# Patient Record
Sex: FEMALE | Race: WHITE | NOT HISPANIC OR LATINO
[De-identification: names, ages, dates, MRNs, and addresses within clinical notes are randomized per-mention and may not be internally consistent; named-entity substitution may affect disease eponyms.]

---

## 2017-01-03 ENCOUNTER — RX RENEWAL (OUTPATIENT)
Age: 82
End: 2017-01-03

## 2017-01-04 ENCOUNTER — MEDICATION RENEWAL (OUTPATIENT)
Age: 82
End: 2017-01-04

## 2017-01-17 ENCOUNTER — APPOINTMENT (OUTPATIENT)
Dept: NEUROSURGERY | Facility: CLINIC | Age: 82
End: 2017-01-17

## 2017-01-17 VITALS
DIASTOLIC BLOOD PRESSURE: 71 MMHG | BODY MASS INDEX: 25.76 KG/M2 | SYSTOLIC BLOOD PRESSURE: 158 MMHG | HEART RATE: 80 BPM | WEIGHT: 140 LBS | HEIGHT: 62 IN

## 2017-01-17 DIAGNOSIS — M54.30 SCIATICA, UNSPECIFIED SIDE: ICD-10-CM

## 2017-01-17 DIAGNOSIS — M96.1 POSTLAMINECTOMY SYNDROME, NOT ELSEWHERE CLASSIFIED: ICD-10-CM

## 2017-02-08 ENCOUNTER — EMERGENCY (EMERGENCY)
Facility: HOSPITAL | Age: 82
LOS: 1 days | Discharge: HOME | End: 2017-02-08
Attending: EMERGENCY MEDICINE | Admitting: EMERGENCY MEDICINE
Payer: MEDICARE

## 2017-02-08 VITALS
HEART RATE: 64 BPM | SYSTOLIC BLOOD PRESSURE: 116 MMHG | WEIGHT: 160.06 LBS | DIASTOLIC BLOOD PRESSURE: 78 MMHG | OXYGEN SATURATION: 97 % | RESPIRATION RATE: 16 BRPM

## 2017-02-08 DIAGNOSIS — E78.5 HYPERLIPIDEMIA, UNSPECIFIED: ICD-10-CM

## 2017-02-08 DIAGNOSIS — R07.89 OTHER CHEST PAIN: ICD-10-CM

## 2017-02-08 DIAGNOSIS — I25.10 ATHEROSCLEROTIC HEART DISEASE OF NATIVE CORONARY ARTERY WITHOUT ANGINA PECTORIS: ICD-10-CM

## 2017-02-08 DIAGNOSIS — K21.9 GASTRO-ESOPHAGEAL REFLUX DISEASE WITHOUT ESOPHAGITIS: ICD-10-CM

## 2017-02-08 DIAGNOSIS — Z96.60 PRESENCE OF UNSPECIFIED ORTHOPEDIC JOINT IMPLANT: Chronic | ICD-10-CM

## 2017-02-08 DIAGNOSIS — Z98.89 OTHER SPECIFIED POSTPROCEDURAL STATES: Chronic | ICD-10-CM

## 2017-02-08 DIAGNOSIS — Z95.5 PRESENCE OF CORONARY ANGIOPLASTY IMPLANT AND GRAFT: ICD-10-CM

## 2017-02-08 DIAGNOSIS — Z79.84 LONG TERM (CURRENT) USE OF ORAL HYPOGLYCEMIC DRUGS: ICD-10-CM

## 2017-02-08 DIAGNOSIS — E11.9 TYPE 2 DIABETES MELLITUS WITHOUT COMPLICATIONS: ICD-10-CM

## 2017-02-08 DIAGNOSIS — Z79.82 LONG TERM (CURRENT) USE OF ASPIRIN: ICD-10-CM

## 2017-02-08 LAB
ALBUMIN SERPL ELPH-MCNC: 4.1 G/DL — SIGNIFICANT CHANGE UP (ref 3.3–5)
ALP SERPL-CCNC: 59 U/L — SIGNIFICANT CHANGE UP (ref 40–120)
ALT FLD-CCNC: 16 U/L — SIGNIFICANT CHANGE UP (ref 12–78)
ANION GAP SERPL CALC-SCNC: 11 MMOL/L — SIGNIFICANT CHANGE UP (ref 5–17)
APTT BLD: 28.5 SEC — SIGNIFICANT CHANGE UP (ref 27.5–37.4)
AST SERPL-CCNC: 16 U/L — SIGNIFICANT CHANGE UP (ref 15–37)
BASOPHILS # BLD AUTO: 0.1 K/UL — SIGNIFICANT CHANGE UP (ref 0–0.2)
BASOPHILS NFR BLD AUTO: 0.6 % — SIGNIFICANT CHANGE UP (ref 0–2)
BILIRUB SERPL-MCNC: 0.3 MG/DL — SIGNIFICANT CHANGE UP (ref 0.2–1.2)
BUN SERPL-MCNC: 19 MG/DL — SIGNIFICANT CHANGE UP (ref 7–23)
CALCIUM SERPL-MCNC: 8.9 MG/DL — SIGNIFICANT CHANGE UP (ref 8.5–10.1)
CHLORIDE SERPL-SCNC: 104 MMOL/L — SIGNIFICANT CHANGE UP (ref 96–108)
CK MB BLD-MCNC: 3 % — SIGNIFICANT CHANGE UP (ref 0–3.5)
CK MB CFR SERPL CALC: 2.6 NG/ML — SIGNIFICANT CHANGE UP (ref 0–3.6)
CK SERPL-CCNC: 88 U/L — SIGNIFICANT CHANGE UP (ref 26–192)
CO2 SERPL-SCNC: 24 MMOL/L — SIGNIFICANT CHANGE UP (ref 22–31)
CREAT SERPL-MCNC: 1.1 MG/DL — SIGNIFICANT CHANGE UP (ref 0.5–1.3)
EOSINOPHIL # BLD AUTO: 0.1 K/UL — SIGNIFICANT CHANGE UP (ref 0–0.5)
EOSINOPHIL NFR BLD AUTO: 0.6 % — SIGNIFICANT CHANGE UP (ref 0–6)
GLUCOSE SERPL-MCNC: 136 MG/DL — HIGH (ref 70–99)
HCT VFR BLD CALC: 36 % — SIGNIFICANT CHANGE UP (ref 34.5–45)
HGB BLD-MCNC: 11.2 G/DL — LOW (ref 11.5–15.5)
INR BLD: 1.03 RATIO — SIGNIFICANT CHANGE UP (ref 0.88–1.16)
LYMPHOCYTES # BLD AUTO: 1.9 K/UL — SIGNIFICANT CHANGE UP (ref 1–3.3)
LYMPHOCYTES # BLD AUTO: 14.5 % — SIGNIFICANT CHANGE UP (ref 13–44)
MCHC RBC-ENTMCNC: 27.3 PG — SIGNIFICANT CHANGE UP (ref 27–34)
MCHC RBC-ENTMCNC: 31 GM/DL — LOW (ref 32–36)
MCV RBC AUTO: 88 FL — SIGNIFICANT CHANGE UP (ref 80–100)
MONOCYTES # BLD AUTO: 0.7 K/UL — SIGNIFICANT CHANGE UP (ref 0–0.9)
MONOCYTES NFR BLD AUTO: 5.4 % — SIGNIFICANT CHANGE UP (ref 1–9)
NEUTROPHILS # BLD AUTO: 10.2 K/UL — HIGH (ref 1.8–7.4)
NEUTROPHILS NFR BLD AUTO: 78.9 % — HIGH (ref 43–77)
PLATELET # BLD AUTO: 238 K/UL — SIGNIFICANT CHANGE UP (ref 150–400)
POTASSIUM SERPL-MCNC: 4.3 MMOL/L — SIGNIFICANT CHANGE UP (ref 3.5–5.3)
POTASSIUM SERPL-SCNC: 4.3 MMOL/L — SIGNIFICANT CHANGE UP (ref 3.5–5.3)
PROT SERPL-MCNC: 7.8 G/DL — SIGNIFICANT CHANGE UP (ref 6–8.3)
PROTHROM AB SERPL-ACNC: 11.4 SEC — SIGNIFICANT CHANGE UP (ref 10–13.1)
RBC # BLD: 4.09 M/UL — SIGNIFICANT CHANGE UP (ref 3.8–5.2)
RBC # FLD: 13.3 % — SIGNIFICANT CHANGE UP (ref 10.3–14.5)
SODIUM SERPL-SCNC: 139 MMOL/L — SIGNIFICANT CHANGE UP (ref 135–145)
TROPONIN I SERPL-MCNC: <.015 NG/ML — SIGNIFICANT CHANGE UP (ref 0.01–0.04)
WBC # BLD: 12.9 K/UL — HIGH (ref 3.8–10.5)
WBC # FLD AUTO: 12.9 K/UL — HIGH (ref 3.8–10.5)

## 2017-02-08 PROCEDURE — 71010: CPT | Mod: 26

## 2017-02-08 PROCEDURE — 99285 EMERGENCY DEPT VISIT HI MDM: CPT

## 2017-02-08 RX ORDER — PANTOPRAZOLE SODIUM 20 MG/1
40 TABLET, DELAYED RELEASE ORAL ONCE
Qty: 0 | Refills: 0 | Status: COMPLETED | OUTPATIENT
Start: 2017-02-08 | End: 2017-02-08

## 2017-02-08 NOTE — ED ADULT NURSE NOTE - PMH
Coronary arteriosclerosis in native artery  s/p 2 stents. last placed 2 years ago  Costochondritis    DM type 2 with diabetic dyslipidemia    Dyslipidemia    Gastroparesis    H/O gastroesophageal reflux (GERD)    H/O vertebral fracture repair    History of vertebral fracture

## 2017-02-08 NOTE — ED ADULT NURSE NOTE - OBJECTIVE STATEMENT
Pt alert and oriented, came to ED with c/o chest pain earlier today, diarrhea earlier today, labs drawn and sent, advised on plan of care/hand hygiene, verbalized understanding, awaiting dispo

## 2017-02-09 VITALS
SYSTOLIC BLOOD PRESSURE: 142 MMHG | DIASTOLIC BLOOD PRESSURE: 68 MMHG | RESPIRATION RATE: 16 BRPM | OXYGEN SATURATION: 96 % | TEMPERATURE: 98 F | HEART RATE: 70 BPM

## 2017-02-09 LAB
CK MB CFR SERPL CALC: 2.1 NG/ML — SIGNIFICANT CHANGE UP (ref 0–3.6)
TROPONIN I SERPL-MCNC: <.015 NG/ML — SIGNIFICANT CHANGE UP (ref 0.01–0.04)

## 2017-02-09 PROCEDURE — 84484 ASSAY OF TROPONIN QUANT: CPT

## 2017-02-09 PROCEDURE — 85610 PROTHROMBIN TIME: CPT

## 2017-02-09 PROCEDURE — 96374 THER/PROPH/DIAG INJ IV PUSH: CPT

## 2017-02-09 PROCEDURE — 85730 THROMBOPLASTIN TIME PARTIAL: CPT

## 2017-02-09 PROCEDURE — 80053 COMPREHEN METABOLIC PANEL: CPT

## 2017-02-09 PROCEDURE — 71045 X-RAY EXAM CHEST 1 VIEW: CPT

## 2017-02-09 PROCEDURE — 93005 ELECTROCARDIOGRAM TRACING: CPT

## 2017-02-09 PROCEDURE — 85027 COMPLETE CBC AUTOMATED: CPT

## 2017-02-09 PROCEDURE — 99284 EMERGENCY DEPT VISIT MOD MDM: CPT | Mod: 25

## 2017-02-09 PROCEDURE — 82553 CREATINE MB FRACTION: CPT

## 2017-02-09 PROCEDURE — 82550 ASSAY OF CK (CPK): CPT

## 2017-02-09 RX ORDER — IBUPROFEN 200 MG
600 TABLET ORAL ONCE
Qty: 0 | Refills: 0 | Status: COMPLETED | OUTPATIENT
Start: 2017-02-09 | End: 2017-02-09

## 2017-02-09 RX ORDER — HYDROMORPHONE HYDROCHLORIDE 2 MG/ML
4 INJECTION INTRAMUSCULAR; INTRAVENOUS; SUBCUTANEOUS ONCE
Qty: 0 | Refills: 0 | Status: DISCONTINUED | OUTPATIENT
Start: 2017-02-09 | End: 2017-02-09

## 2017-02-09 RX ORDER — SUCRALFATE 1 G
1 TABLET ORAL ONCE
Qty: 0 | Refills: 0 | Status: COMPLETED | OUTPATIENT
Start: 2017-02-09 | End: 2017-02-09

## 2017-02-09 RX ADMIN — Medication 600 MILLIGRAM(S): at 05:05

## 2017-02-09 RX ADMIN — HYDROMORPHONE HYDROCHLORIDE 4 MILLIGRAM(S): 2 INJECTION INTRAMUSCULAR; INTRAVENOUS; SUBCUTANEOUS at 08:28

## 2017-02-09 RX ADMIN — Medication 30 MILLILITER(S): at 08:28

## 2017-02-09 RX ADMIN — HYDROMORPHONE HYDROCHLORIDE 4 MILLIGRAM(S): 2 INJECTION INTRAMUSCULAR; INTRAVENOUS; SUBCUTANEOUS at 07:59

## 2017-02-09 RX ADMIN — PANTOPRAZOLE SODIUM 40 MILLIGRAM(S): 20 TABLET, DELAYED RELEASE ORAL at 02:23

## 2017-02-09 RX ADMIN — Medication 600 MILLIGRAM(S): at 08:28

## 2017-02-09 RX ADMIN — Medication 1 GRAM(S): at 02:26

## 2017-02-09 NOTE — ED ADULT NURSE REASSESSMENT NOTE - NS ED NURSE REASSESS COMMENT FT1
await ambulance to transport pt back to her home. taxis and ambulettes are not in service during the snowstorm

## 2017-02-09 NOTE — ED ADULT NURSE REASSESSMENT NOTE - NS ED NURSE REASSESS COMMENT FT1
Pt alert and orientated no symptoms at this time. Pt continues to remove cardiac monitor and equipment. Motrin given for headache. Two sets of cardiac enzymes negative. Pt to see cardiology in the morning then discharged

## 2017-02-09 NOTE — ED PROVIDER NOTE - OBJECTIVE STATEMENT
Pt is a 84 yo female who presents to the ED with a cc of chest discomfort.  Pt reports that she is on chronic opioids for a pain disorder.  Due to this she has suffered from constipation.  She has tried various methods to relieve this without much success.  She recently was at her PMD and was prescribed Movantik for opoid induced constipation. Pt reports that she had taken it before but tonight after taking this medication she developed diarrhea and then severe epigastric discomfort.  About 1/2 hour later she developed chest pain.  Initially she thought that this was secondary to reflux so she tried Zantac, and then Dexilant without relief.  After attempting to rest with continued pain pt called EMS.  She reports that the chest pain concerned her because she does have a cardiac history with 2 previous stents.  Pt believes that the stents were placed in 2012.  Denies fever, chills, N/V, SOB.

## 2017-02-14 ENCOUNTER — APPOINTMENT (OUTPATIENT)
Dept: INTERNAL MEDICINE | Facility: CLINIC | Age: 82
End: 2017-02-14

## 2017-02-14 DIAGNOSIS — Z91.89 OTHER SPECIFIED PERSONAL RISK FACTORS, NOT ELSEWHERE CLASSIFIED: ICD-10-CM

## 2017-02-28 ENCOUNTER — OTHER (OUTPATIENT)
Age: 82
End: 2017-02-28

## 2017-03-01 ENCOUNTER — LABORATORY RESULT (OUTPATIENT)
Age: 82
End: 2017-03-01

## 2017-03-02 LAB
APPEARANCE: CLEAR
BILIRUBIN URINE: NEGATIVE
BLOOD URINE: NEGATIVE
COLOR: YELLOW
GLUCOSE QUALITATIVE U: NORMAL MG/DL
KETONES URINE: NEGATIVE
LEUKOCYTE ESTERASE URINE: ABNORMAL
NITRITE URINE: NEGATIVE
PH URINE: 7
PROTEIN URINE: NEGATIVE MG/DL
SPECIFIC GRAVITY URINE: 1.02
UROBILINOGEN URINE: NORMAL MG/DL

## 2017-05-30 ENCOUNTER — APPOINTMENT (OUTPATIENT)
Dept: INTERNAL MEDICINE | Facility: CLINIC | Age: 82
End: 2017-05-30

## 2017-05-30 VITALS — DIASTOLIC BLOOD PRESSURE: 71 MMHG | OXYGEN SATURATION: 96 % | HEART RATE: 77 BPM | SYSTOLIC BLOOD PRESSURE: 147 MMHG

## 2017-05-31 VITALS
RESPIRATION RATE: 16 BRPM | DIASTOLIC BLOOD PRESSURE: 73 MMHG | HEART RATE: 77 BPM | OXYGEN SATURATION: 97 % | SYSTOLIC BLOOD PRESSURE: 124 MMHG | TEMPERATURE: 97.9 F

## 2017-06-23 ENCOUNTER — APPOINTMENT (OUTPATIENT)
Dept: UROLOGY | Facility: CLINIC | Age: 82
End: 2017-06-23

## 2017-06-23 DIAGNOSIS — Z86.39 PERSONAL HISTORY OF OTHER ENDOCRINE, NUTRITIONAL AND METABOLIC DISEASE: ICD-10-CM

## 2017-06-23 DIAGNOSIS — G62.9 POLYNEUROPATHY, UNSPECIFIED: ICD-10-CM

## 2017-06-23 DIAGNOSIS — N30.00 ACUTE CYSTITIS W/OUT HEMATURIA: ICD-10-CM

## 2017-06-26 LAB
APPEARANCE: CLEAR
BACTERIA UR CULT: NORMAL
BACTERIA: NEGATIVE
BILIRUBIN URINE: NEGATIVE
BLOOD URINE: NEGATIVE
COLOR: YELLOW
GLUCOSE QUALITATIVE U: NORMAL MG/DL
HYALINE CASTS: 6 /LPF
KETONES URINE: NEGATIVE
LEUKOCYTE ESTERASE URINE: NEGATIVE
MICROSCOPIC-UA: NORMAL
NITRITE URINE: NEGATIVE
PH URINE: 6
PROTEIN URINE: 30 MG/DL
RED BLOOD CELLS URINE: 3 /HPF
SPECIFIC GRAVITY URINE: 1.02
SQUAMOUS EPITHELIAL CELLS: 1 /HPF
UROBILINOGEN URINE: NORMAL MG/DL
WHITE BLOOD CELLS URINE: 2 /HPF

## 2017-06-29 ENCOUNTER — APPOINTMENT (OUTPATIENT)
Dept: INTERNAL MEDICINE | Facility: CLINIC | Age: 82
End: 2017-06-29

## 2017-06-29 DIAGNOSIS — R68.83 CHILLS (WITHOUT FEVER): ICD-10-CM

## 2017-07-10 ENCOUNTER — RX RENEWAL (OUTPATIENT)
Age: 82
End: 2017-07-10

## 2017-07-10 DIAGNOSIS — E03.9 HYPOTHYROIDISM, UNSPECIFIED: ICD-10-CM

## 2017-07-10 LAB
CORTIS SERPL-MCNC: 20.9 UG/DL
TSH SERPL-ACNC: 4.5 UIU/ML
VIT B12 SERPL-MCNC: 361 PG/ML

## 2017-07-25 ENCOUNTER — MEDICATION RENEWAL (OUTPATIENT)
Age: 82
End: 2017-07-25

## 2017-08-04 ENCOUNTER — RX RENEWAL (OUTPATIENT)
Age: 82
End: 2017-08-04

## 2017-08-22 ENCOUNTER — APPOINTMENT (OUTPATIENT)
Dept: INTERNAL MEDICINE | Facility: CLINIC | Age: 82
End: 2017-08-22
Payer: MEDICARE

## 2017-08-22 VITALS
SYSTOLIC BLOOD PRESSURE: 113 MMHG | OXYGEN SATURATION: 97 % | HEART RATE: 87 BPM | DIASTOLIC BLOOD PRESSURE: 79 MMHG | RESPIRATION RATE: 14 BRPM

## 2017-08-22 DIAGNOSIS — L03.115 CELLULITIS OF RIGHT LOWER LIMB: ICD-10-CM

## 2017-08-22 PROCEDURE — 99213 OFFICE O/P EST LOW 20 MIN: CPT

## 2017-08-24 LAB
BASOPHILS # BLD AUTO: 0.02 K/UL
BASOPHILS NFR BLD AUTO: 0.2 %
EOSINOPHIL # BLD AUTO: 0.19 K/UL
EOSINOPHIL NFR BLD AUTO: 2.4 %
HCT VFR BLD CALC: 27.8 %
HGB BLD-MCNC: 8.5 G/DL
IMM GRANULOCYTES NFR BLD AUTO: 0.2 %
LYMPHOCYTES # BLD AUTO: 1.32 K/UL
LYMPHOCYTES NFR BLD AUTO: 16.4 %
MAN DIFF?: NORMAL
MCHC RBC-ENTMCNC: 25.2 PG
MCHC RBC-ENTMCNC: 30.6 GM/DL
MCV RBC AUTO: 82.5 FL
MONOCYTES # BLD AUTO: 0.56 K/UL
MONOCYTES NFR BLD AUTO: 7 %
NEUTROPHILS # BLD AUTO: 5.93 K/UL
NEUTROPHILS NFR BLD AUTO: 73.8 %
PLATELET # BLD AUTO: 277 K/UL
RBC # BLD: 3.37 M/UL
RBC # FLD: 15.9 %
WBC # FLD AUTO: 8.04 K/UL

## 2017-08-29 ENCOUNTER — APPOINTMENT (OUTPATIENT)
Dept: INTERNAL MEDICINE | Facility: CLINIC | Age: 82
End: 2017-08-29
Payer: MEDICARE

## 2017-08-29 VITALS
WEIGHT: 140 LBS | SYSTOLIC BLOOD PRESSURE: 124 MMHG | RESPIRATION RATE: 14 BRPM | DIASTOLIC BLOOD PRESSURE: 80 MMHG | BODY MASS INDEX: 25.61 KG/M2 | OXYGEN SATURATION: 98 % | HEART RATE: 84 BPM

## 2017-08-29 PROCEDURE — 99214 OFFICE O/P EST MOD 30 MIN: CPT

## 2017-08-30 LAB
FERRITIN SERPL-MCNC: 8 NG/ML
HBA1C MFR BLD HPLC: 7.1 %
IRON SERPL-MCNC: 22 UG/DL

## 2017-08-31 ENCOUNTER — RX RENEWAL (OUTPATIENT)
Age: 82
End: 2017-08-31

## 2017-09-19 ENCOUNTER — EMERGENCY (EMERGENCY)
Facility: HOSPITAL | Age: 82
LOS: 1 days | Discharge: HOME | End: 2017-09-19
Attending: EMERGENCY MEDICINE | Admitting: EMERGENCY MEDICINE
Payer: MEDICARE

## 2017-09-19 VITALS
RESPIRATION RATE: 14 BRPM | WEIGHT: 138.01 LBS | DIASTOLIC BLOOD PRESSURE: 62 MMHG | SYSTOLIC BLOOD PRESSURE: 130 MMHG | OXYGEN SATURATION: 97 % | HEART RATE: 72 BPM | TEMPERATURE: 98 F

## 2017-09-19 DIAGNOSIS — Z96.60 PRESENCE OF UNSPECIFIED ORTHOPEDIC JOINT IMPLANT: Chronic | ICD-10-CM

## 2017-09-19 DIAGNOSIS — Z98.89 OTHER SPECIFIED POSTPROCEDURAL STATES: Chronic | ICD-10-CM

## 2017-09-19 LAB
POTASSIUM SERPL-MCNC: 4.8 MMOL/L — SIGNIFICANT CHANGE UP (ref 3.5–5.3)
POTASSIUM SERPL-SCNC: 4.8 MMOL/L — SIGNIFICANT CHANGE UP (ref 3.5–5.3)

## 2017-09-19 PROCEDURE — 99283 EMERGENCY DEPT VISIT LOW MDM: CPT

## 2017-09-19 PROCEDURE — 84132 ASSAY OF SERUM POTASSIUM: CPT

## 2017-09-19 NOTE — ED ADULT NURSE NOTE - OBJECTIVE STATEMENT
Pt A&Ox4, ambulatory to ED states she had a lab draw that showed high potassium.  Pt states she went to urgent care this past Sunday for an evaluation of chills and general malaise.  Urgent care did blood analysis and called pt to report potassium of 5.7.  Pt states she needs blood redrawn and checked.  Urgent care faxed over copy of report of pt's bloodwork results.

## 2017-09-19 NOTE — ED PROVIDER NOTE - ATTENDING CONTRIBUTION TO CARE
Pt seen and examined h and p  daughter at bedside. she has hx of swollen legs and anemia, she has also been on abx for a wound on her r leg she sustained over a month ago-now getting better.  went to see urgent care for her leg at the time labs were drawn and she got a call today of the results.  the k was slightly elevated. pt has no other complaintss no sob no fever chills and is here for re evaluation of her serum potassium  eval of her legs demonstrates a healing cellulitis -pt counselled to keep legs up when possible despite her sciatica, she tolerated the lab draw and has no other general illness or complaint.

## 2017-09-19 NOTE — ED PROVIDER NOTE - OBJECTIVE STATEMENT
86 yo female sent to ed from urgent care, blood work was drawn on sunday, patient was called and informed potassium level is high 5.7 , to have level rechecked.  patient states she feels well. has hx of anemia, hgb has gone up since taking iron tablets.    PMD Dr Delacruz

## 2017-09-19 NOTE — ED PROVIDER NOTE - CHIEF COMPLAINT
The patient is a 85y Female complaining of see chief complaint quote. The patient is a 85y Female complaining of abnormal labs

## 2017-09-19 NOTE — ED ADULT NURSE NOTE - CHPI ED SYMPTOMS NEG
no back pain/no fever/no pain/no decreased eating/drinking/no vomiting/no nausea/no dizziness/no loss of consciousness/no headache

## 2017-09-23 DIAGNOSIS — R60.0 LOCALIZED EDEMA: ICD-10-CM

## 2017-09-23 DIAGNOSIS — Z79.84 LONG TERM (CURRENT) USE OF ORAL HYPOGLYCEMIC DRUGS: ICD-10-CM

## 2017-09-23 DIAGNOSIS — Z95.5 PRESENCE OF CORONARY ANGIOPLASTY IMPLANT AND GRAFT: ICD-10-CM

## 2017-09-23 DIAGNOSIS — Z79.2 LONG TERM (CURRENT) USE OF ANTIBIOTICS: ICD-10-CM

## 2017-09-23 DIAGNOSIS — Z79.82 LONG TERM (CURRENT) USE OF ASPIRIN: ICD-10-CM

## 2017-09-23 DIAGNOSIS — R79.89 OTHER SPECIFIED ABNORMAL FINDINGS OF BLOOD CHEMISTRY: ICD-10-CM

## 2017-09-23 DIAGNOSIS — D64.9 ANEMIA, UNSPECIFIED: ICD-10-CM

## 2017-09-23 DIAGNOSIS — E11.9 TYPE 2 DIABETES MELLITUS WITHOUT COMPLICATIONS: ICD-10-CM

## 2017-09-23 DIAGNOSIS — E78.5 HYPERLIPIDEMIA, UNSPECIFIED: ICD-10-CM

## 2017-09-23 DIAGNOSIS — E87.5 HYPERKALEMIA: ICD-10-CM

## 2017-09-23 DIAGNOSIS — K21.9 GASTRO-ESOPHAGEAL REFLUX DISEASE WITHOUT ESOPHAGITIS: ICD-10-CM

## 2017-09-23 DIAGNOSIS — I70.90 UNSPECIFIED ATHEROSCLEROSIS: ICD-10-CM

## 2017-09-25 ENCOUNTER — MEDICATION RENEWAL (OUTPATIENT)
Age: 82
End: 2017-09-25

## 2017-10-06 ENCOUNTER — APPOINTMENT (OUTPATIENT)
Dept: UROLOGY | Facility: CLINIC | Age: 82
End: 2017-10-06

## 2017-10-09 ENCOUNTER — RX RENEWAL (OUTPATIENT)
Age: 82
End: 2017-10-09

## 2017-10-10 ENCOUNTER — RX RENEWAL (OUTPATIENT)
Age: 82
End: 2017-10-10

## 2017-10-10 ENCOUNTER — MEDICATION RENEWAL (OUTPATIENT)
Age: 82
End: 2017-10-10

## 2017-10-13 ENCOUNTER — RX RENEWAL (OUTPATIENT)
Age: 82
End: 2017-10-13

## 2017-10-16 ENCOUNTER — OTHER (OUTPATIENT)
Age: 82
End: 2017-10-16

## 2017-10-16 ENCOUNTER — LABORATORY RESULT (OUTPATIENT)
Age: 82
End: 2017-10-16

## 2017-10-17 ENCOUNTER — MOBILE ON CALL (OUTPATIENT)
Age: 82
End: 2017-10-17

## 2017-10-17 LAB
APPEARANCE: CLEAR
BILIRUBIN URINE: NEGATIVE
BLOOD URINE: NEGATIVE
COLOR: YELLOW
GLUCOSE QUALITATIVE U: NEGATIVE MG/DL
KETONES URINE: ABNORMAL
LEUKOCYTE ESTERASE URINE: NEGATIVE
NITRITE URINE: NEGATIVE
PH URINE: 7
PROTEIN URINE: 30 MG/DL
SPECIFIC GRAVITY URINE: 1.02
UROBILINOGEN URINE: NEGATIVE MG/DL

## 2017-10-31 ENCOUNTER — MEDICATION RENEWAL (OUTPATIENT)
Age: 82
End: 2017-10-31

## 2017-11-01 ENCOUNTER — MEDICATION RENEWAL (OUTPATIENT)
Age: 82
End: 2017-11-01

## 2017-12-06 ENCOUNTER — MEDICATION RENEWAL (OUTPATIENT)
Age: 82
End: 2017-12-06

## 2017-12-18 ENCOUNTER — MEDICATION RENEWAL (OUTPATIENT)
Age: 82
End: 2017-12-18

## 2017-12-18 ENCOUNTER — EMERGENCY (EMERGENCY)
Facility: HOSPITAL | Age: 82
LOS: 1 days | Discharge: HOME | End: 2017-12-18
Attending: EMERGENCY MEDICINE | Admitting: EMERGENCY MEDICINE
Payer: MEDICARE

## 2017-12-18 VITALS
SYSTOLIC BLOOD PRESSURE: 130 MMHG | RESPIRATION RATE: 18 BRPM | OXYGEN SATURATION: 100 % | DIASTOLIC BLOOD PRESSURE: 76 MMHG | HEART RATE: 67 BPM

## 2017-12-18 VITALS
HEIGHT: 62 IN | OXYGEN SATURATION: 100 % | HEART RATE: 69 BPM | TEMPERATURE: 99 F | WEIGHT: 138.89 LBS | RESPIRATION RATE: 15 BRPM | SYSTOLIC BLOOD PRESSURE: 134 MMHG | DIASTOLIC BLOOD PRESSURE: 78 MMHG

## 2017-12-18 DIAGNOSIS — Z98.89 OTHER SPECIFIED POSTPROCEDURAL STATES: Chronic | ICD-10-CM

## 2017-12-18 DIAGNOSIS — Z96.60 PRESENCE OF UNSPECIFIED ORTHOPEDIC JOINT IMPLANT: Chronic | ICD-10-CM

## 2017-12-18 LAB
ALBUMIN SERPL ELPH-MCNC: 3.4 G/DL — SIGNIFICANT CHANGE UP (ref 3.3–5)
ALP SERPL-CCNC: 62 U/L — SIGNIFICANT CHANGE UP (ref 40–120)
ALT FLD-CCNC: 14 U/L — SIGNIFICANT CHANGE UP (ref 12–78)
ANION GAP SERPL CALC-SCNC: 6 MMOL/L — SIGNIFICANT CHANGE UP (ref 5–17)
APPEARANCE UR: ABNORMAL
AST SERPL-CCNC: 15 U/L — SIGNIFICANT CHANGE UP (ref 15–37)
BASOPHILS # BLD AUTO: 0.1 K/UL — SIGNIFICANT CHANGE UP (ref 0–0.2)
BASOPHILS NFR BLD AUTO: 0.6 % — SIGNIFICANT CHANGE UP (ref 0–2)
BILIRUB SERPL-MCNC: 0.3 MG/DL — SIGNIFICANT CHANGE UP (ref 0.2–1.2)
BILIRUB UR-MCNC: NEGATIVE — SIGNIFICANT CHANGE UP
BUN SERPL-MCNC: 19 MG/DL — SIGNIFICANT CHANGE UP (ref 7–23)
CALCIUM SERPL-MCNC: 8.6 MG/DL — SIGNIFICANT CHANGE UP (ref 8.5–10.1)
CHLORIDE SERPL-SCNC: 111 MMOL/L — HIGH (ref 96–108)
CO2 SERPL-SCNC: 24 MMOL/L — SIGNIFICANT CHANGE UP (ref 22–31)
COLOR SPEC: YELLOW — SIGNIFICANT CHANGE UP
CREAT SERPL-MCNC: 0.93 MG/DL — SIGNIFICANT CHANGE UP (ref 0.5–1.3)
DIFF PNL FLD: NEGATIVE — SIGNIFICANT CHANGE UP
EOSINOPHIL # BLD AUTO: 0.1 K/UL — SIGNIFICANT CHANGE UP (ref 0–0.5)
EOSINOPHIL NFR BLD AUTO: 1 % — SIGNIFICANT CHANGE UP (ref 0–6)
GLUCOSE SERPL-MCNC: 110 MG/DL — HIGH (ref 70–99)
GLUCOSE UR QL: NEGATIVE — SIGNIFICANT CHANGE UP
HCT VFR BLD CALC: 30.4 % — LOW (ref 34.5–45)
HGB BLD-MCNC: 9.2 G/DL — LOW (ref 11.5–15.5)
KETONES UR-MCNC: NEGATIVE — SIGNIFICANT CHANGE UP
LEUKOCYTE ESTERASE UR-ACNC: ABNORMAL
LYMPHOCYTES # BLD AUTO: 1.1 K/UL — SIGNIFICANT CHANGE UP (ref 1–3.3)
LYMPHOCYTES # BLD AUTO: 11.6 % — LOW (ref 13–44)
MCHC RBC-ENTMCNC: 26 PG — LOW (ref 27–34)
MCHC RBC-ENTMCNC: 30.2 GM/DL — LOW (ref 32–36)
MCV RBC AUTO: 86.1 FL — SIGNIFICANT CHANGE UP (ref 80–100)
MONOCYTES # BLD AUTO: 0.6 K/UL — SIGNIFICANT CHANGE UP (ref 0–0.9)
MONOCYTES NFR BLD AUTO: 6.1 % — SIGNIFICANT CHANGE UP (ref 1–9)
NEUTROPHILS # BLD AUTO: 7.8 K/UL — HIGH (ref 1.8–7.4)
NEUTROPHILS NFR BLD AUTO: 80.6 % — HIGH (ref 43–77)
NITRITE UR-MCNC: NEGATIVE — SIGNIFICANT CHANGE UP
PH UR: 8 — SIGNIFICANT CHANGE UP (ref 5–8)
PLATELET # BLD AUTO: 253 K/UL — SIGNIFICANT CHANGE UP (ref 150–400)
POTASSIUM SERPL-MCNC: 4.5 MMOL/L — SIGNIFICANT CHANGE UP (ref 3.5–5.3)
POTASSIUM SERPL-SCNC: 4.5 MMOL/L — SIGNIFICANT CHANGE UP (ref 3.5–5.3)
PROT SERPL-MCNC: 6.8 G/DL — SIGNIFICANT CHANGE UP (ref 6–8.3)
PROT UR-MCNC: 25 MG/DL
RBC # BLD: 3.53 M/UL — LOW (ref 3.8–5.2)
RBC # FLD: 15 % — HIGH (ref 10.3–14.5)
SODIUM SERPL-SCNC: 141 MMOL/L — SIGNIFICANT CHANGE UP (ref 135–145)
SP GR SPEC: 1.01 — SIGNIFICANT CHANGE UP (ref 1.01–1.02)
UROBILINOGEN FLD QL: NEGATIVE — SIGNIFICANT CHANGE UP
WBC # BLD: 9.7 K/UL — SIGNIFICANT CHANGE UP (ref 3.8–10.5)
WBC # FLD AUTO: 9.7 K/UL — SIGNIFICANT CHANGE UP (ref 3.8–10.5)

## 2017-12-18 PROCEDURE — 70450 CT HEAD/BRAIN W/O DYE: CPT | Mod: 26

## 2017-12-18 PROCEDURE — 99284 EMERGENCY DEPT VISIT MOD MDM: CPT | Mod: 25

## 2017-12-18 PROCEDURE — 87086 URINE CULTURE/COLONY COUNT: CPT

## 2017-12-18 PROCEDURE — 70450 CT HEAD/BRAIN W/O DYE: CPT

## 2017-12-18 PROCEDURE — 93010 ELECTROCARDIOGRAM REPORT: CPT

## 2017-12-18 PROCEDURE — 85027 COMPLETE CBC AUTOMATED: CPT

## 2017-12-18 PROCEDURE — 99285 EMERGENCY DEPT VISIT HI MDM: CPT

## 2017-12-18 PROCEDURE — 81001 URINALYSIS AUTO W/SCOPE: CPT

## 2017-12-18 PROCEDURE — 71045 X-RAY EXAM CHEST 1 VIEW: CPT

## 2017-12-18 PROCEDURE — 93005 ELECTROCARDIOGRAM TRACING: CPT

## 2017-12-18 PROCEDURE — 71010: CPT | Mod: 26

## 2017-12-18 PROCEDURE — 80053 COMPREHEN METABOLIC PANEL: CPT

## 2017-12-18 RX ORDER — SODIUM CHLORIDE 9 MG/ML
1000 INJECTION INTRAMUSCULAR; INTRAVENOUS; SUBCUTANEOUS ONCE
Qty: 0 | Refills: 0 | Status: COMPLETED | OUTPATIENT
Start: 2017-12-18 | End: 2017-12-18

## 2017-12-18 RX ADMIN — SODIUM CHLORIDE 1000 MILLILITER(S): 9 INJECTION INTRAMUSCULAR; INTRAVENOUS; SUBCUTANEOUS at 12:01

## 2017-12-18 NOTE — ED PROVIDER NOTE - OBJECTIVE STATEMENT
87 yo female with hx cad, dm, chronic back pain bibems due to confusion this am. Patient is aaox3, but doesn't know why she is here. History from patients daughter, who states patient called her twice this am, and was talking about people that werent there. Patient was hallucinating/seeing people. Patient was also prescribed ambien 10mg yesterday and took before bed. Patient denies any chest pain or shortness of breath. denies abdominal pain

## 2017-12-18 NOTE — ED PROVIDER NOTE - PROGRESS NOTE DETAILS
patient observed x 5 hours in ED: remains aaox3  symptoms this am possibly related to ambien  patient wants to go home  encouraged patient and mother to follow up with pmd had social work speak with patient and discuss home care options

## 2017-12-18 NOTE — ED ADULT TRIAGE NOTE - CHIEF COMPLAINT QUOTE
family states pt is confused, hallucinating ,possibly took extra medications, (pt on xanax and pain pills)

## 2017-12-19 LAB
CULTURE RESULTS: SIGNIFICANT CHANGE UP
SPECIMEN SOURCE: SIGNIFICANT CHANGE UP

## 2018-06-01 ENCOUNTER — EMERGENCY (EMERGENCY)
Facility: HOSPITAL | Age: 83
LOS: 1 days | Discharge: HOME | End: 2018-06-01
Attending: INTERNAL MEDICINE
Payer: MEDICARE

## 2018-06-01 VITALS
OXYGEN SATURATION: 97 % | SYSTOLIC BLOOD PRESSURE: 125 MMHG | TEMPERATURE: 98 F | DIASTOLIC BLOOD PRESSURE: 73 MMHG | HEART RATE: 78 BPM | RESPIRATION RATE: 18 BRPM

## 2018-06-01 DIAGNOSIS — Z98.89 OTHER SPECIFIED POSTPROCEDURAL STATES: Chronic | ICD-10-CM

## 2018-06-01 DIAGNOSIS — Z96.60 PRESENCE OF UNSPECIFIED ORTHOPEDIC JOINT IMPLANT: Chronic | ICD-10-CM

## 2018-06-01 PROCEDURE — 71045 X-RAY EXAM CHEST 1 VIEW: CPT | Mod: 26

## 2018-06-01 PROCEDURE — 74019 RADEX ABDOMEN 2 VIEWS: CPT

## 2018-06-01 PROCEDURE — 99284 EMERGENCY DEPT VISIT MOD MDM: CPT

## 2018-06-01 PROCEDURE — 71045 X-RAY EXAM CHEST 1 VIEW: CPT

## 2018-06-01 PROCEDURE — 99284 EMERGENCY DEPT VISIT MOD MDM: CPT | Mod: 25

## 2018-06-01 PROCEDURE — 74019 RADEX ABDOMEN 2 VIEWS: CPT | Mod: 26

## 2018-06-01 RX ORDER — ONDANSETRON 8 MG/1
4 TABLET, FILM COATED ORAL ONCE
Qty: 0 | Refills: 0 | Status: COMPLETED | OUTPATIENT
Start: 2018-06-01 | End: 2018-06-01

## 2018-06-01 RX ADMIN — ONDANSETRON 4 MILLIGRAM(S): 8 TABLET, FILM COATED ORAL at 06:49

## 2018-06-01 RX ADMIN — Medication 1 ENEMA: at 07:20

## 2018-06-01 NOTE — ED ADULT NURSE NOTE - OBJECTIVE STATEMENT
Pt. complaining of constipation x 1 day. Pt. stated she "ran out of milk of magnesia tablets and dulcolax" Pt. complaining of constipation x 1 day. Pt. stated she "ran out of milk of magnesia tablets and dulcolax" and attempted to disimpact her stool but was unsuccessful. Pt. stated she took 2 suppositories last night with oral milk of magnesia and a stool softener. Denied vomiting of fevers. Pt. complaining of constipation x 1 day. Pt. stated she "ran out of milk of magnesia tablets and dulcolax" and attempted to disimpact her stool but was unsuccessful. Pt. stated she took 2 suppositories last night with oral milk of magnesia and a stool softener. Denied vomiting of fevers. Blanchable redness noted to sacrum.

## 2018-06-01 NOTE — ED PROVIDER NOTE - OBJECTIVE STATEMENT
87 y/o wf h/o Coronary arteriosclerosis in native artery  s/p 2 stents  DM type 2 with diabetic dyslipidemia back pain, pain management on analgesia. C/O constipation, fecal impaction sensation. no abdominal pain, no nausea, no vomiting , no GIB. no fever, no chills.

## 2018-06-01 NOTE — ED ADULT TRIAGE NOTE - CHIEF COMPLAINT QUOTE
patient c/o constipation since yesterday took stool softener with no bowel movement. c/o abdominal pain

## 2018-09-05 ENCOUNTER — APPOINTMENT (OUTPATIENT)
Dept: CARDIOLOGY | Facility: CLINIC | Age: 83
End: 2018-09-05
Payer: MEDICARE

## 2018-09-05 ENCOUNTER — NON-APPOINTMENT (OUTPATIENT)
Age: 83
End: 2018-09-05

## 2018-09-05 VITALS
HEART RATE: 73 BPM | WEIGHT: 135 LBS | BODY MASS INDEX: 24.84 KG/M2 | OXYGEN SATURATION: 99 % | SYSTOLIC BLOOD PRESSURE: 149 MMHG | DIASTOLIC BLOOD PRESSURE: 67 MMHG | HEIGHT: 62 IN

## 2018-09-05 VITALS — SYSTOLIC BLOOD PRESSURE: 130 MMHG | DIASTOLIC BLOOD PRESSURE: 68 MMHG

## 2018-09-05 DIAGNOSIS — I65.29 OCCLUSION AND STENOSIS OF UNSPECIFIED CAROTID ARTERY: ICD-10-CM

## 2018-09-05 PROCEDURE — 93000 ELECTROCARDIOGRAM COMPLETE: CPT

## 2018-09-05 PROCEDURE — 99215 OFFICE O/P EST HI 40 MIN: CPT

## 2018-09-05 RX ORDER — LINAGLIPTIN 5 MG/1
5 TABLET, FILM COATED ORAL DAILY
Refills: 0 | Status: ACTIVE | COMMUNITY

## 2018-09-22 ENCOUNTER — APPOINTMENT (OUTPATIENT)
Dept: CARDIOLOGY | Facility: CLINIC | Age: 83
End: 2018-09-22
Payer: MEDICARE

## 2018-09-22 PROCEDURE — 93880 EXTRACRANIAL BILAT STUDY: CPT

## 2018-09-28 ENCOUNTER — APPOINTMENT (OUTPATIENT)
Dept: CARDIOLOGY | Facility: CLINIC | Age: 83
End: 2018-09-28
Payer: MEDICARE

## 2018-09-28 PROCEDURE — 93306 TTE W/DOPPLER COMPLETE: CPT

## 2018-11-02 ENCOUNTER — APPOINTMENT (OUTPATIENT)
Dept: CARDIOLOGY | Facility: CLINIC | Age: 83
End: 2018-11-02

## 2018-12-11 ENCOUNTER — EMERGENCY (EMERGENCY)
Facility: HOSPITAL | Age: 83
LOS: 1 days | Discharge: HOME | End: 2018-12-11
Attending: EMERGENCY MEDICINE | Admitting: EMERGENCY MEDICINE
Payer: MEDICARE

## 2018-12-11 VITALS
DIASTOLIC BLOOD PRESSURE: 69 MMHG | WEIGHT: 132.94 LBS | HEIGHT: 62 IN | HEART RATE: 72 BPM | SYSTOLIC BLOOD PRESSURE: 125 MMHG | RESPIRATION RATE: 18 BRPM | OXYGEN SATURATION: 100 % | TEMPERATURE: 98 F

## 2018-12-11 DIAGNOSIS — Z96.60 PRESENCE OF UNSPECIFIED ORTHOPEDIC JOINT IMPLANT: Chronic | ICD-10-CM

## 2018-12-11 DIAGNOSIS — Z98.89 OTHER SPECIFIED POSTPROCEDURAL STATES: Chronic | ICD-10-CM

## 2018-12-11 PROCEDURE — 74019 RADEX ABDOMEN 2 VIEWS: CPT

## 2018-12-11 PROCEDURE — 74019 RADEX ABDOMEN 2 VIEWS: CPT | Mod: 26

## 2018-12-11 PROCEDURE — 99283 EMERGENCY DEPT VISIT LOW MDM: CPT

## 2018-12-11 PROCEDURE — 99283 EMERGENCY DEPT VISIT LOW MDM: CPT | Mod: 25

## 2018-12-11 RX ORDER — MAGNESIUM HYDROXIDE 400 MG/1
30 TABLET, CHEWABLE ORAL ONCE
Qty: 0 | Refills: 0 | Status: COMPLETED | OUTPATIENT
Start: 2018-12-11 | End: 2018-12-11

## 2018-12-11 RX ADMIN — Medication 5 MILLIGRAM(S): at 15:20

## 2018-12-11 RX ADMIN — Medication 1 ENEMA: at 15:31

## 2018-12-11 RX ADMIN — MAGNESIUM HYDROXIDE 30 MILLILITER(S): 400 TABLET, CHEWABLE ORAL at 15:01

## 2018-12-11 NOTE — ED PROVIDER NOTE - OBJECTIVE STATEMENT
86 y female presents with constipation this morning, states she is on chronic pain medications, is on pain management and has hx of constipation, denies vomiting, states she was "pushing to have a bowel movement today" and now has rectal pain.  states she took dulcolax last night and miralax this am.  PMH :Coronary arteriosclerosis in native artery  s/p 2 stents. last placed 2 years ago, Costochondritis  ,DM type 2 with diabetic dyslipidemia  ,Dyslipidemia    Gastroparesis  ,H/O gastroesophageal reflux (GERD)  ,H/O vertebral fracture repair  ,History of vertebral fracture  PMD: Dr Delacruz

## 2018-12-11 NOTE — ED ADULT NURSE NOTE - NSIMPLEMENTINTERV_GEN_ALL_ED
Implemented All Fall Risk Interventions:  [place] to call system. Call bell, personal items and telephone within reach. Instruct patient to call for assistance. Room bathroom lighting operational. Non-slip footwear when patient is off stretcher. Physically safe environment: no spills, clutter or unnecessary equipment. Stretcher in lowest position, wheels locked, appropriate side rails in place. Provide visual cue, wrist band, yellow gown, etc. Monitor gait and stability. Monitor for mental status changes and reorient to person, place, and time. Review medications for side effects contributing to fall risk. Reinforce activity limits and safety measures with patient and family.

## 2018-12-11 NOTE — ED PROVIDER NOTE - PROGRESS NOTE DETAILS
patient had bowel movement in ED, requesting discharge, copy of xray result given,  advised increase fiber in diet, hydrate, follow up with her pmd and pain management doctor, if any concerns return to ED

## 2018-12-11 NOTE — ED PROVIDER NOTE - ATTENDING CONTRIBUTION TO CARE
Elderly white female with constipation here for evaluation. No vomiting and no abdominal pain. Exam revealed white female in NAD with soft and non tender abdomen. I agree with plan and management outlined by PA.

## 2019-01-31 ENCOUNTER — APPOINTMENT (OUTPATIENT)
Dept: UROLOGY | Facility: CLINIC | Age: 84
End: 2019-01-31
Payer: MEDICARE

## 2019-01-31 DIAGNOSIS — E11.9 TYPE 2 DIABETES MELLITUS W/OUT COMPLICATIONS: ICD-10-CM

## 2019-01-31 DIAGNOSIS — N39.0 URINARY TRACT INFECTION, SITE NOT SPECIFIED: ICD-10-CM

## 2019-01-31 PROCEDURE — 99214 OFFICE O/P EST MOD 30 MIN: CPT

## 2019-01-31 RX ORDER — LEVOTHYROXINE SODIUM 0.03 MG/1
25 TABLET ORAL
Qty: 90 | Refills: 0 | Status: DISCONTINUED | COMMUNITY
Start: 2017-07-10 | End: 2019-01-31

## 2019-01-31 RX ORDER — OXYBUTYNIN CHLORIDE 10 MG/1
10 TABLET, EXTENDED RELEASE ORAL
Qty: 30 | Refills: 5 | Status: DISCONTINUED | COMMUNITY
Start: 2017-12-06 | End: 2019-01-31

## 2019-02-01 LAB
APPEARANCE: CLEAR
BACTERIA: NEGATIVE
BILIRUBIN URINE: NEGATIVE
BLOOD URINE: NEGATIVE
COLOR: YELLOW
GLUCOSE QUALITATIVE U: NEGATIVE MG/DL
HYALINE CASTS: 2 /LPF
KETONES URINE: ABNORMAL
LEUKOCYTE ESTERASE URINE: NEGATIVE
MICROSCOPIC-UA: NORMAL
NITRITE URINE: NEGATIVE
PH URINE: 5.5
PROTEIN URINE: ABNORMAL MG/DL
RED BLOOD CELLS URINE: 2 /HPF
SPECIFIC GRAVITY URINE: 1.02
SQUAMOUS EPITHELIAL CELLS: 1 /HPF
UROBILINOGEN URINE: NEGATIVE MG/DL
WHITE BLOOD CELLS URINE: 3 /HPF

## 2019-04-04 ENCOUNTER — EMERGENCY (EMERGENCY)
Facility: HOSPITAL | Age: 84
LOS: 1 days | Discharge: HOME | End: 2019-04-04
Attending: EMERGENCY MEDICINE | Admitting: EMERGENCY MEDICINE
Payer: MEDICARE

## 2019-04-04 VITALS
WEIGHT: 134.04 LBS | RESPIRATION RATE: 17 BRPM | SYSTOLIC BLOOD PRESSURE: 158 MMHG | OXYGEN SATURATION: 99 % | HEART RATE: 70 BPM | DIASTOLIC BLOOD PRESSURE: 82 MMHG

## 2019-04-04 DIAGNOSIS — Z98.89 OTHER SPECIFIED POSTPROCEDURAL STATES: Chronic | ICD-10-CM

## 2019-04-04 DIAGNOSIS — Z96.60 PRESENCE OF UNSPECIFIED ORTHOPEDIC JOINT IMPLANT: Chronic | ICD-10-CM

## 2019-04-04 PROCEDURE — 74018 RADEX ABDOMEN 1 VIEW: CPT | Mod: 26

## 2019-04-04 PROCEDURE — 99283 EMERGENCY DEPT VISIT LOW MDM: CPT | Mod: 25

## 2019-04-04 PROCEDURE — 99283 EMERGENCY DEPT VISIT LOW MDM: CPT

## 2019-04-04 PROCEDURE — 74018 RADEX ABDOMEN 1 VIEW: CPT

## 2019-04-04 RX ORDER — POLYETHYLENE GLYCOL 3350 17 G/17G
17 POWDER, FOR SOLUTION ORAL
Qty: 1 | Refills: 0
Start: 2019-04-04 | End: 2019-04-10

## 2019-04-04 RX ORDER — SODIUM CHLORIDE 9 MG/ML
1000 INJECTION INTRAMUSCULAR; INTRAVENOUS; SUBCUTANEOUS ONCE
Qty: 0 | Refills: 0 | Status: COMPLETED | OUTPATIENT
Start: 2019-04-04 | End: 2019-04-04

## 2019-04-04 RX ORDER — DOCUSATE SODIUM 100 MG
1 CAPSULE ORAL
Qty: 10 | Refills: 0
Start: 2019-04-04 | End: 2019-04-08

## 2019-04-04 RX ORDER — POLYETHYLENE GLYCOL 3350 17 G/17G
17 POWDER, FOR SOLUTION ORAL ONCE
Qty: 0 | Refills: 0 | Status: COMPLETED | OUTPATIENT
Start: 2019-04-04 | End: 2019-04-04

## 2019-04-04 RX ADMIN — POLYETHYLENE GLYCOL 3350 17 GRAM(S): 17 POWDER, FOR SOLUTION ORAL at 12:08

## 2019-04-04 RX ADMIN — Medication 1 ENEMA: at 12:08

## 2019-04-04 RX ADMIN — Medication 5 MILLIGRAM(S): at 12:08

## 2019-04-04 NOTE — ED PROVIDER NOTE - OBJECTIVE STATEMENT
87 y female accompanied to ED by daughter, presents with constipation x 1 days, states she has hx of constipation,  denies vomiting, diarrhea, states her diet is poor, does not drink enough fluids 87 y female accompanied to ED by daughter, presents with constipation x 1 days, states she has hx of constipation, last episode a few months ago,   denies vomiting, diarrhea, states her diet is poor, does not drink enough fluids.  GI Dr Moe  PM:   Coronary arteriosclerosis in native artery  s/p 2 stents. last placed 2 years ago  Costochondritis    DM type 2 with diabetic dyslipidemia    Dyslipidemia    Gastroparesis    H/O gastroesophageal reflux (GERD)    H/O vertebral fracture repair    History of vertebral fracture

## 2019-04-04 NOTE — ED ADULT NURSE NOTE - NSIMPLEMENTINTERV_GEN_ALL_ED
Implemented All Fall with Harm Risk Interventions:  [place] to call system. Call bell, personal items and telephone within reach. Instruct patient to call for assistance. Room bathroom lighting operational. Non-slip footwear when patient is off stretcher. Physically safe environment: no spills, clutter or unnecessary equipment. Stretcher in lowest position, wheels locked, appropriate side rails in place. Provide visual cue, wrist band, yellow gown, etc. Monitor gait and stability. Monitor for mental status changes and reorient to person, place, and time. Review medications for side effects contributing to fall risk. Reinforce activity limits and safety measures with patient and family. Provide visual clues: red socks.

## 2019-04-04 NOTE — ED PROVIDER NOTE - PROGRESS NOTE DETAILS
patient manually disempacted, + return of stool.  no blood noted in stool, daughter at bedside patient had bowel movement in ED, feeling better, daughter at bedside.  advised follow up with GI Dr Moe, rx for dulcolax and miralax sent to pharmacy,  copy of xray given, no obstruction

## 2019-04-04 NOTE — ED ADULT NURSE NOTE - OBJECTIVE STATEMENT
86 y/o female with pmh of gastroperesis and chronic pain, on 12mg-16mg of oral dilaudid daily, c/o of abd discomfort and constipation for 2 days. states she has been takign laxatives and enemas with no improvement. denies fever chills chest pain sob headache

## 2019-07-15 ENCOUNTER — OTHER (OUTPATIENT)
Age: 84
End: 2019-07-15

## 2019-08-12 ENCOUNTER — APPOINTMENT (OUTPATIENT)
Dept: CARDIOLOGY | Facility: CLINIC | Age: 84
End: 2019-08-12

## 2019-08-20 ENCOUNTER — EMERGENCY (EMERGENCY)
Facility: HOSPITAL | Age: 84
LOS: 1 days | Discharge: HOME | End: 2019-08-20
Attending: EMERGENCY MEDICINE | Admitting: EMERGENCY MEDICINE
Payer: MEDICARE

## 2019-08-20 VITALS
RESPIRATION RATE: 15 BRPM | DIASTOLIC BLOOD PRESSURE: 73 MMHG | SYSTOLIC BLOOD PRESSURE: 143 MMHG | HEART RATE: 77 BPM | HEIGHT: 62 IN | OXYGEN SATURATION: 100 % | TEMPERATURE: 98 F | WEIGHT: 132.06 LBS

## 2019-08-20 DIAGNOSIS — Z96.60 PRESENCE OF UNSPECIFIED ORTHOPEDIC JOINT IMPLANT: Chronic | ICD-10-CM

## 2019-08-20 DIAGNOSIS — Z98.89 OTHER SPECIFIED POSTPROCEDURAL STATES: Chronic | ICD-10-CM

## 2019-08-20 LAB
ALBUMIN SERPL ELPH-MCNC: 3.7 G/DL — SIGNIFICANT CHANGE UP (ref 3.3–5)
ALP SERPL-CCNC: 55 U/L — SIGNIFICANT CHANGE UP (ref 40–120)
ALT FLD-CCNC: 14 U/L — SIGNIFICANT CHANGE UP (ref 12–78)
ANION GAP SERPL CALC-SCNC: 6 MMOL/L — SIGNIFICANT CHANGE UP (ref 5–17)
APTT BLD: 27.3 SEC — LOW (ref 28.5–37)
AST SERPL-CCNC: 12 U/L — LOW (ref 15–37)
BASOPHILS # BLD AUTO: 0.02 K/UL — SIGNIFICANT CHANGE UP (ref 0–0.2)
BASOPHILS NFR BLD AUTO: 0.2 % — SIGNIFICANT CHANGE UP (ref 0–2)
BILIRUB SERPL-MCNC: 0.3 MG/DL — SIGNIFICANT CHANGE UP (ref 0.2–1.2)
BUN SERPL-MCNC: 21 MG/DL — SIGNIFICANT CHANGE UP (ref 7–23)
CALCIUM SERPL-MCNC: 8.6 MG/DL — SIGNIFICANT CHANGE UP (ref 8.5–10.1)
CHLORIDE SERPL-SCNC: 109 MMOL/L — HIGH (ref 96–108)
CO2 SERPL-SCNC: 25 MMOL/L — SIGNIFICANT CHANGE UP (ref 22–31)
CREAT SERPL-MCNC: 1.1 MG/DL — SIGNIFICANT CHANGE UP (ref 0.5–1.3)
D DIMER BLD IA.RAPID-MCNC: <150 NG/ML DDU — SIGNIFICANT CHANGE UP
EOSINOPHIL # BLD AUTO: 0.1 K/UL — SIGNIFICANT CHANGE UP (ref 0–0.5)
EOSINOPHIL NFR BLD AUTO: 0.9 % — SIGNIFICANT CHANGE UP (ref 0–6)
GLUCOSE SERPL-MCNC: 129 MG/DL — HIGH (ref 70–99)
HCT VFR BLD CALC: 34.2 % — LOW (ref 34.5–45)
HGB BLD-MCNC: 10.6 G/DL — LOW (ref 11.5–15.5)
IMM GRANULOCYTES NFR BLD AUTO: 0.3 % — SIGNIFICANT CHANGE UP (ref 0–1.5)
INR BLD: 1 RATIO — SIGNIFICANT CHANGE UP (ref 0.88–1.16)
LYMPHOCYTES # BLD AUTO: 1.07 K/UL — SIGNIFICANT CHANGE UP (ref 1–3.3)
LYMPHOCYTES # BLD AUTO: 9.5 % — LOW (ref 13–44)
MCHC RBC-ENTMCNC: 27.7 PG — SIGNIFICANT CHANGE UP (ref 27–34)
MCHC RBC-ENTMCNC: 31 GM/DL — LOW (ref 32–36)
MCV RBC AUTO: 89.3 FL — SIGNIFICANT CHANGE UP (ref 80–100)
MONOCYTES # BLD AUTO: 0.78 K/UL — SIGNIFICANT CHANGE UP (ref 0–0.9)
MONOCYTES NFR BLD AUTO: 7 % — SIGNIFICANT CHANGE UP (ref 2–14)
NEUTROPHILS # BLD AUTO: 9.22 K/UL — HIGH (ref 1.8–7.4)
NEUTROPHILS NFR BLD AUTO: 82.1 % — HIGH (ref 43–77)
NRBC # BLD: 0 /100 WBCS — SIGNIFICANT CHANGE UP (ref 0–0)
PLATELET # BLD AUTO: 194 K/UL — SIGNIFICANT CHANGE UP (ref 150–400)
POTASSIUM SERPL-MCNC: 4.9 MMOL/L — SIGNIFICANT CHANGE UP (ref 3.5–5.3)
POTASSIUM SERPL-SCNC: 4.9 MMOL/L — SIGNIFICANT CHANGE UP (ref 3.5–5.3)
PROT SERPL-MCNC: 7.3 G/DL — SIGNIFICANT CHANGE UP (ref 6–8.3)
PROTHROM AB SERPL-ACNC: 11.3 SEC — SIGNIFICANT CHANGE UP (ref 10–12.9)
RBC # BLD: 3.83 M/UL — SIGNIFICANT CHANGE UP (ref 3.8–5.2)
RBC # FLD: 14.5 % — SIGNIFICANT CHANGE UP (ref 10.3–14.5)
SODIUM SERPL-SCNC: 140 MMOL/L — SIGNIFICANT CHANGE UP (ref 135–145)
TROPONIN I SERPL-MCNC: <.015 NG/ML — SIGNIFICANT CHANGE UP (ref 0.01–0.04)
WBC # BLD: 11.22 K/UL — HIGH (ref 3.8–10.5)
WBC # FLD AUTO: 11.22 K/UL — HIGH (ref 3.8–10.5)

## 2019-08-20 PROCEDURE — 71045 X-RAY EXAM CHEST 1 VIEW: CPT | Mod: 26

## 2019-08-20 PROCEDURE — 93010 ELECTROCARDIOGRAM REPORT: CPT

## 2019-08-20 PROCEDURE — 93970 EXTREMITY STUDY: CPT | Mod: 26

## 2019-08-20 PROCEDURE — 99284 EMERGENCY DEPT VISIT MOD MDM: CPT

## 2019-08-20 RX ORDER — POLYETHYLENE GLYCOL 3350 17 G/17G
17 POWDER, FOR SOLUTION ORAL ONCE
Refills: 0 | Status: COMPLETED | OUTPATIENT
Start: 2019-08-20 | End: 2019-08-20

## 2019-08-20 RX ORDER — FAMOTIDINE 10 MG/ML
20 INJECTION INTRAVENOUS ONCE
Refills: 0 | Status: COMPLETED | OUTPATIENT
Start: 2019-08-20 | End: 2019-08-20

## 2019-08-20 RX ADMIN — POLYETHYLENE GLYCOL 3350 17 GRAM(S): 17 POWDER, FOR SOLUTION ORAL at 23:16

## 2019-08-20 RX ADMIN — FAMOTIDINE 20 MILLIGRAM(S): 10 INJECTION INTRAVENOUS at 23:13

## 2019-08-20 NOTE — ED ADULT NURSE NOTE - NSIMPLEMENTINTERV_GEN_ALL_ED
Implemented All Fall with Harm Risk Interventions:  [place] to call system. Call bell, personal items and telephone within reach. Instruct patient to call for assistance. Room bathroom lighting operational. Non-slip footwear when patient is off stretcher. Physically safe environment: no spills, clutter or unnecessary equipment. Stretcher in lowest position, wheels locked, appropriate side rails in place. Provide visual cue, wrist band, yellow gown, etc. Monitor gait and stability. Monitor for mental status changes and reorient to person, place, and time. Review medications for side effects contributing to fall risk. Reinforce activity limits and safety measures with patient and family. Provide visual clues: red socks.

## 2019-08-20 NOTE — ED ADULT NURSE NOTE - OBJECTIVE STATEMENT
86 y/o female comes in with EMS A&Ox4 from home with complaints of chest pain x2 hours. Denies SOB, fever, nausea or vomiting. EKG obtained and cardiac monitor in place. Patient states the pain is midsternal and does not radiate. She was given nitroglycerin and asa in ambulance with no relief. PIV inplace. Plan of care discussed with patient. Comfort and safety maintained. Will continue to monitor.

## 2019-08-20 NOTE — ED ADULT NURSE NOTE - CHPI ED NUR SYMPTOMS NEG
no vomiting/no congestion/no diaphoresis/no syncope/no fever/no shortness of breath/no chills/no dizziness/no nausea

## 2019-08-20 NOTE — ED PROVIDER NOTE - OBJECTIVE STATEMENT
pt with hx cad, mi, stents c/o sharp substernal chest pain, worse with deep respirations x few hrs. no fevers, chills, cough, d/n/v, sob, abd pain, edema, calf pain, travel.   pmd - naila veloz pt with hx cad, mi, stents c/o sharp substernal chest pain, worse with deep respirations x few hrs. no fevers, chills, cough, d/n/v, sob, abd pain, edema, calf pain, travel. Pt given asa and ntg by ems without relief  pmd - naila veloz

## 2019-08-21 VITALS
DIASTOLIC BLOOD PRESSURE: 64 MMHG | TEMPERATURE: 99 F | RESPIRATION RATE: 16 BRPM | OXYGEN SATURATION: 98 % | HEART RATE: 76 BPM | SYSTOLIC BLOOD PRESSURE: 128 MMHG

## 2019-08-21 LAB — TROPONIN I SERPL-MCNC: <.015 NG/ML — SIGNIFICANT CHANGE UP (ref 0.01–0.04)

## 2019-08-21 PROCEDURE — 93970 EXTREMITY STUDY: CPT

## 2019-08-21 PROCEDURE — 99284 EMERGENCY DEPT VISIT MOD MDM: CPT | Mod: 25

## 2019-08-21 PROCEDURE — 93005 ELECTROCARDIOGRAM TRACING: CPT

## 2019-08-21 PROCEDURE — 85610 PROTHROMBIN TIME: CPT

## 2019-08-21 PROCEDURE — 84484 ASSAY OF TROPONIN QUANT: CPT

## 2019-08-21 PROCEDURE — 99285 EMERGENCY DEPT VISIT HI MDM: CPT

## 2019-08-21 PROCEDURE — 71045 X-RAY EXAM CHEST 1 VIEW: CPT

## 2019-08-21 PROCEDURE — 85379 FIBRIN DEGRADATION QUANT: CPT

## 2019-08-21 PROCEDURE — 96375 TX/PRO/DX INJ NEW DRUG ADDON: CPT

## 2019-08-21 PROCEDURE — 85730 THROMBOPLASTIN TIME PARTIAL: CPT

## 2019-08-21 PROCEDURE — 85027 COMPLETE CBC AUTOMATED: CPT

## 2019-08-21 PROCEDURE — 96374 THER/PROPH/DIAG INJ IV PUSH: CPT

## 2019-08-21 PROCEDURE — 80053 COMPREHEN METABOLIC PANEL: CPT

## 2019-08-21 PROCEDURE — 36415 COLL VENOUS BLD VENIPUNCTURE: CPT

## 2019-08-21 RX ORDER — KETOROLAC TROMETHAMINE 30 MG/ML
30 SYRINGE (ML) INJECTION ONCE
Refills: 0 | Status: DISCONTINUED | OUTPATIENT
Start: 2019-08-21 | End: 2019-08-21

## 2019-08-21 RX ADMIN — Medication 30 MILLIGRAM(S): at 08:58

## 2019-08-21 RX ADMIN — Medication 30 MILLIGRAM(S): at 03:37

## 2019-08-21 NOTE — CONSULT NOTE ADULT - SUBJECTIVE AND OBJECTIVE BOX
[facility]    Casper Mcgee, Hai, Briana, Connor, Eligio, Damon      [phone]    CHIEF COMPLAINT: Patient is a 88 y/o female who presents with a chief complaint of chest pain.     HPI: 88 y/o female with PMHx HTN, HLD, CAD s/p 2 stents (placed 2012), costochondritis, DMT2, gastroparesis, hypothyroidism, GERD presented to the ED c/o sharp, substernal chest pain that worsened with deep inspirations for approximately 7-8 hours. Pt states the pain did not radiate, was not relieved by nitroglycerin given by EMS. As per pt, her GERD symptoms are usually lower into the abdomen so she didn't think this was GI related at presentation. Pt denied any associated symptoms at the time including fevers, chills, SOB, BECK, cough, palpitations, N/V, abdominal pain, calf pain. Pt denies recent travel or sick contacts. Pt seen and examined at bedside. Pt states the chest pain has suddenly resolved, she denies any other symptoms currently including headache, blurry vision, SOB, BECK, palpitations, N/V, abdominal pain.     Pt last saw Dr. Shirley in 9/2018. EKG at that time showed nonspecific RSR' in V1 with poor R wave progression in V4-V6, which was unchanged from prior studies. Pt last echo 6/2015, EF 65%. Pt is scheduled for a nuclear stress test in 2 weeks.         EKG:     PAST MEDICAL & SURGICAL HISTORY:  Gastroparesis  Coronary arteriosclerosis in native artery: s/p 2 stents. last placed 2 years ago  Costochondritis  H/O gastroesophageal reflux (GERD)  DM type 2 with diabetic dyslipidemia  Dyslipidemia  History of vertebral fracture  H/O vertebral fracture repair  S/P appendectomy  S/P hip hemiarthroplasty  History of laminectomy      MEDICATIONS  (STANDING):    MEDICATIONS  (PRN):      FAMILY HISTORY:  Denies Family history of CAD, early MI. Denies any other significant family medical history.       Constitutional: Denies fever, chills  HEENT: Denies blurry vision  Respiratory: Denies SOB, BECK, cough  Cardiovascular: Denies CP, palpitations, orthopnea, worsening LE edema  Gastrointestinal: Denies nausea, vomiting, abdominal pain  Neurologic: Denies headache, weakness, dizziness    ROS negative except as noted above      SOCIAL HISTORY:  Pt never smoker. Social alcohol use.    Vital Signs Last 24 Hrs  T(C): 37 (21 Aug 2019 07:48), Max: 37.3 (21 Aug 2019 03:34)  T(F): 98.6 (21 Aug 2019 07:48), Max: 99.2 (21 Aug 2019 03:34)  HR: 78 (21 Aug 2019 07:48) (73 - 80)  BP: 130/62 (21 Aug 2019 07:48) (125/60 - 143/73)  BP(mean): --  RR: 16 (21 Aug 2019 07:48) (15 - 16)  SpO2: 97% (21 Aug 2019 07:48) (96% - 100%)    Physical Exam:  General: Well developed, well nourished, NAD  HEENT: NC/AT, EOMI b/l, moist mucous membranes   Neurology: AAOx3, no focal sensory deficits, motor strength grossly intact    Respiratory: Lungs CTA b/l, no wheezing, rhonchi, rales, nonlabored   CV: RRR, +S1/S2, no murmus, rubs or gallops. Trace nonpitting b/l LE edema   Abdominal: Soft, NTND, +BS x4 quadrants, no palpable masses  Extremities: 2+ peripheral pulses  Skin: No obvious ecchymosis or petechiae, warm, dry, normal color      LABS:                        10.6   11.22 )-----------( 194      ( 20 Aug 2019 21:43 )             34.2     08-20    140  |  109<H>  |  21  ----------------------------<  129<H>  4.9   |  25  |  1.10    Ca    8.6      20 Aug 2019 21:43    TPro  7.3  /  Alb  3.7  /  TBili  0.3  /  DBili  x   /  AST  12<L>  /  ALT  14  /  AlkPhos  55  08-20    CARDIAC MARKERS ( 21 Aug 2019 03:45 )  <.015 ng/mL / x     / x     / x     / x      CARDIAC MARKERS ( 20 Aug 2019 21:43 )  <.015 ng/mL / x     / x     / x     / x          PT/INR - ( 20 Aug 2019 21:43 )   PT: 11.3 sec;   INR: 1.00 ratio         PTT - ( 20 Aug 2019 21:43 )  PTT:27.3 sec      RADIOLOGY & ADDITIONAL STUDIES:  < from: Xray Chest 1 View AP/PA (08.20.19 @ 21:53) >  HISTORY: Chest pain    COMPARISON STUDY: 6/1/2018    Rotated expiratory view of the chest shows the heart to be similar in   size. The lungs are clear and there is no evidence of pneumothorax nor   pleural effusion.    IMPRESSION:  No active pulmonary disease.    Thank you for the courtesy of this referral.    < end of copied text >    < from: US Duplex Venous Lower Ext Complete, Bilateral (08.20.19 @ 22:16) >  FINDINGS:    There is normal compressibility of the bilateral common femoral, femoral   and popliteal veins.     Doppler examination shows normal spontaneous and phasic flow.    No calf vein thrombosis is detected.    IMPRESSION:     No evidence of deep venous thrombosis in either lower extremity.    < end of copied text >

## 2019-08-21 NOTE — CONSULT NOTE ADULT - ASSESSMENT
86 y/o female with PMHx HTN, HLD, CAD s/p 2 stents (placed 2012), costochondritis, DMT2, gastroparesis, hypothyroidism, GERD presented 5 pm yesterday evening c/o sharp, substernal chest pain that worsened with deep inspirations for approximately 7-8 hours. Pt currently asymptomatic.     Chest pain with pt history of CAD s/p spents   -Doubt ACS, pain likely reflux vs musculoskeletal  -No evidence of ischemia on EKG, troponins negative x2, pt asymptomatic. Would expect changes on EKG or Abbi at this time nearly 24 hrs since pain initially began   -Would expect cardiac pain to resolve with nitroglycerin, which it did not in this case   -No changes on EKG compared to previous  -BP stable, continue home metoprolol, enalapril   -Last echo 6/2015, EF 63%  -No evidence of volume overload on examination   -Pt will get nuclear stress test outpatient in office, has appointment scheduled for 2 weeks 88 y/o female with PMHx HTN, HLD, CAD s/p 2 stents (placed 2012), costochondritis, DMT2, gastroparesis, hypothyroidism, GERD presented 5 pm yesterday evening c/o sharp, substernal chest pain that worsened with deep inspirations for approximately 7-8 hours. Pt currently asymptomatic.     Chest pain with pt history of CAD s/p spents   -Doubt ACS, pain likely reflux vs musculoskeletal  -No evidence of ischemia on EKG, troponins negative x2, pt asymptomatic. Would expect changes on EKG or Abbi at this time nearly 24 hrs since pain initially began   -Would expect cardiac pain to resolve with nitroglycerin, which it did not in this case   -No changes on EKG compared to previous  -BP stable, continue home metoprolol, enalapril   -Last echo 6/2015, EF 63%  -No evidence of volume overload on examination   -Pt will get nuclear stress test outpatient in office, has appointment scheduled for 2 weeks with dr veloz

## 2019-08-21 NOTE — ED ADULT NURSE REASSESSMENT NOTE - NS ED NURSE REASSESS COMMENT FT1
Patient complaining of chest pain, requesting to take home medication of dilaudid. Patient made aware about not taking home medications while in ED. Medication sent and secured in pharmacy. Dr. Stanton made aware of chest pain. Patient to receive toradol. Ramirez GUTIÉRREZ

## 2019-08-26 ENCOUNTER — APPOINTMENT (OUTPATIENT)
Dept: CARDIOLOGY | Facility: CLINIC | Age: 84
End: 2019-08-26
Payer: MEDICARE

## 2019-08-26 ENCOUNTER — NON-APPOINTMENT (OUTPATIENT)
Age: 84
End: 2019-08-26

## 2019-08-26 VITALS
BODY MASS INDEX: 23.92 KG/M2 | DIASTOLIC BLOOD PRESSURE: 73 MMHG | HEIGHT: 62 IN | SYSTOLIC BLOOD PRESSURE: 136 MMHG | HEART RATE: 61 BPM | OXYGEN SATURATION: 99 % | WEIGHT: 130 LBS

## 2019-08-26 DIAGNOSIS — R07.89 OTHER CHEST PAIN: ICD-10-CM

## 2019-08-26 PROCEDURE — 93000 ELECTROCARDIOGRAM COMPLETE: CPT

## 2019-08-26 PROCEDURE — 99215 OFFICE O/P EST HI 40 MIN: CPT

## 2019-08-26 NOTE — DISCUSSION/SUMMARY
[FreeTextEntry1] : Mrs. Guerin was evaluated in the emergency room in [facility] on 8/20/19 for atypical chest discomfort which was exacerbated by deep inspiration, persisting for probably 7-8 hours, before resolving spontaneously. Her evaluation was negative for evidence of an acute coronary syndrome, and her chest discomfort symptom was attributed to either a musculoskeletal or gastrointestinal etiology. She has not experienced recurrence of this particular chest discomfort symptoms since being discharged from the hospital on 8/21/19. She has specifically not experienced any recurrence of chest discomfort which she would consider to be reminiscent of the symptom she experienced in association with her acute myocardial infarction on 2/11/12. She has not experienced any signs or symptoms to suggest the development of an anginal syndrome, congestive heart failure, or a hemodynamically-compromising arrhythmia. Her electrocardiogram today reveals sinus rhythm with one APC and non-specific diminished voltage with poor R wave progression in leads V4-V6, essentially unchanged from her previous office tracing. Her cardiac examination today is remarkable for a soft systolic ejection murmur, in all likelihood representing age-related valvular calcification, and unchanged from her previous visit with me. Her blood pressure reading was mildly elevated upon initial presentation to the office today, however, a follow-up management obtained after her examination revealed the reading to be normal.\par \par I have instructed the patient to continue her cardiac medications as presently prescribed for the time being.\par \par I am referring the patient for follow-up pharmacological nuclear stress testing at this point to more objectively assess the status of her ischemic heart disease. The patient is going to make arrangements to have this study performed through our office, and I will telephone her to discuss the findings, once the study has been completed.\par \par I have reviewed the findings of the echocardiogram of 9/28/18 and the carotid artery Doppler study of 9/22/18 in detail with the patient.\par \par The patient is going to be following up with her neurologist and pain specialist regarding her lumbar spinal issues.\par \par I have asked the patient had a copy of her next fasting lipid profile and chemistry screen results forwarded to my office for my review and records.\par \par The importance of proper dietary habits has again been discussed with the patient.  Her exercise activities have been precluded by her back issues, however, I have suggested to her that she try exercising on a recumbent bicycle and/or water aerobics.\par \par  I have asked the patient to call me if she should have any questions or problems pertaining to these matters, and especially if she should experience any concerning symptoms. I have otherwise asked her to return to the office for follow-up cardiac evaluation in 6 months, provided she remains clinically stable in the interim, and the findings on the upcoming pharmacological nuclear stress study do not warrant sooner evaluation and/or treatment.

## 2019-08-26 NOTE — PHYSICAL EXAM
[General Appearance - In No Acute Distress] : no acute distress [Normal Conjunctiva] : the conjunctiva exhibited no abnormalities [No Oral Pallor] : no oral pallor [Normal Jugular Venous A Waves Present] : normal jugular venous A waves present [Normal Jugular Venous V Waves Present] : normal jugular venous V waves present [No Jugular Venous Lombardo A Waves] : no jugular venous lombardo A waves [Auscultation Breath Sounds / Voice Sounds] : lungs were clear to auscultation bilaterally [Respiration, Rhythm And Depth] : normal respiratory rhythm and effort [Bowel Sounds] : normal bowel sounds [Abdomen Tenderness] : non-tender [] : no rash [Cyanosis, Localized] : no localized cyanosis [Oriented To Time, Place, And Person] : oriented to person, place, and time [Not Palpable] : not palpable [No Precordial Heave] : no precordial heave was noted [Rhythm Regular] : regular [Normal Rate] : normal [Normal S2] : normal S2 [Normal S1] : normal S1 [No Gallop] : no gallop heard [I] : a grade 1 [2+] : left 2+ [No Abnormalities] : the abdominal aorta was not enlarged and no bruit was heard [Nonpitting Edema] : nonpitting edema present [Rt] : varicose veins of the right leg noted [Lt] : varicose veins of the left leg noted [FreeTextEntry1] : ambulates with a walker secondary to lumbar spinal disease [Apical Thrill] : no thrill palpable at the apex [Click] : no click [Pericardial Rub] : no pericardial rub [Right Carotid Bruit] : no bruit heard over the right carotid [Left Carotid Bruit] : no bruit heard over the left carotid

## 2019-08-26 NOTE — HISTORY OF PRESENT ILLNESS
[FreeTextEntry1] : Mrs. Merissa Guerin presented to the office today for follow-up cardiac evaluation. I last evaluated the patient in the office on 9/5/18.\par \par The patient is an 87-year-old female with a history of coronary artery disease (status post primary LAD PCI 2/11/12), diabetes, hypertension, a dyslipidemia, moderate carotid atherosclerosis, mild aortic stenosis, mild mitral regurgitation, atypical chest discomfort, gastroparesis, hypothyroidism, diverticulosis, anemia, and lumbar degenerative disc disease (associated with spinal stenosis). \par \par The patient presented to the emergency room at [facility] on 8/20/19 with chest discomfort which was exasperated by deep inspiration. The discomfort persisted for a possibly 7-8 hours, without associated symptoms. Her evaluation in the emergency room was unrevealing for evidence of an acute coronary syndrome. Specifically, there were no acute electrocardiographic changes, and cardiac enzymes were negative. A chest x-ray was unrevealing. A lower extremity venous Doppler study was negative for evidence of deep venous thrombosis. The patient's chest discomfort symptom was attributed to either a musculoskeletal or gastrointestinal etiology. She was discharged home on 8/21/19, and she has not experienced recurrence of this particular chest discomfort symptom.\par \par The patient has otherwise been doing well from a cardiac symptomatic standpoint since her previous visit here on 9/5/18. Specifically, she does not report having experienced any symptoms of chest discomfort which have been precipitated or exacerbated by physical activity (she continues to occasionally experience epigastric discomfort in the evening, promptly relieved with antacid therapy). She has not noted dyspnea on exertion in association with her activities, noting that she ambulates with a walker secondary to lumbar spinal disease.  She has not noted orthopnea, paroxysmal nocturnal dyspnea, or lower extremity edema. She has not experienced any episodes of palpitations, presyncope or syncope.\par \par Review of systems is significant for the patient experiencing a persistent "tingling" sensation in the left posterior neck and left upper chest regions, suspected as being representative of post-herpetic neuralgia.\par \par Pharmacological nuclear stress testing most recently performed on 10/16/15 was negative for the inducement of cardiac symptoms, electrocardiographic evidence of myocardial ischemia, or significant arrhythmias. The cardiac imaging portion of the study revealed normal left ventricular myocardial perfusion and systolic function.\par \par Echocardiography most recently performed on 9/28/18 revealed mild bi-atrial enlargement. The ventricles were normal in dimension. Left ventricular wall thickness and wall motion were normal, with a calculated ejection fraction of 57%. Mild mitral regurgitation was demonstrated.\par \par Carotid artery Doppler testing most recently performed on 9/22/18 revealed mild plaque formation involving the bulbar wheezes bilaterally and the proximal portion of the left internal carotid arteries. No significant stenoses were demonstrated.\par \par Previous History:\par \par The patient telephoned me on 9/8/15, reporting having experienced chest discomfort (? right-sided), described as "a spasm", which had developed earlier that day while walking in a parking lot, without any associated symptoms.  At the time I spoke to her her on the telephone, she stated she was still experiencing the discomfort (although in retrospect, she thinks that it may have resolved after a few minutes, however, she cannot recall), aand I referred her to the emergency room at [facility] for further evaluation. She was evaluated in emergency room by my associate, Dr. Warren. No acute electrocardiographic changes were noted. Cardiac enzymes were negative.  A chest x-ray was unrevealing for any acute findings. Laboratory studies were remarkable for a mild hypochromic microcytic anemia. The patient was released from the emergency room that day, and advised to schedule a follow-up nuclear stress study in our office.The patient has not experienced any recurrence of chest discomfort since  the episode of 9/8/15.\par \par The patient was initially diagnosed as having coronary artery disease when she developed severe chest discomfort associated with diaphoresis and nausea on the evening of 2/11/12. She went to the emergency room at [facility], and was diagnosed as having an acute myocardial infarction. She was urgently transferred to [facility], where coronary angiography revealed a 90% stenosis involving the proximal portion of the left anterior descending artery, which was treated with implantation of 2 drug-eluting stents. A 30% stenosis was noted in the first obtuse marginal branch of the circumflex artery, a 40% stenosis was noted in the mid-portion of the right coronary artery, a 30% stenosis was noted in the distal portion of the right coronary artery, and a 70% stenosis was noted in the right posterior descending artery. The left main coronary artery was normal. Left ventriculography revealed normal global left ventricular systolic function, with an estimated ejection fraction of 65%.\par \par As far as risk factors for coronary artery disease are concerned, the patient has a history of diabetes, a dyslipidemia, and hypertension. She denies a history of cigarette smoking. She does not have a known immediate family history of premature coronary artery disease.\par \par Past medical history is otherwise significant for urinary incontinence, for which he follows with a urologist. In addition, the patient suffers intermittently from sciatica, noting that she has undergone multiple lumbar laminectomy procedures, X-stop of L2-L3 and L3-L4, as well as kyphoplasty. She ambulates with a cane and follows with a pain specialist and a neurologist.

## 2019-09-23 ENCOUNTER — APPOINTMENT (OUTPATIENT)
Dept: CARDIOLOGY | Facility: CLINIC | Age: 84
End: 2019-09-23
Payer: MEDICARE

## 2019-09-23 PROCEDURE — A9500: CPT

## 2019-09-23 PROCEDURE — 93015 CV STRESS TEST SUPVJ I&R: CPT

## 2019-09-23 PROCEDURE — 78452 HT MUSCLE IMAGE SPECT MULT: CPT

## 2019-10-01 ENCOUNTER — APPOINTMENT (OUTPATIENT)
Dept: UROLOGY | Facility: CLINIC | Age: 84
End: 2019-10-01
Payer: MEDICARE

## 2019-10-01 PROCEDURE — 51798 US URINE CAPACITY MEASURE: CPT

## 2019-10-01 PROCEDURE — 99214 OFFICE O/P EST MOD 30 MIN: CPT | Mod: 25

## 2019-10-02 LAB
APPEARANCE: CLEAR
BACTERIA: NEGATIVE
BILIRUBIN URINE: NEGATIVE
BLOOD URINE: NEGATIVE
COLOR: YELLOW
GLUCOSE QUALITATIVE U: ABNORMAL
HYALINE CASTS: 0 /LPF
KETONES URINE: NEGATIVE
LEUKOCYTE ESTERASE URINE: ABNORMAL
MICROSCOPIC-UA: NORMAL
NITRITE URINE: NEGATIVE
PH URINE: 6
PROTEIN URINE: NORMAL
RED BLOOD CELLS URINE: 1 /HPF
SPECIFIC GRAVITY URINE: 1.02
SQUAMOUS EPITHELIAL CELLS: 1 /HPF
UROBILINOGEN URINE: NORMAL
WHITE BLOOD CELLS URINE: 4 /HPF

## 2019-10-03 LAB — BACTERIA UR CULT: NORMAL

## 2019-10-22 ENCOUNTER — RX RENEWAL (OUTPATIENT)
Age: 84
End: 2019-10-22

## 2020-04-28 NOTE — ED ADULT NURSE NOTE - FALL HARM RISK
1.	KYLIE: Prerenal azotemia, On ACEI, ATN. Pt with h/o obstructive uropathy with ureteral stent  2.	COVID Pneumonia  3.	Hypertension  4.	h/o Dementia  5.	Anemia  6.	h/o Ovarian ca, s/p Surgery, on Chemo    Pt with worsening renal indices. Continue IV hydration. Change to half NS. Monitor h/h trend. PRBC trasfusion. Treatment for COVID pneumonia. COVID precautions. Supportive care.   On IV steroids. Hold ACEI. Monitor BP trend. Titrate BP meds as needed. Avoid nephrotoxic meds as possible. Avoid ACEI, ARB, NSAIDs and IV contrast.   Will follow electrolytes and renal function trend. D/w pts daughter over the phone. Check renal sonogram. Strict I & O. age(85 years old or older)

## 2020-09-21 ENCOUNTER — APPOINTMENT (OUTPATIENT)
Dept: UROLOGY | Facility: CLINIC | Age: 85
End: 2020-09-21
Payer: MEDICARE

## 2020-09-21 VITALS — TEMPERATURE: 97.1 F

## 2020-09-21 DIAGNOSIS — N95.2 POSTMENOPAUSAL ATROPHIC VAGINITIS: ICD-10-CM

## 2020-09-21 PROCEDURE — 51798 US URINE CAPACITY MEASURE: CPT

## 2020-11-03 ENCOUNTER — NON-APPOINTMENT (OUTPATIENT)
Age: 85
End: 2020-11-03

## 2020-11-03 ENCOUNTER — APPOINTMENT (OUTPATIENT)
Dept: CARDIOLOGY | Facility: CLINIC | Age: 85
End: 2020-11-03
Payer: MEDICARE

## 2020-11-03 VITALS
OXYGEN SATURATION: 98 % | HEIGHT: 62 IN | BODY MASS INDEX: 26.68 KG/M2 | SYSTOLIC BLOOD PRESSURE: 133 MMHG | WEIGHT: 145 LBS | DIASTOLIC BLOOD PRESSURE: 65 MMHG | HEART RATE: 76 BPM

## 2020-11-03 DIAGNOSIS — E55.9 VITAMIN D DEFICIENCY, UNSPECIFIED: ICD-10-CM

## 2020-11-03 PROCEDURE — 99215 OFFICE O/P EST HI 40 MIN: CPT

## 2020-11-03 PROCEDURE — 93000 ELECTROCARDIOGRAM COMPLETE: CPT

## 2020-11-03 RX ORDER — PHENAZOPYRIDINE HYDROCHLORIDE 200 MG/1
200 TABLET ORAL
Qty: 15 | Refills: 1 | Status: DISCONTINUED | COMMUNITY
Start: 2020-09-21 | End: 2020-11-03

## 2020-11-03 RX ORDER — PREDNISONE 5 MG/1
5 TABLET ORAL
Qty: 90 | Refills: 0 | Status: DISCONTINUED | COMMUNITY
Start: 2020-09-21 | End: 2020-11-03

## 2020-12-21 PROBLEM — N39.0 ACUTE URINARY TRACT INFECTION: Status: RESOLVED | Noted: 2017-10-16 | Resolved: 2020-12-21

## 2021-01-04 ENCOUNTER — RX RENEWAL (OUTPATIENT)
Age: 86
End: 2021-01-04

## 2021-05-20 ENCOUNTER — NON-APPOINTMENT (OUTPATIENT)
Age: 86
End: 2021-05-20

## 2021-05-20 ENCOUNTER — APPOINTMENT (OUTPATIENT)
Dept: CARDIOLOGY | Facility: CLINIC | Age: 86
End: 2021-05-20
Payer: MEDICARE

## 2021-05-20 VITALS
HEART RATE: 80 BPM | SYSTOLIC BLOOD PRESSURE: 121 MMHG | HEIGHT: 62 IN | WEIGHT: 125 LBS | BODY MASS INDEX: 23 KG/M2 | DIASTOLIC BLOOD PRESSURE: 67 MMHG | OXYGEN SATURATION: 97 %

## 2021-05-20 DIAGNOSIS — Z86.79 PERSONAL HISTORY OF OTHER DISEASES OF THE CIRCULATORY SYSTEM: ICD-10-CM

## 2021-05-20 PROCEDURE — 93000 ELECTROCARDIOGRAM COMPLETE: CPT

## 2021-05-20 PROCEDURE — 99215 OFFICE O/P EST HI 40 MIN: CPT

## 2021-05-20 RX ORDER — SERTRALINE HYDROCHLORIDE 25 MG/1
25 TABLET, FILM COATED ORAL TWICE DAILY
Refills: 0 | Status: ACTIVE | COMMUNITY
Start: 2017-05-30

## 2021-05-20 RX ORDER — METFORMIN ER 500 MG 500 MG/1
500 TABLET ORAL
Refills: 3 | Status: ACTIVE | COMMUNITY
Start: 2020-07-06

## 2021-05-20 RX ORDER — PREDNISONE 1 MG/1
1 TABLET ORAL
Qty: 90 | Refills: 0 | Status: DISCONTINUED | COMMUNITY
Start: 2020-08-26 | End: 2021-05-20

## 2021-05-20 RX ORDER — ESTRADIOL 0.1 MG/G
0.1 CREAM VAGINAL
Qty: 1 | Refills: 3 | Status: DISCONTINUED | COMMUNITY
Start: 2019-10-01 | End: 2021-05-20

## 2021-05-20 RX ORDER — TAPENTADOL HYDROCHLORIDE 50 MG/1
50 TABLET, FILM COATED, EXTENDED RELEASE ORAL
Qty: 60 | Refills: 0 | Status: DISCONTINUED | COMMUNITY
Start: 2020-10-20 | End: 2021-05-20

## 2021-05-20 RX ORDER — CLONIDINE HYDROCHLORIDE 0.2 MG/1
0.2 TABLET ORAL AT BEDTIME
Refills: 0 | Status: DISCONTINUED | COMMUNITY
Start: 2020-10-20 | End: 2021-05-20

## 2021-06-04 ENCOUNTER — APPOINTMENT (OUTPATIENT)
Dept: CARDIOLOGY | Facility: CLINIC | Age: 86
End: 2021-06-04
Payer: MEDICARE

## 2021-06-04 PROCEDURE — 93306 TTE W/DOPPLER COMPLETE: CPT

## 2021-07-20 ENCOUNTER — APPOINTMENT (OUTPATIENT)
Dept: UROLOGY | Facility: CLINIC | Age: 86
End: 2021-07-20
Payer: MEDICARE

## 2021-07-20 VITALS
TEMPERATURE: 98.3 F | WEIGHT: 125 LBS | BODY MASS INDEX: 23 KG/M2 | OXYGEN SATURATION: 98 % | DIASTOLIC BLOOD PRESSURE: 64 MMHG | SYSTOLIC BLOOD PRESSURE: 113 MMHG | HEIGHT: 62 IN | HEART RATE: 61 BPM | RESPIRATION RATE: 14 BRPM

## 2021-07-20 DIAGNOSIS — R39.15 URGENCY OF URINATION: ICD-10-CM

## 2021-07-20 DIAGNOSIS — R61 GENERALIZED HYPERHIDROSIS: ICD-10-CM

## 2021-07-20 PROCEDURE — 99213 OFFICE O/P EST LOW 20 MIN: CPT

## 2021-07-20 RX ORDER — AMITRIPTYLINE HYDROCHLORIDE 10 MG/1
10 TABLET, FILM COATED ORAL
Qty: 270 | Refills: 3 | Status: ACTIVE | COMMUNITY
Start: 2019-10-01 | End: 1900-01-01

## 2021-07-22 LAB
APPEARANCE: CLEAR
BACTERIA: NEGATIVE
BILIRUBIN URINE: NEGATIVE
BLOOD URINE: NEGATIVE
COLOR: YELLOW
GLUCOSE QUALITATIVE U: NEGATIVE
HYALINE CASTS: 2 /LPF
KETONES URINE: NEGATIVE
LEUKOCYTE ESTERASE URINE: ABNORMAL
MICROSCOPIC-UA: NORMAL
NITRITE URINE: NEGATIVE
PH URINE: 6
PROTEIN URINE: ABNORMAL
RED BLOOD CELLS URINE: 2 /HPF
SPECIFIC GRAVITY URINE: 1.02
SQUAMOUS EPITHELIAL CELLS: 3 /HPF
UROBILINOGEN URINE: ABNORMAL
WHITE BLOOD CELLS URINE: 10 /HPF

## 2021-07-23 LAB — BACTERIA UR CULT: NORMAL

## 2022-01-12 ENCOUNTER — APPOINTMENT (OUTPATIENT)
Dept: CARDIOLOGY | Facility: CLINIC | Age: 87
End: 2022-01-12
Payer: MEDICARE

## 2022-01-12 ENCOUNTER — NON-APPOINTMENT (OUTPATIENT)
Age: 87
End: 2022-01-12

## 2022-01-12 VITALS
SYSTOLIC BLOOD PRESSURE: 107 MMHG | HEIGHT: 62 IN | OXYGEN SATURATION: 100 % | HEART RATE: 64 BPM | WEIGHT: 113 LBS | DIASTOLIC BLOOD PRESSURE: 66 MMHG | BODY MASS INDEX: 20.8 KG/M2

## 2022-01-12 DIAGNOSIS — E78.5 HYPERLIPIDEMIA, UNSPECIFIED: ICD-10-CM

## 2022-01-12 DIAGNOSIS — R60.0 LOCALIZED EDEMA: ICD-10-CM

## 2022-01-12 DIAGNOSIS — I34.0 NONRHEUMATIC MITRAL (VALVE) INSUFFICIENCY: ICD-10-CM

## 2022-01-12 DIAGNOSIS — I25.10 ATHEROSCLEROTIC HEART DISEASE OF NATIVE CORONARY ARTERY W/OUT ANGINA PECTORIS: ICD-10-CM

## 2022-01-12 PROCEDURE — 99215 OFFICE O/P EST HI 40 MIN: CPT

## 2022-01-12 PROCEDURE — 93000 ELECTROCARDIOGRAM COMPLETE: CPT

## 2022-01-12 RX ORDER — PREDNISONE 5 MG/1
5 TABLET ORAL
Qty: 180 | Refills: 0 | Status: DISCONTINUED | COMMUNITY
Start: 2020-06-12 | End: 2022-01-12

## 2022-01-12 RX ORDER — ERGOCALCIFEROL 1.25 MG/1
1.25 MG CAPSULE, LIQUID FILLED ORAL
Qty: 6 | Refills: 0 | Status: DISCONTINUED | COMMUNITY
Start: 2020-05-07 | End: 2022-01-12

## 2022-01-25 ENCOUNTER — EMERGENCY (EMERGENCY)
Facility: HOSPITAL | Age: 87
LOS: 1 days | End: 2022-01-25
Attending: EMERGENCY MEDICINE
Payer: MEDICARE

## 2022-01-25 VITALS
HEART RATE: 74 BPM | SYSTOLIC BLOOD PRESSURE: 110 MMHG | TEMPERATURE: 97 F | DIASTOLIC BLOOD PRESSURE: 60 MMHG | HEIGHT: 62 IN | OXYGEN SATURATION: 98 % | RESPIRATION RATE: 16 BRPM

## 2022-01-25 DIAGNOSIS — Z98.89 OTHER SPECIFIED POSTPROCEDURAL STATES: Chronic | ICD-10-CM

## 2022-01-25 DIAGNOSIS — Z96.60 PRESENCE OF UNSPECIFIED ORTHOPEDIC JOINT IMPLANT: Chronic | ICD-10-CM

## 2022-01-25 LAB
ANION GAP SERPL CALC-SCNC: 6 MMOL/L — SIGNIFICANT CHANGE UP (ref 5–17)
BUN SERPL-MCNC: 23 MG/DL — SIGNIFICANT CHANGE UP (ref 7–23)
CALCIUM SERPL-MCNC: 8.2 MG/DL — LOW (ref 8.5–10.1)
CHLORIDE SERPL-SCNC: 110 MMOL/L — HIGH (ref 96–108)
CO2 SERPL-SCNC: 24 MMOL/L — SIGNIFICANT CHANGE UP (ref 22–31)
CREAT SERPL-MCNC: 1.4 MG/DL — HIGH (ref 0.5–1.3)
GLUCOSE SERPL-MCNC: 87 MG/DL — SIGNIFICANT CHANGE UP (ref 70–99)
HCT VFR BLD CALC: 32.8 % — LOW (ref 34.5–45)
HGB BLD-MCNC: 10.4 G/DL — LOW (ref 11.5–15.5)
MCHC RBC-ENTMCNC: 29.3 PG — SIGNIFICANT CHANGE UP (ref 27–34)
MCHC RBC-ENTMCNC: 31.7 GM/DL — LOW (ref 32–36)
MCV RBC AUTO: 92.4 FL — SIGNIFICANT CHANGE UP (ref 80–100)
NRBC # BLD: 0 /100 WBCS — SIGNIFICANT CHANGE UP (ref 0–0)
PLATELET # BLD AUTO: 180 K/UL — SIGNIFICANT CHANGE UP (ref 150–400)
POTASSIUM SERPL-MCNC: 5 MMOL/L — SIGNIFICANT CHANGE UP (ref 3.5–5.3)
POTASSIUM SERPL-SCNC: 5 MMOL/L — SIGNIFICANT CHANGE UP (ref 3.5–5.3)
RBC # BLD: 3.55 M/UL — LOW (ref 3.8–5.2)
RBC # FLD: 15.7 % — HIGH (ref 10.3–14.5)
SODIUM SERPL-SCNC: 140 MMOL/L — SIGNIFICANT CHANGE UP (ref 135–145)
TROPONIN I, HIGH SENSITIVITY RESULT: 19.1 NG/L — SIGNIFICANT CHANGE UP
WBC # BLD: 5 K/UL — SIGNIFICANT CHANGE UP (ref 3.8–10.5)
WBC # FLD AUTO: 5 K/UL — SIGNIFICANT CHANGE UP (ref 3.8–10.5)

## 2022-01-25 PROCEDURE — 80048 BASIC METABOLIC PNL TOTAL CA: CPT

## 2022-01-25 PROCEDURE — 36415 COLL VENOUS BLD VENIPUNCTURE: CPT

## 2022-01-25 PROCEDURE — 93010 ELECTROCARDIOGRAM REPORT: CPT

## 2022-01-25 PROCEDURE — 99284 EMERGENCY DEPT VISIT MOD MDM: CPT

## 2022-01-25 PROCEDURE — 85027 COMPLETE CBC AUTOMATED: CPT

## 2022-01-25 PROCEDURE — 93005 ELECTROCARDIOGRAM TRACING: CPT

## 2022-01-25 PROCEDURE — 99285 EMERGENCY DEPT VISIT HI MDM: CPT

## 2022-01-25 PROCEDURE — 84484 ASSAY OF TROPONIN QUANT: CPT

## 2022-01-25 NOTE — ED PROVIDER NOTE - NSFOLLOWUPINSTRUCTIONS_ED_ALL_ED_FT
Rest  Follow-up with Dr. Shirley this week  Return here if needed        Anywhere.FM [employer]® CareNotes®     :  [facility]  	                       CHEST PAIN - AfterCare(R) Instructions(ER/ED)           Chest Pain    WHAT YOU NEED TO KNOW:    Chest pain can be caused by a range of conditions, from not serious to life-threatening. Chest pain can be a symptom of a digestive problem, such as acid reflux or a stomach ulcer. An anxiety attack or a strong emotion, such as anger, can also cause chest pain. Infection, inflammation, or a fracture in the bones or cartilage in your chest can cause pain or discomfort. Sometimes chest pain or pressure is caused by poor blood flow to your heart (angina). Chest pain may also be caused by life-threatening conditions such as a heart attack or blood clot in your lungs.    DISCHARGE INSTRUCTIONS:    Call your local emergency number (911 in the ) or have someone call if:   •You have any of the following signs of a heart attack: ?Squeezing, pressure, or pain in your chest      ?You may also have any of the following: ?Discomfort or pain in your back, neck, jaw, stomach, or arm      ?Shortness of breath      ?Nausea or vomiting      ?Lightheadedness or a sudden cold sweat            Return to the emergency department if:   •You have chest discomfort that gets worse, even with medicine.      •You cough or vomit blood.      •Your bowel movements are black or bloody.      •You cannot stop vomiting, or it hurts to swallow.      Call your doctor if:   •You have questions or concerns about your condition or care.          Medicines:   •Medicines may be given to treat the cause of your chest pain. Examples include pain medicine, anxiety medicine, or medicines to increase blood flow to your heart.      •Do not take certain medicines without asking your healthcare provider first. These include NSAIDs, herbal or vitamin supplements, or hormones (estrogen or progestin).      •Take your medicine as directed. Contact your healthcare provider if you think your medicine is not helping or if you have side effects. Tell him or her if you are allergic to any medicine. Keep a list of the medicines, vitamins, and herbs you take. Include the amounts, and when and why you take them. Bring the list or the pill bottles to follow-up visits. Carry your medicine list with you in case of an emergency.      Healthy living tips: The following are general healthy guidelines. If the cause of your chest pain is known, your healthcare provider will give you specific guidelines to follow.  •Do not smoke. Nicotine and other chemicals in cigarettes and cigars can cause lung and heart damage. Ask your healthcare provider for information if you currently smoke and need help to quit. E-cigarettes or smokeless tobacco still contain nicotine. Talk to your healthcare provider before you use these products.      •Choose a variety of healthy foods as often as possible. Include fresh, frozen, or canned fruits and vegetables. Also include low-fat dairy products, fish, chicken (without skin), and lean meats. Your healthcare provider or a dietitian can help you create meal plans. You may need to avoid certain foods or drinks if your pain is caused by a digestion problem.  Healthy Foods           •Lower your sodium (salt) intake. Limit foods that are high in sodium, such as canned foods, salty snacks, and cold cuts. If you add salt when you cook food, do not add more at the table. Choose low-sodium canned foods as much as possible.             •Drink plenty of water every day. Water helps your body to control your temperature and blood pressure. Ask your healthcare provider how much water you should drink every day.      •Ask about activity. Your healthcare provider will tell you which activities to limit or avoid. Ask when you can drive, return to work, and have sex. Ask about the best exercise plan for you.      •Maintain a healthy weight. Ask your healthcare provider what a healthy weight is for you. Ask him or her to help you create a safe weight loss plan if you are overweight.      •Ask about vaccines you may need. Get the influenza (flu) vaccine every year as soon as recommended, usually in September or October. You may also need a pneumococcal vaccine to prevent pneumonia. The vaccine is usually given every 5 years, starting at age 65. Your healthcare provider can tell you if should get other vaccines, and when to get them.      Follow up with your healthcare provider within 72 hours, or as directed: You may need to return for more tests to find the cause of your chest pain. You may be referred to a specialist, such as a cardiologist or gastroenterologist. Write down your questions so you remember to ask them during your visits.

## 2022-01-25 NOTE — ED ADULT NURSE NOTE - NSICDXPASTMEDICALHX_GEN_ALL_CORE_FT
PAST MEDICAL HISTORY:  Coronary arteriosclerosis in native artery s/p 2 stents. last placed 2 years ago    Costochondritis     DM type 2 with diabetic dyslipidemia     Dyslipidemia     Gastroparesis     H/O gastroesophageal reflux (GERD)     H/O vertebral fracture repair     History of vertebral fracture

## 2022-01-25 NOTE — ED PROVIDER NOTE - PROGRESS NOTE DETAILS
Feels well at this time and wants to go home. Feels well at this time and wants to go home. Seen by Cardio Dr. Ryan and cleared to go home to be followed as outpatient.

## 2022-01-25 NOTE — ED ADULT NURSE NOTE - NSIMPLEMENTINTERV_GEN_ALL_ED
Implemented All Fall with Harm Risk Interventions:  [place] to call system. Call bell, personal items and telephone within reach. Instruct patient to call for assistance. Room bathroom lighting operational. Non-slip footwear when patient is off stretcher. Physically safe environment: no spills, clutter or unnecessary equipment. Stretcher in lowest position, wheels locked, appropriate side rails in place. Provide visual cue, wrist band, yellow gown, etc. Monitor gait and stability. Monitor for mental status changes and reorient to person, place, and time. Review medications for side effects contributing to fall risk. Reinforce activity limits and safety measures with patient and family. Provide visual clues: red socks.

## 2022-01-25 NOTE — ED PROVIDER NOTE - CONSTITUTIONAL, MLM
Weak and thin appearing elderly white female, awake, alert, oriented to person, place, time/situation and in no apparent distress. normal...

## 2022-01-25 NOTE — CONSULT NOTE ADULT - ATTENDING COMMENTS
ACS has been ruled out.  OK to follow with Dr. Shirley as outpatient for consideration of nuclear stress testing and echocardiography

## 2022-01-25 NOTE — CONSULT NOTE ADULT - SUBJECTIVE AND OBJECTIVE BOX
Patient is a 89y old  Female who presents with a chief complaint of chest pain    Cardio consulted for chest pain.    HPI: 89 yo F w/PMHx HTN, DM, CAD with 2 stents presents to ED with complaints of chest pressure, questionable near syncope. Patient was doing well at 9am, when she noticed a chest discomfort at the center of her chest. Reports it was pressure-like. It resolved until mid day, when she was eating lunch and chest pressure returned. Per her HHA, patient stood up, became diaphoretic and pale, and looked as if she were going to pass out. Reports chest pressure and other symptoms lasted for 4-5 minutes, and then resolved by the time the ambulance came. Denies any radiation of chest discomfort. Denies palpitations, shortness of breath, dizziness, headache. Reports bilateral lower extremity swelling that is at baseline (has chronic venous stasis).    In the ED:  Vital signs: Temp: 97.2, HR: 74, BP: 110/60, RR: 16, spo2: 98% RA  Labs significant for: H/H: 10.4/32.8, Cr. 1.4, Troponin negative  ECG: NSR    Patient sees cardiologist Dr. Shirley, most recently followed up in January 2022.  Pharmacological nuclear stress testing most recently performed on 9/23/19 was negative for the inducement of cardiac symptoms, electrocardiographic evidence of myocardial ischemia, or significant arrhythmias. The cardiac imaging portion of the study revealed normal left ventricular myocardial perfusion and systolic function.    Echocardiography most recently performed on 6/4/2021 revealed the left atrium to be markedly dilated. The remainder of the cardiac chambers were normal in dimension. Left ventricular wall thickness was toward the upper limit of normal with normal wall motion. Left ventricular systolic function was normal with an estimated ejection fraction of 65%. Moderate left ventricular diastolic dysfunction was demonstrated. Mild mitral regurgitation, mild pulmonic regurgitation, and mild to moderate tricuspid regurgitation were demonstrated, with an estimated right ventricular systolic pressure of 44 mmHg.    Carotid artery Doppler testing most recently performed on 9/22/18 revealed mild plaque formation involving the bulbar regions bilaterally and the proximal portion of the left internal carotid arteries. No significant stenoses were demonstrated.      PAST MEDICAL & SURGICAL HISTORY:  H/O vertebral fracture repair    History of vertebral fracture    Dyslipidemia    DM type 2 with diabetic dyslipidemia    H/O gastroesophageal reflux (GERD)    Costochondritis    Coronary arteriosclerosis in native artery  s/p 2 stents. last placed 2 years ago    Gastroparesis    History of laminectomy    S/P hip hemiarthroplasty    S/P appendectomy               MEDICATIONS  (STANDING):    MEDICATIONS  (PRN):      FAMILY HISTORY:  No pertinent family history in first degree relatives      Denies Family history of CAD or early MI      Constitutional: denies fever, chills  HEENT: denies blurry vision, difficulty hearing  Respiratory: denies SOB, BECK, cough  Cardiovascular: denies CP, palpitations, orthopnea, PND, ADMITS LE edema  Gastrointestinal: denies nausea, vomiting, abdominal pain  Genitourinary: denies urinary changes  Skin: Denies rashes, itching  Neurologic: denies headache, weakness, dizziness  Hematology/Oncology: denies bleeding, easy bruising  ROS negative except as noted above      SOCIAL HISTORY:    No tobacco, Alcohol or Drug use    Vital Signs Last 24 Hrs  T(C): 36.2 (25 Jan 2022 13:38), Max: 36.2 (25 Jan 2022 13:38)  T(F): 97.2 (25 Jan 2022 13:38), Max: 97.2 (25 Jan 2022 13:38)  HR: 74 (25 Jan 2022 13:38) (74 - 74)  BP: 110/60 (25 Jan 2022 13:38) (110/60 - 110/60)  BP(mean): --  RR: 16 (25 Jan 2022 13:38) (16 - 16)  SpO2: 98% (25 Jan 2022 13:38) (98% - 98%)    Physical Exam:  General: Well developed, thin, elderly, NAD  HEENT: NCAT, PERRLA, EOMI bl, moist mucous membranes   Neck: Supple, nontender, no mass  Neurology: A&Ox3, nonfocal, sensation intact   Respiratory: CTA B/L, No W/R/R  CV: RRR, +S1/S2, no murmurs, rubs or gallops  Abdominal: Soft, NT, ND +BSx4, no palpable masses  Extremities:  + peripheral pulses, chronic venous changes of bilateral lower extremities, dry, circular, green-black healing wound on R shin  MSK: Normal ROM, no joint erythema or warmth, no joint swelling  Skin: warm, dry, normal color      ECG: Normal sinus rhythm    I&O's Detail      LABS:                        10.4   5.00  )-----------( 180      ( 25 Jan 2022 14:23 )             32.8     01-25    140  |  110<H>  |  23  ----------------------------<  87  5.0   |  24  |  1.40<H>    Ca    8.2<L>      25 Jan 2022 14:23              I&O's Summary    BNP  RADIOLOGY & ADDITIONAL STUDIES:

## 2022-01-25 NOTE — ED ADULT NURSE NOTE - NSICDXPASTSURGICALHX_GEN_ALL_CORE_FT
PAST SURGICAL HISTORY:  History of laminectomy     S/P appendectomy     S/P hip hemiarthroplasty

## 2022-01-25 NOTE — ED PROVIDER NOTE - CARE PROVIDER_API CALL
Trip Shirley (MD)  Cardiovascular Disease; Internal Medicine  [address] [place] [address]  Phone: [phone]  Fax: [phone]  Follow Up Time:

## 2022-01-25 NOTE — ED ADULT NURSE NOTE - OBJECTIVE STATEMENT
Patient is a 40yo female that had a syncopal episode in front of her aide. Patient states she had some chest discomfort and stood up and felt faint. Patient denies loss of consciousness Patient aide states that she loss of consciousness and slumped back into chair. Patient denies chest pain now denies difficulty breathing Patient denies nausea vomiting diarrhea fever. Patient denies hitting head or neck tenderness

## 2022-01-25 NOTE — CONSULT NOTE ADULT - ASSESSMENT
incomplete; charting in progress    89 yo F w/PMHx HTN, DM, CAD with 2 stents presents to ED with complaints of chest pressure, near syncope. Cardio consulted for chest pain.    - ECG demonstrates normal sinus rhythm  - Troponin negative x1  - No evidence of acute ischemic changes  - Atypical chest pain; more likely to be vasovagal in nature than ischemic event  - Vitals stable, blood pressure well controlled  - H/o CAD with 2 stents placed in 2012  - Previous TTE in 6/2021 shows normal LV systolic function, ejection fraction of 65%. Moderate LV diastolic dysfunction. Mild MR, mild DE, mild to moderate TR with estimated RV systolic pressure of 44 mmHg. LA markedly dilated.  - Prior stress test in 9/2019 was negative for inducement of cardiac symptoms, ecg evidence of myocardial ischemia, or significant arrhythmias. Normal LV myocardial perfusion and systolic function.  - Pt has mild plaque formation involving the bulbar regions bilaterally and the proximal portion of the left internal carotid arteries with no significant stenosis, demonstrated by carotid artery doppler in 2018.  - No evidence of volume overload  - Stable for discharge from a cardiac standpoint; no cardiac contraindications to discharge. 89 yo F w/PMHx HTN, DM, CAD with 2 stents presents to ED with complaints of chest pressure, near syncope. Cardio consulted for chest pain.    - ECG demonstrates normal sinus rhythm  - Troponin negative x1  - No evidence of acute ischemic changes  - Atypical chest pain; more likely to be vasovagal in nature than ischemic event  - Vitals stable, blood pressure well controlled  - H/o CAD with 2 stents placed in 2012  - Previous TTE in 6/2021 shows normal LV systolic function, ejection fraction of 65%. Moderate LV diastolic dysfunction. Mild MR, mild ME, mild to moderate TR with estimated RV systolic pressure of 44 mmHg. LA markedly dilated.  - Prior stress test in 9/2019 was negative for inducement of cardiac symptoms, ecg evidence of myocardial ischemia, or significant arrhythmias. Normal LV myocardial perfusion and systolic function.  - Pt has mild plaque formation involving the bulbar regions bilaterally and the proximal portion of the left internal carotid arteries with no significant stenosis, demonstrated by carotid artery doppler in 2018.  - No evidence of volume overload  - Stable for discharge from a cardiac standpoint; no cardiac contraindications to discharge.

## 2022-01-25 NOTE — ED PROVIDER NOTE - OBJECTIVE STATEMENT
89 yo white female with HTN, DM, CAD with Stents who was well at her baseline condition until this morning when she awoke at 09oo with dull, non radiating chest pressure. Few hours later when eating lunch she stood up, noted chest pressure was worse and was witnessed by her aide to be diaphoretic and nauseated and near syncopal. EMS was called and transported patient to ED where on arrival patient was completely asymptomatic. 89 yo white female with HTN, DM, CAD with Stents who was well at her baseline condition until this morning when she awoke at 0900 with dull, non radiating chest pressure. Few hours later when eating lunch she stood up, noted chest pressure was worse and was witnessed by her aide to be diaphoretic and nauseated and near syncopal. EMS was called and transported patient to ED where on arrival patient was completely asymptomatic.

## 2022-01-25 NOTE — ED PROVIDER NOTE - PRO INTERPRETER NEED 2
[FreeTextEntry1] : #ADH\par We have discussed that ADH puts her at increased risk of developing breast cancer in the future more so than the general population.\par I would recommend chemoprevention to decrease her risk of developing breast cancer despite no difference in breast cancer- specific or all cause mortality.\par continue tamoxifen 10 mg every other day\par \par #anemia\par pending work up\par b12 wnl - previously was deficient\par \par #Vit D insufficiency - recommend 1000 IU vit D daily\par \par RTC in 2 months to see how she is tolerating tamoxifen. will check CBC with diff, cmp, vit D  English

## 2022-02-25 ENCOUNTER — INPATIENT (INPATIENT)
Facility: HOSPITAL | Age: 87
LOS: 4 days | Discharge: HOME | DRG: 439 | End: 2022-03-02
Attending: INTERNAL MEDICINE | Admitting: INTERNAL MEDICINE
Payer: MEDICARE

## 2022-02-25 VITALS
SYSTOLIC BLOOD PRESSURE: 107 MMHG | HEIGHT: 62 IN | DIASTOLIC BLOOD PRESSURE: 54 MMHG | RESPIRATION RATE: 14 BRPM | OXYGEN SATURATION: 97 % | HEART RATE: 76 BPM

## 2022-02-25 DIAGNOSIS — K85.10 BILIARY ACUTE PANCREATITIS WITHOUT NECROSIS OR INFECTION: ICD-10-CM

## 2022-02-25 DIAGNOSIS — N17.9 ACUTE KIDNEY FAILURE, UNSPECIFIED: ICD-10-CM

## 2022-02-25 DIAGNOSIS — E11.9 TYPE 2 DIABETES MELLITUS WITHOUT COMPLICATIONS: ICD-10-CM

## 2022-02-25 DIAGNOSIS — Z86.69 PERSONAL HISTORY OF OTHER DISEASES OF THE NERVOUS SYSTEM AND SENSE ORGANS: ICD-10-CM

## 2022-02-25 DIAGNOSIS — K85.90 ACUTE PANCREATITIS WITHOUT NECROSIS OR INFECTION, UNSPECIFIED: ICD-10-CM

## 2022-02-25 DIAGNOSIS — Z98.89 OTHER SPECIFIED POSTPROCEDURAL STATES: Chronic | ICD-10-CM

## 2022-02-25 DIAGNOSIS — R07.9 CHEST PAIN, UNSPECIFIED: ICD-10-CM

## 2022-02-25 DIAGNOSIS — I10 ESSENTIAL (PRIMARY) HYPERTENSION: ICD-10-CM

## 2022-02-25 DIAGNOSIS — Z29.9 ENCOUNTER FOR PROPHYLACTIC MEASURES, UNSPECIFIED: ICD-10-CM

## 2022-02-25 DIAGNOSIS — E78.5 HYPERLIPIDEMIA, UNSPECIFIED: ICD-10-CM

## 2022-02-25 DIAGNOSIS — I25.10 ATHEROSCLEROTIC HEART DISEASE OF NATIVE CORONARY ARTERY WITHOUT ANGINA PECTORIS: ICD-10-CM

## 2022-02-25 DIAGNOSIS — N39.0 URINARY TRACT INFECTION, SITE NOT SPECIFIED: ICD-10-CM

## 2022-02-25 DIAGNOSIS — F32.9 MAJOR DEPRESSIVE DISORDER, SINGLE EPISODE, UNSPECIFIED: ICD-10-CM

## 2022-02-25 DIAGNOSIS — R82.90 UNSPECIFIED ABNORMAL FINDINGS IN URINE: ICD-10-CM

## 2022-02-25 DIAGNOSIS — Z87.39 PERSONAL HISTORY OF OTHER DISEASES OF THE MUSCULOSKELETAL SYSTEM AND CONNECTIVE TISSUE: ICD-10-CM

## 2022-02-25 DIAGNOSIS — K21.9 GASTRO-ESOPHAGEAL REFLUX DISEASE WITHOUT ESOPHAGITIS: ICD-10-CM

## 2022-02-25 DIAGNOSIS — Z96.60 PRESENCE OF UNSPECIFIED ORTHOPEDIC JOINT IMPLANT: Chronic | ICD-10-CM

## 2022-02-25 LAB
ALBUMIN SERPL ELPH-MCNC: 2.8 G/DL — LOW (ref 3.3–5)
ALP SERPL-CCNC: 66 U/L — SIGNIFICANT CHANGE UP (ref 40–120)
ALT FLD-CCNC: 19 U/L — SIGNIFICANT CHANGE UP (ref 12–78)
ANION GAP SERPL CALC-SCNC: 11 MMOL/L — SIGNIFICANT CHANGE UP (ref 5–17)
APPEARANCE UR: ABNORMAL
AST SERPL-CCNC: 25 U/L — SIGNIFICANT CHANGE UP (ref 15–37)
BACTERIA # UR AUTO: ABNORMAL
BASOPHILS # BLD AUTO: 0.03 K/UL — SIGNIFICANT CHANGE UP (ref 0–0.2)
BASOPHILS NFR BLD AUTO: 0.3 % — SIGNIFICANT CHANGE UP (ref 0–2)
BILIRUB SERPL-MCNC: 0.3 MG/DL — SIGNIFICANT CHANGE UP (ref 0.2–1.2)
BILIRUB UR-MCNC: NEGATIVE — SIGNIFICANT CHANGE UP
BUN SERPL-MCNC: 34 MG/DL — HIGH (ref 7–23)
CALCIUM SERPL-MCNC: 8.9 MG/DL — SIGNIFICANT CHANGE UP (ref 8.5–10.1)
CHLORIDE SERPL-SCNC: 108 MMOL/L — SIGNIFICANT CHANGE UP (ref 96–108)
CO2 SERPL-SCNC: 16 MMOL/L — LOW (ref 22–31)
COLOR SPEC: YELLOW — SIGNIFICANT CHANGE UP
COMMENT - URINE: SIGNIFICANT CHANGE UP
CREAT SERPL-MCNC: 1.8 MG/DL — HIGH (ref 0.5–1.3)
DIFF PNL FLD: ABNORMAL
EOSINOPHIL # BLD AUTO: 0.12 K/UL — SIGNIFICANT CHANGE UP (ref 0–0.5)
EOSINOPHIL NFR BLD AUTO: 1.4 % — SIGNIFICANT CHANGE UP (ref 0–6)
EPI CELLS # UR: SIGNIFICANT CHANGE UP
GLUCOSE SERPL-MCNC: 83 MG/DL — SIGNIFICANT CHANGE UP (ref 70–99)
GLUCOSE UR QL: NEGATIVE — SIGNIFICANT CHANGE UP
HCT VFR BLD CALC: 35.3 % — SIGNIFICANT CHANGE UP (ref 34.5–45)
HGB BLD-MCNC: 11.6 G/DL — SIGNIFICANT CHANGE UP (ref 11.5–15.5)
IMM GRANULOCYTES NFR BLD AUTO: 0.5 % — SIGNIFICANT CHANGE UP (ref 0–1.5)
KETONES UR-MCNC: ABNORMAL
LEUKOCYTE ESTERASE UR-ACNC: ABNORMAL
LIDOCAIN IGE QN: 1537 U/L — HIGH (ref 73–393)
LYMPHOCYTES # BLD AUTO: 1.3 K/UL — SIGNIFICANT CHANGE UP (ref 1–3.3)
LYMPHOCYTES # BLD AUTO: 15 % — SIGNIFICANT CHANGE UP (ref 13–44)
MAGNESIUM SERPL-MCNC: 1.7 MG/DL — SIGNIFICANT CHANGE UP (ref 1.6–2.6)
MCHC RBC-ENTMCNC: 30.4 PG — SIGNIFICANT CHANGE UP (ref 27–34)
MCHC RBC-ENTMCNC: 32.9 GM/DL — SIGNIFICANT CHANGE UP (ref 32–36)
MCV RBC AUTO: 92.4 FL — SIGNIFICANT CHANGE UP (ref 80–100)
MONOCYTES # BLD AUTO: 0.59 K/UL — SIGNIFICANT CHANGE UP (ref 0–0.9)
MONOCYTES NFR BLD AUTO: 6.8 % — SIGNIFICANT CHANGE UP (ref 2–14)
NEUTROPHILS # BLD AUTO: 6.56 K/UL — SIGNIFICANT CHANGE UP (ref 1.8–7.4)
NEUTROPHILS NFR BLD AUTO: 76 % — SIGNIFICANT CHANGE UP (ref 43–77)
NITRITE UR-MCNC: NEGATIVE — SIGNIFICANT CHANGE UP
NRBC # BLD: 0 /100 WBCS — SIGNIFICANT CHANGE UP (ref 0–0)
NT-PROBNP SERPL-SCNC: 2914 PG/ML — HIGH (ref 0–450)
PH UR: 5 — SIGNIFICANT CHANGE UP (ref 5–8)
PLATELET # BLD AUTO: 229 K/UL — SIGNIFICANT CHANGE UP (ref 150–400)
POTASSIUM SERPL-MCNC: 5.3 MMOL/L — SIGNIFICANT CHANGE UP (ref 3.5–5.3)
POTASSIUM SERPL-SCNC: 5.3 MMOL/L — SIGNIFICANT CHANGE UP (ref 3.5–5.3)
PROT SERPL-MCNC: 5.6 G/DL — LOW (ref 6–8.3)
PROT UR-MCNC: 30 MG/DL
RBC # BLD: 3.82 M/UL — SIGNIFICANT CHANGE UP (ref 3.8–5.2)
RBC # FLD: 16.9 % — HIGH (ref 10.3–14.5)
RBC CASTS # UR COMP ASSIST: ABNORMAL /HPF (ref 0–4)
SARS-COV-2 RNA SPEC QL NAA+PROBE: SIGNIFICANT CHANGE UP
SODIUM SERPL-SCNC: 135 MMOL/L — SIGNIFICANT CHANGE UP (ref 135–145)
SP GR SPEC: 1.02 — SIGNIFICANT CHANGE UP (ref 1.01–1.02)
TROPONIN I, HIGH SENSITIVITY RESULT: 48.8 NG/L — SIGNIFICANT CHANGE UP
UROBILINOGEN FLD QL: NEGATIVE — SIGNIFICANT CHANGE UP
WBC # BLD: 8.64 K/UL — SIGNIFICANT CHANGE UP (ref 3.8–10.5)
WBC # FLD AUTO: 8.64 K/UL — SIGNIFICANT CHANGE UP (ref 3.8–10.5)
WBC UR QL: ABNORMAL

## 2022-02-25 PROCEDURE — 71045 X-RAY EXAM CHEST 1 VIEW: CPT | Mod: 26

## 2022-02-25 PROCEDURE — 99497 ADVNCD CARE PLAN 30 MIN: CPT

## 2022-02-25 PROCEDURE — 99285 EMERGENCY DEPT VISIT HI MDM: CPT

## 2022-02-25 PROCEDURE — 93010 ELECTROCARDIOGRAM REPORT: CPT

## 2022-02-25 PROCEDURE — 74176 CT ABD & PELVIS W/O CONTRAST: CPT | Mod: 26,MA

## 2022-02-25 PROCEDURE — 76705 ECHO EXAM OF ABDOMEN: CPT | Mod: 26

## 2022-02-25 PROCEDURE — 99223 1ST HOSP IP/OBS HIGH 75: CPT

## 2022-02-25 PROCEDURE — 78226 HEPATOBILIARY SYSTEM IMAGING: CPT | Mod: 26

## 2022-02-25 RX ORDER — OMEPRAZOLE 10 MG/1
1 CAPSULE, DELAYED RELEASE ORAL
Qty: 0 | Refills: 0 | DISCHARGE

## 2022-02-25 RX ORDER — SODIUM CHLORIDE 9 MG/ML
1000 INJECTION INTRAMUSCULAR; INTRAVENOUS; SUBCUTANEOUS ONCE
Refills: 0 | Status: COMPLETED | OUTPATIENT
Start: 2022-02-25 | End: 2022-02-25

## 2022-02-25 RX ORDER — HEPARIN SODIUM 5000 [USP'U]/ML
5000 INJECTION INTRAVENOUS; SUBCUTANEOUS EVERY 12 HOURS
Refills: 0 | Status: DISCONTINUED | OUTPATIENT
Start: 2022-02-25 | End: 2022-03-02

## 2022-02-25 RX ORDER — SODIUM CHLORIDE 9 MG/ML
1000 INJECTION, SOLUTION INTRAVENOUS
Refills: 0 | Status: DISCONTINUED | OUTPATIENT
Start: 2022-02-25 | End: 2022-02-26

## 2022-02-25 RX ORDER — DEXTROSE 50 % IN WATER 50 %
12.5 SYRINGE (ML) INTRAVENOUS ONCE
Refills: 0 | Status: DISCONTINUED | OUTPATIENT
Start: 2022-02-25 | End: 2022-03-02

## 2022-02-25 RX ORDER — LINAGLIPTIN 5 MG/1
0 TABLET, FILM COATED ORAL
Qty: 0 | Refills: 0 | DISCHARGE

## 2022-02-25 RX ORDER — LIDOCAINE 4 G/100G
10 CREAM TOPICAL ONCE
Refills: 0 | Status: COMPLETED | OUTPATIENT
Start: 2022-02-25 | End: 2022-02-25

## 2022-02-25 RX ORDER — SODIUM CHLORIDE 9 MG/ML
1000 INJECTION INTRAMUSCULAR; INTRAVENOUS; SUBCUTANEOUS
Refills: 0 | Status: DISCONTINUED | OUTPATIENT
Start: 2022-02-25 | End: 2022-02-25

## 2022-02-25 RX ORDER — DEXTROSE 50 % IN WATER 50 %
15 SYRINGE (ML) INTRAVENOUS ONCE
Refills: 0 | Status: DISCONTINUED | OUTPATIENT
Start: 2022-02-25 | End: 2022-03-02

## 2022-02-25 RX ORDER — SODIUM CHLORIDE 9 MG/ML
1000 INJECTION, SOLUTION INTRAVENOUS
Refills: 0 | Status: DISCONTINUED | OUTPATIENT
Start: 2022-02-25 | End: 2022-03-02

## 2022-02-25 RX ORDER — LANOLIN ALCOHOL/MO/W.PET/CERES
3 CREAM (GRAM) TOPICAL AT BEDTIME
Refills: 0 | Status: DISCONTINUED | OUTPATIENT
Start: 2022-02-25 | End: 2022-03-02

## 2022-02-25 RX ORDER — ATORVASTATIN CALCIUM 80 MG/1
40 TABLET, FILM COATED ORAL AT BEDTIME
Refills: 0 | Status: DISCONTINUED | OUTPATIENT
Start: 2022-02-25 | End: 2022-03-02

## 2022-02-25 RX ORDER — INSULIN LISPRO 100/ML
VIAL (ML) SUBCUTANEOUS
Refills: 0 | Status: DISCONTINUED | OUTPATIENT
Start: 2022-02-25 | End: 2022-03-02

## 2022-02-25 RX ORDER — AMITRIPTYLINE HCL 25 MG
30 TABLET ORAL AT BEDTIME
Refills: 0 | Status: DISCONTINUED | OUTPATIENT
Start: 2022-02-25 | End: 2022-03-02

## 2022-02-25 RX ORDER — PANTOPRAZOLE SODIUM 20 MG/1
40 TABLET, DELAYED RELEASE ORAL
Refills: 0 | Status: DISCONTINUED | OUTPATIENT
Start: 2022-02-25 | End: 2022-02-26

## 2022-02-25 RX ORDER — GLUCAGON INJECTION, SOLUTION 0.5 MG/.1ML
1 INJECTION, SOLUTION SUBCUTANEOUS ONCE
Refills: 0 | Status: DISCONTINUED | OUTPATIENT
Start: 2022-02-25 | End: 2022-03-02

## 2022-02-25 RX ORDER — ASPIRIN/CALCIUM CARB/MAGNESIUM 324 MG
81 TABLET ORAL DAILY
Refills: 0 | Status: DISCONTINUED | OUTPATIENT
Start: 2022-02-25 | End: 2022-03-02

## 2022-02-25 RX ORDER — METOPROLOL TARTRATE 50 MG
0 TABLET ORAL
Qty: 0 | Refills: 0 | DISCHARGE

## 2022-02-25 RX ORDER — SUCRALFATE 1 G
1 TABLET ORAL
Refills: 0 | Status: DISCONTINUED | OUTPATIENT
Start: 2022-02-25 | End: 2022-03-02

## 2022-02-25 RX ORDER — HYDROMORPHONE HYDROCHLORIDE 2 MG/ML
4 INJECTION INTRAMUSCULAR; INTRAVENOUS; SUBCUTANEOUS EVERY 8 HOURS
Refills: 0 | Status: DISCONTINUED | OUTPATIENT
Start: 2022-02-25 | End: 2022-03-02

## 2022-02-25 RX ORDER — ASPIRIN/CALCIUM CARB/MAGNESIUM 324 MG
1 TABLET ORAL
Qty: 0 | Refills: 0 | DISCHARGE

## 2022-02-25 RX ORDER — METOPROLOL TARTRATE 50 MG
25 TABLET ORAL DAILY
Refills: 0 | Status: DISCONTINUED | OUTPATIENT
Start: 2022-02-25 | End: 2022-03-02

## 2022-02-25 RX ORDER — SERTRALINE 25 MG/1
25 TABLET, FILM COATED ORAL EVERY 12 HOURS
Refills: 0 | Status: DISCONTINUED | OUTPATIENT
Start: 2022-02-25 | End: 2022-03-02

## 2022-02-25 RX ORDER — SERTRALINE 25 MG/1
0 TABLET, FILM COATED ORAL
Qty: 0 | Refills: 0 | DISCHARGE

## 2022-02-25 RX ORDER — DEXTROSE 50 % IN WATER 50 %
25 SYRINGE (ML) INTRAVENOUS ONCE
Refills: 0 | Status: DISCONTINUED | OUTPATIENT
Start: 2022-02-25 | End: 2022-03-02

## 2022-02-25 RX ORDER — ONDANSETRON 8 MG/1
4 TABLET, FILM COATED ORAL EVERY 8 HOURS
Refills: 0 | Status: DISCONTINUED | OUTPATIENT
Start: 2022-02-25 | End: 2022-03-02

## 2022-02-25 RX ORDER — FAMOTIDINE 10 MG/ML
20 INJECTION INTRAVENOUS ONCE
Refills: 0 | Status: COMPLETED | OUTPATIENT
Start: 2022-02-25 | End: 2022-02-25

## 2022-02-25 RX ORDER — ONDANSETRON 8 MG/1
4 TABLET, FILM COATED ORAL ONCE
Refills: 0 | Status: COMPLETED | OUTPATIENT
Start: 2022-02-25 | End: 2022-02-25

## 2022-02-25 RX ORDER — POLYETHYLENE GLYCOL 3350 17 G/17G
17 POWDER, FOR SOLUTION ORAL
Refills: 0 | Status: DISCONTINUED | OUTPATIENT
Start: 2022-02-25 | End: 2022-03-02

## 2022-02-25 RX ORDER — INSULIN LISPRO 100/ML
VIAL (ML) SUBCUTANEOUS AT BEDTIME
Refills: 0 | Status: DISCONTINUED | OUTPATIENT
Start: 2022-02-25 | End: 2022-03-02

## 2022-02-25 RX ORDER — HYDROMORPHONE HYDROCHLORIDE 2 MG/ML
1 INJECTION INTRAMUSCULAR; INTRAVENOUS; SUBCUTANEOUS
Qty: 0 | Refills: 0 | DISCHARGE

## 2022-02-25 RX ORDER — SENNA PLUS 8.6 MG/1
2 TABLET ORAL AT BEDTIME
Refills: 0 | Status: DISCONTINUED | OUTPATIENT
Start: 2022-02-25 | End: 2022-03-02

## 2022-02-25 RX ORDER — ACETAMINOPHEN 500 MG
650 TABLET ORAL EVERY 6 HOURS
Refills: 0 | Status: DISCONTINUED | OUTPATIENT
Start: 2022-02-25 | End: 2022-03-02

## 2022-02-25 RX ORDER — SERTRALINE 25 MG/1
1 TABLET, FILM COATED ORAL
Qty: 0 | Refills: 0 | DISCHARGE

## 2022-02-25 RX ADMIN — FAMOTIDINE 20 MILLIGRAM(S): 10 INJECTION INTRAVENOUS at 16:35

## 2022-02-25 RX ADMIN — SODIUM CHLORIDE 1000 MILLILITER(S): 9 INJECTION INTRAMUSCULAR; INTRAVENOUS; SUBCUTANEOUS at 01:11

## 2022-02-25 RX ADMIN — POLYETHYLENE GLYCOL 3350 17 GRAM(S): 17 POWDER, FOR SOLUTION ORAL at 17:41

## 2022-02-25 RX ADMIN — ATORVASTATIN CALCIUM 40 MILLIGRAM(S): 80 TABLET, FILM COATED ORAL at 20:06

## 2022-02-25 RX ADMIN — ONDANSETRON 4 MILLIGRAM(S): 8 TABLET, FILM COATED ORAL at 11:42

## 2022-02-25 RX ADMIN — HEPARIN SODIUM 5000 UNIT(S): 5000 INJECTION INTRAVENOUS; SUBCUTANEOUS at 17:41

## 2022-02-25 RX ADMIN — Medication 30 MILLIGRAM(S): at 20:13

## 2022-02-25 RX ADMIN — LIDOCAINE 10 MILLILITER(S): 4 CREAM TOPICAL at 01:11

## 2022-02-25 RX ADMIN — SODIUM CHLORIDE 1000 MILLILITER(S): 9 INJECTION INTRAMUSCULAR; INTRAVENOUS; SUBCUTANEOUS at 02:11

## 2022-02-25 RX ADMIN — SODIUM CHLORIDE 60 MILLILITER(S): 9 INJECTION INTRAMUSCULAR; INTRAVENOUS; SUBCUTANEOUS at 11:43

## 2022-02-25 RX ADMIN — Medication 650 MILLIGRAM(S): at 11:43

## 2022-02-25 RX ADMIN — Medication 3 MILLIGRAM(S): at 20:13

## 2022-02-25 RX ADMIN — PANTOPRAZOLE SODIUM 40 MILLIGRAM(S): 20 TABLET, DELAYED RELEASE ORAL at 11:43

## 2022-02-25 RX ADMIN — SODIUM CHLORIDE 60 MILLILITER(S): 9 INJECTION, SOLUTION INTRAVENOUS at 16:36

## 2022-02-25 RX ADMIN — ONDANSETRON 4 MILLIGRAM(S): 8 TABLET, FILM COATED ORAL at 01:11

## 2022-02-25 RX ADMIN — Medication 30 MILLILITER(S): at 01:11

## 2022-02-25 RX ADMIN — SERTRALINE 25 MILLIGRAM(S): 25 TABLET, FILM COATED ORAL at 17:41

## 2022-02-25 RX ADMIN — SENNA PLUS 2 TABLET(S): 8.6 TABLET ORAL at 20:06

## 2022-02-25 RX ADMIN — Medication 30 MILLILITER(S): at 11:43

## 2022-02-25 RX ADMIN — Medication 81 MILLIGRAM(S): at 11:43

## 2022-02-25 NOTE — H&P ADULT - PROBLEM SELECTOR PLAN 4
H/o CAD s/p stent placement in 2012  - Pt did present with burning chest pain to the ER, however pain may be more epigastric and related to pancreatitis   - EKG---   - Trops negative x 2  - Continue aspirin 81 mg daily   - Continue atorvastatin 40 mg daily  - Continue metoprolol succinate 25 mg daily with hold parameters   - Hold enalapril in setting of KERRY, resume when resolved   - Cardio (Dr Jose) consulted; f/u recs H/o CAD s/p stent placement in 2012  - Pt did present with burning chest pain to the ER, however pain may be more epigastric and related to pancreatitis   - EKG shows NSR, no ST or T wave abnormalities   - Trops negative x 2  - Continue aspirin 81 mg daily   - Continue atorvastatin 40 mg daily  - Continue metoprolol succinate 25 mg daily with hold parameters   - Hold enalapril in setting of KERRY, resume when resolved   - Cardio (Dr Jose) consulted; f/u recs UA significant for  large blood, moderate ketones, moderate leuk esterase, 26-50 WBCs, moderate bacteria, few yeast cells  - Patient is completely asymptomatic, no leukocytosis or fever   - Will monitor off abx for now   - F/u urine cx

## 2022-02-25 NOTE — H&P ADULT - PROBLEM SELECTOR PLAN 5
Chronic, stable   - BP controlled on admission   - Continue metoprolol succinate 25 mg daily with hold parameters   - Hold enalapril in setting of KERRY

## 2022-02-25 NOTE — H&P ADULT - PROBLEM SELECTOR PLAN 11
VTE ppx;   Heparin 5000 units q12h Chronic, stable  - Patient takes Dilaudid 4 mg PRN every 4-6 hr for pain   - Continue--- Chronic, stable  - Patient takes Dilaudid 4 mg PRN every 8hr PRN for pain, will continue

## 2022-02-25 NOTE — H&P ADULT - NEGATIVE GENERAL GENITOURINARY SYMPTOMS
no hematuria/no renal colic/no flank pain L/no flank pain R/no urine discoloration/no dysuria/no urinary hesitancy/normal urinary frequency

## 2022-02-25 NOTE — H&P ADULT - PROBLEM SELECTOR PLAN 9
Chronic, stable   - Continue pantoprazole 40 mg daily (therapeutic interchange for omeprazole 40 mg daily)

## 2022-02-25 NOTE — ED PROVIDER NOTE - NOTES
consult called to [facility] for am consulte called to [facility] service for am consult

## 2022-02-25 NOTE — CONSULT NOTE ADULT - ASSESSMENT
89 y/o F with PMHx CAD s/p stents (2012), DMII, HTN, HLD, GERD, mild AS, chronic venous insufficiency, neuropathy, macular degeneration, chronic constipation, depression, OA, lumbar degenerative disc disease, spinal stenosis who presents to ER with abdominal pain and nausea, CT shows cholelithiasis, U/S equivocal for acute marilyn, HIDA in progress - results pending, lipase 1500

## 2022-02-25 NOTE — H&P ADULT - PROBLEM SELECTOR PLAN 3
H/o CAD s/p stent placement in 2012  - Pt did present with burning chest pain to the ER, however pain may be more epigastric and related to pancreatitis   - EKG shows NSR, no ST or T wave abnormalities   - Trops negative x 2  - Continue aspirin 81 mg daily   - Continue atorvastatin 40 mg daily  - Continue metoprolol succinate 25 mg daily with hold parameters   - Hold enalapril in setting of KERRY, resume when resolved   - Cardio (Dr Jose) consulted; f/u recs

## 2022-02-25 NOTE — ED PROVIDER NOTE - PSYCHIATRIC, MLM
Alert and oriented to person, place, time/situation.flat affect, forgetful no apparent risk to self or others.

## 2022-02-25 NOTE — H&P ADULT - GASTROINTESTINAL DETAILS
normal/soft normal/soft/nontender/no distention/no masses palpable/bowel sounds normal/no bruit/no rebound tenderness/no guarding/no rigidity/no organomegaly

## 2022-02-25 NOTE — H&P ADULT - PROBLEM SELECTOR PLAN 1
Patient presenting with several days of nausea and one day of burning epigastric pain, admitted for likely gallstone pancreatitis vs medication induced (pt takes Tradjenta)  -Admit to [facility]   - Lipase elevated to 1537  - US abdomen: Sonographic findings equivocal for acute cholecystitis. If there is clinical suspicion for acute cholecystitis, a hepatobiliary scan may be obtained for further evaluation. Poorly visualized pancreas. Recommend correlation with serum lipase to assess acute pancreatitis.  -CT A/P shows Marked fatty replacement of the pancreas. Limited evaluation without intravenous contrast. Cholelithiasis without CT evidence for acute cholecystitis. Diverticulosis without diverticulitis.  - Will keep patient NPO and advance diet as tolerated, place on IVF NS @ 70 cc/hr (pt has h/o mild aortic stenosis, would caution with excessive IV fluids)   - No fever or leukocytosis noted, monitor fever and WBC curve  - F/u lipiud panel   - F/u HIDA scan   - Pain control with----   - Zofran PRN for nausea   - GI (Dr Montilla) consulted; f/u recs   - Surgery consulted; f/u recs Patient presenting with several days of nausea and one day of burning epigastric pain, admitted for possible gallstone pancreatitis vs medication induced (pt takes Tradjenta) vs cholecystitis?  -Admit to [facility]   - Lipase elevated to 1537  - US abdomen: Sonographic findings equivocal for acute cholecystitis. If there is clinical suspicion for acute cholecystitis, a hepatobiliary scan may be obtained for further evaluation. Poorly visualized pancreas. Recommend correlation with serum lipase to assess acute pancreatitis.  -CT A/P shows Marked fatty replacement of the pancreas. Limited evaluation without intravenous contrast. Cholelithiasis without CT evidence for acute cholecystitis. Diverticulosis without diverticulitis.  - No inflammation surrounding pancreas on CT; however will keep patient NPO and advance diet as tolerated, place on IVF NS @ 60 cc/hr (pt has h/o mild aortic stenosis, would caution with excessive IV fluids)   - No fever or leukocytosis noted, monitor fever and WBC curve  - F/u lipid panel   - F/u HIDA scan to r/o cholecystitis, will determine with GI whether patient needs MRCP  - Zofran PRN for nausea   - GI (Dr Montilla) consulted; f/u recs   - Surgery consulted; f/u recs

## 2022-02-25 NOTE — CONSULT NOTE ADULT - ATTENDING COMMENTS
Seen/examined. agree with above.  atypical chest pain, likely of GI origin  hepatobiliary imaging is in progress  troponin with mild increase, though no suggestion of acs  ekg unremarkable  will follow along with you

## 2022-02-25 NOTE — CHART NOTE - NSCHARTNOTEFT_GEN_A_CORE
The Drug Utilization Report below displays all of the controlled substance prescriptions, if any, that your patient has filled in the last twelve months. The information displayed on this report is compiled from pharmacy submissions to the Department, and accurately reflects the information as submitted by the pharmacies.    This report was requested by: Polina Bae | Reference #: [id number]    Others' Prescriptions  Patient Name: Merissa KasperkBirandrés Date: 12/15/1931  Address: [address] [place] [address] [address] [place], [place] [address]Swp: Female  Rx Written	Rx Dispensed	Drug	Quantity	Days Supply	Prescriber Name	Prescriber Sydnee #	Payment Method	Dispenser  02/09/2022	02/10/2022	hydromorphone 4 mg tablet	90	30	Leonel Kahn PA-C	[id number]	[facility]  01/11/2022	01/11/2022	hydromorphone 4 mg tablet	90	30	Leonel Kahn PA-C	[id number]	[facility]  11/09/2021	11/09/2021	hydromorphone 4 mg tablet	90	30	Leonel Kahn PA-C	[id number]	Medicare	[facility]  10/05/2021	10/12/2021	hydromorphone 4 mg tablet	90	30	Leonel Kahn PA-C	[id number]	Medicare	[facility]  08/26/2021	09/14/2021	hydromorphone 4 mg tablet	90	30	Satish Hanson	[id number]	Medicare	[facility]  08/11/2021	08/31/2021	hydromorphone 4 mg tablet	45	15	Alfredo Bahena MD	[id number]	Medicare	[facility]  07/26/2021	07/28/2021	hydromorphone 4 mg tablet	90	30	Leonel Kahn PA-C	[id number]	[facility]  06/28/2021	06/30/2021	hydromorphone 4 mg tablet	90	30	Leonel Kahn PA-C	[id number]	[facility]  05/27/2021	06/01/2021	hydromorphone 4 mg tablet	90	30	Emmanuel Sharma MD	[id number]	[facility]  04/29/2021	05/03/2021	hydromorphone 4 mg tablet	90	30	Leonel Kahn PA-C	[id number]	[facility]  04/06/2021	04/07/2021	nucynta er 50 mg tablet	60	30	Aden Radhasd	[id number]	[facility]  03/31/2021	04/05/2021	hydromorphone 4 mg tablet	90	30	Leonel Kahn PA-C	[id number]	[facility]  03/01/2021	03/01/2021	hydromorphone 4 mg tablet	66	22	Alfredo Bahena MD	[id number]	[facility]

## 2022-02-25 NOTE — H&P ADULT - SKIN
detailed exam venous stasis dermatitis bilateral lower extremities/warm and dry venous stasis dermatitis bilateral lower extremities/warm and dry/color normal

## 2022-02-25 NOTE — H&P ADULT - ASSESSMENT
The patient is a 89 year old female with PMH CAD s/p stents in 2012, Type 2 diabetes, HTN, HLD, GERD, mild AS, chronic venous insufficiency, depression, OA, lumbar degenerative disc disease, and spinal stenosis who presents to the ER complaining of nausea, abdominal pain, and burning chest pain, admitted for gallstone pancreatitis  The patient is a 89 year old female with PMH CAD s/p stents in 2012, Type 2 diabetes, HTN, HLD, GERD, mild AS, chronic venous insufficiency, depression, OA, lumbar degenerative disc disease, and spinal stenosis who presents to the ER complaining of nausea, abdominal pain, and burning chest pain, admitted for gallstone pancreatitis, KERRY.  The patient is a 89 year old female with PMH CAD s/p stents in 2012, Type 2 diabetes, HTN, HLD, GERD, mild AS, chronic venous insufficiency, depression, OA, lumbar degenerative disc disease, and spinal stenosis who presents to the ER complaining of nausea, abdominal pain, and burning chest pain, admitted for pancreatitis, KERRY.  The patient is a 89 year old female with PMH CAD s/p stents in 2012, Type 2 diabetes, HTN, HLD, GERD, mild AS, chronic venous insufficiency, neuropathy, macular degeneration, chronic constipation, depression, OA, lumbar degenerative disc disease, and spinal stenosis who presents to the ER complaining of nausea, abdominal pain, and burning chest pain, admitted for pancreatitis, KERRY.

## 2022-02-25 NOTE — ED ADULT NURSE REASSESSMENT NOTE - NS ED NURSE REASSESS COMMENT FT1
89 y/o female received from prior shift, AA&Ox4, breathing unlabored and regular, resting in the stretcher comfortably. Pt denies any abdominal pain at this time, reports mild nausea. Pending cardiology and GI consult. Will continue to monitor and maintain saftey. 89 y/o female received from prior shift, AA&Ox4, breathing unlabored and regular, resting in the stretcher comfortably. Pt denies any abdominal pain at this time, reports mild nausea. Pending cardiology and GI consult. Will continue to monitor and maintain safety.

## 2022-02-25 NOTE — H&P ADULT - NSHPOUTPATIENTPROVIDERS_GEN_ALL_CORE
PCP: Dr. Alvarado   Cardio: Dr Shirley PCP: Dr. Alvarado   Cardio: Dr Shirley  Endo: Dr Linda   Pain Management: Ingrid HENDERSON

## 2022-02-25 NOTE — H&P ADULT - HISTORY OF PRESENT ILLNESS
The patient is a 89 year old female with PMH CAD s/p stents in 2012, Type 2 diabetes, HTN, HLD, GERD, mild AS, chronic venous insufficiency, depression, OA, lumbar degenerative disc disease, and spinal stenosis who presents to the ER complaining of nausea, abdominal pain, and burning chest pain.          In the ED:   VS: T:97.6, HR:93, BP: 119/54, RR:16, SpO2: 98% on room air   Labs significant for BUN/Cr: 34/1.8, Trop negative x 2, lipase: 1537, proBNP: 2914  UA: slightly turbid, large blood, moderate ketones, moderate leuk esterase, 26-50 WBCs, moderate bacteria, few yeast cells   EKG:   US abdomen: Sonographic findings equivocal for acute cholecystitis. If there is clinical suspicion for acute cholecystitis, a hepatobiliary scan may be obtained for further evaluation.Poorly visualized pancreas. Recommend correlation with serum lipase to assess acute pancreatitis.  CT A/P: Marked fatty replacement of the pancreas. Limited evaluation without intravenous contrast. Cholelithiasis without CT evidence for acute cholecystitis.Diverticulosis without diverticulitis.  HIDA scan pending   Patient received 1 L NS bolus 4 mg Zofran The patient is a 89 year old female with PMH CAD s/p stents in 2012, Type 2 diabetes, HTN, HLD, GERD, mild AS, chronic venous insufficiency, neuropathy, macular degeneration, chronic constipation, depression, OA, lumbar degenerative disc disease, and spinal stenosis who presents to the ER complaining of nausea, abdominal pain, and burning chest pain. Patient is a poor historian, majority of the history obtained from daughter Mary Guerin at bedside. Per daughter, over the past week patient has been extremely nauseous and has been dry heaving. Patient never vomited. She had been eating less over the past few days; however, yesterday, patient was so nauseous that she was unable to eat or drink. She felt weak and fatigued. She also developed burning pain in the epigastric area that radiated to her chest. Her daughter became concerned and called EMS. Patient denies any other symptoms, including fevers, chills, SOB, diarrhea. Of note, per daughter, patient has lost a significant amount of weight (about 12 lbs) in the past few months as she has had a decreased appetite.    In the ED:   VS: T:97.6, HR:93, BP: 119/54, RR:16, SpO2: 98% on room air   Labs significant for BUN/Cr: 34/1.8, Trop negative x 2, lipase: 1537, proBNP: 2914  UA: slightly turbid, large blood, moderate ketones, moderate leuk esterase, 26-50 WBCs, moderate bacteria, few yeast cells   EKG: NSR, no ST or T wave abnormalities   US abdomen: Sonographic findings equivocal for acute cholecystitis. If there is clinical suspicion for acute cholecystitis, a hepatobiliary scan may be obtained for further evaluation.Poorly visualized pancreas. Recommend correlation with serum lipase to assess acute pancreatitis.  CT A/P: Marked fatty replacement of the pancreas. Limited evaluation without intravenous contrast. Cholelithiasis without CT evidence for acute cholecystitis.Diverticulosis without diverticulitis.  HIDA scan pending   Patient received 1 L NS bolus 4 mg Zofran The patient is a 89 year old female with PMH CAD s/p stents in 2012, Type 2 diabetes, HTN, HLD, GERD, mild AS, chronic venous insufficiency, neuropathy, macular degeneration, chronic constipation, depression, OA, lumbar degenerative disc disease, and spinal stenosis who presents to the ER complaining of nausea, abdominal pain, and burning chest pain. Patient is a poor historian, majority of the history obtained from daughter Mary Guerin at bedside. Per daughter, over the past week patient has been extremely nauseous and has been dry heaving. Patient never vomited. She had been eating less over the past few days; however, yesterday, patient was so nauseous that she was unable to eat or drink. She felt weak and fatigued. She also developed burning pain in the epigastric area that radiated to her chest. Her daughter became concerned and called EMS. Patient denies any other symptoms, including fevers, chills, SOB, diarrhea. Of note, per daughter, patient has lost a significant amount of weight (about 12 lbs) in the past few months as she has had a decreased appetite.    In the ED:   VS: T:97.6, HR:93, BP: 119/54, RR:16, SpO2: 98% on room air  Labs significant for BUN/Cr: 34/1.8, Trop negative x 2, lipase: 1537, proBNP: 2914  UA: slightly turbid, large blood, moderate ketones, moderate leuk esterase, 26-50 WBCs, moderate bacteria, few yeast cells   EKG: NSR, no ST or T wave abnormalities   US abdomen: Sonographic findings equivocal for acute cholecystitis. If there is clinical suspicion for acute cholecystitis, a hepatobiliary scan may be obtained for further evaluation.Poorly visualized pancreas. Recommend correlation with serum lipase to assess acute pancreatitis.  CT A/P: Marked fatty replacement of the pancreas. Limited evaluation without intravenous contrast. Cholelithiasis without CT evidence for acute cholecystitis.Diverticulosis without diverticulitis.  HIDA scan pending   Patient received 1 L NS bolus 4 mg Zofran

## 2022-02-25 NOTE — H&P ADULT - RS GEN PE MLT RESP DETAILS PC
normal/airway patent/breath sounds equal/good air movement/clear to auscultation bilaterally/no rales/no rhonchi/no wheezes normal/airway patent/breath sounds equal/good air movement/clear to auscultation bilaterally/no chest wall tenderness/no rales/no rhonchi/no wheezes

## 2022-02-25 NOTE — H&P ADULT - PROBLEM SELECTOR PLAN 2
BUN/Cr 34/1.8, baseline kidney function appears to be approximately 1   - Likely prerenal in setting of decreased PO intake as patient has been nauseous   - Continue IVF NS @ 70 cc/hr   - Hold enalapril in setting of KERRY   - Monitor BMP daily BUN/Cr 34/1.8, baseline kidney function appears to be approximately 1   - Likely prerenal in setting of decreased PO intake as patient has been nauseous   - Continue IVF NS @ 60 cc/hr   - Hold enalapril in setting of KERRY   - Monitor BMP daily

## 2022-02-25 NOTE — H&P ADULT - ATTENDING COMMENTS
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  GI-Dr Montilla  Cardiology-DR Jose  Sx -No surgical intervention.  AM labs   Diet---> NPO  Palliative care eval for GOC.  PT Eval.

## 2022-02-25 NOTE — PATIENT PROFILE ADULT - FALL HARM RISK - HARM RISK INTERVENTIONS

## 2022-02-25 NOTE — GOALS OF CARE CONVERSATION - ADVANCED CARE PLANNING - CONVERSATION DETAILS
Writer met with pt and dtr/HCP Mary at bedside. Reviewed patient's medical and social history as well as events leading to patient's hospitalization. Writer discussed patient's current diagnosis (chest pain,gastroporesis,CAD,costochondritis,GERD,DM,spinal stenosis),  medical condition and management,   prognosis, and life expectancy. Inquired about patient's wishes regarding extent of medical care to be provided including escalation of medical care into the ICU and use of vasopressor support. In addition, the writer inquired about thoughts regarding cardiopulmnary resuscitation, artificial nutrition and hydration including use of feeding tubes and IVF, antibiotics, and further investigative studies such as blood draws and radiology.Both  showed good insight into medical condition. Pt states she wants resuscitation, dtr knows when to stop.All questions answered.Psychosocial support provided.

## 2022-02-25 NOTE — ED PROVIDER NOTE - CARE PLAN
1 Principal Discharge DX:	Chest pain   Principal Discharge DX:	Chest pain  Secondary Diagnosis:	Pancreatitis

## 2022-02-25 NOTE — H&P ADULT - PROBLEM SELECTOR PLAN 6
- holding home medications: Tradjenta and metformin   - low dose insulin coverage scale w/hypoglycemia protocol in place   - Rocky AC&HS.  - Diet: NPO for now as patient is admitted with pancreatitis   - f/u AM HbA1c.

## 2022-02-25 NOTE — H&P ADULT - MUSCULOSKELETAL
no joint swelling/no joint erythema details… detailed exam normal/no joint swelling/no joint erythema/normal strength

## 2022-02-25 NOTE — H&P ADULT - CONVERSATION DETAILS
Discussed patient's current diagnosis of pancreatitis, as well as her comorbid conditions, including her history of CAD, advanced age, HTN, diabetes, mild AS, spinal stenosis. At this time, per Mary's discussion with her mother and the patient's wishes, she would like all resuscitative measures, including CPR and intubation. The patient remains FULL CODE at this time.

## 2022-02-25 NOTE — ED PROVIDER NOTE - OBJECTIVE STATEMENT
Pt is a 89 yo female extensive pmhx. pt recently wakes nauseated daily and today states all day with burning in chest. pt tonight told daughter and ems called, no vomiting, no f/c, pt denies radiation of pain, no a/a factors, no n/v.  no sob.  pt otherwise poor historian with  details.  pt states pain constant,     pmd: cele gallego  cards: roselia

## 2022-02-25 NOTE — ED PROVIDER NOTE - CONSTITUTIONAL, MLM
normal... elderly, frail, awake, alert, oriented to person, place, time/situation, forgetful,  and in no apparent distress.

## 2022-02-25 NOTE — CONSULT NOTE ADULT - ASSESSMENT
The patient is a 89 year old female with PMH CAD s/p stents in 2012, Type 2 diabetes, HTN, HLD, GERD, mild AS, chronic venous insufficiency, neuropathy, macular degeneration, chronic constipation, depression, OA, lumbar degenerative disc disease, and spinal stenosis who presents to the ER complaining of nausea, abdominal pain, and burning chest pain, admitted for pancreatitis, KERRY.     Doubt ACS  - pt hx of CAD with MI in 2012 and 2 stents; sees Dr. Shirley   - no evidence of ischemia; EKG NSR 82bpm   - Previous TTE in 6/2021 shows normal LV systolic function, ejection fraction of 65%. Moderate LV diastolic dysfunction. Mild MR, mild CO, mild to moderate TR with estimated RV systolic pressure of 44 mmHg. LA markedly dilated.  - Prior stress test in 9/2019 was negative for inducement of cardiac symptoms, ecg evidence of myocardial ischemia, or significant arrhythmias. Normal LV myocardial perfusion and systolic function.  - Monitor and replete lytes, keep K>4, Mg>2  - Pt has no evidence of ischemia, decompensated HF, unstable arrythmia, or severe stenotic valvular disease, and in the setting of possible low to intermediate risk  procedure, patient is optimized from cardiovascular standpoint to proceed with planned procedure with routine hemodynamic monitoring.   - All other workup per primary team           The patient is a 89 year old female with PMH CAD s/p stents in 2012, Type 2 diabetes, HTN, HLD, GERD, mild AS, chronic venous insufficiency, neuropathy, macular degeneration, chronic constipation, depression, OA, lumbar degenerative disc disease, and spinal stenosis who presents to the ER complaining of nausea, abdominal pain, and burning chest pain, admitted for pancreatitis, KERRY.     Doubt ACS  - symptoms most likely of pancreatic/gallbladder origin  - pt hx of CAD with MI in 2012 and 2 stents; sees Dr. Shirley   - no evidence of ischemia; EKG NSR 82bpm   - Previous TTE in 6/2021 shows normal LV systolic function, ejection fraction of 65%. Moderate LV diastolic dysfunction. Mild MR, mild RI, mild to moderate TR with estimated RV systolic pressure of 44 mmHg. LA markedly dilated.  - Prior stress test in 9/2019 was negative for inducement of cardiac symptoms, ecg evidence of myocardial ischemia, or significant arrhythmias. Normal LV myocardial perfusion and systolic function.  - Monitor and replete lytes, keep K>4, Mg>2  - Pt has no evidence of ischemia, decompensated HF, unstable arrythmia, or severe stenotic valvular disease. If a marilyn is deemed necessary, patient is optimized from cardiovascular standpoint to proceed with planned procedure with routine hemodynamic monitoring.   - All other workup per primary team

## 2022-02-25 NOTE — CONSULT NOTE ADULT - ASSESSMENT
abdominal pain  ? pancreatitis    advance to clears  proton pump inhibitor bid  carafate 1g four times a day  non contrast ct of the abdomen shows atrophic pancreas  no carmen-pancreatic inflammation  her GFR will not allow IV contrast CT  can check MRI with IV contrast (new generation gadolinum) for further evaluation, diagnosis of mass needs to be entertained given weight loss and chronicity of symptoms  d/w primary

## 2022-02-25 NOTE — H&P ADULT - PROBLEM SELECTOR PLAN 10
Chronic, stable  - Patient takes Dilaudid 4 mg PRN every 4-6 hr for pain   - Continue--- Patient started on amitriptyline for h/o neuropathy   - Continue amitriptyline 30 mg qhs

## 2022-02-25 NOTE — ED PROVIDER NOTE - CLINICAL SUMMARY MEDICAL DECISION MAKING FREE TEXT BOX
89 yo female extensive pmhx p/w nausea, recently constant and tonight chest burning pain,  ekg non spec, check trop, lipase, gi cocktail, zofran, cxr, trop x 2, cards in am 89 yo female extensive pmhx p/w nausea, recently constant and tonight chest burning pain,  ekg non spec, check trop, lipase, gi cocktail, zofran, cxr, trop x 2, cards in am---lipase elevated, check us and ct, gi in am

## 2022-02-25 NOTE — CONSULT NOTE ADULT - PROBLEM SELECTOR RECOMMENDATION 9
Continue care as per primary team  Pain control, supportive care  NPO, IV fluids  Possible cholecystectomy after acute pancreatitis resolves  Dr. Rodrigues to speak with patient's daughter  Trend lipase  Discussed with Dr. Rodrigues

## 2022-02-25 NOTE — H&P ADULT - PROBLEM SELECTOR PLAN 8
Chronic, stable   - Continue Zoloft 25 mg daily   - Continue Amitryptiline 30 mg qhs---- Chronic, stable   - Continue Zoloft 25 mg BID

## 2022-02-25 NOTE — H&P ADULT - NEUROLOGICAL DETAILS
responds to pain/responds to verbal commands alert and oriented x 3/responds to pain/responds to verbal commands/sensation intact/normal strength

## 2022-02-26 ENCOUNTER — TRANSCRIPTION ENCOUNTER (OUTPATIENT)
Age: 87
End: 2022-02-26

## 2022-02-26 LAB
ALBUMIN SERPL ELPH-MCNC: 1.6 G/DL — LOW (ref 3.3–5)
ALP SERPL-CCNC: 37 U/L — LOW (ref 40–120)
ALT FLD-CCNC: 10 U/L — LOW (ref 12–78)
ANION GAP SERPL CALC-SCNC: 5 MMOL/L — SIGNIFICANT CHANGE UP (ref 5–17)
ANION GAP SERPL CALC-SCNC: 8 MMOL/L — SIGNIFICANT CHANGE UP (ref 5–17)
AST SERPL-CCNC: 12 U/L — LOW (ref 15–37)
BASOPHILS # BLD AUTO: 0.02 K/UL — SIGNIFICANT CHANGE UP (ref 0–0.2)
BASOPHILS NFR BLD AUTO: 0.6 % — SIGNIFICANT CHANGE UP (ref 0–2)
BILIRUB SERPL-MCNC: 0.2 MG/DL — SIGNIFICANT CHANGE UP (ref 0.2–1.2)
BUN SERPL-MCNC: 13 MG/DL — SIGNIFICANT CHANGE UP (ref 7–23)
BUN SERPL-MCNC: 18 MG/DL — SIGNIFICANT CHANGE UP (ref 7–23)
CALCIUM SERPL-MCNC: 6.2 MG/DL — CRITICAL LOW (ref 8.5–10.1)
CALCIUM SERPL-MCNC: SIGNIFICANT CHANGE UP MG/DL (ref 8.5–10.1)
CHLORIDE SERPL-SCNC: 122 MMOL/L — HIGH (ref 96–108)
CHLORIDE SERPL-SCNC: 128 MMOL/L — HIGH (ref 96–108)
CHOLEST SERPL-MCNC: 54 MG/DL — SIGNIFICANT CHANGE UP
CO2 SERPL-SCNC: 13 MMOL/L — LOW (ref 22–31)
CO2 SERPL-SCNC: 18 MMOL/L — LOW (ref 22–31)
CREAT SERPL-MCNC: 1.2 MG/DL — SIGNIFICANT CHANGE UP (ref 0.5–1.3)
CREAT SERPL-MCNC: 1.2 MG/DL — SIGNIFICANT CHANGE UP (ref 0.5–1.3)
CULTURE RESULTS: SIGNIFICANT CHANGE UP
EOSINOPHIL # BLD AUTO: 0.06 K/UL — SIGNIFICANT CHANGE UP (ref 0–0.5)
EOSINOPHIL NFR BLD AUTO: 1.7 % — SIGNIFICANT CHANGE UP (ref 0–6)
GLUCOSE SERPL-MCNC: 1796 MG/DL — CRITICAL HIGH (ref 70–99)
GLUCOSE SERPL-MCNC: 962 MG/DL — CRITICAL HIGH (ref 70–99)
HCT VFR BLD CALC: 17.9 % — CRITICAL LOW (ref 34.5–45)
HCT VFR BLD CALC: 18.8 % — CRITICAL LOW (ref 34.5–45)
HDLC SERPL-MCNC: 34 MG/DL — LOW
HGB BLD-MCNC: 5.1 G/DL — CRITICAL LOW (ref 11.5–15.5)
HGB BLD-MCNC: 5.3 G/DL — CRITICAL LOW (ref 11.5–15.5)
IMM GRANULOCYTES NFR BLD AUTO: 1.7 % — HIGH (ref 0–1.5)
LIPID PNL WITH DIRECT LDL SERPL: 11 MG/DL — SIGNIFICANT CHANGE UP
LYMPHOCYTES # BLD AUTO: 0.32 K/UL — LOW (ref 1–3.3)
LYMPHOCYTES # BLD AUTO: 8.9 % — LOW (ref 13–44)
MCHC RBC-ENTMCNC: 27.1 GM/DL — LOW (ref 32–36)
MCHC RBC-ENTMCNC: 29.6 GM/DL — LOW (ref 32–36)
MCHC RBC-ENTMCNC: 30.4 PG — SIGNIFICANT CHANGE UP (ref 27–34)
MCHC RBC-ENTMCNC: 30.5 PG — SIGNIFICANT CHANGE UP (ref 27–34)
MCV RBC AUTO: 102.9 FL — HIGH (ref 80–100)
MCV RBC AUTO: 111.9 FL — HIGH (ref 80–100)
MONOCYTES # BLD AUTO: 0.31 K/UL — SIGNIFICANT CHANGE UP (ref 0–0.9)
MONOCYTES NFR BLD AUTO: 8.6 % — SIGNIFICANT CHANGE UP (ref 2–14)
NEUTROPHILS # BLD AUTO: 2.83 K/UL — SIGNIFICANT CHANGE UP (ref 1.8–7.4)
NEUTROPHILS NFR BLD AUTO: 78.5 % — HIGH (ref 43–77)
NON HDL CHOLESTEROL: 20 MG/DL — SIGNIFICANT CHANGE UP
NRBC # BLD: 0 /100 WBCS — SIGNIFICANT CHANGE UP (ref 0–0)
NRBC # BLD: 0 /100 WBCS — SIGNIFICANT CHANGE UP (ref 0–0)
PLATELET # BLD AUTO: 94 K/UL — LOW (ref 150–400)
PLATELET # BLD AUTO: 98 K/UL — LOW (ref 150–400)
POTASSIUM SERPL-MCNC: 2.4 MMOL/L — CRITICAL LOW (ref 3.5–5.3)
POTASSIUM SERPL-MCNC: 3.2 MMOL/L — LOW (ref 3.5–5.3)
POTASSIUM SERPL-SCNC: 2.4 MMOL/L — CRITICAL LOW (ref 3.5–5.3)
POTASSIUM SERPL-SCNC: 3.2 MMOL/L — LOW (ref 3.5–5.3)
PROT SERPL-MCNC: 3.6 G/DL — LOW (ref 6–8.3)
RBC # BLD: 1.68 M/UL — LOW (ref 3.8–5.2)
RBC # BLD: 1.74 M/UL — LOW (ref 3.8–5.2)
RBC # FLD: 17.7 % — HIGH (ref 10.3–14.5)
RBC # FLD: 18 % — HIGH (ref 10.3–14.5)
SODIUM SERPL-SCNC: 145 MMOL/L — SIGNIFICANT CHANGE UP (ref 135–145)
SODIUM SERPL-SCNC: 149 MMOL/L — HIGH (ref 135–145)
SPECIMEN SOURCE: SIGNIFICANT CHANGE UP
TRIGL SERPL-MCNC: 45 MG/DL — SIGNIFICANT CHANGE UP
WBC # BLD: 3.6 K/UL — LOW (ref 3.8–10.5)
WBC # BLD: 4.16 K/UL — SIGNIFICANT CHANGE UP (ref 3.8–10.5)
WBC # FLD AUTO: 3.6 K/UL — LOW (ref 3.8–10.5)
WBC # FLD AUTO: 4.16 K/UL — SIGNIFICANT CHANGE UP (ref 3.8–10.5)

## 2022-02-26 PROCEDURE — 99232 SBSQ HOSP IP/OBS MODERATE 35: CPT

## 2022-02-26 PROCEDURE — 99231 SBSQ HOSP IP/OBS SF/LOW 25: CPT

## 2022-02-26 RX ORDER — PANTOPRAZOLE SODIUM 20 MG/1
40 TABLET, DELAYED RELEASE ORAL
Refills: 0 | Status: DISCONTINUED | OUTPATIENT
Start: 2022-02-26 | End: 2022-03-02

## 2022-02-26 RX ORDER — SUCRALFATE 1 G
1 TABLET ORAL
Refills: 0 | Status: DISCONTINUED | OUTPATIENT
Start: 2022-02-26 | End: 2022-02-26

## 2022-02-26 RX ADMIN — SERTRALINE 25 MILLIGRAM(S): 25 TABLET, FILM COATED ORAL at 18:05

## 2022-02-26 RX ADMIN — Medication 1 GRAM(S): at 05:17

## 2022-02-26 RX ADMIN — HEPARIN SODIUM 5000 UNIT(S): 5000 INJECTION INTRAVENOUS; SUBCUTANEOUS at 05:17

## 2022-02-26 RX ADMIN — PANTOPRAZOLE SODIUM 40 MILLIGRAM(S): 20 TABLET, DELAYED RELEASE ORAL at 05:17

## 2022-02-26 RX ADMIN — Medication 1 GRAM(S): at 18:04

## 2022-02-26 RX ADMIN — SERTRALINE 25 MILLIGRAM(S): 25 TABLET, FILM COATED ORAL at 05:17

## 2022-02-26 RX ADMIN — HYDROMORPHONE HYDROCHLORIDE 4 MILLIGRAM(S): 2 INJECTION INTRAMUSCULAR; INTRAVENOUS; SUBCUTANEOUS at 16:00

## 2022-02-26 RX ADMIN — HYDROMORPHONE HYDROCHLORIDE 4 MILLIGRAM(S): 2 INJECTION INTRAMUSCULAR; INTRAVENOUS; SUBCUTANEOUS at 14:59

## 2022-02-26 RX ADMIN — Medication 1 DROP(S): at 18:04

## 2022-02-26 RX ADMIN — POLYETHYLENE GLYCOL 3350 17 GRAM(S): 17 POWDER, FOR SOLUTION ORAL at 18:04

## 2022-02-26 RX ADMIN — Medication 30 MILLIGRAM(S): at 21:33

## 2022-02-26 RX ADMIN — SENNA PLUS 2 TABLET(S): 8.6 TABLET ORAL at 21:33

## 2022-02-26 RX ADMIN — Medication 81 MILLIGRAM(S): at 12:59

## 2022-02-26 RX ADMIN — PANTOPRAZOLE SODIUM 40 MILLIGRAM(S): 20 TABLET, DELAYED RELEASE ORAL at 18:03

## 2022-02-26 RX ADMIN — Medication 1 GRAM(S): at 01:09

## 2022-02-26 RX ADMIN — POLYETHYLENE GLYCOL 3350 17 GRAM(S): 17 POWDER, FOR SOLUTION ORAL at 05:17

## 2022-02-26 RX ADMIN — Medication 1 GRAM(S): at 23:31

## 2022-02-26 RX ADMIN — HEPARIN SODIUM 5000 UNIT(S): 5000 INJECTION INTRAVENOUS; SUBCUTANEOUS at 18:04

## 2022-02-26 RX ADMIN — Medication 1 GRAM(S): at 12:59

## 2022-02-26 RX ADMIN — ATORVASTATIN CALCIUM 40 MILLIGRAM(S): 80 TABLET, FILM COATED ORAL at 21:33

## 2022-02-26 NOTE — DISCHARGE NOTE PROVIDER - PROVIDER TOKENS
PROVIDER:[TOKEN:[[id number]]] PROVIDER:[TOKEN:[[id number]]],PROVIDER:[TOKEN:[[id number]]]

## 2022-02-26 NOTE — DIETITIAN INITIAL EVALUATION ADULT. - SIGNS/SYMPTOMS
confusion ^ serum glucose, ( pancreatitis  DM, D5)  hypokalemia ( poor oral intake) hypoalbuminemia ( stress )

## 2022-02-26 NOTE — DIETITIAN INITIAL EVALUATION ADULT. - PROBLEM SELECTOR PLAN 1
Patient presenting with several days of nausea and one day of burning epigastric pain, admitted for possible gallstone pancreatitis vs medication induced (pt takes Tradjenta) vs cholecystitis?  -Admit to F   - Lipase elevated to 1537  - US abdomen: Sonographic findings equivocal for acute cholecystitis. If there is clinical suspicion for acute cholecystitis, a hepatobiliary scan may be obtained for further evaluation. Poorly visualized pancreas. Recommend correlation with serum lipase to assess acute pancreatitis.  -CT A/P shows Marked fatty replacement of the pancreas. Limited evaluation without intravenous contrast. Cholelithiasis without CT evidence for acute cholecystitis. Diverticulosis without diverticulitis.  - No inflammation surrounding pancreas on CT; however will keep patient NPO and advance diet as tolerated, place on IVF NS @ 60 cc/hr (pt has h/o mild aortic stenosis, would caution with excessive IV fluids)   - No fever or leukocytosis noted, monitor fever and WBC curve  - F/u lipid panel   - F/u HIDA scan to r/o cholecystitis, will determine with GI whether patient needs MRCP  - Zofran PRN for nausea   - GI (Dr Montilla) consulted; f/u recs   - Surgery consulted; f/u recs

## 2022-02-26 NOTE — PROGRESS NOTE ADULT - ASSESSMENT
89 year old female with PMH CAD s/p stents in 2012, Type 2 diabetes, HTN, HLD, GERD, mild AS, chronic venous insufficiency, neuropathy, macular degeneration, chronic constipation, depression, OA, lumbar degenerative disc disease, and spinal stenosis who presents to the ER complaining of nausea, abdominal pain, and burning chest pain, admitted for pancreatitis, KERRY.     Doubt ACS  - symptoms most likely of pancreatic/gallbladder origin  - pt hx of CAD with MI in 2012 and 2 stents; sees Dr. Shirley   - no evidence of ischemia; EKG NSR 82bpm   - Previous TTE in 6/2021 shows normal LV systolic function, ejection fraction of 65%. Moderate LV diastolic dysfunction. Mild MR, mild OK, mild to moderate TR with estimated RV systolic pressure of 44 mmHg. LA markedly dilated.  - Prior stress test in 9/2019 was negative for inducement of cardiac symptoms, ekg evidence of myocardial ischemia, or significant arrhythmias. Normal LV myocardial perfusion and systolic function.  - Continue asa, BB, statin  - Monitor and replete lytes, keep K>4, Mg>2  - Pt has no evidence of ischemia, decompensated HF, unstable arrythmia, or severe stenotic valvular disease. If a marilyn is deemed necessary, patient is optimized from cardiovascular standpoint to proceed with planned procedure with routine hemodynamic monitoring.   - Awaiting repeat labs this AM as sample was insufficient  - All other workup per primary team  Annemarie CEBALLOS [facility]  Spectra # [phone]       89 year old female with PMH CAD s/p stents in 2012, Type 2 diabetes, HTN, HLD, GERD, mild AS, chronic venous insufficiency, neuropathy, macular degeneration, chronic constipation, depression, OA, lumbar degenerative disc disease, and spinal stenosis who presents to the ER complaining of nausea, abdominal pain, and burning chest pain, admitted for pancreatitis, KERRY.     Doubt ACS  - symptoms most likely secondary to pancreatitis  - pt hx of CAD with MI in 2012 and 2 stents; sees Dr. Shirley   - no evidence of ischemia; EKG NSR 82bpm   - Previous TTE in 6/2021 shows normal LV systolic function, ejection fraction of 65%. Moderate LV diastolic dysfunction. Mild MR, mild IN, mild to moderate TR with estimated RV systolic pressure of 44 mmHg. LA markedly dilated.  - Prior stress test in 9/2019 was negative for inducement of cardiac symptoms, ekg evidence of myocardial ischemia, or significant arrhythmias. Normal LV myocardial perfusion and systolic function.  - Continue asa, BB, statin  - Monitor and replete lytes, keep K>4, Mg>2  - GI follow up  - Awaiting repeat labs this AM as sample was insufficient  - All other workup per primary team  Annemarie CEBALLOS [facility]  Spectra # [phone]

## 2022-02-26 NOTE — PROGRESS NOTE ADULT - PROBLEM SELECTOR PLAN 1
Patient presenting with several days of nausea and one day of burning epigastric pain, admitted for possible gallstone pancreatitis vs medication induced (pt takes Tradjenta) vs cholecystitis vs pancreatic malignancy  - Lipase 1537 on presentation   - US abdomen: Sonographic findings equivocal for acute cholecystitis. Poorly visualized pancreas. Recommend correlation with serum lipase to assess acute pancreatitis.  - HIDA: IMPRESSION: Normal hepatobiliary scan. No scan evidence of acute cholecystitis.  -CT A/P shows Marked fatty replacement of the pancreas. Limited evaluation without intravenous contrast. Cholelithiasis without CT evidence for acute cholecystitis. Diverticulosis without diverticulitis.  - No inflammation surrounding pancreas on CT; however will keep patient NPO and advance diet as tolerated, place on IVF NS @ 60 cc/hr (pt has h/o mild aortic stenosis, would caution with excessive IV fluids)   - f/u MR Abdomen   - No fever or leukocytosis noted, f/u fever and WBC curve  - F/u lipid panel   - Zofran PRN for nausea   - GI (Dr Montilla) consulted; f/u recs   - Surgery consulted; f/u recs Patient presenting with several days of nausea and one day of burning epigastric pain, admitted for possible gallstone pancreatitis vs medication induced (pt takes Tradjenta) vs cholecystitis vs pancreatic malignancy  - Lipase 1537 on presentation   - US abdomen: Sonographic findings equivocal for acute cholecystitis. Poorly visualized pancreas. Recommend correlation with serum lipase to assess acute pancreatitis.  - HIDA: IMPRESSION: Normal hepatobiliary scan. No scan evidence of acute cholecystitis.  -CT A/P shows Marked fatty replacement of the pancreas. Limited evaluation without intravenous contrast. Cholelithiasis without CT evidence for acute cholecystitis. Diverticulosis without diverticulitis.  - No inflammation surrounding pancreas on CT; however will keep patient NPO and advance diet as tolerated, place on IVF NS @ 60 cc/hr (pt has h/o mild aortic stenosis, would caution with excessive IV fluids)   - f/u MR Abdomen with IV Cont  - No fever or leukocytosis noted, f/u fever and WBC curve  - F/u lipid panel   - Zofran PRN for nausea   - GI (Dr Montlila) consulted; f/u recs -MRI A/P with IV Cont   - Surgery consulted; -NO Intervention

## 2022-02-26 NOTE — CHART NOTE - NSCHARTNOTEFT_GEN_A_CORE
Critical lab values Hb 5.4; BG >500 received. However blood draw sample is thin and critical lab values are likely erroneous. Will repeat BMP and CBC STAT.     Magnus Mathur   pgy-1 Critical lab values Hgb: 5.1 Ca: 6.2; Glucose: >500 this am. However blood draw sample is thin and critical lab values are likely erroneous. Will repeat BMP and CBC STAT.     Magnus Mathur   pgy-1

## 2022-02-26 NOTE — PROGRESS NOTE ADULT - SUBJECTIVE AND OBJECTIVE BOX
Patient is a 89y old  Female who presents with a chief complaint of pancreatitis (2022 17:42)      HPI:   The patient is a 89 year old female with PMH CAD s/p stents in , Type 2 diabetes, HTN, HLD, GERD, mild AS, chronic venous insufficiency, neuropathy, macular degeneration, chronic constipation, depression, OA, lumbar degenerative disc disease, and spinal stenosis who presents to the ER complaining of nausea, abdominal pain, and burning chest pain. Patient is a poor historian, majority of the history obtained from daughter Mary Guerin at bedside. Per daughter, over the past week patient has been extremely nauseous and has been dry heaving. Patient never vomited. She had been eating less over the past few days; however, yesterday, patient was so nauseous that she was unable to eat or drink. She felt weak and fatigued. She also developed burning pain in the epigastric area that radiated to her chest. Her daughter became concerned and called EMS. Patient denies any other symptoms, including fevers, chills, SOB, diarrhea. Of note, per daughter, patient has lost a significant amount of weight (about 12 lbs) in the past few months as she has had a decreased appetite.    In the ED:   VS: T:97.6, HR:93, BP: 119/54, RR:16, SpO2: 98% on room air  Labs significant for BUN/Cr: 34/1.8, Trop negative x 2, lipase: 1537, proBNP: 2914  UA: slightly turbid, large blood, moderate ketones, moderate leuk esterase, 26-50 WBCs, moderate bacteria, few yeast cells   EKG: NSR, no ST or T wave abnormalities   US abdomen: Sonographic findings equivocal for acute cholecystitis. If there is clinical suspicion for acute cholecystitis, a hepatobiliary scan may be obtained for further evaluation.Poorly visualized pancreas. Recommend correlation with serum lipase to assess acute pancreatitis.  CT A/P: Marked fatty replacement of the pancreas. Limited evaluation without intravenous contrast. Cholelithiasis without CT evidence for acute cholecystitis.Diverticulosis without diverticulitis.  HIDA scan pending   Patient received 1 L NS bolus 4 mg Zofran    INTERVAL HPI: 22: Pt seen and examined at bedside. Pt is poor historian' aox1 to person only; has no complaints this morning. On 60cc/hr DS+NS for possible acute pancreatitis. Awaiting MRI abd to rule out malignancy.        OVERNIGHT EVENTS: None       Home Medications:  amitriptyline 10 mg oral tablet: 3 tab(s) orally once a day (at bedtime) (2022 16:50)  aspirin 81 mg oral tablet: 1 tab(s) orally once a day (2022 16:50)  atorvastatin 40 mg oral tablet: 1 tab(s) orally once a day (2022 16:50)  Dilaudid 4 mg oral tablet: 1 tab(s) orally 3 times a day, As Needed (2022 16:50)  enalapril 2.5 mg oral tablet: 1 tab(s) orally once a day (2022 16:50)  ferrous sulfate 325 mg (65 mg elemental iron) oral tablet: 1 tablet oral daily (2022 16:50)  metFORMIN 500 mg oral tablet: 1 tab(s) orally 2 times a day (2022 16:50)  Metoprolol Succinate ER 25 mg oral tablet, extended release: 1 tab(s) orally once a day (2022 16:50)  omeprazole 20 mg oral delayed release capsule: 1 cap(s) orally once in the morning and 1 in the afternoon (2022 16:50)  PreserVision AREDS oral capsule: 1 tablet oral daily (2022 16:50)  Tradjenta 5 mg oral tablet: 1 tab(s) orally once a day  -per pt daughter, not sure if taking currently (2022 16:50)  Tylenol 500 mg oral tablet: 2 tablets oral twice a day (2022 16:50)  Vitamin D2 50,000 intl units (1.25 mg) oral capsule (obsolete): 1 cap(s) orally once a week (2022 16:50)  Zoloft 25 mg oral tablet: 1 tab(s) orally 2 times a day (2022 16:50)      MEDICATIONS  (STANDING):  amitriptyline 30 milliGRAM(s) Oral at bedtime  aspirin enteric coated 81 milliGRAM(s) Oral daily  atorvastatin 40 milliGRAM(s) Oral at bedtime  dextrose 40% Gel 15 Gram(s) Oral once  dextrose 5% + sodium chloride 0.9%. 1000 milliLiter(s) (60 mL/Hr) IV Continuous <Continuous>  dextrose 5%. 1000 milliLiter(s) (50 mL/Hr) IV Continuous <Continuous>  dextrose 5%. 1000 milliLiter(s) (100 mL/Hr) IV Continuous <Continuous>  dextrose 50% Injectable 25 Gram(s) IV Push once  dextrose 50% Injectable 12.5 Gram(s) IV Push once  dextrose 50% Injectable 25 Gram(s) IV Push once  glucagon  Injectable 1 milliGRAM(s) IntraMuscular once  heparin   Injectable 5000 Unit(s) SubCutaneous every 12 hours  insulin lispro (ADMELOG) corrective regimen sliding scale   SubCutaneous three times a day before meals  insulin lispro (ADMELOG) corrective regimen sliding scale   SubCutaneous at bedtime  metoprolol succinate ER 25 milliGRAM(s) Oral daily  pantoprazole  Injectable 40 milliGRAM(s) IV Push two times a day  polyethylene glycol 3350 17 Gram(s) Oral two times a day  senna 2 Tablet(s) Oral at bedtime  sertraline 25 milliGRAM(s) Oral every 12 hours  sucralfate suspension 1 Gram(s) Oral four times a day    MEDICATIONS  (PRN):  acetaminophen     Tablet .. 650 milliGRAM(s) Oral every 6 hours PRN Temp greater or equal to 38C (100.4F), Mild Pain (1 - 3)  aluminum hydroxide/magnesium hydroxide/simethicone Suspension 30 milliLiter(s) Oral every 4 hours PRN Dyspepsia  HYDROmorphone   Tablet 4 milliGRAM(s) Oral every 8 hours PRN Severe Pain (7 - 10)  melatonin 3 milliGRAM(s) Oral at bedtime PRN Insomnia  ondansetron Injectable 4 milliGRAM(s) IV Push every 8 hours PRN Nausea and/or Vomiting      morphine (Other)      Social History:  Former cigarette smoker, smoked <1/2 ppd for less than 15 years, quit more than 50 years ago   Denies ETOH use   Denies drug use     Ambulates with a walker. Lives at home with aides who come to help her with ADLs (not 24 hour aides) (2022 07:48)      REVIEW OF SYSTEMS:   CONSTITUTIONAL: No fever, No chills, No fatigue, No myalgia, No Body ache, No Weakness  EYES: No eye pain,  No visual disturbances, No discharge, No Redness  ENMT: No ear pain, No nose bleed, No vertigo; No sinus pain, No throat pain, No Congestion  NECK: No pain, No stiffness  RESPIRATORY: No cough, No wheezing, No hemoptysis, No shortness of breath  CARDIOVASCULAR: No chest pain, No palpitations  GASTROINTESTINAL: No abdominal pain, No epigastric pain. No nausea, No vomiting, No diarrhea, No constipation; [  ] BM  GENITOURINARY: No dysuria, No frequency, No urgency, No hematuria, No incontinence  NEUROLOGICAL: No headaches, No dizziness, No numbness, No tingling, No tremors, No weakness  EXTREMITIES: No Swelling, No Pain, No Edema  SKIN: [  ] No itching, burning, rashes, or lesions   MUSCULOSKELETAL: No joint pain, No joint swelling; No muscle pain, No back pain, No extremity pain  PSYCHIATRIC: No depression, No anxiety, No mood swings, No difficulty sleeping at night  PAIN SCALE: [  ] None  [  ] Other-  ROS Unable to obtain due to: [  ] Dementia  [  ] Lethargy  [  ] Sedated  [  ] Non verbal  REST OF REVIEW OF SYSTEMS: [  ] Normal     Vital Signs Last 24 Hrs  T(C): 36.9 (2022 04:22), Max: 36.9 (2022 19:13)  T(F): 98.4 (2022 04:22), Max: 98.4 (2022 19:13)  HR: 88 (2022 04:22) (88 - 105)  BP: 94/58 (2022 04:22) (94/58 - 117/51)  BP(mean): --  RR: 16 (2022 04:22) (16 - 18)  SpO2: 98% (2022 04:22) (98% - 98%)    CAPILLARY BLOOD GLUCOSE      POCT Blood Glucose.: 113 mg/dL (2022 21:36)  POCT Blood Glucose.: 69 mg/dL (2022 18:10)  POCT Blood Glucose.: 70 mg/dL (2022 11:34)      I&O's Summary    PHYSICAL EXAM:  GENERAL:  [ x ] NAD, [ x ] Well appearing, [  ] Agitated, [  ] Drowsy, [  ] Lethargy, [  ] Confused   HEAD:  [ x ] Normal, [  ] Other  EYES:  [ x ] EOMI, [ x ] PERRLA, [ x ] Conjunctiva and sclera clear normal, [  ] Other, [  ] Pallor, [  ] Discharge  ENMT:  [ x ] Normal, [ x ] Moist mucous membranes, [  ] Good dentition, [  ] No thrush  NECK:  [ x ] Supple, [ x ] No JVD, [  ] Normal thyroid, [  ] Lymphadenopathy, [  ] Other  CHEST/LUNG:  [ x ] Clear to auscultation bilaterally, [ x ] Breath Sounds equal B/L / decreased, [  ] Poor effort, [  ] No rales, [  ] No rhonchi, [  ] No wheezing  HEART:  [ x ] Regular rate and rhythm, [  ] Tachycardia, [  ] Bradycardia, [  ] Irregular, [ x ] No murmurs, No rubs, No gallops, [  ] PPM in place (Mfr:  )  ABDOMEN:  [ x ] Soft, [ x ] Nontender, [ x ] Nondistended, [  ] No mass, [  ] Bowel sounds present, [  ] Obese  NERVOUS SYSTEM:  [ x ] Alert & Oriented x3, [  ] Nonfocal, [  ] Confusion, [  ] Encephalopathic, [  ] Sedated, [  ] Unable to assess, [  ] Dementia, [  ] Other-  EXTREMITIES:  [ x ] 2+ Peripheral Pulses, No clubbing, No cyanosis,  [ x ] Edema B/L lower EXT, [  ] PVD stasis skin changes B/L lower EXT, [  ] Wound  LYMPH:  No lymphadenopathy noted  SKIN:  [ x ] No rashes or lesions, [  ] Pressure ulcers, [  ] Ecchymosis, [  ] Skin tears, [  ] Other    DIET: Diet, Clear Liquid (22 @ 17:32)      LABS:      Ca    8.9        2022 00:33        Urinalysis Basic - ( 2022 01:50 )    Color: Yellow / Appearance: Slightly Turbid / S.020 / pH: x  Gluc: x / Ketone: Moderate  / Bili: Negative / Urobili: Negative   Blood: x / Protein: 30 mg/dL / Nitrite: Negative   Leuk Esterase: Moderate / RBC: 11-25 /HPF / WBC 26-50   Sq Epi: x / Non Sq Epi: Few / Bacteria: Moderate                 Anemia Panel:      Thyroid Panel:        Lipase, Serum: 1537 U/L (22 @ 00:33)      Serum Pro-Brain Natriuretic Peptide: 2914 pg/mL (22 @ 00:33)      RADIOLOGY & ADDITIONAL TESTS:      HEALTH ISSUES - PROBLEM Dx:  CAD (coronary artery disease)    Benign essential HTN    Type 2 diabetes mellitus    Hyperlipidemia    Major depression    GERD (gastroesophageal reflux disease)    History of spinal stenosis    Need for prophylactic measure    Pancreatitis    KERRY (acute kidney injury)    H/O peripheral neuropathy    Abnormal urinalysis    Gallstone pancreatitis          Consultant(s) Notes Reviewed:  [ x ] YES     Care Discussed with [ x ] Consultants, [ x ] Patient, [ x ] Family, [  ] HCP, [  ] , [  ] Social Service, [  ] RN, [  ] Physical Therapy, [  ] Palliative Care Team  DVT PPX: [  ] Lovenox, [ x ] SC Heparin, [  ] Coumadin, [  ] Xarelto, [  ] Eliquis, [  ] Pradaxa, [  ] IV Heparin drip, [  ] SCD, [  ] Ambulation, [  ] Contraindicated 2/2 GI Bleed, [  ] Contraindicated 2/2  Bleed, [  ] Contraindicated 2/2 Brain Bleed  Advanced Directive: [ x ] None, [  ] DNR/DNI Patient is a 89y old  Female who presents with a chief complaint of pancreatitis (2022 17:42)      HPI:   The patient is a 89 year old female with PMH CAD s/p stents in , Type 2 diabetes, HTN, HLD, GERD, mild AS, chronic venous insufficiency, neuropathy, macular degeneration, chronic constipation, depression, OA, lumbar degenerative disc disease, and spinal stenosis who presents to the ER complaining of nausea, abdominal pain, and burning chest pain. Patient is a poor historian, majority of the history obtained from daughter Mary Guerin at bedside. Per daughter, over the past week patient has been extremely nauseous and has been dry heaving. Patient never vomited. She had been eating less over the past few days; however, yesterday, patient was so nauseous that she was unable to eat or drink. She felt weak and fatigued. She also developed burning pain in the epigastric area that radiated to her chest. Her daughter became concerned and called EMS. Patient denies any other symptoms, including fevers, chills, SOB, diarrhea. Of note, per daughter, patient has lost a significant amount of weight (about 12 lbs) in the past few months as she has had a decreased appetite.    In the ED:   VS: T:97.6, HR:93, BP: 119/54, RR:16, SpO2: 98% on room air  Labs significant for BUN/Cr: 34/1.8, Trop negative x 2, lipase: 1537, proBNP: 2914  UA: slightly turbid, large blood, moderate ketones, moderate leuk esterase, 26-50 WBCs, moderate bacteria, few yeast cells   EKG: NSR, no ST or T wave abnormalities   US abdomen: Sonographic findings equivocal for acute cholecystitis. If there is clinical suspicion for acute cholecystitis, a hepatobiliary scan may be obtained for further evaluation.Poorly visualized pancreas. Recommend correlation with serum lipase to assess acute pancreatitis.  CT A/P: Marked fatty replacement of the pancreas. Limited evaluation without intravenous contrast. Cholelithiasis without CT evidence for acute cholecystitis.Diverticulosis without diverticulitis.  HIDA scan pending   Patient received 1 L NS bolus 4 mg Zofran    INTERVAL HPI: 22: Pt seen and examined at bedside. Pt is poor historian' aox1 to person only; has no complaints this morning. On 60cc/hr DS+NS for possible acute pancreatitis. Awaiting MRI abd to rule out malignancy. Erroneous lab result this morning 2/2 to blood draw from IV site; repeat lab ordered.       OVERNIGHT EVENTS: None       Home Medications:  amitriptyline 10 mg oral tablet: 3 tab(s) orally once a day (at bedtime) (2022 16:50)  aspirin 81 mg oral tablet: 1 tab(s) orally once a day (2022 16:50)  atorvastatin 40 mg oral tablet: 1 tab(s) orally once a day (2022 16:50)  Dilaudid 4 mg oral tablet: 1 tab(s) orally 3 times a day, As Needed (2022 16:50)  enalapril 2.5 mg oral tablet: 1 tab(s) orally once a day (2022 16:50)  ferrous sulfate 325 mg (65 mg elemental iron) oral tablet: 1 tablet oral daily (2022 16:50)  metFORMIN 500 mg oral tablet: 1 tab(s) orally 2 times a day (2022 16:50)  Metoprolol Succinate ER 25 mg oral tablet, extended release: 1 tab(s) orally once a day (2022 16:50)  omeprazole 20 mg oral delayed release capsule: 1 cap(s) orally once in the morning and 1 in the afternoon (2022 16:50)  PreserVision AREDS oral capsule: 1 tablet oral daily (2022 16:50)  Tradjenta 5 mg oral tablet: 1 tab(s) orally once a day  -per pt daughter, not sure if taking currently (2022 16:50)  Tylenol 500 mg oral tablet: 2 tablets oral twice a day (2022 16:50)  Vitamin D2 50,000 intl units (1.25 mg) oral capsule (obsolete): 1 cap(s) orally once a week (2022 16:50)  Zoloft 25 mg oral tablet: 1 tab(s) orally 2 times a day (2022 16:50)      MEDICATIONS  (STANDING):  amitriptyline 30 milliGRAM(s) Oral at bedtime  aspirin enteric coated 81 milliGRAM(s) Oral daily  atorvastatin 40 milliGRAM(s) Oral at bedtime  dextrose 40% Gel 15 Gram(s) Oral once  dextrose 5% + sodium chloride 0.9%. 1000 milliLiter(s) (60 mL/Hr) IV Continuous <Continuous>  dextrose 5%. 1000 milliLiter(s) (50 mL/Hr) IV Continuous <Continuous>  dextrose 5%. 1000 milliLiter(s) (100 mL/Hr) IV Continuous <Continuous>  dextrose 50% Injectable 25 Gram(s) IV Push once  dextrose 50% Injectable 12.5 Gram(s) IV Push once  dextrose 50% Injectable 25 Gram(s) IV Push once  glucagon  Injectable 1 milliGRAM(s) IntraMuscular once  heparin   Injectable 5000 Unit(s) SubCutaneous every 12 hours  insulin lispro (ADMELOG) corrective regimen sliding scale   SubCutaneous three times a day before meals  insulin lispro (ADMELOG) corrective regimen sliding scale   SubCutaneous at bedtime  metoprolol succinate ER 25 milliGRAM(s) Oral daily  pantoprazole  Injectable 40 milliGRAM(s) IV Push two times a day  polyethylene glycol 3350 17 Gram(s) Oral two times a day  senna 2 Tablet(s) Oral at bedtime  sertraline 25 milliGRAM(s) Oral every 12 hours  sucralfate suspension 1 Gram(s) Oral four times a day    MEDICATIONS  (PRN):  acetaminophen     Tablet .. 650 milliGRAM(s) Oral every 6 hours PRN Temp greater or equal to 38C (100.4F), Mild Pain (1 - 3)  aluminum hydroxide/magnesium hydroxide/simethicone Suspension 30 milliLiter(s) Oral every 4 hours PRN Dyspepsia  HYDROmorphone   Tablet 4 milliGRAM(s) Oral every 8 hours PRN Severe Pain (7 - 10)  melatonin 3 milliGRAM(s) Oral at bedtime PRN Insomnia  ondansetron Injectable 4 milliGRAM(s) IV Push every 8 hours PRN Nausea and/or Vomiting      morphine (Other)      Social History:  Former cigarette smoker, smoked <1/2 ppd for less than 15 years, quit more than 50 years ago   Denies ETOH use   Denies drug use     Ambulates with a walker. Lives at home with aides who come to help her with ADLs (not 24 hour aides) (2022 07:48)      REVIEW OF SYSTEMS:   CONSTITUTIONAL: No fever, No chills, No fatigue, No myalgia, No Body ache, No Weakness  EYES: No eye pain,  No visual disturbances, No discharge, No Redness  ENMT: No ear pain, No nose bleed, No vertigo; No sinus pain, No throat pain, No Congestion  NECK: No pain, No stiffness  RESPIRATORY: No cough, No wheezing, No hemoptysis, No shortness of breath  CARDIOVASCULAR: No chest pain, No palpitations  GASTROINTESTINAL: No abdominal pain, No epigastric pain. No nausea, No vomiting, No diarrhea, No constipation; [  ] BM  GENITOURINARY: No dysuria, No frequency, No urgency, No hematuria, No incontinence  NEUROLOGICAL: No headaches, No dizziness, No numbness, No tingling, No tremors, No weakness  EXTREMITIES: No Swelling, No Pain, No Edema  SKIN: [  ] No itching, burning, rashes, or lesions   MUSCULOSKELETAL: No joint pain, No joint swelling; No muscle pain, No back pain, No extremity pain  PSYCHIATRIC: No depression, No anxiety, No mood swings, No difficulty sleeping at night  PAIN SCALE: [  ] None  [  ] Other-  ROS Unable to obtain due to: [  ] Dementia  [  ] Lethargy  [  ] Sedated  [  ] Non verbal  REST OF REVIEW OF SYSTEMS: [  ] Normal     Vital Signs Last 24 Hrs  T(C): 36.9 (2022 04:22), Max: 36.9 (2022 19:13)  T(F): 98.4 (2022 04:22), Max: 98.4 (2022 19:13)  HR: 88 (2022 04:22) (88 - 105)  BP: 94/58 (2022 04:22) (94/58 - 117/51)  BP(mean): --  RR: 16 (2022 04:22) (16 - 18)  SpO2: 98% (2022 04:22) (98% - 98%)    CAPILLARY BLOOD GLUCOSE      POCT Blood Glucose.: 113 mg/dL (2022 21:36)  POCT Blood Glucose.: 69 mg/dL (2022 18:10)  POCT Blood Glucose.: 70 mg/dL (2022 11:34)      I&O's Summary    PHYSICAL EXAM:  GENERAL:  [ x ] NAD, [ x ] Well appearing, [  ] Agitated, [  ] Drowsy, [  ] Lethargy, [  ] Confused   HEAD:  [ x ] Normal, [  ] Other  EYES:  [ x ] EOMI, [ x ] PERRLA, [ x ] Conjunctiva and sclera clear normal, [  ] Other, [  ] Pallor, [  ] Discharge  ENMT:  [ x ] Normal, [ x ] Moist mucous membranes, [  ] Good dentition, [  ] No thrush  NECK:  [ x ] Supple, [ x ] No JVD, [  ] Normal thyroid, [  ] Lymphadenopathy, [  ] Other  CHEST/LUNG:  [ x ] Clear to auscultation bilaterally, [ x ] Breath Sounds equal B/L / decreased, [  ] Poor effort, [  ] No rales, [  ] No rhonchi, [  ] No wheezing  HEART:  [ x ] Regular rate and rhythm, [  ] Tachycardia, [  ] Bradycardia, [  ] Irregular, [ x ] No murmurs, No rubs, No gallops, [  ] PPM in place (Mfr:  )  ABDOMEN:  [ x ] Soft, [ x ] Nontender, [ x ] Nondistended, [  ] No mass, [  ] Bowel sounds present, [  ] Obese  NERVOUS SYSTEM:  [ x ] Alert & Oriented x3, [  ] Nonfocal, [  ] Confusion, [  ] Encephalopathic, [  ] Sedated, [  ] Unable to assess, [  ] Dementia, [  ] Other-  EXTREMITIES:  [ x ] 2+ Peripheral Pulses, No clubbing, No cyanosis,  [ x ] Edema B/L lower EXT, [  ] PVD stasis skin changes B/L lower EXT, [  ] Wound  LYMPH:  No lymphadenopathy noted  SKIN:  [ x ] No rashes or lesions, [  ] Pressure ulcers, [  ] Ecchymosis, [  ] Skin tears, [  ] Other    DIET: Diet, Clear Liquid (22 @ 17:32)        LABS:                        9.7    6.51  )-----------( 178      ( 2022 10:03 )             28.8         140  |  113<H>  |  21  ----------------------------<  132<H>  3.9   |  23  |  1.20    Ca    8.4<L>      2022 10:03  Mg     1.7         TPro  4.9<L>  /  Alb  2.3<L>  /  TBili  0.3  /  DBili  x   /  AST  18  /  ALT  17  /  AlkPhos  57            Urinalysis Basic - ( 2022 01:50 )    Color: Yellow / Appearance: Slightly Turbid / S.020 / pH: x  Gluc: x / Ketone: Moderate  / Bili: Negative / Urobili: Negative   Blood: x / Protein: 30 mg/dL / Nitrite: Negative   Leuk Esterase: Moderate / RBC: 11-25 /HPF / WBC 26-50   Sq Epi: x / Non Sq Epi: Few / Bacteria: Moderate                 Anemia Panel:      Thyroid Panel:        Lipase, Serum: 1537 U/L (22 @ 00:33)      Serum Pro-Brain Natriuretic Peptide: 2914 pg/mL (22 @ 00:33)      RADIOLOGY & ADDITIONAL TESTS:      HEALTH ISSUES - PROBLEM Dx:  CAD (coronary artery disease)    Benign essential HTN    Type 2 diabetes mellitus    Hyperlipidemia    Major depression    GERD (gastroesophageal reflux disease)    History of spinal stenosis    Need for prophylactic measure    Pancreatitis    KERRY (acute kidney injury)    H/O peripheral neuropathy    Abnormal urinalysis    Gallstone pancreatitis          Consultant(s) Notes Reviewed:  [ x ] YES     Care Discussed with [ x ] Consultants, [ x ] Patient, [ x ] Family, [  ] HCP, [  ] , [  ] Social Service, [  ] RN, [  ] Physical Therapy, [  ] Palliative Care Team  DVT PPX: [  ] Lovenox, [ x ] SC Heparin, [  ] Coumadin, [  ] Xarelto, [  ] Eliquis, [  ] Pradaxa, [  ] IV Heparin drip, [  ] SCD, [  ] Ambulation, [  ] Contraindicated 2/2 GI Bleed, [  ] Contraindicated 2/2  Bleed, [  ] Contraindicated 2/2 Brain Bleed  Advanced Directive: [ x ] None, [  ] DNR/DNI Patient is a 89y old  Female who presents with a chief complaint of pancreatitis (2022 17:42)      HPI:   The patient is a 89 year old female with PMH CAD s/p stents in , Type 2 diabetes, HTN, HLD, GERD, mild AS, chronic venous insufficiency, neuropathy, macular degeneration, chronic constipation, depression, OA, lumbar degenerative disc disease, and spinal stenosis who presents to the ER complaining of nausea, abdominal pain, and burning chest pain. Patient is a poor historian, majority of the history obtained from daughter Mary Guerin at bedside. Per daughter, over the past week patient has been extremely nauseous and has been dry heaving. Patient never vomited. She had been eating less over the past few days; however, yesterday, patient was so nauseous that she was unable to eat or drink. She felt weak and fatigued. She also developed burning pain in the epigastric area that radiated to her chest. Her daughter became concerned and called EMS. Patient denies any other symptoms, including fevers, chills, SOB, diarrhea. Of note, per daughter, patient has lost a significant amount of weight (about 12 lbs) in the past few months as she has had a decreased appetite.    In the ED:   VS: T:97.6, HR:93, BP: 119/54, RR:16, SpO2: 98% on room air  Labs significant for BUN/Cr: 34/1.8, Trop negative x 2, lipase: 1537, proBNP: 2914  UA: slightly turbid, large blood, moderate ketones, moderate leuk esterase, 26-50 WBCs, moderate bacteria, few yeast cells   EKG: NSR, no ST or T wave abnormalities   US abdomen: Sonographic findings equivocal for acute cholecystitis. If there is clinical suspicion for acute cholecystitis, a hepatobiliary scan may be obtained for further evaluation.Poorly visualized pancreas. Recommend correlation with serum lipase to assess acute pancreatitis.  CT A/P: Marked fatty replacement of the pancreas. Limited evaluation without intravenous contrast. Cholelithiasis without CT evidence for acute cholecystitis.Diverticulosis without diverticulitis.  HIDA scan pending   Patient received 1 L NS bolus 4 mg Zofran    INTERVAL HPI: 22: Pt seen and examined at bedside. Pt is poor historian' aox1 to person only; has no complaints this morning. On 60cc/hr DS+NS for possible acute pancreatitis. Awaiting MRI abd to rule out malignancy. Erroneous lab result this morning 2/2 to blood draw from IV site; repeat lab ordered. -Stable labs. Low stable H/H      OVERNIGHT EVENTS: None       Home Medications:  amitriptyline 10 mg oral tablet: 3 tab(s) orally once a day (at bedtime) (2022 16:50)  aspirin 81 mg oral tablet: 1 tab(s) orally once a day (2022 16:50)  atorvastatin 40 mg oral tablet: 1 tab(s) orally once a day (2022 16:50)  Dilaudid 4 mg oral tablet: 1 tab(s) orally 3 times a day, As Needed (2022 16:50)  enalapril 2.5 mg oral tablet: 1 tab(s) orally once a day (2022 16:50)  ferrous sulfate 325 mg (65 mg elemental iron) oral tablet: 1 tablet oral daily (2022 16:50)  metFORMIN 500 mg oral tablet: 1 tab(s) orally 2 times a day (2022 16:50)  Metoprolol Succinate ER 25 mg oral tablet, extended release: 1 tab(s) orally once a day (2022 16:50)  omeprazole 20 mg oral delayed release capsule: 1 cap(s) orally once in the morning and 1 in the afternoon (2022 16:50)  PreserVision AREDS oral capsule: 1 tablet oral daily (2022 16:50)  Tradjenta 5 mg oral tablet: 1 tab(s) orally once a day  -per pt daughter, not sure if taking currently (2022 16:50)  Tylenol 500 mg oral tablet: 2 tablets oral twice a day (2022 16:50)  Vitamin D2 50,000 intl units (1.25 mg) oral capsule (obsolete): 1 cap(s) orally once a week (2022 16:50)  Zoloft 25 mg oral tablet: 1 tab(s) orally 2 times a day (2022 16:50)      MEDICATIONS  (STANDING):  amitriptyline 30 milliGRAM(s) Oral at bedtime  aspirin enteric coated 81 milliGRAM(s) Oral daily  atorvastatin 40 milliGRAM(s) Oral at bedtime  dextrose 40% Gel 15 Gram(s) Oral once  dextrose 5% + sodium chloride 0.9%. 1000 milliLiter(s) (60 mL/Hr) IV Continuous <Continuous>  dextrose 5%. 1000 milliLiter(s) (50 mL/Hr) IV Continuous <Continuous>  dextrose 5%. 1000 milliLiter(s) (100 mL/Hr) IV Continuous <Continuous>  dextrose 50% Injectable 25 Gram(s) IV Push once  dextrose 50% Injectable 12.5 Gram(s) IV Push once  dextrose 50% Injectable 25 Gram(s) IV Push once  glucagon  Injectable 1 milliGRAM(s) IntraMuscular once  heparin   Injectable 5000 Unit(s) SubCutaneous every 12 hours  insulin lispro (ADMELOG) corrective regimen sliding scale   SubCutaneous three times a day before meals  insulin lispro (ADMELOG) corrective regimen sliding scale   SubCutaneous at bedtime  metoprolol succinate ER 25 milliGRAM(s) Oral daily  pantoprazole  Injectable 40 milliGRAM(s) IV Push two times a day  polyethylene glycol 3350 17 Gram(s) Oral two times a day  senna 2 Tablet(s) Oral at bedtime  sertraline 25 milliGRAM(s) Oral every 12 hours  sucralfate suspension 1 Gram(s) Oral four times a day    MEDICATIONS  (PRN):  acetaminophen     Tablet .. 650 milliGRAM(s) Oral every 6 hours PRN Temp greater or equal to 38C (100.4F), Mild Pain (1 - 3)  aluminum hydroxide/magnesium hydroxide/simethicone Suspension 30 milliLiter(s) Oral every 4 hours PRN Dyspepsia  HYDROmorphone   Tablet 4 milliGRAM(s) Oral every 8 hours PRN Severe Pain (7 - 10)  melatonin 3 milliGRAM(s) Oral at bedtime PRN Insomnia  ondansetron Injectable 4 milliGRAM(s) IV Push every 8 hours PRN Nausea and/or Vomiting      morphine (Other)      Social History:  Former cigarette smoker, smoked <1/2 ppd for less than 15 years, quit more than 50 years ago   Denies ETOH use   Denies drug use     Ambulates with a walker. Lives at home with aides who come to help her with ADLs (not 24 hour aides) (2022 07:48)      REVIEW OF SYSTEMS: i am OK  CONSTITUTIONAL: No fever, No chills, No fatigue, No myalgia, No Body ache, No Weakness  EYES: No eye pain,  No visual disturbances, No discharge, No Redness  ENMT: No ear pain, No nose bleed, No vertigo; No sinus pain, No throat pain, No Congestion  NECK: No pain, No stiffness  RESPIRATORY: No cough, No wheezing, No hemoptysis, No shortness of breath  CARDIOVASCULAR: No chest pain, No palpitations  GASTROINTESTINAL: No abdominal pain, No epigastric pain. No nausea, No vomiting, No diarrhea, No constipation; [  ] BM  GENITOURINARY: No dysuria, No frequency, No urgency, No hematuria, No incontinence  NEUROLOGICAL: No headaches, No dizziness, No numbness, No tingling, No tremors, No weakness  EXTREMITIES: No Swelling, No Pain, No Edema  SKIN: [ x ] No itching, burning, rashes, or lesions   MUSCULOSKELETAL: No joint pain, No joint swelling; No muscle pain, No back pain, No extremity pain  PSYCHIATRIC: No depression, No anxiety, No mood swings, No difficulty sleeping at night  PAIN SCALE: [x  ] None  [  ] Other-  ROS Unable to obtain due to: [  ] Dementia  [  ] Lethargy  [  ] Sedated  [  ] Non verbal  REST OF REVIEW OF SYSTEMS: [ x ] Normal     Vital Signs Last 24 Hrs  T(C): 36.9 (2022 04:22), Max: 36.9 (2022 19:13)  T(F): 98.4 (2022 04:22), Max: 98.4 (2022 19:13)  HR: 88 (2022 04:22) (88 - 105)  BP: 94/58 (2022 04:22) (94/58 - 117/51)  BP(mean): --  RR: 16 (2022 04:22) (16 - 18)  SpO2: 98% (2022 04:22) (98% - 98%)    CAPILLARY BLOOD GLUCOSE      POCT Blood Glucose.: 113 mg/dL (2022 21:36)  POCT Blood Glucose.: 69 mg/dL (2022 18:10)  POCT Blood Glucose.: 70 mg/dL (2022 11:34)      I&O's Summary    PHYSICAL EXAM:  GENERAL:  [ x ] NAD, [ x ] Well appearing, [  ] Agitated, [  ] Drowsy, [  ] Lethargy, [  ] Confused   HEAD:  [ x ] Normal, [  ] Other  EYES:  [ x ] EOMI, [ x ] PERRLA, [ x ] Conjunctiva and sclera clear normal, [  ] Other, [  ] Pallor, [  ] Discharge  ENMT:  [ x ] Normal, [ x ] Moist mucous membranes, [  ] Good dentition, [ x ] No thrush  NECK:  [ x ] Supple, [ x ] No JVD, [x  ] Normal thyroid, [  ] Lymphadenopathy, [  ] Other  CHEST/LUNG:  [ x ] Clear to auscultation bilaterally, [ x ] Breath Sounds equal B/L / decreased, [  ] Poor effort, [ x ] No rales, [ x ] No rhonchi, [x  ] No wheezing  HEART:  [ x ] Regular rate and rhythm, [  ] Tachycardia, [  ] Bradycardia, [  ] Irregular, [ x ] No murmurs, No rubs, No gallops, [  ] PPM in place (Mfr:  )  ABDOMEN:  [ x ] Soft, [ x ] Nontender, [ x ] Nondistended, [ x ] No mass, [ x ] Bowel sounds present, [  ] Obese  NERVOUS SYSTEM:  [ x ] Alert & Oriented x3, [ x ] Nonfocal, [  ] Confusion, [  ] Encephalopathic, [  ] Sedated, [  ] Unable to assess, [  ] Dementia, [  ] Other-  EXTREMITIES:  [ x ] 2+ Peripheral Pulses, No clubbing, No cyanosis,  [ x ] Edema B/L lower EXT, [  ] PVD stasis skin changes B/L lower EXT, [  ] Wound  LYMPH:  No lymphadenopathy noted  SKIN:  [ x ] No rashes or lesions, [  ] Pressure ulcers, [  ] Ecchymosis, [  ] Skin tears, [  ] Other    DIET: Diet, Clear Liquid (22 @ 17:32)        LABS:                        9.7    6.51  )-----------( 178      ( 2022 10:03 )             28.8         140  |  113<H>  |  21  ----------------------------<  132<H>  3.9   |  23  |  1.20    Ca    8.4<L>      2022 10:03  Mg     1.7         TPro  4.9<L>  /  Alb  2.3<L>  /  TBili  0.3  /  DBili  x   /  AST  18  /  ALT  17  /  AlkPhos  57        Urinalysis Basic - ( 2022 01:50 )    Color: Yellow / Appearance: Slightly Turbid / S.020 / pH: x  Gluc: x / Ketone: Moderate  / Bili: Negative / Urobili: Negative   Blood: x / Protein: 30 mg/dL / Nitrite: Negative   Leuk Esterase: Moderate / RBC: 11-25 /HPF / WBC 26-50   Sq Epi: x / Non Sq Epi: Few / Bacteria: Moderate          Lipase, Serum: 1537 U/L (22 @ 00:33)      Serum Pro-Brain Natriuretic Peptide: 2914 pg/mL (22 @ 00:33)      RADIOLOGY & ADDITIONAL TESTS:      HEALTH ISSUES - PROBLEM Dx:  CAD (coronary artery disease)    Benign essential HTN    Type 2 diabetes mellitus    Hyperlipidemia    Major depression    GERD (gastroesophageal reflux disease)    History of spinal stenosis    Need for prophylactic measure    Pancreatitis    KERRY (acute kidney injury)    H/O peripheral neuropathy    Abnormal urinalysis    Gallstone pancreatitis    Consultant(s) Notes Reviewed:  [ x ] YES     Care Discussed with [ x ] Consultants, [ x ] Patient, [ x ] Family, [  ] HCP, [  ] , [  ] Social Service, [x  ] RN, [  ] Physical Therapy, [  ] Palliative Care Team  DVT PPX: [  ] Lovenox, [ x ] SC Heparin, [  ] Coumadin, [  ] Xarelto, [  ] Eliquis, [  ] Pradaxa, [  ] IV Heparin drip, [  ] SCD, [  ] Ambulation, [  ] Contraindicated 2/2 GI Bleed, [  ] Contraindicated 2/2  Bleed, [  ] Contraindicated 2/2 Brain Bleed  Advanced Directive: [ x ] None, [  ] DNR/DNI

## 2022-02-26 NOTE — PROGRESS NOTE ADULT - PROBLEM SELECTOR PLAN 6
- holding home medications: Tradjenta and metformin   - low dose insulin coverage scale w/hypoglycemia protocol in place   - Rocky AC&HS.  - Diet: NPO for now as patient is admitted with pancreatitis   - A1c-

## 2022-02-26 NOTE — PROGRESS NOTE ADULT - SUBJECTIVE AND OBJECTIVE BOX
pt seen  no complaints  Vital Signs Last 24 Hrs  T(C): 36.9 (02-26-22 @ 04:22), Max: 36.9 (02-25-22 @ 19:13)  T(F): 98.4 (02-26-22 @ 04:22), Max: 98.4 (02-25-22 @ 19:13)  HR: 88 (02-26-22 @ 04:22) (88 - 105)  BP: 94/58 (02-26-22 @ 04:22) (94/58 - 117/51)  BP(mean): --  RR: 16 (02-26-22 @ 04:22) (16 - 18)  SpO2: 98% (02-26-22 @ 04:22) (98% - 98%)    gen-NAD  resp-clear  abd-soft TN/ND                          9.7    6.51  )-----------( 178      ( 26 Feb 2022 10:03 )             28.8   02-26    140  |  113<H>  |  21  ----------------------------<  132<H>  3.9   |  23  |  1.20    Ca    8.4<L>      26 Feb 2022 10:03  Mg     1.7     02-25    TPro  4.9<L>  /  Alb  2.3<L>  /  TBili  0.3  /  DBili  x   /  AST  18  /  ALT  17  /  AlkPhos  57  02-26

## 2022-02-26 NOTE — PROGRESS NOTE ADULT - PROBLEM SELECTOR PLAN 2
BUN/Cr 34/1.8, baseline kidney function appears to be approximately 1   - Likely prerenal in setting of decreased PO intake as patient has been nauseous   - Continue IVF NS @ 60 cc/hr   - Hold enalapril in setting of KERRY   - Monitor BMP daily BUN/Cr 34/1.8, baseline kidney function appears to be approximately 1- Improving   - Likely prerenal in setting of decreased PO intake as patient has been nauseous   - Continue IVF NS @ 60 cc/hr   - Hold enalapril in setting of KERRY   - Monitor BMP daily BUN/Cr 34/1.8, baseline kidney function appears to be approximately 1- Improving   - Likely prerenal in setting of decreased PO intake as patient has been nauseous   - S/P IVF NS @ 60 cc/hr -STOP  - Hold enalapril in setting of KERRY   - Monitor BMP daily

## 2022-02-26 NOTE — PROGRESS NOTE ADULT - ASSESSMENT
89 yo with pancreatitis. PT has gallstones.  CT showing pancreatic atrophy  cont IVF/NPO  will d/w GI plan, MRCP?

## 2022-02-26 NOTE — PROGRESS NOTE ADULT - PROBLEM SELECTOR PLAN 4
UA significant for  large blood, moderate ketones, moderate leuk esterase, 26-50 WBCs, moderate bacteria, few yeast cells  - Patient is completely asymptomatic, no leukocytosis or fever   - Will monitor off abx for now   - F/u urine cx

## 2022-02-26 NOTE — DIETITIAN INITIAL EVALUATION ADULT. - PROBLEM SELECTOR PLAN 2
BUN/Cr 34/1.8, baseline kidney function appears to be approximately 1   - Likely prerenal in setting of decreased PO intake as patient has been nauseous   - Continue IVF NS @ 60 cc/hr   - Hold enalapril in setting of KERRY   - Monitor BMP daily

## 2022-02-26 NOTE — DISCHARGE NOTE PROVIDER - NSDCACTIVITY_GEN_ALL_CORE
No restrictions No restrictions/Bathing allowed/Do not drive or operate machinery/Do not make important decisions/Walking - Indoors allowed/No heavy lifting/straining

## 2022-02-26 NOTE — DIETITIAN INITIAL EVALUATION ADULT. - PERTINENT LABORATORY DATA
( 2/26/22)   145  122( H)   962( H)                   3.2(L)   18( L)     18/1.2 alb 1.6 ( L) ca ( 6.2) corrected ca 8.12   ( 2/25/22) H/H: 11.6/35.3 MCV 92.4

## 2022-02-26 NOTE — DISCHARGE NOTE PROVIDER - NSDCCPCAREPLAN_GEN_ALL_CORE_FT
PRINCIPAL DISCHARGE DIAGNOSIS  Diagnosis: Pancreatitis  Assessment and Plan of Treatment: Your nausea, abdominal pain, and chest pain were likely caused by inflammation of your pancreas. We limited your diet and continued your intravenous fluids to help resolve the pancreatitis (or inflammation of the pancreas). We also did an MRI of your abdomen, which showed_____. The surgery team saw you and recommended conservative management, as opposed to surgical intervention.   Given your recent weight loss in addition to these symptoms, you should follow up with your primary care doctor, Dr Alvarado, within 1 week of discharge from the hospital to see if further workup is warranted.      SECONDARY DISCHARGE DIAGNOSES  Diagnosis: KERRY (acute kidney injury)  Assessment and Plan of Treatment: Your kidney marker (the creatinine level) was elevated to 1.8 when you came to the hospital. We gave you intravenous fluids which helped improve your creatinine level down to 1.1. Your Enalapril (blood pressure medication) was also held during your hospitalization due to this elevated number. You may RESUME ENALAPRIL when you leave the hospital as you regularly would take it at home.    Diagnosis: Type 2 diabetes mellitus  Assessment and Plan of Treatment: Your hemoglobin A1c, which is a measure of your three month blood glucose level, was 6.0 in the hospital. You should continue your home diabetic medications, including metformin and trajenta. You should follow up with your primary care doctor, Dr Alvarado within 1 week of discharge.    Diagnosis: CAD (coronary artery disease)  Assessment and Plan of Treatment: Your nausea, abdominal pain and chest pain were more likely to be due to pancreatitis than your history of coronary artery disease. Your EKG did not show any signs of acute heart attack. Your heart enzymes, known as the troponins, were negative when checked twice. Please continue your home aspirin 81mg once a day, atorvastatin 40mg once a day, and metoprolol 25mg once a day. Please follow up with your primary care doctor, Dr Alvarado, and your cardiologist within 1 week of discharge.    Diagnosis: History of spinal stenosis  Assessment and Plan of Treatment: You should continue your pain medication, Dilaudid 4mg as needed every 8hours to help with pain.    Diagnosis: GERD (gastroesophageal reflux disease)  Assessment and Plan of Treatment: You should continue your home medication pantoprazole 40mg every day.    Diagnosis: H/O peripheral neuropathy  Assessment and Plan of Treatment: You should continue your home amitriptyline 30mg daily.    Diagnosis: Hyperlipidemia  Assessment and Plan of Treatment: You should continue your home atorvastatin 40mg daily. We checked your cholesterol levels while you were in the hospital and your levels were all normal, with the exception of a low HDL or "good cholesterol" level. You should follow up with your primary care doctor, Dr Alvarado, within 1 week of discharge.    Diagnosis: Major depression  Assessment and Plan of Treatment: You should continue your home Zoloft 25mg two times a day.     PRINCIPAL DISCHARGE DIAGNOSIS  Diagnosis: Pancreatitis  Assessment and Plan of Treatment: Your nausea, abdominal pain, and chest pain were likely caused by inflammation of your pancreas.   We limited your diet and continued your intravenous fluids to help resolve the pancreatitis (or inflammation of the pancreas). We also did an MRI of your abdomen, which showed_____. The surgery team saw you and recommended conservative management, as opposed to surgical intervention.   Given your recent weight loss in addition to these symptoms, you should follow up with your primary care doctor, Dr Alvarado, within 1 week of discharge from the hospital to see if further workup is warranted.      SECONDARY DISCHARGE DIAGNOSES  Diagnosis: CAD (coronary artery disease)  Assessment and Plan of Treatment: Your nausea, abdominal pain and chest pain were more likely to be due to pancreatitis than your history of coronary artery disease. Your EKG did not show any signs of acute heart attack. Your heart enzymes, known as the troponins, were negative when checked twice. Please continue your home aspirin 81mg once a day, atorvastatin 40mg once a day, and metoprolol 25mg once a day. Please follow up with your primary care doctor, Dr Alvarado, and your cardiologist within 1 week of discharge.    Diagnosis: Type 2 diabetes mellitus  Assessment and Plan of Treatment: Your hemoglobin A1c, which is a measure of your three month blood glucose level, was 6.0 in the hospital.  STOP trajenta due to this medication can cause pancreatitis and continue metformine   You should follow up with your primary care doctor, Dr Alvarado within 1 week of discharge.    Diagnosis: Hyperlipidemia  Assessment and Plan of Treatment: You should continue your home atorvastatin 40mg daily. We checked your cholesterol levels while you were in the hospital and your levels were all normal, with the exception of a low HDL or "good cholesterol" level. You should follow up with your primary care doctor, Dr Alvarado, within 1 week of discharge.    Diagnosis: Major depression  Assessment and Plan of Treatment: You should continue your home Zoloft 25mg two times a day.    Diagnosis: GERD (gastroesophageal reflux disease)  Assessment and Plan of Treatment: You should continue your home medication pantoprazole 40mg every day.    Diagnosis: History of spinal stenosis  Assessment and Plan of Treatment: You should continue your pain medication, Dilaudid 4mg as needed every 8hours to help with pain.    Diagnosis: KERRY (acute kidney injury)  Assessment and Plan of Treatment: Your kidney marker (the creatinine level) was elevated to 1.8 when you came to the hospital. We gave you intravenous fluids which helped improve your creatinine level down to 1.1. Your Enalapril (blood pressure medication) was also held during your hospitalization due to this elevated number. You may RESUME ENALAPRIL when you leave the hospital as you regularly would take it at home.    Diagnosis: H/O peripheral neuropathy  Assessment and Plan of Treatment: You should continue your home amitriptyline 30mg daily.     PRINCIPAL DISCHARGE DIAGNOSIS  Diagnosis: Pancreatitis  Assessment and Plan of Treatment: Your nausea, abdominal pain, and chest pain were likely caused by inflammation of your pancreas.   We limited your diet and continued your intravenous fluids to help resolve the pancreatitis (or inflammation of the pancreas). We also did an MRI of your abdomen, which showed_____. The surgery team saw you and recommended conservative management, as opposed to surgical intervention.   Given your recent weight loss in addition to these symptoms, you should follow up with your primary care doctor, Dr Alvarado, within 1 week of discharge from the hospital to see if further workup is warranted.      SECONDARY DISCHARGE DIAGNOSES  Diagnosis: CAD (coronary artery disease)  Assessment and Plan of Treatment: Your nausea, abdominal pain and chest pain were more likely to be due to pancreatitis than your history of coronary artery disease. Your EKG did not show any signs of acute heart attack. Your heart enzymes, known as the troponins, were negative when checked twice. Please continue your home aspirin 81mg once a day, atorvastatin 40mg once a day, and metoprolol 25mg once a day. Please follow up with your primary care doctor, Dr Alvarado, and your cardiologist within 1 week of discharge.    Diagnosis: Type 2 diabetes mellitus  Assessment and Plan of Treatment: Your hemoglobin, which is a measure of your three month blood glucose level, was 6.0 in the hospital.  STOP trajenta due to this medication can cause pancreatitis and metformine   You should follow up with your primary care doctor, Dr Alvarado within 1 week of discharge.    Diagnosis: Hyperlipidemia  Assessment and Plan of Treatment: You should continue your home atorvastatin 40mg daily. We checked your cholesterol levels while you were in the hospital and your levels were all normal, with the exception of a low HDL or "good cholesterol" level. You should follow up with your primary care doctor, Dr Alvarado, within 1 week of discharge.    Diagnosis: Major depression  Assessment and Plan of Treatment: You should continue your home Zoloft 25mg two times a day.    Diagnosis: GERD (gastroesophageal reflux disease)  Assessment and Plan of Treatment: You should continue your home medication pantoprazole 40mg every day.    Diagnosis: History of spinal stenosis  Assessment and Plan of Treatment: You should continue your pain medication, Dilaudid 4mg as needed every 8hours to help with pain.    Diagnosis: KERRY (acute kidney injury)  Assessment and Plan of Treatment: Your kidney marker (the creatinine level) was elevated to 1.8 when you came to the hospital. We gave you intravenous fluids which helped improve your creatinine level down to 1.1. Your Enalapril (blood pressure medication) was also held during your hospitalization due to this elevated number. You may RESUME ENALAPRIL when you leave the hospital as you regularly would take it at home.    Diagnosis: H/O peripheral neuropathy  Assessment and Plan of Treatment: You should continue your home amitriptyline 30mg daily.     PRINCIPAL DISCHARGE DIAGNOSIS  Diagnosis: Pancreatitis  Assessment and Plan of Treatment: Your nausea, abdominal pain, and chest pain were likely caused by inflammation of your pancreas.   We limited your diet and continued your intravenous fluids to help resolve the pancreatitis (or inflammation of the pancreas). We also did an MRI of your abdomen, which showed a cytic lesion in the pancreas.____   Given your recent weight loss in addition to these symptoms, you should follow up with your primary care doctor, Dr Alvarado, within 1 week of discharge from the hospital to see if further workup is warranted.      SECONDARY DISCHARGE DIAGNOSES  Diagnosis: KERRY (acute kidney injury)  Assessment and Plan of Treatment: Your kidney marker (the creatinine level) was elevated to 1.8 when you came to the hospital. We gave you intravenous fluids which helped improve your creatinine level down to 1.1. Your Enalapril (blood pressure medication) was also held during your hospitalization due to this elevated number. You may RESUME ENALAPRIL when you leave the hospital as you regularly would take it at home.    Diagnosis: Type 2 diabetes mellitus  Assessment and Plan of Treatment: Your hemoglobin A1c, which is a measure of your three month blood glucose level, was 6.0 in the hospital.  STOP trajenta and STOP metformin as your hemoglobin A1c is not in the diabetic range.   You should follow up with your primary care doctor, Dr Alvarado within 1 week of discharge.    Diagnosis: CAD (coronary artery disease)  Assessment and Plan of Treatment: Your nausea, abdominal pain and chest pain were more likely to be due to pancreatitis than your history of coronary artery disease. Your EKG did not show any signs of acute heart attack. Your heart enzymes, known as the troponins, were negative when checked twice. Please continue your home aspirin 81mg once a day, atorvastatin 40mg once a day, and metoprolol 25mg once a day. Please follow up with your primary care doctor, Dr Alvarado, and your cardiologist within 1 week of discharge.    Diagnosis: History of spinal stenosis  Assessment and Plan of Treatment: You should continue your pain medication, Dilaudid 4mg as needed every 8hours to help with pain.    Diagnosis: GERD (gastroesophageal reflux disease)  Assessment and Plan of Treatment: You should continue your home medication pantoprazole 40mg every day.    Diagnosis: H/O peripheral neuropathy  Assessment and Plan of Treatment: You should continue your home amitriptyline 30mg daily.    Diagnosis: Hyperlipidemia  Assessment and Plan of Treatment: You should continue your home atorvastatin 40mg daily. We checked your cholesterol levels while you were in the hospital and your levels were all normal, with the exception of a low HDL or "good cholesterol" level. You should follow up with your primary care doctor, Dr Alvarado, within 1 week of discharge.    Diagnosis: Major depression  Assessment and Plan of Treatment: You should continue your home Zoloft 25mg two times a day.     PRINCIPAL DISCHARGE DIAGNOSIS  Diagnosis: Pancreatitis  Assessment and Plan of Treatment: Your nausea, abdominal pain, and chest pain were likely caused by inflammation of your pancreas.   We limited your diet and continued your intravenous fluids to help resolve the pancreatitis (or inflammation of the pancreas). We also did an MRI of your abdomen, which showed a cytic lesion in the pancreas. A follow up bone scan showed degenerative changes.  You should follow up with your primary care doctor, Dr Alvarado, within 1 week of discharge from the hospital.      SECONDARY DISCHARGE DIAGNOSES  Diagnosis: KERRY (acute kidney injury)  Assessment and Plan of Treatment: Your kidney marker (the creatinine level) was elevated to 1.8 when you came to the hospital. We gave you intravenous fluids which helped improve your creatinine level down to 1.1. Your Enalapril (blood pressure medication) was also held during your hospitalization due to this elevated number. You may RESUME ENALAPRIL when you leave the hospital as you regularly would take it at home.    Diagnosis: Type 2 diabetes mellitus  Assessment and Plan of Treatment: Your hemoglobin A1c, which is a measure of your three month blood glucose level, was 6.0 in the hospital.  STOP trajenta and STOP metformin as your hemoglobin A1c is not in the diabetic range.   You should follow up with your primary care doctor, Dr Alvarado within 1 week of discharge.    Diagnosis: CAD (coronary artery disease)  Assessment and Plan of Treatment: Your nausea, abdominal pain and chest pain were more likely to be due to pancreatitis than your history of coronary artery disease. Your EKG did not show any signs of acute heart attack. Your heart enzymes, known as the troponins, were negative when checked twice. Please continue your home aspirin 81mg once a day, atorvastatin 40mg once a day, and metoprolol 25mg once a day. Please follow up with your primary care doctor, Dr Alvarado, and your cardiologist within 1 week of discharge.    Diagnosis: History of spinal stenosis  Assessment and Plan of Treatment: You should continue your pain medication, Dilaudid 4mg as needed every 8hours to help with pain.    Diagnosis: GERD (gastroesophageal reflux disease)  Assessment and Plan of Treatment: You should continue your home medication pantoprazole 40mg every day.    Diagnosis: H/O peripheral neuropathy  Assessment and Plan of Treatment: You should continue your home amitriptyline 30mg daily.    Diagnosis: Hyperlipidemia  Assessment and Plan of Treatment: You should continue your home atorvastatin 40mg daily. We checked your cholesterol levels while you were in the hospital and your levels were all normal, with the exception of a low HDL or "good cholesterol" level. You should follow up with your primary care doctor, Dr Alvarado, within 1 week of discharge.    Diagnosis: Major depression  Assessment and Plan of Treatment: You should continue your home Zoloft 25mg two times a day.     PRINCIPAL DISCHARGE DIAGNOSIS  Diagnosis: Pancreatitis  Assessment and Plan of Treatment: Your nausea, abdominal pain, and chest pain were likely caused by inflammation of your pancreas.   We limited your diet and continued your intravenous fluids to help resolve the pancreatitis (or inflammation of the pancreas). We also did an MRI of your abdomen, which showed a cytic lesion in the pancreas. A follow up bone scan showed degenerative changes.  You should follow up with your primary care doctor, Dr Alvarado, within 1 week of discharge from the hospital.      SECONDARY DISCHARGE DIAGNOSES  Diagnosis: CAD (coronary artery disease)  Assessment and Plan of Treatment: Your nausea, abdominal pain and chest pain were more likely to be due to pancreatitis than your history of coronary artery disease. Your EKG did not show any signs of acute heart attack. Your heart enzymes, known as the troponins, were negative when checked twice.  -Please continue your home:   *aspirin 81mg once a day, *atorvastatin 40mg once a day, and *metoprolol 25mg once a day.   Please follow up with your primary care doctor, Dr Alvarado, and your cardiologist within 1 week of discharge.    Diagnosis: Type 2 diabetes mellitus  Assessment and Plan of Treatment: Your hemoglobin A1c, which is a measure of your three month blood glucose level, was 6.0 in the hospital.  STOP trajenta and STOP metformin as your hemoglobin A1c is not in the diabetic range.   You should follow up with your primary care doctor, Dr Alvarado within 1 week of discharge.    Diagnosis: Hyperlipidemia  Assessment and Plan of Treatment: You should continue your home atorvastatin 40mg daily.   We checked your cholesterol levels while you were in the hospital and your levels were all normal, with the exception of a low HDL or "good cholesterol" level. You should follow up with your primary care doctor, Dr Alvarado, within 1 week of discharge.    Diagnosis: Major depression  Assessment and Plan of Treatment: You should continue your home Zoloft 25mg two times a day.    Diagnosis: GERD (gastroesophageal reflux disease)  Assessment and Plan of Treatment: You should continue your home medication pantoprazole 40mg every day.    Diagnosis: History of spinal stenosis  Assessment and Plan of Treatment: You should continue your pain medication, Dilaudid 4mg as needed every 8hours to help with pain.    Diagnosis: KERRY (acute kidney injury)  Assessment and Plan of Treatment: Your kidney marker (the creatinine level) was elevated to 1.8 when you came to the hospital. We gave you intravenous fluids which helped improve your creatinine level down to 1.1. Your Enalapril (blood pressure medication) was also held during your hospitalization due to this elevated number.   You may RESUME ENALAPRIL when you leave the hospital as you regularly would take it at home.    Diagnosis: H/O peripheral neuropathy  Assessment and Plan of Treatment: You should continue your home amitriptyline 30mg daily.     PRINCIPAL DISCHARGE DIAGNOSIS  Diagnosis: Pancreatitis  Assessment and Plan of Treatment: Your nausea, abdominal pain, and chest pain were likely caused by inflammation of your pancreas- Likely 2/2 Diabetic Medication.  -We limited your diet and continued your intravenous fluids to help resolve the pancreatitis (or inflammation of the pancreas).  - We also did an MRI of your abdomen, which showed a cytic lesion in the pancreas.  -Cystic lesion in the pancreatic head with a few thin internal septations   measuring 1.6 x 1.0 cm; follow-up is recommended.  -Small amount of peripancreatic fluid consistent with pancreatitis.  -Cholelithiasis.  -STOP Diabetic mredication -Tredjenta.  - A follow up bone scan showed degenerative changes.  You should follow up with your primary care doctor, Dr Alvarado, within 1 week of discharge from the hospital.      SECONDARY DISCHARGE DIAGNOSES  Diagnosis: CAD (coronary artery disease)  Assessment and Plan of Treatment: - Your heart enzymes, known as the troponins, were negative when checked twice.  -aspirin 81mg once a day, *atorvastatin 40mg once a day, and *Metoprolol  XL 25mg once a day.   Please follow up with your primary care doctor, Dr Alvarado, and your cardiologist within 1 week of discharge.    Diagnosis: Type 2 diabetes mellitus  Assessment and Plan of Treatment: Your hemoglobin A1c, which is a measure of your three month blood glucose level, was 6.0 in the hospital.  STOP trajenta and STOP metformin as your hemoglobin A1c is 6.2   You should follow up with Endocrinologist  doctor,to discuss about your Diabetic Medications . follow up with  Dr Alvarado within 1 week of discharge.    Diagnosis: Hyperlipidemia  Assessment and Plan of Treatment: You should continue your home atorvastatin 40mg daily.       Diagnosis: Major depression  Assessment and Plan of Treatment: You should continue your home Zoloft 25mg two times a day.    Diagnosis: GERD (gastroesophageal reflux disease)  Assessment and Plan of Treatment: You should continue your home medication Omeprazole  40mg every day.    Diagnosis: History of spinal stenosis  Assessment and Plan of Treatment: You should continue your pain medication, Dilaudid 4mg 3 x day  as needed.  Bone Scan--  Heterogeneous uptake throughout the spine, possibly secondary to   degenerative changes and/or compression deformities.  -Degenerative changes in the major joints.      Diagnosis: KERRY (acute kidney injury)  Assessment and Plan of Treatment: Your kidney marker (the creatinine level) was elevated to 1.8 when you came to the hospital. We gave you intravenous fluids which helped improve your creatinine level down to 1.1.   -STOP your  ENALAPRIL when you leave the hospital as you regularly would take it at home.    Diagnosis: Benign essential HTN  Assessment and Plan of Treatment: On Metoprol XL 25 mg daily  STOP Enalapril    Diagnosis: H/O peripheral neuropathy  Assessment and Plan of Treatment: You should continue your home amitriptyline 30mg at bedtime..

## 2022-02-26 NOTE — DIETITIAN INITIAL EVALUATION ADULT. - OTHER INFO
Pt confused answers some questions appropriately and others not. she reports she is 5'4" ( per documentation 5'2" and will use this ht for assessment purposes)   and thinks she weighs ~ 125# but used to weigh much more. She has many missing teeth.  normally would recommend transition to low fat cho consistent diet but in view of advanced age, dementia, poor po intake, RD recommending no therapeutic restrictions at this time to help maximize po intake  but will recommend a lower CHO nutrient dense supplement ( Glucerna )  Pt would likley benefit from a modified texture diet r/t confusion and multiple missing teeth .

## 2022-02-26 NOTE — PROGRESS NOTE ADULT - ASSESSMENT
abdominal pain  ? pancreatitis    LFTS are trending down   advance to clears  proton pump inhibitor bid  carafate 1g four times a day  non contrast ct of the abdomen shows atrophic pancreas  no carmen-pancreatic inflammation  her GFR will not allow IV contrast CT  MRI

## 2022-02-26 NOTE — PROGRESS NOTE ADULT - SUBJECTIVE AND OBJECTIVE BOX
subjective   events noted       PHYSICAL EXAM:   Vital Signs:  Vital Signs Last 24 Hrs  T(C): 36.6 (2022 11:57), Max: 36.6 (2022 11:57)  T(F): 97.9 (2022 11:57), Max: 97.9 (2022 11:57)  HR: 101 (2022 11:57) (76 - 101)  BP: 117/51 (2022 11:57) (107/54 - 119/54)  BP(mean): --  RR: 17 (2022 11:57) (14 - 17)  SpO2: 98% (2022 11:57) (97% - 98%)  Daily Height in cm: 157.48 (2022 00:07)    Daily     GENERAL:  Appears stated age,   HEENT:  NC/AT,    CHEST:  Full & symmetric excursion,   HEART:  Regular rhythm  ABDOMEN:  Soft, non-tender, non-distended,   EXTEREMITIES:  no cyanosis,clubbing or edema  SKIN:  No rash  NEURO:  Alert,    LABS:                        11.6   8.64  )-----------( 229      ( 2022 00:33 )             35.3     02-25    135  |  108  |  34<H>  ----------------------------<  83  5.3   |  16<L>  |  1.80<H>    Ca    8.9      2022 00:33  Mg     1.7     -25    TPro  5.6<L>  /  Alb  2.8<L>  /  TBili  0.3  /  DBili  x   /  AST  25  /  ALT  19  /  AlkPhos  66  02-25    LIVER FUNCTIONS - ( 2022 00:33 )  Alb: 2.8 g/dL / Pro: 5.6 g/dL / ALK PHOS: 66 U/L / ALT: 19 U/L / AST: 25 U/L / GGT: x             Urinalysis Basic - ( 2022 01:50 )    Color: Yellow / Appearance: Slightly Turbid / S.020 / pH: x  Gluc: x / Ketone: Moderate  / Bili: Negative / Urobili: Negative   Blood: x / Protein: 30 mg/dL / Nitrite: Negative   Leuk Esterase: Moderate / RBC: 11-25 /HPF / WBC 26-50   Sq Epi: x / Non Sq Epi: Few / Bacteria: Moderate      Amylase Serum--      Lipase [id number]       Ammonia--      Imaging:

## 2022-02-26 NOTE — DISCHARGE NOTE PROVIDER - NSDCFUADDINST_GEN_ALL_CORE_FT
Follow up with PMD DR KADI Alvarado in 1 week- Repeat CBC, BMP in 1 week.  Follow up with Endocrinologist in 1 week to discuss about your Diabetic Medications.

## 2022-02-26 NOTE — DISCHARGE NOTE PROVIDER - NSDCMRMEDTOKEN_GEN_ALL_CORE_FT
amitriptyline 10 mg oral tablet: 3 tab(s) orally once a day (at bedtime)  aspirin 81 mg oral tablet: 1 tab(s) orally once a day  atorvastatin 40 mg oral tablet: 1 tab(s) orally once a day  Dilaudid 4 mg oral tablet: 1 tab(s) orally 3 times a day, As Needed  enalapril 2.5 mg oral tablet: 1 tab(s) orally once a day  ferrous sulfate 325 mg (65 mg elemental iron) oral tablet: 1 tablet oral daily  metFORMIN 500 mg oral tablet: 1 tab(s) orally 2 times a day  Metoprolol Succinate ER 25 mg oral tablet, extended release: 1 tab(s) orally once a day  omeprazole 20 mg oral delayed release capsule: 1 cap(s) orally once in the morning and 1 in the afternoon  PreserVision AREDS oral capsule: 1 tablet oral daily  Tradjenta 5 mg oral tablet: 1 tab(s) orally once a day  -per pt daughter, not sure if taking currently  Tylenol 500 mg oral tablet: 2 tablets oral twice a day  Vitamin D2 50,000 intl units (1.25 mg) oral capsule (obsolete): 1 cap(s) orally once a week  Zoloft 25 mg oral tablet: 1 tab(s) orally 2 times a day   amitriptyline 10 mg oral tablet: 3 tab(s) orally once a day (at bedtime)  aspirin 81 mg oral tablet: 1 tab(s) orally once a day  atorvastatin 40 mg oral tablet: 1 tab(s) orally once a day  Dilaudid 4 mg oral tablet: 1 tab(s) orally 3 times a day, As Needed  enalapril 2.5 mg oral tablet: 1 tab(s) orally once a day  ferrous sulfate 325 mg (65 mg elemental iron) oral tablet: 1 tablet oral daily  metFORMIN 500 mg oral tablet: 1 tab(s) orally 2 times a day  Metoprolol Succinate ER 25 mg oral tablet, extended release: 1 tab(s) orally once a day  omeprazole 20 mg oral delayed release capsule: 1 cap(s) orally once in the morning and 1 in the afternoon  PreserVision AREDS oral capsule: 1 tablet oral daily  Tradjenta 5 mg oral tablet: 1 tab(s) orally once a day  -per pt daughter, not sure if taking currently  Tylenol 325 mg oral tablet: 2 tab(s) orally every 6 hours, As needed, Temp greater or equal to 38C (100.4F), Mild Pain (1 - 3)  Vitamin D2 50,000 intl units (1.25 mg) oral capsule (obsolete): 1 cap(s) orally once a week  Zoloft 25 mg oral tablet: 1 tab(s) orally 2 times a day   amitriptyline 10 mg oral tablet: 3 tab(s) orally once a day (at bedtime)  aspirin 81 mg oral tablet: 1 tab(s) orally once a day  atorvastatin 40 mg oral tablet: 1 tab(s) orally once a day  Dilaudid 4 mg oral tablet: 1 tab(s) orally 3 times a day, As Needed  enalapril 2.5 mg oral tablet: 1 tab(s) orally once a day  ferrous sulfate 325 mg (65 mg elemental iron) oral tablet: 1 tablet oral daily  Metoprolol Succinate ER 25 mg oral tablet, extended release: 1 tab(s) orally once a day  omeprazole 20 mg oral delayed release capsule: 1 cap(s) orally once in the morning and 1 in the afternoon  PreserVision AREDS oral capsule: 1 tablet oral daily  Tylenol 325 mg oral tablet: 2 tab(s) orally every 6 hours, As needed, Temp greater or equal to 38C (100.4F), Mild Pain (1 - 3)  Vitamin D2 50,000 intl units (1.25 mg) oral capsule (obsolete): 1 cap(s) orally once a week  Zoloft 25 mg oral tablet: 1 tab(s) orally 2 times a day   amitriptyline 10 mg oral tablet: 3 tab(s) orally once a day (at bedtime)  aspirin 81 mg oral tablet: 1 tab(s) orally once a day  atorvastatin 40 mg oral tablet: 1 tab(s) orally once a day  Dilaudid 4 mg oral tablet: 1 tab(s) orally 3 times a day, As Needed  ferrous sulfate 325 mg (65 mg elemental iron) oral tablet: 1 tablet oral daily  Metoprolol Succinate ER 25 mg oral tablet, extended release: 1 tab(s) orally once a day  omeprazole 20 mg oral delayed release capsule: 1 cap(s) orally once in the morning and 1 in the afternoon  PreserVision AREDS oral capsule: 1 tablet oral daily  senna oral tablet: 2 tab(s) orally once a day (at bedtime)  Tylenol 325 mg oral tablet: 2 tab(s) orally every 6 hours, As needed, ), Mild Pain (1 - 3)  Vitamin D2 50,000 intl units (1.25 mg) oral capsule (obsolete): 1 cap(s) orally once a week  Zoloft 25 mg oral tablet: 1 tab(s) orally 2 times a day

## 2022-02-26 NOTE — DISCHARGE NOTE PROVIDER - HOSPITAL COURSE
FROM ADMISSION H+P:   HPI:  The patient is a 89 year old female with PMH CAD s/p stents in 2012, Type 2 diabetes, HTN, HLD, GERD, mild AS, chronic venous insufficiency, neuropathy, macular degeneration, chronic constipation, depression, OA, lumbar degenerative disc disease, and spinal stenosis who presents to the ER complaining of nausea, abdominal pain, and burning chest pain. Patient is a poor historian, majority of the history obtained from daughter Mary Guerin at bedside. Per daughter, over the past week patient has been extremely nauseous and has been dry heaving. Patient never vomited. She had been eating less over the past few days; however, yesterday, patient was so nauseous that she was unable to eat or drink. She felt weak and fatigued. She also developed burning pain in the epigastric area that radiated to her chest. Her daughter became concerned and called EMS. Patient denies any other symptoms, including fevers, chills, SOB, diarrhea. Of note, per daughter, patient has lost a significant amount of weight (about 12 lbs) in the past few months as she has had a decreased appetite.    In the ED:   VS: T:97.6, HR:93, BP: 119/54, RR:16, SpO2: 98% on room air  Labs significant for BUN/Cr: 34/1.8, Trop negative x 2, lipase: 1537, proBNP: 2914  UA: slightly turbid, large blood, moderate ketones, moderate leuk esterase, 26-50 WBCs, moderate bacteria, few yeast cells   EKG: NSR, no ST or T wave abnormalities   US abdomen: Sonographic findings equivocal for acute cholecystitis. If there is clinical suspicion for acute cholecystitis, a hepatobiliary scan may be obtained for further evaluation.Poorly visualized pancreas. Recommend correlation with serum lipase to assess acute pancreatitis.  CT A/P: Marked fatty replacement of the pancreas. Limited evaluation without intravenous contrast. Cholelithiasis without CT evidence for acute cholecystitis.Diverticulosis without diverticulitis.  HIDA scan pending   Patient received 1 L NS bolus 4 mg Zofran (25 Feb 2022 07:48)      ---  HOSPITAL COURSE: Patient admitted for Acute pancreatitis. GI consulted- Dr Montilla. Pt treated with gentle fluid hydration given patients hx of aortic stenosis. CT Abd showed marked fatty replacement of the pancreas. Limited evaluation without intravenous contrast. Cholelithiasis without CT evidence for acute cholecystitis. Diverticulosis without diverticulitis.   Surgery - Dr Rodrigues consulted for ?acute cholecystitis.   Cardiology Dr Jose consulted   MRI abd showed ?        Patient was medically optimized and improved clinically throughout hospital course. Patient seen and examined on day of discharge. Patient is medically stable for discharge to ______ with outpatient follow up.       updated   02/26  ---  CONSULTANTS:   GI- Dr Montilla  Cardio- Dr Jose   Surgery- Dr Rodrigues     ---  TIME SPENT:  I, the attending physician, was physically present for the key portions of the evaluation and management (E/M) service provided. The total amount of time spent reviewing the hospital notes, laboratory values, imaging findings, assessing/counseling the patient, discussing with consultant physicians, social work, nursing staff was -- minutes    ---  Primary care provider was made aware of plan for discharge:      [  ] NO     [ x ] YES     FROM ADMISSION H+P:   HPI:  The patient is a 89 year old female with PMH CAD s/p stents in 2012, Type 2 diabetes, HTN, HLD, GERD, mild AS, chronic venous insufficiency, neuropathy, macular degeneration, chronic constipation, depression, OA, lumbar degenerative disc disease, and spinal stenosis who presents to the ER complaining of nausea, abdominal pain, and burning chest pain. Patient is a poor historian, majority of the history obtained from daughter Mary Guerin at bedside. Per daughter, over the past week patient has been extremely nauseous and has been dry heaving. Patient never vomited. She had been eating less over the past few days; however, yesterday, patient was so nauseous that she was unable to eat or drink. She felt weak and fatigued. She also developed burning pain in the epigastric area that radiated to her chest. Her daughter became concerned and called EMS. Patient denies any other symptoms, including fevers, chills, SOB, diarrhea. Of note, per daughter, patient has lost a significant amount of weight (about 12 lbs) in the past few months as she has had a decreased appetite.    In the ED:   VS: T:97.6, HR:93, BP: 119/54, RR:16, SpO2: 98% on room air  Labs significant for BUN/Cr: 34/1.8, Trop negative x 2, lipase: 1537, proBNP: 2914  UA: slightly turbid, large blood, moderate ketones, moderate leuk esterase, 26-50 WBCs, moderate bacteria, few yeast cells   EKG: NSR, no ST or T wave abnormalities   US abdomen: Sonographic findings equivocal for acute cholecystitis. If there is clinical suspicion for acute cholecystitis, a hepatobiliary scan may be obtained for further evaluation.Poorly visualized pancreas. Recommend correlation with serum lipase to assess acute pancreatitis.  CT A/P: Marked fatty replacement of the pancreas. Limited evaluation without intravenous contrast. Cholelithiasis without CT evidence for acute cholecystitis.Diverticulosis without diverticulitis.  HIDA scan pending   Patient received 1 L NS bolus 4 mg Zofran (25 Feb 2022 07:48)      ---  HOSPITAL COURSE:   Patient admitted for acute pancreatitis. GI was consulted, Dr Montilla. Pt was treated with gentle fluid hydration given patients hx of aortic stenosis. CT Abd showed marked fatty replacement of the pancreas. Limited evaluation without intravenous contrast. Cholelithiasis without CT evidence for acute cholecystitis. Diverticulosis without diverticulitis.   The patient had an KERRY upon admission with Cr 1.8, unknown baseline. Patient was continued on D5+NS. Enalapril was held until resolution of the KERRY.   The patient was also noted to be anemic upon admission. The patient was without acute signs or symptoms of bleeding. Hg was monitoring. Heme onc followed patient while inpatient (Dr Nicholas).   The patient had an abnormal UA as noted above. However, pt was asymptomatic and was monitored off antibiotics. A urine culture showed 3 organisms with likely contamination.   PT was consulted.    MRI showed _______.    Patient was medically optimized and improved clinically throughout hospital course. Patient seen and examined on day of discharge. Patient is medically stable for discharge to ______ with outpatient follow up.       CONSULTANTS:   GI- Dr Montilla  Cardio- Dr Jose   Surgery- Dr Rodrigues     ---  TIME SPENT:  I, the attending physician, was physically present for the key portions of the evaluation and management (E/M) service provided. The total amount of time spent reviewing the hospital notes, laboratory values, imaging findings, assessing/counseling the patient, discussing with consultant physicians, social work, nursing staff was -- minutes    ---  Primary care provider was made aware of plan for discharge:      [  ] NO     [ x ] YES      PATIENT CODE STATUS: [ X ] FULL CODE    FROM ADMISSION H+P:   HPI:  The patient is a 89 year old female with PMH CAD s/p stents in 2012, Type 2 diabetes, HTN, HLD, GERD, mild AS, chronic venous insufficiency, neuropathy, macular degeneration, chronic constipation, depression, OA, lumbar degenerative disc disease, and spinal stenosis who presents to the ER complaining of nausea, abdominal pain, and burning chest pain. Patient is a poor historian, majority of the history obtained from daughter Mary Guerin at bedside. Per daughter, over the past week patient has been extremely nauseous and has been dry heaving. Patient never vomited. She had been eating less over the past few days; however, yesterday, patient was so nauseous that she was unable to eat or drink. She felt weak and fatigued. She also developed burning pain in the epigastric area that radiated to her chest. Her daughter became concerned and called EMS. Patient denies any other symptoms, including fevers, chills, SOB, diarrhea. Of note, per daughter, patient has lost a significant amount of weight (about 12 lbs) in the past few months as she has had a decreased appetite.    In the ED:   VS: T:97.6, HR:93, BP: 119/54, RR:16, SpO2: 98% on room air  Labs significant for BUN/Cr: 34/1.8, Trop negative x 2, lipase: 1537, proBNP: 2914  UA: slightly turbid, large blood, moderate ketones, moderate leuk esterase, 26-50 WBCs, moderate bacteria, few yeast cells   EKG: NSR, no ST or T wave abnormalities   US abdomen: Sonographic findings equivocal for acute cholecystitis. If there is clinical suspicion for acute cholecystitis, a hepatobiliary scan may be obtained for further evaluation.Poorly visualized pancreas. Recommend correlation with serum lipase to assess acute pancreatitis.  CT A/P: Marked fatty replacement of the pancreas. Limited evaluation without intravenous contrast. Cholelithiasis without CT evidence for acute cholecystitis.Diverticulosis without diverticulitis.  HIDA scan pending   Patient received 1 L NS bolus 4 mg Zofran (25 Feb 2022 07:48)      ---  HOSPITAL COURSE:   Patient admitted for acute pancreatitis. GI was consulted, Dr Montilla. Pt was treated with gentle fluid hydration given patients hx of aortic stenosis. CT Abd showed marked fatty replacement of the pancreas. Limited evaluation without intravenous contrast. Cholelithiasis without CT evidence for acute cholecystitis. Diverticulosis without diverticulitis.   The patient had an KERRY upon admission with Cr 1.8, unknown baseline. Patient was continued on D5+NS. Enalapril was held until resolution of the KERRY.   The patient was also noted to be anemic upon admission. The patient was without acute signs or symptoms of bleeding. Hg was monitored. Heme onc followed patient while inpatient (Dr Nicholas).   The patient had an abnormal UA as noted above. However, pt was asymptomatic and was monitored off antibiotics. A urine culture showed 3 organisms with likely contamination.   PT was consulted.    MRI showed _______.    Patient was medically optimized and improved clinically throughout hospital course. Patient seen and examined on day of discharge. Patient is medically stable for discharge to ______ with outpatient follow up.       CONSULTANTS:   GI- Dr Montilla  Cardio- Dr Jose   Surgery- Dr Rodrigues     ---  TIME SPENT:  I, the attending physician, was physically present for the key portions of the evaluation and management (E/M) service provided. The total amount of time spent reviewing the hospital notes, laboratory values, imaging findings, assessing/counseling the patient, discussing with consultant physicians, social work, nursing staff was -- minutes    ---  Primary care provider was made aware of plan for discharge:      [  ] NO     [ x ] YES      PATIENT CODE STATUS: [ X ] FULL CODE    FROM ADMISSION H+P:   HPI:  The patient is a 89 year old female with PMH CAD s/p stents in 2012, Type 2 diabetes, HTN, HLD, GERD, mild AS, chronic venous insufficiency, neuropathy, macular degeneration, chronic constipation, depression, OA, lumbar degenerative disc disease, and spinal stenosis who presents to the ER complaining of nausea, abdominal pain, and burning chest pain. Patient is a poor historian, majority of the history obtained from daughter Mary Guerin at bedside. Per daughter, over the past week patient has been extremely nauseous and has been dry heaving. Patient never vomited. She had been eating less over the past few days; however, yesterday, patient was so nauseous that she was unable to eat or drink. She felt weak and fatigued. She also developed burning pain in the epigastric area that radiated to her chest. Her daughter became concerned and called EMS. Patient denies any other symptoms, including fevers, chills, SOB, diarrhea. Of note, per daughter, patient has lost a significant amount of weight (about 12 lbs) in the past few months as she has had a decreased appetite.    In the ED:   VS: T:97.6, HR:93, BP: 119/54, RR:16, SpO2: 98% on room air  Labs significant for BUN/Cr: 34/1.8, Trop negative x 2, lipase: 1537, proBNP: 2914  UA: slightly turbid, large blood, moderate ketones, moderate leuk esterase, 26-50 WBCs, moderate bacteria, few yeast cells   EKG: NSR, no ST or T wave abnormalities   US abdomen: Sonographic findings equivocal for acute cholecystitis. If there is clinical suspicion for acute cholecystitis, a hepatobiliary scan may be obtained for further evaluation.Poorly visualized pancreas. Recommend correlation with serum lipase to assess acute pancreatitis.  CT A/P: Marked fatty replacement of the pancreas. Limited evaluation without intravenous contrast. Cholelithiasis without CT evidence for acute cholecystitis.Diverticulosis without diverticulitis.  HIDA scan pending   Patient received 1 L NS bolus 4 mg Zofran (25 Feb 2022 07:48)      ---  HOSPITAL COURSE:   Patient admitted for acute pancreatitis. CT Abd on admission showed marked fatty replacement of the pancreas. Cholelithiasis without CT evidence for acute cholecystitis. Diverticulosis without diverticulitis. GI was consulted, Dr Montilla. Pt was treated with gentle fluid hydration given patients hx of aortic stenosis. MRI abdomen was done on 02/28/22.   Patient tolerated well regular diet.    On admission found to have KERRY, was given IV fluids and held home enalapril. Renal function improved during hospital stay.   The patient was also noted to be anemic upon admission. The patient was without acute signs or symptoms of bleeding. Hg was monitored. Heme onc followed patient while inpatient (Dr Nicholas).   The patient had an abnormal UA as noted above. However, pt was asymptomatic and was monitored off antibiotics. A urine culture showed 3 organisms with likely contamination.   PT was consulted who recommenced home with home PT     Patient was medically optimized and improved clinically throughout hospital course. Patient seen and examined on day of discharge. Patient is medically stable for discharge to ______ with outpatient follow up.       CONSULTANTS:   GI- Dr Montilla  Cardio- Dr Jose   Surgery- Dr Rodrigues   PT   ---  TIME SPENT:  I, the attending physician, was physically present for the key portions of the evaluation and management (E/M) service provided. The total amount of time spent reviewing the hospital notes, laboratory values, imaging findings, assessing/counseling the patient, discussing with consultant physicians, social work, nursing staff was -- minutes    ---  Primary care provider was made aware of plan for discharge:      [  ] NO     [ x ] YES      PATIENT CODE STATUS: [ X ] FULL CODE    FROM ADMISSION H+P:   HPI:  The patient is a 89 year old female with PMH CAD s/p stents in 2012, Type 2 diabetes, HTN, HLD, GERD, mild AS, chronic venous insufficiency, neuropathy, macular degeneration, chronic constipation, depression, OA, lumbar degenerative disc disease, and spinal stenosis who presents to the ER complaining of nausea, abdominal pain, and burning chest pain. Patient is a poor historian, majority of the history obtained from daughter Mary Guerin at bedside. Per daughter, over the past week patient has been extremely nauseous and has been dry heaving. Patient never vomited. She had been eating less over the past few days; however, yesterday, patient was so nauseous that she was unable to eat or drink. She felt weak and fatigued. She also developed burning pain in the epigastric area that radiated to her chest. Her daughter became concerned and called EMS. Patient denies any other symptoms, including fevers, chills, SOB, diarrhea. Of note, per daughter, patient has lost a significant amount of weight (about 12 lbs) in the past few months as she has had a decreased appetite.    In the ED:   VS: T:97.6, HR:93, BP: 119/54, RR:16, SpO2: 98% on room air  Labs significant for BUN/Cr: 34/1.8, Trop negative x 2, lipase: 1537, proBNP: 2914  UA: slightly turbid, large blood, moderate ketones, moderate leuk esterase, 26-50 WBCs, moderate bacteria, few yeast cells   EKG: NSR, no ST or T wave abnormalities   US abdomen: Sonographic findings equivocal for acute cholecystitis. If there is clinical suspicion for acute cholecystitis, a hepatobiliary scan may be obtained for further evaluation.Poorly visualized pancreas. Recommend correlation with serum lipase to assess acute pancreatitis.  CT A/P: Marked fatty replacement of the pancreas. Limited evaluation without intravenous contrast. Cholelithiasis without CT evidence for acute cholecystitis.Diverticulosis without diverticulitis.  HIDA scan pending   Patient received 1 L NS bolus 4 mg Zofran (25 Feb 2022 07:48)      ---  HOSPITAL COURSE:   Patient admitted for acute pancreatitis. CT Abd on admission showed marked fatty replacement of the pancreas. Cholelithiasis without CT evidence for acute cholecystitis. Diverticulosis without diverticulitis. GI was consulted, Dr Montilla. Pt was treated with gentle fluid hydration given patients hx of aortic stenosis. MRI abdomen was done on 02/28/22.   Patient tolerated well regular diet.  MRI abdomen demonstrated cyst in the pancreatic head, cholelithiasis, and lesions in the right iliac bone  On admission found to have KERRY, was given IV fluids and held home enalapril. Renal function improved during hospital stay.   The patient was also noted to be anemic upon admission. The patient was without acute signs or symptoms of bleeding. Hg was monitored. Heme onc followed patient while inpatient (Dr Nicholas).   The patient had an abnormal UA as noted above. However, pt was asymptomatic and was monitored off antibiotics. A urine culture showed 3 organisms with likely contamination.   PT was consulted who recommenced home with home PT     Patient was medically optimized and improved clinically throughout hospital course. Patient seen and examined on day of discharge. Patient is medically stable for discharge to ______ with outpatient follow up.       CONSULTANTS:   GI- Dr Montilla  Cardio- Dr Jose   Surgery- Dr Rodrigues   PT   ---  TIME SPENT:  I, the attending physician, was physically present for the key portions of the evaluation and management (E/M) service provided. The total amount of time spent reviewing the hospital notes, laboratory values, imaging findings, assessing/counseling the patient, discussing with consultant physicians, social work, nursing staff was -- minutes    ---  Primary care provider was made aware of plan for discharge:      [  ] NO     [ x ] YES      PATIENT CODE STATUS: [ X ] FULL CODE    FROM ADMISSION H+P:   HPI:  The patient is a 89 year old female with PMH CAD s/p stents in 2012, Type 2 diabetes, HTN, HLD, GERD, mild AS, chronic venous insufficiency, neuropathy, macular degeneration, chronic constipation, depression, OA, lumbar degenerative disc disease, and spinal stenosis who presents to the ER complaining of nausea, abdominal pain, and burning chest pain. Patient is a poor historian, majority of the history obtained from daughter Mary Guerin at bedside. Per daughter, over the past week patient has been extremely nauseous and has been dry heaving. Patient never vomited. She had been eating less over the past few days; however, yesterday, patient was so nauseous that she was unable to eat or drink. She felt weak and fatigued. She also developed burning pain in the epigastric area that radiated to her chest. Her daughter became concerned and called EMS. Patient denies any other symptoms, including fevers, chills, SOB, diarrhea. Of note, per daughter, patient has lost a significant amount of weight (about 12 lbs) in the past few months as she has had a decreased appetite.    In the ED:   VS: T:97.6, HR:93, BP: 119/54, RR:16, SpO2: 98% on room air  Labs significant for BUN/Cr: 34/1.8, Trop negative x 2, lipase: 1537, proBNP: 2914  UA: slightly turbid, large blood, moderate ketones, moderate leuk esterase, 26-50 WBCs, moderate bacteria, few yeast cells   EKG: NSR, no ST or T wave abnormalities   US abdomen: Sonographic findings equivocal for acute cholecystitis. If there is clinical suspicion for acute cholecystitis, a hepatobiliary scan may be obtained for further evaluation.Poorly visualized pancreas. Recommend correlation with serum lipase to assess acute pancreatitis.  CT A/P: Marked fatty replacement of the pancreas. Limited evaluation without intravenous contrast. Cholelithiasis without CT evidence for acute cholecystitis.Diverticulosis without diverticulitis.  HIDA scan pending   Patient received 1 L NS bolus 4 mg Zofran (25 Feb 2022 07:48)      ---  HOSPITAL COURSE:   Patient admitted for acute pancreatitis. CT Abd on admission showed marked fatty replacement of the pancreas. Cholelithiasis without CT evidence for acute cholecystitis. Diverticulosis without diverticulitis. GI was consulted, Dr Montilla. Pt was treated with gentle fluid hydration given patients hx of aortic stenosis. MRI abdomen was done on 02/28/22.   Patient tolerated well regular diet.  MRI abdomen demonstrated cyst in the pancreatic head, cholelithiasis, and lesions in the right iliac bone. A NM Bone scan was performed and showed Heterogeneous uptake throughout the spine, possibly secondary to degenerative changes and/or compression deformities. Degenerative changes in the major joints. Right iliac bone lesions noted on MRI, not visualized.  On admission found to have KERRY, was given IV fluids and held home enalapril. Renal function improved during hospital stay.   The patient was also noted to be anemic upon admission. The patient was without acute signs or symptoms of bleeding. Hg was monitored. Heme onc followed patient while inpatient (Dr Nicholas).   The patient had an abnormal UA as noted above. However, pt was asymptomatic and was monitored off antibiotics. A urine culture showed 3 organisms with likely contamination.   PT was consulted who recommenced home with home PT     Patient was medically optimized and improved clinically throughout hospital course. Patient seen and examined on day of discharge. Patient is medically stable for discharge to ______ with outpatient follow up.       CONSULTANTS:   GI- Dr Montilla  Cardio- Dr Jose   Surgery- Dr Rodrigues   PT   ---  TIME SPENT:  I, the attending physician, was physically present for the key portions of the evaluation and management (E/M) service provided. The total amount of time spent reviewing the hospital notes, laboratory values, imaging findings, assessing/counseling the patient, discussing with consultant physicians, social work, nursing staff was -- minutes    ---  Primary care provider was made aware of plan for discharge:      [  ] NO     [ x ] YES      PATIENT CODE STATUS: [ X ] FULL CODE    FROM ADMISSION H+P:   HPI:  The patient is a 89 year old female with PMH CAD s/p stents in 2012, Type 2 diabetes, HTN, HLD, GERD, mild AS, chronic venous insufficiency, neuropathy, macular degeneration, chronic constipation, depression, OA, lumbar degenerative disc disease, and spinal stenosis who presents to the ER complaining of nausea, abdominal pain, and burning chest pain. Patient is a poor historian, majority of the history obtained from daughter Mary Guerin at bedside. Per daughter, over the past week patient has been extremely nauseous and has been dry heaving. Patient never vomited. She had been eating less over the past few days; however, yesterday, patient was so nauseous that she was unable to eat or drink. She felt weak and fatigued. She also developed burning pain in the epigastric area that radiated to her chest. Her daughter became concerned and called EMS. Patient denies any other symptoms, including fevers, chills, SOB, diarrhea. Of note, per daughter, patient has lost a significant amount of weight (about 12 lbs) in the past few months as she has had a decreased appetite.    In the ED:   VS: T:97.6, HR:93, BP: 119/54, RR:16, SpO2: 98% on room air  Labs significant for BUN/Cr: 34/1.8, Trop negative x 2, lipase: 1537, proBNP: 2914  UA: slightly turbid, large blood, moderate ketones, moderate leuk esterase, 26-50 WBCs, moderate bacteria, few yeast cells   EKG: NSR, no ST or T wave abnormalities   US abdomen: Sonographic findings equivocal for acute cholecystitis. If there is clinical suspicion for acute cholecystitis, a hepatobiliary scan may be obtained for further evaluation.Poorly visualized pancreas. Recommend correlation with serum lipase to assess acute pancreatitis.  CT A/P: Marked fatty replacement of the pancreas. Limited evaluation without intravenous contrast. Cholelithiasis without CT evidence for acute cholecystitis.Diverticulosis without diverticulitis.  HIDA scan pending   Patient received 1 L NS bolus 4 mg Zofran (25 Feb 2022 07:48)      ---  HOSPITAL COURSE:   Patient admitted for acute pancreatitis. CT Abd on admission showed marked fatty replacement of the pancreas. Cholelithiasis without CT evidence for acute cholecystitis. Diverticulosis without diverticulitis. GI was consulted, Dr Montilla. Pt was treated with gentle fluid hydration given patients hx of aortic stenosis. MRI abdomen was done on 02/28/22.   Patient tolerated well regular diet.  MRI abdomen demonstrated cyst in the pancreatic head, cholelithiasis, and lesions in the right iliac bone. A NM Bone scan was performed and showed Heterogeneous uptake throughout the spine, possibly secondary to degenerative changes and/or compression deformities. Degenerative changes in the major joints. Right iliac bone lesions noted on MRI, not visualized.  On admission found to have KERRY, was given IV fluids and held home enalapril. Renal function improved during hospital stay.   The patient was also noted to be anemic upon admission. The patient was without acute signs or symptoms of bleeding. Hg was monitored. Heme onc followed patient while inpatient (Dr Nicholas).   The patient had an abnormal UA as noted above. However, pt was asymptomatic and was monitored off antibiotics. A urine culture showed 3 organisms with likely contamination.   PT was consulted who recommenced home with assistance.     Patient was medically optimized and improved clinically throughout hospital course. Patient seen and examined on day of discharge. Patient is medically stable for discharge to home, as patient has private nurse aid. Pt to follow up closely with PCP upon discharge.      CONSULTANTS:   GI- Dr Montilla  Cardio- Dr Jose   Surgery- Dr Rodrigues   PT   ---    GENERAL:  [ x ] NAD , [ x ] well appearing, [  ] Agitated, [  ] Drowsy,  [  ] Lethargy, [  ] confused   HEAD:  [ x ] Normal, [  ] Other  EYES:  [ x ] EOMI, [ x ] PERRLA, [ x ] conjunctiva and sclera clear normal, [  ] Other,  [  ] Pallor,[  ] Discharge  ENMT:  [ x ] Normal, [ x ] Moist mucous membranes, [  ] Good dentition, [ x ] No Thrush  NECK:  [ x ] Supple, [ x ] No JVD, [ x ] Normal thyroid, [  ] Lymphadenopathy [  ] Other  CHEST/LUNG:  [ x ] Clear to auscultation bilaterally, [ x ] Breath Sounds equal B/L / Decrease, [x  ] poor effort  [ x ] No rales, [ x ] No rhonchi  [ x ]  No wheezing,   HEART:  [ x ] Regular rate and rhythm, [  ] tachycardia, [  ] Bradycardia,  [  ] irregular  [ x ] No murmurs, No rubs, No gallops, [  ] PPM in place (Mfr:  )  ABDOMEN:  [ x ] Soft, [ x ] Nontender, [ x ] Nondistended, [x  ] No mass, [  ] Bowel sounds present, [  ] obese  NERVOUS SYSTEM:  [ x ] Alert & Oriented X2 to person, place, [ x ] Nonfocal  [  ] Confusion  [  ] Encephalopathic [  ] Sedated [  ] Unable to assess, [  ] Dementia [ x ] Other- forgetful  EXTREMITIES: [ x ] 2+ Peripheral Pulses, No clubbing, No cyanosis,  [ x ] 2 + edema B/L lower EXT. [  ] PVD stasis skin changes B/L Lower EXT, [  ] wound  LYMPH: No lymphadenopathy noted  SKIN:  [  ] No rashes or lesions, [  ] Pressure Ulcers, [  ] ecchymosis, [  ] Skin Tears, [ x ] Other- scattered age related ecchymosis throughout b/l UE and LE  TIME SPENT:  I, the attending physician, was physically present for the key portions of the evaluation and management (E/M) service provided. The total amount of time spent reviewing the hospital notes, laboratory values, imaging findings, assessing/counseling the patient, discussing with consultant physicians, social work, nursing staff was -- minutes    ---  Primary care provider was made aware of plan for discharge:      [  ] NO     [ x ] YES      PATIENT CODE STATUS: [ X ] FULL CODE    FROM ADMISSION H+P:   HPI:  The patient is a 89 year old female with PMH CAD s/p stents in 2012, Type 2 diabetes, HTN, HLD, GERD, mild AS, chronic venous insufficiency, neuropathy, macular degeneration, chronic constipation, depression, OA, lumbar degenerative disc disease, and spinal stenosis who presents to the ER complaining of nausea, abdominal pain, and burning chest pain. Patient is a poor historian, majority of the history obtained from daughter Mary Guerin at bedside. Per daughter, over the past week patient has been extremely nauseous and has been dry heaving. Patient never vomited. She had been eating less over the past few days; however, yesterday, patient was so nauseous that she was unable to eat or drink. She felt weak and fatigued. She also developed burning pain in the epigastric area that radiated to her chest. Her daughter became concerned and called EMS. Patient denies any other symptoms, including fevers, chills, SOB, diarrhea. Of note, per daughter, patient has lost a significant amount of weight (about 12 lbs) in the past few months as she has had a decreased appetite.    In the ED:   VS: T:97.6, HR:93, BP: 119/54, RR:16, SpO2: 98% on room air  Labs significant for BUN/Cr: 34/1.8, Trop negative x 2, lipase: 1537, proBNP: 2914  UA: slightly turbid, large blood, moderate ketones, moderate leuk esterase, 26-50 WBCs, moderate bacteria, few yeast cells   EKG: NSR, no ST or T wave abnormalities   US abdomen: Sonographic findings equivocal for acute cholecystitis. If there is clinical suspicion for acute cholecystitis, a hepatobiliary scan may be obtained for further evaluation.Poorly visualized pancreas. Recommend correlation with serum lipase to assess acute pancreatitis.  CT A/P: Marked fatty replacement of the pancreas. Limited evaluation without intravenous contrast. Cholelithiasis without CT evidence for acute cholecystitis.Diverticulosis without diverticulitis.  HIDA scan pending   Patient received 1 L NS bolus 4 mg Zofran (25 Feb 2022 07:48)      ---  HOSPITAL COURSE:   Patient admitted for acute pancreatitis. CT Abd on admission showed marked fatty replacement of the pancreas. Cholelithiasis without CT evidence for acute cholecystitis. Diverticulosis without diverticulitis. GI was consulted, Dr Montilla. Pt was treated with gentle fluid hydration given patients hx of aortic stenosis. MRI abdomen was done on 02/28/22.   Patient tolerated well regular diet.  MRI abdomen demonstrated cyst in the pancreatic head, cholelithiasis, and lesions in the right iliac bone. A NM Bone scan was performed and showed Heterogeneous uptake throughout the spine, possibly secondary to degenerative changes and/or compression deformities. Degenerative changes in the major joints. Right iliac bone lesions noted on MRI, not visualized.  On admission found to have KERRY, was given IV fluids and held home enalapril. Renal function improved during hospital stay.   The patient was also noted to be anemic upon admission. The patient was without acute signs or symptoms of bleeding. Hg was monitored. Heme onc followed patient while inpatient (Dr Nicholas).   The patient had an abnormal UA as noted above. However, pt was asymptomatic and was monitored off antibiotics. A urine culture showed 3 organisms with likely contamination.   PT was consulted who recommenced home with assistance.     Patient was medically optimized and improved clinically throughout hospital course. Patient seen and examined on day of discharge. Patient is medically stable for discharge to home, as patient has private nurse aid. Pt to follow up closely with PCP upon discharge.      CONSULTANTS:   GI- Dr Montilla  Cardio- Dr Jose   Surgery- Dr Rodrigues   PT -No Need  -Hematology-DR Nicholas   -  TIME SPENT:  I, the attending physician, was physically present for the key portions of the evaluation and management (E/M) service provided. The total amount of time spent reviewing the hospital notes, laboratory values, imaging findings, assessing/counseling the patient, discussing with consultant physicians, social work, nursing staff was 60 minutes    ---  Primary care provider was made aware of plan for discharge:      [  ] NO     [ x ] YES      PATIENT CODE STATUS: [ X ] FULL CODE

## 2022-02-26 NOTE — DIETITIAN INITIAL EVALUATION ADULT. - PERTINENT MEDS FT
IVF's: D5 with 0.9% nacl at 60 ml/hr  humalog , protonix, eleavil, lipitor, metoprolol, miralax, senna carafate

## 2022-02-26 NOTE — PROGRESS NOTE ADULT - SUBJECTIVE AND OBJECTIVE BOX
[facility] -- Caspre Mcgee Grossman, [place], Eligio Ryan Savella, Goodger: Office # [phone]    Follow Up:  CAD Abdominal pain/pancreatitis    Subjective/Observations:     REVIEW OF SYSTEMS: All review of systems is negative for eye, ENT, GI, , allergic, dermatologic, musculoskeletal and neurologic except as described above    PAST MEDICAL & SURGICAL HISTORY:  H/O vertebral fracture repair    History of vertebral fracture    Dyslipidemia    DM type 2 with diabetic dyslipidemia    H/O gastroesophageal reflux (GERD)    Costochondritis    Coronary arteriosclerosis in native artery  s/p 2 stents. last placed 2 years ago    Gastroparesis    History of laminectomy    S/P hip hemiarthroplasty    S/P appendectomy        MEDICATIONS  (STANDING):  amitriptyline 30 milliGRAM(s) Oral at bedtime  aspirin enteric coated 81 milliGRAM(s) Oral daily  atorvastatin 40 milliGRAM(s) Oral at bedtime  dextrose 40% Gel 15 Gram(s) Oral once  dextrose 5% + sodium chloride 0.9%. 1000 milliLiter(s) (60 mL/Hr) IV Continuous <Continuous>  dextrose 5%. 1000 milliLiter(s) (50 mL/Hr) IV Continuous <Continuous>  dextrose 5%. 1000 milliLiter(s) (100 mL/Hr) IV Continuous <Continuous>  dextrose 50% Injectable 25 Gram(s) IV Push once  dextrose 50% Injectable 12.5 Gram(s) IV Push once  dextrose 50% Injectable 25 Gram(s) IV Push once  glucagon  Injectable 1 milliGRAM(s) IntraMuscular once  heparin   Injectable 5000 Unit(s) SubCutaneous every 12 hours  insulin lispro (ADMELOG) corrective regimen sliding scale   SubCutaneous three times a day before meals  insulin lispro (ADMELOG) corrective regimen sliding scale   SubCutaneous at bedtime  metoprolol succinate ER 25 milliGRAM(s) Oral daily  pantoprazole  Injectable 40 milliGRAM(s) IV Push two times a day  polyethylene glycol 3350 17 Gram(s) Oral two times a day  senna 2 Tablet(s) Oral at bedtime  sertraline 25 milliGRAM(s) Oral every 12 hours  sucralfate suspension 1 Gram(s) Oral four times a day    MEDICATIONS  (PRN):  acetaminophen     Tablet .. 650 milliGRAM(s) Oral every 6 hours PRN Temp greater or equal to 38C (100.4F), Mild Pain (1 - 3)  aluminum hydroxide/magnesium hydroxide/simethicone Suspension 30 milliLiter(s) Oral every 4 hours PRN Dyspepsia  HYDROmorphone   Tablet 4 milliGRAM(s) Oral every 8 hours PRN Severe Pain (7 - 10)  melatonin 3 milliGRAM(s) Oral at bedtime PRN Insomnia  ondansetron Injectable 4 milliGRAM(s) IV Push every 8 hours PRN Nausea and/or Vomiting    Allergies    morphine (Other)    Intolerances      Vital Signs Last 24 Hrs  T(C): 36.9 (26 Feb 2022 04:22), Max: 36.9 (25 Feb 2022 19:13)  T(F): 98.4 (26 Feb 2022 04:22), Max: 98.4 (25 Feb 2022 19:13)  HR: 88 (26 Feb 2022 04:22) (88 - 105)  BP: 94/58 (26 Feb 2022 04:22) (94/58 - 117/51)  BP(mean): --  RR: 16 (26 Feb 2022 04:22) (16 - 18)  SpO2: 98% (26 Feb 2022 04:22) (98% - 98%)  I&O's Summary        TELE: Not on telemetry   PHYSICAL EXAM:  Appearance: NAD, no distress, alert, thin eldrly  HEENT: Moist Mucous Membranes, Anicteric  Cardiovascular: Regular rate and rhythm, Normal S1 S2, No JVD, No murmurs, No rubs, gallops or clicks  Respiratory: Non-labored, Clear to auscultation, No rales, No rhonchi, No wheezing.   Gastrointestinal:  Soft, Non-tender, + BS  Neurologic: Non-focal  Skin: Warm and dry, No visible rashes or ulcers, No ecchymosis, No cyanosis  Musculoskeletal: No clubbing, No cyanosis, No joint swelling/tenderness  Psychiatry: Mood & affect appropriate  Lymph: No peripheral edema.     LABS: All Labs Reviewed:                        5.1    3.60  )-----------( 94       ( 26 Feb 2022 08:03 )             18.8                         11.6   8.64  )-----------( 229      ( 25 Feb 2022 00:33 )             35.3     26 Feb 2022 08:03    145    |  122    |  18     ----------------------------<  962    3.2     |  18     |  1.20   25 Feb 2022 00:33    135    |  108    |  34     ----------------------------<  83     5.3     |  16     |  1.80     Ca    6.2        26 Feb 2022 08:03  Ca    8.9        25 Feb 2022 00:33  Mg     1.7       25 Feb 2022 00:33    TPro  3.6    /  Alb  1.6    /  TBili  0.2    /  DBili  x      /  AST  12     /  ALT  10     /  AlkPhos  37     26 Feb 2022 08:03  TPro  5.6    /  Alb  2.8    /  TBili  0.3    /  DBili  x      /  AST  25     /  ALT  19     /  AlkPhos  66     25 Feb 2022 00:33  Troponin I, High Sensitivity Result: 48.8 ng/L (02-25-22 @ 00:33)      12 Lead ECG:   Ventricular Rate 82 BPM  Atrial Rate 82 BPM  P-R Interval 138 ms  QRS Duration 80 ms  Q-T Interval 370 ms  QTC Calculation(Bazett) 432 m  P Axis 79 degrees  R Axis 34 degrees  T Axis 34 degrees  Diagnosis Line Normal sinus rhythm  Nonspecific ST abnormality  Abnormal ECG  Confirmed by jarrett Jose ([id number]) on 2/25/2022 1:30:25 PM (02-25-22 @ 00:18)    < from: NM Hepatobiliary Imaging (02.25.22 @ 11:28) >  IMPRESSION: Normal hepatobiliary scan.  No scan evidence of acute cholecystitis.  --- End of Report ---  < end of copied text >   [facility] -- Casper Mcgee Grossman, Wachsman, Eligio Ryan Savella, Goodger: Office # [phone]    Follow Up:  CAD Abdominal pain/pancreatitis    Subjective/Observations:  Patient seen and examined. Patient awake, alert, resting comfortably in bed. No complaints of chest pain, dyspnea, palpitations or dizziness. Tolerating room air. Denie abdominal pain or nausea. IVF @ 75 cc/hr    REVIEW OF SYSTEMS: All review of systems is negative for eye, ENT, GI, , allergic, dermatologic, musculoskeletal and neurologic except as described above    PAST MEDICAL & SURGICAL HISTORY:  H/O vertebral fracture repair    History of vertebral fracture    Dyslipidemia    DM type 2 with diabetic dyslipidemia    H/O gastroesophageal reflux (GERD)    Costochondritis    Coronary arteriosclerosis in native artery  s/p 2 stents. last placed 2 years ago    Gastroparesis    History of laminectomy    S/P hip hemiarthroplasty    S/P appendectomy        MEDICATIONS  (STANDING):  amitriptyline 30 milliGRAM(s) Oral at bedtime  aspirin enteric coated 81 milliGRAM(s) Oral daily  atorvastatin 40 milliGRAM(s) Oral at bedtime  dextrose 40% Gel 15 Gram(s) Oral once  dextrose 5% + sodium chloride 0.9%. 1000 milliLiter(s) (60 mL/Hr) IV Continuous <Continuous>  dextrose 5%. 1000 milliLiter(s) (50 mL/Hr) IV Continuous <Continuous>  dextrose 5%. 1000 milliLiter(s) (100 mL/Hr) IV Continuous <Continuous>  dextrose 50% Injectable 25 Gram(s) IV Push once  dextrose 50% Injectable 12.5 Gram(s) IV Push once  dextrose 50% Injectable 25 Gram(s) IV Push once  glucagon  Injectable 1 milliGRAM(s) IntraMuscular once  heparin   Injectable 5000 Unit(s) SubCutaneous every 12 hours  insulin lispro (ADMELOG) corrective regimen sliding scale   SubCutaneous three times a day before meals  insulin lispro (ADMELOG) corrective regimen sliding scale   SubCutaneous at bedtime  metoprolol succinate ER 25 milliGRAM(s) Oral daily  pantoprazole  Injectable 40 milliGRAM(s) IV Push two times a day  polyethylene glycol 3350 17 Gram(s) Oral two times a day  senna 2 Tablet(s) Oral at bedtime  sertraline 25 milliGRAM(s) Oral every 12 hours  sucralfate suspension 1 Gram(s) Oral four times a day    MEDICATIONS  (PRN):  acetaminophen     Tablet .. 650 milliGRAM(s) Oral every 6 hours PRN Temp greater or equal to 38C (100.4F), Mild Pain (1 - 3)  aluminum hydroxide/magnesium hydroxide/simethicone Suspension 30 milliLiter(s) Oral every 4 hours PRN Dyspepsia  HYDROmorphone   Tablet 4 milliGRAM(s) Oral every 8 hours PRN Severe Pain (7 - 10)  melatonin 3 milliGRAM(s) Oral at bedtime PRN Insomnia  ondansetron Injectable 4 milliGRAM(s) IV Push every 8 hours PRN Nausea and/or Vomiting    Allergies    morphine (Other)    Intolerances      Vital Signs Last 24 Hrs  T(C): 36.9 (26 Feb 2022 04:22), Max: 36.9 (25 Feb 2022 19:13)  T(F): 98.4 (26 Feb 2022 04:22), Max: 98.4 (25 Feb 2022 19:13)  HR: 88 (26 Feb 2022 04:22) (88 - 105)  BP: 94/58 (26 Feb 2022 04:22) (94/58 - 117/51)  BP(mean): --  RR: 16 (26 Feb 2022 04:22) (16 - 18)  SpO2: 98% (26 Feb 2022 04:22) (98% - 98%)  I&O's Summary        TELE: Not on telemetry   PHYSICAL EXAM:  Appearance: NAD, no distress, alert, thin eldrly  HEENT: Moist Mucous Membranes, Anicteric  Cardiovascular: Regular rate and rhythm, Normal S1 S2, No JVD, No murmurs, No rubs, gallops or clicks  Respiratory: Non-labored, Clear to auscultation, No rales, No rhonchi, No wheezing.   Gastrointestinal:  Soft, Non-tender, + BS  Neurologic: Non-focal  Skin: Warm and dry, No visible rashes or ulcers, No ecchymosis, No cyanosis  Musculoskeletal: No clubbing, No cyanosis, No joint swelling/tenderness  Psychiatry: Mood & affect appropriate  Lymph: No peripheral edema.     LABS: All Labs Reviewed:                        5.1    3.60  )-----------( 94       ( 26 Feb 2022 08:03 )             18.8                         11.6   8.64  )-----------( 229      ( 25 Feb 2022 00:33 )             35.3     26 Feb 2022 08:03    145    |  122    |  18     ----------------------------<  962    3.2     |  18     |  1.20   25 Feb 2022 00:33    135    |  108    |  34     ----------------------------<  83     5.3     |  16     |  1.80     Ca    6.2        26 Feb 2022 08:03  Ca    8.9        25 Feb 2022 00:33  Mg     1.7       25 Feb 2022 00:33    TPro  3.6    /  Alb  1.6    /  TBili  0.2    /  DBili  x      /  AST  12     /  ALT  10     /  AlkPhos  37     26 Feb 2022 08:03  TPro  5.6    /  Alb  2.8    /  TBili  0.3    /  DBili  x      /  AST  25     /  ALT  19     /  AlkPhos  66     25 Feb 2022 00:33  Troponin I, High Sensitivity Result: 48.8 ng/L (02-25-22 @ 00:33)      12 Lead ECG:   Ventricular Rate 82 BPM  Atrial Rate 82 BPM  P-R Interval 138 ms  QRS Duration 80 ms  Q-T Interval 370 ms  QTC Calculation(Bazett) 432 m  P Axis 79 degrees  R Axis 34 degrees  T Axis 34 degrees  Diagnosis Line Normal sinus rhythm  Nonspecific ST abnormality  Abnormal ECG  Confirmed by jarrett Jose ([id number]) on 2/25/2022 1:30:25 PM (02-25-22 @ 00:18)    < from: NM Hepatobiliary Imaging (02.25.22 @ 11:28) >  IMPRESSION: Normal hepatobiliary scan.  No scan evidence of acute cholecystitis.  --- End of Report ---  < end of copied text >

## 2022-02-26 NOTE — DISCHARGE NOTE PROVIDER - CARE PROVIDER_API CALL
Gary Alvarado  INTERNAL MEDICINE  [address] [place] [address]  Phone: [phone]  Fax: [phone]  Follow Up Time:    Gary Alvarado  INTERNAL MEDICINE  [address] [place] [address]  Phone: [phone]  Fax: [phone]  Follow Up Time:     Tucker Nicholas)  Hematology; Internal Medicine; Medical Oncology  [address] [facility], [place] [address]  Phone: [phone]  Fax: [phone]  Follow Up Time:

## 2022-02-26 NOTE — PROGRESS NOTE ADULT - PROBLEM SELECTOR PLAN 3
H/o CAD s/p stent placement in 2012  - Pt did present with burning chest pain to the ER, however pain may be more epigastric and related to pancreatitis   - EKG shows NSR, no ST or T wave abnormalities   - Trops negative x 2  - Continue aspirin 81 mg daily   - Continue atorvastatin 40 mg daily  - Continue metoprolol succinate 25 mg daily with hold parameters   - Hold enalapril in setting of KERRY, resume when resolved   - Cardio (Dr Jose) consulted; f/u recs H/o CAD s/p stent placement in 2012  - Pt did present with burning chest pain to the ER, however pain may be more epigastric and related to pancreatitis   - EKG shows NSR, no ST or T wave abnormalities   - Trops negative x 2  - Continue aspirin 81 mg daily   - Continue atorvastatin 40 mg daily  - Continue metoprolol succinate 25 mg daily with hold parameters   - Hold enalapril in setting of KERRY, resume when resolved   - Cardio (Dr Jose),

## 2022-02-26 NOTE — PROGRESS NOTE ADULT - ASSESSMENT
The patient is a 89 year old female with PMH CAD s/p stents in 2012, Type 2 diabetes, HTN, HLD, GERD, mild AS, chronic venous insufficiency, neuropathy, macular degeneration, chronic constipation, depression, OA, lumbar degenerative disc disease, and spinal stenosis who presents to the ER complaining of nausea, abdominal pain, and burning chest pain, admitted for pancreatitis, KERRY.

## 2022-02-27 DIAGNOSIS — D64.9 ANEMIA, UNSPECIFIED: ICD-10-CM

## 2022-02-27 LAB
A1C WITH ESTIMATED AVERAGE GLUCOSE RESULT: 6 % — HIGH (ref 4–5.6)
ALBUMIN SERPL ELPH-MCNC: 2.3 G/DL — LOW (ref 3.3–5)
ALP SERPL-CCNC: 59 U/L — SIGNIFICANT CHANGE UP (ref 40–120)
ALT FLD-CCNC: 18 U/L — SIGNIFICANT CHANGE UP (ref 12–78)
ANION GAP SERPL CALC-SCNC: 7 MMOL/L — SIGNIFICANT CHANGE UP (ref 5–17)
AST SERPL-CCNC: 22 U/L — SIGNIFICANT CHANGE UP (ref 15–37)
BASOPHILS # BLD AUTO: 0.03 K/UL — SIGNIFICANT CHANGE UP (ref 0–0.2)
BASOPHILS NFR BLD AUTO: 0.5 % — SIGNIFICANT CHANGE UP (ref 0–2)
BILIRUB SERPL-MCNC: 0.4 MG/DL — SIGNIFICANT CHANGE UP (ref 0.2–1.2)
BUN SERPL-MCNC: 18 MG/DL — SIGNIFICANT CHANGE UP (ref 7–23)
CALCIUM SERPL-MCNC: 8.7 MG/DL — SIGNIFICANT CHANGE UP (ref 8.5–10.1)
CHLORIDE SERPL-SCNC: 113 MMOL/L — HIGH (ref 96–108)
CO2 SERPL-SCNC: 20 MMOL/L — LOW (ref 22–31)
CREAT SERPL-MCNC: 0.97 MG/DL — SIGNIFICANT CHANGE UP (ref 0.5–1.3)
EOSINOPHIL # BLD AUTO: 0.15 K/UL — SIGNIFICANT CHANGE UP (ref 0–0.5)
EOSINOPHIL NFR BLD AUTO: 2.4 % — SIGNIFICANT CHANGE UP (ref 0–6)
ESTIMATED AVERAGE GLUCOSE: 126 MG/DL — HIGH (ref 68–114)
GLUCOSE SERPL-MCNC: 70 MG/DL — SIGNIFICANT CHANGE UP (ref 70–99)
HCT VFR BLD CALC: 30.3 % — LOW (ref 34.5–45)
HGB BLD-MCNC: 10.3 G/DL — LOW (ref 11.5–15.5)
IMM GRANULOCYTES NFR BLD AUTO: 0.6 % — SIGNIFICANT CHANGE UP (ref 0–1.5)
LIDOCAIN IGE QN: 996 U/L — HIGH (ref 73–393)
LYMPHOCYTES # BLD AUTO: 0.72 K/UL — LOW (ref 1–3.3)
LYMPHOCYTES # BLD AUTO: 11.4 % — LOW (ref 13–44)
MCHC RBC-ENTMCNC: 30.7 PG — SIGNIFICANT CHANGE UP (ref 27–34)
MCHC RBC-ENTMCNC: 34 GM/DL — SIGNIFICANT CHANGE UP (ref 32–36)
MCV RBC AUTO: 90.2 FL — SIGNIFICANT CHANGE UP (ref 80–100)
MONOCYTES # BLD AUTO: 0.62 K/UL — SIGNIFICANT CHANGE UP (ref 0–0.9)
MONOCYTES NFR BLD AUTO: 9.9 % — SIGNIFICANT CHANGE UP (ref 2–14)
NEUTROPHILS # BLD AUTO: 4.73 K/UL — SIGNIFICANT CHANGE UP (ref 1.8–7.4)
NEUTROPHILS NFR BLD AUTO: 75.2 % — SIGNIFICANT CHANGE UP (ref 43–77)
NRBC # BLD: 0 /100 WBCS — SIGNIFICANT CHANGE UP (ref 0–0)
PLATELET # BLD AUTO: 169 K/UL — SIGNIFICANT CHANGE UP (ref 150–400)
POTASSIUM SERPL-MCNC: 4.2 MMOL/L — SIGNIFICANT CHANGE UP (ref 3.5–5.3)
POTASSIUM SERPL-SCNC: 4.2 MMOL/L — SIGNIFICANT CHANGE UP (ref 3.5–5.3)
PROT SERPL-MCNC: 5.1 G/DL — LOW (ref 6–8.3)
RBC # BLD: 3.36 M/UL — LOW (ref 3.8–5.2)
RBC # FLD: 16.7 % — HIGH (ref 10.3–14.5)
SODIUM SERPL-SCNC: 140 MMOL/L — SIGNIFICANT CHANGE UP (ref 135–145)
WBC # BLD: 6.29 K/UL — SIGNIFICANT CHANGE UP (ref 3.8–10.5)
WBC # FLD AUTO: 6.29 K/UL — SIGNIFICANT CHANGE UP (ref 3.8–10.5)

## 2022-02-27 PROCEDURE — 99232 SBSQ HOSP IP/OBS MODERATE 35: CPT

## 2022-02-27 PROCEDURE — 99231 SBSQ HOSP IP/OBS SF/LOW 25: CPT

## 2022-02-27 RX ORDER — SODIUM CHLORIDE 9 MG/ML
1000 INJECTION, SOLUTION INTRAVENOUS
Refills: 0 | Status: DISCONTINUED | OUTPATIENT
Start: 2022-02-27 | End: 2022-02-28

## 2022-02-27 RX ORDER — ALPRAZOLAM 0.25 MG
0.25 TABLET ORAL ONCE
Refills: 0 | Status: DISCONTINUED | OUTPATIENT
Start: 2022-02-27 | End: 2022-02-28

## 2022-02-27 RX ADMIN — HEPARIN SODIUM 5000 UNIT(S): 5000 INJECTION INTRAVENOUS; SUBCUTANEOUS at 17:12

## 2022-02-27 RX ADMIN — Medication 1 GRAM(S): at 17:12

## 2022-02-27 RX ADMIN — SERTRALINE 25 MILLIGRAM(S): 25 TABLET, FILM COATED ORAL at 05:13

## 2022-02-27 RX ADMIN — Medication 1 GRAM(S): at 05:14

## 2022-02-27 RX ADMIN — SODIUM CHLORIDE 30 MILLILITER(S): 9 INJECTION, SOLUTION INTRAVENOUS at 17:09

## 2022-02-27 RX ADMIN — SENNA PLUS 2 TABLET(S): 8.6 TABLET ORAL at 21:34

## 2022-02-27 RX ADMIN — Medication 30 MILLIGRAM(S): at 21:33

## 2022-02-27 RX ADMIN — POLYETHYLENE GLYCOL 3350 17 GRAM(S): 17 POWDER, FOR SOLUTION ORAL at 05:14

## 2022-02-27 RX ADMIN — PANTOPRAZOLE SODIUM 40 MILLIGRAM(S): 20 TABLET, DELAYED RELEASE ORAL at 05:14

## 2022-02-27 RX ADMIN — HEPARIN SODIUM 5000 UNIT(S): 5000 INJECTION INTRAVENOUS; SUBCUTANEOUS at 05:14

## 2022-02-27 RX ADMIN — ATORVASTATIN CALCIUM 40 MILLIGRAM(S): 80 TABLET, FILM COATED ORAL at 21:34

## 2022-02-27 RX ADMIN — Medication 1 DROP(S): at 17:17

## 2022-02-27 RX ADMIN — PANTOPRAZOLE SODIUM 40 MILLIGRAM(S): 20 TABLET, DELAYED RELEASE ORAL at 17:12

## 2022-02-27 RX ADMIN — HYDROMORPHONE HYDROCHLORIDE 4 MILLIGRAM(S): 2 INJECTION INTRAMUSCULAR; INTRAVENOUS; SUBCUTANEOUS at 14:31

## 2022-02-27 RX ADMIN — Medication 81 MILLIGRAM(S): at 11:37

## 2022-02-27 RX ADMIN — Medication 1 DROP(S): at 05:14

## 2022-02-27 RX ADMIN — HYDROMORPHONE HYDROCHLORIDE 4 MILLIGRAM(S): 2 INJECTION INTRAMUSCULAR; INTRAVENOUS; SUBCUTANEOUS at 15:31

## 2022-02-27 RX ADMIN — Medication 1 GRAM(S): at 11:36

## 2022-02-27 RX ADMIN — SERTRALINE 25 MILLIGRAM(S): 25 TABLET, FILM COATED ORAL at 17:20

## 2022-02-27 RX ADMIN — Medication 1 GRAM(S): at 21:33

## 2022-02-27 NOTE — PROGRESS NOTE ADULT - PROBLEM SELECTOR PLAN 10
Patient started on amitriptyline for h/o neuropathy   - Continue amitriptyline 30 mg qhs Chronic, stable   - Continue pantoprazole 40 mg daily (therapeutic interchange for omeprazole 40 mg daily)

## 2022-02-27 NOTE — CONSULT NOTE ADULT - SUBJECTIVE AND OBJECTIVE BOX
[facility]  Shar Hopkins PA-C  [facility] [place] [address]  [phone]      Chief Complaint:  Patient is a 89y old  Female who presents with a chief complaint of gallstone pancreatitis (2022 10:48)      HPI:The patient is a 89 year old female with PMH CAD s/p stents in , Type 2 diabetes, HTN, HLD, GERD, mild AS, chronic venous insufficiency, neuropathy, macular degeneration, chronic constipation, depression, OA, lumbar degenerative disc disease, and spinal stenosis who presents to the ER complaining of nausea, abdominal pain, and burning chest pain. Patient is a poor historian, majority of the history obtained from daughter Mary Guerin at bedside. Per daughter, over the past week patient has been extremely nauseous and has been dry heaving. Patient never vomited. She had been eating less over the past few days; however, yesterday, patient was so nauseous that she was unable to eat or drink. She felt weak and fatigued. She also developed burning pain in the epigastric area that radiated to her chest. Her daughter became concerned and called EMS. Patient denies any other symptoms, including fevers, chills, SOB, diarrhea. Of note, per daughter, patient has lost a significant amount of weight (about 12 lbs) in the past few months as she has had a decreased appetite.    Allergies:  morphine (Other)      Medications:  acetaminophen     Tablet .. 650 milliGRAM(s) Oral every 6 hours PRN  aluminum hydroxide/magnesium hydroxide/simethicone Suspension 30 milliLiter(s) Oral every 4 hours PRN  amitriptyline 30 milliGRAM(s) Oral at bedtime  aspirin enteric coated 81 milliGRAM(s) Oral daily  atorvastatin 40 milliGRAM(s) Oral at bedtime  dextrose 40% Gel 15 Gram(s) Oral once  dextrose 5% + sodium chloride 0.9%. 1000 milliLiter(s) IV Continuous <Continuous>  dextrose 5%. 1000 milliLiter(s) IV Continuous <Continuous>  dextrose 5%. 1000 milliLiter(s) IV Continuous <Continuous>  dextrose 50% Injectable 25 Gram(s) IV Push once  dextrose 50% Injectable 12.5 Gram(s) IV Push once  dextrose 50% Injectable 25 Gram(s) IV Push once  glucagon  Injectable 1 milliGRAM(s) IntraMuscular once  heparin   Injectable 5000 Unit(s) SubCutaneous every 12 hours  HYDROmorphone   Tablet 4 milliGRAM(s) Oral every 8 hours PRN  insulin lispro (ADMELOG) corrective regimen sliding scale   SubCutaneous three times a day before meals  insulin lispro (ADMELOG) corrective regimen sliding scale   SubCutaneous at bedtime  melatonin 3 milliGRAM(s) Oral at bedtime PRN  metoprolol succinate ER 25 milliGRAM(s) Oral daily  ondansetron Injectable 4 milliGRAM(s) IV Push every 8 hours PRN  pantoprazole    Tablet 40 milliGRAM(s) Oral before breakfast  polyethylene glycol 3350 17 Gram(s) Oral two times a day  senna 2 Tablet(s) Oral at bedtime  sertraline 25 milliGRAM(s) Oral every 12 hours      PMHX/PSHX:  H/O vertebral fracture repair    History of vertebral fracture    Dyslipidemia    DM type 2 with diabetic dyslipidemia    H/O gastroesophageal reflux (GERD)    Costochondritis    Coronary arteriosclerosis in native artery    Gastroparesis    History of laminectomy    S/P hip hemiarthroplasty    S/P appendectomy        Family history:  No pertinent family history in first degree relatives    No pertinent family history in first degree relatives    FHx: stroke (Mother)        Social History:     ROS:     General:  No wt loss, fevers, chills, night sweats, fatigue,   Eyes:  Good vision, no reported pain  ENT:  No sore throat, pain, runny nose, dysphagia  CV:  No pain, palpitations, hypo/hypertension  Resp:  No dyspnea, cough, tachypnea, wheezing  GI:  No pain, No nausea, No vomiting, No diarrhea, No constipation, No weight loss, No fever, No pruritis, No rectal bleeding, No tarry stools, No dysphagia,  :  No pain, bleeding, incontinence, nocturia  Muscle:  No pain, weakness  Neuro:  No weakness, tingling, memory problems  Psych:  No fatigue, insomnia, mood problems, depression  Endocrine:  No polyuria, polydipsia, cold/heat intolerance  Heme:  No petechiae, ecchymosis, easy bruisability  Skin:  No rash, tattoos, scars, edema      PHYSICAL EXAM:   Vital Signs:  Vital Signs Last 24 Hrs  T(C): 36.6 (2022 11:57), Max: 36.6 (2022 11:57)  T(F): 97.9 (2022 11:57), Max: 97.9 (2022 11:57)  HR: 101 (2022 11:57) (76 - 101)  BP: 117/51 (2022 11:57) (107/54 - 119/54)  BP(mean): --  RR: 17 (2022 11:57) (14 - 17)  SpO2: 98% (2022 11:57) (97% - 98%)  Daily Height in cm: 157.48 (2022 00:07)    Daily     GENERAL:  Appears stated age,   HEENT:  NC/AT,    CHEST:  Full & symmetric excursion,   HEART:  Regular rhythm  ABDOMEN:  Soft, non-tender, non-distended,   EXTEREMITIES:  no cyanosis,clubbing or edema  SKIN:  No rash  NEURO:  Alert,    LABS:                        11.6   8.64  )-----------( 229      ( 2022 00:33 )             35.3     02-25    135  |  108  |  34<H>  ----------------------------<  83  5.3   |  16<L>  |  1.80<H>    Ca    8.9      2022 00:33  Mg     1.7     02-25    TPro  5.6<L>  /  Alb  2.8<L>  /  TBili  0.3  /  DBili  x   /  AST  25  /  ALT  19  /  AlkPhos  66  02-25    LIVER FUNCTIONS - ( 2022 00:33 )  Alb: 2.8 g/dL / Pro: 5.6 g/dL / ALK PHOS: 66 U/L / ALT: 19 U/L / AST: 25 U/L / GGT: x             Urinalysis Basic - ( 2022 01:50 )    Color: Yellow / Appearance: Slightly Turbid / S.020 / pH: x  Gluc: x / Ketone: Moderate  / Bili: Negative / Urobili: Negative   Blood: x / Protein: 30 mg/dL / Nitrite: Negative   Leuk Esterase: Moderate / RBC: 11-25 /HPF / WBC 26-50   Sq Epi: x / Non Sq Epi: Few / Bacteria: Moderate      Amylase Serum--      Lipase [id number]       Ammonia--      Imaging:          
Patient is a 89y old  Female who presents with a chief complaint of pancreatitis (27 Feb 2022 13:59)      HPI:  The patient is a 89 year old female with PMH CAD s/p stents in 2012, Type 2 diabetes, HTN, HLD, GERD, mild AS, chronic venous insufficiency, neuropathy, macular degeneration, chronic constipation, depression, OA, lumbar degenerative disc disease, and spinal stenosis who presents to the ER complaining of nausea, abdominal pain, and burning chest pain. Patient is a poor historian, majority of the history obtained from daughter Mary Guerin at bedside. Per daughter, over the past week patient has been extremely nauseous and has been dry heaving. Patient never vomited. She had been eating less over the past few days; however, yesterday, patient was so nauseous that she was unable to eat or drink. She felt weak and fatigued. She also developed burning pain in the epigastric area that radiated to her chest. Her daughter became concerned and called EMS. Patient denies any other symptoms, including fevers, chills, SOB, diarrhea. Of note, per daughter, patient has lost a significant amount of weight (about 12 lbs) in the past few months as she has had a decreased appetite.    In the ED:   VS: T:97.6, HR:93, BP: 119/54, RR:16, SpO2: 98% on room air  Labs significant for BUN/Cr: 34/1.8, Trop negative x 2, lipase: 1537, proBNP: 2914  UA: slightly turbid, large blood, moderate ketones, moderate leuk esterase, 26-50 WBCs, moderate bacteria, few yeast cells   EKG: NSR, no ST or T wave abnormalities   US abdomen: Sonographic findings equivocal for acute cholecystitis. If there is clinical suspicion for acute cholecystitis, a hepatobiliary scan may be obtained for further evaluation.Poorly visualized pancreas. Recommend correlation with serum lipase to assess acute pancreatitis.  CT A/P: Marked fatty replacement of the pancreas. Limited evaluation without intravenous contrast. Cholelithiasis without CT evidence for acute cholecystitis.Diverticulosis without diverticulitis.  HIDA scan pending   Patient received 1 L NS bolus 4 mg Zofran (25 Feb 2022 07:48)       ROS:  Negative except for:    PAST MEDICAL & SURGICAL HISTORY:  H/O vertebral fracture repair    History of vertebral fracture    Dyslipidemia    DM type 2 with diabetic dyslipidemia    H/O gastroesophageal reflux (GERD)    Costochondritis    Coronary arteriosclerosis in native artery  s/p 2 stents. last placed 2 years ago    Gastroparesis    History of laminectomy    S/P hip hemiarthroplasty    S/P appendectomy        SOCIAL HISTORY:    FAMILY HISTORY:  FHx: stroke (Mother)        MEDICATIONS  (STANDING):  ALPRAZolam 0.25 milliGRAM(s) Oral once  amitriptyline 30 milliGRAM(s) Oral at bedtime  artificial tears (preservative free) Ophthalmic Solution 1 Drop(s) Both EYES two times a day  aspirin enteric coated 81 milliGRAM(s) Oral daily  atorvastatin 40 milliGRAM(s) Oral at bedtime  dextrose 40% Gel 15 Gram(s) Oral once  dextrose 5% + sodium chloride 0.9%. 1000 milliLiter(s) (30 mL/Hr) IV Continuous <Continuous>  dextrose 5%. 1000 milliLiter(s) (50 mL/Hr) IV Continuous <Continuous>  dextrose 5%. 1000 milliLiter(s) (100 mL/Hr) IV Continuous <Continuous>  dextrose 50% Injectable 25 Gram(s) IV Push once  dextrose 50% Injectable 12.5 Gram(s) IV Push once  dextrose 50% Injectable 25 Gram(s) IV Push once  glucagon  Injectable 1 milliGRAM(s) IntraMuscular once  heparin   Injectable 5000 Unit(s) SubCutaneous every 12 hours  insulin lispro (ADMELOG) corrective regimen sliding scale   SubCutaneous three times a day before meals  insulin lispro (ADMELOG) corrective regimen sliding scale   SubCutaneous at bedtime  metoprolol succinate ER 25 milliGRAM(s) Oral daily  pantoprazole  Injectable 40 milliGRAM(s) IV Push two times a day  polyethylene glycol 3350 17 Gram(s) Oral two times a day  senna 2 Tablet(s) Oral at bedtime  sertraline 25 milliGRAM(s) Oral every 12 hours  sucralfate suspension 1 Gram(s) Oral four times a day    MEDICATIONS  (PRN):  acetaminophen     Tablet .. 650 milliGRAM(s) Oral every 6 hours PRN Temp greater or equal to 38C (100.4F), Mild Pain (1 - 3)  aluminum hydroxide/magnesium hydroxide/simethicone Suspension 30 milliLiter(s) Oral every 4 hours PRN Dyspepsia  HYDROmorphone   Tablet 4 milliGRAM(s) Oral every 8 hours PRN Severe Pain (7 - 10)  melatonin 3 milliGRAM(s) Oral at bedtime PRN Insomnia  ondansetron Injectable 4 milliGRAM(s) IV Push every 8 hours PRN Nausea and/or Vomiting      Allergies    morphine (Other)    Intolerances        Vital Signs Last 24 Hrs  T(C): 36.4 (27 Feb 2022 12:34), Max: 36.8 (27 Feb 2022 05:06)  T(F): 97.6 (27 Feb 2022 12:34), Max: 98.2 (27 Feb 2022 05:06)  HR: 80 (27 Feb 2022 12:34) (80 - 91)  BP: 124/73 (27 Feb 2022 12:34) (103/61 - 124/73)  BP(mean): --  RR: 18 (27 Feb 2022 12:34) (18 - 18)  SpO2: 92% (27 Feb 2022 12:34) (92% - 98%)    PHYSICAL EXAM  General: adult in NAD  HEENT: clear oropharynx, anicteric sclera, pink conjunctivae  Neck: supple  CV: normal S1S2 with no murmur rubs or gallops  Lungs: clear to auscultation, no wheezes, no rhales  Abdomen: soft non-tender non-distended, no hepato/splenomegaly  Ext: no clubbing cyanosis or edema  Skin: no rashes and no petichiae  Neuro: alert and oriented X3 no focal deficits      LABS:    CBC Full  -  ( 27 Feb 2022 07:39 )  WBC Count : 6.29 K/uL  RBC Count : 3.36 M/uL  Hemoglobin : 10.3 g/dL  Hematocrit : 30.3 %  Platelet Count - Automated : 169 K/uL  Mean Cell Volume : 90.2 fl  Mean Cell Hemoglobin : 30.7 pg  Mean Cell Hemoglobin Concentration : 34.0 gm/dL  Auto Neutrophil # : 4.73 K/uL  Auto Lymphocyte # : 0.72 K/uL  Auto Monocyte # : 0.62 K/uL  Auto Eosinophil # : 0.15 K/uL  Auto Basophil # : 0.03 K/uL  Auto Neutrophil % : 75.2 %  Auto Lymphocyte % : 11.4 %  Auto Monocyte % : 9.9 %  Auto Eosinophil % : 2.4 %  Auto Basophil % : 0.5 %    02-27    140  |  113<H>  |  18  ----------------------------<  70  4.2   |  20<L>  |  0.97    Ca    8.7      27 Feb 2022 07:39    TPro  5.1<L>  /  Alb  2.3<L>  /  TBili  0.4  /  DBili  x   /  AST  22  /  ALT  18  /  AlkPhos  59  02-27              BLOOD SMEAR INTERPRETATION:    RADIOLOGY & ADDITIONAL STUDIES:  < from: CT Abdomen and Pelvis No Cont (02.25.22 @ 02:52) >  ACC: [id number] EXAM:  CT ABDOMEN AND PELVIS                          PROCEDURE DATE:  02/25/2022          INTERPRETATION:  CLINICAL INFORMATION: Nausea, chest/epigastric pain,   elevated lipase, question pancreatitis    COMPARISON: None.    CONTRAST/COMPLICATIONS:  IV Contrast: NONE  0 cc administered   0 cc discarded  Oral Contrast: NONE  Complications: None reported at time of study completion    PROCEDURE:  CT of the Abdomen and Pelvis was performed.  Sagittal and coronal reformats were performed.    FINDINGS:  LOWER CHEST: Mitral valve annulus calcifications. Coronary artery and   valvular calcifications.    LIVER: Within normal limits.  BILE DUCTS: Normal caliber.  GALLBLADDER: Cholelithiasis without CT evidence for acute cholecystitis.  SPLEEN: Within normal limits.  PANCREAS: Marked fatty replacement of the pancreas  ADRENALS: Within normal limits.  KIDNEYS/URETERS: Within normal limits.    BLADDER: Within normal limits.  REPRODUCTIVE ORGANS: Atrophic uterus.    BOWEL: No bowel obstruction. Appendix is normal. Diverticulosis without   diverticulitis.  PERITONEUM: No ascites.  VESSELS: Atherosclerotic changes.  RETROPERITONEUM/LYMPH NODES: No lymphadenopathy.  ABDOMINAL WALL: Within normal limits.  BONES: Degenerative changes. Diffuse osteopenia. Vertebral body height   loss and kyphoplasty cement at T11 and T12. Surgical disc spaces at   L2-L4. Disc height loss at L5.    IMPRESSION:    Marked fatty replacement of the pancreas. Limited evaluation without   intravenous contrast.    Cholelithiasis without CT evidence for acute cholecystitis.    Diverticulosis without diverticulitis.      --- End of Report ---            ASTER WARD MD; Attending Radiologist  This document has been electronically signed. Feb 25 2022  3:03AM    < end of copied text >      
[facility]         Casper Mcgee, Hai, Briana, Connor, Eligio, Damon        [phone] (office)    Reason for Consult: pancreatitis, cad hx     Interval HPI: Patient seen and examined at bedside. No acute events overnight. Denies chest pain, palpitations, sob, or any other cardiac symptoms.     HPI:  The patient is a 89 year old female with PMH CAD s/p stents in 2012, Type 2 diabetes, HTN, HLD, GERD, mild AS, chronic venous insufficiency, neuropathy, macular degeneration, chronic constipation, depression, OA, lumbar degenerative disc disease, and spinal stenosis who presents to the ER complaining of nausea, abdominal pain, and burning chest pain. Patient is a poor historian, majority of the history obtained from daughter Mary Guerin at bedside. Per daughter, over the past week patient has been extremely nauseous and has been dry heaving. Patient never vomited. She had been eating less over the past few days; however, yesterday, patient was so nauseous that she was unable to eat or drink. She felt weak and fatigued. She also developed burning pain in the epigastric area that radiated to her chest. Her daughter became concerned and called EMS. Patient denies any other symptoms, including fevers, chills, SOB, diarrhea. Of note, per daughter, patient has lost a significant amount of weight (about 12 lbs) in the past few months as she has had a decreased appetite.    In the ED:   VS: T:97.6, HR:93, BP: 119/54, RR:16, SpO2: 98% on room air   Labs significant for BUN/Cr: 34/1.8, Trop negative x 2, lipase: 1537, proBNP: 2914  UA: slightly turbid, large blood, moderate ketones, moderate leuk esterase, 26-50 WBCs, moderate bacteria, few yeast cells   EKG: NSR, no ST or T wave abnormalities   US abdomen: Sonographic findings equivocal for acute cholecystitis. If there is clinical suspicion for acute cholecystitis, a hepatobiliary scan may be obtained for further evaluation.Poorly visualized pancreas. Recommend correlation with serum lipase to assess acute pancreatitis.  CT A/P: Marked fatty replacement of the pancreas. Limited evaluation without intravenous contrast. Cholelithiasis without CT evidence for acute cholecystitis.Diverticulosis without diverticulitis.  HIDA scan pending   Patient received 1 L NS bolus 4 mg Zofran (25 Feb 2022 07:48)      PAST MEDICAL & SURGICAL HISTORY:  H/O vertebral fracture repair    History of vertebral fracture    Dyslipidemia    DM type 2 with diabetic dyslipidemia    H/O gastroesophageal reflux (GERD)    Costochondritis    Coronary arteriosclerosis in native artery  s/p 2 stents. last placed 2 years ago    Gastroparesis    History of laminectomy    S/P hip hemiarthroplasty    S/P appendectomy        SOCIAL HISTORY: No active tobacco, alcohol or illicit drug use    FAMILY HISTORY:  FHx: stroke (Mother)        Home Medications:  amitriptyline 10 mg oral tablet: 3 tab(s) orally once a day (at bedtime) (25 Feb 2022 09:48)  aspirin 81 mg oral tablet: 1 tab(s) orally once a day (25 Feb 2022 09:48)  atorvastatin 40 mg oral tablet: 1 tab(s) orally once a day (25 Feb 2022 09:48)  Dilaudid 4 mg oral tablet: 1 tab(s) orally 3 times a day, As Needed (25 Feb 2022 09:48)  enalapril 2.5 mg oral tablet: 1 tab(s) orally once a day (25 Feb 2022 09:48)  metFORMIN 500 mg oral tablet: 1 tab(s) orally 2 times a day (25 Feb 2022 09:48)  Metoprolol Succinate ER 25 mg oral tablet, extended release: 1 tab(s) orally once a day (25 Feb 2022 09:48)  omeprazole 40 mg oral delayed release capsule: 1 cap(s) orally once a day (25 Feb 2022 09:48)  Tradjenta 5 mg oral tablet: 1 tab(s) orally once a day (25 Feb 2022 09:48)  Vitamin D2 50,000 intl units (1.25 mg) oral capsule (obsolete): 1 cap(s) orally once a week (25 Feb 2022 09:48)  Zoloft 25 mg oral tablet: 1 tab(s) orally 2 times a day (25 Feb 2022 09:48)      MEDICATIONS  (STANDING):  amitriptyline 30 milliGRAM(s) Oral at bedtime  aspirin enteric coated 81 milliGRAM(s) Oral daily  atorvastatin 40 milliGRAM(s) Oral at bedtime  dextrose 40% Gel 15 Gram(s) Oral once  dextrose 5%. 1000 milliLiter(s) (50 mL/Hr) IV Continuous <Continuous>  dextrose 5%. 1000 milliLiter(s) (100 mL/Hr) IV Continuous <Continuous>  dextrose 50% Injectable 25 Gram(s) IV Push once  dextrose 50% Injectable 12.5 Gram(s) IV Push once  dextrose 50% Injectable 25 Gram(s) IV Push once  glucagon  Injectable 1 milliGRAM(s) IntraMuscular once  heparin   Injectable 5000 Unit(s) SubCutaneous every 12 hours  insulin lispro (ADMELOG) corrective regimen sliding scale   SubCutaneous three times a day before meals  insulin lispro (ADMELOG) corrective regimen sliding scale   SubCutaneous at bedtime  metoprolol succinate ER 25 milliGRAM(s) Oral daily  pantoprazole    Tablet 40 milliGRAM(s) Oral before breakfast  polyethylene glycol 3350 17 Gram(s) Oral two times a day  senna 2 Tablet(s) Oral at bedtime  sertraline 25 milliGRAM(s) Oral every 12 hours    MEDICATIONS  (PRN):  acetaminophen     Tablet .. 650 milliGRAM(s) Oral every 6 hours PRN Temp greater or equal to 38C (100.4F), Mild Pain (1 - 3)  aluminum hydroxide/magnesium hydroxide/simethicone Suspension 30 milliLiter(s) Oral every 4 hours PRN Dyspepsia  melatonin 3 milliGRAM(s) Oral at bedtime PRN Insomnia  ondansetron Injectable 4 milliGRAM(s) IV Push every 8 hours PRN Nausea and/or Vomiting      Allergies    morphine (Other)    Intolerances        REVIEW OF SYSTEMS: Negative except as per HPI.    VITAL SIGNS:   Vital Signs Last 24 Hrs  T(C): 36.4 (25 Feb 2022 07:12), Max: 36.4 (25 Feb 2022 07:12)  T(F): 97.6 (25 Feb 2022 07:12), Max: 97.6 (25 Feb 2022 07:12)  HR: 93 (25 Feb 2022 07:12) (76 - 93)  BP: 119/54 (25 Feb 2022 07:12) (107/54 - 119/54)  BP(mean): --  RR: 16 (25 Feb 2022 07:12) (14 - 16)  SpO2: 98% (25 Feb 2022 07:12) (97% - 98%)    I&O's Summary      PHYSICAL EXAM:  Constitutional: anxious elderly female   HEENT NC/AT, moist mucous membranes  Pulmonary: Non-labored, breath sounds are clear bilaterally, no wheezing, rales or rhonchi  Cardiovascular: +S1, S2, RRR, no murmur  Gastrointestinal: Soft, nontender, nondistended, normoactive bowel sounds  Extremities: b/l LE edema   Neurological: Alert, strength and sensitivity are grossly intact  Skin: No obvious lesions/rashes  Psych: Mood & affect appropriate    LABS: All Labs Reviewed:                        11.6   8.64  )-----------( 229      ( 25 Feb 2022 00:33 )             35.3     25 Feb 2022 00:33    135    |  108    |  34     ----------------------------<  83     5.3     |  16     |  1.80     Ca    8.9        25 Feb 2022 00:33  Mg     1.7       25 Feb 2022 00:33    TPro  5.6    /  Alb  2.8    /  TBili  0.3    /  DBili  x      /  AST  25     /  ALT  19     /  AlkPhos  66     25 Feb 2022 00:33          Blood Culture:   02-25 @ 00:33  Pro Bnp 2914        EKG:    RADIOLOGY:    CXR:  
HPI  89 y/o F with PMHx CAD s/p stents (), DMII, HTN, HLD, GERD, mild AS, chronic venous insufficiency, neuropathy, macular degeneration, chronic constipation, depression, OA, lumbar degenerative disc disease, spinal stenosis who presents to ER with abdominal pain and nausea. Patient poor historian, most history obtained from chart and H&P. As per daughter, patient has decreased PO intake due to nausea and increasing weakness. EMS was called after concern over burning abdominal pain radiating to the chest. Of note, patient has lost approx 12lbs over the past few months and has experienced decreased appetite. Patient seen with Dr. Rodrigues while in HIDA scan. Denies fever, chills, shortness of breath, vomiting, diarrhea.       PAST MEDICAL & SURGICAL HISTORY:  H/O vertebral fracture repair  History of vertebral fracture  Dyslipidemia  DM type 2 with diabetic dyslipidemia  H/O gastroesophageal reflux (GERD)  Costochondritis  Coronary arteriosclerosis in native artery  s/p 2 stents. last placed 2 years ago  Gastroparesis  History of laminectomy  S/P hip hemiarthroplasty  S/P appendectomy    FAMILY HISTORY:  FHx: stroke (Mother)    SOCIAL:  Former cigarette smoker, smoked 1/2 ppd for less than 15 years, quit more than 50 years ago. Denies ETOH use. Denies drug use Ambulates with a walker.    ALLERGIES:  morphine (Other)    Home Medications:  amitriptyline 10 mg oral tablet: 3 tab(s) orally once a day (at bedtime) (:48)  aspirin 81 mg oral tablet: 1 tab(s) orally once a day (:48)  atorvastatin 40 mg oral tablet: 1 tab(s) orally once a day (:48)  Dilaudid 4 mg oral tablet: 1 tab(s) orally 3 times a day, As Needed (:48)  enalapril 2.5 mg oral tablet: 1 tab(s) orally once a day (:48)  metFORMIN 500 mg oral tablet: 1 tab(s) orally 2 times a day (:48)  Metoprolol Succinate ER 25 mg oral tablet, extended release: 1 tab(s) orally once a day (:48)  omeprazole 40 mg oral delayed release capsule: 1 cap(s) orally once a day (2022 09:48)  Tradjenta 5 mg oral tablet: 1 tab(s) orally once a day (2022 09:48)  Vitamin D2 50,000 intl units (1.25 mg) oral capsule (obsolete): 1 cap(s) orally once a week (2022 09:48)  Zoloft 25 mg oral tablet: 1 tab(s) orally 2 times a day (2022 09:48)    MEDICATIONS  (STANDING):  amitriptyline 30 milliGRAM(s) Oral at bedtime  aspirin enteric coated 81 milliGRAM(s) Oral daily  atorvastatin 40 milliGRAM(s) Oral at bedtime  dextrose 40% Gel 15 Gram(s) Oral once  dextrose 5%. 1000 milliLiter(s) (50 mL/Hr) IV Continuous <Continuous>  dextrose 5%. 1000 milliLiter(s) (100 mL/Hr) IV Continuous <Continuous>  dextrose 50% Injectable 25 Gram(s) IV Push once  dextrose 50% Injectable 12.5 Gram(s) IV Push once  dextrose 50% Injectable 25 Gram(s) IV Push once  glucagon  Injectable 1 milliGRAM(s) IntraMuscular once  heparin   Injectable 5000 Unit(s) SubCutaneous every 12 hours  insulin lispro (ADMELOG) corrective regimen sliding scale   SubCutaneous three times a day before meals  insulin lispro (ADMELOG) corrective regimen sliding scale   SubCutaneous at bedtime  metoprolol succinate ER 25 milliGRAM(s) Oral daily  pantoprazole    Tablet 40 milliGRAM(s) Oral before breakfast  polyethylene glycol 3350 17 Gram(s) Oral two times a day  senna 2 Tablet(s) Oral at bedtime  sertraline 25 milliGRAM(s) Oral every 12 hours    MEDICATIONS  (PRN):  acetaminophen     Tablet .. 650 milliGRAM(s) Oral every 6 hours PRN Temp greater or equal to 38C (100.4F), Mild Pain (1 - 3)  aluminum hydroxide/magnesium hydroxide/simethicone Suspension 30 milliLiter(s) Oral every 4 hours PRN Dyspepsia  melatonin 3 milliGRAM(s) Oral at bedtime PRN Insomnia  ondansetron Injectable 4 milliGRAM(s) IV Push every 8 hours PRN Nausea and/or Vomiting    REVIEW OF SYSTEMS:  CONSTITUTIONAL: No weakness, fevers or chills  EYES/ENT: No visual changes;  No vertigo or throat pain   NECK: No pain or stiffness  RESPIRATORY: No cough, wheezing, hemoptysis; No shortness of breath  CARDIOVASCULAR: No chest pain or palpitations  GASTROINTESTINAL: SEE HPI.  GENITOURINARY: No dysuria, frequency or hematuria  NEUROLOGICAL: No numbness or weakness  SKIN: No itching, burning, rashes, or lesions   All other review of systems is negative unless indicated above.    Vital Signs Last 24 Hrs  T(C): 36.4 (2022 07:12), Max: 36.4 (2022 07:12)  T(F): 97.6 (2022 07:12), Max: 97.6 (2022 07:12)  HR: 93 (2022 07:12) (76 - 93)  BP: 119/54 (2022 07:12) (107/54 - 119/54)  BP(mean): --  RR: 16 (2022 07:12) (14 - 16)  SpO2: 98% (2022 07:12) (97% - 98%)      PHYSICAL EXAM:  GENERAL: NAD, appears comfortable  HEAD:  Atraumatic, Normocephalic  NECK: Supple  HEART: RRR. +S1/S2.   RESPIRATORY: CTA B/L, Good inspiratory effort.   ABDOMEN: Soft, Nontender, Nondistended; Bowel sounds present  NEUROLOGY: A&Ox3  EXTREMITIES: No calf tenderness b/l.     LABS:                        11.6   8.64  )-----------( 229      ( 2022 00:33 )             35.3     LIVER FUNCTIONS - ( 2022 00:33 )  Alb: 2.8 g/dL / Pro: 5.6 g/dL / ALK PHOS: 66 U/L / ALT: 19 U/L / AST: 25 U/L / GGT: x           Urinalysis Basic - ( 2022 01:50 )  Color: Yellow / Appearance: Slightly Turbid / S.020 / pH: x  Gluc: x / Ketone: Moderate  / Bili: Negative / Urobili: Negative   Blood: x / Protein: 30 mg/dL / Nitrite: Negative   Leuk Esterase: Moderate / RBC: 11-25 /HPF / WBC 26-50   Sq Epi: x / Non Sq Epi: Few / Bacteria: Moderate      RADIOLOGY/IMAGING:    < from: CT Abdomen and Pelvis No Cont (22 @ 02:52) >  ACC: [id number] EXAM:  CT ABDOMEN AND PELVIS                        PROCEDURE DATE:  2022    INTERPRETATION:  CLINICAL INFORMATION: Nausea, chest/epigastric pain,   elevated lipase, question pancreatitis  COMPARISON: None.  CONTRAST/COMPLICATIONS:  IV Contrast: NONE  0 cc administered   0 cc discarded  Oral Contrast: NONE  Complications: None reported at time of study completion  PROCEDURE:  CT of the Abdomen and Pelvis was performed.  Sagittal and coronal reformats were performed.    FINDINGS:  LOWER CHEST: Mitral valve annulus calcifications. Coronary artery and   valvular calcifications.  LIVER: Within normal limits.  BILE DUCTS: Normal caliber.  GALLBLADDER: Cholelithiasis without CT evidence for acute cholecystitis.  SPLEEN: Within normal limits.  PANCREAS: Marked fatty replacement of the pancreas  ADRENALS: Within normal limits.  KIDNEYS/URETERS: Within normal limits.  BLADDER: Within normal limits.  REPRODUCTIVE ORGANS: Atrophic uterus.  BOWEL: No bowel obstruction. Appendix is normal. Diverticulosis without   diverticulitis.  PERITONEUM: No ascites.  VESSELS: Atherosclerotic changes.  RETROPERITONEUM/LYMPH NODES: No lymphadenopathy.  ABDOMINAL WALL: Within normal limits.  BONES: Degenerative changes. Diffuse osteopenia. Vertebral body height   loss and kyphoplasty cement at T11 and T12. Surgical disc spaces at   L2-L4. Disc height loss at L5.    IMPRESSION:  Marked fatty replacement of the pancreas. Limited evaluation without   intravenous contrast.  Cholelithiasis without CT evidence for acute cholecystitis.  Diverticulosis without diverticulitis.  --- End of Report ---    ASTER WARD MD; Attending Radiologist  This document has been electronically signed. 2022  3:03AM  < end of copied text >    < from: US Abdomen Limited (22 @ 02:22) >  ACC: [id number] EXAM:  US ABDOMEN LIMITED                        PROCEDURE DATE:  2022    INTERPRETATION:  CLINICAL INDICATION: epigastric pain, nausea. ro   gallstone paincreatitis/marilyn  TECHNIQUE: Targeted right upper quadrant ultrasound of the abdomen was   performed.  COMPARISON: Same day CT.    FINDINGS: This study was technically difficult due to bowel gas.  LIVER: 12.3 cm in length. Unremarkable.  BILIARY: No intrahepatic or extrahepatic biliary dilatation. Visualized   common bile duct measuring up to 0.3 cm.  GALLBLADDER: Multiple stones. Wall thickening (0.5 cm). No obvious   pericholecystic fluid. Negative sonographic Scott sign. It is unknown   unclear whether the patient was premedicated.  PANCREAS: Poorly visualized due to bowel gas.  RIGHT KIDNEY: 8.2 cm in length. No hydronephrosis. Echogenic, reflecting   parenchymal disease.  ADDITIONAL: No ascites.    IMPRESSION:  Sonographic findings equivocal for acute cholecystitis. If there is   clinical suspicion for acute cholecystitis, a hepatobiliary scan may be   obtained for further evaluation.  Poorly visualized pancreas. Recommend correlation with serum lipase to   assess acute pancreatitis.  --- End of Report ---    OPAL MEAD MD; Attending Radiologist  This document has been electronically signed. 2022  3:15AM  < end of copied text >    HIDA in progress. Results pending.

## 2022-02-27 NOTE — PROGRESS NOTE ADULT - PROBLEM SELECTOR PLAN 1
Patient presenting with several days of nausea and one day of burning epigastric pain, admitted for possible gallstone pancreatitis vs medication induced (pt takes Tradjenta) vs cholecystitis vs pancreatic malignancy  - Lipase 1537 on presentation   - US abdomen: Sonographic findings equivocal for acute cholecystitis. Poorly visualized pancreas. Recommend correlation with serum lipase to assess acute pancreatitis.  - HIDA: Normal hepatobiliary scan. No scan evidence of acute cholecystitis.  -CT A/P shows Marked fatty replacement of the pancreas. Limited evaluation without intravenous contrast. Cholelithiasis without CT evidence for acute cholecystitis. Diverticulosis without diverticulitis.  - No inflammation surrounding pancreas on CT; however will keep patient NPO and advance diet as tolerated, place on IVF NS @ 60 cc/hr (pt has h/o mild aortic stenosis, would caution with excessive IV fluids)   - f/u MR Abdomen with IV Cont  - No fever or leukocytosis noted, f/u fever and WBC curve  - Lipid panel WNL    - Zofran PRN for nausea   - GI (Dr Montilla) consulted; f/u recs -MRI A/P with IV Cont   - Surgery consulted; -NO Intervention Patient presenting with several days of nausea and one day of burning epigastric pain, admitted for possible gallstone pancreatitis vs medication induced (pt takes Tradjenta) vs cholecystitis vs pancreatic malignancy  - Lipase 1537 on presentation- Downtrending   - US abdomen: Sonographic findings equivocal for acute cholecystitis. Poorly visualized pancreas. Recommend correlation with serum lipase to assess acute pancreatitis.  - HIDA: Normal hepatobiliary scan. No scan evidence of acute cholecystitis.  -CT A/P shows Marked fatty replacement of the pancreas. Limited evaluation without intravenous contrast. Cholelithiasis without CT evidence for acute cholecystitis. Diverticulosis without diverticulitis.  - No inflammation surrounding pancreas on CT; however will keep patient NPO and advance diet as tolerated, place on IVF NS @ 60 cc/hr (pt has h/o mild aortic stenosis, would caution with excessive IV fluids)   - f/u MR Abdomen with IV Cont  - No fever or leukocytosis noted, f/u fever and WBC curve  - Lipid panel WNL    - Zofran PRN for nausea   - GI (Dr Montilla) consulted; f/u recs -MRI A/P with IV Cont   - Surgery consulted; -NO Intervention

## 2022-02-27 NOTE — PROGRESS NOTE ADULT - SUBJECTIVE AND OBJECTIVE BOX
[facility] - Casper Mcgee, Hai, Briana, Connor, Damon Del Valle  Office Number:  [phone]    Patient resting comfortably in bed in NAD.  Laying flat with no respiratory distress.  No complaints of chest pain, dyspnea, palpitations, PND, or orthopnea.  confused, sitting in chair, though says she is ok    ROS: negative unless otherwise mentioned.    Telemetry:  off    MEDICATIONS  (STANDING):  amitriptyline 30 milliGRAM(s) Oral at bedtime  artificial tears (preservative free) Ophthalmic Solution 1 Drop(s) Both EYES two times a day  aspirin enteric coated 81 milliGRAM(s) Oral daily  atorvastatin 40 milliGRAM(s) Oral at bedtime  dextrose 40% Gel 15 Gram(s) Oral once  dextrose 5%. 1000 milliLiter(s) (50 mL/Hr) IV Continuous <Continuous>  dextrose 5%. 1000 milliLiter(s) (100 mL/Hr) IV Continuous <Continuous>  dextrose 50% Injectable 25 Gram(s) IV Push once  dextrose 50% Injectable 12.5 Gram(s) IV Push once  dextrose 50% Injectable 25 Gram(s) IV Push once  glucagon  Injectable 1 milliGRAM(s) IntraMuscular once  heparin   Injectable 5000 Unit(s) SubCutaneous every 12 hours  insulin lispro (ADMELOG) corrective regimen sliding scale   SubCutaneous three times a day before meals  insulin lispro (ADMELOG) corrective regimen sliding scale   SubCutaneous at bedtime  metoprolol succinate ER 25 milliGRAM(s) Oral daily  pantoprazole  Injectable 40 milliGRAM(s) IV Push two times a day  polyethylene glycol 3350 17 Gram(s) Oral two times a day  senna 2 Tablet(s) Oral at bedtime  sertraline 25 milliGRAM(s) Oral every 12 hours  sucralfate suspension 1 Gram(s) Oral four times a day    MEDICATIONS  (PRN):  acetaminophen     Tablet .. 650 milliGRAM(s) Oral every 6 hours PRN Temp greater or equal to 38C (100.4F), Mild Pain (1 - 3)  aluminum hydroxide/magnesium hydroxide/simethicone Suspension 30 milliLiter(s) Oral every 4 hours PRN Dyspepsia  HYDROmorphone   Tablet 4 milliGRAM(s) Oral every 8 hours PRN Severe Pain (7 - 10)  melatonin 3 milliGRAM(s) Oral at bedtime PRN Insomnia  ondansetron Injectable 4 milliGRAM(s) IV Push every 8 hours PRN Nausea and/or Vomiting      Allergies    morphine (Other)    Intolerances        Vital Signs Last 24 Hrs  T(C): 36.4 (27 Feb 2022 12:34), Max: 36.8 (27 Feb 2022 05:06)  T(F): 97.6 (27 Feb 2022 12:34), Max: 98.2 (27 Feb 2022 05:06)  HR: 80 (27 Feb 2022 12:34) (80 - 91)  BP: 124/73 (27 Feb 2022 12:34) (103/61 - 124/73)  BP(mean): --  RR: 18 (27 Feb 2022 12:34) (18 - 18)  SpO2: 92% (27 Feb 2022 12:34) (92% - 98%)    I&O's Summary      ON EXAM:    Appearance: NAD, no distress, alert, thin eldrly  HEENT: Moist Mucous Membranes, Anicteric  Cardiovascular: Regular rate and rhythm, Normal S1 S2, No JVD, No murmurs, No rubs, gallops or clicks  Respiratory: Non-labored, Clear to auscultation, No rales, No rhonchi, No wheezing.   Gastrointestinal:  Soft, Non-tender, + BS  Neurologic: Non-focal  Skin: Warm and dry, No visible rashes or ulcers, No ecchymosis, No cyanosis  Musculoskeletal: No clubbing, No cyanosis, No joint swelling/tenderness  Psychiatry: Mood & affect appropriate  Lymph: No peripheral edema.     LABS: All Labs Reviewed:                        10.3   6.29  )-----------( 169      ( 27 Feb 2022 07:39 )             30.3                         9.7    6.51  )-----------( 178      ( 26 Feb 2022 10:03 )             28.8                         5.3    4.16  )-----------( 98       ( 26 Feb 2022 09:03 )             17.9     27 Feb 2022 07:39    140    |  113    |  18     ----------------------------<  70     4.2     |  20     |  0.97   26 Feb 2022 10:03    140    |  113    |  21     ----------------------------<  132    3.9     |  23     |  1.20   26 Feb 2022 09:03    149    |  128    |  13     ----------------------------<  1796   2.4     |  13     |  1.20     Ca    8.7        27 Feb 2022 07:39  Ca    8.4        26 Feb 2022 10:03  Ca    SEE NOTE      26 Feb 2022 09:03  Mg     1.7       25 Feb 2022 00:33    TPro  5.1    /  Alb  2.3    /  TBili  0.4    /  DBili  x      /  AST  22     /  ALT  18     /  AlkPhos  59     27 Feb 2022 07:39  TPro  4.9    /  Alb  2.3    /  TBili  0.3    /  DBili  x      /  AST  18     /  ALT  17     /  AlkPhos  57     26 Feb 2022 10:03  TPro  3.6    /  Alb  1.6    /  TBili  0.2    /  DBili  x      /  AST  12     /  ALT  10     /  AlkPhos  37     26 Feb 2022 08:03          Blood Culture: Organism --  Gram Stain Blood -- Gram Stain --  Specimen Source Clean Catch Clean Catch (Midstream)  Culture-Blood --      02-25 @ 00:33  Pro Bnp 2003

## 2022-02-27 NOTE — PROGRESS NOTE ADULT - PROBLEM SELECTOR PLAN 4
UA significant for  large blood, moderate ketones, moderate leuk esterase, 26-50 WBCs, moderate bacteria, few yeast cells  - Patient is completely asymptomatic, no leukocytosis or fever   - Will monitor off abx for now   - F/u urine cx H/o CAD s/p stent placement in 2012  - Pt did present with burning chest pain to the ER, however pain may be more epigastric and related to pancreatitis   - EKG shows NSR, no ST or T wave abnormalities   - Trops negative x 2  - Continue aspirin 81 mg daily   - Continue atorvastatin 40 mg daily  - Continue metoprolol succinate 25 mg daily with hold parameters   - Hold enalapril in setting of KERRY, resume when resolved   - Cardio (Dr Jose),

## 2022-02-27 NOTE — PROGRESS NOTE ADULT - PROBLEM SELECTOR PLAN 12
VTE ppx;   Heparin 5000 units q12h Chronic, stable  - Patient takes Dilaudid 4 mg PRN every 8hr PRN for pain, will continue

## 2022-02-27 NOTE — PROGRESS NOTE ADULT - PROBLEM SELECTOR PLAN 6
- holding home medications: Tradjenta and metformin   - low dose insulin coverage scale w/hypoglycemia protocol in place   - Rocky AC&HS.  - Diet: NPO for now as patient is admitted with pancreatitis   - A1c- Chronic, stable   - BP controlled on admission   - Continue metoprolol succinate 25 mg daily with hold parameters   - Hold enalapril in setting of KERRY

## 2022-02-27 NOTE — PROGRESS NOTE ADULT - PROBLEM SELECTOR PLAN 3
H/o CAD s/p stent placement in 2012  - Pt did present with burning chest pain to the ER, however pain may be more epigastric and related to pancreatitis   - EKG shows NSR, no ST or T wave abnormalities   - Trops negative x 2  - Continue aspirin 81 mg daily   - Continue atorvastatin 40 mg daily  - Continue metoprolol succinate 25 mg daily with hold parameters   - Hold enalapril in setting of KERRY, resume when resolved   - Cardio (Dr Jose), Hb 10.4 on presentation- STABLE   - Monitor for signs and symptoms of bleeding  - Transfuse prn (Hgb>8)   - F/u AM CBC; serum iron, tibc, ferritin   - Heme consulted- Dr Nicholas

## 2022-02-27 NOTE — PROGRESS NOTE ADULT - SUBJECTIVE AND OBJECTIVE BOX
Patient is a 89y old  Female who presents with a chief complaint of pancreatitis (26 Feb 2022 22:05)      HPI:   The patient is a 89 year old female with PMH CAD s/p stents in 2012, Type 2 diabetes, HTN, HLD, GERD, mild AS, chronic venous insufficiency, neuropathy, macular degeneration, chronic constipation, depression, OA, lumbar degenerative disc disease, and spinal stenosis who presents to the ER complaining of nausea, abdominal pain, and burning chest pain. Patient is a poor historian, majority of the history obtained from daughter Mary Guerin at bedside. Per daughter, over the past week patient has been extremely nauseous and has been dry heaving. Patient never vomited. She had been eating less over the past few days; however, yesterday, patient was so nauseous that she was unable to eat or drink. She felt weak and fatigued. She also developed burning pain in the epigastric area that radiated to her chest. Her daughter became concerned and called EMS. Patient denies any other symptoms, including fevers, chills, SOB, diarrhea. Of note, per daughter, patient has lost a significant amount of weight (about 12 lbs) in the past few months as she has had a decreased appetite.      In the ED:   VS: T:97.6, HR:93, BP: 119/54, RR:16, SpO2: 98% on room air  Labs significant for BUN/Cr: 34/1.8, Trop negative x 2, lipase: 1537, proBNP: 2914  UA: slightly turbid, large blood, moderate ketones, moderate leuk esterase, 26-50 WBCs, moderate bacteria, few yeast cells   EKG: NSR, no ST or T wave abnormalities   US abdomen: Sonographic findings equivocal for acute cholecystitis. If there is clinical suspicion for acute cholecystitis, a hepatobiliary scan may be obtained for further evaluation.Poorly visualized pancreas. Recommend correlation with serum lipase to assess acute pancreatitis.  CT A/P: Marked fatty replacement of the pancreas. Limited evaluation without intravenous contrast. Cholelithiasis without CT evidence for acute cholecystitis. Diverticulosis without diverticulitis.  HIDA scan pending   Patient received 1 L NS bolus 4 mg Zofran    INTERVAL HPI:   02/26/22: Pt seen and examined at bedside. Pt is poor historian' aox1 to person only; has no complaints this morning. On 60cc/hr DS+NS for possible acute pancreatitis. Awaiting MRI abd to rule out malignancy. Erroneous lab result this morning 2/2 to blood draw from IV site; repeat lab ordered. -Stable labs. Low stable H/H  02/27/22: Pt seen and examined at bedside. Pt AOx1 with mild confusion. No complaint this am.  Awaiting MRI abd to rule out malignancy. H/H       OVERNIGHT EVENTS: None     Home Medications:  amitriptyline 10 mg oral tablet: 3 tab(s) orally once a day (at bedtime) (25 Feb 2022 16:50)  aspirin 81 mg oral tablet: 1 tab(s) orally once a day (25 Feb 2022 16:50)  atorvastatin 40 mg oral tablet: 1 tab(s) orally once a day (25 Feb 2022 16:50)  Dilaudid 4 mg oral tablet: 1 tab(s) orally 3 times a day, As Needed (25 Feb 2022 16:50)  enalapril 2.5 mg oral tablet: 1 tab(s) orally once a day (25 Feb 2022 16:50)  ferrous sulfate 325 mg (65 mg elemental iron) oral tablet: 1 tablet oral daily (25 Feb 2022 16:50)  metFORMIN 500 mg oral tablet: 1 tab(s) orally 2 times a day (25 Feb 2022 16:50)  Metoprolol Succinate ER 25 mg oral tablet, extended release: 1 tab(s) orally once a day (25 Feb 2022 16:50)  omeprazole 20 mg oral delayed release capsule: 1 cap(s) orally once in the morning and 1 in the afternoon (25 Feb 2022 16:50)  PreserVision AREDS oral capsule: 1 tablet oral daily (25 Feb 2022 16:50)  Tradjenta 5 mg oral tablet: 1 tab(s) orally once a day  -per pt daughter, not sure if taking currently (25 Feb 2022 16:50)  Tylenol 500 mg oral tablet: 2 tablets oral twice a day (25 Feb 2022 16:50)  Vitamin D2 50,000 intl units (1.25 mg) oral capsule (obsolete): 1 cap(s) orally once a week (25 Feb 2022 16:50)  Zoloft 25 mg oral tablet: 1 tab(s) orally 2 times a day (25 Feb 2022 16:50)      MEDICATIONS  (STANDING):  amitriptyline 30 milliGRAM(s) Oral at bedtime  artificial tears (preservative free) Ophthalmic Solution 1 Drop(s) Both EYES two times a day  aspirin enteric coated 81 milliGRAM(s) Oral daily  atorvastatin 40 milliGRAM(s) Oral at bedtime  dextrose 40% Gel 15 Gram(s) Oral once  dextrose 5%. 1000 milliLiter(s) (50 mL/Hr) IV Continuous <Continuous>  dextrose 5%. 1000 milliLiter(s) (100 mL/Hr) IV Continuous <Continuous>  dextrose 50% Injectable 25 Gram(s) IV Push once  dextrose 50% Injectable 12.5 Gram(s) IV Push once  dextrose 50% Injectable 25 Gram(s) IV Push once  glucagon  Injectable 1 milliGRAM(s) IntraMuscular once  heparin   Injectable 5000 Unit(s) SubCutaneous every 12 hours  insulin lispro (ADMELOG) corrective regimen sliding scale   SubCutaneous three times a day before meals  insulin lispro (ADMELOG) corrective regimen sliding scale   SubCutaneous at bedtime  metoprolol succinate ER 25 milliGRAM(s) Oral daily  pantoprazole  Injectable 40 milliGRAM(s) IV Push two times a day  polyethylene glycol 3350 17 Gram(s) Oral two times a day  senna 2 Tablet(s) Oral at bedtime  sertraline 25 milliGRAM(s) Oral every 12 hours  sucralfate suspension 1 Gram(s) Oral four times a day    MEDICATIONS  (PRN):  acetaminophen     Tablet .. 650 milliGRAM(s) Oral every 6 hours PRN Temp greater or equal to 38C (100.4F), Mild Pain (1 - 3)  aluminum hydroxide/magnesium hydroxide/simethicone Suspension 30 milliLiter(s) Oral every 4 hours PRN Dyspepsia  HYDROmorphone   Tablet 4 milliGRAM(s) Oral every 8 hours PRN Severe Pain (7 - 10)  melatonin 3 milliGRAM(s) Oral at bedtime PRN Insomnia  ondansetron Injectable 4 milliGRAM(s) IV Push every 8 hours PRN Nausea and/or Vomiting      morphine (Other)      Social History:  Former cigarette smoker, smoked <1/2 ppd for less than 15 years, quit more than 50 years ago   Denies ETOH use   Denies drug use     Ambulates with a walker. Lives at home with aides who come to help her with ADLs (not 24 hour aides) (25 Feb 2022 07:48)      REVIEW OF SYSTEMS:  CONSTITUTIONAL: No fever, No chills, No fatigue, No myalgia, No Body ache, No Weakness  EYES: No eye pain,  No visual disturbances, No discharge, No Redness  ENMT: No ear pain, No nose bleed, No vertigo; No sinus pain, No throat pain, No Congestion  NECK: No pain, No stiffness  RESPIRATORY: No cough, No wheezing, No hemoptysis, No shortness of breath  CARDIOVASCULAR: No chest pain, No palpitations  GASTROINTESTINAL: No abdominal pain, No epigastric pain. No nausea, No vomiting, No diarrhea, No constipation; [  ] BM  GENITOURINARY: No dysuria, No frequency, No urgency, No hematuria, No incontinence  NEUROLOGICAL: No headaches, No dizziness, No numbness, No tingling, No tremors, No weakness  EXTREMITIES: No Swelling, No Pain, No Edema  SKIN: [ x ] No itching, burning, rashes, or lesions   MUSCULOSKELETAL: No joint pain, No joint swelling; No muscle pain, No back pain, No extremity pain  PSYCHIATRIC: No depression, No anxiety, No mood swings, No difficulty sleeping at night  PAIN SCALE: [x  ] None  [  ] Other-  ROS Unable to obtain due to: [  ] Dementia  [  ] Lethargy  [  ] Sedated  [  ] Non verbal  REST OF REVIEW OF SYSTEMS: [ x ] Normal     Vital Signs Last 24 Hrs  T(C): 36.8 (27 Feb 2022 05:06), Max: 36.8 (27 Feb 2022 05:06)  T(F): 98.2 (27 Feb 2022 05:06), Max: 98.2 (27 Feb 2022 05:06)  HR: 83 (27 Feb 2022 05:06) (83 - 102)  BP: 103/61 (27 Feb 2022 05:06) (103/61 - 126/82)  BP(mean): --  RR: 18 (27 Feb 2022 05:06) (18 - 18)  SpO2: 98% (27 Feb 2022 05:06) (94% - 98%)    CAPILLARY BLOOD GLUCOSE      POCT Blood Glucose.: 87 mg/dL (26 Feb 2022 21:31)  POCT Blood Glucose.: 97 mg/dL (26 Feb 2022 16:45)  POCT Blood Glucose.: 94 mg/dL (26 Feb 2022 11:58)  POCT Blood Glucose.: 119 mg/dL (26 Feb 2022 07:57)      I&O's Summary      PHYSICAL EXAM:  GENERAL:  [ x ] NAD, [ x ] Well appearing, [  ] Agitated, [  ] Drowsy, [  ] Lethargy, [  ] Confused   HEAD:  [ x ] Normal, [  ] Other  EYES:  [ x ] EOMI, [ x ] PERRLA, [ x ] Conjunctiva and sclera clear normal, [  ] Other, [  ] Pallor, [  ] Discharge  ENMT:  [ x ] Normal, [ x ] Moist mucous membranes, [  ] Good dentition, [ x ] No thrush  NECK:  [ x ] Supple, [ x ] No JVD, [x  ] Normal thyroid, [  ] Lymphadenopathy, [  ] Other  CHEST/LUNG:  [ x ] Clear to auscultation bilaterally, [ x ] Breath Sounds equal B/L / decreased, [  ] Poor effort, [ x ] No rales, [ x ] No rhonchi, [x  ] No wheezing  HEART:  [ x ] Regular rate and rhythm, [  ] Tachycardia, [  ] Bradycardia, [  ] Irregular, [ x ] No murmurs, No rubs, No gallops, [  ] PPM in place (Mfr:  )  ABDOMEN:  [ x ] Soft, [ x ] Nontender, [ x ] Nondistended, [ x ] No mass, [ x ] Bowel sounds present, [  ] Obese  NERVOUS SYSTEM:  [ x ] Alert & Oriented x3, [ x ] Nonfocal, [  ] Confusion, [  ] Encephalopathic, [  ] Sedated, [  ] Unable to assess, [  ] Dementia, [  ] Other-  EXTREMITIES:  [ x ] 2+ Peripheral Pulses, No clubbing, No cyanosis,  [ x ] Edema B/L lower EXT, [  ] PVD stasis skin changes B/L lower EXT, [  ] Wound  LYMPH:  No lymphadenopathy noted  SKIN:  [ x ] No rashes or lesions, [  ] Pressure ulcers, [  ] Ecchymosis, [  ] Skin tears, [  ] Other      DIET: Diet, Clear Liquid (02-25-22 @ 17:32)      LABS:                        9.7    6.51  )-----------( 178      ( 26 Feb 2022 10:03 )             28.8     26 Feb 2022 10:03    140    |  113    |  21     ----------------------------<  132    3.9     |  23     |  1.20     Ca    8.4        26 Feb 2022 10:03    TPro  4.9    /  Alb  2.3    /  TBili  0.3    /  DBili  x      /  AST  18     /  ALT  17     /  AlkPhos  57     26 Feb 2022 10:03        Culture Results:   >=3 organisms. Probable collection contamination. (02-25 @ 09:18)    culture blood  -- Clean Catch Clean Catch (Midstream) 02-25 @ 09:18    culture urine  --  02-25 @ 09:18          Culture - Urine (collected 25 Feb 2022 09:18)  Source: Clean Catch Clean Catch (Midstream)  Final Report (26 Feb 2022 08:21):    >=3 organisms. Probable collection contamination.       Anemia Panel:      Thyroid Panel:        Lipase, Serum: 1668 U/L (02-26-22 @ 10:03)  Lipase, Serum: 1251 U/L (02-26-22 @ 08:03)  Lipase, Serum: 1537 U/L (02-25-22 @ 00:33)      Serum Pro-Brain Natriuretic Peptide: 2914 pg/mL (02-25-22 @ 00:33)      RADIOLOGY & ADDITIONAL TESTS:      HEALTH ISSUES - PROBLEM Dx:  CAD (coronary artery disease)    Benign essential HTN    Type 2 diabetes mellitus    Hyperlipidemia    Major depression    GERD (gastroesophageal reflux disease)    History of spinal stenosis    Need for prophylactic measure    Pancreatitis    KERRY (acute kidney injury)    H/O peripheral neuropathy    Abnormal urinalysis    Gallstone pancreatitis          Consultant(s) Notes Reviewed:  [ x ] YES     Care Discussed with [ x ] Consultants, [ x ] Patient, [ x ] Family, [  ] HCP, [  ] , [  ] Social Service, [  ] RN, [  ] Physical Therapy, [  ] Palliative Care Team  DVT PPX: [  ] Lovenox, [ x ] SC Heparin, [  ] Coumadin, [  ] Xarelto, [  ] Eliquis, [  ] Pradaxa, [  ] IV Heparin drip, [  ] SCD, [  ] Ambulation, [  ] Contraindicated 2/2 GI Bleed, [  ] Contraindicated 2/2  Bleed, [  ] Contraindicated 2/2 Brain Bleed  Advanced Directive: [ x ] None, [  ] DNR/DNI Patient is a 89y old  Female who presents with a chief complaint of pancreatitis (26 Feb 2022 22:05)      HPI:   The patient is a 89 year old female with PMH CAD s/p stents in 2012, Type 2 diabetes, HTN, HLD, GERD, mild AS, chronic venous insufficiency, neuropathy, macular degeneration, chronic constipation, depression, OA, lumbar degenerative disc disease, and spinal stenosis who presents to the ER complaining of nausea, abdominal pain, and burning chest pain. Patient is a poor historian, majority of the history obtained from daughter Mary Guerin at bedside. Per daughter, over the past week patient has been extremely nauseous and has been dry heaving. Patient never vomited. She had been eating less over the past few days; however, yesterday, patient was so nauseous that she was unable to eat or drink. She felt weak and fatigued. She also developed burning pain in the epigastric area that radiated to her chest. Her daughter became concerned and called EMS. Patient denies any other symptoms, including fevers, chills, SOB, diarrhea. Of note, per daughter, patient has lost a significant amount of weight (about 12 lbs) in the past few months as she has had a decreased appetite.      In the ED:   VS: T:97.6, HR:93, BP: 119/54, RR:16, SpO2: 98% on room air  Labs significant for BUN/Cr: 34/1.8, Trop negative x 2, lipase: 1537, proBNP: 2914  UA: slightly turbid, large blood, moderate ketones, moderate leuk esterase, 26-50 WBCs, moderate bacteria, few yeast cells   EKG: NSR, no ST or T wave abnormalities   US abdomen: Sonographic findings equivocal for acute cholecystitis. If there is clinical suspicion for acute cholecystitis, a hepatobiliary scan may be obtained for further evaluation.Poorly visualized pancreas. Recommend correlation with serum lipase to assess acute pancreatitis.  CT A/P: Marked fatty replacement of the pancreas. Limited evaluation without intravenous contrast. Cholelithiasis without CT evidence for acute cholecystitis. Diverticulosis without diverticulitis.  HIDA scan pending   Patient received 1 L NS bolus 4 mg Zofran    INTERVAL HPI:   02/26/22: Pt seen and examined at bedside. Pt is poor historian' aox1 to person only; has no complaints this morning. On 60cc/hr DS+NS for possible acute pancreatitis. Awaiting MRI abd to rule out malignancy. Erroneous lab result this morning 2/2 to blood draw from IV site; repeat lab ordered. -Stable labs. Low stable H/H  02/27/22: Pt seen and examined at bedside. Pt AOx1 with mild confusion. No complaint this am.  Awaiting MRI abd to rule out malignancy. Low stable H/H; lipase downtrending.      OVERNIGHT EVENTS: None     Home Medications:  amitriptyline 10 mg oral tablet: 3 tab(s) orally once a day (at bedtime) (25 Feb 2022 16:50)  aspirin 81 mg oral tablet: 1 tab(s) orally once a day (25 Feb 2022 16:50)  atorvastatin 40 mg oral tablet: 1 tab(s) orally once a day (25 Feb 2022 16:50)  Dilaudid 4 mg oral tablet: 1 tab(s) orally 3 times a day, As Needed (25 Feb 2022 16:50)  enalapril 2.5 mg oral tablet: 1 tab(s) orally once a day (25 Feb 2022 16:50)  ferrous sulfate 325 mg (65 mg elemental iron) oral tablet: 1 tablet oral daily (25 Feb 2022 16:50)  metFORMIN 500 mg oral tablet: 1 tab(s) orally 2 times a day (25 Feb 2022 16:50)  Metoprolol Succinate ER 25 mg oral tablet, extended release: 1 tab(s) orally once a day (25 Feb 2022 16:50)  omeprazole 20 mg oral delayed release capsule: 1 cap(s) orally once in the morning and 1 in the afternoon (25 Feb 2022 16:50)  PreserVision AREDS oral capsule: 1 tablet oral daily (25 Feb 2022 16:50)  Tradjenta 5 mg oral tablet: 1 tab(s) orally once a day  -per pt daughter, not sure if taking currently (25 Feb 2022 16:50)  Tylenol 500 mg oral tablet: 2 tablets oral twice a day (25 Feb 2022 16:50)  Vitamin D2 50,000 intl units (1.25 mg) oral capsule (obsolete): 1 cap(s) orally once a week (25 Feb 2022 16:50)  Zoloft 25 mg oral tablet: 1 tab(s) orally 2 times a day (25 Feb 2022 16:50)      MEDICATIONS  (STANDING):  amitriptyline 30 milliGRAM(s) Oral at bedtime  artificial tears (preservative free) Ophthalmic Solution 1 Drop(s) Both EYES two times a day  aspirin enteric coated 81 milliGRAM(s) Oral daily  atorvastatin 40 milliGRAM(s) Oral at bedtime  dextrose 40% Gel 15 Gram(s) Oral once  dextrose 5%. 1000 milliLiter(s) (50 mL/Hr) IV Continuous <Continuous>  dextrose 5%. 1000 milliLiter(s) (100 mL/Hr) IV Continuous <Continuous>  dextrose 50% Injectable 25 Gram(s) IV Push once  dextrose 50% Injectable 12.5 Gram(s) IV Push once  dextrose 50% Injectable 25 Gram(s) IV Push once  glucagon  Injectable 1 milliGRAM(s) IntraMuscular once  heparin   Injectable 5000 Unit(s) SubCutaneous every 12 hours  insulin lispro (ADMELOG) corrective regimen sliding scale   SubCutaneous three times a day before meals  insulin lispro (ADMELOG) corrective regimen sliding scale   SubCutaneous at bedtime  metoprolol succinate ER 25 milliGRAM(s) Oral daily  pantoprazole  Injectable 40 milliGRAM(s) IV Push two times a day  polyethylene glycol 3350 17 Gram(s) Oral two times a day  senna 2 Tablet(s) Oral at bedtime  sertraline 25 milliGRAM(s) Oral every 12 hours  sucralfate suspension 1 Gram(s) Oral four times a day    MEDICATIONS  (PRN):  acetaminophen     Tablet .. 650 milliGRAM(s) Oral every 6 hours PRN Temp greater or equal to 38C (100.4F), Mild Pain (1 - 3)  aluminum hydroxide/magnesium hydroxide/simethicone Suspension 30 milliLiter(s) Oral every 4 hours PRN Dyspepsia  HYDROmorphone   Tablet 4 milliGRAM(s) Oral every 8 hours PRN Severe Pain (7 - 10)  melatonin 3 milliGRAM(s) Oral at bedtime PRN Insomnia  ondansetron Injectable 4 milliGRAM(s) IV Push every 8 hours PRN Nausea and/or Vomiting      morphine (Other)      Social History:  Former cigarette smoker, smoked <1/2 ppd for less than 15 years, quit more than 50 years ago   Denies ETOH use   Denies drug use     Ambulates with a walker. Lives at home with aides who come to help her with ADLs (not 24 hour aides) (25 Feb 2022 07:48)      REVIEW OF SYSTEMS:  CONSTITUTIONAL: No fever, No chills, No fatigue, No myalgia, No Body ache, No Weakness  EYES: No eye pain,  No visual disturbances, No discharge, No Redness  ENMT: No ear pain, No nose bleed, No vertigo; No sinus pain, No throat pain, No Congestion  NECK: No pain, No stiffness  RESPIRATORY: No cough, No wheezing, No hemoptysis, No shortness of breath  CARDIOVASCULAR: No chest pain, No palpitations  GASTROINTESTINAL: No abdominal pain, No epigastric pain. No nausea, No vomiting, No diarrhea, No constipation; [  ] BM  GENITOURINARY: No dysuria, No frequency, No urgency, No hematuria, No incontinence  NEUROLOGICAL: No headaches, No dizziness, No numbness, No tingling, No tremors, No weakness  EXTREMITIES: No Swelling, No Pain, No Edema  SKIN: [ x ] No itching, burning, rashes, or lesions   MUSCULOSKELETAL: No joint pain, No joint swelling; No muscle pain, No back pain, No extremity pain  PSYCHIATRIC: No depression, No anxiety, No mood swings, No difficulty sleeping at night  PAIN SCALE: [x  ] None  [  ] Other-  ROS Unable to obtain due to: [  ] Dementia  [  ] Lethargy  [  ] Sedated  [  ] Non verbal  REST OF REVIEW OF SYSTEMS: [ x ] Normal     Vital Signs Last 24 Hrs  T(C): 36.8 (27 Feb 2022 05:06), Max: 36.8 (27 Feb 2022 05:06)  T(F): 98.2 (27 Feb 2022 05:06), Max: 98.2 (27 Feb 2022 05:06)  HR: 83 (27 Feb 2022 05:06) (83 - 102)  BP: 103/61 (27 Feb 2022 05:06) (103/61 - 126/82)  BP(mean): --  RR: 18 (27 Feb 2022 05:06) (18 - 18)  SpO2: 98% (27 Feb 2022 05:06) (94% - 98%)    CAPILLARY BLOOD GLUCOSE      POCT Blood Glucose.: 87 mg/dL (26 Feb 2022 21:31)  POCT Blood Glucose.: 97 mg/dL (26 Feb 2022 16:45)  POCT Blood Glucose.: 94 mg/dL (26 Feb 2022 11:58)  POCT Blood Glucose.: 119 mg/dL (26 Feb 2022 07:57)      I&O's Summary      PHYSICAL EXAM:  GENERAL:  [ x ] NAD, [ x ] Well appearing, [  ] Agitated, [  ] Drowsy, [  ] Lethargy, [  ] Confused   HEAD:  [ x ] Normal, [  ] Other  EYES:  [ x ] EOMI, [ x ] PERRLA, [ x ] Conjunctiva and sclera clear normal, [  ] Other, [  ] Pallor, [  ] Discharge  ENMT:  [ x ] Normal, [ x ] Moist mucous membranes, [  ] Good dentition, [ x ] No thrush  NECK:  [ x ] Supple, [ x ] No JVD, [x  ] Normal thyroid, [  ] Lymphadenopathy, [  ] Other  CHEST/LUNG:  [ x ] Clear to auscultation bilaterally, [ x ] Breath Sounds equal B/L / decreased, [  ] Poor effort, [ x ] No rales, [ x ] No rhonchi, [x  ] No wheezing  HEART:  [ x ] Regular rate and rhythm, [  ] Tachycardia, [  ] Bradycardia, [  ] Irregular, [ x ] No murmurs, No rubs, No gallops, [  ] PPM in place (Mfr:  )  ABDOMEN:  [ x ] Soft, [ x ] Nontender, [ x ] Nondistended, [ x ] No mass, [ x ] Bowel sounds present, [  ] Obese  NERVOUS SYSTEM:  [ x ] Alert & Oriented x3, [ x ] Nonfocal, [  ] Confusion, [  ] Encephalopathic, [  ] Sedated, [  ] Unable to assess, [  ] Dementia, [  ] Other-  EXTREMITIES:  [ x ] 2+ Peripheral Pulses, No clubbing, No cyanosis,  [ x ] Edema B/L lower EXT, [  ] PVD stasis skin changes B/L lower EXT, [  ] Wound  LYMPH:  No lymphadenopathy noted  SKIN:  [ x ] No rashes or lesions, [  ] Pressure ulcers, [  ] Ecchymosis, [  ] Skin tears, [  ] Other      DIET: Diet, Clear Liquid (02-25-22 @ 17:32)      LABS:                        9.7    6.51  )-----------( 178      ( 26 Feb 2022 10:03 )             28.8     26 Feb 2022 10:03    140    |  113    |  21     ----------------------------<  132    3.9     |  23     |  1.20     Ca    8.4        26 Feb 2022 10:03    TPro  4.9    /  Alb  2.3    /  TBili  0.3    /  DBili  x      /  AST  18     /  ALT  17     /  AlkPhos  57     26 Feb 2022 10:03        Culture Results:   >=3 organisms. Probable collection contamination. (02-25 @ 09:18)    culture blood  -- Clean Catch Clean Catch (Midstream) 02-25 @ 09:18    culture urine  --  02-25 @ 09:18          Culture - Urine (collected 25 Feb 2022 09:18)  Source: Clean Catch Clean Catch (Midstream)  Final Report (26 Feb 2022 08:21):    >=3 organisms. Probable collection contamination.       Anemia Panel:      Thyroid Panel:        Lipase, Serum: 1668 U/L (02-26-22 @ 10:03)  Lipase, Serum: 1251 U/L (02-26-22 @ 08:03)  Lipase, Serum: 1537 U/L (02-25-22 @ 00:33)      Serum Pro-Brain Natriuretic Peptide: 2914 pg/mL (02-25-22 @ 00:33)      RADIOLOGY & ADDITIONAL TESTS:      HEALTH ISSUES - PROBLEM Dx:  CAD (coronary artery disease)    Benign essential HTN    Type 2 diabetes mellitus    Hyperlipidemia    Major depression    GERD (gastroesophageal reflux disease)    History of spinal stenosis    Need for prophylactic measure    Pancreatitis    KERRY (acute kidney injury)    H/O peripheral neuropathy    Abnormal urinalysis    Gallstone pancreatitis          Consultant(s) Notes Reviewed:  [ x ] YES     Care Discussed with [ x ] Consultants, [ x ] Patient, [ x ] Family, [  ] HCP, [  ] , [  ] Social Service, [  ] RN, [  ] Physical Therapy, [  ] Palliative Care Team  DVT PPX: [  ] Lovenox, [ x ] SC Heparin, [  ] Coumadin, [  ] Xarelto, [  ] Eliquis, [  ] Pradaxa, [  ] IV Heparin drip, [  ] SCD, [  ] Ambulation, [  ] Contraindicated 2/2 GI Bleed, [  ] Contraindicated 2/2  Bleed, [  ] Contraindicated 2/2 Brain Bleed  Advanced Directive: [ x ] None, [  ] DNR/DNI Patient is a 89y old  Female who presents with a chief complaint of pancreatitis (26 Feb 2022 22:05)      HPI:   The patient is a 89 year old female with PMH CAD s/p stents in 2012, Type 2 diabetes, HTN, HLD, GERD, mild AS, chronic venous insufficiency, neuropathy, macular degeneration, chronic constipation, depression, OA, lumbar degenerative disc disease, and spinal stenosis who presents to the ER complaining of nausea, abdominal pain, and burning chest pain. Patient is a poor historian, majority of the history obtained from daughter Mary Guerin at bedside. Per daughter, over the past week patient has been extremely nauseous and has been dry heaving. Patient never vomited. She had been eating less over the past few days; however, yesterday, patient was so nauseous that she was unable to eat or drink. She felt weak and fatigued. She also developed burning pain in the epigastric area that radiated to her chest. Her daughter became concerned and called EMS. Patient denies any other symptoms, including fevers, chills, SOB, diarrhea. Of note, per daughter, patient has lost a significant amount of weight (about 12 lbs) in the past few months as she has had a decreased appetite.      In the ED:   VS: T:97.6, HR:93, BP: 119/54, RR:16, SpO2: 98% on room air  Labs significant for BUN/Cr: 34/1.8, Trop negative x 2, lipase: 1537, proBNP: 2914  UA: slightly turbid, large blood, moderate ketones, moderate leuk esterase, 26-50 WBCs, moderate bacteria, few yeast cells   EKG: NSR, no ST or T wave abnormalities   US abdomen: Sonographic findings equivocal for acute cholecystitis. If there is clinical suspicion for acute cholecystitis, a hepatobiliary scan may be obtained for further evaluation.Poorly visualized pancreas. Recommend correlation with serum lipase to assess acute pancreatitis.  CT A/P: Marked fatty replacement of the pancreas. Limited evaluation without intravenous contrast. Cholelithiasis without CT evidence for acute cholecystitis. Diverticulosis without diverticulitis.  HIDA scan pending   Patient received 1 L NS bolus 4 mg Zofran    INTERVAL HPI:   02/26/22: Pt seen and examined at bedside. Pt is poor historian' aox1 to person only; has no complaints this morning. On 60cc/hr DS+NS for possible acute pancreatitis. Awaiting MRI abd to rule out malignancy. Erroneous lab result this morning 2/2 to blood draw from IV site; repeat lab ordered. -Stable labs. Low stable H/H  02/27/22: Pt seen and examined at bedside. Pt AOx1 with mild confusion. No complaint this am.  Awaiting MRI A/P  abd to rule out malignancy. Low stable H/H; lipase downtrending.No Complaints      OVERNIGHT EVENTS: None     Home Medications:  amitriptyline 10 mg oral tablet: 3 tab(s) orally once a day (at bedtime) (25 Feb 2022 16:50)  aspirin 81 mg oral tablet: 1 tab(s) orally once a day (25 Feb 2022 16:50)  atorvastatin 40 mg oral tablet: 1 tab(s) orally once a day (25 Feb 2022 16:50)  Dilaudid 4 mg oral tablet: 1 tab(s) orally 3 times a day, As Needed (25 Feb 2022 16:50)  enalapril 2.5 mg oral tablet: 1 tab(s) orally once a day (25 Feb 2022 16:50)  ferrous sulfate 325 mg (65 mg elemental iron) oral tablet: 1 tablet oral daily (25 Feb 2022 16:50)  metFORMIN 500 mg oral tablet: 1 tab(s) orally 2 times a day (25 Feb 2022 16:50)  Metoprolol Succinate ER 25 mg oral tablet, extended release: 1 tab(s) orally once a day (25 Feb 2022 16:50)  omeprazole 20 mg oral delayed release capsule: 1 cap(s) orally once in the morning and 1 in the afternoon (25 Feb 2022 16:50)  PreserVision AREDS oral capsule: 1 tablet oral daily (25 Feb 2022 16:50)  Tradjenta 5 mg oral tablet: 1 tab(s) orally once a day  -per pt daughter, not sure if taking currently (25 Feb 2022 16:50)  Tylenol 500 mg oral tablet: 2 tablets oral twice a day (25 Feb 2022 16:50)  Vitamin D2 50,000 intl units (1.25 mg) oral capsule (obsolete): 1 cap(s) orally once a week (25 Feb 2022 16:50)  Zoloft 25 mg oral tablet: 1 tab(s) orally 2 times a day (25 Feb 2022 16:50)      MEDICATIONS  (STANDING):  amitriptyline 30 milliGRAM(s) Oral at bedtime  artificial tears (preservative free) Ophthalmic Solution 1 Drop(s) Both EYES two times a day  aspirin enteric coated 81 milliGRAM(s) Oral daily  atorvastatin 40 milliGRAM(s) Oral at bedtime  dextrose 40% Gel 15 Gram(s) Oral once  dextrose 5%. 1000 milliLiter(s) (50 mL/Hr) IV Continuous <Continuous>  dextrose 5%. 1000 milliLiter(s) (100 mL/Hr) IV Continuous <Continuous>  dextrose 50% Injectable 25 Gram(s) IV Push once  dextrose 50% Injectable 12.5 Gram(s) IV Push once  dextrose 50% Injectable 25 Gram(s) IV Push once  glucagon  Injectable 1 milliGRAM(s) IntraMuscular once  heparin   Injectable 5000 Unit(s) SubCutaneous every 12 hours  insulin lispro (ADMELOG) corrective regimen sliding scale   SubCutaneous three times a day before meals  insulin lispro (ADMELOG) corrective regimen sliding scale   SubCutaneous at bedtime  metoprolol succinate ER 25 milliGRAM(s) Oral daily  pantoprazole  Injectable 40 milliGRAM(s) IV Push two times a day  polyethylene glycol 3350 17 Gram(s) Oral two times a day  senna 2 Tablet(s) Oral at bedtime  sertraline 25 milliGRAM(s) Oral every 12 hours  sucralfate suspension 1 Gram(s) Oral four times a day    MEDICATIONS  (PRN):  acetaminophen     Tablet .. 650 milliGRAM(s) Oral every 6 hours PRN Temp greater or equal to 38C (100.4F), Mild Pain (1 - 3)  aluminum hydroxide/magnesium hydroxide/simethicone Suspension 30 milliLiter(s) Oral every 4 hours PRN Dyspepsia  HYDROmorphone   Tablet 4 milliGRAM(s) Oral every 8 hours PRN Severe Pain (7 - 10)  melatonin 3 milliGRAM(s) Oral at bedtime PRN Insomnia  ondansetron Injectable 4 milliGRAM(s) IV Push every 8 hours PRN Nausea and/or Vomiting      morphine (Other)      Social History:  Former cigarette smoker, smoked <1/2 ppd for less than 15 years, quit more than 50 years ago   Denies ETOH use   Denies drug use     Ambulates with a walker. Lives at home with aides who come to help her with ADLs (not 24 hour aides) (25 Feb 2022 07:48)      REVIEW OF SYSTEMS:i am OK, Not much appetite  CONSTITUTIONAL: No fever, No chills, No fatigue, No myalgia, No Body ache, No Weakness  EYES: No eye pain,  No visual disturbances, No discharge, No Redness  ENMT: No ear pain, No nose bleed, No vertigo; No sinus pain, No throat pain, No Congestion  NECK: No pain, No stiffness  RESPIRATORY: No cough, No wheezing, No hemoptysis, No shortness of breath  CARDIOVASCULAR: No chest pain, No palpitations  GASTROINTESTINAL: No abdominal pain, No epigastric pain. No nausea, No vomiting, No diarrhea, No constipation; [  ] BM  GENITOURINARY: No dysuria, No frequency, No urgency, No hematuria, No incontinence  NEUROLOGICAL: No headaches, No dizziness, No numbness, No tingling, No tremors, No weakness  EXTREMITIES: No Swelling, No Pain, No Edema  SKIN: [ x ] No itching, burning, rashes, or lesions   MUSCULOSKELETAL: No joint pain, No joint swelling; No muscle pain, No back pain, No extremity pain  PSYCHIATRIC: No depression, No anxiety, No mood swings, No difficulty sleeping at night  PAIN SCALE: [x  ] None  [  ] Other-  ROS Unable to obtain due to: [  ] Dementia  [  ] Lethargy  [  ] Sedated  [  ] Non verbal  REST OF REVIEW OF SYSTEMS: [ x ] Normal     Vital Signs Last 24 Hrs  T(C): 36.8 (27 Feb 2022 05:06), Max: 36.8 (27 Feb 2022 05:06)  T(F): 98.2 (27 Feb 2022 05:06), Max: 98.2 (27 Feb 2022 05:06)  HR: 83 (27 Feb 2022 05:06) (83 - 102)  BP: 103/61 (27 Feb 2022 05:06) (103/61 - 126/82)  BP(mean): --  RR: 18 (27 Feb 2022 05:06) (18 - 18)  SpO2: 98% (27 Feb 2022 05:06) (94% - 98%)    CAPILLARY BLOOD GLUCOSE      POCT Blood Glucose.: 87 mg/dL (26 Feb 2022 21:31)  POCT Blood Glucose.: 97 mg/dL (26 Feb 2022 16:45)  POCT Blood Glucose.: 94 mg/dL (26 Feb 2022 11:58)  POCT Blood Glucose.: 119 mg/dL (26 Feb 2022 07:57)      I&O's Summary      PHYSICAL EXAM:  GENERAL:  [ x ] NAD, [ x ] Well appearing, [  ] Agitated, [  ] Drowsy, [  ] Lethargy, [  ] Confused   HEAD:  [ x ] Normal, [  ] Other  EYES:  [ x ] EOMI, [ x ] PERRLA, [ x ] Conjunctiva and sclera clear normal, [  ] Other, [  ] Pallor, [  ] Discharge  ENMT:  [ x ] Normal, [ x ] Moist mucous membranes, [  ] Good dentition, [ x ] No thrush  NECK:  [ x ] Supple, [ x ] No JVD, [x  ] Normal thyroid, [  ] Lymphadenopathy, [  ] Other  CHEST/LUNG:  [ x ] Clear to auscultation bilaterally, [ x ] Breath Sounds equal B/L / decreased, [  ] Poor effort, [ x ] No rales, [ x ] No rhonchi, [x  ] No wheezing  HEART:  [ x ] Regular rate and rhythm, [  ] Tachycardia, [  ] Bradycardia, [  ] Irregular, [ x ] No murmurs, No rubs, No gallops, [  ] PPM in place (Mfr:  )  ABDOMEN:  [ x ] Soft, [ x ] Nontender, [ x ] Nondistended, [ x ] No mass, [ x ] Bowel sounds present, [  ] Obese  NERVOUS SYSTEM:  [ x ] Alert & Oriented x3, [ x ] Nonfocal, [  ] Confusion, [  ] Encephalopathic, [  ] Sedated, [  ] Unable to assess, [  ] Dementia, [  ] Other-  EXTREMITIES:  [ x ] 2+ Peripheral Pulses, No clubbing, No cyanosis,  [ x ] Edema B/L lower EXT, [  ] PVD stasis skin changes B/L lower EXT, [  ] Wound  LYMPH:  No lymphadenopathy noted  SKIN:  [ x ] No rashes or lesions, [  ] Pressure ulcers, [  ] Ecchymosis, [  ] Skin tears, [  ] Other      DIET: Diet, Clear Liquid (02-25-22 @ 17:32)      LABS:                        9.7    6.51  )-----------( 178      ( 26 Feb 2022 10:03 )             28.8     26 Feb 2022 10:03    140    |  113    |  21     ----------------------------<  132    3.9     |  23     |  1.20     Ca    8.4        26 Feb 2022 10:03    TPro  4.9    /  Alb  2.3    /  TBili  0.3    /  DBili  x      /  AST  18     /  ALT  17     /  AlkPhos  57     26 Feb 2022 10:03        Culture Results:   >=3 organisms. Probable collection contamination. (02-25 @ 09:18)    culture blood  -- Clean Catch Clean Catch (Midstream) 02-25 @ 09:18    culture urine  --  02-25 @ 09:18          Culture - Urine (collected 25 Feb 2022 09:18)  Source: Clean Catch Clean Catch (Midstream)  Final Report (26 Feb 2022 08:21):    >=3 organisms. Probable collection contamination.  Lipase, Serum: 1668 U/L (02-26-22 @ 10:03)  Lipase, Serum: 1251 U/L (02-26-22 @ 08:03)  Lipase, Serum: 1537 U/L (02-25-22 @ 00:33)      Serum Pro-Brain Natriuretic Peptide: 2914 pg/mL (02-25-22 @ 00:33)      RADIOLOGY & ADDITIONAL TESTS: NONE      HEALTH ISSUES - PROBLEM Dx:  CAD (coronary artery disease)    Benign essential HTN    Type 2 diabetes mellitus    Hyperlipidemia    Major depression    GERD (gastroesophageal reflux disease)    History of spinal stenosis    Need for prophylactic measure    Pancreatitis    KERRY (acute kidney injury)    H/O peripheral neuropathy    Abnormal urinalysis    Gallstone pancreatitis          Consultant(s) Notes Reviewed:  [ x ] YES     Care Discussed with [ x ] Consultants, [ x ] Patient, [ x ] Family,- Dtr  [  ] HCP, [  ] , [  ] Social Service, [ x ] RN, [  ] Physical Therapy, [  ] Palliative Care Team  DVT PPX: [  ] Lovenox, [ x ] SC Heparin, [  ] Coumadin, [  ] Xarelto, [  ] Eliquis, [  ] Pradaxa, [  ] IV Heparin drip, [  ] SCD, [  ] Ambulation, [  ] Contraindicated 2/2 GI Bleed, [  ] Contraindicated 2/2  Bleed, [  ] Contraindicated 2/2 Brain Bleed  Advanced Directive: [ x ] None, [  ] DNR/DNI

## 2022-02-27 NOTE — PROGRESS NOTE ADULT - SUBJECTIVE AND OBJECTIVE BOX
pt seen  sleeping comfortably  no complaints  ICU Vital Signs Last 24 Hrs  T(C): 36.8 (27 Feb 2022 05:06), Max: 36.8 (27 Feb 2022 05:06)  T(F): 98.2 (27 Feb 2022 05:06), Max: 98.2 (27 Feb 2022 05:06)  HR: 83 (27 Feb 2022 05:06) (83 - 102)  BP: 103/61 (27 Feb 2022 05:06) (103/61 - 126/82)  BP(mean): --  ABP: --  ABP(mean): --  RR: 18 (27 Feb 2022 05:06) (18 - 18)  SpO2: 98% (27 Feb 2022 05:06) (94% - 98%)  gen-NAD  resp-clear  abd-soft NT/ND      labs pending

## 2022-02-27 NOTE — PROGRESS NOTE ADULT - PROBLEM SELECTOR PLAN 9
Chronic, stable   - Continue pantoprazole 40 mg daily (therapeutic interchange for omeprazole 40 mg daily) Chronic, stable   - Continue Zoloft 25 mg BID

## 2022-02-27 NOTE — PROGRESS NOTE ADULT - PROBLEM SELECTOR PLAN 2
BUN/Cr 34/1.8, baseline kidney function appears to be approximately 1- Improving   - Likely prerenal in setting of decreased PO intake as patient has been nauseous   - S/P IVF NS @ 60 cc/hr -STOP  - Hold enalapril in setting of KERRY   - Monitor BMP daily BUN/Cr 34/1.8, baseline kidney function appears to be approximately 1- Improving , RESOLVED  - Likely prerenal in setting of decreased PO intake as patient has been nauseous   - S/P IVF NS @ 60 cc/hr -STOP  - Hold enalapril in setting of KERRY   - Monitor BMP daily

## 2022-02-27 NOTE — CONSULT NOTE ADULT - ASSESSMENT
Anemia seondary most likely secondary to acute disease with pancreatitis  lab error on first blood draw in ER above IV line  Etiology of pancreatitis ?possible from medication    Recommendations  1.  follow CBC  2.  MRI pancreas to rule underlying mass  3.  check fe studies   4.  further heme onc recommendations pending above

## 2022-02-27 NOTE — PROGRESS NOTE ADULT - PROBLEM SELECTOR PLAN 8
Chronic, stable   - Continue Zoloft 25 mg BID Chronic, stable   - Continue atorvastatin 40 mg daily   - F/u lipid panel

## 2022-02-27 NOTE — PROGRESS NOTE ADULT - SUBJECTIVE AND OBJECTIVE BOX
[facility]  Shar Hopkins PA-C  [facility] [place] [address]  [phone]      Chief Complaint:  Patient is a 89y old  Female who presents with a chief complaint of gallstone pancreatitis (2022 10:48)      HPI:The patient is a 89 year old female with PMH CAD s/p stents in , Type 2 diabetes, HTN, HLD, GERD, mild AS, chronic venous insufficiency, neuropathy, macular degeneration, chronic constipation, depression, OA, lumbar degenerative disc disease, and spinal stenosis who presents to the ER complaining of nausea, abdominal pain, and burning chest pain. Patient is a poor historian, majority of the history obtained from daughter Mary Guerin at bedside. Per daughter, over the past week patient has been extremely nauseous and has been dry heaving. Patient never vomited. She had been eating less over the past few days; however, yesterday, patient was so nauseous that she was unable to eat or drink. She felt weak and fatigued. She also developed burning pain in the epigastric area that radiated to her chest. Her daughter became concerned and called EMS. Patient denies any other symptoms, including fevers, chills, SOB, diarrhea. Of note, per daughter, patient has lost a significant amount of weight (about 12 lbs) in the past few months as she has had a decreased appetite.    Allergies:  morphine (Other)      Medications:  acetaminophen     Tablet .. 650 milliGRAM(s) Oral every 6 hours PRN  aluminum hydroxide/magnesium hydroxide/simethicone Suspension 30 milliLiter(s) Oral every 4 hours PRN  amitriptyline 30 milliGRAM(s) Oral at bedtime  aspirin enteric coated 81 milliGRAM(s) Oral daily  atorvastatin 40 milliGRAM(s) Oral at bedtime  dextrose 40% Gel 15 Gram(s) Oral once  dextrose 5% + sodium chloride 0.9%. 1000 milliLiter(s) IV Continuous <Continuous>  dextrose 5%. 1000 milliLiter(s) IV Continuous <Continuous>  dextrose 5%. 1000 milliLiter(s) IV Continuous <Continuous>  dextrose 50% Injectable 25 Gram(s) IV Push once  dextrose 50% Injectable 12.5 Gram(s) IV Push once  dextrose 50% Injectable 25 Gram(s) IV Push once  glucagon  Injectable 1 milliGRAM(s) IntraMuscular once  heparin   Injectable 5000 Unit(s) SubCutaneous every 12 hours  HYDROmorphone   Tablet 4 milliGRAM(s) Oral every 8 hours PRN  insulin lispro (ADMELOG) corrective regimen sliding scale   SubCutaneous three times a day before meals  insulin lispro (ADMELOG) corrective regimen sliding scale   SubCutaneous at bedtime  melatonin 3 milliGRAM(s) Oral at bedtime PRN  metoprolol succinate ER 25 milliGRAM(s) Oral daily  ondansetron Injectable 4 milliGRAM(s) IV Push every 8 hours PRN  pantoprazole    Tablet 40 milliGRAM(s) Oral before breakfast  polyethylene glycol 3350 17 Gram(s) Oral two times a day  senna 2 Tablet(s) Oral at bedtime  sertraline 25 milliGRAM(s) Oral every 12 hours      PMHX/PSHX:  H/O vertebral fracture repair    History of vertebral fracture    Dyslipidemia    DM type 2 with diabetic dyslipidemia    H/O gastroesophageal reflux (GERD)    Costochondritis    Coronary arteriosclerosis in native artery    Gastroparesis    History of laminectomy    S/P hip hemiarthroplasty    S/P appendectomy        Family history:  No pertinent family history in first degree relatives    No pertinent family history in first degree relatives    FHx: stroke (Mother)        Social History:     ROS:     General:  No wt loss, fevers, chills, night sweats, fatigue,   Eyes:  Good vision, no reported pain  ENT:  No sore throat, pain, runny nose, dysphagia  CV:  No pain, palpitations, hypo/hypertension  Resp:  No dyspnea, cough, tachypnea, wheezing  GI:  No pain, No nausea, No vomiting, No diarrhea, No constipation, No weight loss, No fever, No pruritis, No rectal bleeding, No tarry stools, No dysphagia,  :  No pain, bleeding, incontinence, nocturia  Muscle:  No pain, weakness  Neuro:  No weakness, tingling, memory problems  Psych:  No fatigue, insomnia, mood problems, depression  Endocrine:  No polyuria, polydipsia, cold/heat intolerance  Heme:  No petechiae, ecchymosis, easy bruisability  Skin:  No rash, tattoos, scars, edema      PHYSICAL EXAM:   Vital Signs:  Vital Signs Last 24 Hrs  T(C): 36.6 (2022 11:57), Max: 36.6 (2022 11:57)  T(F): 97.9 (2022 11:57), Max: 97.9 (2022 11:57)  HR: 101 (2022 11:57) (76 - 101)  BP: 117/51 (2022 11:57) (107/54 - 119/54)  BP(mean): --  RR: 17 (2022 11:57) (14 - 17)  SpO2: 98% (2022 11:57) (97% - 98%)  Daily Height in cm: 157.48 (2022 00:07)    Daily     GENERAL:  Appears stated age,   HEENT:  NC/AT,    CHEST:  Full & symmetric excursion,   HEART:  Regular rhythm  ABDOMEN:  Soft, non-tender, non-distended,   EXTEREMITIES:  no cyanosis,clubbing or edema  SKIN:  No rash  NEURO:  Alert,    LABS:                        11.6   8.64  )-----------( 229      ( 2022 00:33 )             35.3     02-25    135  |  108  |  34<H>  ----------------------------<  83  5.3   |  16<L>  |  1.80<H>    Ca    8.9      2022 00:33  Mg     1.7     02-25    TPro  5.6<L>  /  Alb  2.8<L>  /  TBili  0.3  /  DBili  x   /  AST  25  /  ALT  19  /  AlkPhos  66  02-25    LIVER FUNCTIONS - ( 2022 00:33 )  Alb: 2.8 g/dL / Pro: 5.6 g/dL / ALK PHOS: 66 U/L / ALT: 19 U/L / AST: 25 U/L / GGT: x             Urinalysis Basic - ( 2022 01:50 )    Color: Yellow / Appearance: Slightly Turbid / S.020 / pH: x  Gluc: x / Ketone: Moderate  / Bili: Negative / Urobili: Negative   Blood: x / Protein: 30 mg/dL / Nitrite: Negative   Leuk Esterase: Moderate / RBC: 11-25 /HPF / WBC 26-50   Sq Epi: x / Non Sq Epi: Few / Bacteria: Moderate      Amylase Serum--      Lipase [id number]       Ammonia--      Imaging:

## 2022-02-27 NOTE — PROGRESS NOTE ADULT - PROBLEM SELECTOR PLAN 11
Chronic, stable  - Patient takes Dilaudid 4 mg PRN every 8hr PRN for pain, will continue Patient started on amitriptyline for h/o neuropathy   - Continue amitriptyline 30 mg qhs

## 2022-02-27 NOTE — PROGRESS NOTE ADULT - PROBLEM SELECTOR PLAN 7
Chronic, stable   - Continue atorvastatin 40 mg daily   - F/u lipid panel - holding home medications: Tradjenta and metformin   - low dose insulin coverage scale w/hypoglycemia protocol in place   - Rocky AC&HS.  - Diet: NPO for now as patient is admitted with pancreatitis   - A1c-

## 2022-02-27 NOTE — PROGRESS NOTE ADULT - PROBLEM SELECTOR PLAN 5
Chronic, stable   - BP controlled on admission   - Continue metoprolol succinate 25 mg daily with hold parameters   - Hold enalapril in setting of KERRY UA significant for  large blood, moderate ketones, moderate leuk esterase, 26-50 WBCs, moderate bacteria, few yeast cells  - Patient is completely asymptomatic, no leukocytosis or fever   - Will monitor off abx for now   - F/u urine cx

## 2022-02-27 NOTE — PROGRESS NOTE ADULT - ASSESSMENT
89 year old female with PMH CAD s/p stents in 2012, Type 2 diabetes, HTN, HLD, GERD, mild AS, chronic venous insufficiency, neuropathy, macular degeneration, chronic constipation, depression, OA, lumbar degenerative disc disease, and spinal stenosis who presents to the ER complaining of nausea, abdominal pain, and burning chest pain, admitted for pancreatitis, KERRY.     Doubt ACS  - symptoms most likely secondary to pancreatitis  - pt hx of CAD with MI in 2012 and 2 stents; sees Dr. Shirley   - no evidence of ischemia; EKG NSR 82bpm   - Previous TTE in 6/2021 shows normal LV systolic function, ejection fraction of 65%. Moderate LV diastolic dysfunction. Mild MR, mild IL, mild to moderate TR with estimated RV systolic pressure of 44 mmHg. LA markedly dilated.  - Prior stress test in 9/2019 was negative for inducement of cardiac symptoms, ekg evidence of myocardial ischemia, or significant arrhythmias. Normal LV myocardial perfusion and systolic function.  - Continue asa, BB, statin  - Monitor and replete lytes, keep K>4, Mg>2  - GI follow up  - All other workup per primary team

## 2022-02-28 LAB
ALBUMIN SERPL ELPH-MCNC: 2.2 G/DL — LOW (ref 3.3–5)
ALP SERPL-CCNC: 54 U/L — SIGNIFICANT CHANGE UP (ref 40–120)
ALT FLD-CCNC: 17 U/L — SIGNIFICANT CHANGE UP (ref 12–78)
ANION GAP SERPL CALC-SCNC: 7 MMOL/L — SIGNIFICANT CHANGE UP (ref 5–17)
AST SERPL-CCNC: 22 U/L — SIGNIFICANT CHANGE UP (ref 15–37)
BASOPHILS # BLD AUTO: 0.03 K/UL — SIGNIFICANT CHANGE UP (ref 0–0.2)
BASOPHILS NFR BLD AUTO: 0.5 % — SIGNIFICANT CHANGE UP (ref 0–2)
BILIRUB SERPL-MCNC: 0.3 MG/DL — SIGNIFICANT CHANGE UP (ref 0.2–1.2)
BUN SERPL-MCNC: 14 MG/DL — SIGNIFICANT CHANGE UP (ref 7–23)
CALCIUM SERPL-MCNC: 8.6 MG/DL — SIGNIFICANT CHANGE UP (ref 8.5–10.1)
CHLORIDE SERPL-SCNC: 112 MMOL/L — HIGH (ref 96–108)
CO2 SERPL-SCNC: 22 MMOL/L — SIGNIFICANT CHANGE UP (ref 22–31)
CREAT SERPL-MCNC: 1.1 MG/DL — SIGNIFICANT CHANGE UP (ref 0.5–1.3)
EOSINOPHIL # BLD AUTO: 0.1 K/UL — SIGNIFICANT CHANGE UP (ref 0–0.5)
EOSINOPHIL NFR BLD AUTO: 1.8 % — SIGNIFICANT CHANGE UP (ref 0–6)
FERRITIN SERPL-MCNC: 124 NG/ML — SIGNIFICANT CHANGE UP (ref 15–150)
GLUCOSE SERPL-MCNC: 83 MG/DL — SIGNIFICANT CHANGE UP (ref 70–99)
HCT VFR BLD CALC: 25.3 % — LOW (ref 34.5–45)
HGB BLD-MCNC: 8.6 G/DL — LOW (ref 11.5–15.5)
IMM GRANULOCYTES NFR BLD AUTO: 0.4 % — SIGNIFICANT CHANGE UP (ref 0–1.5)
IRON SATN MFR SERPL: 27 % — SIGNIFICANT CHANGE UP (ref 14–50)
IRON SATN MFR SERPL: 33 UG/DL — SIGNIFICANT CHANGE UP (ref 30–160)
LIDOCAIN IGE QN: 752 U/L — HIGH (ref 73–393)
LYMPHOCYTES # BLD AUTO: 0.56 K/UL — LOW (ref 1–3.3)
LYMPHOCYTES # BLD AUTO: 10.2 % — LOW (ref 13–44)
MCHC RBC-ENTMCNC: 30.2 PG — SIGNIFICANT CHANGE UP (ref 27–34)
MCHC RBC-ENTMCNC: 34 GM/DL — SIGNIFICANT CHANGE UP (ref 32–36)
MCV RBC AUTO: 88.8 FL — SIGNIFICANT CHANGE UP (ref 80–100)
MONOCYTES # BLD AUTO: 0.51 K/UL — SIGNIFICANT CHANGE UP (ref 0–0.9)
MONOCYTES NFR BLD AUTO: 9.3 % — SIGNIFICANT CHANGE UP (ref 2–14)
NEUTROPHILS # BLD AUTO: 4.27 K/UL — SIGNIFICANT CHANGE UP (ref 1.8–7.4)
NEUTROPHILS NFR BLD AUTO: 77.8 % — HIGH (ref 43–77)
NRBC # BLD: 0 /100 WBCS — SIGNIFICANT CHANGE UP (ref 0–0)
PLATELET # BLD AUTO: 177 K/UL — SIGNIFICANT CHANGE UP (ref 150–400)
POTASSIUM SERPL-MCNC: 3.7 MMOL/L — SIGNIFICANT CHANGE UP (ref 3.5–5.3)
POTASSIUM SERPL-SCNC: 3.7 MMOL/L — SIGNIFICANT CHANGE UP (ref 3.5–5.3)
PROT SERPL-MCNC: 4.6 G/DL — LOW (ref 6–8.3)
RBC # BLD: 2.85 M/UL — LOW (ref 3.8–5.2)
RBC # FLD: 16.6 % — HIGH (ref 10.3–14.5)
SODIUM SERPL-SCNC: 141 MMOL/L — SIGNIFICANT CHANGE UP (ref 135–145)
TIBC SERPL-MCNC: 120 UG/DL — LOW (ref 220–430)
UIBC SERPL-MCNC: 87 UG/DL — LOW (ref 110–370)
WBC # BLD: 5.49 K/UL — SIGNIFICANT CHANGE UP (ref 3.8–10.5)
WBC # FLD AUTO: 5.49 K/UL — SIGNIFICANT CHANGE UP (ref 3.8–10.5)

## 2022-02-28 PROCEDURE — 74183 MRI ABD W/O CNTR FLWD CNTR: CPT | Mod: 26

## 2022-02-28 PROCEDURE — 99231 SBSQ HOSP IP/OBS SF/LOW 25: CPT

## 2022-02-28 PROCEDURE — 99232 SBSQ HOSP IP/OBS MODERATE 35: CPT

## 2022-02-28 RX ORDER — ACETAMINOPHEN 500 MG
2 TABLET ORAL
Qty: 0 | Refills: 0 | DISCHARGE
Start: 2022-02-28

## 2022-02-28 RX ORDER — ACETAMINOPHEN 500 MG
0 TABLET ORAL
Qty: 0 | Refills: 0 | DISCHARGE

## 2022-02-28 RX ADMIN — SERTRALINE 25 MILLIGRAM(S): 25 TABLET, FILM COATED ORAL at 18:10

## 2022-02-28 RX ADMIN — ATORVASTATIN CALCIUM 40 MILLIGRAM(S): 80 TABLET, FILM COATED ORAL at 20:47

## 2022-02-28 RX ADMIN — Medication 3 MILLIGRAM(S): at 20:47

## 2022-02-28 RX ADMIN — PANTOPRAZOLE SODIUM 40 MILLIGRAM(S): 20 TABLET, DELAYED RELEASE ORAL at 05:31

## 2022-02-28 RX ADMIN — Medication 0.25 MILLIGRAM(S): at 13:01

## 2022-02-28 RX ADMIN — Medication 1 GRAM(S): at 05:31

## 2022-02-28 RX ADMIN — SERTRALINE 25 MILLIGRAM(S): 25 TABLET, FILM COATED ORAL at 05:31

## 2022-02-28 RX ADMIN — Medication 1 GRAM(S): at 23:01

## 2022-02-28 RX ADMIN — POLYETHYLENE GLYCOL 3350 17 GRAM(S): 17 POWDER, FOR SOLUTION ORAL at 05:31

## 2022-02-28 RX ADMIN — Medication 1 GRAM(S): at 18:10

## 2022-02-28 RX ADMIN — HEPARIN SODIUM 5000 UNIT(S): 5000 INJECTION INTRAVENOUS; SUBCUTANEOUS at 18:09

## 2022-02-28 RX ADMIN — HYDROMORPHONE HYDROCHLORIDE 4 MILLIGRAM(S): 2 INJECTION INTRAMUSCULAR; INTRAVENOUS; SUBCUTANEOUS at 19:44

## 2022-02-28 RX ADMIN — SENNA PLUS 2 TABLET(S): 8.6 TABLET ORAL at 20:46

## 2022-02-28 RX ADMIN — Medication 1 DROP(S): at 18:11

## 2022-02-28 RX ADMIN — Medication 81 MILLIGRAM(S): at 13:01

## 2022-02-28 RX ADMIN — HEPARIN SODIUM 5000 UNIT(S): 5000 INJECTION INTRAVENOUS; SUBCUTANEOUS at 05:31

## 2022-02-28 RX ADMIN — Medication 30 MILLIGRAM(S): at 20:47

## 2022-02-28 RX ADMIN — Medication 1 DROP(S): at 05:32

## 2022-02-28 RX ADMIN — PANTOPRAZOLE SODIUM 40 MILLIGRAM(S): 20 TABLET, DELAYED RELEASE ORAL at 18:10

## 2022-02-28 RX ADMIN — POLYETHYLENE GLYCOL 3350 17 GRAM(S): 17 POWDER, FOR SOLUTION ORAL at 18:10

## 2022-02-28 RX ADMIN — Medication 25 MILLIGRAM(S): at 05:31

## 2022-02-28 NOTE — PROGRESS NOTE ADULT - ASSESSMENT
89 year old female with PMH CAD s/p stents in 2012, Type 2 diabetes, HTN, HLD, GERD, mild AS, chronic venous insufficiency, neuropathy, macular degeneration, chronic constipation, depression, OA, lumbar degenerative disc disease, and spinal stenosis who presents to the ER complaining of nausea, abdominal pain, and burning chest pain, admitted for pancreatitis, KERRY.     Chest Pain  - Symptoms most likely secondary to pancreatitis.  Now pain-free  - Hx of CAD with MI in 2012 and 2 stents; sees Dr. Shirley   - No evidence of ischemia; EKG NSR.  Troponins x 2 negative  - Previous TTE in 6/2021 shows normal LV systolic function, ejection fraction of 65%. Moderate LV diastolic dysfunction. Mild MR, mild CA, mild to moderate TR with estimated RV systolic pressure of 44 mmHg. LA markedly dilated.  - Prior stress test in 9/2019 was negative for inducement of cardiac symptoms, ekg evidence of myocardial ischemia, or significant arrhythmias. Normal LV myocardial perfusion and systolic function.  - Continue ASA, BB, and statin  - Monitor and replete lytes, keep K>4, Mg>2  - GI follow up.  Planned for MRI    - All other workup per primary team    Birdie Miranda DNP, NP-C  Cardiology   Spectra #[phone]      89 year old female with PMH CAD s/p stents in 2012, Type 2 diabetes, HTN, HLD, GERD, mild AS, chronic venous insufficiency, neuropathy, macular degeneration, chronic constipation, depression, OA, lumbar degenerative disc disease, and spinal stenosis who presents to the ER complaining of nausea, abdominal pain, and burning chest pain, admitted for pancreatitis, KERRY.     Atypical Chest Pain  - Symptoms most likely secondary to pancreatitis.  Now pain-free  - Hx of CAD with MI in 2012 and 2 stents; sees Dr. Shirley   - No evidence of ischemia; EKG NSR.  Troponins x 2 negative.  No evidence of volume overload  - Previous TTE in 6/2021 shows normal LV systolic function, ejection fraction of 65%. Moderate LV diastolic dysfunction. Mild MR, mild MO, mild to moderate TR with estimated RV systolic pressure of 44 mmHg. LA markedly dilated.  - Prior stress test in 9/2019 was negative for inducement of cardiac symptoms, ekg evidence of myocardial ischemia, or significant arrhythmias. Normal LV myocardial perfusion and systolic function.  - Continue ASA, BB, and statin  - Monitor and replete lytes, keep K>4, Mg>2  - GI follow up.  Planned for MRI    - All other workup per primary team    Birdie Miranda DNP, NP-C  Cardiology   Spectra #[phone]

## 2022-02-28 NOTE — PROGRESS NOTE ADULT - PROBLEM SELECTOR PLAN 7
- holding home medications, tradjenta and metformin   - low dose insulin coverage scale w/hypoglycemia protocol in place   - accuchecks AC&HS  - diet full liquid, advance as tolerated  - A1c 6.0

## 2022-02-28 NOTE — PROGRESS NOTE ADULT - SUBJECTIVE AND OBJECTIVE BOX
Patient is a 89y old  Female who presents with a chief complaint of pancreatitis (27 Feb 2022 15:37)    HPI:  The patient is a 89 year old female with PMH CAD s/p stents in 2012, Type 2 diabetes, HTN, HLD, GERD, mild AS, chronic venous insufficiency, neuropathy, macular degeneration, chronic constipation, depression, OA, lumbar degenerative disc disease, and spinal stenosis who presents to the ER complaining of nausea, abdominal pain, and burning chest pain. Patient is a poor historian, majority of the history obtained from daughter Mary Guerin at bedside. Per daughter, over the past week patient has been extremely nauseous and has been dry heaving. Patient never vomited. She had been eating less over the past few days; however, yesterday, patient was so nauseous that she was unable to eat or drink. She felt weak and fatigued. She also developed burning pain in the epigastric area that radiated to her chest. Her daughter became concerned and called EMS. Patient denies any other symptoms, including fevers, chills, SOB, diarrhea. Of note, per daughter, patient has lost a significant amount of weight (about 12 lbs) in the past few months as she has had a decreased appetite.    In the ED:   VS: T:97.6, HR:93, BP: 119/54, RR:16, SpO2: 98% on room air  Labs significant for BUN/Cr: 34/1.8, Trop negative x 2, lipase: 1537, proBNP: 2914  UA: slightly turbid, large blood, moderate ketones, moderate leuk esterase, 26-50 WBCs, moderate bacteria, few yeast cells   EKG: NSR, no ST or T wave abnormalities   US abdomen: Sonographic findings equivocal for acute cholecystitis. If there is clinical suspicion for acute cholecystitis, a hepatobiliary scan may be obtained for further evaluation.Poorly visualized pancreas. Recommend correlation with serum lipase to assess acute pancreatitis.  CT A/P: Marked fatty replacement of the pancreas. Limited evaluation without intravenous contrast. Cholelithiasis without CT evidence for acute cholecystitis.Diverticulosis without diverticulitis.  HIDA scan pending   Patient received 1 L NS bolus 4 mg Zofran (25 Feb 2022 07:48)    INTERVAL HPI:  02/26/22: Pt seen and examined at bedside. Pt is poor historian' aox1 to person only; has no complaints this morning. On 60cc/hr DS+NS for possible acute pancreatitis. Awaiting MRI abd to rule out malignancy. Erroneous lab result this morning 2/2 to blood draw from IV site; repeat lab ordered. -Stable labs. Low stable H/H  02/27/22: Pt seen and examined at bedside. Pt AOx1 with mild confusion. No complaint this am.  Awaiting MRI A/P  abd to rule out malignancy. Low stable H/H; lipase downtrending.No Complaints  02/28/22: Pt seen and examined at bedside. Pt AOx2 to person, place. Pt appears confused about whereabouts of her . Has no acute concerns this morning. Awaiting MRI abdomen w/ w/o C to r/o malignancy. H/H remains low this AM, though no acute signs bleeding at this time. Pt otherwise stable. Lipase downtrended. No complaints.    OVERNIGHT EVENTS:    Home Medications:  amitriptyline 10 mg oral tablet: 3 tab(s) orally once a day (at bedtime) (25 Feb 2022 16:50)  aspirin 81 mg oral tablet: 1 tab(s) orally once a day (25 Feb 2022 16:50)  atorvastatin 40 mg oral tablet: 1 tab(s) orally once a day (25 Feb 2022 16:50)  Dilaudid 4 mg oral tablet: 1 tab(s) orally 3 times a day, As Needed (25 Feb 2022 16:50)  enalapril 2.5 mg oral tablet: 1 tab(s) orally once a day (25 Feb 2022 16:50)  ferrous sulfate 325 mg (65 mg elemental iron) oral tablet: 1 tablet oral daily (25 Feb 2022 16:50)  metFORMIN 500 mg oral tablet: 1 tab(s) orally 2 times a day (25 Feb 2022 16:50)  Metoprolol Succinate ER 25 mg oral tablet, extended release: 1 tab(s) orally once a day (25 Feb 2022 16:50)  omeprazole 20 mg oral delayed release capsule: 1 cap(s) orally once in the morning and 1 in the afternoon (25 Feb 2022 16:50)  PreserVision AREDS oral capsule: 1 tablet oral daily (25 Feb 2022 16:50)  Tradjenta 5 mg oral tablet: 1 tab(s) orally once a day  -per pt daughter, not sure if taking currently (25 Feb 2022 16:50)  Tylenol 500 mg oral tablet: 2 tablets oral twice a day (25 Feb 2022 16:50)  Vitamin D2 50,000 intl units (1.25 mg) oral capsule (obsolete): 1 cap(s) orally once a week (25 Feb 2022 16:50)  Zoloft 25 mg oral tablet: 1 tab(s) orally 2 times a day (25 Feb 2022 16:50)      MEDICATIONS  (STANDING):  ALPRAZolam 0.25 milliGRAM(s) Oral once  amitriptyline 30 milliGRAM(s) Oral at bedtime  artificial tears (preservative free) Ophthalmic Solution 1 Drop(s) Both EYES two times a day  aspirin enteric coated 81 milliGRAM(s) Oral daily  atorvastatin 40 milliGRAM(s) Oral at bedtime  dextrose 40% Gel 15 Gram(s) Oral once  dextrose 5% + sodium chloride 0.9%. 1000 milliLiter(s) (30 mL/Hr) IV Continuous <Continuous>  dextrose 5%. 1000 milliLiter(s) (50 mL/Hr) IV Continuous <Continuous>  dextrose 5%. 1000 milliLiter(s) (100 mL/Hr) IV Continuous <Continuous>  dextrose 50% Injectable 25 Gram(s) IV Push once  dextrose 50% Injectable 12.5 Gram(s) IV Push once  dextrose 50% Injectable 25 Gram(s) IV Push once  glucagon  Injectable 1 milliGRAM(s) IntraMuscular once  heparin   Injectable 5000 Unit(s) SubCutaneous every 12 hours  insulin lispro (ADMELOG) corrective regimen sliding scale   SubCutaneous three times a day before meals  insulin lispro (ADMELOG) corrective regimen sliding scale   SubCutaneous at bedtime  metoprolol succinate ER 25 milliGRAM(s) Oral daily  pantoprazole  Injectable 40 milliGRAM(s) IV Push two times a day  polyethylene glycol 3350 17 Gram(s) Oral two times a day  senna 2 Tablet(s) Oral at bedtime  sertraline 25 milliGRAM(s) Oral every 12 hours  sucralfate suspension 1 Gram(s) Oral four times a day    MEDICATIONS  (PRN):  acetaminophen     Tablet .. 650 milliGRAM(s) Oral every 6 hours PRN Temp greater or equal to 38C (100.4F), Mild Pain (1 - 3)  aluminum hydroxide/magnesium hydroxide/simethicone Suspension 30 milliLiter(s) Oral every 4 hours PRN Dyspepsia  HYDROmorphone   Tablet 4 milliGRAM(s) Oral every 8 hours PRN Severe Pain (7 - 10)  melatonin 3 milliGRAM(s) Oral at bedtime PRN Insomnia  ondansetron Injectable 4 milliGRAM(s) IV Push every 8 hours PRN Nausea and/or Vomiting      Allergies    morphine (Other)    Intolerances        Social History:  Former cigarette smoker, smoked <1/2 ppd for less than 15 years, quit more than 50 years ago   Denies ETOH use   Denies drug use     Ambulates with a walker. Lives at home with aides who come to help her with ADLs (not 24 hour aides) (25 Feb 2022 07:48)      REVIEW OF SYSTEMS:  CONSTITUTIONAL: No fever, No chills, No fatigue, No myalgia, No Body ache, No Weakness  EYES: No eye pain,  No visual disturbances, No discharge, NO Redness  ENMT:  No ear pain, No nose bleed, No vertigo; No sinus pain, NO throat pain, No Congestion  NECK: No pain, No stiffness  RESPIRATORY: No cough, NO wheezing, No  hemoptysis, NO  shortness of breath  CARDIOVASCULAR: No chest pain, palpitations  GASTROINTESTINAL: No abdominal pain, NO epigastric pain. No nausea, No vomiting; No diarrhea, No constipation. [  ] BM  GENITOURINARY: No dysuria, No frequency, No urgency, No hematuria, NO incontinence  NEUROLOGICAL: No headaches, No dizziness, No numbness, No tingling, No tremors, No weakness  EXT: No Swelling, No Pain, No Edema  SKIN:  [ X ] No itching, burning, rashes, or lesions   MUSCULOSKELETAL: No joint pain ,No Jt swelling; No muscle pain, No back pain, No extremity pain  PSYCHIATRIC: No depression,  No anxiety,  No mood swings ,No difficulty sleeping at night  PAIN SCALE: [ X ] None  [  ] Other-  ROS Unable to obtain due to - [  ] Dementia  [  ] Lethargy [  ] Drowsy [  ] Sedated [  ] non verbal  REST OF REVIEW Of SYSTEM - [ X ] Normal     Vital Signs Last 24 Hrs  T(C): 36.9 (28 Feb 2022 04:41), Max: 36.9 (28 Feb 2022 04:41)  T(F): 98.4 (28 Feb 2022 04:41), Max: 98.4 (28 Feb 2022 04:41)  HR: 92 (28 Feb 2022 04:41) (80 - 92)  BP: 116/68 (28 Feb 2022 04:41) (116/68 - 132/71)  BP(mean): --  RR: 18 (28 Feb 2022 04:41) (18 - 18)  SpO2: 92% (28 Feb 2022 04:41) (92% - 92%)  Finger Stick        02-27 @ 07:01  -  02-28 @ 07:00  --------------------------------------------------------  IN: 480 mL / OUT: 0 mL / NET: 480 mL        PHYSICAL EXAM:  GENERAL:  [ x ] NAD , [ x ] well appearing, [  ] Agitated, [  ] Drowsy,  [  ] Lethargy, [  ] confused   HEAD:  [ x ] Normal, [  ] Other  EYES:  [ x ] EOMI, [ x ] PERRLA, [ x ] conjunctiva and sclera clear normal, [  ] Other,  [  ] Pallor,[  ] Discharge  ENMT:  [ x ] Normal, [ x ] Moist mucous membranes, [  ] Good dentition, [ x ] No Thrush  NECK:  [ x ] Supple, [ x ] No JVD, [ x ] Normal thyroid, [  ] Lymphadenopathy [  ] Other  CHEST/LUNG:  [ x ] Clear to auscultation bilaterally, [ x ] Breath Sounds equal B/L / Decrease, [  ] poor effort  [ x ] No rales, [ x ] No rhonchi  [ x ]  No wheezing,   HEART:  [ x ] Regular rate and rhythm, [  ] tachycardia, [  ] Bradycardia,  [  ] irregular  [ x ] No murmurs, No rubs, No gallops, [  ] PPM in place (Mfr:  )  ABDOMEN:  [ x ] Soft, [ x ] Nontender, [ x ] Nondistended, [x  ] No mass, [  ] Bowel sounds present, [  ] obese  NERVOUS SYSTEM:  [ x ] Alert & Oriented X2 to person, place, [  ] Nonfocal  [  ] Confusion  [  ] Encephalopathic [  ] Sedated [  ] Unable to assess, [  ] Dementia [  ] Other-  EXTREMITIES: [ x ] 2+ Peripheral Pulses, No clubbing, No cyanosis,  [ x ] edema B/L lower EXT. [  ] PVD stasis skin changes B/L Lower EXT, [  ] wound  LYMPH: No lymphadenopathy noted  SKIN:  [  ] No rashes or lesions, [  ] Pressure Ulcers, [  ] ecchymosis, [  ] Skin Tears, [ x ] Other- scattered age related ecchymosis throughout b/l UE and LE    DIET: Diet, Full Liquid:   Consistent Carbohydrate Evening Snack  DASH/TLC Sodium & Cholesterol Restricted (02-27-22 @ 12:07)  Diet, NPO after Midnight:      NPO Start Date: 27-Feb-2022,   NPO Start Time: 23:59 (02-27-22 @ 12:07)      LABS:                        8.6    5.49  )-----------( 177      ( 28 Feb 2022 08:09 )             25.3       Ca    8.7        27 Feb 2022 07:39            Culture Results:   >=3 organisms. Probable collection contamination. (02-25 @ 09:18)                  Culture - Urine (collected 25 Feb 2022 09:18)  Source: Clean Catch Clean Catch (Midstream)  Final Report (26 Feb 2022 08:21):    >=3 organisms. Probable collection contamination.         Anemia Panel:      Thyroid Panel:        Lipase, Serum: 996 U/L (02-27-22 @ 07:39)  Lipase, Serum: 1668 U/L (02-26-22 @ 10:03)  Lipase, Serum: 1251 U/L (02-26-22 @ 08:03)  Lipase, Serum: 1537 U/L (02-25-22 @ 00:33)      Serum Pro-Brain Natriuretic Peptide: 2914 pg/mL (02-25-22 @ 00:33)      RADIOLOGY & ADDITIONAL TESTS:      HEALTH ISSUES - PROBLEM Dx:  CAD (coronary artery disease)    Benign essential HTN    Type 2 diabetes mellitus    Hyperlipidemia    Major depression    GERD (gastroesophageal reflux disease)    History of spinal stenosis    Need for prophylactic measure    Pancreatitis    KERRY (acute kidney injury)    H/O peripheral neuropathy    Abnormal urinalysis    Gallstone pancreatitis    Anemia            Consultant(s) Notes Reviewed:  [  ] YES     Care Discussed with [X] Consultants  [  ] Patient  [  ] Family [  ] HCP [  ]   [  ] Social Service  [  ] RN, [  ] Physical Therapy,[  ] Palliative care team  DVT PPX: [  ] Lovenox, [  ] S C Heparin, [  ] Coumadin, [  ] Xarelto, [  ] Eliquis, [  ] Pradaxa, [  ] IV Heparin drip, [  ] SCD [  ] Contraindication 2 to GI Bleed,[  ] Ambulation [  ] Contraindicated 2 to  bleed [  ] Contraindicated 2 to Brain Bleed  Advanced directive: [  ] None, [  ] DNR/DNI Patient is a 89y old  Female who presents with a chief complaint of pancreatitis (27 Feb 2022 15:37)    HPI:  The patient is a 89 year old female with PMH CAD s/p stents in 2012, Type 2 diabetes, HTN, HLD, GERD, mild AS, chronic venous insufficiency, neuropathy, macular degeneration, chronic constipation, depression, OA, lumbar degenerative disc disease, and spinal stenosis who presents to the ER complaining of nausea, abdominal pain, and burning chest pain. Patient is a poor historian, majority of the history obtained from daughter Mary Guerin at bedside. Per daughter, over the past week patient has been extremely nauseous and has been dry heaving. Patient never vomited. She had been eating less over the past few days; however, yesterday, patient was so nauseous that she was unable to eat or drink. She felt weak and fatigued. She also developed burning pain in the epigastric area that radiated to her chest. Her daughter became concerned and called EMS. Patient denies any other symptoms, including fevers, chills, SOB, diarrhea. Of note, per daughter, patient has lost a significant amount of weight (about 12 lbs) in the past few months as she has had a decreased appetite.    In the ED:   VS: T:97.6, HR:93, BP: 119/54, RR:16, SpO2: 98% on room air  Labs significant for BUN/Cr: 34/1.8, Trop negative x 2, lipase: 1537, proBNP: 2914  UA: slightly turbid, large blood, moderate ketones, moderate leuk esterase, 26-50 WBCs, moderate bacteria, few yeast cells   EKG: NSR, no ST or T wave abnormalities   US abdomen: Sonographic findings equivocal for acute cholecystitis. If there is clinical suspicion for acute cholecystitis, a hepatobiliary scan may be obtained for further evaluation.Poorly visualized pancreas. Recommend correlation with serum lipase to assess acute pancreatitis.  CT A/P: Marked fatty replacement of the pancreas. Limited evaluation without intravenous contrast. Cholelithiasis without CT evidence for acute cholecystitis.Diverticulosis without diverticulitis.  HIDA scan pending   Patient received 1 L NS bolus 4 mg Zofran (25 Feb 2022 07:48)    INTERVAL HPI:  02/26/22: Pt seen and examined at bedside. Pt is poor historian' aox1 to person only; has no complaints this morning. On 60cc/hr DS+NS for possible acute pancreatitis. Awaiting MRI abd to rule out malignancy. Erroneous lab result this morning 2/2 to blood draw from IV site; repeat lab ordered. -Stable labs. Low stable H/H  02/27/22: Pt seen and examined at bedside. Pt AOx1 with mild confusion. No complaint this am.  Awaiting MRI A/P  abd to rule out malignancy. Low stable H/H; lipase downtrending.No Complaints  02/28/22: Pt seen and examined at bedside. Pt AOx2 to person, place. Pt appears confused about whereabouts of her . Has no acute concerns this morning. Awaiting MRI abdomen w/ w/o C to r/o malignancy. H/H remains low this AM, though no acute signs bleeding at this time. Pt otherwise stable. Lipase downtrended. No complaints.    OVERNIGHT EVENTS:    Home Medications:  amitriptyline 10 mg oral tablet: 3 tab(s) orally once a day (at bedtime) (25 Feb 2022 16:50)  aspirin 81 mg oral tablet: 1 tab(s) orally once a day (25 Feb 2022 16:50)  atorvastatin 40 mg oral tablet: 1 tab(s) orally once a day (25 Feb 2022 16:50)  Dilaudid 4 mg oral tablet: 1 tab(s) orally 3 times a day, As Needed (25 Feb 2022 16:50)  enalapril 2.5 mg oral tablet: 1 tab(s) orally once a day (25 Feb 2022 16:50)  ferrous sulfate 325 mg (65 mg elemental iron) oral tablet: 1 tablet oral daily (25 Feb 2022 16:50)  metFORMIN 500 mg oral tablet: 1 tab(s) orally 2 times a day (25 Feb 2022 16:50)  Metoprolol Succinate ER 25 mg oral tablet, extended release: 1 tab(s) orally once a day (25 Feb 2022 16:50)  omeprazole 20 mg oral delayed release capsule: 1 cap(s) orally once in the morning and 1 in the afternoon (25 Feb 2022 16:50)  PreserVision AREDS oral capsule: 1 tablet oral daily (25 Feb 2022 16:50)  Tradjenta 5 mg oral tablet: 1 tab(s) orally once a day  -per pt daughter, not sure if taking currently (25 Feb 2022 16:50)  Tylenol 500 mg oral tablet: 2 tablets oral twice a day (25 Feb 2022 16:50)  Vitamin D2 50,000 intl units (1.25 mg) oral capsule (obsolete): 1 cap(s) orally once a week (25 Feb 2022 16:50)  Zoloft 25 mg oral tablet: 1 tab(s) orally 2 times a day (25 Feb 2022 16:50)      MEDICATIONS  (STANDING):  ALPRAZolam 0.25 milliGRAM(s) Oral once  amitriptyline 30 milliGRAM(s) Oral at bedtime  artificial tears (preservative free) Ophthalmic Solution 1 Drop(s) Both EYES two times a day  aspirin enteric coated 81 milliGRAM(s) Oral daily  atorvastatin 40 milliGRAM(s) Oral at bedtime  dextrose 40% Gel 15 Gram(s) Oral once  dextrose 5% + sodium chloride 0.9%. 1000 milliLiter(s) (30 mL/Hr) IV Continuous <Continuous>  dextrose 5%. 1000 milliLiter(s) (50 mL/Hr) IV Continuous <Continuous>  dextrose 5%. 1000 milliLiter(s) (100 mL/Hr) IV Continuous <Continuous>  dextrose 50% Injectable 25 Gram(s) IV Push once  dextrose 50% Injectable 12.5 Gram(s) IV Push once  dextrose 50% Injectable 25 Gram(s) IV Push once  glucagon  Injectable 1 milliGRAM(s) IntraMuscular once  heparin   Injectable 5000 Unit(s) SubCutaneous every 12 hours  insulin lispro (ADMELOG) corrective regimen sliding scale   SubCutaneous three times a day before meals  insulin lispro (ADMELOG) corrective regimen sliding scale   SubCutaneous at bedtime  metoprolol succinate ER 25 milliGRAM(s) Oral daily  pantoprazole  Injectable 40 milliGRAM(s) IV Push two times a day  polyethylene glycol 3350 17 Gram(s) Oral two times a day  senna 2 Tablet(s) Oral at bedtime  sertraline 25 milliGRAM(s) Oral every 12 hours  sucralfate suspension 1 Gram(s) Oral four times a day    MEDICATIONS  (PRN):  acetaminophen     Tablet .. 650 milliGRAM(s) Oral every 6 hours PRN Temp greater or equal to 38C (100.4F), Mild Pain (1 - 3)  aluminum hydroxide/magnesium hydroxide/simethicone Suspension 30 milliLiter(s) Oral every 4 hours PRN Dyspepsia  HYDROmorphone   Tablet 4 milliGRAM(s) Oral every 8 hours PRN Severe Pain (7 - 10)  melatonin 3 milliGRAM(s) Oral at bedtime PRN Insomnia  ondansetron Injectable 4 milliGRAM(s) IV Push every 8 hours PRN Nausea and/or Vomiting      Allergies    morphine (Other)    Intolerances        Social History:  Former cigarette smoker, smoked <1/2 ppd for less than 15 years, quit more than 50 years ago   Denies ETOH use   Denies drug use     Ambulates with a walker. Lives at home with aides who come to help her with ADLs (not 24 hour aides) (25 Feb 2022 07:48)      REVIEW OF SYSTEMS:  CONSTITUTIONAL: No fever, No chills, No fatigue, No myalgia, No Body ache, No Weakness  EYES: No eye pain,  No visual disturbances, No discharge, NO Redness  ENMT:  No ear pain, No nose bleed, No vertigo; No sinus pain, NO throat pain, No Congestion  NECK: No pain, No stiffness  RESPIRATORY: No cough, NO wheezing, No  hemoptysis, NO  shortness of breath  CARDIOVASCULAR: No chest pain, palpitations  GASTROINTESTINAL: No abdominal pain, NO epigastric pain. No nausea, No vomiting; No diarrhea, No constipation. [  ] BM  GENITOURINARY: No dysuria, No frequency, No urgency, No hematuria, NO incontinence  NEUROLOGICAL: No headaches, No dizziness, No numbness, No tingling, No tremors, No weakness  EXT: No Swelling, No Pain, No Edema  SKIN:  [ X ] No itching, burning, rashes, or lesions   MUSCULOSKELETAL: No joint pain ,No Jt swelling; No muscle pain, No back pain, No extremity pain  PSYCHIATRIC: No depression,  No anxiety,  No mood swings ,No difficulty sleeping at night  PAIN SCALE: [ X ] None  [  ] Other-  ROS Unable to obtain due to - [  ] Dementia  [  ] Lethargy [  ] Drowsy [  ] Sedated [  ] non verbal  REST OF REVIEW Of SYSTEM - [ X ] Normal     Vital Signs Last 24 Hrs  T(C): 36.9 (28 Feb 2022 04:41), Max: 36.9 (28 Feb 2022 04:41)  T(F): 98.4 (28 Feb 2022 04:41), Max: 98.4 (28 Feb 2022 04:41)  HR: 92 (28 Feb 2022 04:41) (80 - 92)  BP: 116/68 (28 Feb 2022 04:41) (116/68 - 132/71)  BP(mean): --  RR: 18 (28 Feb 2022 04:41) (18 - 18)  SpO2: 92% (28 Feb 2022 04:41) (92% - 92%)  Finger Stick        02-27 @ 07:01  -  02-28 @ 07:00  --------------------------------------------------------  IN: 480 mL / OUT: 0 mL / NET: 480 mL        PHYSICAL EXAM:  GENERAL:  [ x ] NAD , [ x ] well appearing, [  ] Agitated, [  ] Drowsy,  [  ] Lethargy, [  ] confused   HEAD:  [ x ] Normal, [  ] Other  EYES:  [ x ] EOMI, [ x ] PERRLA, [ x ] conjunctiva and sclera clear normal, [  ] Other,  [  ] Pallor,[  ] Discharge  ENMT:  [ x ] Normal, [ x ] Moist mucous membranes, [  ] Good dentition, [ x ] No Thrush  NECK:  [ x ] Supple, [ x ] No JVD, [ x ] Normal thyroid, [  ] Lymphadenopathy [  ] Other  CHEST/LUNG:  [ x ] Clear to auscultation bilaterally, [ x ] Breath Sounds equal B/L / Decrease, [  ] poor effort  [ x ] No rales, [ x ] No rhonchi  [ x ]  No wheezing,   HEART:  [ x ] Regular rate and rhythm, [  ] tachycardia, [  ] Bradycardia,  [  ] irregular  [ x ] No murmurs, No rubs, No gallops, [  ] PPM in place (Mfr:  )  ABDOMEN:  [ x ] Soft, [ x ] Nontender, [ x ] Nondistended, [x  ] No mass, [  ] Bowel sounds present, [  ] obese  NERVOUS SYSTEM:  [ x ] Alert & Oriented X2 to person, place, [  ] Nonfocal  [  ] Confusion  [  ] Encephalopathic [  ] Sedated [  ] Unable to assess, [  ] Dementia [  ] Other-  EXTREMITIES: [ x ] 2+ Peripheral Pulses, No clubbing, No cyanosis,  [ x ] edema B/L lower EXT. [  ] PVD stasis skin changes B/L Lower EXT, [  ] wound  LYMPH: No lymphadenopathy noted  SKIN:  [  ] No rashes or lesions, [  ] Pressure Ulcers, [  ] ecchymosis, [  ] Skin Tears, [ x ] Other- scattered age related ecchymosis throughout b/l UE and LE    DIET: Diet, Full Liquid:   Consistent Carbohydrate Evening Snack  DASH/TLC Sodium & Cholesterol Restricted (02-27-22 @ 12:07)  Diet, NPO after Midnight:      NPO Start Date: 27-Feb-2022,   NPO Start Time: 23:59 (02-27-22 @ 12:07)      LABS:                        8.6    5.49  )-----------( 177      ( 28 Feb 2022 08:09 )             25.3       Ca    8.7        27 Feb 2022 07:39            Culture Results:   >=3 organisms. Probable collection contamination. (02-25 @ 09:18)                  Culture - Urine (collected 25 Feb 2022 09:18)  Source: Clean Catch Clean Catch (Midstream)  Final Report (26 Feb 2022 08:21):    >=3 organisms. Probable collection contamination.         Anemia Panel:      Thyroid Panel:        Lipase, Serum: 996 U/L (02-27-22 @ 07:39)  Lipase, Serum: 1668 U/L (02-26-22 @ 10:03)  Lipase, Serum: 1251 U/L (02-26-22 @ 08:03)  Lipase, Serum: 1537 U/L (02-25-22 @ 00:33)      Serum Pro-Brain Natriuretic Peptide: 2914 pg/mL (02-25-22 @ 00:33)      RADIOLOGY & ADDITIONAL TESTS:      HEALTH ISSUES - PROBLEM Dx:  CAD (coronary artery disease)    Benign essential HTN    Type 2 diabetes mellitus    Hyperlipidemia    Major depression    GERD (gastroesophageal reflux disease)    History of spinal stenosis    Need for prophylactic measure    Pancreatitis    KERRY (acute kidney injury)    H/O peripheral neuropathy    Abnormal urinalysis    Gallstone pancreatitis    Anemia            Consultant(s) Notes Reviewed:  [  ] YES     Care Discussed with [X] Consultants  [ x ] Patient  [ x ] Family [  ] HCP [  ]   [  ] Social Service  [ x ] RN, [  ] Physical Therapy,[  ] Palliative care team  DVT PPX: [  ] Lovenox, [ x ] S C Heparin, [  ] Coumadin, [  ] Xarelto, [  ] Eliquis, [  ] Pradaxa, [  ] IV Heparin drip, [  ] SCD [  ] Contraindication 2 to GI Bleed,[  ] Ambulation [  ] Contraindicated 2 to  bleed [  ] Contraindicated 2 to Brain Bleed  Advanced directive: [ x ] None, [  ] DNR/DNI Patient is a 89y old  Female who presents with a chief complaint of pancreatitis (27 Feb 2022 15:37)    HPI:  The patient is a 89 year old female with PMH CAD s/p stents in 2012, Type 2 diabetes, HTN, HLD, GERD, mild AS, chronic venous insufficiency, neuropathy, macular degeneration, chronic constipation, depression, OA, lumbar degenerative disc disease, and spinal stenosis who presents to the ER complaining of nausea, abdominal pain, and burning chest pain. Patient is a poor historian, majority of the history obtained from daughter Mary Guerin at bedside. Per daughter, over the past week patient has been extremely nauseous and has been dry heaving. Patient never vomited. She had been eating less over the past few days; however, yesterday, patient was so nauseous that she was unable to eat or drink. She felt weak and fatigued. She also developed burning pain in the epigastric area that radiated to her chest. Her daughter became concerned and called EMS. Patient denies any other symptoms, including fevers, chills, SOB, diarrhea. Of note, per daughter, patient has lost a significant amount of weight (about 12 lbs) in the past few months as she has had a decreased appetite.    In the ED:   VS: T:97.6, HR:93, BP: 119/54, RR:16, SpO2: 98% on room air  Labs significant for BUN/Cr: 34/1.8, Trop negative x 2, lipase: 1537, proBNP: 2914  UA: slightly turbid, large blood, moderate ketones, moderate leuk esterase, 26-50 WBCs, moderate bacteria, few yeast cells   EKG: NSR, no ST or T wave abnormalities   US abdomen: Sonographic findings equivocal for acute cholecystitis. If there is clinical suspicion for acute cholecystitis, a hepatobiliary scan may be obtained for further evaluation.Poorly visualized pancreas. Recommend correlation with serum lipase to assess acute pancreatitis.  CT A/P: Marked fatty replacement of the pancreas. Limited evaluation without intravenous contrast. Cholelithiasis without CT evidence for acute cholecystitis.Diverticulosis without diverticulitis.  HIDA scan pending   Patient received 1 L NS bolus 4 mg Zofran (25 Feb 2022 07:48)    INTERVAL HPI:  02/26/22: Pt seen and examined at bedside. Pt is poor historian' aox1 to person only; has no complaints this morning. On 60cc/hr DS+NS for possible acute pancreatitis. Awaiting MRI abd to rule out malignancy. Erroneous lab result this morning 2/2 to blood draw from IV site; repeat lab ordered. -Stable labs. Low stable H/H  02/27/22: Pt seen and examined at bedside. Pt AOx1 with mild confusion. No complaint this am.  Awaiting MRI A/P  abd to rule out malignancy. Low stable H/H; lipase downtrending.No Complaints  02/28/22: Pt seen and examined at bedside. Pt AOx2 to person, place. Pt appears confused about whereabouts of her . Has no acute concerns this morning. Awaiting MRI abdomen w/ w/o C to r/o malignancy. H/H remains low this AM, though no acute signs bleeding at this time. Pt otherwise stable. Lipase downtrended. No complaints.    OVERNIGHT EVENTS: None    Home Medications:  amitriptyline 10 mg oral tablet: 3 tab(s) orally once a day (at bedtime) (25 Feb 2022 16:50)  aspirin 81 mg oral tablet: 1 tab(s) orally once a day (25 Feb 2022 16:50)  atorvastatin 40 mg oral tablet: 1 tab(s) orally once a day (25 Feb 2022 16:50)  Dilaudid 4 mg oral tablet: 1 tab(s) orally 3 times a day, As Needed (25 Feb 2022 16:50)  enalapril 2.5 mg oral tablet: 1 tab(s) orally once a day (25 Feb 2022 16:50)  ferrous sulfate 325 mg (65 mg elemental iron) oral tablet: 1 tablet oral daily (25 Feb 2022 16:50)  metFORMIN 500 mg oral tablet: 1 tab(s) orally 2 times a day (25 Feb 2022 16:50)  Metoprolol Succinate ER 25 mg oral tablet, extended release: 1 tab(s) orally once a day (25 Feb 2022 16:50)  omeprazole 20 mg oral delayed release capsule: 1 cap(s) orally once in the morning and 1 in the afternoon (25 Feb 2022 16:50)  PreserVision AREDS oral capsule: 1 tablet oral daily (25 Feb 2022 16:50)  Tradjenta 5 mg oral tablet: 1 tab(s) orally once a day  -per pt daughter, not sure if taking currently (25 Feb 2022 16:50)  Tylenol 500 mg oral tablet: 2 tablets oral twice a day (25 Feb 2022 16:50)  Vitamin D2 50,000 intl units (1.25 mg) oral capsule (obsolete): 1 cap(s) orally once a week (25 Feb 2022 16:50)  Zoloft 25 mg oral tablet: 1 tab(s) orally 2 times a day (25 Feb 2022 16:50)      MEDICATIONS  (STANDING):  ALPRAZolam 0.25 milliGRAM(s) Oral once  amitriptyline 30 milliGRAM(s) Oral at bedtime  artificial tears (preservative free) Ophthalmic Solution 1 Drop(s) Both EYES two times a day  aspirin enteric coated 81 milliGRAM(s) Oral daily  atorvastatin 40 milliGRAM(s) Oral at bedtime  dextrose 40% Gel 15 Gram(s) Oral once  dextrose 5% + sodium chloride 0.9%. 1000 milliLiter(s) (30 mL/Hr) IV Continuous <Continuous>  dextrose 5%. 1000 milliLiter(s) (50 mL/Hr) IV Continuous <Continuous>  dextrose 5%. 1000 milliLiter(s) (100 mL/Hr) IV Continuous <Continuous>  dextrose 50% Injectable 25 Gram(s) IV Push once  dextrose 50% Injectable 12.5 Gram(s) IV Push once  dextrose 50% Injectable 25 Gram(s) IV Push once  glucagon  Injectable 1 milliGRAM(s) IntraMuscular once  heparin   Injectable 5000 Unit(s) SubCutaneous every 12 hours  insulin lispro (ADMELOG) corrective regimen sliding scale   SubCutaneous three times a day before meals  insulin lispro (ADMELOG) corrective regimen sliding scale   SubCutaneous at bedtime  metoprolol succinate ER 25 milliGRAM(s) Oral daily  pantoprazole  Injectable 40 milliGRAM(s) IV Push two times a day  polyethylene glycol 3350 17 Gram(s) Oral two times a day  senna 2 Tablet(s) Oral at bedtime  sertraline 25 milliGRAM(s) Oral every 12 hours  sucralfate suspension 1 Gram(s) Oral four times a day    MEDICATIONS  (PRN):  acetaminophen     Tablet .. 650 milliGRAM(s) Oral every 6 hours PRN Temp greater or equal to 38C (100.4F), Mild Pain (1 - 3)  aluminum hydroxide/magnesium hydroxide/simethicone Suspension 30 milliLiter(s) Oral every 4 hours PRN Dyspepsia  HYDROmorphone   Tablet 4 milliGRAM(s) Oral every 8 hours PRN Severe Pain (7 - 10)  melatonin 3 milliGRAM(s) Oral at bedtime PRN Insomnia  ondansetron Injectable 4 milliGRAM(s) IV Push every 8 hours PRN Nausea and/or Vomiting      Allergies    morphine (Other)    Intolerances        Social History:  Former cigarette smoker, smoked <1/2 ppd for less than 15 years, quit more than 50 years ago   Denies ETOH use   Denies drug use     Ambulates with a walker. Lives at home with aides who come to help her with ADLs (not 24 hour aides) (25 Feb 2022 07:48)      REVIEW OF SYSTEMS:  CONSTITUTIONAL: No fever, No chills, No fatigue, No myalgia, No Body ache, No Weakness  EYES: No eye pain,  No visual disturbances, No discharge, NO Redness  ENMT:  No ear pain, No nose bleed, No vertigo; No sinus pain, NO throat pain, No Congestion  NECK: No pain, No stiffness  RESPIRATORY: No cough, NO wheezing, No  hemoptysis, NO  shortness of breath  CARDIOVASCULAR: No chest pain, palpitations  GASTROINTESTINAL: No abdominal pain, NO epigastric pain. No nausea, No vomiting; No diarrhea, No constipation. [  ] BM  GENITOURINARY: No dysuria, No frequency, No urgency, No hematuria, NO incontinence  NEUROLOGICAL: No headaches, No dizziness, No numbness, No tingling, No tremors, No weakness  EXT: No Swelling, No Pain, No Edema  SKIN:  [ X ] No itching, burning, rashes, or lesions   MUSCULOSKELETAL: No joint pain ,No Jt swelling; No muscle pain, No back pain, No extremity pain  PSYCHIATRIC: No depression,  No anxiety,  No mood swings ,No difficulty sleeping at night  PAIN SCALE: [ X ] None  [  ] Other-  ROS Unable to obtain due to - [  ] Dementia  [  ] Lethargy [  ] Drowsy [  ] Sedated [  ] non verbal  REST OF REVIEW Of SYSTEM - [ X ] Normal     Vital Signs Last 24 Hrs  T(C): 36.9 (28 Feb 2022 04:41), Max: 36.9 (28 Feb 2022 04:41)  T(F): 98.4 (28 Feb 2022 04:41), Max: 98.4 (28 Feb 2022 04:41)  HR: 92 (28 Feb 2022 04:41) (80 - 92)  BP: 116/68 (28 Feb 2022 04:41) (116/68 - 132/71)  BP(mean): --  RR: 18 (28 Feb 2022 04:41) (18 - 18)  SpO2: 92% (28 Feb 2022 04:41) (92% - 92%)  Finger Stick        02-27 @ 07:01  -  02-28 @ 07:00  --------------------------------------------------------  IN: 480 mL / OUT: 0 mL / NET: 480 mL        PHYSICAL EXAM:  GENERAL:  [ x ] NAD , [ x ] well appearing, [  ] Agitated, [  ] Drowsy,  [  ] Lethargy, [  ] confused   HEAD:  [ x ] Normal, [  ] Other  EYES:  [ x ] EOMI, [ x ] PERRLA, [ x ] conjunctiva and sclera clear normal, [  ] Other,  [  ] Pallor,[  ] Discharge  ENMT:  [ x ] Normal, [ x ] Moist mucous membranes, [  ] Good dentition, [ x ] No Thrush  NECK:  [ x ] Supple, [ x ] No JVD, [ x ] Normal thyroid, [  ] Lymphadenopathy [  ] Other  CHEST/LUNG:  [ x ] Clear to auscultation bilaterally, [ x ] Breath Sounds equal B/L / Decrease, [  ] poor effort  [ x ] No rales, [ x ] No rhonchi  [ x ]  No wheezing,   HEART:  [ x ] Regular rate and rhythm, [  ] tachycardia, [  ] Bradycardia,  [  ] irregular  [ x ] No murmurs, No rubs, No gallops, [  ] PPM in place (Mfr:  )  ABDOMEN:  [ x ] Soft, [ x ] Nontender, [ x ] Nondistended, [x  ] No mass, [  ] Bowel sounds present, [  ] obese  NERVOUS SYSTEM:  [ x ] Alert & Oriented X2 to person, place, [  ] Nonfocal  [  ] Confusion  [  ] Encephalopathic [  ] Sedated [  ] Unable to assess, [  ] Dementia [  ] Other-  EXTREMITIES: [ x ] 2+ Peripheral Pulses, No clubbing, No cyanosis,  [ x ] edema B/L lower EXT. [  ] PVD stasis skin changes B/L Lower EXT, [  ] wound  LYMPH: No lymphadenopathy noted  SKIN:  [  ] No rashes or lesions, [  ] Pressure Ulcers, [  ] ecchymosis, [  ] Skin Tears, [ x ] Other- scattered age related ecchymosis throughout b/l UE and LE    DIET: Diet, Full Liquid:   Consistent Carbohydrate Evening Snack  DASH/TLC Sodium & Cholesterol Restricted (02-27-22 @ 12:07)  Diet, NPO after Midnight:      NPO Start Date: 27-Feb-2022,   NPO Start Time: 23:59 (02-27-22 @ 12:07)      LABS:                        8.6    5.49  )-----------( 177      ( 28 Feb 2022 08:09 )             25.3       Ca    8.7        27 Feb 2022 07:39            Culture Results:   >=3 organisms. Probable collection contamination. (02-25 @ 09:18)                  Culture - Urine (collected 25 Feb 2022 09:18)  Source: Clean Catch Clean Catch (Midstream)  Final Report (26 Feb 2022 08:21):    >=3 organisms. Probable collection contamination.         Anemia Panel:      Thyroid Panel:        Lipase, Serum: 996 U/L (02-27-22 @ 07:39)  Lipase, Serum: 1668 U/L (02-26-22 @ 10:03)  Lipase, Serum: 1251 U/L (02-26-22 @ 08:03)  Lipase, Serum: 1537 U/L (02-25-22 @ 00:33)      Serum Pro-Brain Natriuretic Peptide: 2914 pg/mL (02-25-22 @ 00:33)      RADIOLOGY & ADDITIONAL TESTS:      HEALTH ISSUES - PROBLEM Dx:  CAD (coronary artery disease)    Benign essential HTN    Type 2 diabetes mellitus    Hyperlipidemia    Major depression    GERD (gastroesophageal reflux disease)    History of spinal stenosis    Need for prophylactic measure    Pancreatitis    KERRY (acute kidney injury)    H/O peripheral neuropathy    Abnormal urinalysis    Gallstone pancreatitis    Anemia            Consultant(s) Notes Reviewed:  [  ] YES     Care Discussed with [X] Consultants  [ x ] Patient  [ x ] Family [  ] HCP [  ]   [  ] Social Service  [ x ] RN, [  ] Physical Therapy,[  ] Palliative care team  DVT PPX: [  ] Lovenox, [ x ] S C Heparin, [  ] Coumadin, [  ] Xarelto, [  ] Eliquis, [  ] Pradaxa, [  ] IV Heparin drip, [  ] SCD [  ] Contraindication 2 to GI Bleed,[  ] Ambulation [  ] Contraindicated 2 to  bleed [  ] Contraindicated 2 to Brain Bleed  Advanced directive: [ x ] None, [  ] DNR/DNI Patient is a 89y old  Female who presents with a chief complaint of pancreatitis (27 Feb 2022 15:37)    HPI:  The patient is a 89 year old female with PMH CAD s/p stents in 2012, Type 2 diabetes, HTN, HLD, GERD, mild AS, chronic venous insufficiency, neuropathy, macular degeneration, chronic constipation, depression, OA, lumbar degenerative disc disease, and spinal stenosis who presents to the ER complaining of nausea, abdominal pain, and burning chest pain. Patient is a poor historian, majority of the history obtained from daughter Mary Guerin at bedside. Per daughter, over the past week patient has been extremely nauseous and has been dry heaving. Patient never vomited. She had been eating less over the past few days; however, yesterday, patient was so nauseous that she was unable to eat or drink. She felt weak and fatigued. She also developed burning pain in the epigastric area that radiated to her chest. Her daughter became concerned and called EMS. Patient denies any other symptoms, including fevers, chills, SOB, diarrhea. Of note, per daughter, patient has lost a significant amount of weight (about 12 lbs) in the past few months as she has had a decreased appetite.    In the ED:   VS: T:97.6, HR:93, BP: 119/54, RR:16, SpO2: 98% on room air  Labs significant for BUN/Cr: 34/1.8, Trop negative x 2, lipase: 1537, proBNP: 2914  UA: slightly turbid, large blood, moderate ketones, moderate leuk esterase, 26-50 WBCs, moderate bacteria, few yeast cells   EKG: NSR, no ST or T wave abnormalities   US abdomen: Sonographic findings equivocal for acute cholecystitis. If there is clinical suspicion for acute cholecystitis, a hepatobiliary scan may be obtained for further evaluation.Poorly visualized pancreas. Recommend correlation with serum lipase to assess acute pancreatitis.  CT A/P: Marked fatty replacement of the pancreas. Limited evaluation without intravenous contrast. Cholelithiasis without CT evidence for acute cholecystitis.Diverticulosis without diverticulitis.  HIDA scan pending   Patient received 1 L NS bolus 4 mg Zofran (25 Feb 2022 07:48)    INTERVAL HPI:  02/26/22: Pt seen and examined at bedside. Pt is poor historian' aox1 to person only; has no complaints this morning. On 60cc/hr DS+NS for possible acute pancreatitis. Awaiting MRI abd to rule out malignancy. Erroneous lab result this morning 2/2 to blood draw from IV site; repeat lab ordered. -Stable labs. Low stable H/H  02/27/22: Pt seen and examined at bedside. Pt AOx1 with mild confusion. No complaint this am.  Awaiting MRI A/P  abd to rule out malignancy. Low stable H/H; lipase downtrending.No Complaints  02/28/22: Pt seen and examined at bedside. Pt AOx2 to person, place. Pt appears confused about whereabouts of her . Has no acute concerns this morning. Awaiting MRI abdomen w/ w/o C to r/o malignancy. H/H remains low this AM, though no acute signs bleeding at this time. Pt otherwise stable. Lipase downtrended. No complaints. advance Diet.    OVERNIGHT EVENTS: None    Home Medications:  amitriptyline 10 mg oral tablet: 3 tab(s) orally once a day (at bedtime) (25 Feb 2022 16:50)  aspirin 81 mg oral tablet: 1 tab(s) orally once a day (25 Feb 2022 16:50)  atorvastatin 40 mg oral tablet: 1 tab(s) orally once a day (25 Feb 2022 16:50)  Dilaudid 4 mg oral tablet: 1 tab(s) orally 3 times a day, As Needed (25 Feb 2022 16:50)  enalapril 2.5 mg oral tablet: 1 tab(s) orally once a day (25 Feb 2022 16:50)  ferrous sulfate 325 mg (65 mg elemental iron) oral tablet: 1 tablet oral daily (25 Feb 2022 16:50)  metFORMIN 500 mg oral tablet: 1 tab(s) orally 2 times a day (25 Feb 2022 16:50)  Metoprolol Succinate ER 25 mg oral tablet, extended release: 1 tab(s) orally once a day (25 Feb 2022 16:50)  omeprazole 20 mg oral delayed release capsule: 1 cap(s) orally once in the morning and 1 in the afternoon (25 Feb 2022 16:50)  PreserVision AREDS oral capsule: 1 tablet oral daily (25 Feb 2022 16:50)  Tradjenta 5 mg oral tablet: 1 tab(s) orally once a day  -per pt daughter, not sure if taking currently (25 Feb 2022 16:50)  Tylenol 500 mg oral tablet: 2 tablets oral twice a day (25 Feb 2022 16:50)  Vitamin D2 50,000 intl units (1.25 mg) oral capsule (obsolete): 1 cap(s) orally once a week (25 Feb 2022 16:50)  Zoloft 25 mg oral tablet: 1 tab(s) orally 2 times a day (25 Feb 2022 16:50)      MEDICATIONS  (STANDING):  ALPRAZolam 0.25 milliGRAM(s) Oral once  amitriptyline 30 milliGRAM(s) Oral at bedtime  artificial tears (preservative free) Ophthalmic Solution 1 Drop(s) Both EYES two times a day  aspirin enteric coated 81 milliGRAM(s) Oral daily  atorvastatin 40 milliGRAM(s) Oral at bedtime  dextrose 40% Gel 15 Gram(s) Oral once  dextrose 5% + sodium chloride 0.9%. 1000 milliLiter(s) (30 mL/Hr) IV Continuous <Continuous>  dextrose 5%. 1000 milliLiter(s) (50 mL/Hr) IV Continuous <Continuous>  dextrose 5%. 1000 milliLiter(s) (100 mL/Hr) IV Continuous <Continuous>  dextrose 50% Injectable 25 Gram(s) IV Push once  dextrose 50% Injectable 12.5 Gram(s) IV Push once  dextrose 50% Injectable 25 Gram(s) IV Push once  glucagon  Injectable 1 milliGRAM(s) IntraMuscular once  heparin   Injectable 5000 Unit(s) SubCutaneous every 12 hours  insulin lispro (ADMELOG) corrective regimen sliding scale   SubCutaneous three times a day before meals  insulin lispro (ADMELOG) corrective regimen sliding scale   SubCutaneous at bedtime  metoprolol succinate ER 25 milliGRAM(s) Oral daily  pantoprazole  Injectable 40 milliGRAM(s) IV Push two times a day  polyethylene glycol 3350 17 Gram(s) Oral two times a day  senna 2 Tablet(s) Oral at bedtime  sertraline 25 milliGRAM(s) Oral every 12 hours  sucralfate suspension 1 Gram(s) Oral four times a day    MEDICATIONS  (PRN):  acetaminophen     Tablet .. 650 milliGRAM(s) Oral every 6 hours PRN Temp greater or equal to 38C (100.4F), Mild Pain (1 - 3)  aluminum hydroxide/magnesium hydroxide/simethicone Suspension 30 milliLiter(s) Oral every 4 hours PRN Dyspepsia  HYDROmorphone   Tablet 4 milliGRAM(s) Oral every 8 hours PRN Severe Pain (7 - 10)  melatonin 3 milliGRAM(s) Oral at bedtime PRN Insomnia  ondansetron Injectable 4 milliGRAM(s) IV Push every 8 hours PRN Nausea and/or Vomiting      Allergies    morphine (Other)    Intolerances        Social History:  Former cigarette smoker, smoked <1/2 ppd for less than 15 years, quit more than 50 years ago   Denies ETOH use   Denies drug use     Ambulates with a walker. Lives at home with aides who come to help her with ADLs (not 24 hour aides) (25 Feb 2022 07:48)      REVIEW OF SYSTEMS:  CONSTITUTIONAL: No fever, No chills, No fatigue, No myalgia, No Body ache, No Weakness  EYES: No eye pain,  No visual disturbances, No discharge, NO Redness  ENMT:  No ear pain, No nose bleed, No vertigo; No sinus pain, NO throat pain, No Congestion  NECK: No pain, No stiffness  RESPIRATORY: No cough, NO wheezing, No  hemoptysis, NO  shortness of breath  CARDIOVASCULAR: No chest pain, palpitations  GASTROINTESTINAL: No abdominal pain, NO epigastric pain. No nausea, No vomiting; No diarrhea, No constipation. [  ] BM  GENITOURINARY: No dysuria, No frequency, No urgency, No hematuria, NO incontinence  NEUROLOGICAL: No headaches, No dizziness, No numbness, No tingling, No tremors, No weakness  EXT: No Swelling, No Pain, No Edema  SKIN:  [ X ] No itching, burning, rashes, or lesions   MUSCULOSKELETAL: No joint pain ,No Jt swelling; No muscle pain, No back pain, No extremity pain  PSYCHIATRIC: No depression,  No anxiety,  No mood swings ,No difficulty sleeping at night  PAIN SCALE: [ X ] None  [  ] Other-  ROS Unable to obtain due to - [  ] Dementia  [  ] Lethargy [  ] Drowsy [  ] Sedated [  ] non verbal  REST OF REVIEW Of SYSTEM - [ X ] Normal     Vital Signs Last 24 Hrs  T(C): 36.9 (28 Feb 2022 04:41), Max: 36.9 (28 Feb 2022 04:41)  T(F): 98.4 (28 Feb 2022 04:41), Max: 98.4 (28 Feb 2022 04:41)  HR: 92 (28 Feb 2022 04:41) (80 - 92)  BP: 116/68 (28 Feb 2022 04:41) (116/68 - 132/71)  BP(mean): --  RR: 18 (28 Feb 2022 04:41) (18 - 18)  SpO2: 92% (28 Feb 2022 04:41) (92% - 92%)  Finger Stick        02-27 @ 07:01  -  02-28 @ 07:00  --------------------------------------------------------  IN: 480 mL / OUT: 0 mL / NET: 480 mL        PHYSICAL EXAM:  GENERAL:  [ x ] NAD , [ x ] well appearing, [  ] Agitated, [  ] Drowsy,  [  ] Lethargy, [  ] confused   HEAD:  [ x ] Normal, [  ] Other  EYES:  [ x ] EOMI, [ x ] PERRLA, [ x ] conjunctiva and sclera clear normal, [  ] Other,  [  ] Pallor,[  ] Discharge  ENMT:  [ x ] Normal, [ x ] Moist mucous membranes, [  ] Good dentition, [ x ] No Thrush  NECK:  [ x ] Supple, [ x ] No JVD, [ x ] Normal thyroid, [  ] Lymphadenopathy [  ] Other  CHEST/LUNG:  [ x ] Clear to auscultation bilaterally, [ x ] Breath Sounds equal B/L / Decrease, [x  ] poor effort  [ x ] No rales, [ x ] No rhonchi  [ x ]  No wheezing,   HEART:  [ x ] Regular rate and rhythm, [  ] tachycardia, [  ] Bradycardia,  [  ] irregular  [ x ] No murmurs, No rubs, No gallops, [  ] PPM in place (Mfr:  )  ABDOMEN:  [ x ] Soft, [ x ] Nontender, [ x ] Nondistended, [x  ] No mass, [  ] Bowel sounds present, [  ] obese  NERVOUS SYSTEM:  [ x ] Alert & Oriented X2 to person, place, [  ] Nonfocal  [  ] Confusion  [  ] Encephalopathic [  ] Sedated [  ] Unable to assess, [  ] Dementia [  ] Other-  EXTREMITIES: [ x ] 2+ Peripheral Pulses, No clubbing, No cyanosis,  [ x ] 2 + edema B/L lower EXT. [  ] PVD stasis skin changes B/L Lower EXT, [  ] wound  LYMPH: No lymphadenopathy noted  SKIN:  [  ] No rashes or lesions, [  ] Pressure Ulcers, [  ] ecchymosis, [  ] Skin Tears, [ x ] Other- scattered age related ecchymosis throughout b/l UE and LE    DIET: Diet, Full Liquid:   Consistent Carbohydrate Evening Snack  DASH/TLC Sodium & Cholesterol Restricted (02-27-22 @ 12:07)  Diet, NPO after Midnight:      NPO Start Date: 27-Feb-2022,   NPO Start Time: 23:59 (02-27-22 @ 12:07)      LABS:                        8.6    5.49  )-----------( 177      ( 28 Feb 2022 08:09 )             25.3       Ca    8.7        27 Feb 2022 07:39      28 Feb 2022 08:09    141    |  112    |  14     ----------------------------<  83     3.7     |  22     |  1.10     Ca    8.6        28 Feb 2022 08:09    TPro  4.6    /  Alb  2.2    /  TBili  0.3    /  DBili  x      /  AST  22     /  ALT  17     /  AlkPhos  54     28 Feb 2022 08:09        Culture Results:   >=3 organisms. Probable collection contamination. (02-25 @ 09:18)      Culture - Urine (collected 25 Feb 2022 09:18)  Source: Clean Catch Clean Catch (Midstream)  Final Report (26 Feb 2022 08:21):    >=3 organisms. Probable collection contamination.         Lipase, Serum: 996 U/L (02-27-22 @ 07:39)  Lipase, Serum: 1668 U/L (02-26-22 @ 10:03)  Lipase, Serum: 1251 U/L (02-26-22 @ 08:03)  Lipase, Serum: 1537 U/L (02-25-22 @ 00:33)      Serum Pro-Brain Natriuretic Peptide: 2914 pg/mL (02-25-22 @ 00:33)      RADIOLOGY & ADDITIONAL TESTS: none      HEALTH ISSUES - PROBLEM Dx:  CAD (coronary artery disease)    Benign essential HTN    Type 2 diabetes mellitus    Hyperlipidemia    Major depression    GERD (gastroesophageal reflux disease)    History of spinal stenosis    Need for prophylactic measure    Pancreatitis    KERRY (acute kidney injury)    H/O peripheral neuropathy    Abnormal urinalysis    Gallstone pancreatitis    Anemia      Consultant(s) Notes Reviewed:  [ x ] YES     Care Discussed with [X] Consultants  [ x ] Patient  [ x ] Family- dtr  [  ] HCP [  ]   [  ] Social Service  [ x ] RN, [  ] Physical Therapy,[  ] Palliative care team  DVT PPX: [  ] Lovenox, [ x ] S C Heparin, [  ] Coumadin, [  ] Xarelto, [  ] Eliquis, [  ] Pradaxa, [  ] IV Heparin drip, [  ] SCD [  ] Contraindication 2 to GI Bleed,[  ] Ambulation [  ] Contraindicated 2 to  bleed [  ] Contraindicated 2 to Brain Bleed  Advanced directive: [ x ] None, [  ] DNR/DNI

## 2022-02-28 NOTE — PROGRESS NOTE ADULT - SUBJECTIVE AND OBJECTIVE BOX
pt seen  doing well  no complaints  ICU Vital Signs Last 24 Hrs  T(C): 36.9 (28 Feb 2022 04:41), Max: 36.9 (28 Feb 2022 04:41)  T(F): 98.4 (28 Feb 2022 04:41), Max: 98.4 (28 Feb 2022 04:41)  HR: 92 (28 Feb 2022 04:41) (80 - 92)  BP: 116/68 (28 Feb 2022 04:41) (116/68 - 132/71)  BP(mean): --  ABP: --  ABP(mean): --  RR: 18 (28 Feb 2022 04:41) (18 - 18)  SpO2: 92% (28 Feb 2022 04:41) (92% - 92%)  gen-NAD  resp-clear  abd-soft NT/ND                          8.6    5.49  )-----------( 177      ( 28 Feb 2022 08:09 )             25.3   02-28    141  |  112<H>  |  14  ----------------------------<  83  3.7   |  22  |  1.10    Ca    8.6      28 Feb 2022 08:09    TPro  4.6<L>  /  Alb  2.2<L>  /  TBili  0.3  /  DBili  x   /  AST  22  /  ALT  17  /  AlkPhos  54  02-28

## 2022-02-28 NOTE — PROGRESS NOTE ADULT - PROBLEM SELECTOR PLAN 6
Face to Face Note  -  Durable Medical Equipment    Brian Barker M.D. - NPI: [id number]  I certify that this patient is under my care and that they had a durable medical equipment(DME)face to face encounter by myself that meets the physician DME face-to-face encounter requirements with this patient on:    Date of encounter:   Patient:                    MRN:                       YOB: 2020  Chase Harris  [id number]  1949     The encounter with the patient was in whole, or in part, for the following medical condition, which is the primary reason for durable medical equipment:  Other - PT recommneded DC Equipment Recommendations: Front-Wheel Walker    I certify that, based on my findings, the following durable medical equipment is medically necessary:  Walkers.    HOME O2 Saturation Measurements:(Values must be present for Home Oxygen orders)         ,     ,         My Clinical findings support the need for the above equipment due to:  Other - Orthostatic Hypotension and Interstitial Lung Disease    Supporting Symptoms: Shortness of breath with ambulation and light-headedness when standing (orthostatic)     Chronic, stable   - BP controlled on admission   - Continue metoprolol succinate 25 mg daily with hold parameters   - Hold enalapril in setting of KERRY

## 2022-02-28 NOTE — PROGRESS NOTE ADULT - PROBLEM SELECTOR PLAN 3
Hb 10.4 on presentation- Hg 8.6 this AM, low but stable  - Monitor for signs and symptoms of bleeding  - Transfuse prn (Hgb>8)   - F/u AM CBC; serum iron, tibc, ferritin   - Heme consulted- Dr Nicholas- MRI to r/o mass, F studies Hb 10.4 on presentation- Hg 8.6 this AM, low but stable  - Monitor for signs and symptoms of bleeding  - Transfuse prn (Hgb>8)   - F/u AM CBC; serum iron, tibc, ferritin   - Heme consulted- Dr Nicholas- MRI to r/o mass, Fe studies

## 2022-02-28 NOTE — PROGRESS NOTE ADULT - SUBJECTIVE AND OBJECTIVE BOX
Patient seen and examined;  Chart reviewed and events noted;   Upset that she has not eaten; daughter at bedside    MEDICATIONS  (STANDING):  ALPRAZolam 0.25 milliGRAM(s) Oral once  amitriptyline 30 milliGRAM(s) Oral at bedtime  artificial tears (preservative free) Ophthalmic Solution 1 Drop(s) Both EYES two times a day  aspirin enteric coated 81 milliGRAM(s) Oral daily  atorvastatin 40 milliGRAM(s) Oral at bedtime  dextrose 40% Gel 15 Gram(s) Oral once  dextrose 5% + sodium chloride 0.9%. 1000 milliLiter(s) (30 mL/Hr) IV Continuous <Continuous>  glucagon  Injectable 1 milliGRAM(s) IntraMuscular once  heparin   Injectable 5000 Unit(s) SubCutaneous every 12 hours  insulin lispro (ADMELOG) corrective regimen sliding scale   SubCutaneous three times a day before meals  insulin lispro (ADMELOG) corrective regimen sliding scale   SubCutaneous at bedtime  metoprolol succinate ER 25 milliGRAM(s) Oral daily  pantoprazole  Injectable 40 milliGRAM(s) IV Push two times a day  polyethylene glycol 3350 17 Gram(s) Oral two times a day  senna 2 Tablet(s) Oral at bedtime  sertraline 25 milliGRAM(s) Oral every 12 hours  sucralfate suspension 1 Gram(s) Oral four times a day    MEDICATIONS  (PRN):  acetaminophen     Tablet .. 650 milliGRAM(s) Oral every 6 hours PRN Temp greater or equal to 38C (100.4F), Mild Pain (1 - 3)  aluminum hydroxide/magnesium hydroxide/simethicone Suspension 30 milliLiter(s) Oral every 4 hours PRN Dyspepsia  HYDROmorphone   Tablet 4 milliGRAM(s) Oral every 8 hours PRN Severe Pain (7 - 10)  melatonin 3 milliGRAM(s) Oral at bedtime PRN Insomnia  ondansetron Injectable 4 milliGRAM(s) IV Push every 8 hours PRN Nausea and/or Vomiting      Vital Signs Last 24 Hrs  T(C): 36.4 (28 Feb 2022 12:05), Max: 36.9 (28 Feb 2022 04:41)  T(F): 97.6 (28 Feb 2022 12:05), Max: 98.4 (28 Feb 2022 04:41)  HR: 84 (28 Feb 2022 12:05) (80 - 92)  BP: 121/68 (28 Feb 2022 12:05) (116/68 - 132/71)  BP(mean): --  RR: 18 (28 Feb 2022 12:05) (18 - 18)  SpO2: 92% (28 Feb 2022 12:05) (92% - 92%)    PHYSICAL EXAM  General: adult thin elderly woman in NAD  HEENT: clear oropharynx, anicteric sclera, pink conjunctivae  Neck: supple  CV: normal S1S2 with no murmur rubs or gallops  Lungs: clear to auscultation, no wheezes, no rhales  Abdomen: soft non-tender non-distended, no hepato/splenomegaly  Ext: left arm with IV (minimal infiltration)  Skin: no rashes and no petichiae  Neuro: alert and cooperative but "sundowning per family"    LABS:                        8.6    5.49  )-----------( 177      ( 28 Feb 2022 08:09 )             25.3     Hemoglobin: 8.6 g/dL (02-28 @ 08:09)  Hemoglobin: 10.3 g/dL (02-27 @ 07:39)  Hemoglobin: 9.7 g/dL (02-26 @ 10:03)  Hemoglobin: 5.3 g/dL (02-26 @ 09:03)  Hemoglobin: 5.1 g/dL (02-26 @ 08:03)      02-28    141  |  112<H>  |  14  ----------------------------<  83  3.7   |  22  |  1.10    Ca    8.6      28 Feb 2022 08:09    TPro  4.6<L>  /  Alb  2.2<L>  /  TBili  0.3  /  DBili  x   /  AST  22  /  ALT  17  /  AlkPhos  54  02-28

## 2022-02-28 NOTE — PHYSICAL THERAPY INITIAL EVALUATION ADULT - PERTINENT HX OF CURRENT PROBLEM, REHAB EVAL
PMH CAD s/p stents in 2012, Type 2 diabetes, HTN, HLD, GERD, mild AS, chronic venous insufficiency, neuropathy, macular degeneration, chronic constipation, depression, OA, lumbar degenerative disc disease, and spinal stenosis who presents to the ER with nausea, abdominal pain, and burning chest pain, admitted for pancreatitis, KERRY.

## 2022-02-28 NOTE — PROGRESS NOTE ADULT - PROBLEM SELECTOR PLAN 1
- gallstone pancreatitis vs medication induced (pt takes Tradjenta) vs cholecystitis vs pancreatic malignancy  - Lipase 1537 on presentation- downtrending 996   - US abdomen: findings equivocal for acute cholecystitis. Poorly visualized pancreas  - HIDA: Normal hepatobiliary scan. No scan evidence of acute cholecystitis.  - CT A/P shows Marked fatty replacement of the pancreas. Limited evaluation without intravenous contrast. Cholelithiasis without CT evidence for acute cholecystitis. Diverticulosis without diverticulitis. No inflammation surrounding pancreas on CT  - diet full liquid, advance as tolerated  - continue D5+NS at 30cc/hr (h/o mild aortic stenosis, would caution with excessive IV fluids)   - f/u MR Abdomen w/ and w/o IV Cont  - Zofran PRN for nausea   - GI (Dr Montilla) consulted; f/u recs -MRI A/P with IV Cont to r/o mass   - Surgery consulted; -NO Intervention - gallstone pancreatitis vs medication induced (pt takes Tradjenta) vs cholecystitis vs pancreatic malignancy  - Lipase 1537 on presentation- downtrending 996   - US abdomen: findings equivocal for acute cholecystitis. Poorly visualized pancreas  - HIDA: Normal hepatobiliary scan. No scan evidence of acute cholecystitis.  - CT A/P shows Marked fatty replacement of the pancreas. Limited evaluation without intravenous contrast. Cholelithiasis without CT evidence for acute cholecystitis. Diverticulosis without diverticulitis. No inflammation surrounding pancreas on CT  - diet full liquid, advance as tolerated  - continue D5+NS at 30cc/hr (h/o mild aortic stenosis, would caution with excessive IV fluids)   - f/u MR Abdomen w/ and w/o IV Cont today  - Zofran PRN for nausea   - GI (Dr Montilla) consulted; f/u recs -MRI A/P with IV Cont to r/o mass   - Surgery consulted; -NO Intervention - gallstone pancreatitis vs medication induced (pt takes Tradjenta) vs cholecystitis vs pancreatic malignancy  - Lipase 1537 on presentation- downtrending 996   - US abdomen: findings equivocal for acute cholecystitis. Poorly visualized pancreas  - HIDA: Normal hepatobiliary scan. No scan evidence of acute cholecystitis.  - CT A/P shows Marked fatty replacement of the pancreas. Limited evaluation without intravenous contrast. Cholelithiasis without CT evidence for acute cholecystitis. Diverticulosis without diverticulitis. No inflammation surrounding pancreas on CT  - will trial regular diet, DASH   - STOP D5+NS at 30cc/hr (h/o mild aortic stenosis, would caution with excessive IV fluids)   - f/u MR Abdomen w/ and w/o IV Cont today-Cystic lesion in the pancreatic head with a few thin internal septations measuring 1.6 x 1.0 cm. Small amount of peripancreatic fluid consistent with pancreatitis. Cholelithiasis. Partially imaged enhancing lesions in the right iliac bone measuring 1.5 cm and 0.7 cm, incompletely characterized on this study; a bone scan or MRI of the pelvis could be obtained for further evaluation.  - Zofran PRN for nausea   - GI (Dr Montilla) consulted; f/u recs -MRI A/P with IV Cont to r/o mass   - Surgery consulted; -NO Intervention

## 2022-02-28 NOTE — PROGRESS NOTE ADULT - SUBJECTIVE AND OBJECTIVE BOX
[facility] -- Casper Mcgee, Briana Valles, Eligio Ryan Savella, Goodger  Office # [phone]    Follow Up:  CAD s/p stents, Cardiac Optimization    Subjective/Observations: Denies N/V/abdominal pain.  Denies any respiratory or cardiac discomfort.  Comfortable on RA    REVIEW OF SYSTEMS: All other review of systems is negative unless indicated above  PAST MEDICAL & SURGICAL HISTORY:  H/O vertebral fracture repair    History of vertebral fracture    Dyslipidemia    DM type 2 with diabetic dyslipidemia    H/O gastroesophageal reflux (GERD)    Costochondritis    Coronary arteriosclerosis in native artery  s/p 2 stents. last placed 2 years ago    Gastroparesis    History of laminectomy    S/P hip hemiarthroplasty    S/P appendectomy    MEDICATIONS  (STANDING):  ALPRAZolam 0.25 milliGRAM(s) Oral once  amitriptyline 30 milliGRAM(s) Oral at bedtime  artificial tears (preservative free) Ophthalmic Solution 1 Drop(s) Both EYES two times a day  aspirin enteric coated 81 milliGRAM(s) Oral daily  atorvastatin 40 milliGRAM(s) Oral at bedtime  dextrose 40% Gel 15 Gram(s) Oral once  dextrose 5% + sodium chloride 0.9%. 1000 milliLiter(s) (30 mL/Hr) IV Continuous <Continuous>  dextrose 5%. 1000 milliLiter(s) (50 mL/Hr) IV Continuous <Continuous>  dextrose 5%. 1000 milliLiter(s) (100 mL/Hr) IV Continuous <Continuous>  dextrose 50% Injectable 25 Gram(s) IV Push once  dextrose 50% Injectable 12.5 Gram(s) IV Push once  dextrose 50% Injectable 25 Gram(s) IV Push once  glucagon  Injectable 1 milliGRAM(s) IntraMuscular once  heparin   Injectable 5000 Unit(s) SubCutaneous every 12 hours  insulin lispro (ADMELOG) corrective regimen sliding scale   SubCutaneous three times a day before meals  insulin lispro (ADMELOG) corrective regimen sliding scale   SubCutaneous at bedtime  metoprolol succinate ER 25 milliGRAM(s) Oral daily  pantoprazole  Injectable 40 milliGRAM(s) IV Push two times a day  polyethylene glycol 3350 17 Gram(s) Oral two times a day  senna 2 Tablet(s) Oral at bedtime  sertraline 25 milliGRAM(s) Oral every 12 hours  sucralfate suspension 1 Gram(s) Oral four times a day    MEDICATIONS  (PRN):  acetaminophen     Tablet .. 650 milliGRAM(s) Oral every 6 hours PRN Temp greater or equal to 38C (100.4F), Mild Pain (1 - 3)  aluminum hydroxide/magnesium hydroxide/simethicone Suspension 30 milliLiter(s) Oral every 4 hours PRN Dyspepsia  HYDROmorphone   Tablet 4 milliGRAM(s) Oral every 8 hours PRN Severe Pain (7 - 10)  melatonin 3 milliGRAM(s) Oral at bedtime PRN Insomnia  ondansetron Injectable 4 milliGRAM(s) IV Push every 8 hours PRN Nausea and/or Vomiting    Allergies    morphine (Other)    Intolerances    Vital Signs Last 24 Hrs  T(C): 36.9 (28 Feb 2022 04:41), Max: 36.9 (28 Feb 2022 04:41)  T(F): 98.4 (28 Feb 2022 04:41), Max: 98.4 (28 Feb 2022 04:41)  HR: 92 (28 Feb 2022 04:41) (80 - 92)  BP: 116/68 (28 Feb 2022 04:41) (116/68 - 132/71)  BP(mean): --  RR: 18 (28 Feb 2022 04:41) (18 - 18)  SpO2: 92% (28 Feb 2022 04:41) (92% - 92%)  I&O's Summary    27 Feb 2022 07:01  -  28 Feb 2022 07:00  --------------------------------------------------------  IN: 480 mL / OUT: 0 mL / NET: 480 mL     PHYSICAL EXAM:  TELE: Not on tele  Constitutional: NAD, awake and alert, well-developed  HEENT: Moist Mucous Membranes, Anicteric  Pulmonary: Non-labored, breath sounds are clear bilaterally, No wheezing, rales or rhonchi  Cardiovascular: Regular, S1 and S2, No murmurs, rubs, gallops or clicks  Gastrointestinal: Bowel Sounds present, soft, nontender.   Lymph: No peripheral edema. No lymphadenopathy.  Skin: No visible rashes or ulcers.  Psych:  Mood & affect appropriate  LABS: All Labs Reviewed:                        8.6    5.49  )-----------( 177      ( 28 Feb 2022 08:09 )             25.3                         10.3   6.29  )-----------( 169      ( 27 Feb 2022 07:39 )             30.3                         9.7    6.51  )-----------( 178      ( 26 Feb 2022 10:03 )             28.8     28 Feb 2022 08:09    141    |  112    |  14     ----------------------------<  83     3.7     |  22     |  1.10   27 Feb 2022 07:39    140    |  113    |  18     ----------------------------<  70     4.2     |  20     |  0.97   26 Feb 2022 10:03    140    |  113    |  21     ----------------------------<  132    3.9     |  23     |  1.20     Ca    8.6        28 Feb 2022 08:09  Ca    8.7        27 Feb 2022 07:39  Ca    8.4        26 Feb 2022 10:03    TPro  4.6    /  Alb  2.2    /  TBili  0.3    /  DBili  x      /  AST  22     /  ALT  17     /  AlkPhos  54     28 Feb 2022 08:09  TPro  5.1    /  Alb  2.3    /  TBili  0.4    /  DBili  x      /  AST  22     /  ALT  18     /  AlkPhos  59     27 Feb 2022 07:39  TPro  4.9    /  Alb  2.3    /  TBili  0.3    /  DBili  x      /  AST  18     /  ALT  17     /  AlkPhos  57     26 Feb 2022 10:03      ACC: [id number] EXAM:  XR CHEST PORTABLE URGENT 1V                          PROCEDURE DATE:  02/25/2022          INTERPRETATION:  AP chest on February 25, 2022 at 1:26 AM. Patient has   chest pain.    Elevated diaphragms again noted.    . I see densities in the lower thoracic region again noted.    Heart magnified by technique.    Patient's chin obscures the apices.    No visible lung abnormality.    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    Chest is similar to August 20, 2019.    IMPRESSION: No acute finding or change.    --- End of Report ---    JARRETT SHARIF MD; Attending Radiologist  This document has been electronically signed. Feb 25 2022  1:36PM    Ventricular Rate 82 BPM    Atrial Rate 82 BPM    P-R Interval 138 ms    QRS Duration 80 ms    Q-T Interval 370 ms    QTC Calculation(Bazett) 432 ms    P Axis 79 degrees    R Axis 34 degrees    T Axis 34 degrees    Diagnosis Line Normal sinus rhythm  Nonspecific ST abnormality  Abnormal ECG    Confirmed by jarrett Jose ([id number]) on 2/25/2022 1:30:25 PM            [facility] -- Casper Mcgee, Briana Valles, Eligio Ryan Savella, Goodger  Office # [phone]    Follow Up:  CAD s/p stents, Cardiac Optimization    Subjective/Observations: Denies N/V/abdominal pain.  Denies any respiratory or cardiac discomfort.  Comfortable on RA    REVIEW OF SYSTEMS: All other review of systems is negative unless indicated above  PAST MEDICAL & SURGICAL HISTORY:  H/O vertebral fracture repair    History of vertebral fracture    Dyslipidemia    DM type 2 with diabetic dyslipidemia    H/O gastroesophageal reflux (GERD)    Costochondritis    Coronary arteriosclerosis in native artery  s/p 2 stents. last placed 2 years ago    Gastroparesis    History of laminectomy    S/P hip hemiarthroplasty    S/P appendectomy    MEDICATIONS  (STANDING):  ALPRAZolam 0.25 milliGRAM(s) Oral once  amitriptyline 30 milliGRAM(s) Oral at bedtime  artificial tears (preservative free) Ophthalmic Solution 1 Drop(s) Both EYES two times a day  aspirin enteric coated 81 milliGRAM(s) Oral daily  atorvastatin 40 milliGRAM(s) Oral at bedtime  dextrose 40% Gel 15 Gram(s) Oral once  dextrose 5% + sodium chloride 0.9%. 1000 milliLiter(s) (30 mL/Hr) IV Continuous <Continuous>  dextrose 5%. 1000 milliLiter(s) (50 mL/Hr) IV Continuous <Continuous>  dextrose 5%. 1000 milliLiter(s) (100 mL/Hr) IV Continuous <Continuous>  dextrose 50% Injectable 25 Gram(s) IV Push once  dextrose 50% Injectable 12.5 Gram(s) IV Push once  dextrose 50% Injectable 25 Gram(s) IV Push once  glucagon  Injectable 1 milliGRAM(s) IntraMuscular once  heparin   Injectable 5000 Unit(s) SubCutaneous every 12 hours  insulin lispro (ADMELOG) corrective regimen sliding scale   SubCutaneous three times a day before meals  insulin lispro (ADMELOG) corrective regimen sliding scale   SubCutaneous at bedtime  metoprolol succinate ER 25 milliGRAM(s) Oral daily  pantoprazole  Injectable 40 milliGRAM(s) IV Push two times a day  polyethylene glycol 3350 17 Gram(s) Oral two times a day  senna 2 Tablet(s) Oral at bedtime  sertraline 25 milliGRAM(s) Oral every 12 hours  sucralfate suspension 1 Gram(s) Oral four times a day    MEDICATIONS  (PRN):  acetaminophen     Tablet .. 650 milliGRAM(s) Oral every 6 hours PRN Temp greater or equal to 38C (100.4F), Mild Pain (1 - 3)  aluminum hydroxide/magnesium hydroxide/simethicone Suspension 30 milliLiter(s) Oral every 4 hours PRN Dyspepsia  HYDROmorphone   Tablet 4 milliGRAM(s) Oral every 8 hours PRN Severe Pain (7 - 10)  melatonin 3 milliGRAM(s) Oral at bedtime PRN Insomnia  ondansetron Injectable 4 milliGRAM(s) IV Push every 8 hours PRN Nausea and/or Vomiting    Allergies    morphine (Other)    Intolerances    Vital Signs Last 24 Hrs  T(C): 36.9 (28 Feb 2022 04:41), Max: 36.9 (28 Feb 2022 04:41)  T(F): 98.4 (28 Feb 2022 04:41), Max: 98.4 (28 Feb 2022 04:41)  HR: 92 (28 Feb 2022 04:41) (80 - 92)  BP: 116/68 (28 Feb 2022 04:41) (116/68 - 132/71)  BP(mean): --  RR: 18 (28 Feb 2022 04:41) (18 - 18)  SpO2: 92% (28 Feb 2022 04:41) (92% - 92%)  I&O's Summary    27 Feb 2022 07:01  -  28 Feb 2022 07:00  --------------------------------------------------------  IN: 480 mL / OUT: 0 mL / NET: 480 mL     PHYSICAL EXAM:  TELE: Not on tele  Constitutional: NAD, awake and alert, well-developed  HEENT: Moist Mucous Membranes, Anicteric  Pulmonary: Non-labored, breath sounds are clear bilaterally, No wheezing, rales or rhonchi  Cardiovascular: Regular, S1 and S2, + murmurs, no rubs, gallops or clicks  Gastrointestinal: Bowel Sounds present, soft, nontender.   Lymph: No peripheral edema. No lymphadenopathy.  Skin: No visible rashes or ulcers.  Psych:  Mood & affect appropriate  LABS: All Labs Reviewed:                        8.6    5.49  )-----------( 177      ( 28 Feb 2022 08:09 )             25.3                         10.3   6.29  )-----------( 169      ( 27 Feb 2022 07:39 )             30.3                         9.7    6.51  )-----------( 178      ( 26 Feb 2022 10:03 )             28.8     28 Feb 2022 08:09    141    |  112    |  14     ----------------------------<  83     3.7     |  22     |  1.10   27 Feb 2022 07:39    140    |  113    |  18     ----------------------------<  70     4.2     |  20     |  0.97   26 Feb 2022 10:03    140    |  113    |  21     ----------------------------<  132    3.9     |  23     |  1.20     Ca    8.6        28 Feb 2022 08:09  Ca    8.7        27 Feb 2022 07:39  Ca    8.4        26 Feb 2022 10:03    TPro  4.6    /  Alb  2.2    /  TBili  0.3    /  DBili  x      /  AST  22     /  ALT  17     /  AlkPhos  54     28 Feb 2022 08:09  TPro  5.1    /  Alb  2.3    /  TBili  0.4    /  DBili  x      /  AST  22     /  ALT  18     /  AlkPhos  59     27 Feb 2022 07:39  TPro  4.9    /  Alb  2.3    /  TBili  0.3    /  DBili  x      /  AST  18     /  ALT  17     /  AlkPhos  57     26 Feb 2022 10:03      ACC: [id number] EXAM:  XR CHEST PORTABLE URGENT 1V                          PROCEDURE DATE:  02/25/2022          INTERPRETATION:  AP chest on February 25, 2022 at 1:26 AM. Patient has   chest pain.    Elevated diaphragms again noted.    . I see densities in the lower thoracic region again noted.    Heart magnified by technique.    Patient's chin obscures the apices.    No visible lung abnormality.    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    Chest is similar to August 20, 2019.    IMPRESSION: No acute finding or change.    --- End of Report ---    JARRETT SHARIF MD; Attending Radiologist  This document has been electronically signed. Feb 25 2022  1:36PM    Ventricular Rate 82 BPM    Atrial Rate 82 BPM    P-R Interval 138 ms    QRS Duration 80 ms    Q-T Interval 370 ms    QTC Calculation(Bazett) 432 ms    P Axis 79 degrees    R Axis 34 degrees    T Axis 34 degrees    Diagnosis Line Normal sinus rhythm  Nonspecific ST abnormality  Abnormal ECG    Confirmed by jarrett Jose ([id number]) on 2/25/2022 1:30:25 PM

## 2022-02-28 NOTE — PROGRESS NOTE ADULT - ASSESSMENT
89 yo woman admitted with acute pancreatitis; hematology asked to evaluate for acute anemia with Hg drop to ~5 but suspected to be due to lab error as blood drawn above IV access line  - iron studies noted and equivocal with low TIBC 120 and Ferritin 124  - suspect anemia multifactorial with aggressive hydration and acute illness  - supportive transfusion to maintain Hg >7 g/dL  - awaiting MRI abdomen to evaluate for pancreatic lesion  - continue medical management for pancreatitis  - will follow with you

## 2022-02-28 NOTE — PROGRESS NOTE ADULT - SUBJECTIVE AND OBJECTIVE BOX
[facility]  Shar Hopkins PA-C  Formerly [facility], [facility] [place] [address]  [phone]      INTERVAL HPI/OVERNIGHT EVENTS:  Pt s/e  Pt reports she feels tired but otherwise denies GI complaints    MEDICATIONS  (STANDING):  ALPRAZolam 0.25 milliGRAM(s) Oral once  amitriptyline 30 milliGRAM(s) Oral at bedtime  artificial tears (preservative free) Ophthalmic Solution 1 Drop(s) Both EYES two times a day  aspirin enteric coated 81 milliGRAM(s) Oral daily  atorvastatin 40 milliGRAM(s) Oral at bedtime  dextrose 40% Gel 15 Gram(s) Oral once  dextrose 5% + sodium chloride 0.9%. 1000 milliLiter(s) (30 mL/Hr) IV Continuous <Continuous>  dextrose 5%. 1000 milliLiter(s) (50 mL/Hr) IV Continuous <Continuous>  dextrose 5%. 1000 milliLiter(s) (100 mL/Hr) IV Continuous <Continuous>  dextrose 50% Injectable 25 Gram(s) IV Push once  dextrose 50% Injectable 12.5 Gram(s) IV Push once  dextrose 50% Injectable 25 Gram(s) IV Push once  glucagon  Injectable 1 milliGRAM(s) IntraMuscular once  heparin   Injectable 5000 Unit(s) SubCutaneous every 12 hours  insulin lispro (ADMELOG) corrective regimen sliding scale   SubCutaneous three times a day before meals  insulin lispro (ADMELOG) corrective regimen sliding scale   SubCutaneous at bedtime  metoprolol succinate ER 25 milliGRAM(s) Oral daily  pantoprazole  Injectable 40 milliGRAM(s) IV Push two times a day  polyethylene glycol 3350 17 Gram(s) Oral two times a day  senna 2 Tablet(s) Oral at bedtime  sertraline 25 milliGRAM(s) Oral every 12 hours  sucralfate suspension 1 Gram(s) Oral four times a day    MEDICATIONS  (PRN):  acetaminophen     Tablet .. 650 milliGRAM(s) Oral every 6 hours PRN Temp greater or equal to 38C (100.4F), Mild Pain (1 - 3)  aluminum hydroxide/magnesium hydroxide/simethicone Suspension 30 milliLiter(s) Oral every 4 hours PRN Dyspepsia  HYDROmorphone   Tablet 4 milliGRAM(s) Oral every 8 hours PRN Severe Pain (7 - 10)  melatonin 3 milliGRAM(s) Oral at bedtime PRN Insomnia  ondansetron Injectable 4 milliGRAM(s) IV Push every 8 hours PRN Nausea and/or Vomiting      Allergies    morphine (Other)    PHYSICAL EXAM:   Vital Signs:  Vital Signs Last 24 Hrs  T(C): 36.9 (2022 04:41), Max: 36.9 (2022 04:41)  T(F): 98.4 (2022 04:41), Max: 98.4 (2022 04:41)  HR: 92 (2022 04:41) (80 - 92)  BP: 116/68 (2022 04:41) (116/68 - 132/71)  BP(mean): --  RR: 18 (2022 04:41) (18 - 18)  SpO2: 92% (2022 04:41) (92% - 92%)  Daily     Daily Weight in k.5 (2022 04:41)    GENERAL:  Appears stated age  ABDOMEN:  Soft, non-tender, non-distended  NEURO:  Alert      LABS:                        8.6    5.49  )-----------( 177      ( 2022 08:09 )             25.3         141  |  112<H>  |  14  ----------------------------<  83  3.7   |  22  |  1.10    Ca    8.6      2022 08:09    TPro  4.6<L>  /  Alb  2.2<L>  /  TBili  0.3  /  DBili  x   /  AST  22  /  ALT  17  /  AlkPhos  54

## 2022-02-28 NOTE — PROGRESS NOTE ADULT - ASSESSMENT
The patient is a 89 year old female with PMH CAD s/p stents in 2012, Type 2 diabetes, HTN, HLD, GERD, mild AS, chronic venous insufficiency, neuropathy, macular degeneration, chronic constipation, depression, OA, lumbar degenerative disc disease, and spinal stenosis who presented to the ER complaining of nausea, abdominal pain, and burning chest pain. Admitted for pancreatitis, KERRY.  intact/manual removal/to pathology

## 2022-02-28 NOTE — PROGRESS NOTE ADULT - ASSESSMENT
abdominal pain  ? pancreatitis    diet as tolerated  proton pump inhibitor bid  carafate 1g four times a day  non contrast ct of the abdomen shows atrophic pancreas  no carmen-pancreatic inflammation  her GFR will not allow IV contrast CT  can check MRI with IV contrast (new generation gadolinum) for further evaluation, diagnosis of mass needs to be entertained given weight loss and chronicity of symptoms  d/w primary    I reviewed the overnight course of events on the unit, re-confirming the patient history. I discussed the care with the patient and their family  Differential diagnosis and plan of care discussed with patient after the evaluation  35 minutes spent on total encounter of which more than fifty percent of the encounter was spent counseling and/or coordinating care by the attending physician.  Advanced care planning was discussed with patient and family.  Advanced care planning forms were reviewed and discussed.  Risks, benefits and alternatives of gastroenterologic procedures were discussed in detail and all questions were answered.

## 2022-02-28 NOTE — PHYSICAL THERAPY INITIAL EVALUATION ADULT - ADDITIONAL COMMENTS
Pt has no steps to enter. Pt has HHA's at all times except for a few hours in the middle of the night according to her daughter. Pt owns several rolling walkers

## 2022-02-28 NOTE — PROGRESS NOTE ADULT - PROBLEM SELECTOR PLAN 2
BUN/Cr 34/1.8, baseline kidney function appears to be approximately 1- RESOLVED  - Likely prerenal in setting of decreased PO intake   - will continue D5+NS 30cc/hr  - Hold enalapril in setting of KERRY   - Monitor BMP daily BUN/Cr 34/1.8, baseline kidney function appears to be approximately 1- RESOLVED  - Likely prerenal in setting of decreased PO intake   - STOP D5+NS 30cc/hr  - Hold enalapril in setting of KERRY   - Monitor BMP daily

## 2022-02-28 NOTE — PROGRESS NOTE ADULT - PROBLEM SELECTOR PLAN 12
Chronic, stable  - Patient takes Dilaudid 4 mg PRN every 8hr PRN for pain, will continue    #DVT  - heparin 5000u SQ q12hrs Chronic, stable  - Patient takes Dilaudid 4 mg PRN every 8hr PRN for pain, will continue    #DVT  - heparin 5000u SQ q12hrs      - PT consult, f/u recs  - palliative consult, f/u recs

## 2022-02-28 NOTE — PROGRESS NOTE ADULT - PROBLEM SELECTOR PLAN 4
H/o CAD s/p stent placement in 2012  - epigastric pain on admission more likely 2/2 pancreatitis than cardiac etiology at this time  - EKG shows NSR, no ST or T wave abnormalities   - Trops negative x 2  - Continue aspirin 81 mg daily   - Continue atorvastatin 40 mg daily  - Continue metoprolol succinate 25 mg daily with hold parameters   - Hold enalapril in setting of KERRY, resume when resolved   - Cardio (Dr Jose)

## 2022-02-28 NOTE — PROGRESS NOTE ADULT - PROBLEM SELECTOR PLAN 5
UA significant for large blood, moderate ketones, moderate leuk esterase, 26-50 WBCs, moderate bacteria, few yeast cells  - Patient is completely asymptomatic, no leukocytosis or fever   - Will monitor off abx for now   - F/u urine cx- >3organisms, likely contaminant UA significant for large blood, moderate ketones, moderate leuk esterase, 26-50 WBCs, moderate bacteria, few yeast cells  - Patient is completely asymptomatic, no leukocytosis or fever   - Will monitor off abx for now   - F/u urine cx -> 3 organisms, likely contaminant

## 2022-03-01 LAB
ALBUMIN SERPL ELPH-MCNC: 2 G/DL — LOW (ref 3.3–5)
ALP SERPL-CCNC: 52 U/L — SIGNIFICANT CHANGE UP (ref 40–120)
ALT FLD-CCNC: 18 U/L — SIGNIFICANT CHANGE UP (ref 12–78)
ANION GAP SERPL CALC-SCNC: 6 MMOL/L — SIGNIFICANT CHANGE UP (ref 5–17)
AST SERPL-CCNC: 20 U/L — SIGNIFICANT CHANGE UP (ref 15–37)
BASOPHILS # BLD AUTO: 0.02 K/UL — SIGNIFICANT CHANGE UP (ref 0–0.2)
BASOPHILS NFR BLD AUTO: 0.4 % — SIGNIFICANT CHANGE UP (ref 0–2)
BILIRUB SERPL-MCNC: 0.3 MG/DL — SIGNIFICANT CHANGE UP (ref 0.2–1.2)
BUN SERPL-MCNC: 12 MG/DL — SIGNIFICANT CHANGE UP (ref 7–23)
CALCIUM SERPL-MCNC: 8.5 MG/DL — SIGNIFICANT CHANGE UP (ref 8.5–10.1)
CHLORIDE SERPL-SCNC: 112 MMOL/L — HIGH (ref 96–108)
CO2 SERPL-SCNC: 23 MMOL/L — SIGNIFICANT CHANGE UP (ref 22–31)
CREAT SERPL-MCNC: 0.89 MG/DL — SIGNIFICANT CHANGE UP (ref 0.5–1.3)
EGFR: 62 ML/MIN/1.73M2 — SIGNIFICANT CHANGE UP
EOSINOPHIL # BLD AUTO: 0.23 K/UL — SIGNIFICANT CHANGE UP (ref 0–0.5)
EOSINOPHIL NFR BLD AUTO: 4.5 % — SIGNIFICANT CHANGE UP (ref 0–6)
GLUCOSE SERPL-MCNC: 90 MG/DL — SIGNIFICANT CHANGE UP (ref 70–99)
HCT VFR BLD CALC: 23.2 % — LOW (ref 34.5–45)
HGB BLD-MCNC: 8.2 G/DL — LOW (ref 11.5–15.5)
IMM GRANULOCYTES NFR BLD AUTO: 0.4 % — SIGNIFICANT CHANGE UP (ref 0–1.5)
LIDOCAIN IGE QN: 489 U/L — HIGH (ref 73–393)
LYMPHOCYTES # BLD AUTO: 0.56 K/UL — LOW (ref 1–3.3)
LYMPHOCYTES # BLD AUTO: 11.1 % — LOW (ref 13–44)
MCHC RBC-ENTMCNC: 31.2 PG — SIGNIFICANT CHANGE UP (ref 27–34)
MCHC RBC-ENTMCNC: 35.3 GM/DL — SIGNIFICANT CHANGE UP (ref 32–36)
MCV RBC AUTO: 88.2 FL — SIGNIFICANT CHANGE UP (ref 80–100)
MONOCYTES # BLD AUTO: 0.52 K/UL — SIGNIFICANT CHANGE UP (ref 0–0.9)
MONOCYTES NFR BLD AUTO: 10.3 % — SIGNIFICANT CHANGE UP (ref 2–14)
NEUTROPHILS # BLD AUTO: 3.71 K/UL — SIGNIFICANT CHANGE UP (ref 1.8–7.4)
NEUTROPHILS NFR BLD AUTO: 73.3 % — SIGNIFICANT CHANGE UP (ref 43–77)
NRBC # BLD: 0 /100 WBCS — SIGNIFICANT CHANGE UP (ref 0–0)
PLATELET # BLD AUTO: 177 K/UL — SIGNIFICANT CHANGE UP (ref 150–400)
POTASSIUM SERPL-MCNC: 3.7 MMOL/L — SIGNIFICANT CHANGE UP (ref 3.5–5.3)
POTASSIUM SERPL-SCNC: 3.7 MMOL/L — SIGNIFICANT CHANGE UP (ref 3.5–5.3)
PROT SERPL-MCNC: 4.3 G/DL — LOW (ref 6–8.3)
RBC # BLD: 2.63 M/UL — LOW (ref 3.8–5.2)
RBC # FLD: 16.5 % — HIGH (ref 10.3–14.5)
SODIUM SERPL-SCNC: 141 MMOL/L — SIGNIFICANT CHANGE UP (ref 135–145)
WBC # BLD: 5.06 K/UL — SIGNIFICANT CHANGE UP (ref 3.8–10.5)
WBC # FLD AUTO: 5.06 K/UL — SIGNIFICANT CHANGE UP (ref 3.8–10.5)

## 2022-03-01 PROCEDURE — 99232 SBSQ HOSP IP/OBS MODERATE 35: CPT

## 2022-03-01 PROCEDURE — 99231 SBSQ HOSP IP/OBS SF/LOW 25: CPT

## 2022-03-01 PROCEDURE — 78306 BONE IMAGING WHOLE BODY: CPT | Mod: 26

## 2022-03-01 RX ORDER — LINAGLIPTIN 5 MG/1
1 TABLET, FILM COATED ORAL
Qty: 0 | Refills: 0 | DISCHARGE

## 2022-03-01 RX ORDER — FERROUS SULFATE 325(65) MG
325 TABLET ORAL DAILY
Refills: 0 | Status: DISCONTINUED | OUTPATIENT
Start: 2022-03-01 | End: 2022-03-02

## 2022-03-01 RX ORDER — ALPRAZOLAM 0.25 MG
0.25 TABLET ORAL ONCE
Refills: 0 | Status: DISCONTINUED | OUTPATIENT
Start: 2022-03-01 | End: 2022-03-01

## 2022-03-01 RX ORDER — METFORMIN HYDROCHLORIDE 850 MG/1
1 TABLET ORAL
Qty: 0 | Refills: 0 | DISCHARGE

## 2022-03-01 RX ADMIN — Medication 0.25 MILLIGRAM(S): at 13:06

## 2022-03-01 RX ADMIN — SENNA PLUS 2 TABLET(S): 8.6 TABLET ORAL at 21:12

## 2022-03-01 RX ADMIN — SERTRALINE 25 MILLIGRAM(S): 25 TABLET, FILM COATED ORAL at 18:10

## 2022-03-01 RX ADMIN — Medication 25 MILLIGRAM(S): at 05:26

## 2022-03-01 RX ADMIN — PANTOPRAZOLE SODIUM 40 MILLIGRAM(S): 20 TABLET, DELAYED RELEASE ORAL at 18:10

## 2022-03-01 RX ADMIN — Medication 30 MILLIGRAM(S): at 21:12

## 2022-03-01 RX ADMIN — Medication 81 MILLIGRAM(S): at 12:23

## 2022-03-01 RX ADMIN — Medication 1 GRAM(S): at 18:10

## 2022-03-01 RX ADMIN — Medication 0.25 MILLIGRAM(S): at 22:04

## 2022-03-01 RX ADMIN — HEPARIN SODIUM 5000 UNIT(S): 5000 INJECTION INTRAVENOUS; SUBCUTANEOUS at 18:10

## 2022-03-01 RX ADMIN — HYDROMORPHONE HYDROCHLORIDE 4 MILLIGRAM(S): 2 INJECTION INTRAMUSCULAR; INTRAVENOUS; SUBCUTANEOUS at 20:30

## 2022-03-01 RX ADMIN — Medication 1 GRAM(S): at 12:22

## 2022-03-01 RX ADMIN — HYDROMORPHONE HYDROCHLORIDE 4 MILLIGRAM(S): 2 INJECTION INTRAMUSCULAR; INTRAVENOUS; SUBCUTANEOUS at 12:23

## 2022-03-01 RX ADMIN — HYDROMORPHONE HYDROCHLORIDE 4 MILLIGRAM(S): 2 INJECTION INTRAMUSCULAR; INTRAVENOUS; SUBCUTANEOUS at 21:00

## 2022-03-01 RX ADMIN — Medication 1 DROP(S): at 18:10

## 2022-03-01 RX ADMIN — Medication 1: at 12:23

## 2022-03-01 RX ADMIN — Medication 3 MILLIGRAM(S): at 21:12

## 2022-03-01 RX ADMIN — ATORVASTATIN CALCIUM 40 MILLIGRAM(S): 80 TABLET, FILM COATED ORAL at 21:12

## 2022-03-01 RX ADMIN — SERTRALINE 25 MILLIGRAM(S): 25 TABLET, FILM COATED ORAL at 05:26

## 2022-03-01 RX ADMIN — HEPARIN SODIUM 5000 UNIT(S): 5000 INJECTION INTRAVENOUS; SUBCUTANEOUS at 05:26

## 2022-03-01 RX ADMIN — POLYETHYLENE GLYCOL 3350 17 GRAM(S): 17 POWDER, FOR SOLUTION ORAL at 21:12

## 2022-03-01 RX ADMIN — Medication 1 GRAM(S): at 05:26

## 2022-03-01 RX ADMIN — POLYETHYLENE GLYCOL 3350 17 GRAM(S): 17 POWDER, FOR SOLUTION ORAL at 05:28

## 2022-03-01 RX ADMIN — PANTOPRAZOLE SODIUM 40 MILLIGRAM(S): 20 TABLET, DELAYED RELEASE ORAL at 05:26

## 2022-03-01 NOTE — PROGRESS NOTE ADULT - ASSESSMENT
89 yo woman admitted with acute pancreatitis; hematology asked to evaluate for acute anemia with Hg drop to ~5 but suspected to be due to lab error as blood drawn above IV access line  - iron studies not c/w iron def w TIBC only in 100s  - suspect anemia multifactorial with aggressive hydration and acute illness, poor response during stress. Recommend symptomatic management  - MRI show head of pancreas cystic lesion w internal septation 1.6x1cm, but also right iliac bone lesions x21.5cm and 0.7cm but incompletely imaged, and recom bone scan or MRI pelvis. Will order Bone Scan(CT pelvis did not mention bone lesion but diffuse osteopenia. Pt does c/o right leg/thigh pain(chronic?)

## 2022-03-01 NOTE — PROGRESS NOTE ADULT - SUBJECTIVE AND OBJECTIVE BOX
[facility]  Shar Hopkins PA-C  [facility] [place] [address]  [phone]      INTERVAL HPI/OVERNIGHT EVENTS:  Pt s/e  Pt states she feels well, denies N/V or abdominal pain or further GI complaints    MEDICATIONS  (STANDING):  amitriptyline 30 milliGRAM(s) Oral at bedtime  artificial tears (preservative free) Ophthalmic Solution 1 Drop(s) Both EYES two times a day  aspirin enteric coated 81 milliGRAM(s) Oral daily  atorvastatin 40 milliGRAM(s) Oral at bedtime  dextrose 40% Gel 15 Gram(s) Oral once  dextrose 5%. 1000 milliLiter(s) (50 mL/Hr) IV Continuous <Continuous>  dextrose 5%. 1000 milliLiter(s) (100 mL/Hr) IV Continuous <Continuous>  dextrose 50% Injectable 25 Gram(s) IV Push once  dextrose 50% Injectable 12.5 Gram(s) IV Push once  dextrose 50% Injectable 25 Gram(s) IV Push once  glucagon  Injectable 1 milliGRAM(s) IntraMuscular once  heparin   Injectable 5000 Unit(s) SubCutaneous every 12 hours  insulin lispro (ADMELOG) corrective regimen sliding scale   SubCutaneous three times a day before meals  insulin lispro (ADMELOG) corrective regimen sliding scale   SubCutaneous at bedtime  metoprolol succinate ER 25 milliGRAM(s) Oral daily  pantoprazole  Injectable 40 milliGRAM(s) IV Push two times a day  polyethylene glycol 3350 17 Gram(s) Oral two times a day  senna 2 Tablet(s) Oral at bedtime  sertraline 25 milliGRAM(s) Oral every 12 hours  sucralfate suspension 1 Gram(s) Oral four times a day    MEDICATIONS  (PRN):  acetaminophen     Tablet .. 650 milliGRAM(s) Oral every 6 hours PRN Temp greater or equal to 38C (100.4F), Mild Pain (1 - 3)  aluminum hydroxide/magnesium hydroxide/simethicone Suspension 30 milliLiter(s) Oral every 4 hours PRN Dyspepsia  HYDROmorphone   Tablet 4 milliGRAM(s) Oral every 8 hours PRN Severe Pain (7 - 10)  melatonin 3 milliGRAM(s) Oral at bedtime PRN Insomnia  ondansetron Injectable 4 milliGRAM(s) IV Push every 8 hours PRN Nausea and/or Vomiting      Allergies    morphine (Other)    PHYSICAL EXAM:   Vital Signs:  Vital Signs Last 24 Hrs  T(C): 36.5 (01 Mar 2022 04:45), Max: 36.5 (01 Mar 2022 04:45)  T(F): 97.7 (01 Mar 2022 04:45), Max: 97.7 (01 Mar 2022 04:45)  HR: 61 (01 Mar 2022 04:45) (61 - 94)  BP: 126/70 (01 Mar 2022 04:45) (118/71 - 126/70)  BP(mean): --  RR: 18 (01 Mar 2022 04:45) (18 - 18)  SpO2: 92% (01 Mar 2022 04:45) (92% - 92%)  Daily     Daily Weight in k.5 (01 Mar 2022 04:45)    GENERAL:  Appears stated age  ABDOMEN:  Soft, non-tender, non-distended  NEURO:  Alert      LABS:                        8.2    5.06  )-----------( 177      ( 01 Mar 2022 07:55 )             23.2     03-01    141  |  112<H>  |  12  ----------------------------<  90  3.7   |  23  |  0.89    Ca    8.5      01 Mar 2022 07:55    TPro  4.3<L>  /  Alb  2.0<L>  /  TBili  0.3  /  DBili  x   /  AST  20  /  ALT  18  /  AlkPhos  52  03-    RADIOLOGY:  < from: MR Abdomen w/wo IV Cont (22 @ 14:31) >    ACC: [id number] EXAM:  MR ABDOMEN WAW IC                          PROCEDURE DATE:  2022          INTERPRETATION:  CLINICAL INFORMATION: History of p.o. intake and weight   loss. Evaluate for pancreatic cancer. Pancreatitis.    COMPARISON: CT abdomen/pelvis, right upper quadrant ultrasound, and   nuclear medicine hepatobiliary study 2022.    CONTRAST/COMPLICATIONS:  IV Contrast: Gadavist  6 cc administered   1.5 cc discarded  Oral Contrast: NONE  Complications: None reported at time of study completion    PROCEDURE:  MRI of the abdomen was performed.  MRCP was performed.  The examination is limited secondary to motion artifact.    FINDINGS:  LOWER CHEST: Bilateral pleural effusions, larger on the right.    LIVER: No focal hepatic lesion.  BILE DUCTS: Normal caliber.  GALLBLADDER: Cholelithiasis.  SPLEEN: Within normal limits.  PANCREAS: Cystic lesion in the pancreatic head with a few thin internal   septations measuring 1.6 x 1.0 cm (series 2 image 17). Evaluation for   enhancement is limited secondary to motion artifact. No pancreatic ductal   dilatation is seen. Small amount of peripancreatic fluid. Fatty   infiltration of the pancreas.  ADRENALS: Within normal limits.  KIDNEYS/URETERS: No hydronephrosis. Right renal cysts.    VISUALIZED PORTIONS:  BOWEL: Normal caliber.  PERITONEUM: Small amount of ascites.  VESSELS: Abdominal aorta normal in caliber. Portal vein is patent.  RETROPERITONEUM/LYMPH NODES: No lymphadenopathy.  ABDOMINAL WALL: Fat-containing hernia withinthe posterior abdominal   wall, posterior to the left kidney. Tiny fat-containing umbilical hernia.  BONES: Compression deformities again seen in the spine. Partially imaged   enhancing lesions in the right iliac bone measuring 1.5 cm and 0.7 cm.    IMPRESSION:    Examination limited secondary to motion artifact.    Cystic lesion in the pancreatic head with a few thin internal septations   measuring 1.6 x 1.0 cm; follow-up is recommended.    Small amount of peripancreatic fluid consistent with pancreatitis.    Cholelithiasis.    Partially imaged enhancing lesions in the right iliac bone measuring 1.5   cm and 0.7 cm, incompletely characterized on this study; a bone scan or   MRI of the pelvis could be obtained for further evaluation.    --- End of Report ---            BRENDON HURD MD; Attending Radiologist  This document has been electronically signed. 2022  5:16PM    < end of copied text >

## 2022-03-01 NOTE — PROGRESS NOTE ADULT - SUBJECTIVE AND OBJECTIVE BOX
All interim records and events noted.    up in chair, c/o same right leg/thigh pain      MEDICATIONS  (STANDING):  amitriptyline 30 milliGRAM(s) Oral at bedtime  artificial tears (preservative free) Ophthalmic Solution 1 Drop(s) Both EYES two times a day  aspirin enteric coated 81 milliGRAM(s) Oral daily  atorvastatin 40 milliGRAM(s) Oral at bedtime  dextrose 40% Gel 15 Gram(s) Oral once  dextrose 5%. 1000 milliLiter(s) (50 mL/Hr) IV Continuous <Continuous>  dextrose 5%. 1000 milliLiter(s) (100 mL/Hr) IV Continuous <Continuous>  dextrose 50% Injectable 25 Gram(s) IV Push once  dextrose 50% Injectable 12.5 Gram(s) IV Push once  dextrose 50% Injectable 25 Gram(s) IV Push once  glucagon  Injectable 1 milliGRAM(s) IntraMuscular once  heparin   Injectable 5000 Unit(s) SubCutaneous every 12 hours  insulin lispro (ADMELOG) corrective regimen sliding scale   SubCutaneous three times a day before meals  insulin lispro (ADMELOG) corrective regimen sliding scale   SubCutaneous at bedtime  metoprolol succinate ER 25 milliGRAM(s) Oral daily  pantoprazole  Injectable 40 milliGRAM(s) IV Push two times a day  polyethylene glycol 3350 17 Gram(s) Oral two times a day  senna 2 Tablet(s) Oral at bedtime  sertraline 25 milliGRAM(s) Oral every 12 hours  sucralfate suspension 1 Gram(s) Oral four times a day    MEDICATIONS  (PRN):  acetaminophen     Tablet .. 650 milliGRAM(s) Oral every 6 hours PRN Temp greater or equal to 38C (100.4F), Mild Pain (1 - 3)  aluminum hydroxide/magnesium hydroxide/simethicone Suspension 30 milliLiter(s) Oral every 4 hours PRN Dyspepsia  HYDROmorphone   Tablet 4 milliGRAM(s) Oral every 8 hours PRN Severe Pain (7 - 10)  melatonin 3 milliGRAM(s) Oral at bedtime PRN Insomnia  ondansetron Injectable 4 milliGRAM(s) IV Push every 8 hours PRN Nausea and/or Vomiting      Vital Signs Last 24 Hrs  T(C): 36.5 (01 Mar 2022 04:45), Max: 36.5 (01 Mar 2022 04:45)  T(F): 97.7 (01 Mar 2022 04:45), Max: 97.7 (01 Mar 2022 04:45)  HR: 61 (01 Mar 2022 04:45) (61 - 94)  BP: 126/70 (01 Mar 2022 04:45) (118/71 - 126/70)  BP(mean): --  RR: 18 (01 Mar 2022 04:45) (18 - 18)  SpO2: 92% (01 Mar 2022 04:45) (92% - 92%)    PHYSICAL EXAM  General: frail thin elderly woman in chair, in no acute distress  Head: atraumatic, normocephalic  ENT: sclera anicteric  Neck: supple, trachea midline  CV: S1 S2, regular rate and rhythm  Lungs: clear to auscultation, no wheezes/rhonchi  Abdomen: soft, nontender, bowel sounds present, no palpable masses  Extrem: no clubbing/cyanosis/edema  Skin: no significant increased ecchymosis/petechiae  Neuro: alert and responsive,  no focal deficits      LABS:             8.2    5.06  )-----------( 177      ( 03-01 @ 07:55 )             23.2                8.6    5.49  )-----------( 177      ( 02-28 @ 08:09 )             25.3                10.3   6.29  )-----------( 169      ( 02-27 @ 07:39 )             30.3       03-01    141  |  112<H>  |  12  ----------------------------<  90  3.7   |  23  |  0.89    Ca    8.5      01 Mar 2022 07:55    TPro  4.3<L>  /  Alb  2.0<L>  /  TBili  0.3  /  DBili  x   /  AST  20  /  ALT  18  /  AlkPhos  52  03-01        RADIOLOGY & ADDITIONAL STUDIES:  < from: MR Abdomen w/wo IV Cont (02.28.22 @ 14:31) >    ACC: [id number] EXAM:  MR ABDOMEN WAW IC                          PROCEDURE DATE:  02/28/2022          INTERPRETATION:  CLINICAL INFORMATION: History of p.o. intake and weight   loss. Evaluate for pancreatic cancer. Pancreatitis.    COMPARISON: CT abdomen/pelvis, right upper quadrant ultrasound, and   nuclear medicine hepatobiliary study 2/25/2022.    CONTRAST/COMPLICATIONS:  IV Contrast: Gadavist  6 cc administered   1.5 cc discarded  Oral Contrast: NONE  Complications: None reported at time of study completion    PROCEDURE:  MRI of the abdomen was performed.  MRCP was performed.  The examination is limited secondary to motion artifact.    FINDINGS:  LOWER CHEST: Bilateral pleural effusions, larger on the right.    LIVER: No focal hepatic lesion.  BILE DUCTS: Normal caliber.  GALLBLADDER: Cholelithiasis.  SPLEEN: Within normal limits.  PANCREAS: Cystic lesion in the pancreatic head with a few thin internal   septations measuring 1.6 x 1.0 cm (series 2 image 17). Evaluation for   enhancement is limited secondary to motion artifact. No pancreatic ductal   dilatation is seen. Small amount of peripancreatic fluid. Fatty   infiltration of the pancreas.  ADRENALS: Within normal limits.  KIDNEYS/URETERS: No hydronephrosis. Right renal cysts.    VISUALIZED PORTIONS:  BOWEL: Normal caliber.  PERITONEUM: Small amount of ascites.  VESSELS: Abdominal aorta normal in caliber. Portal vein is patent.  RETROPERITONEUM/LYMPH NODES: No lymphadenopathy.  ABDOMINAL WALL: Fat-containing hernia withinthe posterior abdominal   wall, posterior to the left kidney. Tiny fat-containing umbilical hernia.  BONES: Compression deformities again seen in the spine. Partially imaged   enhancing lesions in the right iliac bone measuring 1.5 cm and 0.7 cm.    IMPRESSION:    Examination limited secondary to motion artifact.    Cystic lesion in the pancreatic head with a few thin internal septations   measuring 1.6 x 1.0 cm; follow-up is recommended.    Small amount of peripancreatic fluid consistent with pancreatitis.    Cholelithiasis.    Partially imaged enhancing lesions in the right iliac bone measuring 1.5   cm and 0.7 cm, incompletely characterized on this study; a bone scan or   MRI of the pelvis could be obtained for further evaluation.    --- End of Report ---    < end of copied text >    < from: CT Abdomen and Pelvis No Cont (02.25.22 @ 02:52) >    ACC: [id number] EXAM:  CT ABDOMEN AND PELVIS                          PROCEDURE DATE:  02/25/2022          INTERPRETATION:  CLINICAL INFORMATION: Nausea, chest/epigastric pain,   elevated lipase, question pancreatitis    COMPARISON: None.    CONTRAST/COMPLICATIONS:  IV Contrast: NONE  0 cc administered   0 cc discarded  Oral Contrast: NONE  Complications: None reported at time of study completion    PROCEDURE:  CT of the Abdomen and Pelvis was performed.  Sagittal and coronal reformats were performed.    FINDINGS:  LOWER CHEST: Mitral valve annulus calcifications. Coronary artery and   valvular calcifications.    LIVER: Within normal limits.  BILE DUCTS: Normal caliber.  GALLBLADDER: Cholelithiasis without CT evidence for acute cholecystitis.  SPLEEN: Within normal limits.  PANCREAS: Marked fatty replacement of the pancreas  ADRENALS: Within normal limits.  KIDNEYS/URETERS: Within normal limits.    BLADDER: Within normal limits.  REPRODUCTIVE ORGANS: Atrophic uterus.    BOWEL: No bowel obstruction. Appendix is normal. Diverticulosis without   diverticulitis.  PERITONEUM: No ascites.  VESSELS: Atherosclerotic changes.  RETROPERITONEUM/LYMPH NODES: No lymphadenopathy.  ABDOMINAL WALL: Within normal limits.  BONES: Degenerative changes. Diffuse osteopenia. Vertebral body height   loss and kyphoplasty cement at T11 and T12. Surgical disc spaces at   L2-L4. Disc height loss at L5.    IMPRESSION:    Marked fatty replacement of the pancreas. Limited evaluation without   intravenous contrast.    Cholelithiasis without CT evidence for acute cholecystitis.    Diverticulosis without diverticulitis.      < end of copied text >      IMPRESSION/RECOMMENDATIONS:

## 2022-03-01 NOTE — PROGRESS NOTE ADULT - SUBJECTIVE AND OBJECTIVE BOX
[facility] -- Casper Mcgee Grossman, [place], Eligio Ryan, Thalia Jose: Office # [phone]    Follow Up:  CAD, Cardiac Optimization    Subjective/Observations: Patient seen and examined, awake, alert, resting comfortably in bed, denies chest pain, dyspnea, palpitations or dizziness, Tolerating room air. states that her cp had resolved     REVIEW OF SYSTEMS: All review of systems is negative for eye, ENT, GI, , allergic, dermatologic, musculoskeletal and neurologic except as described above    PAST MEDICAL & SURGICAL HISTORY:  H/O vertebral fracture repair    History of vertebral fracture    Dyslipidemia    DM type 2 with diabetic dyslipidemia    H/O gastroesophageal reflux (GERD)    Costochondritis    Coronary arteriosclerosis in native artery  s/p 2 stents. last placed 2 years ago    Gastroparesis    History of laminectomy    S/P hip hemiarthroplasty    S/P appendectomy        MEDICATIONS  (STANDING):  amitriptyline 30 milliGRAM(s) Oral at bedtime  artificial tears (preservative free) Ophthalmic Solution 1 Drop(s) Both EYES two times a day  aspirin enteric coated 81 milliGRAM(s) Oral daily  atorvastatin 40 milliGRAM(s) Oral at bedtime  dextrose 40% Gel 15 Gram(s) Oral once  dextrose 5%. 1000 milliLiter(s) (50 mL/Hr) IV Continuous <Continuous>  dextrose 5%. 1000 milliLiter(s) (100 mL/Hr) IV Continuous <Continuous>  dextrose 50% Injectable 25 Gram(s) IV Push once  dextrose 50% Injectable 12.5 Gram(s) IV Push once  dextrose 50% Injectable 25 Gram(s) IV Push once  glucagon  Injectable 1 milliGRAM(s) IntraMuscular once  heparin   Injectable 5000 Unit(s) SubCutaneous every 12 hours  insulin lispro (ADMELOG) corrective regimen sliding scale   SubCutaneous three times a day before meals  insulin lispro (ADMELOG) corrective regimen sliding scale   SubCutaneous at bedtime  metoprolol succinate ER 25 milliGRAM(s) Oral daily  pantoprazole  Injectable 40 milliGRAM(s) IV Push two times a day  polyethylene glycol 3350 17 Gram(s) Oral two times a day  senna 2 Tablet(s) Oral at bedtime  sertraline 25 milliGRAM(s) Oral every 12 hours  sucralfate suspension 1 Gram(s) Oral four times a day    MEDICATIONS  (PRN):  acetaminophen     Tablet .. 650 milliGRAM(s) Oral every 6 hours PRN Temp greater or equal to 38C (100.4F), Mild Pain (1 - 3)  aluminum hydroxide/magnesium hydroxide/simethicone Suspension 30 milliLiter(s) Oral every 4 hours PRN Dyspepsia  HYDROmorphone   Tablet 4 milliGRAM(s) Oral every 8 hours PRN Severe Pain (7 - 10)  melatonin 3 milliGRAM(s) Oral at bedtime PRN Insomnia  ondansetron Injectable 4 milliGRAM(s) IV Push every 8 hours PRN Nausea and/or Vomiting    Allergies    morphine (Other)    Intolerances      Vital Signs Last 24 Hrs  T(C): 36.5 (01 Mar 2022 04:45), Max: 36.5 (01 Mar 2022 04:45)  T(F): 97.7 (01 Mar 2022 04:45), Max: 97.7 (01 Mar 2022 04:45)  HR: 61 (01 Mar 2022 04:45) (61 - 94)  BP: 126/70 (01 Mar 2022 04:45) (118/71 - 126/70)  BP(mean): --  RR: 18 (01 Mar 2022 04:45) (18 - 18)  SpO2: 92% (01 Mar 2022 04:45) (92% - 92%)  I&O's Summary        TELE: Not on telemetry   PHYSICAL EXAM:  Appearance: NAD, no distress, alert, well developed   HEENT: Moist Mucous Membranes, Anicteric  Cardiovascular: Regular rate and rhythm, Normal S1 S2, No JVD, + murmurs, No rubs, gallops or clicks  Respiratory: Non-labored, Clear to auscultation, No rales, No rhonchi, No wheezing.   Gastrointestinal:  Soft, Non-tender, + BS  Neurologic: Non-focal  Skin: Warm and dry, No visible rashes or ulcers, No ecchymosis, No cyanosis  Musculoskeletal: No clubbing, No cyanosis, No joint swelling/tenderness  Psychiatry: Mood & affect appropriate  Lymph: No peripheral edema.     LABS: All Labs Reviewed:                        8.2    5.06  )-----------( 177      ( 01 Mar 2022 07:55 )             23.2                         8.6    5.49  )-----------( 177      ( 28 Feb 2022 08:09 )             25.3                         10.3   6.29  )-----------( 169      ( 27 Feb 2022 07:39 )             30.3     01 Mar 2022 07:55    141    |  112    |  12     ----------------------------<  90     3.7     |  23     |  0.89   28 Feb 2022 08:09    141    |  112    |  14     ----------------------------<  83     3.7     |  22     |  1.10   27 Feb 2022 07:39    140    |  113    |  18     ----------------------------<  70     4.2     |  20     |  0.97     Ca    8.5        01 Mar 2022 07:55  Ca    8.6        28 Feb 2022 08:09  Ca    8.7        27 Feb 2022 07:39    TPro  4.3    /  Alb  2.0    /  TBili  0.3    /  DBili  x      /  AST  20     /  ALT  18     /  AlkPhos  52     01 Mar 2022 07:55  TPro  4.6    /  Alb  2.2    /  TBili  0.3    /  DBili  x      /  AST  22     /  ALT  17     /  AlkPhos  54     28 Feb 2022 08:09  TPro  5.1    /  Alb  2.3    /  TBili  0.4    /  DBili  x      /  AST  22     /  ALT  18     /  AlkPhos  59     27 Feb 2022 07:39      Troponin I, High Sensitivity Result: 48.8 ng/L (02-25-22 @ 00:33)            Cholesterol, Serum: 54 mg/dL (02-26-22 @ 11:18)  HDL Cholesterol, Serum: 34 mg/dL (02-26-22 @ 11:18)  Triglycerides, Serum: 45 mg/dL (02-26-22 @ 11:18)      12 Lead ECG:   Ventricular Rate 82 BPM    Atrial Rate 82 BPM    P-R Interval 138 ms    QRS Duration 80 ms    Q-T Interval 370 ms    QTC Calculation(Bazett) 432 ms    P Axis 79 degrees    R Axis 34 degrees    T Axis 34 degrees    Diagnosis Line Normal sinus rhythm  Nonspecific ST abnormality  Abnormal ECG    Confirmed by jarrett Jose ([id number]) on 2/25/2022 1:30:25 PM (02-25-22 @ 00:18)    < from: Xray Chest 1 View- PORTABLE-Urgent (02.25.22 @ 01:36) >    ACC: [id number] EXAM:  XR CHEST PORTABLE URGENT 1V                          PROCEDURE DATE:  02/25/2022          INTERPRETATION:  AP chest on February 25, 2022 at 1:26 AM. Patient has   chest pain.    Elevated diaphragms again noted.    . I see densities in the lower thoracic region again noted.    Heart magnified by technique.    Patient's chin obscures the apices.    No visible lung abnormality.    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    Chest is similar to August 20, 2019.    IMPRESSION: No acute finding or change.    --- End of Report ---            JARRETT SHARIF MD; Attending Radiologist  This document has been electronically signed. Feb 25 2022  1:36PM    < end of copied text >  < from: Transthoracic Echocardiogram w/Doppler (02.13.12 @ 10:41) >    Patient name: DEVON NOLAND  YOB: 1931   Age: 80 (F)   MR#: [id number]  Study Date: 2/13/2012  Location: [place] [id number]: Brooke Matias RDCS  Study quality: Technically fair  Referring Physician: MARY FINNEY MD  Blood Pressure: 139/60 mmHg  Height: 5ft 3in  Weight: 153 lb  BSA: 1.7 m2  ------------------------------------------------------------------------  PROCEDURE: Transthoracic echocardiogram with 2-D, M-Mode  and complete spectral and color flow Doppler.  INDICATION: Chest Pain (786.50)  ------------------------------------------------------------------------  Dimensions:    Normal Values:  LA:     4.1    2.0 - 4.0 cm  Ao:     3.3    2.0 - 3.8 cm  SEPTUM: 1.3    0.6 - 1.2 cm  PWT:    1.1    0.6 - 1.1 cm  LVIDd:4.0    3.0 - 5.6 cm  LVIDs:  2.6    1.8 - 4.0 cm  Derived variables:  LVMI: 96 g/m2  RWT: 0.55  Fractional short: 35 %  Ejection Fraction: 65 %  ------------------------------------------------------------------------  Observations:  Mitral Valve: Normal mitral valve. Mild-moderate mitral  regurgitation.  Aortic Valve/Aorta: Aortic valve not well visualized;  appears calcified.  Normal aortic root.  Left Atrium: Mildly dilated left atrium.  LA volume index =  29 cc/m2.  Left Ventricle: Normal left ventricular systolic function.  No segmental wall motion abnormalities. Concentric left  ventricular hypertrophy. Mild diastolic dysfunction (Stage  I).  Right Heart: Normal right atrium. Normal right ventricular  size and function. Normal tricuspidvalve. Mild tricuspid  regurgitation. Normal pulmonic valve.  Pericardium/Pleura: Normal pericardium with no pericardial  effusion.  Hemodynamic: Estimated right atrial pressure is 10 mm Hg.  Estimated right ventricular systolic pressure equals 43 mm  Hg, assuming right atrial pressure equals 10 mm Hg,  consistent with mild pulmonary hypertension.  ------------------------------------------------------------------------  Conclusions:  1. Mild-moderate mitral regurgitation.  2. Normal left ventricular systolic function. No segmental  wall motion abnormalities.  3. Mild diastolic dysfunction (Stage I).  4. Normal right ventricular size and function.  5. Estimated right ventricular systolic pressure equals 43  mm Hg, assuming right atrial pressure equals 10 mm Hg,  consistent with mild pulmonary hypertension.  6. Normal tricuspid valve. Mild tricuspid regurgitation.  ------------------------------------------------------------------------  Confirmed on  2/13/2012 - 18:34:21 by Paolo Nascimento  ------------------------------------------------------------------------    < end of copied text >

## 2022-03-01 NOTE — CHART NOTE - NSCHARTNOTEFT_GEN_A_CORE
Assessment: patient seen for re-referral and follow up 89y old  Female who presents with a chief complaint of pancreatitis   patient seen sitting in chair just finished with PT now going to eat breakfast. per RN patient with good PO yesterday. confused now.  previously noted with poor dentition. appears to be tolerating diet as ordered   2/28 BM       Factors impacting intake: [ ] none [ ] nausea  [ ] vomiting [ ] diarrhea [ ] constipation  [x ]chewing problems [ ] swallowing issues  [ ] other: poor dentition previously noted    Diet Prescription: Diet, Regular:   DASH/TLC {Sodium & Cholesterol Restricted}  Low Sodium (02-28-22 @ 14:48)    Intake: good PO per RN    Current Weight: 3/1 106.9# 2/26 111.5#      Pertinent Medications: MEDICATIONS  (STANDING):  amitriptyline 30 milliGRAM(s) Oral at bedtime  artificial tears (preservative free) Ophthalmic Solution 1 Drop(s) Both EYES two times a day  aspirin enteric coated 81 milliGRAM(s) Oral daily  atorvastatin 40 milliGRAM(s) Oral at bedtime  dextrose 40% Gel 15 Gram(s) Oral once  dextrose 5%. 1000 milliLiter(s) (50 mL/Hr) IV Continuous <Continuous>  dextrose 5%. 1000 milliLiter(s) (100 mL/Hr) IV Continuous <Continuous>  dextrose 50% Injectable 25 Gram(s) IV Push once  dextrose 50% Injectable 12.5 Gram(s) IV Push once  dextrose 50% Injectable 25 Gram(s) IV Push once  glucagon  Injectable 1 milliGRAM(s) IntraMuscular once  heparin   Injectable 5000 Unit(s) SubCutaneous every 12 hours  insulin lispro (ADMELOG) corrective regimen sliding scale   SubCutaneous three times a day before meals  insulin lispro (ADMELOG) corrective regimen sliding scale   SubCutaneous at bedtime  metoprolol succinate ER 25 milliGRAM(s) Oral daily  pantoprazole  Injectable 40 milliGRAM(s) IV Push two times a day  polyethylene glycol 3350 17 Gram(s) Oral two times a day  senna 2 Tablet(s) Oral at bedtime  sertraline 25 milliGRAM(s) Oral every 12 hours  sucralfate suspension 1 Gram(s) Oral four times a day    MEDICATIONS  (PRN):  acetaminophen     Tablet .. 650 milliGRAM(s) Oral every 6 hours PRN Temp greater or equal to 38C (100.4F), Mild Pain (1 - 3)  aluminum hydroxide/magnesium hydroxide/simethicone Suspension 30 milliLiter(s) Oral every 4 hours PRN Dyspepsia  HYDROmorphone   Tablet 4 milliGRAM(s) Oral every 8 hours PRN Severe Pain (7 - 10)  melatonin 3 milliGRAM(s) Oral at bedtime PRN Insomnia  ondansetron Injectable 4 milliGRAM(s) IV Push every 8 hours PRN Nausea and/or Vomiting    Pertinent Labs: A1c 6.0% lipase 752 with pancreatitis dx  Skin: no pressure injury     Estimated Needs:   [x ] no change since previous assessment  [ ] recalculated:     Previous Nutrition Diagnosis:   [ ] Inadequate Energy Intake [ x]Inadequate Oral Intake [ ] Excessive Energy Intake   [ ] Underweight [ ] Increased Nutrient Needs [ ] Overweight/Obesity   [ ] Altered GI Function [ ] Unintended Weight Loss [ ] Food & Nutrition Related Knowledge Deficit [ ] Malnutrition   (X) altered nutrition related labs  Nutrition Diagnosis is [x ] ongoing improving PO and Lipase noted  [ ] resolved [ ] not applicable     New Nutrition Diagnosis: [x ] not applicable       Interventions:   Recommend  [ ] Change Diet To:  [ ] Nutrition Supplement  [ ] Nutrition Support  [x ] Other:  recommend cc low Na low fat  diet glucerna BID , trend labs    Monitoring and Evaluation:   [x ] PO intake [ x ] Tolerance to diet prescription [ x ] weights [ x ] labs[ x ] follow up per protocol  [ ] other: Assessment: patient seen for re-referral and follow up 89y old  Female who presents with a chief complaint of pancreatitis   patient seen sitting in chair just finished with PT now going to eat breakfast. per RN patient with good PO yesterday. confused now.  previously noted with poor dentition. appears to be tolerating diet as ordered   2/28 BM       Factors impacting intake: [ ] none [ ] nausea  [ ] vomiting [ ] diarrhea [ ] constipation  [x ]chewing problems [ ] swallowing issues  [ ] other: poor dentition previously noted    Diet Prescription: Diet, Regular:   DASH/TLC {Sodium & Cholesterol Restricted}  Low Sodium (02-28-22 @ 14:48)    Intake: good PO per RN    Current Weight: 3/1 106.9# 2/26 111.5#      Pertinent Medications: MEDICATIONS  (STANDING):  amitriptyline 30 milliGRAM(s) Oral at bedtime  artificial tears (preservative free) Ophthalmic Solution 1 Drop(s) Both EYES two times a day  aspirin enteric coated 81 milliGRAM(s) Oral daily  atorvastatin 40 milliGRAM(s) Oral at bedtime  dextrose 40% Gel 15 Gram(s) Oral once  dextrose 5%. 1000 milliLiter(s) (50 mL/Hr) IV Continuous <Continuous>  dextrose 5%. 1000 milliLiter(s) (100 mL/Hr) IV Continuous <Continuous>  dextrose 50% Injectable 25 Gram(s) IV Push once  dextrose 50% Injectable 12.5 Gram(s) IV Push once  dextrose 50% Injectable 25 Gram(s) IV Push once  glucagon  Injectable 1 milliGRAM(s) IntraMuscular once  heparin   Injectable 5000 Unit(s) SubCutaneous every 12 hours  insulin lispro (ADMELOG) corrective regimen sliding scale   SubCutaneous three times a day before meals  insulin lispro (ADMELOG) corrective regimen sliding scale   SubCutaneous at bedtime  metoprolol succinate ER 25 milliGRAM(s) Oral daily  pantoprazole  Injectable 40 milliGRAM(s) IV Push two times a day  polyethylene glycol 3350 17 Gram(s) Oral two times a day  senna 2 Tablet(s) Oral at bedtime  sertraline 25 milliGRAM(s) Oral every 12 hours  sucralfate suspension 1 Gram(s) Oral four times a day    MEDICATIONS  (PRN):  acetaminophen     Tablet .. 650 milliGRAM(s) Oral every 6 hours PRN Temp greater or equal to 38C (100.4F), Mild Pain (1 - 3)  aluminum hydroxide/magnesium hydroxide/simethicone Suspension 30 milliLiter(s) Oral every 4 hours PRN Dyspepsia  HYDROmorphone   Tablet 4 milliGRAM(s) Oral every 8 hours PRN Severe Pain (7 - 10)  melatonin 3 milliGRAM(s) Oral at bedtime PRN Insomnia  ondansetron Injectable 4 milliGRAM(s) IV Push every 8 hours PRN Nausea and/or Vomiting    Pertinent Labs: A1c 6.0% lipase 752 with pancreatitis dx  Skin: no pressure injury     Estimated Needs:   [x ] no change since previous assessment  [ ] recalculated:     Previous Nutrition Diagnosis:   [ ] Inadequate Energy Intake [ x]Inadequate Oral Intake [ ] Excessive Energy Intake   [ ] Underweight [ ] Increased Nutrient Needs [ ] Overweight/Obesity   [ ] Altered GI Function [ ] Unintended Weight Loss [ ] Food & Nutrition Related Knowledge Deficit [ ] Malnutrition   (X) altered nutrition related labs  Nutrition Diagnosis is [x ] ongoing improving PO and Lipase noted  [ ] resolved [ ] not applicable     New Nutrition Diagnosis: [x ] not applicable       Interventions:   Recommend  [ ] Change Diet To:  [ ] Nutrition Supplement  [ ] Nutrition Support  [x ] Other:  recommend low Na low fat  diet glucerna BID , trend labs follow blood sugars for need for cc diet    Monitoring and Evaluation:   [x ] PO intake [ x ] Tolerance to diet prescription [ x ] weights [ x ] labs[ x ] follow up per protocol  [ ] other:

## 2022-03-01 NOTE — PROGRESS NOTE ADULT - PROBLEM SELECTOR PLAN 4
- s/p stent placement in 2012  - epigastric pain on admission more likely 2/2 pancreatitis than cardiac etiology at this time  - EKG shows NSR, no ST or T wave abnormalities   - Trops negative x 2  - Continue aspirin 81 mg daily   - Continue atorvastatin 40 mg daily  - Continue metoprolol succinate 25 mg daily with hold parameters   - Continue to hold enalapril for now  - Cardio (Dr Jose) following, f/u recs - s/p stent placement in 2012  - epigastric pain on admission more likely 2/2 pancreatitis than cardiac etiology at this time  - EKG shows NSR, no ST or T wave abnormalities   - Trops negative x 2  - Continue aspirin 81 mg daily   - Continue atorvastatin 40 mg daily  - Continue metoprolol succinate 25 mg daily with hold parameters   - Continue to hold enalapril for now  - Cardio (Dr Jose) following, f/u recs-- continue asa, beta blocker, statin

## 2022-03-01 NOTE — PROGRESS NOTE ADULT - PROBLEM SELECTOR PLAN 5
UA significant for large blood, moderate ketones, moderate leuk esterase, 26-50 WBCs, moderate bacteria, few yeast cells  - Patient is completely asymptomatic, no leukocytosis or fever   - Will continue to monitor off abx  - Urine cx -> 3 organisms, likely contaminant

## 2022-03-01 NOTE — PROGRESS NOTE ADULT - PROBLEM SELECTOR PLAN 3
Hb 10.4 on presentation- Hg ___ this AM   - Monitor for signs and symptoms of bleeding  - Transfuse prn (Hgb>8)   - Serum iron 33, tibc 120, ferritin 124  - Heme consulted, Dr Nicholas, f/u recs-- Hb 10.4 on presentation- Hg 8.2 this AM, low but stable  - Monitor for signs and symptoms of bleeding  - Transfuse prn (Hgb>8)   - Serum iron 33, tibc 120, ferritin 124  - Heme consulted, Dr Nicholas, f/u recs--  - Continue to monitor daily CBCs Hb 10.4 on presentation- Hg 8.2 this AM, low but stable  - Monitor for signs and symptoms of bleeding  - Transfuse prn (Hgb>8)   - Serum iron 33, tibc 120, ferritin 124  - Heme consulted, Dr Nicholas, rec symptomatic management  - Continue to monitor daily CBCs

## 2022-03-01 NOTE — PROGRESS NOTE ADULT - ASSESSMENT
89 year old female with PMH CAD s/p stents in 2012, Type 2 diabetes, HTN, HLD, GERD, mild AS, chronic venous insufficiency, neuropathy, macular degeneration, chronic constipation, depression, OA, lumbar degenerative disc disease, and spinal stenosis who presents to the ER complaining of nausea, abdominal pain, and burning chest pain, admitted for pancreatitis, KERRY.     Atypical Chest Pain  - Symptoms most likely secondary to pancreatitis. Now pain-free  - Hx of CAD with MI in 2012 and 2 stents; sees Dr. Shirley   - No evidence of ischemia; EKG NSR.  Troponins x 2 negative.  No evidence of volume overload  - TTE (6/2021)normal LV systolic function, ejection fraction of 65%. Moderate LV diastolic dysfunction. Mild MR, mild DC, mild to moderate TR with estimated RV systolic pressure of 44 mmHg. LA markedly dilated.  - Stress test (9/2019) negative for inducement of cardiac symptoms, EKG evidence of myocardial ischemia, or significant arrhythmias. Normal LV myocardial perfusion and systolic function.  - Continue ASA, BB, and statin  - Monitor and replete lytes, keep K>4, Mg>2  - Heme/onc and GI following, awaiting for MRI    - All other medical needs as per primary team.  - Other cardiovascular workup will depend on clinical course.  - Will continue to follow.    Nikia Costa [facility]  Nurse Practitioner - Cardiology   Spectra #[id number]/ [phone]

## 2022-03-01 NOTE — PROGRESS NOTE ADULT - PROBLEM SELECTOR PLAN 2
BUN/Cr 34/1.8, baseline kidney function appears to be approximately 1- RESOLVED  - Likely prerenal in setting of decreased PO intake   - will continue to hold enalapril for now  - Monitor BMP daily

## 2022-03-01 NOTE — PROGRESS NOTE ADULT - SUBJECTIVE AND OBJECTIVE BOX
pt seen  doing well  no complaints  ICU Vital Signs Last 24 Hrs  T(C): 36.5 (01 Mar 2022 04:45), Max: 36.5 (01 Mar 2022 04:45)  T(F): 97.7 (01 Mar 2022 04:45), Max: 97.7 (01 Mar 2022 04:45)  HR: 61 (01 Mar 2022 04:45) (61 - 94)  BP: 126/70 (01 Mar 2022 04:45) (118/71 - 126/70)  BP(mean): --  ABP: --  ABP(mean): --  RR: 18 (01 Mar 2022 04:45) (18 - 18)  SpO2: 92% (01 Mar 2022 04:45) (92% - 92%)  gen-NAD  resp-clear  abd-soft NT/ND                        8.2    5.06  )-----------( 177      ( 01 Mar 2022 07:55 )             23.2   03-01    141  |  112<H>  |  12  ----------------------------<  90  3.7   |  23  |  0.89    Ca    8.5      01 Mar 2022 07:55    TPro  4.3<L>  /  Alb  2.0<L>  /  TBili  0.3  /  DBili  x   /  AST  20  /  ALT  18  /  AlkPhos  52  03-01    pancreatic cyst

## 2022-03-01 NOTE — PROGRESS NOTE ADULT - PROBLEM SELECTOR PLAN 6
Chronic, stable   - BP controlled on admission   - Continue metoprolol succinate 25 mg daily with hold parameters   - Hold enalapril in setting of KERRY Chronic, stable   - BP controlled on admission   - Continue metoprolol succinate 25 mg daily with hold parameters   - Will continue to hold enalapril in setting of recent KERRY and soft BPs

## 2022-03-01 NOTE — PROGRESS NOTE ADULT - ASSESSMENT
The patient is a 89 year old female with PMH CAD s/p stents in 2012, Type 2 diabetes, HTN, HLD, GERD, mild AS, chronic venous insufficiency, neuropathy, macular degeneration, chronic constipation, depression, OA, lumbar degenerative disc disease, and spinal stenosis who presented to the ER complaining of nausea, abdominal pain, and burning chest pain. Admitted for pancreatitis, KERRY.

## 2022-03-01 NOTE — PROGRESS NOTE ADULT - SUBJECTIVE AND OBJECTIVE BOX
INCOMPLETE NOTE IN PROGRESS  Patient is a 89y old  Female who presents with a chief complaint of pancreatitis (28 Feb 2022 12:32)    HPI:  The patient is a 89 year old female with PMH CAD s/p stents in 2012, Type 2 diabetes, HTN, HLD, GERD, mild AS, chronic venous insufficiency, neuropathy, macular degeneration, chronic constipation, depression, OA, lumbar degenerative disc disease, and spinal stenosis who presents to the ER complaining of nausea, abdominal pain, and burning chest pain. Patient is a poor historian, majority of the history obtained from daughter Mary Guerin at bedside. Per daughter, over the past week patient has been extremely nauseous and has been dry heaving. Patient never vomited. She had been eating less over the past few days; however, yesterday, patient was so nauseous that she was unable to eat or drink. She felt weak and fatigued. She also developed burning pain in the epigastric area that radiated to her chest. Her daughter became concerned and called EMS. Patient denies any other symptoms, including fevers, chills, SOB, diarrhea. Of note, per daughter, patient has lost a significant amount of weight (about 12 lbs) in the past few months as she has had a decreased appetite.    In the ED:   VS: T:97.6, HR:93, BP: 119/54, RR:16, SpO2: 98% on room air  Labs significant for BUN/Cr: 34/1.8, Trop negative x 2, lipase: 1537, proBNP: 2914  UA: slightly turbid, large blood, moderate ketones, moderate leuk esterase, 26-50 WBCs, moderate bacteria, few yeast cells   EKG: NSR, no ST or T wave abnormalities   US abdomen: Sonographic findings equivocal for acute cholecystitis. If there is clinical suspicion for acute cholecystitis, a hepatobiliary scan may be obtained for further evaluation.Poorly visualized pancreas. Recommend correlation with serum lipase to assess acute pancreatitis.  CT A/P: Marked fatty replacement of the pancreas. Limited evaluation without intravenous contrast. Cholelithiasis without CT evidence for acute cholecystitis.Diverticulosis without diverticulitis.  HIDA scan pending   Patient received 1 L NS bolus 4 mg Zofran (25 Feb 2022 07:48)    INTERVAL HPI:  02/26/22: Pt seen and examined at bedside. Pt is poor historian' aox1 to person only; has no complaints this morning. On 60cc/hr DS+NS for possible acute pancreatitis. Awaiting MRI abd to rule out malignancy. Erroneous lab result this morning 2/2 to blood draw from IV site; repeat lab ordered. -Stable labs. Low stable H/H  02/27/22: Pt seen and examined at bedside. Pt AOx1 with mild confusion. No complaint this am.  Awaiting MRI A/P  abd to rule out malignancy. Low stable H/H; lipase downtrending.No Complaints  02/28/22: Pt seen and examined at bedside. Pt AOx2 to person, place. Pt appears confused about whereabouts of her . Has no acute concerns this morning. Awaiting MRI abdomen w/ w/o C to r/o malignancy. H/H remains low this AM, though no acute signs bleeding at this time. Pt otherwise stable. Lipase downtrended. No complaints. advance Diet.  02/29/22: Pt seen and examined at bedside. ___    OVERNIGHT EVENTS: None    Home Medications:  amitriptyline 10 mg oral tablet: 3 tab(s) orally once a day (at bedtime) (25 Feb 2022 16:50)  aspirin 81 mg oral tablet: 1 tab(s) orally once a day (25 Feb 2022 16:50)  atorvastatin 40 mg oral tablet: 1 tab(s) orally once a day (25 Feb 2022 16:50)  Dilaudid 4 mg oral tablet: 1 tab(s) orally 3 times a day, As Needed (25 Feb 2022 16:50)  enalapril 2.5 mg oral tablet: 1 tab(s) orally once a day (25 Feb 2022 16:50)  ferrous sulfate 325 mg (65 mg elemental iron) oral tablet: 1 tablet oral daily (25 Feb 2022 16:50)  metFORMIN 500 mg oral tablet: 1 tab(s) orally 2 times a day (25 Feb 2022 16:50)  Metoprolol Succinate ER 25 mg oral tablet, extended release: 1 tab(s) orally once a day (25 Feb 2022 16:50)  omeprazole 20 mg oral delayed release capsule: 1 cap(s) orally once in the morning and 1 in the afternoon (25 Feb 2022 16:50)  PreserVision AREDS oral capsule: 1 tablet oral daily (25 Feb 2022 16:50)  Tradjenta 5 mg oral tablet: 1 tab(s) orally once a day  -per pt daughter, not sure if taking currently (25 Feb 2022 16:50)  Tylenol 325 mg oral tablet: 2 tab(s) orally every 6 hours, As needed, Temp greater or equal to 38C (100.4F), Mild Pain (1 - 3) (28 Feb 2022 13:22)  Vitamin D2 50,000 intl units (1.25 mg) oral capsule (obsolete): 1 cap(s) orally once a week (25 Feb 2022 16:50)  Zoloft 25 mg oral tablet: 1 tab(s) orally 2 times a day (25 Feb 2022 16:50)      MEDICATIONS  (STANDING):  amitriptyline 30 milliGRAM(s) Oral at bedtime  artificial tears (preservative free) Ophthalmic Solution 1 Drop(s) Both EYES two times a day  aspirin enteric coated 81 milliGRAM(s) Oral daily  atorvastatin 40 milliGRAM(s) Oral at bedtime  dextrose 40% Gel 15 Gram(s) Oral once  dextrose 5%. 1000 milliLiter(s) (50 mL/Hr) IV Continuous <Continuous>  dextrose 5%. 1000 milliLiter(s) (100 mL/Hr) IV Continuous <Continuous>  dextrose 50% Injectable 25 Gram(s) IV Push once  dextrose 50% Injectable 12.5 Gram(s) IV Push once  dextrose 50% Injectable 25 Gram(s) IV Push once  glucagon  Injectable 1 milliGRAM(s) IntraMuscular once  heparin   Injectable 5000 Unit(s) SubCutaneous every 12 hours  insulin lispro (ADMELOG) corrective regimen sliding scale   SubCutaneous three times a day before meals  insulin lispro (ADMELOG) corrective regimen sliding scale   SubCutaneous at bedtime  metoprolol succinate ER 25 milliGRAM(s) Oral daily  pantoprazole  Injectable 40 milliGRAM(s) IV Push two times a day  polyethylene glycol 3350 17 Gram(s) Oral two times a day  senna 2 Tablet(s) Oral at bedtime  sertraline 25 milliGRAM(s) Oral every 12 hours  sucralfate suspension 1 Gram(s) Oral four times a day    MEDICATIONS  (PRN):  acetaminophen     Tablet .. 650 milliGRAM(s) Oral every 6 hours PRN Temp greater or equal to 38C (100.4F), Mild Pain (1 - 3)  aluminum hydroxide/magnesium hydroxide/simethicone Suspension 30 milliLiter(s) Oral every 4 hours PRN Dyspepsia  HYDROmorphone   Tablet 4 milliGRAM(s) Oral every 8 hours PRN Severe Pain (7 - 10)  melatonin 3 milliGRAM(s) Oral at bedtime PRN Insomnia  ondansetron Injectable 4 milliGRAM(s) IV Push every 8 hours PRN Nausea and/or Vomiting      Allergies    morphine (Other)    Intolerances        Social History:  Former cigarette smoker, smoked <1/2 ppd for less than 15 years, quit more than 50 years ago   Denies ETOH use   Denies drug use     Ambulates with a walker. Lives at home with aides who come to help her with ADLs (not 24 hour aides) (25 Feb 2022 07:48)      REVIEW OF SYSTEMS:  CONSTITUTIONAL: No fever, No chills, No fatigue, No myalgia, No Body ache, No Weakness  EYES: No eye pain,  No visual disturbances, No discharge, NO Redness  ENMT:  No ear pain, No nose bleed, No vertigo; No sinus pain, NO throat pain, No Congestion  NECK: No pain, No stiffness  RESPIRATORY: No cough, NO wheezing, No  hemoptysis, NO  shortness of breath  CARDIOVASCULAR: No chest pain, palpitations  GASTROINTESTINAL: No abdominal pain, NO epigastric pain. No nausea, No vomiting; No diarrhea, No constipation. [  ] BM  GENITOURINARY: No dysuria, No frequency, No urgency, No hematuria, NO incontinence  NEUROLOGICAL: No headaches, No dizziness, No numbness, No tingling, No tremors, No weakness  EXT: No Swelling, No Pain, No Edema  SKIN:  [ X ] No itching, burning, rashes, or lesions   MUSCULOSKELETAL: No joint pain ,No Jt swelling; No muscle pain, No back pain, No extremity pain  PSYCHIATRIC: No depression,  No anxiety,  No mood swings ,No difficulty sleeping at night  PAIN SCALE: [ X ] None  [  ] Other-  ROS Unable to obtain due to - [  ] Dementia  [  ] Lethargy [  ] Drowsy [  ] Sedated [  ] non verbal  REST OF REVIEW Of SYSTEM - [ X ] Normal     Vital Signs Last 24 Hrs  T(C): 36.5 (01 Mar 2022 04:45), Max: 36.5 (01 Mar 2022 04:45)  T(F): 97.7 (01 Mar 2022 04:45), Max: 97.7 (01 Mar 2022 04:45)  HR: 61 (01 Mar 2022 04:45) (61 - 94)  BP: 126/70 (01 Mar 2022 04:45) (118/71 - 126/70)  BP(mean): --  RR: 18 (01 Mar 2022 04:45) (18 - 18)  SpO2: 92% (01 Mar 2022 04:45) (92% - 92%)  Finger Stick      PHYSICAL EXAM:  GENERAL:  [ x ] NAD , [ x ] well appearing, [  ] Agitated, [  ] Drowsy,  [  ] Lethargy, [  ] confused   HEAD:  [ x ] Normal, [  ] Other  EYES:  [ x ] EOMI, [ x ] PERRLA, [ x ] conjunctiva and sclera clear normal, [  ] Other,  [  ] Pallor,[  ] Discharge  ENMT:  [ x ] Normal, [ x ] Moist mucous membranes, [  ] Good dentition, [ x ] No Thrush  NECK:  [ x ] Supple, [ x ] No JVD, [ x ] Normal thyroid, [  ] Lymphadenopathy [  ] Other  CHEST/LUNG:  [ x ] Clear to auscultation bilaterally, [ x ] Breath Sounds equal B/L / Decrease, [x  ] poor effort  [ x ] No rales, [ x ] No rhonchi  [ x ]  No wheezing,   HEART:  [ x ] Regular rate and rhythm, [  ] tachycardia, [  ] Bradycardia,  [  ] irregular  [ x ] No murmurs, No rubs, No gallops, [  ] PPM in place (Mfr:  )  ABDOMEN:  [ x ] Soft, [ x ] Nontender, [ x ] Nondistended, [x  ] No mass, [  ] Bowel sounds present, [  ] obese  NERVOUS SYSTEM:  [ x ] Alert & Oriented X2 to person, place, [  ] Nonfocal  [  ] Confusion  [  ] Encephalopathic [  ] Sedated [  ] Unable to assess, [  ] Dementia [  ] Other-  EXTREMITIES: [ x ] 2+ Peripheral Pulses, No clubbing, No cyanosis,  [ x ] 2 + edema B/L lower EXT. [  ] PVD stasis skin changes B/L Lower EXT, [  ] wound  LYMPH: No lymphadenopathy noted  SKIN:  [  ] No rashes or lesions, [  ] Pressure Ulcers, [  ] ecchymosis, [  ] Skin Tears, [ x ] Other- scattered age related ecchymosis throughout b/l UE and LE    DIET: Diet, Regular:   DASH/TLC Sodium & Cholesterol Restricted  Low Sodium (02-28-22 @ 14:48)      LABS:                        8.6    5.49  )-----------( 177      ( 28 Feb 2022 08:09 )             25.3     28 Feb 2022 08:09    141    |  112    |  14     ----------------------------<  83     3.7     |  22     |  1.10     Ca    8.6        28 Feb 2022 08:09    TPro  4.6    /  Alb  2.2    /  TBili  0.3    /  DBili  x      /  AST  22     /  ALT  17     /  AlkPhos  54     28 Feb 2022 08:09          Culture Results:   >=3 organisms. Probable collection contamination. (02-25 @ 09:18)                  Culture - Urine (collected 25 Feb 2022 09:18)  Source: Clean Catch Clean Catch (Midstream)  Final Report (26 Feb 2022 08:21):    >=3 organisms. Probable collection contamination.         Anemia Panel:  Ferritin, Serum: 124 ng/mL (02-28-22 @ 11:05)  Iron - Total Binding Capacity.: 120 ug/dL (02-28-22 @ 11:03)  Iron Total, Serum: 33 ug/dL (02-28-22 @ 11:03)      Thyroid Panel:        Lipase, Serum: 752 U/L (02-28-22 @ 08:09)  Lipase, Serum: 996 U/L (02-27-22 @ 07:39)  Lipase, Serum: 1668 U/L (02-26-22 @ 10:03)  Lipase, Serum: 1251 U/L (02-26-22 @ 08:03)  Lipase, Serum: 1537 U/L (02-25-22 @ 00:33)      Serum Pro-Brain Natriuretic Peptide: 2914 pg/mL (02-25-22 @ 00:33)      RADIOLOGY & ADDITIONAL TESTS:      HEALTH ISSUES - PROBLEM Dx:  Abnormal urinalysis    Benign essential HTN    Need for prophylactic measure    Pancreatitis    Gallstone pancreatitis    Anemia    CAD (coronary artery disease)    Type 2 diabetes mellitus    Hyperlipidemia    Major depression    GERD (gastroesophageal reflux disease)    History of spinal stenosis    KERRY (acute kidney injury)    H/O peripheral neuropathy      Consultant(s) Notes Reviewed:  [ x ] YES     Care Discussed with [X] Consultants  [ x ] Patient  [ x ] Family- daughter  [  ] HCP [  ]   [  ] Social Service  [ x ] RN, [  ] Physical Therapy,[  ] Palliative care team  DVT PPX: [  ] Lovenox, [ x ] S C Heparin, [  ] Coumadin, [  ] Xarelto, [  ] Eliquis, [  ] Pradaxa, [  ] IV Heparin drip, [  ] SCD [  ] Contraindication 2 to GI Bleed,[  ] Ambulation [  ] Contraindicated 2 to  bleed [  ] Contraindicated 2 to Brain Bleed    Advanced directive: [ x ] None, [  ] DNR/DNI     INCOMPLETE NOTE IN PROGRESS  Patient is a 89y old  Female who presents with a chief complaint of pancreatitis (28 Feb 2022 12:32)    HPI:  The patient is a 89 year old female with PMH CAD s/p stents in 2012, Type 2 diabetes, HTN, HLD, GERD, mild AS, chronic venous insufficiency, neuropathy, macular degeneration, chronic constipation, depression, OA, lumbar degenerative disc disease, and spinal stenosis who presents to the ER complaining of nausea, abdominal pain, and burning chest pain. Patient is a poor historian, majority of the history obtained from daughter Mary Guerin at bedside. Per daughter, over the past week patient has been extremely nauseous and has been dry heaving. Patient never vomited. She had been eating less over the past few days; however, yesterday, patient was so nauseous that she was unable to eat or drink. She felt weak and fatigued. She also developed burning pain in the epigastric area that radiated to her chest. Her daughter became concerned and called EMS. Patient denies any other symptoms, including fevers, chills, SOB, diarrhea. Of note, per daughter, patient has lost a significant amount of weight (about 12 lbs) in the past few months as she has had a decreased appetite.    In the ED:   VS: T:97.6, HR:93, BP: 119/54, RR:16, SpO2: 98% on room air  Labs significant for BUN/Cr: 34/1.8, Trop negative x 2, lipase: 1537, proBNP: 2914  UA: slightly turbid, large blood, moderate ketones, moderate leuk esterase, 26-50 WBCs, moderate bacteria, few yeast cells   EKG: NSR, no ST or T wave abnormalities   US abdomen: Sonographic findings equivocal for acute cholecystitis. If there is clinical suspicion for acute cholecystitis, a hepatobiliary scan may be obtained for further evaluation.Poorly visualized pancreas. Recommend correlation with serum lipase to assess acute pancreatitis.  CT A/P: Marked fatty replacement of the pancreas. Limited evaluation without intravenous contrast. Cholelithiasis without CT evidence for acute cholecystitis.Diverticulosis without diverticulitis.  HIDA scan pending   Patient received 1 L NS bolus 4 mg Zofran (25 Feb 2022 07:48)    INTERVAL HPI:  02/26/22: Pt seen and examined at bedside. Pt is poor historian' aox1 to person only; has no complaints this morning. On 60cc/hr DS+NS for possible acute pancreatitis. Awaiting MRI abd to rule out malignancy. Erroneous lab result this morning 2/2 to blood draw from IV site; repeat lab ordered. -Stable labs. Low stable H/H  02/27/22: Pt seen and examined at bedside. Pt AOx1 with mild confusion. No complaint this am.  Awaiting MRI A/P  abd to rule out malignancy. Low stable H/H; lipase downtrending.No Complaints  02/28/22: Pt seen and examined at bedside. Pt AOx2 to person, place. Pt appears confused about whereabouts of her . Has no acute concerns this morning. Awaiting MRI abdomen w/ w/o C to r/o malignancy. H/H remains low this AM, though no acute signs bleeding at this time. Pt otherwise stable. Lipase downtrended. No complaints. advance Diet.  03/01/22: Pt seen and examined at bedside. The patient is sleepy on exam, does not appear to have any concerns. Again appears confused about the time. AOX2 person, place. No complaints this AM. H/H low this AM but largely stable. Lipase downtrending. Tolerating diet.     OVERNIGHT EVENTS: None    Home Medications:  amitriptyline 10 mg oral tablet: 3 tab(s) orally once a day (at bedtime) (25 Feb 2022 16:50)  aspirin 81 mg oral tablet: 1 tab(s) orally once a day (25 Feb 2022 16:50)  atorvastatin 40 mg oral tablet: 1 tab(s) orally once a day (25 Feb 2022 16:50)  Dilaudid 4 mg oral tablet: 1 tab(s) orally 3 times a day, As Needed (25 Feb 2022 16:50)  enalapril 2.5 mg oral tablet: 1 tab(s) orally once a day (25 Feb 2022 16:50)  ferrous sulfate 325 mg (65 mg elemental iron) oral tablet: 1 tablet oral daily (25 Feb 2022 16:50)  metFORMIN 500 mg oral tablet: 1 tab(s) orally 2 times a day (25 Feb 2022 16:50)  Metoprolol Succinate ER 25 mg oral tablet, extended release: 1 tab(s) orally once a day (25 Feb 2022 16:50)  omeprazole 20 mg oral delayed release capsule: 1 cap(s) orally once in the morning and 1 in the afternoon (25 Feb 2022 16:50)  PreserVision AREDS oral capsule: 1 tablet oral daily (25 Feb 2022 16:50)  Tradjenta 5 mg oral tablet: 1 tab(s) orally once a day  -per pt daughter, not sure if taking currently (25 Feb 2022 16:50)  Tylenol 325 mg oral tablet: 2 tab(s) orally every 6 hours, As needed, Temp greater or equal to 38C (100.4F), Mild Pain (1 - 3) (28 Feb 2022 13:22)  Vitamin D2 50,000 intl units (1.25 mg) oral capsule (obsolete): 1 cap(s) orally once a week (25 Feb 2022 16:50)  Zoloft 25 mg oral tablet: 1 tab(s) orally 2 times a day (25 Feb 2022 16:50)      MEDICATIONS  (STANDING):  amitriptyline 30 milliGRAM(s) Oral at bedtime  artificial tears (preservative free) Ophthalmic Solution 1 Drop(s) Both EYES two times a day  aspirin enteric coated 81 milliGRAM(s) Oral daily  atorvastatin 40 milliGRAM(s) Oral at bedtime  dextrose 40% Gel 15 Gram(s) Oral once  dextrose 5%. 1000 milliLiter(s) (50 mL/Hr) IV Continuous <Continuous>  dextrose 5%. 1000 milliLiter(s) (100 mL/Hr) IV Continuous <Continuous>  dextrose 50% Injectable 25 Gram(s) IV Push once  dextrose 50% Injectable 12.5 Gram(s) IV Push once  dextrose 50% Injectable 25 Gram(s) IV Push once  glucagon  Injectable 1 milliGRAM(s) IntraMuscular once  heparin   Injectable 5000 Unit(s) SubCutaneous every 12 hours  insulin lispro (ADMELOG) corrective regimen sliding scale   SubCutaneous three times a day before meals  insulin lispro (ADMELOG) corrective regimen sliding scale   SubCutaneous at bedtime  metoprolol succinate ER 25 milliGRAM(s) Oral daily  pantoprazole  Injectable 40 milliGRAM(s) IV Push two times a day  polyethylene glycol 3350 17 Gram(s) Oral two times a day  senna 2 Tablet(s) Oral at bedtime  sertraline 25 milliGRAM(s) Oral every 12 hours  sucralfate suspension 1 Gram(s) Oral four times a day    MEDICATIONS  (PRN):  acetaminophen     Tablet .. 650 milliGRAM(s) Oral every 6 hours PRN Temp greater or equal to 38C (100.4F), Mild Pain (1 - 3)  aluminum hydroxide/magnesium hydroxide/simethicone Suspension 30 milliLiter(s) Oral every 4 hours PRN Dyspepsia  HYDROmorphone   Tablet 4 milliGRAM(s) Oral every 8 hours PRN Severe Pain (7 - 10)  melatonin 3 milliGRAM(s) Oral at bedtime PRN Insomnia  ondansetron Injectable 4 milliGRAM(s) IV Push every 8 hours PRN Nausea and/or Vomiting      Allergies    morphine (Other)    Intolerances        Social History:  Former cigarette smoker, smoked <1/2 ppd for less than 15 years, quit more than 50 years ago   Denies ETOH use   Denies drug use     Ambulates with a walker. Lives at home with aides who come to help her with ADLs (not 24 hour aides) (25 Feb 2022 07:48)      REVIEW OF SYSTEMS:  CONSTITUTIONAL: No fever, No chills, No fatigue, No myalgia, No Body ache, No Weakness  EYES: No eye pain,  No visual disturbances, No discharge, NO Redness  ENMT:  No ear pain, No nose bleed, No vertigo; No sinus pain, NO throat pain, No Congestion  NECK: No pain, No stiffness  RESPIRATORY: No cough, NO wheezing, No  hemoptysis, NO  shortness of breath  CARDIOVASCULAR: No chest pain, palpitations  GASTROINTESTINAL: No abdominal pain, NO epigastric pain. No nausea, No vomiting; No diarrhea, No constipation. [  ] BM  GENITOURINARY: No dysuria, No frequency, No urgency, No hematuria, NO incontinence  NEUROLOGICAL: No headaches, No dizziness, No numbness, No tingling, No tremors, No weakness  EXT: No Swelling, No Pain, No Edema  SKIN:  [ X ] No itching, burning, rashes, or lesions   MUSCULOSKELETAL: No joint pain ,No Jt swelling; No muscle pain, No back pain, No extremity pain  PSYCHIATRIC: No depression,  No anxiety,  No mood swings ,No difficulty sleeping at night  PAIN SCALE: [ X ] None  [  ] Other-  ROS Unable to obtain due to - [  ] Dementia  [  ] Lethargy [  ] Drowsy [  ] Sedated [  ] non verbal  REST OF REVIEW Of SYSTEM - [ X ] Normal     Vital Signs Last 24 Hrs  T(C): 36.5 (01 Mar 2022 04:45), Max: 36.5 (01 Mar 2022 04:45)  T(F): 97.7 (01 Mar 2022 04:45), Max: 97.7 (01 Mar 2022 04:45)  HR: 61 (01 Mar 2022 04:45) (61 - 94)  BP: 126/70 (01 Mar 2022 04:45) (118/71 - 126/70)  BP(mean): --  RR: 18 (01 Mar 2022 04:45) (18 - 18)  SpO2: 92% (01 Mar 2022 04:45) (92% - 92%)  Finger Stick      PHYSICAL EXAM:  GENERAL:  [ x ] NAD , [ x ] well appearing, [  ] Agitated, [  ] Drowsy,  [  ] Lethargy, [  ] confused   HEAD:  [ x ] Normal, [  ] Other  EYES:  [ x ] EOMI, [ x ] PERRLA, [ x ] conjunctiva and sclera clear normal, [  ] Other,  [  ] Pallor,[  ] Discharge  ENMT:  [ x ] Normal, [ x ] Moist mucous membranes, [  ] Good dentition, [ x ] No Thrush  NECK:  [ x ] Supple, [ x ] No JVD, [ x ] Normal thyroid, [  ] Lymphadenopathy [  ] Other  CHEST/LUNG:  [ x ] Clear to auscultation bilaterally, [ x ] Breath Sounds equal B/L / Decrease, [x  ] poor effort  [ x ] No rales, [ x ] No rhonchi  [ x ]  No wheezing,   HEART:  [ x ] Regular rate and rhythm, [  ] tachycardia, [  ] Bradycardia,  [  ] irregular  [ x ] No murmurs, No rubs, No gallops, [  ] PPM in place (Mfr:  )  ABDOMEN:  [ x ] Soft, [ x ] Nontender, [ x ] Nondistended, [x  ] No mass, [  ] Bowel sounds present, [  ] obese  NERVOUS SYSTEM:  [ x ] Alert & Oriented X2 to person, place, [  ] Nonfocal  [  ] Confusion  [  ] Encephalopathic [  ] Sedated [  ] Unable to assess, [  ] Dementia [  ] Other-  EXTREMITIES: [ x ] 2+ Peripheral Pulses, No clubbing, No cyanosis,  [ x ] 2 + edema B/L lower EXT. [  ] PVD stasis skin changes B/L Lower EXT, [  ] wound  LYMPH: No lymphadenopathy noted  SKIN:  [  ] No rashes or lesions, [  ] Pressure Ulcers, [  ] ecchymosis, [  ] Skin Tears, [ x ] Other- scattered age related ecchymosis throughout b/l UE and LE    DIET: Diet, Regular:   DASH/TLC Sodium & Cholesterol Restricted  Low Sodium (02-28-22 @ 14:48)      LABS:                        8.6    5.49  )-----------( 177      ( 28 Feb 2022 08:09 )             25.3     28 Feb 2022 08:09    141    |  112    |  14     ----------------------------<  83     3.7     |  22     |  1.10     Ca    8.6        28 Feb 2022 08:09    TPro  4.6    /  Alb  2.2    /  TBili  0.3    /  DBili  x      /  AST  22     /  ALT  17     /  AlkPhos  54     28 Feb 2022 08:09          Culture Results:   >=3 organisms. Probable collection contamination. (02-25 @ 09:18)                  Culture - Urine (collected 25 Feb 2022 09:18)  Source: Clean Catch Clean Catch (Midstream)  Final Report (26 Feb 2022 08:21):    >=3 organisms. Probable collection contamination.         Anemia Panel:  Ferritin, Serum: 124 ng/mL (02-28-22 @ 11:05)  Iron - Total Binding Capacity.: 120 ug/dL (02-28-22 @ 11:03)  Iron Total, Serum: 33 ug/dL (02-28-22 @ 11:03)      Thyroid Panel:        Lipase, Serum: 752 U/L (02-28-22 @ 08:09)  Lipase, Serum: 996 U/L (02-27-22 @ 07:39)  Lipase, Serum: 1668 U/L (02-26-22 @ 10:03)  Lipase, Serum: 1251 U/L (02-26-22 @ 08:03)  Lipase, Serum: 1537 U/L (02-25-22 @ 00:33)      Serum Pro-Brain Natriuretic Peptide: 2914 pg/mL (02-25-22 @ 00:33)      RADIOLOGY & ADDITIONAL TESTS:      HEALTH ISSUES - PROBLEM Dx:  Abnormal urinalysis    Benign essential HTN    Need for prophylactic measure    Pancreatitis    Gallstone pancreatitis    Anemia    CAD (coronary artery disease)    Type 2 diabetes mellitus    Hyperlipidemia    Major depression    GERD (gastroesophageal reflux disease)    History of spinal stenosis    KERRY (acute kidney injury)    H/O peripheral neuropathy      Consultant(s) Notes Reviewed:  [ x ] YES     Care Discussed with [X] Consultants  [ x ] Patient  [ x ] Family- daughter  [  ] HCP [  ]   [  ] Social Service  [ x ] RN, [  ] Physical Therapy,[  ] Palliative care team  DVT PPX: [  ] Lovenox, [ x ] S C Heparin, [  ] Coumadin, [  ] Xarelto, [  ] Eliquis, [  ] Pradaxa, [  ] IV Heparin drip, [  ] SCD [  ] Contraindication 2 to GI Bleed,[  ] Ambulation [  ] Contraindicated 2 to  bleed [  ] Contraindicated 2 to Brain Bleed    Advanced directive: [ x ] None, [  ] DNR/DNI     INCOMPLETE NOTE IN PROGRESS  Patient is a 89y old  Female who presents with a chief complaint of pancreatitis (28 Feb 2022 12:32)    HPI:  The patient is a 89 year old female with PMH CAD s/p stents in 2012, Type 2 diabetes, HTN, HLD, GERD, mild AS, chronic venous insufficiency, neuropathy, macular degeneration, chronic constipation, depression, OA, lumbar degenerative disc disease, and spinal stenosis who presents to the ER complaining of nausea, abdominal pain, and burning chest pain. Patient is a poor historian, majority of the history obtained from daughter Mary Guerin at bedside. Per daughter, over the past week patient has been extremely nauseous and has been dry heaving. Patient never vomited. She had been eating less over the past few days; however, yesterday, patient was so nauseous that she was unable to eat or drink. She felt weak and fatigued. She also developed burning pain in the epigastric area that radiated to her chest. Her daughter became concerned and called EMS. Patient denies any other symptoms, including fevers, chills, SOB, diarrhea. Of note, per daughter, patient has lost a significant amount of weight (about 12 lbs) in the past few months as she has had a decreased appetite.    In the ED:   VS: T:97.6, HR:93, BP: 119/54, RR:16, SpO2: 98% on room air  Labs significant for BUN/Cr: 34/1.8, Trop negative x 2, lipase: 1537, proBNP: 2914  UA: slightly turbid, large blood, moderate ketones, moderate leuk esterase, 26-50 WBCs, moderate bacteria, few yeast cells   EKG: NSR, no ST or T wave abnormalities   US abdomen: Sonographic findings equivocal for acute cholecystitis. If there is clinical suspicion for acute cholecystitis, a hepatobiliary scan may be obtained for further evaluation.Poorly visualized pancreas. Recommend correlation with serum lipase to assess acute pancreatitis.  CT A/P: Marked fatty replacement of the pancreas. Limited evaluation without intravenous contrast. Cholelithiasis without CT evidence for acute cholecystitis.Diverticulosis without diverticulitis.  HIDA scan pending   Patient received 1 L NS bolus 4 mg Zofran (25 Feb 2022 07:48)    INTERVAL HPI:  02/26/22: Pt seen and examined at bedside. Pt is poor historian' aox1 to person only; has no complaints this morning. On 60cc/hr DS+NS for possible acute pancreatitis. Awaiting MRI abd to rule out malignancy. Erroneous lab result this morning 2/2 to blood draw from IV site; repeat lab ordered. -Stable labs. Low stable H/H  02/27/22: Pt seen and examined at bedside. Pt AOx1 with mild confusion. No complaint this am.  Awaiting MRI A/P  abd to rule out malignancy. Low stable H/H; lipase downtrending.No Complaints  02/28/22: Pt seen and examined at bedside. Pt AOx2 to person, place. Pt appears confused about whereabouts of her . Has no acute concerns this morning. Awaiting MRI abdomen w/ w/o C to r/o malignancy. H/H remains low this AM, though no acute signs bleeding at this time. Pt otherwise stable. Lipase downtrended. No complaints. advance Diet.  03/01/22: Pt seen and examined at bedside. The patient is sleepy on exam, does not appear to have any concerns. Again appears confused about the time. AOX2 person, place. No complaints this AM. H/H low this AM but largely stable. Lipase downtrending. Tolerating diet.     OVERNIGHT EVENTS: None    Home Medications:  amitriptyline 10 mg oral tablet: 3 tab(s) orally once a day (at bedtime) (25 Feb 2022 16:50)  aspirin 81 mg oral tablet: 1 tab(s) orally once a day (25 Feb 2022 16:50)  atorvastatin 40 mg oral tablet: 1 tab(s) orally once a day (25 Feb 2022 16:50)  Dilaudid 4 mg oral tablet: 1 tab(s) orally 3 times a day, As Needed (25 Feb 2022 16:50)  enalapril 2.5 mg oral tablet: 1 tab(s) orally once a day (25 Feb 2022 16:50)  ferrous sulfate 325 mg (65 mg elemental iron) oral tablet: 1 tablet oral daily (25 Feb 2022 16:50)  metFORMIN 500 mg oral tablet: 1 tab(s) orally 2 times a day (25 Feb 2022 16:50)  Metoprolol Succinate ER 25 mg oral tablet, extended release: 1 tab(s) orally once a day (25 Feb 2022 16:50)  omeprazole 20 mg oral delayed release capsule: 1 cap(s) orally once in the morning and 1 in the afternoon (25 Feb 2022 16:50)  PreserVision AREDS oral capsule: 1 tablet oral daily (25 Feb 2022 16:50)  Tradjenta 5 mg oral tablet: 1 tab(s) orally once a day  -per pt daughter, not sure if taking currently (25 Feb 2022 16:50)  Tylenol 325 mg oral tablet: 2 tab(s) orally every 6 hours, As needed, Temp greater or equal to 38C (100.4F), Mild Pain (1 - 3) (28 Feb 2022 13:22)  Vitamin D2 50,000 intl units (1.25 mg) oral capsule (obsolete): 1 cap(s) orally once a week (25 Feb 2022 16:50)  Zoloft 25 mg oral tablet: 1 tab(s) orally 2 times a day (25 Feb 2022 16:50)      MEDICATIONS  (STANDING):  amitriptyline 30 milliGRAM(s) Oral at bedtime  artificial tears (preservative free) Ophthalmic Solution 1 Drop(s) Both EYES two times a day  aspirin enteric coated 81 milliGRAM(s) Oral daily  atorvastatin 40 milliGRAM(s) Oral at bedtime  dextrose 40% Gel 15 Gram(s) Oral once  dextrose 5%. 1000 milliLiter(s) (50 mL/Hr) IV Continuous <Continuous>  dextrose 5%. 1000 milliLiter(s) (100 mL/Hr) IV Continuous <Continuous>  dextrose 50% Injectable 25 Gram(s) IV Push once  dextrose 50% Injectable 12.5 Gram(s) IV Push once  dextrose 50% Injectable 25 Gram(s) IV Push once  glucagon  Injectable 1 milliGRAM(s) IntraMuscular once  heparin   Injectable 5000 Unit(s) SubCutaneous every 12 hours  insulin lispro (ADMELOG) corrective regimen sliding scale   SubCutaneous three times a day before meals  insulin lispro (ADMELOG) corrective regimen sliding scale   SubCutaneous at bedtime  metoprolol succinate ER 25 milliGRAM(s) Oral daily  pantoprazole  Injectable 40 milliGRAM(s) IV Push two times a day  polyethylene glycol 3350 17 Gram(s) Oral two times a day  senna 2 Tablet(s) Oral at bedtime  sertraline 25 milliGRAM(s) Oral every 12 hours  sucralfate suspension 1 Gram(s) Oral four times a day    MEDICATIONS  (PRN):  acetaminophen     Tablet .. 650 milliGRAM(s) Oral every 6 hours PRN Temp greater or equal to 38C (100.4F), Mild Pain (1 - 3)  aluminum hydroxide/magnesium hydroxide/simethicone Suspension 30 milliLiter(s) Oral every 4 hours PRN Dyspepsia  HYDROmorphone   Tablet 4 milliGRAM(s) Oral every 8 hours PRN Severe Pain (7 - 10)  melatonin 3 milliGRAM(s) Oral at bedtime PRN Insomnia  ondansetron Injectable 4 milliGRAM(s) IV Push every 8 hours PRN Nausea and/or Vomiting      Allergies    morphine (Other)    Intolerances        Social History:  Former cigarette smoker, smoked <1/2 ppd for less than 15 years, quit more than 50 years ago   Denies ETOH use   Denies drug use     Ambulates with a walker. Lives at home with aides who come to help her with ADLs (not 24 hour aides) (25 Feb 2022 07:48)      REVIEW OF SYSTEMS: i am better , i ate well  CONSTITUTIONAL: No fever, No chills, No fatigue, No myalgia, No Body ache, No Weakness  EYES: No eye pain,  No visual disturbances, No discharge, NO Redness  ENMT:  No ear pain, No nose bleed, No vertigo; No sinus pain, NO throat pain, No Congestion  NECK: No pain, No stiffness  RESPIRATORY: No cough, NO wheezing, No  hemoptysis, NO  shortness of breath  CARDIOVASCULAR: No chest pain, palpitations  GASTROINTESTINAL: No abdominal pain, NO epigastric pain. No nausea, No vomiting; No diarrhea, No constipation. [  ] BM  GENITOURINARY: No dysuria, No frequency, No urgency, No hematuria, NO incontinence  NEUROLOGICAL: No headaches, No dizziness, No numbness, No tingling, No tremors, No weakness  EXT: No Swelling, No Pain, No Edema  SKIN:  [ X ] No itching, burning, rashes, or lesions   MUSCULOSKELETAL: No joint pain ,No Jt swelling; No muscle pain, No back pain, No extremity pain  PSYCHIATRIC: No depression,  No anxiety,  No mood swings ,No difficulty sleeping at night  PAIN SCALE: [ X ] None  [  ] Other-  ROS Unable to obtain due to - [  ] Dementia  [  ] Lethargy [  ] Drowsy [  ] Sedated [  ] non verbal  REST OF REVIEW Of SYSTEM - [ X ] Normal     Vital Signs Last 24 Hrs  T(C): 36.5 (01 Mar 2022 04:45), Max: 36.5 (01 Mar 2022 04:45)  T(F): 97.7 (01 Mar 2022 04:45), Max: 97.7 (01 Mar 2022 04:45)  HR: 61 (01 Mar 2022 04:45) (61 - 94)  BP: 126/70 (01 Mar 2022 04:45) (118/71 - 126/70)  BP(mean): --  RR: 18 (01 Mar 2022 04:45) (18 - 18)  SpO2: 92% (01 Mar 2022 04:45) (92% - 92%)  Finger Stick      PHYSICAL EXAM:  GENERAL:  [ x ] NAD , [ x ] well appearing, [  ] Agitated, [  ] Drowsy,  [  ] Lethargy, [  ] confused   HEAD:  [ x ] Normal, [  ] Other  EYES:  [ x ] EOMI, [ x ] PERRLA, [ x ] conjunctiva and sclera clear normal, [  ] Other,  [  ] Pallor,[  ] Discharge  ENMT:  [ x ] Normal, [ x ] Moist mucous membranes, [  ] Good dentition, [ x ] No Thrush  NECK:  [ x ] Supple, [ x ] No JVD, [ x ] Normal thyroid, [  ] Lymphadenopathy [  ] Other  CHEST/LUNG:  [ x ] Clear to auscultation bilaterally, [ x ] Breath Sounds equal B/L / Decrease, [x  ] poor effort  [ x ] No rales, [ x ] No rhonchi  [ x ]  No wheezing,   HEART:  [ x ] Regular rate and rhythm, [  ] tachycardia, [  ] Bradycardia,  [  ] irregular  [ x ] No murmurs, No rubs, No gallops, [  ] PPM in place (Mfr:  )  ABDOMEN:  [ x ] Soft, [ x ] Nontender, [ x ] Nondistended, [x  ] No mass, [  ] Bowel sounds present, [  ] obese  NERVOUS SYSTEM:  [ x ] Alert & Oriented X2 to person, place, [ x ] Nonfocal  [  ] Confusion  [  ] Encephalopathic [  ] Sedated [  ] Unable to assess, [  ] Dementia [ x ] Other- forgetful  EXTREMITIES: [ x ] 2+ Peripheral Pulses, No clubbing, No cyanosis,  [ x ] 2 + edema B/L lower EXT. [  ] PVD stasis skin changes B/L Lower EXT, [  ] wound  LYMPH: No lymphadenopathy noted  SKIN:  [  ] No rashes or lesions, [  ] Pressure Ulcers, [  ] ecchymosis, [  ] Skin Tears, [ x ] Other- scattered age related ecchymosis throughout b/l UE and LE    DIET: Diet, Regular:   DASH/TLC Sodium & Cholesterol Restricted  Low Sodium (02-28-22 @ 14:48)      LABS:                          8.2    5.06  )-----------( 177      ( 01 Mar 2022 07:55 )             23.2     01 Mar 2022 07:55    141    |  112    |  12     ----------------------------<  90     3.7     |  23     |  0.89     Ca    8.5        01 Mar 2022 07:55    TPro  4.3    /  Alb  2.0    /  TBili  0.3    /  DBili  x      /  AST  20     /  ALT  18     /  AlkPhos  52     01 Mar 2022 07:55                          8.6    5.49  )-----------( 177      ( 28 Feb 2022 08:09 )             25.3     28 Feb 2022 08:09    141    |  112    |  14     ----------------------------<  83     3.7     |  22     |  1.10     Ca    8.6        28 Feb 2022 08:09    TPro  4.6    /  Alb  2.2    /  TBili  0.3    /  DBili  x      /  AST  22     /  ALT  17     /  AlkPhos  54     28 Feb 2022 08:09          Culture Results:   >=3 organisms. Probable collection contamination. (02-25 @ 09:18)    Culture - Urine (collected 25 Feb 2022 09:18)  Source: Clean Catch Clean Catch (Midstream)  Final Report (26 Feb 2022 08:21):    >=3 organisms. Probable collection contamination.         Anemia Panel:  Ferritin, Serum: 124 ng/mL (02-28-22 @ 11:05)  Iron - Total Binding Capacity.: 120 ug/dL (02-28-22 @ 11:03)  Iron Total, Serum: 33 ug/dL (02-28-22 @ 11:03)      Thyroid Panel:        Lipase, Serum: 752 U/L (02-28-22 @ 08:09)  Lipase, Serum: 996 U/L (02-27-22 @ 07:39)  Lipase, Serum: 1668 U/L (02-26-22 @ 10:03)  Lipase, Serum: 1251 U/L (02-26-22 @ 08:03)  Lipase, Serum: 1537 U/L (02-25-22 @ 00:33)      Serum Pro-Brain Natriuretic Peptide: 2914 pg/mL (02-25-22 @ 00:33)      RADIOLOGY & ADDITIONAL TESTS:  NONE    HEALTH ISSUES - PROBLEM Dx:  Abnormal urinalysis    Benign essential HTN    Need for prophylactic measure    Pancreatitis    Gallstone pancreatitis    Anemia    CAD (coronary artery disease)    Type 2 diabetes mellitus    Hyperlipidemia    Major depression    GERD (gastroesophageal reflux disease)    History of spinal stenosis    KERRY (acute kidney injury)    H/O peripheral neuropathy      Consultant(s) Notes Reviewed:  [ x ] YES     Care Discussed with [X] Consultants  [ x ] Patient  [ x ] Family- daughter  [  ] HCP [  ]   [  ] Social Service  [ x ] RN, [  ] Physical Therapy,[  ] Palliative care team  DVT PPX: [  ] Lovenox, [ x ] S C Heparin, [  ] Coumadin, [  ] Xarelto, [  ] Eliquis, [  ] Pradaxa, [  ] IV Heparin drip, [  ] SCD [  ] Contraindication 2 to GI Bleed,[  ] Ambulation [  ] Contraindicated 2 to  bleed [  ] Contraindicated 2 to Brain Bleed    Advanced directive: [ x ] None, [  ] DNR/DNI     Patient is a 89y old  Female who presents with a chief complaint of pancreatitis (28 Feb 2022 12:32)    HPI:  The patient is a 89 year old female with PMH CAD s/p stents in 2012, Type 2 diabetes, HTN, HLD, GERD, mild AS, chronic venous insufficiency, neuropathy, macular degeneration, chronic constipation, depression, OA, lumbar degenerative disc disease, and spinal stenosis who presents to the ER complaining of nausea, abdominal pain, and burning chest pain. Patient is a poor historian, majority of the history obtained from daughter Mary Guerin at bedside. Per daughter, over the past week patient has been extremely nauseous and has been dry heaving. Patient never vomited. She had been eating less over the past few days; however, yesterday, patient was so nauseous that she was unable to eat or drink. She felt weak and fatigued. She also developed burning pain in the epigastric area that radiated to her chest. Her daughter became concerned and called EMS. Patient denies any other symptoms, including fevers, chills, SOB, diarrhea. Of note, per daughter, patient has lost a significant amount of weight (about 12 lbs) in the past few months as she has had a decreased appetite.    In the ED:   VS: T:97.6, HR:93, BP: 119/54, RR:16, SpO2: 98% on room air  Labs significant for BUN/Cr: 34/1.8, Trop negative x 2, lipase: 1537, proBNP: 2914  UA: slightly turbid, large blood, moderate ketones, moderate leuk esterase, 26-50 WBCs, moderate bacteria, few yeast cells   EKG: NSR, no ST or T wave abnormalities   US abdomen: Sonographic findings equivocal for acute cholecystitis. If there is clinical suspicion for acute cholecystitis, a hepatobiliary scan may be obtained for further evaluation.Poorly visualized pancreas. Recommend correlation with serum lipase to assess acute pancreatitis.  CT A/P: Marked fatty replacement of the pancreas. Limited evaluation without intravenous contrast. Cholelithiasis without CT evidence for acute cholecystitis.Diverticulosis without diverticulitis.  HIDA scan pending   Patient received 1 L NS bolus 4 mg Zofran (25 Feb 2022 07:48)    INTERVAL HPI:  02/26/22: Pt seen and examined at bedside. Pt is poor historian' aox1 to person only; has no complaints this morning. On 60cc/hr DS+NS for possible acute pancreatitis. Awaiting MRI abd to rule out malignancy. Erroneous lab result this morning 2/2 to blood draw from IV site; repeat lab ordered. -Stable labs. Low stable H/H  02/27/22: Pt seen and examined at bedside. Pt AOx1 with mild confusion. No complaint this am.  Awaiting MRI A/P  abd to rule out malignancy. Low stable H/H; lipase downtrending.No Complaints  02/28/22: Pt seen and examined at bedside. Pt AOx2 to person, place. Pt appears confused about whereabouts of her . Has no acute concerns this morning. Awaiting MRI abdomen w/ w/o C to r/o malignancy. H/H remains low this AM, though no acute signs bleeding at this time. Pt otherwise stable. Lipase downtrended. No complaints. advance Diet.  03/01/22: Pt seen and examined at bedside. The patient is sleepy on exam, does not appear to have any concerns. Again appears confused about the time. AOX2 person, place. No complaints this AM. H/H low this AM but largely stable. Lipase downtrending. Tolerating diet.     OVERNIGHT EVENTS: None    Home Medications:  amitriptyline 10 mg oral tablet: 3 tab(s) orally once a day (at bedtime) (25 Feb 2022 16:50)  aspirin 81 mg oral tablet: 1 tab(s) orally once a day (25 Feb 2022 16:50)  atorvastatin 40 mg oral tablet: 1 tab(s) orally once a day (25 Feb 2022 16:50)  Dilaudid 4 mg oral tablet: 1 tab(s) orally 3 times a day, As Needed (25 Feb 2022 16:50)  enalapril 2.5 mg oral tablet: 1 tab(s) orally once a day (25 Feb 2022 16:50)  ferrous sulfate 325 mg (65 mg elemental iron) oral tablet: 1 tablet oral daily (25 Feb 2022 16:50)  metFORMIN 500 mg oral tablet: 1 tab(s) orally 2 times a day (25 Feb 2022 16:50)  Metoprolol Succinate ER 25 mg oral tablet, extended release: 1 tab(s) orally once a day (25 Feb 2022 16:50)  omeprazole 20 mg oral delayed release capsule: 1 cap(s) orally once in the morning and 1 in the afternoon (25 Feb 2022 16:50)  PreserVision AREDS oral capsule: 1 tablet oral daily (25 Feb 2022 16:50)  Tradjenta 5 mg oral tablet: 1 tab(s) orally once a day  -per pt daughter, not sure if taking currently (25 Feb 2022 16:50)  Tylenol 325 mg oral tablet: 2 tab(s) orally every 6 hours, As needed, Temp greater or equal to 38C (100.4F), Mild Pain (1 - 3) (28 Feb 2022 13:22)  Vitamin D2 50,000 intl units (1.25 mg) oral capsule (obsolete): 1 cap(s) orally once a week (25 Feb 2022 16:50)  Zoloft 25 mg oral tablet: 1 tab(s) orally 2 times a day (25 Feb 2022 16:50)      MEDICATIONS  (STANDING):  amitriptyline 30 milliGRAM(s) Oral at bedtime  artificial tears (preservative free) Ophthalmic Solution 1 Drop(s) Both EYES two times a day  aspirin enteric coated 81 milliGRAM(s) Oral daily  atorvastatin 40 milliGRAM(s) Oral at bedtime  dextrose 40% Gel 15 Gram(s) Oral once  dextrose 5%. 1000 milliLiter(s) (50 mL/Hr) IV Continuous <Continuous>  dextrose 5%. 1000 milliLiter(s) (100 mL/Hr) IV Continuous <Continuous>  dextrose 50% Injectable 25 Gram(s) IV Push once  dextrose 50% Injectable 12.5 Gram(s) IV Push once  dextrose 50% Injectable 25 Gram(s) IV Push once  glucagon  Injectable 1 milliGRAM(s) IntraMuscular once  heparin   Injectable 5000 Unit(s) SubCutaneous every 12 hours  insulin lispro (ADMELOG) corrective regimen sliding scale   SubCutaneous three times a day before meals  insulin lispro (ADMELOG) corrective regimen sliding scale   SubCutaneous at bedtime  metoprolol succinate ER 25 milliGRAM(s) Oral daily  pantoprazole  Injectable 40 milliGRAM(s) IV Push two times a day  polyethylene glycol 3350 17 Gram(s) Oral two times a day  senna 2 Tablet(s) Oral at bedtime  sertraline 25 milliGRAM(s) Oral every 12 hours  sucralfate suspension 1 Gram(s) Oral four times a day    MEDICATIONS  (PRN):  acetaminophen     Tablet .. 650 milliGRAM(s) Oral every 6 hours PRN Temp greater or equal to 38C (100.4F), Mild Pain (1 - 3)  aluminum hydroxide/magnesium hydroxide/simethicone Suspension 30 milliLiter(s) Oral every 4 hours PRN Dyspepsia  HYDROmorphone   Tablet 4 milliGRAM(s) Oral every 8 hours PRN Severe Pain (7 - 10)  melatonin 3 milliGRAM(s) Oral at bedtime PRN Insomnia  ondansetron Injectable 4 milliGRAM(s) IV Push every 8 hours PRN Nausea and/or Vomiting      Allergies    morphine (Other)    Intolerances        Social History:  Former cigarette smoker, smoked <1/2 ppd for less than 15 years, quit more than 50 years ago   Denies ETOH use   Denies drug use     Ambulates with a walker. Lives at home with aides who come to help her with ADLs (not 24 hour aides) (25 Feb 2022 07:48)      REVIEW OF SYSTEMS: i am better , i ate well  CONSTITUTIONAL: No fever, No chills, No fatigue, No myalgia, No Body ache, No Weakness  EYES: No eye pain,  No visual disturbances, No discharge, NO Redness  ENMT:  No ear pain, No nose bleed, No vertigo; No sinus pain, NO throat pain, No Congestion  NECK: No pain, No stiffness  RESPIRATORY: No cough, NO wheezing, No  hemoptysis, NO  shortness of breath  CARDIOVASCULAR: No chest pain, palpitations  GASTROINTESTINAL: No abdominal pain, NO epigastric pain. No nausea, No vomiting; No diarrhea, No constipation. [  ] BM  GENITOURINARY: No dysuria, No frequency, No urgency, No hematuria, NO incontinence  NEUROLOGICAL: No headaches, No dizziness, No numbness, No tingling, No tremors, No weakness  EXT: No Swelling, No Pain, No Edema  SKIN:  [ X ] No itching, burning, rashes, or lesions   MUSCULOSKELETAL: No joint pain ,No Jt swelling; No muscle pain, No back pain, No extremity pain  PSYCHIATRIC: No depression,  No anxiety,  No mood swings ,No difficulty sleeping at night  PAIN SCALE: [ X ] None  [  ] Other-  ROS Unable to obtain due to - [  ] Dementia  [  ] Lethargy [  ] Drowsy [  ] Sedated [  ] non verbal  REST OF REVIEW Of SYSTEM - [ X ] Normal     Vital Signs Last 24 Hrs  T(C): 36.5 (01 Mar 2022 04:45), Max: 36.5 (01 Mar 2022 04:45)  T(F): 97.7 (01 Mar 2022 04:45), Max: 97.7 (01 Mar 2022 04:45)  HR: 61 (01 Mar 2022 04:45) (61 - 94)  BP: 126/70 (01 Mar 2022 04:45) (118/71 - 126/70)  BP(mean): --  RR: 18 (01 Mar 2022 04:45) (18 - 18)  SpO2: 92% (01 Mar 2022 04:45) (92% - 92%)  Finger Stick      PHYSICAL EXAM:  GENERAL:  [ x ] NAD , [ x ] well appearing, [  ] Agitated, [  ] Drowsy,  [  ] Lethargy, [  ] confused   HEAD:  [ x ] Normal, [  ] Other  EYES:  [ x ] EOMI, [ x ] PERRLA, [ x ] conjunctiva and sclera clear normal, [  ] Other,  [  ] Pallor,[  ] Discharge  ENMT:  [ x ] Normal, [ x ] Moist mucous membranes, [  ] Good dentition, [ x ] No Thrush  NECK:  [ x ] Supple, [ x ] No JVD, [ x ] Normal thyroid, [  ] Lymphadenopathy [  ] Other  CHEST/LUNG:  [ x ] Clear to auscultation bilaterally, [ x ] Breath Sounds equal B/L / Decrease, [x  ] poor effort  [ x ] No rales, [ x ] No rhonchi  [ x ]  No wheezing,   HEART:  [ x ] Regular rate and rhythm, [  ] tachycardia, [  ] Bradycardia,  [  ] irregular  [ x ] No murmurs, No rubs, No gallops, [  ] PPM in place (Mfr:  )  ABDOMEN:  [ x ] Soft, [ x ] Nontender, [ x ] Nondistended, [x  ] No mass, [  ] Bowel sounds present, [  ] obese  NERVOUS SYSTEM:  [ x ] Alert & Oriented X2 to person, place, [ x ] Nonfocal  [  ] Confusion  [  ] Encephalopathic [  ] Sedated [  ] Unable to assess, [  ] Dementia [ x ] Other- forgetful  EXTREMITIES: [ x ] 2+ Peripheral Pulses, No clubbing, No cyanosis,  [ x ] 2 + edema B/L lower EXT. [  ] PVD stasis skin changes B/L Lower EXT, [  ] wound  LYMPH: No lymphadenopathy noted  SKIN:  [  ] No rashes or lesions, [  ] Pressure Ulcers, [  ] ecchymosis, [  ] Skin Tears, [ x ] Other- scattered age related ecchymosis throughout b/l UE and LE    DIET: Diet, Regular:   DASH/TLC Sodium & Cholesterol Restricted  Low Sodium (02-28-22 @ 14:48)      LABS:                          8.2    5.06  )-----------( 177      ( 01 Mar 2022 07:55 )             23.2     01 Mar 2022 07:55    141    |  112    |  12     ----------------------------<  90     3.7     |  23     |  0.89     Ca    8.5        01 Mar 2022 07:55    TPro  4.3    /  Alb  2.0    /  TBili  0.3    /  DBili  x      /  AST  20     /  ALT  18     /  AlkPhos  52     01 Mar 2022 07:55                          8.6    5.49  )-----------( 177      ( 28 Feb 2022 08:09 )             25.3     28 Feb 2022 08:09    141    |  112    |  14     ----------------------------<  83     3.7     |  22     |  1.10     Ca    8.6        28 Feb 2022 08:09    TPro  4.6    /  Alb  2.2    /  TBili  0.3    /  DBili  x      /  AST  22     /  ALT  17     /  AlkPhos  54     28 Feb 2022 08:09          Culture Results:   >=3 organisms. Probable collection contamination. (02-25 @ 09:18)    Culture - Urine (collected 25 Feb 2022 09:18)  Source: Clean Catch Clean Catch (Midstream)  Final Report (26 Feb 2022 08:21):    >=3 organisms. Probable collection contamination.         Anemia Panel:  Ferritin, Serum: 124 ng/mL (02-28-22 @ 11:05)  Iron - Total Binding Capacity.: 120 ug/dL (02-28-22 @ 11:03)  Iron Total, Serum: 33 ug/dL (02-28-22 @ 11:03)      Thyroid Panel:        Lipase, Serum: 752 U/L (02-28-22 @ 08:09)  Lipase, Serum: 996 U/L (02-27-22 @ 07:39)  Lipase, Serum: 1668 U/L (02-26-22 @ 10:03)  Lipase, Serum: 1251 U/L (02-26-22 @ 08:03)  Lipase, Serum: 1537 U/L (02-25-22 @ 00:33)      Serum Pro-Brain Natriuretic Peptide: 2914 pg/mL (02-25-22 @ 00:33)      RADIOLOGY & ADDITIONAL TESTS:  NONE    HEALTH ISSUES - PROBLEM Dx:  Abnormal urinalysis    Benign essential HTN    Need for prophylactic measure    Pancreatitis    Gallstone pancreatitis    Anemia    CAD (coronary artery disease)    Type 2 diabetes mellitus    Hyperlipidemia    Major depression    GERD (gastroesophageal reflux disease)    History of spinal stenosis    KERRY (acute kidney injury)    H/O peripheral neuropathy      Consultant(s) Notes Reviewed:  [ x ] YES     Care Discussed with [X] Consultants  [ x ] Patient  [ x ] Family- daughter  [  ] HCP [  ]   [  ] Social Service  [ x ] RN, [  ] Physical Therapy,[  ] Palliative care team  DVT PPX: [  ] Lovenox, [ x ] S C Heparin, [  ] Coumadin, [  ] Xarelto, [  ] Eliquis, [  ] Pradaxa, [  ] IV Heparin drip, [  ] SCD [  ] Contraindication 2 to GI Bleed,[  ] Ambulation [  ] Contraindicated 2 to  bleed [  ] Contraindicated 2 to Brain Bleed    Advanced directive: [ x ] None, [  ] DNR/DNI

## 2022-03-01 NOTE — PROGRESS NOTE ADULT - ASSESSMENT
abdominal pain  ? pancreatitis    diet as tolerated  proton pump inhibitor bid  carafate 1g four times a day  non contrast ct of the abdomen shows atrophic pancreas; no carmen-pancreatic inflammation  MRI noted, results d/w patient  hem/onc following, recs appreciated, ordered NM bone scan  will d/w family and surgery team    I reviewed the overnight course of events on the unit, re-confirming the patient history. I discussed the care with the patient and their family  Differential diagnosis and plan of care discussed with patient after the evaluation  35 minutes spent on total encounter of which more than fifty percent of the encounter was spent counseling and/or coordinating care by the attending physician.  Advanced care planning was discussed with patient and family.  Advanced care planning forms were reviewed and discussed.  Risks, benefits and alternatives of gastroenterologic procedures were discussed in detail and all questions were answered.

## 2022-03-01 NOTE — PROGRESS NOTE ADULT - PROBLEM SELECTOR PLAN 7
- holding home medications, tradjenta and metformin, hba1c 6.0%  - low dose insulin coverage scale w/hypoglycemia protocol in place   - joe AC&HS  - would STOP tradjenta on d/c as pancreatitis risk may be inc by this medication - STOPPING  home medications, Tradjenta and metformin, hba1c 6.0%  - low dose insulin coverage scale w/hypoglycemia protocol in place   - Accu-Devante AC&HS, D/W Dtr   - would STOP Tradjenta on d/c as pancreatitis risk may be inc by this medication - STOPPING home medications, Tradjenta and metformin, hba1c 6.0%  - low dose insulin coverage scale w/hypoglycemia protocol in place   - Accu-Devante AC&HS, D/W Dtr   - would STOP Tradjenta on d/c as pancreatitis risk may be inc by this medication

## 2022-03-01 NOTE — PROGRESS NOTE ADULT - PROBLEM SELECTOR PLAN 12
Chronic, stable  - Patient takes Dilaudid 4 mg PRN every 8hr PRN for pain, will continue    #DVT  - heparin 5000u SQ q12hrs    #Dispo planning  - PT consult, recs home with assist  - palliative consult, f/u recs-- Chronic, stable  - Patient takes Dilaudid 4 mg PRN every 8hr PRN for pain, will continue    #DVT  - heparin 5000u SQ q12hrs    #Dispo planning  - PT consult, recs home with assist  - palliative consult, f/u recs

## 2022-03-01 NOTE — PROGRESS NOTE ADULT - PROBLEM SELECTOR PLAN 1
- gallstone pancreatitis vs medication induced (pt takes Tradjenta) vs cholecystitis vs pancreatic malignancy  - Lipase 1537 -> 1251 -> 1668 -> 996 -> 752, downtrending. F/u AM Lipase  - US abdomen: findings equivocal for acute cholecystitis. Poorly visualized pancreas  - HIDA: Normal hepatobiliary scan. No scan evidence of acute cholecystitis.  - CT A/P shows Marked fatty replacement of the pancreas. Limited evaluation without intravenous contrast. Cholelithiasis without CT evidence for acute cholecystitis. Diverticulosis without diverticulitis. No inflammation surrounding pancreas on CT  - continue DASH diet as tolerated  - h/o mild aortic stenosis, would caution with excessive IV fluids  - MR abdomen w/ and w/o IV Cont 2/28/22: cystic lesion in the pancreatic head with a few thin internal septations measuring 1.6 x 1.0 cm. Small amount of peripancreatic fluid consistent with pancreatitis. Cholelithiasis. Partially imaged enhancing lesions in the right iliac bone measuring 1.5 cm and 0.7 cm, incompletely characterized on this study; a bone scan or MRI of the pelvis could be obtained for further evaluation.  - Zofran PRN for nausea   - GI (Dr Montilla) consulted, f/u recs--  - Surgery consulted- NO Intervention - medication induced (pt takes Tradjenta) pancreatitis vs pancreatic malignancy  - Lipase 1537 -> 1251 -> 1668 -> 996 -> 752 -> 489, downtrending. F/u AM Lipase  - US abdomen: findings equivocal for acute cholecystitis. Poorly visualized pancreas  - HIDA: Normal hepatobiliary scan. No scan evidence of acute cholecystitis.  - CT A/P shows Marked fatty replacement of the pancreas. Limited evaluation without intravenous contrast. Cholelithiasis without CT evidence for acute cholecystitis. Diverticulosis without diverticulitis. No inflammation surrounding pancreas on CT  - continue DASH diet as tolerated  - h/o mild aortic stenosis, would caution with excessive IV fluids  - MR abdomen w/ and w/o IV Cont 2/28/22: cystic lesion in the pancreatic head with a few thin internal septations measuring 1.6 x 1.0 cm. Small amount of peripancreatic fluid consistent with pancreatitis. Cholelithiasis. Partially imaged enhancing lesions in the right iliac bone measuring 1.5 cm and 0.7 cm, incompletely characterized on this study; a bone scan or MRI of the pelvis could be obtained for further evaluation.  - Zofran PRN for nausea   - GI (Dr Montilla) consulted, f/u recs--  - Surgery consulted- NO Intervention - medication induced (pt takes Tradjenta) pancreatitis vs pancreatic malignancy  - Lipase 1537 -> 1251 -> 1668 -> 996 -> 752 -> 489, downtrending. F/u AM Lipase  - US abdomen: findings equivocal for acute cholecystitis. Poorly visualized pancreas  - HIDA: Normal hepatobiliary scan. No scan evidence of acute cholecystitis.  - CT A/P shows Marked fatty replacement of the pancreas. Limited evaluation without intravenous contrast. Cholelithiasis without CT evidence for acute cholecystitis. Diverticulosis without diverticulitis. No inflammation surrounding pancreas on CT  - continue DASH diet as tolerated  - h/o mild aortic stenosis, would caution with excessive IV fluids  - MR abdomen w/ and w/o IV Cont 2/28/22: cystic lesion in the pancreatic head with a few thin internal septations measuring 1.6 x 1.0 cm. Small amount of peripancreatic fluid consistent with pancreatitis. Cholelithiasis. Partially imaged enhancing lesions in the right iliac bone measuring 1.5 cm and 0.7 cm, incompletely characterized on this study; a bone scan or MRI of the pelvis could be obtained for further evaluation.  - NM Bone scan: Heterogeneous uptake throughout the spine, possibly secondary to degenerative changes and/or compression deformities. Degenerative changes in the major joints. Right iliac bone lesions noted on MRI, not visualized.  - Zofran PRN for nausea   - GI (Dr Montilla) consulted, recs appreciated  - Surgery consulted- NO Intervention

## 2022-03-02 ENCOUNTER — TRANSCRIPTION ENCOUNTER (OUTPATIENT)
Age: 87
End: 2022-03-02

## 2022-03-02 VITALS
OXYGEN SATURATION: 97 % | SYSTOLIC BLOOD PRESSURE: 121 MMHG | RESPIRATION RATE: 16 BRPM | DIASTOLIC BLOOD PRESSURE: 76 MMHG | TEMPERATURE: 98 F | HEART RATE: 99 BPM

## 2022-03-02 LAB
ALBUMIN SERPL ELPH-MCNC: 2 G/DL — LOW (ref 3.3–5)
ALP SERPL-CCNC: 53 U/L — SIGNIFICANT CHANGE UP (ref 40–120)
ALT FLD-CCNC: 17 U/L — SIGNIFICANT CHANGE UP (ref 12–78)
ANION GAP SERPL CALC-SCNC: 5 MMOL/L — SIGNIFICANT CHANGE UP (ref 5–17)
AST SERPL-CCNC: 16 U/L — SIGNIFICANT CHANGE UP (ref 15–37)
BASOPHILS # BLD AUTO: 0.02 K/UL — SIGNIFICANT CHANGE UP (ref 0–0.2)
BASOPHILS NFR BLD AUTO: 0.4 % — SIGNIFICANT CHANGE UP (ref 0–2)
BILIRUB SERPL-MCNC: 0.3 MG/DL — SIGNIFICANT CHANGE UP (ref 0.2–1.2)
BUN SERPL-MCNC: 12 MG/DL — SIGNIFICANT CHANGE UP (ref 7–23)
CALCIUM SERPL-MCNC: 8.3 MG/DL — LOW (ref 8.5–10.1)
CHLORIDE SERPL-SCNC: 111 MMOL/L — HIGH (ref 96–108)
CO2 SERPL-SCNC: 25 MMOL/L — SIGNIFICANT CHANGE UP (ref 22–31)
CREAT SERPL-MCNC: 0.87 MG/DL — SIGNIFICANT CHANGE UP (ref 0.5–1.3)
EGFR: 63 ML/MIN/1.73M2 — SIGNIFICANT CHANGE UP
EOSINOPHIL # BLD AUTO: 0.36 K/UL — SIGNIFICANT CHANGE UP (ref 0–0.5)
EOSINOPHIL NFR BLD AUTO: 7.5 % — HIGH (ref 0–6)
GLUCOSE SERPL-MCNC: 84 MG/DL — SIGNIFICANT CHANGE UP (ref 70–99)
HCT VFR BLD CALC: 24.8 % — LOW (ref 34.5–45)
HGB BLD-MCNC: 8.3 G/DL — LOW (ref 11.5–15.5)
IMM GRANULOCYTES NFR BLD AUTO: 0.2 % — SIGNIFICANT CHANGE UP (ref 0–1.5)
LIDOCAIN IGE QN: 346 U/L — SIGNIFICANT CHANGE UP (ref 73–393)
LYMPHOCYTES # BLD AUTO: 0.82 K/UL — LOW (ref 1–3.3)
LYMPHOCYTES # BLD AUTO: 17.2 % — SIGNIFICANT CHANGE UP (ref 13–44)
MCHC RBC-ENTMCNC: 30.3 PG — SIGNIFICANT CHANGE UP (ref 27–34)
MCHC RBC-ENTMCNC: 33.5 GM/DL — SIGNIFICANT CHANGE UP (ref 32–36)
MCV RBC AUTO: 90.5 FL — SIGNIFICANT CHANGE UP (ref 80–100)
MONOCYTES # BLD AUTO: 0.61 K/UL — SIGNIFICANT CHANGE UP (ref 0–0.9)
MONOCYTES NFR BLD AUTO: 12.8 % — SIGNIFICANT CHANGE UP (ref 2–14)
NEUTROPHILS # BLD AUTO: 2.95 K/UL — SIGNIFICANT CHANGE UP (ref 1.8–7.4)
NEUTROPHILS NFR BLD AUTO: 61.9 % — SIGNIFICANT CHANGE UP (ref 43–77)
NRBC # BLD: 0 /100 WBCS — SIGNIFICANT CHANGE UP (ref 0–0)
PLATELET # BLD AUTO: 178 K/UL — SIGNIFICANT CHANGE UP (ref 150–400)
POTASSIUM SERPL-MCNC: 3.8 MMOL/L — SIGNIFICANT CHANGE UP (ref 3.5–5.3)
POTASSIUM SERPL-SCNC: 3.8 MMOL/L — SIGNIFICANT CHANGE UP (ref 3.5–5.3)
PROT SERPL-MCNC: 4.6 G/DL — LOW (ref 6–8.3)
RBC # BLD: 2.74 M/UL — LOW (ref 3.8–5.2)
RBC # FLD: 16.8 % — HIGH (ref 10.3–14.5)
SODIUM SERPL-SCNC: 141 MMOL/L — SIGNIFICANT CHANGE UP (ref 135–145)
WBC # BLD: 4.77 K/UL — SIGNIFICANT CHANGE UP (ref 3.8–10.5)
WBC # FLD AUTO: 4.77 K/UL — SIGNIFICANT CHANGE UP (ref 3.8–10.5)

## 2022-03-02 PROCEDURE — 83550 IRON BINDING TEST: CPT

## 2022-03-02 PROCEDURE — 86900 BLOOD TYPING SEROLOGIC ABO: CPT

## 2022-03-02 PROCEDURE — 84484 ASSAY OF TROPONIN QUANT: CPT

## 2022-03-02 PROCEDURE — 93005 ELECTROCARDIOGRAM TRACING: CPT

## 2022-03-02 PROCEDURE — 82962 GLUCOSE BLOOD TEST: CPT

## 2022-03-02 PROCEDURE — 78226 HEPATOBILIARY SYSTEM IMAGING: CPT | Mod: MA

## 2022-03-02 PROCEDURE — 96360 HYDRATION IV INFUSION INIT: CPT

## 2022-03-02 PROCEDURE — A9579: CPT

## 2022-03-02 PROCEDURE — 82728 ASSAY OF FERRITIN: CPT

## 2022-03-02 PROCEDURE — 71045 X-RAY EXAM CHEST 1 VIEW: CPT

## 2022-03-02 PROCEDURE — 97162 PT EVAL MOD COMPLEX 30 MIN: CPT

## 2022-03-02 PROCEDURE — 83690 ASSAY OF LIPASE: CPT

## 2022-03-02 PROCEDURE — 99285 EMERGENCY DEPT VISIT HI MDM: CPT

## 2022-03-02 PROCEDURE — 86901 BLOOD TYPING SEROLOGIC RH(D): CPT

## 2022-03-02 PROCEDURE — 80053 COMPREHEN METABOLIC PANEL: CPT

## 2022-03-02 PROCEDURE — 83540 ASSAY OF IRON: CPT

## 2022-03-02 PROCEDURE — 78306 BONE IMAGING WHOLE BODY: CPT

## 2022-03-02 PROCEDURE — 76705 ECHO EXAM OF ABDOMEN: CPT

## 2022-03-02 PROCEDURE — 87086 URINE CULTURE/COLONY COUNT: CPT

## 2022-03-02 PROCEDURE — 80048 BASIC METABOLIC PNL TOTAL CA: CPT

## 2022-03-02 PROCEDURE — 96374 THER/PROPH/DIAG INJ IV PUSH: CPT

## 2022-03-02 PROCEDURE — 83735 ASSAY OF MAGNESIUM: CPT

## 2022-03-02 PROCEDURE — 99231 SBSQ HOSP IP/OBS SF/LOW 25: CPT

## 2022-03-02 PROCEDURE — 74176 CT ABD & PELVIS W/O CONTRAST: CPT | Mod: MA

## 2022-03-02 PROCEDURE — 83880 ASSAY OF NATRIURETIC PEPTIDE: CPT

## 2022-03-02 PROCEDURE — 86850 RBC ANTIBODY SCREEN: CPT

## 2022-03-02 PROCEDURE — 81001 URINALYSIS AUTO W/SCOPE: CPT

## 2022-03-02 PROCEDURE — 96361 HYDRATE IV INFUSION ADD-ON: CPT

## 2022-03-02 PROCEDURE — A9561: CPT

## 2022-03-02 PROCEDURE — 74183 MRI ABD W/O CNTR FLWD CNTR: CPT

## 2022-03-02 PROCEDURE — A9537: CPT

## 2022-03-02 PROCEDURE — 80061 LIPID PANEL: CPT

## 2022-03-02 PROCEDURE — 99232 SBSQ HOSP IP/OBS MODERATE 35: CPT

## 2022-03-02 PROCEDURE — 87635 SARS-COV-2 COVID-19 AMP PRB: CPT

## 2022-03-02 PROCEDURE — 85027 COMPLETE CBC AUTOMATED: CPT

## 2022-03-02 PROCEDURE — 83036 HEMOGLOBIN GLYCOSYLATED A1C: CPT

## 2022-03-02 PROCEDURE — 85025 COMPLETE CBC W/AUTO DIFF WBC: CPT

## 2022-03-02 PROCEDURE — 36415 COLL VENOUS BLD VENIPUNCTURE: CPT

## 2022-03-02 RX ORDER — SENNA PLUS 8.6 MG/1
2 TABLET ORAL
Qty: 0 | Refills: 0 | DISCHARGE
Start: 2022-03-02

## 2022-03-02 RX ADMIN — HYDROMORPHONE HYDROCHLORIDE 4 MILLIGRAM(S): 2 INJECTION INTRAMUSCULAR; INTRAVENOUS; SUBCUTANEOUS at 11:19

## 2022-03-02 RX ADMIN — Medication 1 DROP(S): at 06:11

## 2022-03-02 RX ADMIN — Medication 325 MILLIGRAM(S): at 11:19

## 2022-03-02 RX ADMIN — Medication 1 GRAM(S): at 06:12

## 2022-03-02 RX ADMIN — Medication 81 MILLIGRAM(S): at 11:19

## 2022-03-02 RX ADMIN — SERTRALINE 25 MILLIGRAM(S): 25 TABLET, FILM COATED ORAL at 06:11

## 2022-03-02 RX ADMIN — PANTOPRAZOLE SODIUM 40 MILLIGRAM(S): 20 TABLET, DELAYED RELEASE ORAL at 06:11

## 2022-03-02 RX ADMIN — HEPARIN SODIUM 5000 UNIT(S): 5000 INJECTION INTRAVENOUS; SUBCUTANEOUS at 06:11

## 2022-03-02 RX ADMIN — Medication 1 GRAM(S): at 11:19

## 2022-03-02 RX ADMIN — POLYETHYLENE GLYCOL 3350 17 GRAM(S): 17 POWDER, FOR SOLUTION ORAL at 06:12

## 2022-03-02 NOTE — PROGRESS NOTE ADULT - SUBJECTIVE AND OBJECTIVE BOX
INCOMPLETE - NOTE IN PROGRESS  Patient is a 89y old  Female who presents with a chief complaint of pancreatitis (01 Mar 2022 12:35)    HPI:  The patient is a 89 year old female with PMH CAD s/p stents in 2012, Type 2 diabetes, HTN, HLD, GERD, mild AS, chronic venous insufficiency, neuropathy, macular degeneration, chronic constipation, depression, OA, lumbar degenerative disc disease, and spinal stenosis who presents to the ER complaining of nausea, abdominal pain, and burning chest pain. Patient is a poor historian, majority of the history obtained from daughter Mary Guerin at bedside. Per daughter, over the past week patient has been extremely nauseous and has been dry heaving. Patient never vomited. She had been eating less over the past few days; however, yesterday, patient was so nauseous that she was unable to eat or drink. She felt weak and fatigued. She also developed burning pain in the epigastric area that radiated to her chest. Her daughter became concerned and called EMS. Patient denies any other symptoms, including fevers, chills, SOB, diarrhea. Of note, per daughter, patient has lost a significant amount of weight (about 12 lbs) in the past few months as she has had a decreased appetite.    In the ED:   VS: T:97.6, HR:93, BP: 119/54, RR:16, SpO2: 98% on room air  Labs significant for BUN/Cr: 34/1.8, Trop negative x 2, lipase: 1537, proBNP: 2914  UA: slightly turbid, large blood, moderate ketones, moderate leuk esterase, 26-50 WBCs, moderate bacteria, few yeast cells   EKG: NSR, no ST or T wave abnormalities   US abdomen: Sonographic findings equivocal for acute cholecystitis. If there is clinical suspicion for acute cholecystitis, a hepatobiliary scan may be obtained for further evaluation.Poorly visualized pancreas. Recommend correlation with serum lipase to assess acute pancreatitis.  CT A/P: Marked fatty replacement of the pancreas. Limited evaluation without intravenous contrast. Cholelithiasis without CT evidence for acute cholecystitis.Diverticulosis without diverticulitis.  HIDA scan pending   Patient received 1 L NS bolus 4 mg Zofran (25 Feb 2022 07:48)    INTERVAL HPI:  02/26/22: Pt seen and examined at bedside. Pt is poor historian' aox1 to person only; has no complaints this morning. On 60cc/hr DS+NS for possible acute pancreatitis. Awaiting MRI abd to rule out malignancy. Erroneous lab result this morning 2/2 to blood draw from IV site; repeat lab ordered. -Stable labs. Low stable H/H  02/27/22: Pt seen and examined at bedside. Pt AOx1 with mild confusion. No complaint this am.  Awaiting MRI A/P  abd to rule out malignancy. Low stable H/H; lipase downtrending.No Complaints  02/28/22: Pt seen and examined at bedside. Pt AOx2 to person, place. Pt appears confused about whereabouts of her . Has no acute concerns this morning. Awaiting MRI abdomen w/ w/o C to r/o malignancy. H/H remains low this AM, though no acute signs bleeding at this time. Pt otherwise stable. Lipase downtrended. No complaints. advance Diet.  03/01/22: Pt seen and examined at bedside. The patient is sleepy on exam, does not appear to have any concerns. Again appears confused about the time. AOX2 person, place. No complaints this AM. H/H low this AM but largely stable. Lipase downtrending. Tolerating diet.   03/02/22: Pt seem and examined at bedside...    OVERNIGHT EVENTS: None    Home Medications:  amitriptyline 10 mg oral tablet: 3 tab(s) orally once a day (at bedtime) (25 Feb 2022 16:50)  aspirin 81 mg oral tablet: 1 tab(s) orally once a day (25 Feb 2022 16:50)  atorvastatin 40 mg oral tablet: 1 tab(s) orally once a day (25 Feb 2022 16:50)  Dilaudid 4 mg oral tablet: 1 tab(s) orally 3 times a day, As Needed (25 Feb 2022 16:50)  enalapril 2.5 mg oral tablet: 1 tab(s) orally once a day (25 Feb 2022 16:50)  ferrous sulfate 325 mg (65 mg elemental iron) oral tablet: 1 tablet oral daily (25 Feb 2022 16:50)  Metoprolol Succinate ER 25 mg oral tablet, extended release: 1 tab(s) orally once a day (25 Feb 2022 16:50)  omeprazole 20 mg oral delayed release capsule: 1 cap(s) orally once in the morning and 1 in the afternoon (25 Feb 2022 16:50)  PreserVision AREDS oral capsule: 1 tablet oral daily (25 Feb 2022 16:50)  Tylenol 325 mg oral tablet: 2 tab(s) orally every 6 hours, As needed, Temp greater or equal to 38C (100.4F), Mild Pain (1 - 3) (28 Feb 2022 13:22)  Vitamin D2 50,000 intl units (1.25 mg) oral capsule (obsolete): 1 cap(s) orally once a week (25 Feb 2022 16:50)  Zoloft 25 mg oral tablet: 1 tab(s) orally 2 times a day (25 Feb 2022 16:50)      MEDICATIONS  (STANDING):  amitriptyline 30 milliGRAM(s) Oral at bedtime  artificial tears (preservative free) Ophthalmic Solution 1 Drop(s) Both EYES two times a day  aspirin enteric coated 81 milliGRAM(s) Oral daily  atorvastatin 40 milliGRAM(s) Oral at bedtime  dextrose 40% Gel 15 Gram(s) Oral once  dextrose 5%. 1000 milliLiter(s) (50 mL/Hr) IV Continuous <Continuous>  dextrose 5%. 1000 milliLiter(s) (100 mL/Hr) IV Continuous <Continuous>  dextrose 50% Injectable 25 Gram(s) IV Push once  dextrose 50% Injectable 12.5 Gram(s) IV Push once  dextrose 50% Injectable 25 Gram(s) IV Push once  ferrous    sulfate 325 milliGRAM(s) Oral daily  glucagon  Injectable 1 milliGRAM(s) IntraMuscular once  heparin   Injectable 5000 Unit(s) SubCutaneous every 12 hours  insulin lispro (ADMELOG) corrective regimen sliding scale   SubCutaneous three times a day before meals  insulin lispro (ADMELOG) corrective regimen sliding scale   SubCutaneous at bedtime  metoprolol succinate ER 25 milliGRAM(s) Oral daily  pantoprazole  Injectable 40 milliGRAM(s) IV Push two times a day  polyethylene glycol 3350 17 Gram(s) Oral two times a day  senna 2 Tablet(s) Oral at bedtime  sertraline 25 milliGRAM(s) Oral every 12 hours  sucralfate suspension 1 Gram(s) Oral four times a day    MEDICATIONS  (PRN):  acetaminophen     Tablet .. 650 milliGRAM(s) Oral every 6 hours PRN Temp greater or equal to 38C (100.4F), Mild Pain (1 - 3)  aluminum hydroxide/magnesium hydroxide/simethicone Suspension 30 milliLiter(s) Oral every 4 hours PRN Dyspepsia  HYDROmorphone   Tablet 4 milliGRAM(s) Oral every 8 hours PRN Severe Pain (7 - 10)  melatonin 3 milliGRAM(s) Oral at bedtime PRN Insomnia  ondansetron Injectable 4 milliGRAM(s) IV Push every 8 hours PRN Nausea and/or Vomiting      Allergies    morphine (Other)    Intolerances        Social History:  Former cigarette smoker, smoked <1/2 ppd for less than 15 years, quit more than 50 years ago   Denies ETOH use   Denies drug use     Ambulates with a walker. Lives at home with aides who come to help her with ADLs (not 24 hour aides) (25 Feb 2022 07:48)      REVIEW OF SYSTEMS: i am better , i ate well  CONSTITUTIONAL: No fever, No chills, No fatigue, No myalgia, No Body ache, No Weakness  EYES: No eye pain,  No visual disturbances, No discharge, NO Redness  ENMT:  No ear pain, No nose bleed, No vertigo; No sinus pain, NO throat pain, No Congestion  NECK: No pain, No stiffness  RESPIRATORY: No cough, NO wheezing, No  hemoptysis, NO  shortness of breath  CARDIOVASCULAR: No chest pain, palpitations  GASTROINTESTINAL: No abdominal pain, NO epigastric pain. No nausea, No vomiting; No diarrhea, No constipation. [  ] BM  GENITOURINARY: No dysuria, No frequency, No urgency, No hematuria, NO incontinence  NEUROLOGICAL: No headaches, No dizziness, No numbness, No tingling, No tremors, No weakness  EXT: No Swelling, No Pain, No Edema  SKIN:  [ X ] No itching, burning, rashes, or lesions   MUSCULOSKELETAL: No joint pain ,No Jt swelling; No muscle pain, No back pain, No extremity pain  PSYCHIATRIC: No depression,  No anxiety,  No mood swings ,No difficulty sleeping at night  PAIN SCALE: [ X ] None  [  ] Other-  ROS Unable to obtain due to - [  ] Dementia  [  ] Lethargy [  ] Drowsy [  ] Sedated [  ] non verbal  REST OF REVIEW Of SYSTEM - [ X ] Normal     Vital Signs Last 24 Hrs  T(C): 36.4 (02 Mar 2022 05:35), Max: 36.6 (01 Mar 2022 13:28)  T(F): 97.5 (02 Mar 2022 05:35), Max: 97.9 (01 Mar 2022 13:28)  HR: 70 (02 Mar 2022 05:35) (70 - 97)  BP: 104/64 (02 Mar 2022 05:35) (104/64 - 117/76)  BP(mean): --  RR: 17 (02 Mar 2022 05:35) (17 - 18)  SpO2: 95% (02 Mar 2022 05:35) (95% - 98%)  Finger Stick    PHYSICAL EXAM:  GENERAL:  [ x ] NAD , [ x ] well appearing, [  ] Agitated, [  ] Drowsy,  [  ] Lethargy, [  ] confused   HEAD:  [ x ] Normal, [  ] Other  EYES:  [ x ] EOMI, [ x ] PERRLA, [ x ] conjunctiva and sclera clear normal, [  ] Other,  [  ] Pallor,[  ] Discharge  ENMT:  [ x ] Normal, [ x ] Moist mucous membranes, [  ] Good dentition, [ x ] No Thrush  NECK:  [ x ] Supple, [ x ] No JVD, [ x ] Normal thyroid, [  ] Lymphadenopathy [  ] Other  CHEST/LUNG:  [ x ] Clear to auscultation bilaterally, [ x ] Breath Sounds equal B/L / Decrease, [x  ] poor effort  [ x ] No rales, [ x ] No rhonchi  [ x ]  No wheezing,   HEART:  [ x ] Regular rate and rhythm, [  ] tachycardia, [  ] Bradycardia,  [  ] irregular  [ x ] No murmurs, No rubs, No gallops, [  ] PPM in place (Mfr:  )  ABDOMEN:  [ x ] Soft, [ x ] Nontender, [ x ] Nondistended, [x  ] No mass, [  ] Bowel sounds present, [  ] obese  NERVOUS SYSTEM:  [ x ] Alert & Oriented X2 to person, place, [ x ] Nonfocal  [  ] Confusion  [  ] Encephalopathic [  ] Sedated [  ] Unable to assess, [  ] Dementia [ x ] Other- forgetful  EXTREMITIES: [ x ] 2+ Peripheral Pulses, No clubbing, No cyanosis,  [ x ] 2 + edema B/L lower EXT. [  ] PVD stasis skin changes B/L Lower EXT, [  ] wound  LYMPH: No lymphadenopathy noted  SKIN:  [  ] No rashes or lesions, [  ] Pressure Ulcers, [  ] ecchymosis, [  ] Skin Tears, [ x ] Other- scattered age related ecchymosis throughout b/l UE and LE    DIET: Diet, Regular:   DASH/TLC Sodium & Cholesterol Restricted  Low Sodium (02-28-22 @ 14:48)      LABS:      Ca    8.5        01 Mar 2022 07:55        Culture Results:   >=3 organisms. Probable collection contamination. (02-25 @ 09:18)      Culture - Urine (collected 25 Feb 2022 09:18)  Source: Clean Catch Clean Catch (Midstream)  Final Report (26 Feb 2022 08:21):    >=3 organisms. Probable collection contamination.         Anemia Panel:  Ferritin, Serum: 124 ng/mL (02-28-22 @ 11:05)  Iron - Total Binding Capacity.: 120 ug/dL (02-28-22 @ 11:03)  Iron Total, Serum: 33 ug/dL (02-28-22 @ 11:03)      Thyroid Panel:        Lipase, Serum: 489 U/L (03-01-22 @ 07:55)  Lipase, Serum: 752 U/L (02-28-22 @ 08:09)  Lipase, Serum: 996 U/L (02-27-22 @ 07:39)  Lipase, Serum: 1668 U/L (02-26-22 @ 10:03)  Lipase, Serum: 1251 U/L (02-26-22 @ 08:03)  Lipase, Serum: 1537 U/L (02-25-22 @ 00:33)      Serum Pro-Brain Natriuretic Peptide: 2914 pg/mL (02-25-22 @ 00:33)      RADIOLOGY & ADDITIONAL TESTS:      HEALTH ISSUES - PROBLEM Dx:  Abnormal urinalysis    Benign essential HTN    Need for prophylactic measure    Pancreatitis    Gallstone pancreatitis    Anemia    CAD (coronary artery disease)    Type 2 diabetes mellitus    Hyperlipidemia    Major depression    GERD (gastroesophageal reflux disease)    History of spinal stenosis    KERRY (acute kidney injury)    H/O peripheral neuropathy        Consultant(s) Notes Reviewed:  [ x ] YES     Care Discussed with [X] Consultants  [ x ] Patient  [ x ] Family- daughter  [  ] HCP [  ]   [  ] Social Service  [ x ] RN, [  ] Physical Therapy,[  ] Palliative care team  DVT PPX: [  ] Lovenox, [ x ] S C Heparin, [  ] Coumadin, [  ] Xarelto, [  ] Eliquis, [  ] Pradaxa, [  ] IV Heparin drip, [  ] SCD [  ] Contraindication 2 to GI Bleed,[  ] Ambulation [  ] Contraindicated 2 to  bleed [  ] Contraindicated 2 to Brain Bleed    Advanced directive: [ x ] None, [  ] DNR/DNI Patient is a 89y old  Female who presents with a chief complaint of pancreatitis (01 Mar 2022 12:35)    HPI:  The patient is a 89 year old female with PMH CAD s/p stents in 2012, Type 2 diabetes, HTN, HLD, GERD, mild AS, chronic venous insufficiency, neuropathy, macular degeneration, chronic constipation, depression, OA, lumbar degenerative disc disease, and spinal stenosis who presents to the ER complaining of nausea, abdominal pain, and burning chest pain. Patient is a poor historian, majority of the history obtained from daughter Mary Guerin at bedside. Per daughter, over the past week patient has been extremely nauseous and has been dry heaving. Patient never vomited. She had been eating less over the past few days; however, yesterday, patient was so nauseous that she was unable to eat or drink. She felt weak and fatigued. She also developed burning pain in the epigastric area that radiated to her chest. Her daughter became concerned and called EMS. Patient denies any other symptoms, including fevers, chills, SOB, diarrhea. Of note, per daughter, patient has lost a significant amount of weight (about 12 lbs) in the past few months as she has had a decreased appetite.    In the ED:   VS: T:97.6, HR:93, BP: 119/54, RR:16, SpO2: 98% on room air  Labs significant for BUN/Cr: 34/1.8, Trop negative x 2, lipase: 1537, proBNP: 2914  UA: slightly turbid, large blood, moderate ketones, moderate leuk esterase, 26-50 WBCs, moderate bacteria, few yeast cells   EKG: NSR, no ST or T wave abnormalities   US abdomen: Sonographic findings equivocal for acute cholecystitis. If there is clinical suspicion for acute cholecystitis, a hepatobiliary scan may be obtained for further evaluation.Poorly visualized pancreas. Recommend correlation with serum lipase to assess acute pancreatitis.  CT A/P: Marked fatty replacement of the pancreas. Limited evaluation without intravenous contrast. Cholelithiasis without CT evidence for acute cholecystitis.Diverticulosis without diverticulitis.  HIDA scan pending   Patient received 1 L NS bolus 4 mg Zofran (25 Feb 2022 07:48)    INTERVAL HPI:  02/26/22: Pt seen and examined at bedside. Pt is poor historian' aox1 to person only; has no complaints this morning. On 60cc/hr DS+NS for possible acute pancreatitis. Awaiting MRI abd to rule out malignancy. Erroneous lab result this morning 2/2 to blood draw from IV site; repeat lab ordered. -Stable labs. Low stable H/H  02/27/22: Pt seen and examined at bedside. Pt AOx1 with mild confusion. No complaint this am.  Awaiting MRI A/P  abd to rule out malignancy. Low stable H/H; lipase downtrending.No Complaints  02/28/22: Pt seen and examined at bedside. Pt AOx2 to person, place. Pt appears confused about whereabouts of her . Has no acute concerns this morning. Awaiting MRI abdomen w/ w/o C to r/o malignancy. H/H remains low this AM, though no acute signs bleeding at this time. Pt otherwise stable. Lipase downtrended. No complaints. advance Diet.  03/01/22: Pt seen and examined at bedside. The patient is sleepy on exam, does not appear to have any concerns. Again appears confused about the time. AOX2 person, place. No complaints this AM. H/H low this AM but largely stable. Lipase downtrending. Tolerating diet.   03/02/22: Pt seem and examined at bedside. Pt offers no new complaints at this time. Still confused but comfortable. A&Ox2 person, place. H/H low but remains stable. Lipase downtrending. Tolerating breakfast this AM.    OVERNIGHT EVENTS: None    Home Medications:  amitriptyline 10 mg oral tablet: 3 tab(s) orally once a day (at bedtime) (25 Feb 2022 16:50)  aspirin 81 mg oral tablet: 1 tab(s) orally once a day (25 Feb 2022 16:50)  atorvastatin 40 mg oral tablet: 1 tab(s) orally once a day (25 Feb 2022 16:50)  Dilaudid 4 mg oral tablet: 1 tab(s) orally 3 times a day, As Needed (25 Feb 2022 16:50)  enalapril 2.5 mg oral tablet: 1 tab(s) orally once a day (25 Feb 2022 16:50)  ferrous sulfate 325 mg (65 mg elemental iron) oral tablet: 1 tablet oral daily (25 Feb 2022 16:50)  Metoprolol Succinate ER 25 mg oral tablet, extended release: 1 tab(s) orally once a day (25 Feb 2022 16:50)  omeprazole 20 mg oral delayed release capsule: 1 cap(s) orally once in the morning and 1 in the afternoon (25 Feb 2022 16:50)  PreserVision AREDS oral capsule: 1 tablet oral daily (25 Feb 2022 16:50)  Tylenol 325 mg oral tablet: 2 tab(s) orally every 6 hours, As needed, Temp greater or equal to 38C (100.4F), Mild Pain (1 - 3) (28 Feb 2022 13:22)  Vitamin D2 50,000 intl units (1.25 mg) oral capsule (obsolete): 1 cap(s) orally once a week (25 Feb 2022 16:50)  Zoloft 25 mg oral tablet: 1 tab(s) orally 2 times a day (25 Feb 2022 16:50)      MEDICATIONS  (STANDING):  amitriptyline 30 milliGRAM(s) Oral at bedtime  artificial tears (preservative free) Ophthalmic Solution 1 Drop(s) Both EYES two times a day  aspirin enteric coated 81 milliGRAM(s) Oral daily  atorvastatin 40 milliGRAM(s) Oral at bedtime  dextrose 40% Gel 15 Gram(s) Oral once  dextrose 5%. 1000 milliLiter(s) (50 mL/Hr) IV Continuous <Continuous>  dextrose 5%. 1000 milliLiter(s) (100 mL/Hr) IV Continuous <Continuous>  dextrose 50% Injectable 25 Gram(s) IV Push once  dextrose 50% Injectable 12.5 Gram(s) IV Push once  dextrose 50% Injectable 25 Gram(s) IV Push once  ferrous    sulfate 325 milliGRAM(s) Oral daily  glucagon  Injectable 1 milliGRAM(s) IntraMuscular once  heparin   Injectable 5000 Unit(s) SubCutaneous every 12 hours  insulin lispro (ADMELOG) corrective regimen sliding scale   SubCutaneous three times a day before meals  insulin lispro (ADMELOG) corrective regimen sliding scale   SubCutaneous at bedtime  metoprolol succinate ER 25 milliGRAM(s) Oral daily  pantoprazole  Injectable 40 milliGRAM(s) IV Push two times a day  polyethylene glycol 3350 17 Gram(s) Oral two times a day  senna 2 Tablet(s) Oral at bedtime  sertraline 25 milliGRAM(s) Oral every 12 hours  sucralfate suspension 1 Gram(s) Oral four times a day    MEDICATIONS  (PRN):  acetaminophen     Tablet .. 650 milliGRAM(s) Oral every 6 hours PRN Temp greater or equal to 38C (100.4F), Mild Pain (1 - 3)  aluminum hydroxide/magnesium hydroxide/simethicone Suspension 30 milliLiter(s) Oral every 4 hours PRN Dyspepsia  HYDROmorphone   Tablet 4 milliGRAM(s) Oral every 8 hours PRN Severe Pain (7 - 10)  melatonin 3 milliGRAM(s) Oral at bedtime PRN Insomnia  ondansetron Injectable 4 milliGRAM(s) IV Push every 8 hours PRN Nausea and/or Vomiting      Allergies    morphine (Other)    Intolerances        Social History:  Former cigarette smoker, smoked <1/2 ppd for less than 15 years, quit more than 50 years ago   Denies ETOH use   Denies drug use     Ambulates with a walker. Lives at home with aides who come to help her with ADLs (not 24 hour aides) (25 Feb 2022 07:48)      REVIEW OF SYSTEMS: i feel fine   CONSTITUTIONAL: No fever, No chills, No fatigue, No myalgia, No Body ache, No Weakness  EYES: No eye pain,  No visual disturbances, No discharge, NO Redness  ENMT:  No ear pain, No nose bleed, No vertigo; No sinus pain, NO throat pain, No Congestion  NECK: No pain, No stiffness  RESPIRATORY: No cough, NO wheezing, No  hemoptysis, NO  shortness of breath  CARDIOVASCULAR: No chest pain, palpitations  GASTROINTESTINAL: No abdominal pain, NO epigastric pain. No nausea, No vomiting; No diarrhea, No constipation. [  ] BM  GENITOURINARY: No dysuria, No frequency, No urgency, No hematuria, NO incontinence  NEUROLOGICAL: No headaches, No dizziness, No numbness, No tingling, No tremors, No weakness  EXT: No Swelling, No Pain, No Edema  SKIN:  [ X ] No itching, burning, rashes, or lesions   MUSCULOSKELETAL: No joint pain ,No Jt swelling; No muscle pain, No back pain, No extremity pain  PSYCHIATRIC: No depression,  No anxiety,  No mood swings ,No difficulty sleeping at night  PAIN SCALE: [ X ] None  [  ] Other-  ROS Unable to obtain due to - [  ] Dementia  [  ] Lethargy [  ] Drowsy [  ] Sedated [  ] non verbal  REST OF REVIEW Of SYSTEM - [ X ] Normal     Vital Signs Last 24 Hrs  T(C): 36.4 (02 Mar 2022 05:35), Max: 36.6 (01 Mar 2022 13:28)  T(F): 97.5 (02 Mar 2022 05:35), Max: 97.9 (01 Mar 2022 13:28)  HR: 70 (02 Mar 2022 05:35) (70 - 97)  BP: 104/64 (02 Mar 2022 05:35) (104/64 - 117/76)  BP(mean): --  RR: 17 (02 Mar 2022 05:35) (17 - 18)  SpO2: 95% (02 Mar 2022 05:35) (95% - 98%)  Finger Stick    PHYSICAL EXAM:  GENERAL:  [ x ] NAD , [ x ] well appearing, [  ] Agitated, [  ] Drowsy,  [  ] Lethargy, [  ] confused   HEAD:  [ x ] Normal, [  ] Other  EYES:  [ x ] EOMI, [ x ] PERRLA, [ x ] conjunctiva and sclera clear normal, [  ] Other,  [  ] Pallor,[  ] Discharge  ENMT:  [ x ] Normal, [ x ] Moist mucous membranes, [  ] Good dentition, [ x ] No Thrush  NECK:  [ x ] Supple, [ x ] No JVD, [ x ] Normal thyroid, [  ] Lymphadenopathy [  ] Other  CHEST/LUNG:  [ x ] Clear to auscultation bilaterally, [ x ] Breath Sounds equal B/L / Decrease, [x  ] poor effort  [ x ] No rales, [ x ] No rhonchi  [ x ]  No wheezing,   HEART:  [ x ] Regular rate and rhythm, [  ] tachycardia, [  ] Bradycardia,  [  ] irregular  [ x ] No murmurs, No rubs, No gallops, [  ] PPM in place (Mfr:  )  ABDOMEN:  [ x ] Soft, [ x ] Nontender, [ x ] Nondistended, [x  ] No mass, [  ] Bowel sounds present, [  ] obese  NERVOUS SYSTEM:  [ x ] Alert & Oriented X2 to person, place, [ x ] Nonfocal  [  ] Confusion  [  ] Encephalopathic [  ] Sedated [  ] Unable to assess, [  ] Dementia [ x ] Other- forgetful  EXTREMITIES: [ x ] 2+ Peripheral Pulses, No clubbing, No cyanosis,  [ x ] 2 + edema B/L lower EXT. [  ] PVD stasis skin changes B/L Lower EXT, [  ] wound  LYMPH: No lymphadenopathy noted  SKIN:  [  ] No rashes or lesions, [  ] Pressure Ulcers, [  ] ecchymosis, [  ] Skin Tears, [ x ] Other- scattered age related ecchymosis throughout b/l UE and LE    DIET: Diet, Regular:   DASH/TLC Sodium & Cholesterol Restricted  Low Sodium (02-28-22 @ 14:48)      LABS:      Ca    8.5        01 Mar 2022 07:55        Culture Results:   >=3 organisms. Probable collection contamination. (02-25 @ 09:18)      Culture - Urine (collected 25 Feb 2022 09:18)  Source: Clean Catch Clean Catch (Midstream)  Final Report (26 Feb 2022 08:21):    >=3 organisms. Probable collection contamination.         Anemia Panel:  Ferritin, Serum: 124 ng/mL (02-28-22 @ 11:05)  Iron - Total Binding Capacity.: 120 ug/dL (02-28-22 @ 11:03)  Iron Total, Serum: 33 ug/dL (02-28-22 @ 11:03)      Thyroid Panel:        Lipase, Serum: 489 U/L (03-01-22 @ 07:55)  Lipase, Serum: 752 U/L (02-28-22 @ 08:09)  Lipase, Serum: 996 U/L (02-27-22 @ 07:39)  Lipase, Serum: 1668 U/L (02-26-22 @ 10:03)  Lipase, Serum: 1251 U/L (02-26-22 @ 08:03)  Lipase, Serum: 1537 U/L (02-25-22 @ 00:33)      Serum Pro-Brain Natriuretic Peptide: 2914 pg/mL (02-25-22 @ 00:33)      RADIOLOGY & ADDITIONAL TESTS:      HEALTH ISSUES - PROBLEM Dx:  Abnormal urinalysis    Benign essential HTN    Need for prophylactic measure    Pancreatitis    Gallstone pancreatitis    Anemia    CAD (coronary artery disease)    Type 2 diabetes mellitus    Hyperlipidemia    Major depression    GERD (gastroesophageal reflux disease)    History of spinal stenosis    KERRY (acute kidney injury)    H/O peripheral neuropathy        Consultant(s) Notes Reviewed:  [ x ] YES     Care Discussed with [X] Consultants  [ x ] Patient  [ x ] Family- daughter  [  ] HCP [  ]   [  ] Social Service  [ x ] RN, [  ] Physical Therapy,[  ] Palliative care team  DVT PPX: [  ] Lovenox, [ x ] S C Heparin, [  ] Coumadin, [  ] Xarelto, [  ] Eliquis, [  ] Pradaxa, [  ] IV Heparin drip, [  ] SCD [  ] Contraindication 2 to GI Bleed,[  ] Ambulation [  ] Contraindicated 2 to  bleed [  ] Contraindicated 2 to Brain Bleed    Advanced directive: [ x ] None, [  ] DNR/DNI Patient is a 89y old  Female who presents with a chief complaint of pancreatitis (01 Mar 2022 12:35)    HPI:  The patient is a 89 year old female with PMH CAD s/p stents in 2012, Type 2 diabetes, HTN, HLD, GERD, mild AS, chronic venous insufficiency, neuropathy, macular degeneration, chronic constipation, depression, OA, lumbar degenerative disc disease, and spinal stenosis who presents to the ER complaining of nausea, abdominal pain, and burning chest pain. Patient is a poor historian, majority of the history obtained from daughter Mary Guerin at bedside. Per daughter, over the past week patient has been extremely nauseous and has been dry heaving. Patient never vomited. She had been eating less over the past few days; however, yesterday, patient was so nauseous that she was unable to eat or drink. She felt weak and fatigued. She also developed burning pain in the epigastric area that radiated to her chest. Her daughter became concerned and called EMS. Patient denies any other symptoms, including fevers, chills, SOB, diarrhea. Of note, per daughter, patient has lost a significant amount of weight (about 12 lbs) in the past few months as she has had a decreased appetite.    In the ED:   VS: T:97.6, HR:93, BP: 119/54, RR:16, SpO2: 98% on room air  Labs significant for BUN/Cr: 34/1.8, Trop negative x 2, lipase: 1537, proBNP: 2914  UA: slightly turbid, large blood, moderate ketones, moderate leuk esterase, 26-50 WBCs, moderate bacteria, few yeast cells   EKG: NSR, no ST or T wave abnormalities   US abdomen: Sonographic findings equivocal for acute cholecystitis. If there is clinical suspicion for acute cholecystitis, a hepatobiliary scan may be obtained for further evaluation.Poorly visualized pancreas. Recommend correlation with serum lipase to assess acute pancreatitis.  CT A/P: Marked fatty replacement of the pancreas. Limited evaluation without intravenous contrast. Cholelithiasis without CT evidence for acute cholecystitis.Diverticulosis without diverticulitis.  HIDA scan pending   Patient received 1 L NS bolus 4 mg Zofran (25 Feb 2022 07:48)    INTERVAL HPI:  02/26/22: Pt seen and examined at bedside. Pt is poor historian' aox1 to person only; has no complaints this morning. On 60cc/hr DS+NS for possible acute pancreatitis. Awaiting MRI abd to rule out malignancy. Erroneous lab result this morning 2/2 to blood draw from IV site; repeat lab ordered. -Stable labs. Low stable H/H  02/27/22: Pt seen and examined at bedside. Pt AOx1 with mild confusion. No complaint this am.  Awaiting MRI A/P  abd to rule out malignancy. Low stable H/H; lipase downtrending.No Complaints  02/28/22: Pt seen and examined at bedside. Pt AOx2 to person, place. Pt appears confused about whereabouts of her . Has no acute concerns this morning. Awaiting MRI abdomen w/ w/o C to r/o malignancy. H/H remains low this AM, though no acute signs bleeding at this time. Pt otherwise stable. Lipase downtrended. No complaints. advance Diet.  03/01/22: Pt seen and examined at bedside. The patient is sleepy on exam, does not appear to have any concerns. Again appears confused about the time. AOX2 person, place. No complaints this AM. H/H low this AM but largely stable. Lipase downtrending. Tolerating diet.   03/02/22: Pt seem and examined at bedside. Pt offers no new complaints at this time. Still confused but comfortable. A&Ox 3 person, place. H/H low but remains stable. Lipase downtrending. Tolerating breakfast this AM. D/C Plan     OVERNIGHT EVENTS: None    Home Medications:  amitriptyline 10 mg oral tablet: 3 tab(s) orally once a day (at bedtime) (25 Feb 2022 16:50)  aspirin 81 mg oral tablet: 1 tab(s) orally once a day (25 Feb 2022 16:50)  atorvastatin 40 mg oral tablet: 1 tab(s) orally once a day (25 Feb 2022 16:50)  Dilaudid 4 mg oral tablet: 1 tab(s) orally 3 times a day, As Needed (25 Feb 2022 16:50)  enalapril 2.5 mg oral tablet: 1 tab(s) orally once a day (25 Feb 2022 16:50)  ferrous sulfate 325 mg (65 mg elemental iron) oral tablet: 1 tablet oral daily (25 Feb 2022 16:50)  Metoprolol Succinate ER 25 mg oral tablet, extended release: 1 tab(s) orally once a day (25 Feb 2022 16:50)  omeprazole 20 mg oral delayed release capsule: 1 cap(s) orally once in the morning and 1 in the afternoon (25 Feb 2022 16:50)  PreserVision AREDS oral capsule: 1 tablet oral daily (25 Feb 2022 16:50)  Tylenol 325 mg oral tablet: 2 tab(s) orally every 6 hours, As needed, Temp greater or equal to 38C (100.4F), Mild Pain (1 - 3) (28 Feb 2022 13:22)  Vitamin D2 50,000 intl units (1.25 mg) oral capsule (obsolete): 1 cap(s) orally once a week (25 Feb 2022 16:50)  Zoloft 25 mg oral tablet: 1 tab(s) orally 2 times a day (25 Feb 2022 16:50)      MEDICATIONS  (STANDING):  amitriptyline 30 milliGRAM(s) Oral at bedtime  artificial tears (preservative free) Ophthalmic Solution 1 Drop(s) Both EYES two times a day  aspirin enteric coated 81 milliGRAM(s) Oral daily  atorvastatin 40 milliGRAM(s) Oral at bedtime  dextrose 40% Gel 15 Gram(s) Oral once  dextrose 5%. 1000 milliLiter(s) (50 mL/Hr) IV Continuous <Continuous>  dextrose 5%. 1000 milliLiter(s) (100 mL/Hr) IV Continuous <Continuous>  dextrose 50% Injectable 25 Gram(s) IV Push once  dextrose 50% Injectable 12.5 Gram(s) IV Push once  dextrose 50% Injectable 25 Gram(s) IV Push once  ferrous    sulfate 325 milliGRAM(s) Oral daily  glucagon  Injectable 1 milliGRAM(s) IntraMuscular once  heparin   Injectable 5000 Unit(s) SubCutaneous every 12 hours  insulin lispro (ADMELOG) corrective regimen sliding scale   SubCutaneous three times a day before meals  insulin lispro (ADMELOG) corrective regimen sliding scale   SubCutaneous at bedtime  metoprolol succinate ER 25 milliGRAM(s) Oral daily  pantoprazole  Injectable 40 milliGRAM(s) IV Push two times a day  polyethylene glycol 3350 17 Gram(s) Oral two times a day  senna 2 Tablet(s) Oral at bedtime  sertraline 25 milliGRAM(s) Oral every 12 hours  sucralfate suspension 1 Gram(s) Oral four times a day    MEDICATIONS  (PRN):  acetaminophen     Tablet .. 650 milliGRAM(s) Oral every 6 hours PRN Temp greater or equal to 38C (100.4F), Mild Pain (1 - 3)  aluminum hydroxide/magnesium hydroxide/simethicone Suspension 30 milliLiter(s) Oral every 4 hours PRN Dyspepsia  HYDROmorphone   Tablet 4 milliGRAM(s) Oral every 8 hours PRN Severe Pain (7 - 10)  melatonin 3 milliGRAM(s) Oral at bedtime PRN Insomnia  ondansetron Injectable 4 milliGRAM(s) IV Push every 8 hours PRN Nausea and/or Vomiting      Allergies    morphine (Other)    Intolerances        Social History:  Former cigarette smoker, smoked <1/2 ppd for less than 15 years, quit more than 50 years ago   Denies ETOH use   Denies drug use     Ambulates with a walker. Lives at home with aides who come to help her with ADLs (not 24 hour aides) (25 Feb 2022 07:48)      REVIEW OF SYSTEMS: i feel fine   CONSTITUTIONAL: No fever, No chills, No fatigue, No myalgia, No Body ache, No Weakness  EYES: No eye pain,  No visual disturbances, No discharge, NO Redness  ENMT:  No ear pain, No nose bleed, No vertigo; No sinus pain, NO throat pain, No Congestion  NECK: No pain, No stiffness  RESPIRATORY: No cough, NO wheezing, No  hemoptysis, NO  shortness of breath  CARDIOVASCULAR: No chest pain, palpitations  GASTROINTESTINAL: No abdominal pain, NO epigastric pain. No nausea, No vomiting; No diarrhea, No constipation. [  ] BM  GENITOURINARY: No dysuria, No frequency, No urgency, No hematuria, NO incontinence  NEUROLOGICAL: No headaches, No dizziness, No numbness, No tingling, No tremors, No weakness  EXT: No Swelling, No Pain, No Edema  SKIN:  [ X ] No itching, burning, rashes, or lesions   MUSCULOSKELETAL: No joint pain ,No Jt swelling; No muscle pain, No back pain, No extremity pain  PSYCHIATRIC: No depression,  No anxiety,  No mood swings ,No difficulty sleeping at night  PAIN SCALE: [ X ] None  [  ] Other-  ROS Unable to obtain due to - [  ] Dementia  [  ] Lethargy [  ] Drowsy [  ] Sedated [  ] non verbal  REST OF REVIEW Of SYSTEM - [ X ] Normal     Vital Signs Last 24 Hrs  T(C): 36.4 (02 Mar 2022 05:35), Max: 36.6 (01 Mar 2022 13:28)  T(F): 97.5 (02 Mar 2022 05:35), Max: 97.9 (01 Mar 2022 13:28)  HR: 70 (02 Mar 2022 05:35) (70 - 97)  BP: 104/64 (02 Mar 2022 05:35) (104/64 - 117/76)  BP(mean): --  RR: 17 (02 Mar 2022 05:35) (17 - 18)  SpO2: 95% (02 Mar 2022 05:35) (95% - 98%)  Finger Stick    PHYSICAL EXAM:  GENERAL:  [ x ] NAD , [ x ] well appearing, [  ] Agitated, [  ] Drowsy,  [  ] Lethargy, [  ] confused   HEAD:  [ x ] Normal, [  ] Other  EYES:  [ x ] EOMI, [ x ] PERRLA, [ x ] conjunctiva and sclera clear normal, [  ] Other,  [  ] Pallor,[  ] Discharge  ENMT:  [ x ] Normal, [ x ] Moist mucous membranes, [  ] Good dentition, [ x ] No Thrush  NECK:  [ x ] Supple, [ x ] No JVD, [ x ] Normal thyroid, [  ] Lymphadenopathy [  ] Other  CHEST/LUNG:  [ x ] Clear to auscultation bilaterally, [ x ] Breath Sounds equal B/L / Decrease, [x  ] poor effort  [ x ] No rales, [ x ] No rhonchi  [ x ]  No wheezing,   HEART:  [ x ] Regular rate and rhythm, [  ] tachycardia, [  ] Bradycardia,  [  ] irregular  [ x ] No murmurs, No rubs, No gallops, [  ] PPM in place (Mfr:  )  ABDOMEN:  [ x ] Soft, [ x ] Nontender, [ x ] Nondistended, [x  ] No mass, [  ] Bowel sounds present, [  ] obese  NERVOUS SYSTEM:  [ x ] Alert & Oriented X2 to person, place, [ x ] Nonfocal  [  ] Confusion  [  ] Encephalopathic [  ] Sedated [  ] Unable to assess, [  ] Dementia [ x ] Other- forgetful  EXTREMITIES: [ x ] 2+ Peripheral Pulses, No clubbing, No cyanosis,  [ x ] 2 + edema B/L lower EXT. [  ] PVD stasis skin changes B/L Lower EXT, [  ] wound  LYMPH: No lymphadenopathy noted  SKIN:  [  ] No rashes or lesions, [  ] Pressure Ulcers, [  ] ecchymosis, [  ] Skin Tears, [ x ] Other- scattered age related ecchymosis throughout b/l UE and LE    DIET: Diet, Regular:   DASH/TLC Sodium & Cholesterol Restricted  Low Sodium (02-28-22 @ 14:48)      LABS:      Ca    8.5        01 Mar 2022 07:55        Culture Results:   >=3 organisms. Probable collection contamination. (02-25 @ 09:18)      Culture - Urine (collected 25 Feb 2022 09:18)  Source: Clean Catch Clean Catch (Midstream)  Final Report (26 Feb 2022 08:21):    >=3 organisms. Probable collection contamination.         Anemia Panel:  Ferritin, Serum: 124 ng/mL (02-28-22 @ 11:05)  Iron - Total Binding Capacity.: 120 ug/dL (02-28-22 @ 11:03)  Iron Total, Serum: 33 ug/dL (02-28-22 @ 11:03)      Thyroid Panel:        Lipase, Serum: 489 U/L (03-01-22 @ 07:55)  Lipase, Serum: 752 U/L (02-28-22 @ 08:09)  Lipase, Serum: 996 U/L (02-27-22 @ 07:39)  Lipase, Serum: 1668 U/L (02-26-22 @ 10:03)  Lipase, Serum: 1251 U/L (02-26-22 @ 08:03)  Lipase, Serum: 1537 U/L (02-25-22 @ 00:33)      Serum Pro-Brain Natriuretic Peptide: 2914 pg/mL (02-25-22 @ 00:33)      RADIOLOGY & ADDITIONAL TESTS:      HEALTH ISSUES - PROBLEM Dx:  Abnormal urinalysis    Benign essential HTN    Need for prophylactic measure    Pancreatitis    Gallstone pancreatitis    Anemia    CAD (coronary artery disease)    Type 2 diabetes mellitus    Hyperlipidemia    Major depression    GERD (gastroesophageal reflux disease)    History of spinal stenosis    KERRY (acute kidney injury)    H/O peripheral neuropathy        Consultant(s) Notes Reviewed:  [ x ] YES     Care Discussed with [X] Consultants  [ x ] Patient  [ x ] Family- daughter  [  ] HCP [  ]   [  ] Social Service  [ x ] RN, [  ] Physical Therapy,[  ] Palliative care team  DVT PPX: [  ] Lovenox, [ x ] S C Heparin, [  ] Coumadin, [  ] Xarelto, [  ] Eliquis, [  ] Pradaxa, [  ] IV Heparin drip, [  ] SCD [  ] Contraindication 2 to GI Bleed,[  ] Ambulation [  ] Contraindicated 2 to  bleed [  ] Contraindicated 2 to Brain Bleed    Advanced directive: [ x ] None, [  ] DNR/DNI

## 2022-03-02 NOTE — PROGRESS NOTE ADULT - ASSESSMENT
89 year old female with PMH CAD s/p stents in 2012, Type 2 diabetes, HTN, HLD, GERD, mild AS, chronic venous insufficiency, neuropathy, macular degeneration, chronic constipation, depression, OA, lumbar degenerative disc disease, and spinal stenosis who presents to the ER complaining of nausea, abdominal pain, and burning chest pain, admitted for pancreatitis, KERRY.     Atypical Chest Pain  - Symptoms most likely secondary to pancreatitis. Now pain-free  - Hx of CAD with MI in 2012 and 2 stents; sees Dr. Shirley   - No evidence of ischemia; EKG NSR.  Troponins x 2 negative.  No evidence of volume overload  - TTE (6/2021)normal LV systolic function, ejection fraction of 65%. Moderate LV diastolic dysfunction. Mild MR, mild LA, mild to moderate TR with estimated RV systolic pressure of 44 mmHg. LA markedly dilated.  - Stress test (9/2019) negative for inducement of cardiac symptoms, EKG evidence of myocardial ischemia, or significant arrhythmias. Normal LV myocardial perfusion and systolic function.  - Continue ASA, BB, and statin  - Monitor and replete lytes, keep K>4, Mg>2  - Heme/onc and GI following, awaiting for MRI  - surgery following, no surgical intervention planned at this time     - dc planning per primary   - All other medical needs as per primary team.  - Other cardiovascular workup will depend on clinical course.  - Will continue to follow.    Nikia Costa [facility]  Nurse Practitioner - Cardiology   Spectra #[id number]/ [phone]

## 2022-03-02 NOTE — PROGRESS NOTE ADULT - ASSESSMENT
89 yo woman admitted with acute pancreatitis; hematology asked to evaluate for acute anemia with Hg drop to ~5 but suspected to be due to lab error as blood drawn above IV access line  - iron studies not c/w iron def w TIBC only in 100s  - suspect anemia multifactorial with aggressive hydration and acute illness, poor response during stress. Recommend symptomatic management  - MRI show head of pancreas cystic lesion w internal septation 1.6x1cm, but also right iliac bone lesions x21.5cm and 0.7cm but incompletely imaged, and recom bone scan or MRI pelvis.   s/p Bone Scan(CT pelvis did not mention bone lesion but diffuse osteopenia. No lesion in iliac bone. DJD changes    Pt does c/o right leg/thigh pain(chronic?).   Follows with orthopaedics outpt for osteoarthritis of R knee> L knee and LBP    RECOMMENDATION  Pain control for OA  Followup with GI for pancreatitis and cystic lesion.   Outpt followup for anemia, will re-eval fi concurrent fe def with anemia chronic disease.   OOB as tolerate  DC planning

## 2022-03-02 NOTE — PROGRESS NOTE ADULT - PROBLEM SELECTOR PLAN 2
BUN/Cr 34/1.8, baseline kidney function appears to be approximately 1- RESOLVED  - Likely prerenal in setting of decreased PO intake   - will continue to hold enalapril for now  - Monitor BMP daily BUN/Cr 34/1.8, baseline kidney function appears to be approximately 1- RESOLVED  - Likely prerenal in setting of decreased PO intake   - will continue to hold enalapril on D/C as BP is stable.

## 2022-03-02 NOTE — PROGRESS NOTE ADULT - PROBLEM SELECTOR PLAN 1
- medication induced (pt takes Tradjenta) pancreatitis vs pancreatic malignancy  - Lipase 1537 -> 1251 -> 1668 -> 996 -> 752 -> 489, downtrending. F/u AM Lipase  - US abdomen: findings equivocal for acute cholecystitis. Poorly visualized pancreas  - HIDA: Normal hepatobiliary scan. No scan evidence of acute cholecystitis.  - CT A/P shows Marked fatty replacement of the pancreas. Limited evaluation without intravenous contrast. Cholelithiasis without CT evidence for acute cholecystitis. Diverticulosis without diverticulitis. No inflammation surrounding pancreas on CT  - continue DASH diet as tolerated  - h/o mild aortic stenosis, would caution with excessive IV fluids  - MR abdomen w/ and w/o IV Cont 2/28/22: cystic lesion in the pancreatic head with a few thin internal septations measuring 1.6 x 1.0 cm. Small amount of peripancreatic fluid consistent with pancreatitis. Cholelithiasis. Partially imaged enhancing lesions in the right iliac bone measuring 1.5 cm and 0.7 cm, incompletely characterized on this study; a bone scan or MRI of the pelvis could be obtained for further evaluation.  - NM Bone scan: Heterogeneous uptake throughout the spine, possibly secondary to degenerative changes and/or compression deformities. Degenerative changes in the major joints. Right iliac bone lesions noted on MRI, not visualized.  - Zofran PRN for nausea   - GI (Dr Montilla) consulted, recs appreciated  - Surgery consulted- NO Intervention - medication induced (pt takes Tradjenta) pancreatitis vs pancreatic malignancy  - Lipase 1537 -> 1251 -> 1668 -> 996 -> 752 -> 489 -> 346, downtrending. F/u AM Lipase  - US abdomen: findings equivocal for acute cholecystitis. Poorly visualized pancreas  - HIDA: Normal hepatobiliary scan. No scan evidence of acute cholecystitis.  - CT A/P shows Marked fatty replacement of the pancreas. Limited evaluation without intravenous contrast. Cholelithiasis without CT evidence for acute cholecystitis. Diverticulosis without diverticulitis. No inflammation surrounding pancreas on CT  - continue DASH diet as tolerated  - h/o mild aortic stenosis, would caution with excessive IV fluids  - MR abdomen w/ and w/o IV Cont 2/28/22: cystic lesion in the pancreatic head with a few thin internal septations measuring 1.6 x 1.0 cm. Small amount of peripancreatic fluid consistent with pancreatitis. Cholelithiasis. Partially imaged enhancing lesions in the right iliac bone measuring 1.5 cm and 0.7 cm, incompletely characterized on this study; a bone scan or MRI of the pelvis could be obtained for further evaluation.  - NM Bone scan: Heterogeneous uptake throughout the spine, possibly secondary to degenerative changes and/or compression deformities. Degenerative changes in the major joints. Right iliac bone lesions noted on MRI, not visualized.  - Zofran PRN for nausea   - GI (Dr Montilla) consulted, recs appreciated  - Surgery consulted- NO Intervention, pt stable for d/c per surgery team Likely - medication induced (pt takes Tradjenta) pancreatitis ,   -NO pancreatic malignancy on MRI A/P   - Lipase 1537 -> 1251 -> 1668 -> 996 -> 752 -> 489 -> 346, downtrending.  AM Lipase is normal.  - US abdomen: findings equivocal for acute cholecystitis. Poorly visualized pancreas  - HIDA: Normal hepatobiliary scan. No scan evidence of acute cholecystitis.  - CT A/P shows Marked fatty replacement of the pancreas. Limited evaluation without intravenous contrast. Cholelithiasis without CT evidence for acute cholecystitis. Diverticulosis without diverticulitis. No inflammation surrounding pancreas on CT  - continue DASH diet as tolerated  - h/o mild aortic stenosis, would caution with excessive IV fluids  - MR abdomen w/ and w/o IV Cont 2/28/22: cystic lesion in the pancreatic head with a few thin internal septations measuring 1.6 x 1.0 cm. Small amount of peripancreatic fluid consistent with pancreatitis. Cholelithiasis. Partially imaged enhancing lesions in the right iliac bone measuring 1.5 cm and 0.7 cm, incompletely characterized on this study; a bone scan or MRI of the pelvis could be obtained for further evaluation.  - NM Bone scan: Heterogeneous uptake throughout the spine, possibly secondary to degenerative changes and/or compression deformities. Degenerative changes in the major joints. Right iliac bone lesions noted on MRI, not visualized.  - Zofran PRN for nausea   - GI (Dr Montilla) follow up , tolerated Po diet well,   - Surgery consulted- NO Intervention, pt stable for d/c per surgery team

## 2022-03-02 NOTE — PROGRESS NOTE ADULT - PROBLEM SELECTOR PLAN 6
Reviewed PCP clinic notes and tests.  Pt was treated for acute bacterial sinusitis.  Note 11/17/17 sinus XR negative.    Component      Latest Ref Rng & Units 11/17/2017   Fasting Status      hrs 1   Sodium      135 - 145 mmol/L 137   Potassium      3.4 - 5.1 mmol/L 4.6   Chloride      98 - 107 mmol/L 103   CO2      21 - 32 mmol/L 26   ANION GAP      10 - 20 mmol/L 13   Glucose      65 - 99 mg/dL 91   BUN      6 - 20 mg/dL 13   Creatinine      0.51 - 0.95 mg/dL 0.59   GFR Estimate,        >90   GFR Estimate, Non        >90   BUN/CREATININE RATIO      7 - 25 22   CALCIUM      8.4 - 10.2 mg/dL 9.1   TOTAL BILIRUBIN      0.2 - 1.0 mg/dL 0.2   AST/SGOT      <38 Units/L 14   ALT/SGPT      <79 Units/L 25   ALK PHOSPHATASE      45 - 117 Units/L 57   TOTAL PROTEIN      6.4 - 8.2 g/dL 7.2   Albumin      3.6 - 5.1 g/dL 3.5 (L)   GLOBULIN      2.0 - 4.0 g/dL 3.7   A/G Ratio, Serum      1.0 - 2.4 0.9 (L)     Component      Latest Ref Rng & Units 11/17/2017   WBC      4.2 - 11.0 K/mcL 9.2   RBC      4.00 - 5.20 mil/mcL 4.68   HGB      12.0 - 15.5 g/dL 14.6   HCT      36.0 - 46.5 % 44.7   MCV      78.0 - 100.0 fl 95.5   MCH      26.0 - 34.0 pg 31.2   MCHC      32.0 - 36.5 g/dL 32.7   RDW-CV      11.0 - 15.0 % 13.6   PLT      140 - 450 K/mcL 283   DIFFERENTIAL TYPE       AUTOMATED DIFFERENTIAL   Neutrophil      % 56   LYMPH      % 37   MONO      % 5   EOSIN      % 2   BASO      % 0   Absolute Neutrophil      1.8 - 7.7 K/mcL 5.1   Absolute Lymph      1.0 - 4.8 K/mcL 3.4   Absolute Mono      0.3 - 0.9 K/mcL 0.5   Absolute Eos      0.1 - 0.5 K/mcL 0.1   Absolute Baso      0.0 - 0.3 K/mcL 0.0     RN or MA to do the headache/migraine telephone triage form for details.    Will likely need IV therapy given duration of headache and unresponsiveness to steroids.   Chronic, stable   - BP controlled on admission   - Continue metoprolol succinate 25 mg daily with hold parameters   - Will continue to hold enalapril in setting of recent KERRY and soft BPs

## 2022-03-02 NOTE — PROGRESS NOTE ADULT - PROBLEM SELECTOR PLAN 4
- s/p stent placement in 2012  - epigastric pain on admission more likely 2/2 pancreatitis than cardiac etiology at this time  - EKG shows NSR, no ST or T wave abnormalities   - Trops negative x 2  - Continue aspirin 81 mg daily   - Continue atorvastatin 40 mg daily  - Continue metoprolol succinate 25 mg daily with hold parameters   - Continue to hold enalapril for now  - Cardio (Dr Jose) following, f/u recs-- continue asa, beta blocker, statin

## 2022-03-02 NOTE — PROGRESS NOTE ADULT - SUBJECTIVE AND OBJECTIVE BOX
[INTERVAL HX: ]  Patient seen and examined;  Chart reviewed and events noted;   c/o R knee pain and LBP chronically.   better w pain meds    anxious to be DC home. Dtr Mary [retired RN] to [facility]        Patient is a 89y Female with a known history of :  Chest pain [R07.9]  H/O vertebral fracture repair [V15.51]  History of vertebral fracture [V15.51]  Dyslipidemia [272.4]  DM type 2 with diabetic dyslipidemia [250.80]  H/O gastroesophageal reflux (GERD) [V12.79]  Costochondritis [733.6]  Coronary arteriosclerosis in native artery [414.01]  Gastroparesis [536.3]  History of laminectomy [V45.89]  S/P hip hemiarthroplasty [V43.64]  S/P appendectomy [V45.89]      HPI:  The patient is a 89 year old female with PMH CAD s/p stents in 2012, Type 2 diabetes, HTN, HLD, GERD, mild AS, chronic venous insufficiency, neuropathy, macular degeneration, chronic constipation, depression, OA, lumbar degenerative disc disease, and spinal stenosis who presents to the ER complaining of nausea, abdominal pain, and burning chest pain. Patient is a poor historian, majority of the history obtained from daughter Mary Guerin at bedside. Per daughter, over the past week patient has been extremely nauseous and has been dry heaving. Patient never vomited. She had been eating less over the past few days; however, yesterday, patient was so nauseous that she was unable to eat or drink. She felt weak and fatigued. She also developed burning pain in the epigastric area that radiated to her chest. Her daughter became concerned and called EMS. Patient denies any other symptoms, including fevers, chills, SOB, diarrhea. Of note, per daughter, patient has lost a significant amount of weight (about 12 lbs) in the past few months as she has had a decreased appetite.    In the ED:   VS: T:97.6, HR:93, BP: 119/54, RR:16, SpO2: 98% on room air  Labs significant for BUN/Cr: 34/1.8, Trop negative x 2, lipase: 1537, proBNP: 2914  UA: slightly turbid, large blood, moderate ketones, moderate leuk esterase, 26-50 WBCs, moderate bacteria, few yeast cells   EKG: NSR, no ST or T wave abnormalities   US abdomen: Sonographic findings equivocal for acute cholecystitis. If there is clinical suspicion for acute cholecystitis, a hepatobiliary scan may be obtained for further evaluation.Poorly visualized pancreas. Recommend correlation with serum lipase to assess acute pancreatitis.  CT A/P: Marked fatty replacement of the pancreas. Limited evaluation without intravenous contrast. Cholelithiasis without CT evidence for acute cholecystitis.Diverticulosis without diverticulitis.  HIDA scan pending   Patient received 1 L NS bolus 4 mg Zofran (2022 07:48)        MEDICATIONS  (STANDING):  amitriptyline 30 milliGRAM(s) Oral at bedtime  artificial tears (preservative free) Ophthalmic Solution 1 Drop(s) Both EYES two times a day  aspirin enteric coated 81 milliGRAM(s) Oral daily  atorvastatin 40 milliGRAM(s) Oral at bedtime  dextrose 40% Gel 15 Gram(s) Oral once  dextrose 5%. 1000 milliLiter(s) (50 mL/Hr) IV Continuous <Continuous>  dextrose 5%. 1000 milliLiter(s) (100 mL/Hr) IV Continuous <Continuous>  dextrose 50% Injectable 25 Gram(s) IV Push once  dextrose 50% Injectable 12.5 Gram(s) IV Push once  dextrose 50% Injectable 25 Gram(s) IV Push once  ferrous    sulfate 325 milliGRAM(s) Oral daily  glucagon  Injectable 1 milliGRAM(s) IntraMuscular once  heparin   Injectable 5000 Unit(s) SubCutaneous every 12 hours  insulin lispro (ADMELOG) corrective regimen sliding scale   SubCutaneous three times a day before meals  insulin lispro (ADMELOG) corrective regimen sliding scale   SubCutaneous at bedtime  metoprolol succinate ER 25 milliGRAM(s) Oral daily  pantoprazole  Injectable 40 milliGRAM(s) IV Push two times a day  polyethylene glycol 3350 17 Gram(s) Oral two times a day  senna 2 Tablet(s) Oral at bedtime  sertraline 25 milliGRAM(s) Oral every 12 hours  sucralfate suspension 1 Gram(s) Oral four times a day    MEDICATIONS  (PRN):  acetaminophen     Tablet .. 650 milliGRAM(s) Oral every 6 hours PRN Temp greater or equal to 38C (100.4F), Mild Pain (1 - 3)  aluminum hydroxide/magnesium hydroxide/simethicone Suspension 30 milliLiter(s) Oral every 4 hours PRN Dyspepsia  HYDROmorphone   Tablet 4 milliGRAM(s) Oral every 8 hours PRN Severe Pain (7 - 10)  melatonin 3 milliGRAM(s) Oral at bedtime PRN Insomnia  ondansetron Injectable 4 milliGRAM(s) IV Push every 8 hours PRN Nausea and/or Vomiting    Vital Signs Last 24 Hrs  T(C): 36.4 (02 Mar 2022 05:35), Max: 36.6 (01 Mar 2022 13:28)  T(F): 97.5 (02 Mar 2022 05:35), Max: 97.9 (01 Mar 2022 13:28)  HR: 70 (02 Mar 2022 05:35) (70 - 97)  BP: 104/64 (02 Mar 2022 05:35) (104/64 - 117/76)  BP(mean): --  RR: 17 (02 Mar 2022 05:35) (17 - 18)  SpO2: 95% (02 Mar 2022 05:35) (95% - 98%)  Daily     Daily Weight in k.5 (02 Mar 2022 05:35)  Last Menstrual Period          [PHYSICAL EXAM]  General: adult in NAD,  WN,  WD. elderly Frail  HEENT: clear oropharynx, anicteric sclera, pink conjunctivae.  Neck: supple, no masses.  CV: normal S1S2, no murmur, no rubs, no gallops.  Lungs: clear to auscultation, no wheezes, no rales, no rhonchi.  Abdomen: soft, non-tender, non-distended, no hepatosplenomegaly, normal BS, no guarding.  Ext: no clubbing, no cyanosis, no edema.  Skin: no rashes,  no petechiae, no venous stasis changes.  Neuro: alert and oriented X3  , no focal motor deficits.  LN: no SC PENG.      [LABS:]                        8.3    4.77  )-----------( 178      ( 02 Mar 2022 08:03 )             24.8     03-02    141  |  111<H>  |  12  ----------------------------<  84  3.8   |  25  |  0.87    Ca    8.3<L>      02 Mar 2022 08:03    TPro  4.6<L>  /  Alb  2.0<L>  /  TBili  0.3  /  DBili  x   /  AST  16  /  ALT  17  /  AlkPhos  53            Ferritin, Serum: 124 ng/mL (22 @ 11:05)  Iron - Total Binding Capacity.: 120 ug/dL (22 @ 11:03)    COVID-19 PCR: NotDetec (2022 03:28)      [RADIOLOGY STUDIES:]  < from: NM Bone Imaging Total (22 @ 15:21) >  ACC: [id number] EXAM:  NM BONE IMG WHOLE BODY                        PROCEDURE DATE:  2022    INTERPRETATION:  CLINICAL INFORMATION: 89-year-old female, incidentally noted to have right iliac bone lesions on recent MRI. Evaluate for other bony lesions.  RADIOPHARMACEUTICAL: 20.4 mCi Tc-99m HDP, I.V.    TECHNIQUE:  Whole-body and static images of the lumbosacral spine/pelvis and both femurs were obtained in the anterior and posterior projections approximately 2-3 hours following administration of radiotracer.  COMPARISON: No prior bone scan.    FINDINGS: There is heterogeneous radionuclide distribution with linear foci throughout the spine. Degenerative changes and compression deformities with kyphoplasty at T11 and T12 are noted on MRI and CT scan.   There are degenerative changes in the major joints. There is physiologic distribution of radiotracer in the remainder of the visualized osseous structures. Lesions in the right iliac bone and not visualized.  Both kidneys are visualized.    IMPRESSION: Bone scan:  Heterogeneous uptake throughout the spine, possibly secondary to degenerative changes and/or compression deformities.  Degenerative changes in the major joints.  Right iliac bone lesions noted on MRI, not visualized.  --- End of Report ---  SHANNAN GARCIA MD; Attending Nuclear Medicine  This document has been electronically signed. Mar  1 2022  3:49PM  < end of copied text >

## 2022-03-02 NOTE — PROGRESS NOTE ADULT - PROBLEM SELECTOR PLAN 3
Hb 10.4 on presentation- hg low but stable  - Monitor for signs and symptoms of bleeding  - Transfuse prn (Hgb>8)   - Serum iron 33, tibc 120, ferritin 124  - Heme consulted, Dr Nicholas, rec symptomatic management  - Continue to monitor daily CBCs Hb 10.4 on presentation- hg low but stable  - NO  signs and symptoms of bleeding  - Transfuse prn (Hgb>8)   - Serum iron 33, tibc 120, ferritin 124  - Heme consulted, Dr Nicholas, - Out pt follow up , Repeat CBC after 1 week

## 2022-03-02 NOTE — PROGRESS NOTE ADULT - PROBLEM SELECTOR PLAN 12
Chronic, stable  - Patient takes Dilaudid 4 mg PRN every 8hr PRN for pain, will continue    #DVT  - heparin 5000u SQ q12hrs    #Dispo planning  - PT consult, recs home with assist, pt has private home aid   - palliative consult, pt remains FULL CODE

## 2022-03-02 NOTE — PROGRESS NOTE ADULT - SUBJECTIVE AND OBJECTIVE BOX
pt seen  doing well  no issues  ICU Vital Signs Last 24 Hrs  T(C): 36.4 (02 Mar 2022 05:35), Max: 36.6 (01 Mar 2022 13:28)  T(F): 97.5 (02 Mar 2022 05:35), Max: 97.9 (01 Mar 2022 13:28)  HR: 70 (02 Mar 2022 05:35) (70 - 97)  BP: 104/64 (02 Mar 2022 05:35) (104/64 - 117/76)  BP(mean): --  ABP: --  ABP(mean): --  RR: 17 (02 Mar 2022 05:35) (17 - 18)  SpO2: 95% (02 Mar 2022 05:35) (95% - 98%)  gen-NAD  resp-clear  abd-soft NT/ND                          8.3    4.77  )-----------( 178      ( 02 Mar 2022 08:03 )             24.8

## 2022-03-02 NOTE — PROGRESS NOTE ADULT - SUBJECTIVE AND OBJECTIVE BOX
[facility] -- Casper Mcgee Grossman, [place], Eligio Ryan Savella, [place]ger: Office # [phone]    Follow Up:  CAD, Cardiac Optimization    Subjective/Observations: Patient seen and examined, awake, alert, resting comfortably in bed, denies chest pain, dyspnea, palpitations or dizziness, orthopnea and PND. Tolerating room air.    REVIEW OF SYSTEMS: All review of systems is negative for eye, ENT, GI, , allergic, dermatologic, musculoskeletal and neurologic except as described above    PAST MEDICAL & SURGICAL HISTORY:  H/O vertebral fracture repair    History of vertebral fracture    Dyslipidemia    DM type 2 with diabetic dyslipidemia    H/O gastroesophageal reflux (GERD)    Costochondritis    Coronary arteriosclerosis in native artery  s/p 2 stents. last placed 2 years ago    Gastroparesis    History of laminectomy    S/P hip hemiarthroplasty    S/P appendectomy        MEDICATIONS  (STANDING):  amitriptyline 30 milliGRAM(s) Oral at bedtime  artificial tears (preservative free) Ophthalmic Solution 1 Drop(s) Both EYES two times a day  aspirin enteric coated 81 milliGRAM(s) Oral daily  atorvastatin 40 milliGRAM(s) Oral at bedtime  dextrose 40% Gel 15 Gram(s) Oral once  dextrose 5%. 1000 milliLiter(s) (50 mL/Hr) IV Continuous <Continuous>  dextrose 5%. 1000 milliLiter(s) (100 mL/Hr) IV Continuous <Continuous>  dextrose 50% Injectable 25 Gram(s) IV Push once  dextrose 50% Injectable 12.5 Gram(s) IV Push once  dextrose 50% Injectable 25 Gram(s) IV Push once  ferrous    sulfate 325 milliGRAM(s) Oral daily  glucagon  Injectable 1 milliGRAM(s) IntraMuscular once  heparin   Injectable 5000 Unit(s) SubCutaneous every 12 hours  insulin lispro (ADMELOG) corrective regimen sliding scale   SubCutaneous three times a day before meals  insulin lispro (ADMELOG) corrective regimen sliding scale   SubCutaneous at bedtime  metoprolol succinate ER 25 milliGRAM(s) Oral daily  pantoprazole  Injectable 40 milliGRAM(s) IV Push two times a day  polyethylene glycol 3350 17 Gram(s) Oral two times a day  senna 2 Tablet(s) Oral at bedtime  sertraline 25 milliGRAM(s) Oral every 12 hours  sucralfate suspension 1 Gram(s) Oral four times a day    MEDICATIONS  (PRN):  acetaminophen     Tablet .. 650 milliGRAM(s) Oral every 6 hours PRN Temp greater or equal to 38C (100.4F), Mild Pain (1 - 3)  aluminum hydroxide/magnesium hydroxide/simethicone Suspension 30 milliLiter(s) Oral every 4 hours PRN Dyspepsia  HYDROmorphone   Tablet 4 milliGRAM(s) Oral every 8 hours PRN Severe Pain (7 - 10)  melatonin 3 milliGRAM(s) Oral at bedtime PRN Insomnia  ondansetron Injectable 4 milliGRAM(s) IV Push every 8 hours PRN Nausea and/or Vomiting    Allergies    morphine (Other)    Intolerances      Vital Signs Last 24 Hrs  T(C): 36.4 (02 Mar 2022 05:35), Max: 36.6 (01 Mar 2022 13:28)  T(F): 97.5 (02 Mar 2022 05:35), Max: 97.9 (01 Mar 2022 13:28)  HR: 70 (02 Mar 2022 05:35) (70 - 97)  BP: 104/64 (02 Mar 2022 05:35) (104/64 - 117/76)  BP(mean): --  RR: 17 (02 Mar 2022 05:35) (17 - 18)  SpO2: 95% (02 Mar 2022 05:35) (95% - 98%)  I&O's Summary        TELE: Not on telemetry   PHYSICAL EXAM:  Appearance: NAD, no distress, alert,  HEENT: Moist Mucous Membranes, Anicteric  Cardiovascular: Regular rate and rhythm, Normal S1 S2, No JVD, No murmurs, No rubs, gallops or clicks  Respiratory: Non-labored, Clear to auscultation, No rales, No rhonchi, No wheezing.   Gastrointestinal:  Soft, Non-tender, + BS  Neurologic: Non-focal  Skin: Warm and dry, No visible rashes or ulcers, No ecchymosis, No cyanosis  Musculoskeletal: No clubbing, No cyanosis, No joint swelling/tenderness  Psychiatry: Mood & affect appropriate  Lymph: No peripheral edema.     LABS: All Labs Reviewed:                        8.3    4.77  )-----------( 178      ( 02 Mar 2022 08:03 )             24.8                         8.2    5.06  )-----------( 177      ( 01 Mar 2022 07:55 )             23.2                         8.6    5.49  )-----------( 177      ( 28 Feb 2022 08:09 )             25.3     02 Mar 2022 08:03    141    |  111    |  12     ----------------------------<  84     3.8     |  25     |  0.87   01 Mar 2022 07:55    141    |  112    |  12     ----------------------------<  90     3.7     |  23     |  0.89   28 Feb 2022 08:09    141    |  112    |  14     ----------------------------<  83     3.7     |  22     |  1.10     Ca    8.3        02 Mar 2022 08:03  Ca    8.5        01 Mar 2022 07:55  Ca    8.6        28 Feb 2022 08:09    TPro  4.6    /  Alb  2.0    /  TBili  0.3    /  DBili  x      /  AST  16     /  ALT  17     /  AlkPhos  53     02 Mar 2022 08:03  TPro  4.3    /  Alb  2.0    /  TBili  0.3    /  DBili  x      /  AST  20     /  ALT  18     /  AlkPhos  52     01 Mar 2022 07:55  TPro  4.6    /  Alb  2.2    /  TBili  0.3    /  DBili  x      /  AST  22     /  ALT  17     /  AlkPhos  54     28 Feb 2022 08:09      Troponin I, High Sensitivity Result: 48.8 ng/L (02-25-22 @ 00:33)            Cholesterol, Serum: 54 mg/dL (02-26-22 @ 11:18)  HDL Cholesterol, Serum: 34 mg/dL (02-26-22 @ 11:18)  Triglycerides, Serum: 45 mg/dL (02-26-22 @ 11:18)      12 Lead ECG:   Ventricular Rate 82 BPM    Atrial Rate 82 BPM    P-R Interval 138 ms    QRS Duration 80 ms    Q-T Interval 370 ms    QTC Calculation(Bazett) 432 ms    P Axis 79 degrees    R Axis 34 degrees    T Axis 34 degrees    Diagnosis Line Normal sinus rhythm  Nonspecific ST abnormality  Abnormal ECG    Confirmed by jarrett Jose ([id number]) on 2/25/2022 1:30:25 PM (02-25-22 @ 00:18)    < from: Xray Chest 1 View- PORTABLE-Urgent (02.25.22 @ 01:36) >    ACC: [id number] EXAM:  XR CHEST PORTABLE URGENT 1V                          PROCEDURE DATE:  02/25/2022          INTERPRETATION:  AP chest on February 25, 2022 at 1:26 AM. Patient has   chest pain.    Elevated diaphragms again noted.    . I see densities in the lower thoracic region again noted.    Heart magnified by technique.    Patient's chin obscures the apices.    No visible lung abnormality.    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    Chest is similar to August 20, 2019.    IMPRESSION: No acute finding or change.    --- End of Report ---            JARRETT SHARIF MD; Attending Radiologist  This document has been electronically signed. Feb 25 2022  1:36PM    < end of copied text >  < from: Transthoracic Echocardiogram w/Doppler (02.13.12 @ 10:41) >    Patient name: DEVON NOLAND  YOB: 1931   Age: 80 (F)   MR#: [id number]  Study Date: 2/13/2012  Location: [place] [id number]: Brooke Matias RDCS  Study quality: Technically fair  Referring Physician: MARY FINNEY MD  Blood Pressure: 139/60 mmHg  Height: 5ft 3in  Weight: 153 lb  BSA: 1.7 m2  ------------------------------------------------------------------------  PROCEDURE: Transthoracic echocardiogram with 2-D, M-Mode  and complete spectral and color flow Doppler.  INDICATION: Chest Pain (786.50)  ------------------------------------------------------------------------  Dimensions:    Normal Values:  LA:     4.1    2.0 - 4.0 cm  Ao:     3.3    2.0 - 3.8 cm  SEPTUM: 1.3    0.6 - 1.2 cm  PWT:    1.1    0.6 - 1.1 cm  LVIDd:4.0    3.0 - 5.6 cm  LVIDs:  2.6    1.8 - 4.0 cm  Derived variables:  LVMI: 96 g/m2  RWT: 0.55  Fractional short: 35 %  Ejection Fraction: 65 %  ------------------------------------------------------------------------  Observations:  Mitral Valve: Normal mitral valve. Mild-moderate mitral  regurgitation.  Aortic Valve/Aorta: Aortic valve not well visualized;  appears calcified.  Normal aortic root.  Left Atrium: Mildly dilated left atrium.  LA volume index =  29 cc/m2.  Left Ventricle: Normal left ventricular systolic function.  No segmental wall motion abnormalities. Concentric left  ventricular hypertrophy. Mild diastolic dysfunction (Stage  I).  Right Heart: Normal right atrium. Normal right ventricular  size and function. Normal tricuspidvalve. Mild tricuspid  regurgitation. Normal pulmonic valve.  Pericardium/Pleura: Normal pericardium with no pericardial  effusion.  Hemodynamic: Estimated right atrial pressure is 10 mm Hg.  Estimated right ventricular systolic pressure equals 43 mm  Hg, assuming right atrial pressure equals 10 mm Hg,  consistent with mild pulmonary hypertension.  ------------------------------------------------------------------------  Conclusions:  1. Mild-moderate mitral regurgitation.  2. Normal left ventricular systolic function. No segmental  wall motion abnormalities.  3. Mild diastolic dysfunction (Stage I).  4. Normal right ventricular size and function.  5. Estimated right ventricular systolic pressure equals 43  mm Hg, assuming right atrial pressure equals 10 mm Hg,  consistent with mild pulmonary hypertension.  6. Normal tricuspid valve. Mild tricuspid regurgitation.  ------------------------------------------------------------------------  Confirmed on  2/13/2012 - 18:34:21 by Paolo Nascimento  ------------------------------------------------------------------------    < end of copied text >

## 2022-03-02 NOTE — PROGRESS NOTE ADULT - PROBLEM SELECTOR PLAN 7
- STOPPING home medications, Tradjenta and metformin, hba1c 6.0%  - low dose insulin coverage scale w/hypoglycemia protocol in place   - Accu-Devante RAMIREZ&HS, D/W Dtr - STOPPING home medications, Tradjenta and metformin, hba1c 6.0%  - low dose insulin coverage scale w/hypoglycemia protocol in place   - Accu-Cheks AC&HS, D/W Dtr at detail will STOP both meds Metformin & Tradjenta on d/c as concern for hypoglycemia with ~ low normal HbA1c for her age, wt loss & poor po intake.  Pt will follow up with primary Endocrinologist next week to discuss about meds

## 2022-03-02 NOTE — DISCHARGE NOTE NURSING/CASE MANAGEMENT/SOCIAL WORK - NSDCPEFALRISK_GEN_ALL_CORE
For information on Fall & Injury Prevention, visit: [email or web address] OR  [email or web address] OR  https://www.cdc.gov/steadi/patient.html

## 2022-03-02 NOTE — PROGRESS NOTE ADULT - ASSESSMENT
abdominal pain  ? pancreatitis    diet as tolerated  proton pump inhibitor bid  carafate 1g four times a day  non contrast ct of the abdomen shows atrophic pancreas; no carmen-pancreatic inflammation  MRI noted, results d/w patient  NM bone scan noted  No GI objection to d/c planning with outpatient f/u for pancreas    I reviewed the overnight course of events on the unit, re-confirming the patient history. I discussed the care with the patient and their family  Differential diagnosis and plan of care discussed with patient after the evaluation  35 minutes spent on total encounter of which more than fifty percent of the encounter was spent counseling and/or coordinating care by the attending physician.  Advanced care planning was discussed with patient and family.  Advanced care planning forms were reviewed and discussed.  Risks, benefits and alternatives of gastroenterologic procedures were discussed in detail and all questions were answered.

## 2022-03-02 NOTE — PROGRESS NOTE ADULT - ATTENDING COMMENTS
Chart reviewed    Patient seen and examined    Agree with plan as outlined    89 year old female with PMH CAD s/p stents in 2012, Type 2 diabetes, HTN, HLD, GERD, mild AS, chronic venous insufficiency, neuropathy, macular degeneration, chronic constipation, depression, OA, lumbar degenerative disc disease, and spinal stenosis who presents to the ER complaining of nausea, abdominal pain, and burning chest pain, admitted for pancreatitis, KERRY.     Atypical Chest Pain  - Symptoms most likely secondary to pancreatitis. Now pain-free  - Hx of CAD with MI in 2012 and 2 stents; sees Dr. Shirley   - No evidence of ischemia; EKG NSR.  Troponins x 2 negative.  No evidence of volume overload  - TTE (6/2021)normal LV systolic function, ejection fraction of 65%. Moderate LV diastolic dysfunction. Mild MR, mild HI, mild to moderate TR with estimated RV systolic pressure of 44 mmHg. LA markedly dilated.  - Stress test (9/2019) negative for inducement of cardiac symptoms, EKG evidence of myocardial ischemia, or significant arrhythmias. Normal LV myocardial perfusion and systolic function.  - Continue ASA, BB, and statin  - Monitor and replete lytes, keep K>4, Mg>2  - Heme/onc and GI following, awaiting for MRI  - surgery following, no surgical intervention planned at this time
Seen/examined. agree with above.  atypical chest pain, likely of GI origin  troponin with mild increase, though no suggestion of acs  ekg unremarkable  will follow along with you
no more cp. No signs of significant ischemia or volume overload. Further cardiac workup will depend on clinical course.
Likely non anginal chest pains.  To follow closely while admitted.
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  GI-Dr Montilla  Cardiology-DR Jose  Sx -No surgical intervention.  AM labs   Diet---> low fat , low cholesterol  Palliative care eval for GOC.-Pt is Full Code.  PT Eval.
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  GI-Dr Montilla  Cardiology-DR Jose  Sx -No surgical intervention.  AM labs   Diet---> Clear Liquid diet  Palliative care eval for GOC.-Pt is Full Code.  PT Eval.
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  Sx -No surgical intervention.  D/W DR Woodward- D/C , out pt follow up.  Palliative care eval for GOC.-Pt is Full Code.  D/W Dtr at bed side at detail about out pt follow up with PMD/ Endocrinology for Meds  & Hematology for anemia,   PT Eval- NO PT need .  D/C Home today, Total d/c care time is 60 minutes.
The patient is a 89 year old female with PMH CAD s/p stents in 2012, Type 2 diabetes, HTN, HLD, GERD, mild AS, chronic venous insufficiency, neuropathy, macular degeneration, chronic constipation, depression, OA, lumbar degenerative disc disease, and spinal stenosis who presents to the ER complaining of nausea, abdominal pain, and burning chest pain, admitted for pancreatitis, KERRY./CKD 2-3  Pt seen, examined, Case & care plan d/w pt, residents at [facility].  Sx -No surgical intervention.  AM labs.   D/W DR Leger -Hematology-Bone scan today, D/C Plan in AM ,   Palliative care eval for GOC.-Pt is Full Code.  PT Eval- NO PT need .
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  GI-Dr Montilla  Cardiology-DR Jose  Sx -No surgical intervention.  AM labs   Diet---> Clear Liquid diet, advance to Full Liquid   Palliative care eval for GOC.-Pt is Full Code.  PT Eval.

## 2022-03-02 NOTE — PROGRESS NOTE ADULT - REASON FOR ADMISSION
pancreatitis
q
pancreatitis

## 2022-03-02 NOTE — PROGRESS NOTE ADULT - SUBJECTIVE AND OBJECTIVE BOX
[facility]  Shar Hopkins PA-C  [facility], [facility] [place] [address]  [phone]      INTERVAL HPI/OVERNIGHT EVENTS:  Pt s/e  Pt reports no GI complaints  Tolerating diet    MEDICATIONS  (STANDING):  amitriptyline 30 milliGRAM(s) Oral at bedtime  artificial tears (preservative free) Ophthalmic Solution 1 Drop(s) Both EYES two times a day  aspirin enteric coated 81 milliGRAM(s) Oral daily  atorvastatin 40 milliGRAM(s) Oral at bedtime  dextrose 40% Gel 15 Gram(s) Oral once  dextrose 5%. 1000 milliLiter(s) (50 mL/Hr) IV Continuous <Continuous>  dextrose 5%. 1000 milliLiter(s) (100 mL/Hr) IV Continuous <Continuous>  dextrose 50% Injectable 25 Gram(s) IV Push once  dextrose 50% Injectable 12.5 Gram(s) IV Push once  dextrose 50% Injectable 25 Gram(s) IV Push once  ferrous    sulfate 325 milliGRAM(s) Oral daily  glucagon  Injectable 1 milliGRAM(s) IntraMuscular once  heparin   Injectable 5000 Unit(s) SubCutaneous every 12 hours  insulin lispro (ADMELOG) corrective regimen sliding scale   SubCutaneous three times a day before meals  insulin lispro (ADMELOG) corrective regimen sliding scale   SubCutaneous at bedtime  metoprolol succinate ER 25 milliGRAM(s) Oral daily  pantoprazole  Injectable 40 milliGRAM(s) IV Push two times a day  polyethylene glycol 3350 17 Gram(s) Oral two times a day  senna 2 Tablet(s) Oral at bedtime  sertraline 25 milliGRAM(s) Oral every 12 hours  sucralfate suspension 1 Gram(s) Oral four times a day    MEDICATIONS  (PRN):  acetaminophen     Tablet .. 650 milliGRAM(s) Oral every 6 hours PRN Temp greater or equal to 38C (100.4F), Mild Pain (1 - 3)  aluminum hydroxide/magnesium hydroxide/simethicone Suspension 30 milliLiter(s) Oral every 4 hours PRN Dyspepsia  HYDROmorphone   Tablet 4 milliGRAM(s) Oral every 8 hours PRN Severe Pain (7 - 10)  melatonin 3 milliGRAM(s) Oral at bedtime PRN Insomnia  ondansetron Injectable 4 milliGRAM(s) IV Push every 8 hours PRN Nausea and/or Vomiting      Allergies    morphine (Other)    PHYSICAL EXAM:   Vital Signs:  Vital Signs Last 24 Hrs  T(C): 36.6 (02 Mar 2022 11:34), Max: 36.6 (01 Mar 2022 13:28)  T(F): 97.8 (02 Mar 2022 11:34), Max: 97.9 (01 Mar 2022 13:28)  HR: 99 (02 Mar 2022 11:34) (70 - 99)  BP: 121/76 (02 Mar 2022 11:34) (104/64 - 121/76)  BP(mean): --  RR: 16 (02 Mar 2022 11:34) (16 - 18)  SpO2: 97% (02 Mar 2022 11:34) (95% - 98%)  Daily     Daily Weight in k.5 (02 Mar 2022 05:35)    GENERAL:  Appears stated age  ABDOMEN:  Soft, non-tender, non-distended  NEURO:  Alert      LABS:                        8.3    4.77  )-----------( 178      ( 02 Mar 2022 08:03 )             24.8     03-02    141  |  111<H>  |  12  ----------------------------<  84  3.8   |  25  |  0.87    Ca    8.3<L>      02 Mar 2022 08:03    TPro  4.6<L>  /  Alb  2.0<L>  /  TBili  0.3  /  DBili  x   /  AST  16  /  ALT  17  /  AlkPhos  53  03-02

## 2022-03-02 NOTE — PROGRESS NOTE ADULT - PROBLEM SELECTOR PROBLEM 2
Arrived to IR suite, pt being prepped for intervention, preprocedure  Assessment performed, see flowsheet  
Patient arrived to [facility] from IR post thrombectomy     Type of Stroke Ischemic Rt MCA     No TPA given     Patients current symptoms include aphasia, left facial droop, Left sided weakness  
KERRY (acute kidney injury)

## 2022-03-02 NOTE — PROGRESS NOTE ADULT - PROVIDER SPECIALTY LIST ADULT
Cardiology
Cardiology
Gastroenterology
Internal Medicine
Surgery
Cardiology
Gastroenterology
Gastroenterology
Heme/Onc
Surgery
Internal Medicine

## 2022-03-02 NOTE — DISCHARGE NOTE NURSING/CASE MANAGEMENT/SOCIAL WORK - PATIENT PORTAL LINK FT
You can access the FollowMyHealth Patient Portal offered by [facility] by registering at the following website: [email or web address]. By joining [employer]’s FollowMyHealth portal, you will also be able to view your health information using other applications (apps) compatible with our system.

## 2022-05-05 NOTE — ED PROVIDER NOTE - ATTESTATION, MLM
Patient reports her right knee is improving daily  She continues to have an increase in redness to the area following any activity  Encouraged her to discuss this with ortho or her PCP to see if they recommend she follow up with vascular  No new questions or needs  I have reviewed and confirmed nurses' notes for patient's medications, allergies, medical history, and surgical history.

## 2022-06-20 ENCOUNTER — EMERGENCY (EMERGENCY)
Facility: HOSPITAL | Age: 87
LOS: 1 days | Discharge: HOME | End: 2022-06-20
Attending: EMERGENCY MEDICINE | Admitting: EMERGENCY MEDICINE
Payer: MEDICARE

## 2022-06-20 VITALS
DIASTOLIC BLOOD PRESSURE: 84 MMHG | HEIGHT: 62 IN | RESPIRATION RATE: 16 BRPM | SYSTOLIC BLOOD PRESSURE: 147 MMHG | OXYGEN SATURATION: 98 % | TEMPERATURE: 98 F | HEART RATE: 74 BPM

## 2022-06-20 VITALS
DIASTOLIC BLOOD PRESSURE: 65 MMHG | OXYGEN SATURATION: 96 % | RESPIRATION RATE: 18 BRPM | SYSTOLIC BLOOD PRESSURE: 124 MMHG | TEMPERATURE: 98 F | HEART RATE: 78 BPM

## 2022-06-20 DIAGNOSIS — Z98.89 OTHER SPECIFIED POSTPROCEDURAL STATES: Chronic | ICD-10-CM

## 2022-06-20 DIAGNOSIS — Z96.60 PRESENCE OF UNSPECIFIED ORTHOPEDIC JOINT IMPLANT: Chronic | ICD-10-CM

## 2022-06-20 LAB
ALBUMIN SERPL ELPH-MCNC: 3.2 G/DL — LOW (ref 3.3–5)
ALP SERPL-CCNC: 83 U/L — SIGNIFICANT CHANGE UP (ref 40–120)
ALT FLD-CCNC: 17 U/L — SIGNIFICANT CHANGE UP (ref 12–78)
ANION GAP SERPL CALC-SCNC: 6 MMOL/L — SIGNIFICANT CHANGE UP (ref 5–17)
APTT BLD: 28.2 SEC — SIGNIFICANT CHANGE UP (ref 27.5–35.5)
AST SERPL-CCNC: 26 U/L — SIGNIFICANT CHANGE UP (ref 15–37)
BASOPHILS # BLD AUTO: 0.06 K/UL — SIGNIFICANT CHANGE UP (ref 0–0.2)
BASOPHILS NFR BLD AUTO: 0.9 % — SIGNIFICANT CHANGE UP (ref 0–2)
BILIRUB SERPL-MCNC: 0.4 MG/DL — SIGNIFICANT CHANGE UP (ref 0.2–1.2)
BUN SERPL-MCNC: 20 MG/DL — SIGNIFICANT CHANGE UP (ref 7–23)
CALCIUM SERPL-MCNC: 9.3 MG/DL — SIGNIFICANT CHANGE UP (ref 8.5–10.1)
CHLORIDE SERPL-SCNC: 106 MMOL/L — SIGNIFICANT CHANGE UP (ref 96–108)
CK MB CFR SERPL CALC: 1.1 NG/ML — SIGNIFICANT CHANGE UP (ref 0–3.6)
CO2 SERPL-SCNC: 27 MMOL/L — SIGNIFICANT CHANGE UP (ref 22–31)
CREAT SERPL-MCNC: 1.3 MG/DL — SIGNIFICANT CHANGE UP (ref 0.5–1.3)
EGFR: 39 ML/MIN/1.73M2 — LOW
EOSINOPHIL # BLD AUTO: 0.15 K/UL — SIGNIFICANT CHANGE UP (ref 0–0.5)
EOSINOPHIL NFR BLD AUTO: 2.1 % — SIGNIFICANT CHANGE UP (ref 0–6)
GLUCOSE SERPL-MCNC: 114 MG/DL — HIGH (ref 70–99)
HCT VFR BLD CALC: 34.7 % — SIGNIFICANT CHANGE UP (ref 34.5–45)
HGB BLD-MCNC: 10.7 G/DL — LOW (ref 11.5–15.5)
IMM GRANULOCYTES NFR BLD AUTO: 0.4 % — SIGNIFICANT CHANGE UP (ref 0–1.5)
INR BLD: 1.07 RATIO — SIGNIFICANT CHANGE UP (ref 0.88–1.16)
LIDOCAIN IGE QN: 89 U/L — SIGNIFICANT CHANGE UP (ref 73–393)
LYMPHOCYTES # BLD AUTO: 0.76 K/UL — LOW (ref 1–3.3)
LYMPHOCYTES # BLD AUTO: 10.8 % — LOW (ref 13–44)
MAGNESIUM SERPL-MCNC: 2.2 MG/DL — SIGNIFICANT CHANGE UP (ref 1.6–2.6)
MCHC RBC-ENTMCNC: 29.1 PG — SIGNIFICANT CHANGE UP (ref 27–34)
MCHC RBC-ENTMCNC: 30.8 GM/DL — LOW (ref 32–36)
MCV RBC AUTO: 94.3 FL — SIGNIFICANT CHANGE UP (ref 80–100)
MONOCYTES # BLD AUTO: 0.49 K/UL — SIGNIFICANT CHANGE UP (ref 0–0.9)
MONOCYTES NFR BLD AUTO: 7 % — SIGNIFICANT CHANGE UP (ref 2–14)
NEUTROPHILS # BLD AUTO: 5.56 K/UL — SIGNIFICANT CHANGE UP (ref 1.8–7.4)
NEUTROPHILS NFR BLD AUTO: 78.8 % — HIGH (ref 43–77)
NRBC # BLD: 0 /100 WBCS — SIGNIFICANT CHANGE UP (ref 0–0)
NT-PROBNP SERPL-SCNC: 2912 PG/ML — HIGH (ref 0–450)
PLATELET # BLD AUTO: 210 K/UL — SIGNIFICANT CHANGE UP (ref 150–400)
POTASSIUM SERPL-MCNC: 5.1 MMOL/L — SIGNIFICANT CHANGE UP (ref 3.5–5.3)
POTASSIUM SERPL-SCNC: 5.1 MMOL/L — SIGNIFICANT CHANGE UP (ref 3.5–5.3)
PROT SERPL-MCNC: 6.7 G/DL — SIGNIFICANT CHANGE UP (ref 6–8.3)
PROTHROM AB SERPL-ACNC: 12.5 SEC — SIGNIFICANT CHANGE UP (ref 10.5–13.4)
RBC # BLD: 3.68 M/UL — LOW (ref 3.8–5.2)
RBC # FLD: 12.5 % — SIGNIFICANT CHANGE UP (ref 10.3–14.5)
SARS-COV-2 RNA SPEC QL NAA+PROBE: SIGNIFICANT CHANGE UP
SODIUM SERPL-SCNC: 139 MMOL/L — SIGNIFICANT CHANGE UP (ref 135–145)
TROPONIN I, HIGH SENSITIVITY RESULT: 15 NG/L — SIGNIFICANT CHANGE UP
WBC # BLD: 7.05 K/UL — SIGNIFICANT CHANGE UP (ref 3.8–10.5)
WBC # FLD AUTO: 7.05 K/UL — SIGNIFICANT CHANGE UP (ref 3.8–10.5)

## 2022-06-20 PROCEDURE — 76705 ECHO EXAM OF ABDOMEN: CPT

## 2022-06-20 PROCEDURE — 80053 COMPREHEN METABOLIC PANEL: CPT

## 2022-06-20 PROCEDURE — 71045 X-RAY EXAM CHEST 1 VIEW: CPT | Mod: 26

## 2022-06-20 PROCEDURE — 83690 ASSAY OF LIPASE: CPT

## 2022-06-20 PROCEDURE — 96375 TX/PRO/DX INJ NEW DRUG ADDON: CPT | Mod: XU

## 2022-06-20 PROCEDURE — 85025 COMPLETE CBC W/AUTO DIFF WBC: CPT

## 2022-06-20 PROCEDURE — 74177 CT ABD & PELVIS W/CONTRAST: CPT | Mod: 26,MA

## 2022-06-20 PROCEDURE — 74177 CT ABD & PELVIS W/CONTRAST: CPT | Mod: MA

## 2022-06-20 PROCEDURE — 96374 THER/PROPH/DIAG INJ IV PUSH: CPT | Mod: XU

## 2022-06-20 PROCEDURE — 83880 ASSAY OF NATRIURETIC PEPTIDE: CPT

## 2022-06-20 PROCEDURE — 99285 EMERGENCY DEPT VISIT HI MDM: CPT

## 2022-06-20 PROCEDURE — 76705 ECHO EXAM OF ABDOMEN: CPT | Mod: 26

## 2022-06-20 PROCEDURE — 83735 ASSAY OF MAGNESIUM: CPT

## 2022-06-20 PROCEDURE — 71045 X-RAY EXAM CHEST 1 VIEW: CPT

## 2022-06-20 PROCEDURE — 36415 COLL VENOUS BLD VENIPUNCTURE: CPT

## 2022-06-20 PROCEDURE — 84484 ASSAY OF TROPONIN QUANT: CPT

## 2022-06-20 PROCEDURE — 99285 EMERGENCY DEPT VISIT HI MDM: CPT | Mod: 25

## 2022-06-20 PROCEDURE — 87635 SARS-COV-2 COVID-19 AMP PRB: CPT

## 2022-06-20 PROCEDURE — 85730 THROMBOPLASTIN TIME PARTIAL: CPT

## 2022-06-20 PROCEDURE — 85610 PROTHROMBIN TIME: CPT

## 2022-06-20 PROCEDURE — 71275 CT ANGIOGRAPHY CHEST: CPT | Mod: MA

## 2022-06-20 PROCEDURE — 93010 ELECTROCARDIOGRAM REPORT: CPT

## 2022-06-20 PROCEDURE — 71275 CT ANGIOGRAPHY CHEST: CPT | Mod: 26,MA

## 2022-06-20 PROCEDURE — 93005 ELECTROCARDIOGRAM TRACING: CPT

## 2022-06-20 PROCEDURE — 96376 TX/PRO/DX INJ SAME DRUG ADON: CPT | Mod: XU

## 2022-06-20 PROCEDURE — 82553 CREATINE MB FRACTION: CPT

## 2022-06-20 RX ORDER — ONDANSETRON 8 MG/1
4 TABLET, FILM COATED ORAL ONCE
Refills: 0 | Status: COMPLETED | OUTPATIENT
Start: 2022-06-20 | End: 2022-06-20

## 2022-06-20 RX ORDER — PANTOPRAZOLE SODIUM 20 MG/1
40 TABLET, DELAYED RELEASE ORAL ONCE
Refills: 0 | Status: COMPLETED | OUTPATIENT
Start: 2022-06-20 | End: 2022-06-20

## 2022-06-20 RX ORDER — ACETAMINOPHEN 500 MG
1000 TABLET ORAL ONCE
Refills: 0 | Status: COMPLETED | OUTPATIENT
Start: 2022-06-20 | End: 2022-06-20

## 2022-06-20 RX ORDER — SODIUM CHLORIDE 9 MG/ML
1000 INJECTION INTRAMUSCULAR; INTRAVENOUS; SUBCUTANEOUS ONCE
Refills: 0 | Status: COMPLETED | OUTPATIENT
Start: 2022-06-20 | End: 2022-06-20

## 2022-06-20 RX ORDER — HYDROMORPHONE HYDROCHLORIDE 2 MG/ML
0.5 INJECTION INTRAMUSCULAR; INTRAVENOUS; SUBCUTANEOUS ONCE
Refills: 0 | Status: DISCONTINUED | OUTPATIENT
Start: 2022-06-20 | End: 2022-06-20

## 2022-06-20 RX ADMIN — SODIUM CHLORIDE 1000 MILLILITER(S): 9 INJECTION INTRAMUSCULAR; INTRAVENOUS; SUBCUTANEOUS at 15:08

## 2022-06-20 RX ADMIN — Medication 400 MILLIGRAM(S): at 15:13

## 2022-06-20 RX ADMIN — ONDANSETRON 4 MILLIGRAM(S): 8 TABLET, FILM COATED ORAL at 15:13

## 2022-06-20 RX ADMIN — HYDROMORPHONE HYDROCHLORIDE 0.5 MILLIGRAM(S): 2 INJECTION INTRAMUSCULAR; INTRAVENOUS; SUBCUTANEOUS at 19:33

## 2022-06-20 RX ADMIN — HYDROMORPHONE HYDROCHLORIDE 0.5 MILLIGRAM(S): 2 INJECTION INTRAMUSCULAR; INTRAVENOUS; SUBCUTANEOUS at 15:08

## 2022-06-20 RX ADMIN — PANTOPRAZOLE SODIUM 40 MILLIGRAM(S): 20 TABLET, DELAYED RELEASE ORAL at 19:33

## 2022-06-20 NOTE — ED ADULT NURSE REASSESSMENT NOTE - NSIMPLEMENTINTERV_GEN_ALL_ED
Implemented All Fall with Harm Risk Interventions:  [place] to call system. Call bell, personal items and telephone within reach. Instruct patient to call for assistance. Room bathroom lighting operational. Non-slip footwear when patient is off stretcher. Physically safe environment: no spills, clutter or unnecessary equipment. Stretcher in lowest position, wheels locked, appropriate side rails in place. Provide visual cue, wrist band, yellow gown, etc. Monitor gait and stability. Monitor for mental status changes and reorient to person, place, and time. Review medications for side effects contributing to fall risk. Reinforce activity limits and safety measures with patient and family. Provide visual clues: red socks.

## 2022-06-20 NOTE — ED PROVIDER NOTE - PATIENT PORTAL LINK FT
You can access the FollowMyHealth Patient Portal offered by [facility] by registering at the following website: [email or web address]. By joining [employer]’s FollowMyHealth portal, you will also be able to view your health information using other applications (apps) compatible with our system.

## 2022-06-20 NOTE — ED PROVIDER NOTE - OBJECTIVE STATEMENT
89 female presents to ER with daughter c/o right sided chest pain, epigastric pain, nausea, started yesterday, daughter states she had a similar presentation in February 2022 and was admitted for pancreatitis.

## 2022-06-20 NOTE — ED ADULT NURSE REASSESSMENT NOTE - NS ED NURSE REASSESS COMMENT FT1
Pt resting comfortably in bed at this time, offers no complaints.  No chest pain or SOB noted.  No n/v/d.  pt did have acid reflux, resolved at this time after receiving protonix iv.  Pending dc home.  Will follow up with Nidhi.

## 2022-06-20 NOTE — ED PROVIDER NOTE - CARE PROVIDER_API CALL
Alexis Terrell ()  Internal Medicine  [address] [place] [address]  Phone: [phone]  Fax: [phone]  Follow Up Time:

## 2022-06-20 NOTE — ED PROVIDER NOTE - CARDIAC, MLM
Normal rate, regular rhythm.  Heart sounds S1, S2.  No murmurs, rubs or gallops.
Awake/Alert/Cooperative

## 2022-07-05 ENCOUNTER — INPATIENT (INPATIENT)
Facility: HOSPITAL | Age: 87
LOS: 9 days | Discharge: HOME | DRG: 177 | End: 2022-07-15
Attending: INTERNAL MEDICINE | Admitting: INTERNAL MEDICINE
Payer: MEDICARE

## 2022-07-05 VITALS
HEART RATE: 82 BPM | RESPIRATION RATE: 16 BRPM | SYSTOLIC BLOOD PRESSURE: 105 MMHG | HEIGHT: 62 IN | TEMPERATURE: 99 F | DIASTOLIC BLOOD PRESSURE: 57 MMHG | WEIGHT: 115.96 LBS | OXYGEN SATURATION: 94 %

## 2022-07-05 DIAGNOSIS — Z98.89 OTHER SPECIFIED POSTPROCEDURAL STATES: Chronic | ICD-10-CM

## 2022-07-05 DIAGNOSIS — Z96.60 PRESENCE OF UNSPECIFIED ORTHOPEDIC JOINT IMPLANT: Chronic | ICD-10-CM

## 2022-07-05 DIAGNOSIS — K80.20 CALCULUS OF GALLBLADDER WITHOUT CHOLECYSTITIS WITHOUT OBSTRUCTION: ICD-10-CM

## 2022-07-05 DIAGNOSIS — R11.0 NAUSEA: ICD-10-CM

## 2022-07-05 LAB
ALBUMIN SERPL ELPH-MCNC: 2.7 G/DL — LOW (ref 3.3–5)
ALP SERPL-CCNC: 67 U/L — SIGNIFICANT CHANGE UP (ref 40–120)
ALT FLD-CCNC: 10 U/L — LOW (ref 12–78)
ANION GAP SERPL CALC-SCNC: 7 MMOL/L — SIGNIFICANT CHANGE UP (ref 5–17)
APPEARANCE UR: CLEAR — SIGNIFICANT CHANGE UP
APTT BLD: 28.6 SEC — SIGNIFICANT CHANGE UP (ref 27.5–35.5)
AST SERPL-CCNC: 30 U/L — SIGNIFICANT CHANGE UP (ref 15–37)
BASOPHILS # BLD AUTO: 0.03 K/UL — SIGNIFICANT CHANGE UP (ref 0–0.2)
BASOPHILS NFR BLD AUTO: 0.5 % — SIGNIFICANT CHANGE UP (ref 0–2)
BILIRUB SERPL-MCNC: 0.3 MG/DL — SIGNIFICANT CHANGE UP (ref 0.2–1.2)
BILIRUB UR-MCNC: NEGATIVE — SIGNIFICANT CHANGE UP
BUN SERPL-MCNC: 30 MG/DL — HIGH (ref 7–23)
CALCIUM SERPL-MCNC: 8.9 MG/DL — SIGNIFICANT CHANGE UP (ref 8.5–10.1)
CHLORIDE SERPL-SCNC: 99 MMOL/L — SIGNIFICANT CHANGE UP (ref 96–108)
CO2 SERPL-SCNC: 26 MMOL/L — SIGNIFICANT CHANGE UP (ref 22–31)
COLOR SPEC: YELLOW — SIGNIFICANT CHANGE UP
CREAT SERPL-MCNC: 2 MG/DL — HIGH (ref 0.5–1.3)
DIFF PNL FLD: ABNORMAL
EGFR: 23 ML/MIN/1.73M2 — LOW
EOSINOPHIL # BLD AUTO: 0.15 K/UL — SIGNIFICANT CHANGE UP (ref 0–0.5)
EOSINOPHIL NFR BLD AUTO: 2.3 % — SIGNIFICANT CHANGE UP (ref 0–6)
GLUCOSE SERPL-MCNC: 97 MG/DL — SIGNIFICANT CHANGE UP (ref 70–99)
GLUCOSE UR QL: NEGATIVE — SIGNIFICANT CHANGE UP
HCT VFR BLD CALC: 32.5 % — LOW (ref 34.5–45)
HGB BLD-MCNC: 10.2 G/DL — LOW (ref 11.5–15.5)
IMM GRANULOCYTES NFR BLD AUTO: 0.3 % — SIGNIFICANT CHANGE UP (ref 0–1.5)
INR BLD: 1.04 RATIO — SIGNIFICANT CHANGE UP (ref 0.88–1.16)
KETONES UR-MCNC: NEGATIVE — SIGNIFICANT CHANGE UP
LACTATE SERPL-SCNC: 1.2 MMOL/L — SIGNIFICANT CHANGE UP (ref 0.7–2)
LEUKOCYTE ESTERASE UR-ACNC: ABNORMAL
LIDOCAIN IGE QN: 78 U/L — SIGNIFICANT CHANGE UP (ref 73–393)
LYMPHOCYTES # BLD AUTO: 0.46 K/UL — LOW (ref 1–3.3)
LYMPHOCYTES # BLD AUTO: 6.9 % — LOW (ref 13–44)
MCHC RBC-ENTMCNC: 28.3 PG — SIGNIFICANT CHANGE UP (ref 27–34)
MCHC RBC-ENTMCNC: 31.4 GM/DL — LOW (ref 32–36)
MCV RBC AUTO: 90 FL — SIGNIFICANT CHANGE UP (ref 80–100)
MONOCYTES # BLD AUTO: 0.83 K/UL — SIGNIFICANT CHANGE UP (ref 0–0.9)
MONOCYTES NFR BLD AUTO: 12.5 % — SIGNIFICANT CHANGE UP (ref 2–14)
NEUTROPHILS # BLD AUTO: 5.15 K/UL — SIGNIFICANT CHANGE UP (ref 1.8–7.4)
NEUTROPHILS NFR BLD AUTO: 77.5 % — HIGH (ref 43–77)
NITRITE UR-MCNC: NEGATIVE — SIGNIFICANT CHANGE UP
NRBC # BLD: 0 /100 WBCS — SIGNIFICANT CHANGE UP (ref 0–0)
PH UR: 8 — SIGNIFICANT CHANGE UP (ref 5–8)
PLATELET # BLD AUTO: 229 K/UL — SIGNIFICANT CHANGE UP (ref 150–400)
POTASSIUM SERPL-MCNC: 5.5 MMOL/L — HIGH (ref 3.5–5.3)
POTASSIUM SERPL-SCNC: 5.5 MMOL/L — HIGH (ref 3.5–5.3)
PROT SERPL-MCNC: 5.9 G/DL — LOW (ref 6–8.3)
PROT UR-MCNC: 30 MG/DL
PROTHROM AB SERPL-ACNC: 12.2 SEC — SIGNIFICANT CHANGE UP (ref 10.5–13.4)
RAPID RVP RESULT: DETECTED
RBC # BLD: 3.61 M/UL — LOW (ref 3.8–5.2)
RBC # FLD: 12.8 % — SIGNIFICANT CHANGE UP (ref 10.3–14.5)
SARS-COV-2 RNA SPEC QL NAA+PROBE: DETECTED
SODIUM SERPL-SCNC: 132 MMOL/L — LOW (ref 135–145)
SP GR SPEC: 1.01 — SIGNIFICANT CHANGE UP (ref 1.01–1.02)
UROBILINOGEN FLD QL: NEGATIVE — SIGNIFICANT CHANGE UP
WBC # BLD: 6.64 K/UL — SIGNIFICANT CHANGE UP (ref 3.8–10.5)
WBC # FLD AUTO: 6.64 K/UL — SIGNIFICANT CHANGE UP (ref 3.8–10.5)

## 2022-07-05 PROCEDURE — 74176 CT ABD & PELVIS W/O CONTRAST: CPT | Mod: 26,MA

## 2022-07-05 PROCEDURE — 93970 EXTREMITY STUDY: CPT | Mod: 26

## 2022-07-05 PROCEDURE — 71250 CT THORAX DX C-: CPT | Mod: 26,MA

## 2022-07-05 PROCEDURE — 71045 X-RAY EXAM CHEST 1 VIEW: CPT | Mod: 26

## 2022-07-05 PROCEDURE — 99285 EMERGENCY DEPT VISIT HI MDM: CPT | Mod: FS,CS

## 2022-07-05 PROCEDURE — 76705 ECHO EXAM OF ABDOMEN: CPT | Mod: 26

## 2022-07-05 PROCEDURE — 93010 ELECTROCARDIOGRAM REPORT: CPT

## 2022-07-05 PROCEDURE — 70450 CT HEAD/BRAIN W/O DYE: CPT | Mod: 26,MA

## 2022-07-05 RX ORDER — SERTRALINE 25 MG/1
1 TABLET, FILM COATED ORAL
Qty: 0 | Refills: 0 | DISCHARGE

## 2022-07-05 RX ORDER — ASPIRIN/CALCIUM CARB/MAGNESIUM 324 MG
81 TABLET ORAL DAILY
Refills: 0 | Status: DISCONTINUED | OUTPATIENT
Start: 2022-07-05 | End: 2022-07-15

## 2022-07-05 RX ORDER — ONDANSETRON 8 MG/1
4 TABLET, FILM COATED ORAL EVERY 8 HOURS
Refills: 0 | Status: DISCONTINUED | OUTPATIENT
Start: 2022-07-05 | End: 2022-07-15

## 2022-07-05 RX ORDER — AMITRIPTYLINE HCL 25 MG
30 TABLET ORAL AT BEDTIME
Refills: 0 | Status: DISCONTINUED | OUTPATIENT
Start: 2022-07-05 | End: 2022-07-15

## 2022-07-05 RX ORDER — LANOLIN ALCOHOL/MO/W.PET/CERES
3 CREAM (GRAM) TOPICAL AT BEDTIME
Refills: 0 | Status: DISCONTINUED | OUTPATIENT
Start: 2022-07-05 | End: 2022-07-15

## 2022-07-05 RX ORDER — MULTIVIT-MIN/FERROUS GLUCONATE 9 MG/15 ML
0 LIQUID (ML) ORAL
Qty: 0 | Refills: 0 | DISCHARGE

## 2022-07-05 RX ORDER — ERGOCALCIFEROL 1.25 MG/1
1 CAPSULE ORAL
Qty: 0 | Refills: 0 | DISCHARGE

## 2022-07-05 RX ORDER — SENNA PLUS 8.6 MG/1
2 TABLET ORAL AT BEDTIME
Refills: 0 | Status: DISCONTINUED | OUTPATIENT
Start: 2022-07-05 | End: 2022-07-15

## 2022-07-05 RX ORDER — FERROUS SULFATE 325(65) MG
325 TABLET ORAL DAILY
Refills: 0 | Status: DISCONTINUED | OUTPATIENT
Start: 2022-07-05 | End: 2022-07-15

## 2022-07-05 RX ORDER — HYDROMORPHONE HYDROCHLORIDE 2 MG/ML
0.5 INJECTION INTRAMUSCULAR; INTRAVENOUS; SUBCUTANEOUS ONCE
Refills: 0 | Status: DISCONTINUED | OUTPATIENT
Start: 2022-07-05 | End: 2022-07-05

## 2022-07-05 RX ORDER — HYDROMORPHONE HYDROCHLORIDE 2 MG/ML
4 INJECTION INTRAMUSCULAR; INTRAVENOUS; SUBCUTANEOUS
Refills: 0 | Status: DISCONTINUED | OUTPATIENT
Start: 2022-07-05 | End: 2022-07-07

## 2022-07-05 RX ORDER — OMEPRAZOLE 10 MG/1
1 CAPSULE, DELAYED RELEASE ORAL
Qty: 0 | Refills: 0 | DISCHARGE

## 2022-07-05 RX ORDER — ACETAMINOPHEN 500 MG
650 TABLET ORAL EVERY 6 HOURS
Refills: 0 | Status: DISCONTINUED | OUTPATIENT
Start: 2022-07-05 | End: 2022-07-15

## 2022-07-05 RX ORDER — POLYETHYLENE GLYCOL 3350 17 G/17G
17 POWDER, FOR SOLUTION ORAL DAILY
Refills: 0 | Status: DISCONTINUED | OUTPATIENT
Start: 2022-07-05 | End: 2022-07-15

## 2022-07-05 RX ORDER — PIPERACILLIN AND TAZOBACTAM 4; .5 G/20ML; G/20ML
3.38 INJECTION, POWDER, LYOPHILIZED, FOR SOLUTION INTRAVENOUS ONCE
Refills: 0 | Status: COMPLETED | OUTPATIENT
Start: 2022-07-05 | End: 2022-07-05

## 2022-07-05 RX ORDER — SODIUM CHLORIDE 9 MG/ML
500 INJECTION INTRAMUSCULAR; INTRAVENOUS; SUBCUTANEOUS ONCE
Refills: 0 | Status: COMPLETED | OUTPATIENT
Start: 2022-07-05 | End: 2022-07-05

## 2022-07-05 RX ORDER — PIPERACILLIN AND TAZOBACTAM 4; .5 G/20ML; G/20ML
3.38 INJECTION, POWDER, LYOPHILIZED, FOR SOLUTION INTRAVENOUS EVERY 12 HOURS
Refills: 0 | Status: DISCONTINUED | OUTPATIENT
Start: 2022-07-06 | End: 2022-07-06

## 2022-07-05 RX ORDER — SODIUM CHLORIDE 9 MG/ML
1000 INJECTION INTRAMUSCULAR; INTRAVENOUS; SUBCUTANEOUS
Refills: 0 | Status: DISCONTINUED | OUTPATIENT
Start: 2022-07-05 | End: 2022-07-06

## 2022-07-05 RX ORDER — FUROSEMIDE 40 MG
40 TABLET ORAL ONCE
Refills: 0 | Status: COMPLETED | OUTPATIENT
Start: 2022-07-05 | End: 2022-07-05

## 2022-07-05 RX ORDER — MULTIVIT-MIN/FERROUS GLUCONATE 9 MG/15 ML
1 LIQUID (ML) ORAL
Qty: 0 | Refills: 0 | DISCHARGE

## 2022-07-05 RX ORDER — METOPROLOL TARTRATE 50 MG
25 TABLET ORAL DAILY
Refills: 0 | Status: DISCONTINUED | OUTPATIENT
Start: 2022-07-05 | End: 2022-07-11

## 2022-07-05 RX ORDER — ATORVASTATIN CALCIUM 80 MG/1
40 TABLET, FILM COATED ORAL AT BEDTIME
Refills: 0 | Status: DISCONTINUED | OUTPATIENT
Start: 2022-07-05 | End: 2022-07-15

## 2022-07-05 RX ADMIN — SODIUM CHLORIDE 75 MILLILITER(S): 9 INJECTION INTRAMUSCULAR; INTRAVENOUS; SUBCUTANEOUS at 23:54

## 2022-07-05 RX ADMIN — SODIUM CHLORIDE 500 MILLILITER(S): 9 INJECTION INTRAMUSCULAR; INTRAVENOUS; SUBCUTANEOUS at 22:55

## 2022-07-05 RX ADMIN — HYDROMORPHONE HYDROCHLORIDE 0.5 MILLIGRAM(S): 2 INJECTION INTRAMUSCULAR; INTRAVENOUS; SUBCUTANEOUS at 21:51

## 2022-07-05 RX ADMIN — HYDROMORPHONE HYDROCHLORIDE 0.5 MILLIGRAM(S): 2 INJECTION INTRAMUSCULAR; INTRAVENOUS; SUBCUTANEOUS at 22:09

## 2022-07-05 RX ADMIN — ONDANSETRON 4 MILLIGRAM(S): 8 TABLET, FILM COATED ORAL at 23:54

## 2022-07-05 RX ADMIN — PIPERACILLIN AND TAZOBACTAM 200 GRAM(S): 4; .5 INJECTION, POWDER, LYOPHILIZED, FOR SOLUTION INTRAVENOUS at 21:51

## 2022-07-05 RX ADMIN — PIPERACILLIN AND TAZOBACTAM 3.38 GRAM(S): 4; .5 INJECTION, POWDER, LYOPHILIZED, FOR SOLUTION INTRAVENOUS at 22:15

## 2022-07-05 RX ADMIN — Medication 40 MILLIGRAM(S): at 22:49

## 2022-07-05 RX ADMIN — SODIUM CHLORIDE 500 MILLILITER(S): 9 INJECTION INTRAMUSCULAR; INTRAVENOUS; SUBCUTANEOUS at 21:51

## 2022-07-05 NOTE — H&P ADULT - CARDIOVASCULAR
regular rate and rhythm/murmur/peripheral edema details… regular rate and rhythm/S1 S2 present/no JVD/murmur/peripheral edema

## 2022-07-05 NOTE — H&P ADULT - PROBLEM SELECTOR PLAN 1
Patient with nausea x 4 days associated with dec PO intake, epigastric pain ~2 weeks ago resolved  - CT abd/pel: Cholelithiasis with distended gallbladder and right upper quadrant inflammatory changes, findings suspicious for acute cholecystitis.  - RUQ US: Cholelithiasis with nonspecific pericholecystic fluid. Negative sonographic Scott sign. Dilated common bowel duct.  - recent admission to [facility] 2/[facility]- 3/2 for gallstone pancreatitis with non-operative treatment   - no leukocytosis, lipase wnl, afebrile  - NPO, IVF, IV zofran prn, plan for HIDA in an as per surgery   - surgery consulted (Dr. Rodrigues), recs appreciated Patient with nausea x 4 days associated with dec PO intake, epigastric pain ~2 weeks ago resolved  - CT abd/pel: Cholelithiasis with distended gallbladder and right upper quadrant inflammatory changes, findings suspicious for acute cholecystitis.  - RUQ US: Cholelithiasis with nonspecific pericholecystic fluid. Negative sonographic Scott sign. Dilated common bile duct.  - recent admission to [facility] 2/25- 3/2 for gallstone pancreatitis with non-operative treatment   - no leukocytosis, lipase wnl, afebrile  - NPO, IVF, IV Zofran prn, plan for HIDA in an as per surgery   - surgery consulted (Dr. Rodrigues), recs appreciated Patient with nausea x 4 days associated with dec PO intake, epigastric pain ~2 weeks ago resolved  - CT abd/pel: Cholelithiasis with distended gallbladder and right upper quadrant inflammatory changes, findings suspicious for acute cholecystitis.  - RUQ US: Cholelithiasis with nonspecific pericholecystic fluid. Negative sonographic Scott sign. Dilated common bile duct.  - recent admission to [facility] 2/25- 3/2 for gallstone pancreatitis with non-operative treatment   - no leukocytosis, lipase wnl, afebrile  - NPO, hold IVF for now given overload, IV Zofran prn, plan for HIDA in an as per surgery   - surgery consulted (Dr. Rodrigues), recs appreciated

## 2022-07-05 NOTE — ED ADULT NURSE NOTE - ABDOMEN
Go for blood tests as directed. Your doctor will do lab tests at regular visits to monitor the effects of this medicine. Please follow up with your doctor and keep your health care provider appointments.
soft/nontender

## 2022-07-05 NOTE — H&P ADULT - PROBLEM SELECTOR PLAN 2
BUN 30, Cr 2 on admission  - on last admission Cr fluctuated between .87-1.3  - KERRY likely 2/2 dec PO intake  - continue IVF and f/u am metabolic panel  - nephrology consulted Dr. Wu f/u recs BUN 30, Cr 2 on admission  - on last admission Cr fluctuated between .87-1.3  - KERRY likely 2/2 dec PO intake, Pre Renal   - continue IVF and f/u am metabolic panel  -Hold nephrotoxic meds   - nephrology consulted Dr. Wu f/u recs BUN 30, Cr 2 on admission  - on last admission Cr fluctuated between .87-1.3  - KERRY likely 2/2 dec PO intake, Pre Renal   - unknown cardiac function and patient with edematous legs on exam  - will hold off on further IVF for now  - Hold nephrotoxic meds   - nephrology consulted Dr. Wu f/u recs BUN 30, Cr 2 on admission  - on last admission Cr fluctuated between .87-1.3  - KERRY likely 2/2 dec PO intake, Pre Renal   - unknown cardiac function and patient with edematous legs on exam  - will hold off on further IVF for now given overload  - Hold nephrotoxic meds   - nephrology consulted Dr. Wu f/u recs

## 2022-07-05 NOTE — ED PROVIDER NOTE - NS ED ATTENDING STATEMENT MOD
This was a shared visit with the AZALEA. I reviewed and verified the documentation and independently performed the documented:

## 2022-07-05 NOTE — ED ADULT NURSE NOTE - NSICDXPASTSURGICALHX_GEN_ALL_CORE_FT
Quality 226: Preventive Care And Screening: Tobacco Use: Screening And Cessation Intervention: Patient screened for tobacco and is a smoker AND received Cessation Counseling Detail Level: Generalized Quality 110: Preventive Care And Screening: Influenza Immunization: Influenza Immunization previously received during influenza season PAST SURGICAL HISTORY:  History of laminectomy     S/P appendectomy     S/P hip hemiarthroplasty

## 2022-07-05 NOTE — H&P ADULT - GASTROINTESTINAL
normal/soft/nontender/nondistended details… Hernia +/normal/soft/nontender/nondistended/normal active bowel sounds/no organomegaly/no masses palpable

## 2022-07-05 NOTE — ED ADULT NURSE NOTE - OBJECTIVE STATEMENT
Patient A/Ox4 with clear speech. Daughter at bedside. Daughter states that the patient has recently had multiple falls, chronic swelling increasing to right knee and leg, nausea, soft stool and decreased appetite. Was previously in hospital this year with pancreatitis and gall stones. Patient denies pain, dizziness, CP, SOB. IV and labs done. EKG done. External catheter placed, urine collected. Call light in reach. NAD noted at current. Will continue to monitor.

## 2022-07-05 NOTE — ED PROVIDER NOTE - PHYSICAL EXAMINATION
Constitutional: Awake, Alert, non-toxic. NAD  HEAD: Normocephalic, atraumatic.   EYES: PERRL, EOM intact, conjunctiva and sclera are clear bilaterally. No raccoon eyes.   ENT: No rhinorrhea, normal pharynx, patent, no tonsillar exudate or enlargement, mucous membranes pink/moist, no erythema, no drooling or stridor.   NECK: Supple, non-tender  BACK: No midline or paraspinal TTP of cervical/thoracic/lumbar spine, FROM. No ecchymosis or hematomas.   CARDIOVASCULAR: Normal S1, S2; regular rate and rhythm.  RESPIRATORY: Normal respiratory effort; breath sounds CTAB, no wheezes, rhonchi, or rales. Speaking in full sentences. No accessory muscle use.   ABDOMEN: Soft; non-tender, non-distended.   EXTREMITIES: Full passive and active ROM in all extremities; non-tender to palpation; distal pulses palpable and symmetric, no edema, no crepitus or step off, (+) B/L pitting edema R>L   SKIN: Warm, dry; good skin turgor, (+) right elbow scabbed abrasion, no ecchymosis, brisk capillary refill.  NEURO: A&O x3. Sensory and motor functions are grossly intact. Speech is normal. Appearance and judgement seem appropriate for gender and age. No neurological deficits. Neurovascular sensation intact motor function 5/5 of upper and lower extremities, CN II-XII grossly intact, no ataxia, absent pronator drift, intact cerebellar function. Speech clear, without articulation or word-finding difficulties. Eyes- PERRL bilaterally. EOMs in tact. No nystagmus. No facial droop.

## 2022-07-05 NOTE — ED PROVIDER NOTE - OBJECTIVE STATEMENT
89 y/o female with PMHx HLD and DM presents today for multiple medical complaints. Pt daughter reports patient has been nauseous and not eating well. reports follow up with Dr. Campos in which was given Zofran without relief. Reports patient fell on Saturday in which sustained right elbow abrasion and head injury. Reports leg swelling recently increased and concern for dehydration. pt denies cp, sob, headache, vomiting, abd pain, dysuria, hematuria, cough, or any other complaints.

## 2022-07-05 NOTE — H&P ADULT - ATTENDING COMMENTS
89y F pmhx CAD x 2 stents (2012), T2DM, HTN, HLD, GERD, mild AS, chronic venous insufficiency, chronic neuropathy, macular degeneration, chronic constipation, depression, OA, lumbar degenerative disc disease, and spinal stenosis admitted for cholelithiasis, KERRY, and found to be COVID positive on admission.     Pt seen, Examined, case & care plan d/w pt, residents at detail. 89y F pmhx CAD x 2 stents (2012), T2DM, HTN, HLD, GERD, mild AS, chronic venous insufficiency, chronic neuropathy, macular degeneration, chronic constipation, depression, OA, lumbar degenerative disc disease, and spinal stenosis admitted for cholelithiasis, KERRY, and found to be COVID positive on admission.     Pt seen, Examined, case & care plan d/w pt, residents at detail.  Case & Care plan d/w  Ronnell PA & Dtr Gardenia in ER   Consults:  GI-DR Montilla  -Renal-DR Garcia/Dr DEENA Hernandez   Pulmonary-DR Melo  ID-DR Nash   Cardiology-Dr Warren   Palliative care Eval.  PT Eval  Nutrition eval  Fall Precaution  AM labs   NPO 89y F pmhx CAD x 2 stents (2012), T2DM, HTN, HLD, GERD, mild AS, chronic venous insufficiency, chronic neuropathy, macular degeneration, chronic constipation, depression, OA, lumbar degenerative disc disease, and spinal stenosis admitted for cholelithiasis, KERRY, and found to be COVID positive on admission.     Pt seen, Examined, case & care plan d/w pt, residents at detail.  Case & Care plan d/w  Surgery PA & Dtr -Mary in ER   Consults:  GI-DR Montilla  -Renal-DR Garcia/Dr DEENA Hernandez   Pulmonary-DR Melo  ID-DR Nash   Cardiology-Dr Warren   Palliative care Eval.  PT Eval  Nutrition eval  Fall Precaution  AM labs   NPO

## 2022-07-05 NOTE — CONSULT NOTE ADULT - ASSESSMENT
89 YO Female w/ PMHx of CAD s/p stents (2012), DMII, HTN, HLD, GERD, mild AS, chronic venous insufficiency, neuropathy, macular degeneration, chronic constipation, depression, OA, lumbar degenerative disc disease, spinal stenosis p/w nausea & poor PO intake x 2 weeks.  89 YO Female w/ PMHx of CAD s/p stents (2012), DMII, HTN, HLD, GERD, mild AS, chronic venous insufficiency, neuropathy, macular degeneration, chronic constipation, depression, OA, lumbar degenerative disc disease, spinal stenosis p/w nausea & poor PO intake x 2 weeks, CT revealed cholelithiasis w/ distended GB, RUQ U/S revealed distended GB w/ stones/sludge. LFTs and WBC nml. Pt COVID+

## 2022-07-05 NOTE — H&P ADULT - PROBLEM SELECTOR PLAN 3
K 5.5 on admission  - no prior hx of hyperkalemia  - possibly 2/2 dec PO intake  - continue with IVF NS and f/u am CMP  - nephro Dr. Wu consulted f/u recs CT Chest: Moderate-sized bilateral pleural effusion with partial bilateral lower lobe atelectasis  - s/p IV Lasix 40mg x 1 in ED  - patient saturating well on room air and denies sob  - will hold off on further diuretics for now   - pulmonology (Dr. Melo) consulted f/u recs

## 2022-07-05 NOTE — H&P ADULT - NEGATIVE GASTROINTESTINAL SYMPTOMS
no vomiting/no diarrhea/no abdominal pain no appetite/no vomiting/no diarrhea/no abdominal pain/no jaundice/no hiccoughs

## 2022-07-05 NOTE — CONSULT NOTE ADULT - SUBJECTIVE AND OBJECTIVE BOX
HPI: 89 YO Female w/ PMHx of CAD s/p stents (), DMII, HTN, HLD, GERD, mild AS, chronic venous insufficiency, neuropathy, macular degeneration, chronic constipation, depression, OA, lumbar degenerative disc disease, spinal stenosis p/w nausea & poor PO intake x 2 weeks. Pt is poor historian, most of history obtained from daughter at bedside. As per daughter, pt has been c/o nausea, dry heaving and decreased appetite for the past 2 weeks. The patient's daughter has noticed that the pt has been losing weight as a result of her poor PO intake. Pt seen by Dr. Young last week and has been medically managed with Zofran & Carafate for her symptoms with minimal relief. Pt was recommended to have an esophagram, but pt was unable to drink the amount of fluid required to complete this exam. Of note, pt was admitted to [facility] from 22- 3/2/22 and was seen by surgery & GI for gallstone pancreatitis. Pt denies fever, chills, chest pain, SOB, abdominal pain, diarrhea, constipation, hematochezia or hematemesis.     PAST MEDICAL & SURGICAL HISTORY:  H/O vertebral fracture repair    History of vertebral fracture    Dyslipidemia    DM type 2 with diabetic dyslipidemia    H/O gastroesophageal reflux (GERD)    Costochondritis    Coronary arteriosclerosis in native artery  s/p 2 stents. last placed 2 years ago    Gastroparesis    History of laminectomy    S/P hip hemiarthroplasty    S/P appendectomy    CONSTITUTIONAL: No weakness, fevers or chills  EYES/ENT: No visual changes;  No vertigo or throat pain   NECK: No pain or stiffness  RESPIRATORY: No cough, wheezing, hemoptysis; No shortness of breath  CARDIOVASCULAR: No chest pain or palpitations  GASTROINTESTINAL: See HPI  GENITOURINARY: No dysuria, frequency or hematuria  NEUROLOGICAL: No numbness or weakness  SKIN: No itching, burning, rashes, or lesions   All other review of systems is negative unless indicated above.    MEDICATIONS  (STANDING):  furosemide   Injectable 40 milliGRAM(s) IV Push Once    MEDICATIONS  (PRN):    Allergies    morphine (Other)    Intolerances    FAMILY HISTORY:  FHx: stroke (Mother)    Vital Signs Last 24 Hrs  T(C): 37.3 (2022 21:21), Max: 37.3 (2022 21:21)  T(F): 99.2 (2022 21:21), Max: 99.2 (2022 21:21)  HR: 85 (2022 21:21) (82 - 85)  BP: 143/66 (2022 21:21) (105/57 - 143/66)  BP(mean): --  RR: 18 (2022 21:21) (16 - 18)  SpO2: 95% (2022 21:21) (94% - 95%)    GENERAL:  Well-nourished, well-developed Female lying comfortably in bed in NAD.  HEENT:  NC/AT. Sclera white. Mucous membranes moist.  ABDOMEN:  Soft, nondistended, mild ttp in RLQ. Soft, reducible umbilical hernia palpated  NEURO:  A&O x 3    LABS:                        10.2   6.64  )-----------( 229      ( 2022 19:05 )             32.5     07-05    132<L>  |  99  |  30<H>  ----------------------------<  97  5.5<H>   |  26  |  2.00<H>    Ca    8.9      2022 19:05    TPro  5.9<L>  /  Alb  2.7<L>  /  TBili  0.3  /  DBili  x   /  AST  30  /  ALT  10<L>  /  AlkPhos  67  07-05    PT/INR - ( 2022 19:05 )   PT: 12.2 sec;   INR: 1.04 ratio      PTT - ( 2022 19:05 )  PTT:28.6 sec  Urinalysis Basic - ( 2022 19:00 )    Color: Yellow / Appearance: Clear / S.010 / pH: x  Gluc: x / Ketone: Negative  / Bili: Negative / Urobili: Negative   Blood: x / Protein: 30 mg/dL / Nitrite: Negative   Leuk Esterase: Small / RBC: 25-50 /HPF / WBC 3-5   Sq Epi: x / Non Sq Epi: Few / Bacteria: Few    RADIOLOGY & ADDITIONAL STUDIES:  < from: CT Chest No Cont (22 @ 19:46) >  FINDINGS:    CHEST:    LUNGS AND LARGE AIRWAYS:  PLEURA:  There are moderate-sized bilateral pleural effusions with partial   atelectasis bilateral lower lobes.    The central airways are patent.    VESSELS: Atherosclerotic changes thoracic aorta with coronary artery   calcification.    HEART: Heart size is normal. Dense mitral calcification.  No pericardial effusion.    MEDIASTINUM AND VAL:  No enlarged lymphadenopathy.    CHEST WALL AND LOWER NECK: Within normal limits.    ABDOMEN AND PELVIS:    Streak artifact degrades image quality.    The evaluation of the solid organ parenchyma is limited without   intravenous contrast.    LIVER: Within normal limits.  BILE DUCTS: Normal caliber.  GALLBLADDER: Mild gallbladder distention with cholelithiasis.  Right upper quadrant stranding.  SPLEEN: Within normal limits.  PANCREAS: Atrophic.  ADRENALS: Within normal limits.  KIDNEYS/URETERS:  Punctate nonobstructing intrarenal calcification upper pole left kidney.  Right-sided hydronephrosis.    BLADDER: Bladder wall thickening.  REPRODUCTIVE ORGANS: No pelvic mass.    BOWEL:  Evaluation of the stomach is limited without distention.  Prominent colonic fecal retentionwith mild distention of the colon; no   bowel obstruction.  Mild perirectal and presacral stranding.  Sigmoid diverticulosis, without CT evidence of diverticulitis.  PERITONEUM:  Small perihepatic ascites.    VESSELS: Within normal limits.  RETROPERITONEUM/LYMPH NODES: Atherosclerotic changes.    ABDOMINAL WALL:  Small fat-containing umbilical hernia.  Subcutaneous edema.    BONES:  Degenerative changes.  Vertebroplasty with compression deformities T11 and T12 vertebral bodies.  Chronic compression deformity L5 vertebral body.  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  Chronic deformity of the sternum.    IMPRESSION:    Moderate-sized bilateral pleural effusion with partial bilateral lower   lobe atelectasis.      Cholelithiasis with distended gallbladder and right upper quadrant   inflammatory changes, findings suspicious for acute cholecystitis.  Recommend further evaluation with right upper quadrant ultrasonography.    < from: US Abdomen Upper Quadrant Right (22 @ 21:10) >    FINDINGS:  Liver: Within normal limits.  Bile ducts: Normal caliber. Common bile duct measures 12 mm.  Gallbladder: Distended gallbladder containing stones and sludge.   Pericholecystic fluid is noted. Sonographic Scott sign is negative.  Pancreas: Visualized portions are within normal limits.  Right kidney: 9.8 cm. Mild hydronephrosis..  Ascites: None.  IVC: Visualized portions are within normal limits.  Miscellaneous: Right pleural effusion noted.    IMPRESSION:  Cholelithiasis with nonspecific pericholecystic fluid. Negative   sonographic Scott sign.    Dilated common bowel duct. Recommend correlating with LFTs. Cannot   exclude distal choledocholithiasis.       HPI: 89 YO Female w/ PMHx of CAD s/p stents (), DMII, HTN, HLD, GERD, mild AS, chronic venous insufficiency, neuropathy, macular degeneration, chronic constipation, depression, OA, lumbar degenerative disc disease, spinal stenosis p/w nausea & poor PO intake x 2 weeks. Pt is poor historian, most of history obtained from daughter at bedside. As per daughter, pt has been c/o nausea, dry heaving and decreased appetite for the past 2 weeks. The patient's daughter has noticed that the pt has been losing weight as a result of her poor PO intake. Pt seen by Dr. Young last week and has been medically managed with Zofran & Carafate for her symptoms with minimal relief. Pt was recommended to have an esophagram, but pt was unable to drink the amount of fluid required to complete this exam. Of note, pt was admitted to [facility] from 22- 3/2/22 and was seen by surgery & GI for gallstone pancreatitis. Pt denies fever, chills, chest pain, SOB, abdominal pain, diarrhea, constipation, hematochezia or hematemesis.     PAST MEDICAL & SURGICAL HISTORY:  H/O vertebral fracture repair    History of vertebral fracture    Dyslipidemia    DM type 2 with diabetic dyslipidemia    H/O gastroesophageal reflux (GERD)    Costochondritis    Coronary arteriosclerosis in native artery  s/p 2 stents. last placed 2 years ago    Gastroparesis    History of laminectomy    S/P hip hemiarthroplasty    S/P appendectomy    CONSTITUTIONAL: No weakness, fevers or chills  EYES/ENT: No visual changes;  No vertigo or throat pain   NECK: No pain or stiffness  RESPIRATORY: No cough, wheezing, hemoptysis; No shortness of breath  CARDIOVASCULAR: No chest pain or palpitations  GASTROINTESTINAL: See HPI  GENITOURINARY: No dysuria, frequency or hematuria  NEUROLOGICAL: No numbness or weakness  SKIN: No itching, burning, rashes, or lesions   All other review of systems is negative unless indicated above.    MEDICATIONS  (STANDING):  furosemide   Injectable 40 milliGRAM(s) IV Push Once    MEDICATIONS  (PRN):    Allergies    morphine (Other)    Intolerances    FAMILY HISTORY:  FHx: stroke (Mother)    Vital Signs Last 24 Hrs  T(C): 37.3 (2022 21:21), Max: 37.3 (2022 21:21)  T(F): 99.2 (2022 21:21), Max: 99.2 (2022 21:21)  HR: 85 (2022 21:21) (82 - 85)  BP: 143/66 (2022 21:21) (105/57 - 143/66)  BP(mean): --  RR: 18 (2022 21:21) (16 - 18)  SpO2: 95% (2022 21:21) (94% - 95%)    GENERAL:  Well-nourished, well-developed Female lying comfortably in bed in NAD.  HEENT:  NC/AT. Sclera white. Mucous membranes moist.  ABDOMEN:  Soft, nondistended, mild ttp in RLQ. Soft, reducible umbilical hernia palpated  NEURO:  A&O x 3      LABS:                        10.2   6.64  )-----------( 229      ( 2022 19:05 )             32.5     07-05    132<L>  |  99  |  30<H>  ----------------------------<  97  5.5<H>   |  26  |  2.00<H>    Ca    8.9      2022 19:05    TPro  5.9<L>  /  Alb  2.7<L>  /  TBili  0.3  /  DBili  x   /  AST  30  /  ALT  10<L>  /  AlkPhos  67  07-05    PT/INR - ( 2022 19:05 )   PT: 12.2 sec;   INR: 1.04 ratio      PTT - ( 2022 19:05 )  PTT:28.6 sec  Urinalysis Basic - ( 2022 19:00 )    Color: Yellow / Appearance: Clear / S.010 / pH: x  Gluc: x / Ketone: Negative  / Bili: Negative / Urobili: Negative   Blood: x / Protein: 30 mg/dL / Nitrite: Negative   Leuk Esterase: Small / RBC: 25-50 /HPF / WBC 3-5   Sq Epi: x / Non Sq Epi: Few / Bacteria: Few    RADIOLOGY & ADDITIONAL STUDIES:  < from: CT Chest No Cont (22 @ 19:46) >  FINDINGS:    CHEST:    LUNGS AND LARGE AIRWAYS:  PLEURA:  There are moderate-sized bilateral pleural effusions with partial   atelectasis bilateral lower lobes.    The central airways are patent.    VESSELS: Atherosclerotic changes thoracic aorta with coronary artery   calcification.    HEART: Heart size is normal. Dense mitral calcification.  No pericardial effusion.    MEDIASTINUM AND VAL:  No enlarged lymphadenopathy.    CHEST WALL AND LOWER NECK: Within normal limits.    ABDOMEN AND PELVIS:    Streak artifact degrades image quality.    The evaluation of the solid organ parenchyma is limited without   intravenous contrast.    LIVER: Within normal limits.  BILE DUCTS: Normal caliber.  GALLBLADDER: Mild gallbladder distention with cholelithiasis.  Right upper quadrant stranding.  SPLEEN: Within normal limits.  PANCREAS: Atrophic.  ADRENALS: Within normal limits.  KIDNEYS/URETERS:  Punctate nonobstructing intrarenal calcification upper pole left kidney.  Right-sided hydronephrosis.    BLADDER: Bladder wall thickening.  REPRODUCTIVE ORGANS: No pelvic mass.    BOWEL:  Evaluation of the stomach is limited without distention.  Prominent colonic fecal retentionwith mild distention of the colon; no   bowel obstruction.  Mild perirectal and presacral stranding.  Sigmoid diverticulosis, without CT evidence of diverticulitis.  PERITONEUM:  Small perihepatic ascites.    VESSELS: Within normal limits.  RETROPERITONEUM/LYMPH NODES: Atherosclerotic changes.    ABDOMINAL WALL:  Small fat-containing umbilical hernia.  Subcutaneous edema.    BONES:  Degenerative changes.  Vertebroplasty with compression deformities T11 and T12 vertebral bodies.  Chronic compression deformity L5 vertebral body.  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  Chronic deformity of the sternum.    IMPRESSION:    Moderate-sized bilateral pleural effusion with partial bilateral lower   lobe atelectasis.      Cholelithiasis with distended gallbladder and right upper quadrant   inflammatory changes, findings suspicious for acute cholecystitis.  Recommend further evaluation with right upper quadrant ultrasonography.    < from: US Abdomen Upper Quadrant Right (22 @ 21:10) >    FINDINGS:  Liver: Within normal limits.  Bile ducts: Normal caliber. Common bile duct measures 12 mm.  Gallbladder: Distended gallbladder containing stones and sludge.   Pericholecystic fluid is noted. Sonographic Scott sign is negative.  Pancreas: Visualized portions are within normal limits.  Right kidney: 9.8 cm. Mild hydronephrosis..  Ascites: None.  IVC: Visualized portions are within normal limits.  Miscellaneous: Right pleural effusion noted.    IMPRESSION:  Cholelithiasis with nonspecific pericholecystic fluid. Negative   sonographic Scott sign.    Dilated common bowel duct. Recommend correlating with LFTs. Cannot   exclude distal choledocholithiasis.       HPI: 89 YO Female w/ PMHx of CAD s/p stents (), DMII, HTN, HLD, GERD, mild AS, chronic venous insufficiency, neuropathy, macular degeneration, chronic constipation, depression, OA, lumbar degenerative disc disease, spinal stenosis p/w nausea & poor PO intake x 2 weeks. Pt is poor historian, most of history obtained from daughter at bedside. As per daughter, pt has been c/o nausea, dry heaving and decreased appetite for the past 2 weeks. The patient's daughter has noticed that the pt has been losing weight as a result of her poor PO intake. Pt seen by Dr. Young last week and has been medically managed with Zofran & Carafate for her symptoms with minimal relief. Pt was recommended to have an esophagram, but pt was unable to drink the amount of fluid required to complete this exam. Of note, pt was admitted to [facility] from 22- 3/2/22 and was seen by surgery & GI for gallstone pancreatitis, treated medically. Pt denies fever, chills, chest pain, SOB, abdominal pain, diarrhea, constipation, hematochezia or hematemesis.     PAST MEDICAL & SURGICAL HISTORY:  H/O vertebral fracture repair    History of vertebral fracture    Dyslipidemia    DM type 2 with diabetic dyslipidemia    H/O gastroesophageal reflux (GERD)    Costochondritis    Coronary arteriosclerosis in native artery  s/p 2 stents. last placed 2 years ago    Gastroparesis    History of laminectomy    S/P hip hemiarthroplasty    S/P appendectomy    CONSTITUTIONAL: No weakness, fevers or chills  EYES/ENT: No visual changes;  No vertigo or throat pain   NECK: No pain or stiffness  RESPIRATORY: No cough, wheezing, hemoptysis; No shortness of breath  CARDIOVASCULAR: No chest pain or palpitations  GASTROINTESTINAL: See HPI  GENITOURINARY: No dysuria, frequency or hematuria  NEUROLOGICAL: No numbness or weakness  SKIN: No itching, burning, rashes, or lesions   All other review of systems is negative unless indicated above.    MEDICATIONS  (STANDING):  furosemide   Injectable 40 milliGRAM(s) IV Push Once    MEDICATIONS  (PRN):    Allergies    morphine (Other)    Intolerances    FAMILY HISTORY:  FHx: stroke (Mother)    Vital Signs Last 24 Hrs  T(C): 37.3 (2022 21:21), Max: 37.3 (2022 21:21)  T(F): 99.2 (2022 21:21), Max: 99.2 (2022 21:21)  HR: 85 (2022 21:21) (82 - 85)  BP: 143/66 (2022 21:21) (105/57 - 143/66)  BP(mean): --  RR: 18 (2022 21:21) (16 - 18)  SpO2: 95% (2022 21:21) (94% - 95%)    GENERAL:  Well-nourished, well-developed Female lying comfortably in bed in NAD.  HEENT:  NC/AT. Sclera white. Mucous membranes moist.  ABDOMEN:  Soft, nondistended, mild ttp in RLQ. Soft, reducible umbilical hernia palpated  NEURO:  A&O x 3      LABS:                        10.2   6.64  )-----------( 229      ( 2022 19:05 )             32.5     07-05    132<L>  |  99  |  30<H>  ----------------------------<  97  5.5<H>   |  26  |  2.00<H>    Ca    8.9      2022 19:05    TPro  5.9<L>  /  Alb  2.7<L>  /  TBili  0.3  /  DBili  x   /  AST  30  /  ALT  10<L>  /  AlkPhos  67  07-05    PT/INR - ( 2022 19:05 )   PT: 12.2 sec;   INR: 1.04 ratio      PTT - ( 2022 19:05 )  PTT:28.6 sec  Urinalysis Basic - ( 2022 19:00 )    Color: Yellow / Appearance: Clear / S.010 / pH: x  Gluc: x / Ketone: Negative  / Bili: Negative / Urobili: Negative   Blood: x / Protein: 30 mg/dL / Nitrite: Negative   Leuk Esterase: Small / RBC: 25-50 /HPF / WBC 3-5   Sq Epi: x / Non Sq Epi: Few / Bacteria: Few    RADIOLOGY & ADDITIONAL STUDIES:  < from: CT Chest No Cont (22 @ 19:46) >  FINDINGS:    CHEST:    LUNGS AND LARGE AIRWAYS:  PLEURA:  There are moderate-sized bilateral pleural effusions with partial   atelectasis bilateral lower lobes.    The central airways are patent.    VESSELS: Atherosclerotic changes thoracic aorta with coronary artery   calcification.    HEART: Heart size is normal. Dense mitral calcification.  No pericardial effusion.    MEDIASTINUM AND VAL:  No enlarged lymphadenopathy.    CHEST WALL AND LOWER NECK: Within normal limits.    ABDOMEN AND PELVIS:    Streak artifact degrades image quality.    The evaluation of the solid organ parenchyma is limited without   intravenous contrast.    LIVER: Within normal limits.  BILE DUCTS: Normal caliber.  GALLBLADDER: Mild gallbladder distention with cholelithiasis.  Right upper quadrant stranding.  SPLEEN: Within normal limits.  PANCREAS: Atrophic.  ADRENALS: Within normal limits.  KIDNEYS/URETERS:  Punctate nonobstructing intrarenal calcification upper pole left kidney.  Right-sided hydronephrosis.    BLADDER: Bladder wall thickening.  REPRODUCTIVE ORGANS: No pelvic mass.    BOWEL:  Evaluation of the stomach is limited without distention.  Prominent colonic fecal retentionwith mild distention of the colon; no   bowel obstruction.  Mild perirectal and presacral stranding.  Sigmoid diverticulosis, without CT evidence of diverticulitis.  PERITONEUM:  Small perihepatic ascites.    VESSELS: Within normal limits.  RETROPERITONEUM/LYMPH NODES: Atherosclerotic changes.    ABDOMINAL WALL:  Small fat-containing umbilical hernia.  Subcutaneous edema.    BONES:  Degenerative changes.  Vertebroplasty with compression deformities T11 and T12 vertebral bodies.  Chronic compression deformity L5 vertebral body.  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  Chronic deformity of the sternum.    IMPRESSION:    Moderate-sized bilateral pleural effusion with partial bilateral lower   lobe atelectasis.      Cholelithiasis with distended gallbladder and right upper quadrant   inflammatory changes, findings suspicious for acute cholecystitis.  Recommend further evaluation with right upper quadrant ultrasonography.    < from: US Abdomen Upper Quadrant Right (22 @ 21:10) >    FINDINGS:  Liver: Within normal limits.  Bile ducts: Normal caliber. Common bile duct measures 12 mm.  Gallbladder: Distended gallbladder containing stones and sludge.   Pericholecystic fluid is noted. Sonographic Scott sign is negative.  Pancreas: Visualized portions are within normal limits.  Right kidney: 9.8 cm. Mild hydronephrosis..  Ascites: None.  IVC: Visualized portions are within normal limits.  Miscellaneous: Right pleural effusion noted.    IMPRESSION:  Cholelithiasis with nonspecific pericholecystic fluid. Negative   sonographic Scott sign.    Dilated common bowel duct. Recommend correlating with LFTs. Cannot   exclude distal choledocholithiasis.

## 2022-07-05 NOTE — H&P ADULT - RESPIRATORY
no wheezes/no rales/no rhonchi/no respiratory distress/no use of accessory muscles/breath sounds equal/good air movement no wheezes/no rales/no rhonchi/no respiratory distress/no use of accessory muscles/breath sounds equal/good air movement/no intercostal retractions/diminished breath sounds, L/diminished breath sounds, R

## 2022-07-05 NOTE — H&P ADULT - PROBLEM SELECTOR PLAN 4
patient covid positive on admission  - saturating well on room air and no sx  - continue to monitor on continuous pulse ox  - contact precautions  - ID consulted, Dr. Nash f/u recs  - Daughter Mary [phone] states she does not want monoclonal antibodies for patient  - patient has moderna 3/3

## 2022-07-05 NOTE — ED PROVIDER NOTE - CLINICAL SUMMARY MEDICAL DECISION MAKING FREE TEXT BOX
89 y/o female with PMHx HLD and DM presents today for multiple medical complaints. Pt daughter reports patient has been nauseous and not eating well. reports follow up with Dr. Campos in which was given Zofran without relief. Reports patient fell on Saturday in which sustained right elbow abrasion and head injury. Reports leg swelling recently increased and concern for dehydration. pt denies cp, sob, headache, vomiting, abd pain, dysuria, hematuria, cough, or any other complaints.  pt on exam , frail, no distress,  mm dry, anicteric, lungs cta, abd, soft, nt, b/l 2+ edema, labs with cr 2.0,  imaging with ? cholecysititis,  covid +, admit med, surg /gi consults, abx

## 2022-07-05 NOTE — H&P ADULT - PROBLEM SELECTOR PLAN 8
A1c 6 in Feb 2022  - not on insulin at home  - low dose ISS   - f/u am A1c VSS  - continue home toprol VSS  - continue home Toprol

## 2022-07-05 NOTE — H&P ADULT - NSHPSOCIALHISTORY_GEN_ALL_CORE
Former cigarette smoker, smoked <1/2 ppd for less than 15 years, quit more than 50 years ago   Denies ETOH use   Denies drug use   Ambulates with a walker. Lives at home with daughter. ADLs with assistance.

## 2022-07-05 NOTE — H&P ADULT - PROBLEM SELECTOR PLAN 5
Patient with fall at home x 1 due to LE weakness  - likely 2/2 dec PO intake  - CT head negative  - no signs or sx of acute fx  - PT consulted  - fall precautions Hgb 10.2 on admission  - patient on PO iron daily  - has outpatient appt with Dr. Nicholas for chronic anemia  - no signs sx of acute bleed  - f/u am iron studies, b12, folate

## 2022-07-05 NOTE — H&P ADULT - HISTORY OF PRESENT ILLNESS
89y F pmhx CAD x 2 stents (2012), T2DM, HTN, HLD, GERD, mild AS, chronic venous insufficiency, chronic neuropathy, macular degeneration, chronic constipation, depression, OA, and recent admission to [facility]"" 2/25- 3/2 for gallstone pancreatitis with non-operative treatment presented to the ED with constant nausea x 4 days associated with decreased PO intake, generalized weakness, and fall today x 1 due to LE weakness. Patient denies fever, chills, cp, dizziness, sob, abd pain, vomiting, diarrhea. Patient was seen here in ED 6/20 for epigastric pain (since resolved) and discharged with outpatient follow up with Dr. Terrell. Patient has seen Dr. Terrell twice since then and epigastric pain resolved s/p PO zofran and carafate with planned esophagram. Daughter states epigastric pain resolved, but patient has had constant nausea x 4 days that has not improved despite increasing PO zofran from 4mg to 8mg q6h. Patient was unable to obtain appointment with Dr. Terrell today and after falling at home due to leg weakness, daughter brought her to the ED.     ED Course  Vitals: 99.2F, 85bpm, 143/66, 95% RA  Labs: Hgb 10.2, Hct 32.5, Na 132, K 5.5, BUN 30, Cr 2, GFR 23, lipase wnl  EKG:    CT Chest and abd/pel: Moderate-sized bilateral pleural effusion with partial bilateral lower lobe atelectasis. Cholelithiasis with distended gallbladder and right upper quadrant inflammatory changes, findings suspicious for acute cholecystitis.    RUQ US: Cholelithiasis with nonspecific pericholecystic fluid. Negative sonographic Scott sign. Dilated common bowel duct. Recommend correlating with LFTs. Cannot exclude distal choledocholithiasis.    HEAD CT: Mild volume loss, microvascular disease, no acute hemorrhage or midline shift.    Patient Received: Zosyn x1, NS 500cc x1, Lasix 40mg IV x1, IV dilaudid .5mg x1

## 2022-07-05 NOTE — H&P ADULT - PROBLEM SELECTOR PLAN 6
S/p 2 stents 2012   - continue home aspirin and statin Patient with fall at home x 1 due to LE weakness  - likely 2/2 dec PO intake  - CT head negative  - no signs or sx of acute fx  - PT consulted  - fall precautions Patient with fall at home x 1 due to LE weakness  - likely 2/2 dec PO intake  - CT head negative  - no signs or sx of acute fx  - PT consulted  - fall precautions and out of bed to chair

## 2022-07-05 NOTE — H&P ADULT - PROBLEM SELECTOR PLAN 9
Chronic LE edema  - not on diuretics as per daughter A1c 6 in Feb 2022  - not on insulin at home  - low dose ISS   - f/u am A1c

## 2022-07-05 NOTE — H&P ADULT - NSHPADDITIONALINFOADULT_GEN_ALL_CORE
DVT prophlyaxis  - Heparin DVT prophylaxis  - heparin DVT prophylaxis- B/L lower ext Doppler   - heparin

## 2022-07-05 NOTE — CONSULT NOTE ADULT - PROBLEM SELECTOR RECOMMENDATION 9
- HIDA in AM to r/o acute cholecystitis  - Cont IV abx  - NPO, IVF  - Pain control, supportive care  - Case discussed with Dr. Rodrigues

## 2022-07-05 NOTE — H&P ADULT - ASSESSMENT
89y F pmhx CAD x 2 stents (2012), T2DM, HTN, HLD, GERD, mild AS, chronic venous insufficiency, chronic neuropathy, macular degeneration, chronic constipation, depression, OA, lumbar degenerative disc disease, and spinal stenosis admitted for cholelithiasis, KERRY, and found to be COVID positive on admission.         Vitals: 99.2F, 85bpm, 143/66, 95% RA  Labs: Hgb 10.2, Hct 32.5, Na 132, K 5.5, BUN 30, Cr 2, GFR 23, lipase wnl  EKG:    CT Chest and abd/pel: Moderate-sized bilateral pleural effusion with partial bilateral lower lobe atelectasis. Cholelithiasis with distended gallbladder and right upper quadrant inflammatory changes, findings suspicious for acute cholecystitis.    RUQ US: Cholelithiasis with nonspecific pericholecystic fluid. Negative sonographic Scott sign. Dilated common bowel duct. Recommend correlating with LFTs. Cannot exclude distal choledocholithiasis.    HEAD CT: Mild volume loss, microvascular disease, no acute hemorrhage or midline shift.    Patient Received: Zosyn x1, NS 500cc x1, Lasix 40mg IV x1, IV dilaudid .5mg x1           89y F pmhx CAD x 2 stents (2012), T2DM, HTN, HLD, GERD, mild AS, chronic venous insufficiency, chronic neuropathy, macular degeneration, chronic constipation, depression, OA, lumbar degenerative disc disease, and spinal stenosis admitted for cholelithiasis, KERRY, and found to be COVID positive on admission.

## 2022-07-05 NOTE — ED PROVIDER NOTE - CARE PLAN
1 Principal Discharge DX:	Nausea  Secondary Diagnosis:	Acute cholecystitis  Secondary Diagnosis:	KERRY (acute kidney injury)

## 2022-07-06 DIAGNOSIS — Z29.9 ENCOUNTER FOR PROPHYLACTIC MEASURES, UNSPECIFIED: ICD-10-CM
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
## 2022-07-06 DIAGNOSIS — N17.9 ACUTE KIDNEY FAILURE, UNSPECIFIED: ICD-10-CM

## 2022-07-06 DIAGNOSIS — F05 DELIRIUM DUE TO KNOWN PHYSIOLOGICAL CONDITION: ICD-10-CM

## 2022-07-06 DIAGNOSIS — I10 ESSENTIAL (PRIMARY) HYPERTENSION: ICD-10-CM

## 2022-07-06 DIAGNOSIS — M54.9 DORSALGIA, UNSPECIFIED: ICD-10-CM

## 2022-07-06 DIAGNOSIS — D64.9 ANEMIA, UNSPECIFIED: ICD-10-CM

## 2022-07-06 DIAGNOSIS — I25.10 ATHEROSCLEROTIC HEART DISEASE OF NATIVE CORONARY ARTERY WITHOUT ANGINA PECTORIS: ICD-10-CM

## 2022-07-06 DIAGNOSIS — E11.9 TYPE 2 DIABETES MELLITUS WITHOUT COMPLICATIONS: ICD-10-CM

## 2022-07-06 DIAGNOSIS — U07.1 COVID-19: ICD-10-CM

## 2022-07-06 DIAGNOSIS — F41.9 ANXIETY DISORDER, UNSPECIFIED: ICD-10-CM

## 2022-07-06 DIAGNOSIS — J90 PLEURAL EFFUSION, NOT ELSEWHERE CLASSIFIED: ICD-10-CM

## 2022-07-06 DIAGNOSIS — I87.2 VENOUS INSUFFICIENCY (CHRONIC) (PERIPHERAL): ICD-10-CM

## 2022-07-06 DIAGNOSIS — E87.5 HYPERKALEMIA: ICD-10-CM

## 2022-07-06 LAB
A1C WITH ESTIMATED AVERAGE GLUCOSE RESULT: 6.9 % — HIGH (ref 4–5.6)
ALBUMIN SERPL ELPH-MCNC: 2.6 G/DL — LOW (ref 3.3–5)
ALP SERPL-CCNC: 65 U/L — SIGNIFICANT CHANGE UP (ref 40–120)
ALT FLD-CCNC: 12 U/L — SIGNIFICANT CHANGE UP (ref 12–78)
ANION GAP SERPL CALC-SCNC: 8 MMOL/L — SIGNIFICANT CHANGE UP (ref 5–17)
AST SERPL-CCNC: 22 U/L — SIGNIFICANT CHANGE UP (ref 15–37)
BASOPHILS # BLD AUTO: 0.02 K/UL — SIGNIFICANT CHANGE UP (ref 0–0.2)
BASOPHILS NFR BLD AUTO: 0.4 % — SIGNIFICANT CHANGE UP (ref 0–2)
BILIRUB SERPL-MCNC: 0.4 MG/DL — SIGNIFICANT CHANGE UP (ref 0.2–1.2)
BUN SERPL-MCNC: 29 MG/DL — HIGH (ref 7–23)
CALCIUM SERPL-MCNC: 8.8 MG/DL — SIGNIFICANT CHANGE UP (ref 8.5–10.1)
CHLORIDE SERPL-SCNC: 100 MMOL/L — SIGNIFICANT CHANGE UP (ref 96–108)
CO2 SERPL-SCNC: 27 MMOL/L — SIGNIFICANT CHANGE UP (ref 22–31)
CREAT SERPL-MCNC: 2.1 MG/DL — HIGH (ref 0.5–1.3)
EGFR: 22 ML/MIN/1.73M2 — LOW
EOSINOPHIL # BLD AUTO: 0.07 K/UL — SIGNIFICANT CHANGE UP (ref 0–0.5)
EOSINOPHIL NFR BLD AUTO: 1.5 % — SIGNIFICANT CHANGE UP (ref 0–6)
ESTIMATED AVERAGE GLUCOSE: 151 MG/DL — HIGH (ref 68–114)
FERRITIN SERPL-MCNC: 67 NG/ML — SIGNIFICANT CHANGE UP (ref 15–150)
FOLATE SERPL-MCNC: 11.8 NG/ML — SIGNIFICANT CHANGE UP
GLUCOSE SERPL-MCNC: 100 MG/DL — HIGH (ref 70–99)
HCT VFR BLD CALC: 32.9 % — LOW (ref 34.5–45)
HGB BLD-MCNC: 10.6 G/DL — LOW (ref 11.5–15.5)
IMM GRANULOCYTES NFR BLD AUTO: 0.4 % — SIGNIFICANT CHANGE UP (ref 0–1.5)
IRON SATN MFR SERPL: 19 UG/DL — LOW (ref 30–160)
IRON SATN MFR SERPL: 8 % — LOW (ref 14–50)
LYMPHOCYTES # BLD AUTO: 0.33 K/UL — LOW (ref 1–3.3)
LYMPHOCYTES # BLD AUTO: 7.1 % — LOW (ref 13–44)
MAGNESIUM SERPL-MCNC: 2.1 MG/DL — SIGNIFICANT CHANGE UP (ref 1.6–2.6)
MCHC RBC-ENTMCNC: 28.6 PG — SIGNIFICANT CHANGE UP (ref 27–34)
MCHC RBC-ENTMCNC: 32.2 GM/DL — SIGNIFICANT CHANGE UP (ref 32–36)
MCV RBC AUTO: 88.9 FL — SIGNIFICANT CHANGE UP (ref 80–100)
MONOCYTES # BLD AUTO: 0.39 K/UL — SIGNIFICANT CHANGE UP (ref 0–0.9)
MONOCYTES NFR BLD AUTO: 8.3 % — SIGNIFICANT CHANGE UP (ref 2–14)
NEUTROPHILS # BLD AUTO: 3.85 K/UL — SIGNIFICANT CHANGE UP (ref 1.8–7.4)
NEUTROPHILS NFR BLD AUTO: 82.3 % — HIGH (ref 43–77)
NRBC # BLD: 0 /100 WBCS — SIGNIFICANT CHANGE UP (ref 0–0)
PHOSPHATE SERPL-MCNC: 3.6 MG/DL — SIGNIFICANT CHANGE UP (ref 2.5–4.5)
PLATELET # BLD AUTO: 220 K/UL — SIGNIFICANT CHANGE UP (ref 150–400)
POTASSIUM SERPL-MCNC: 4.1 MMOL/L — SIGNIFICANT CHANGE UP (ref 3.5–5.3)
POTASSIUM SERPL-SCNC: 4.1 MMOL/L — SIGNIFICANT CHANGE UP (ref 3.5–5.3)
PROT SERPL-MCNC: 6 G/DL — SIGNIFICANT CHANGE UP (ref 6–8.3)
RBC # BLD: 3.7 M/UL — LOW (ref 3.8–5.2)
RBC # FLD: 12.9 % — SIGNIFICANT CHANGE UP (ref 10.3–14.5)
SODIUM SERPL-SCNC: 135 MMOL/L — SIGNIFICANT CHANGE UP (ref 135–145)
TIBC SERPL-MCNC: 242 UG/DL — SIGNIFICANT CHANGE UP (ref 220–430)
UIBC SERPL-MCNC: 223 UG/DL — SIGNIFICANT CHANGE UP (ref 110–370)
VIT B12 SERPL-MCNC: 342 PG/ML — SIGNIFICANT CHANGE UP (ref 232–1245)
WBC # BLD: 4.68 K/UL — SIGNIFICANT CHANGE UP (ref 3.8–10.5)
WBC # FLD AUTO: 4.68 K/UL — SIGNIFICANT CHANGE UP (ref 3.8–10.5)

## 2022-07-06 PROCEDURE — 93010 ELECTROCARDIOGRAM REPORT: CPT

## 2022-07-06 PROCEDURE — 99223 1ST HOSP IP/OBS HIGH 75: CPT

## 2022-07-06 PROCEDURE — 99497 ADVNCD CARE PLAN 30 MIN: CPT

## 2022-07-06 PROCEDURE — 78226 HEPATOBILIARY SYSTEM IMAGING: CPT | Mod: 26

## 2022-07-06 RX ORDER — DEXTROSE 50 % IN WATER 50 %
12.5 SYRINGE (ML) INTRAVENOUS ONCE
Refills: 0 | Status: DISCONTINUED | OUTPATIENT
Start: 2022-07-06 | End: 2022-07-12

## 2022-07-06 RX ORDER — HEPARIN SODIUM 5000 [USP'U]/ML
5000 INJECTION INTRAVENOUS; SUBCUTANEOUS EVERY 8 HOURS
Refills: 0 | Status: DISCONTINUED | OUTPATIENT
Start: 2022-07-06 | End: 2022-07-10

## 2022-07-06 RX ORDER — GLUCAGON INJECTION, SOLUTION 0.5 MG/.1ML
1 INJECTION, SOLUTION SUBCUTANEOUS ONCE
Refills: 0 | Status: DISCONTINUED | OUTPATIENT
Start: 2022-07-06 | End: 2022-07-12

## 2022-07-06 RX ORDER — DEXTROSE 50 % IN WATER 50 %
25 SYRINGE (ML) INTRAVENOUS ONCE
Refills: 0 | Status: DISCONTINUED | OUTPATIENT
Start: 2022-07-06 | End: 2022-07-12

## 2022-07-06 RX ORDER — DEXTROSE 50 % IN WATER 50 %
15 SYRINGE (ML) INTRAVENOUS ONCE
Refills: 0 | Status: DISCONTINUED | OUTPATIENT
Start: 2022-07-06 | End: 2022-07-06

## 2022-07-06 RX ORDER — INSULIN LISPRO 100/ML
VIAL (ML) SUBCUTANEOUS AT BEDTIME
Refills: 0 | Status: DISCONTINUED | OUTPATIENT
Start: 2022-07-06 | End: 2022-07-11

## 2022-07-06 RX ORDER — DEXTROSE 50 % IN WATER 50 %
12.5 SYRINGE (ML) INTRAVENOUS ONCE
Refills: 0 | Status: DISCONTINUED | OUTPATIENT
Start: 2022-07-06 | End: 2022-07-06

## 2022-07-06 RX ORDER — DEXTROSE 50 % IN WATER 50 %
25 SYRINGE (ML) INTRAVENOUS ONCE
Refills: 0 | Status: DISCONTINUED | OUTPATIENT
Start: 2022-07-06 | End: 2022-07-06

## 2022-07-06 RX ORDER — FUROSEMIDE 40 MG
40 TABLET ORAL ONCE
Refills: 0 | Status: COMPLETED | OUTPATIENT
Start: 2022-07-06 | End: 2022-07-06

## 2022-07-06 RX ORDER — GLUCAGON INJECTION, SOLUTION 0.5 MG/.1ML
1 INJECTION, SOLUTION SUBCUTANEOUS ONCE
Refills: 0 | Status: DISCONTINUED | OUTPATIENT
Start: 2022-07-06 | End: 2022-07-06

## 2022-07-06 RX ORDER — SODIUM CHLORIDE 9 MG/ML
1000 INJECTION, SOLUTION INTRAVENOUS
Refills: 0 | Status: DISCONTINUED | OUTPATIENT
Start: 2022-07-06 | End: 2022-07-06

## 2022-07-06 RX ORDER — SODIUM CHLORIDE 9 MG/ML
1000 INJECTION, SOLUTION INTRAVENOUS
Refills: 0 | Status: DISCONTINUED | OUTPATIENT
Start: 2022-07-06 | End: 2022-07-12

## 2022-07-06 RX ORDER — INSULIN LISPRO 100/ML
VIAL (ML) SUBCUTANEOUS EVERY 6 HOURS
Refills: 0 | Status: DISCONTINUED | OUTPATIENT
Start: 2022-07-06 | End: 2022-07-06

## 2022-07-06 RX ORDER — DEXTROSE 50 % IN WATER 50 %
15 SYRINGE (ML) INTRAVENOUS ONCE
Refills: 0 | Status: DISCONTINUED | OUTPATIENT
Start: 2022-07-06 | End: 2022-07-12

## 2022-07-06 RX ORDER — INSULIN LISPRO 100/ML
VIAL (ML) SUBCUTANEOUS
Refills: 0 | Status: DISCONTINUED | OUTPATIENT
Start: 2022-07-06 | End: 2022-07-12

## 2022-07-06 RX ORDER — HYDROMORPHONE HYDROCHLORIDE 2 MG/ML
2 INJECTION INTRAMUSCULAR; INTRAVENOUS; SUBCUTANEOUS ONCE
Refills: 0 | Status: DISCONTINUED | OUTPATIENT
Start: 2022-07-06 | End: 2022-07-06

## 2022-07-06 RX ADMIN — HEPARIN SODIUM 5000 UNIT(S): 5000 INJECTION INTRAVENOUS; SUBCUTANEOUS at 06:55

## 2022-07-06 RX ADMIN — ATORVASTATIN CALCIUM 40 MILLIGRAM(S): 80 TABLET, FILM COATED ORAL at 23:21

## 2022-07-06 RX ADMIN — HYDROMORPHONE HYDROCHLORIDE 4 MILLIGRAM(S): 2 INJECTION INTRAMUSCULAR; INTRAVENOUS; SUBCUTANEOUS at 10:15

## 2022-07-06 RX ADMIN — HEPARIN SODIUM 5000 UNIT(S): 5000 INJECTION INTRAVENOUS; SUBCUTANEOUS at 23:22

## 2022-07-06 RX ADMIN — Medication 81 MILLIGRAM(S): at 11:59

## 2022-07-06 RX ADMIN — SENNA PLUS 2 TABLET(S): 8.6 TABLET ORAL at 00:29

## 2022-07-06 RX ADMIN — HYDROMORPHONE HYDROCHLORIDE 4 MILLIGRAM(S): 2 INJECTION INTRAMUSCULAR; INTRAVENOUS; SUBCUTANEOUS at 23:21

## 2022-07-06 RX ADMIN — HYDROMORPHONE HYDROCHLORIDE 2 MILLIGRAM(S): 2 INJECTION INTRAMUSCULAR; INTRAVENOUS; SUBCUTANEOUS at 02:12

## 2022-07-06 RX ADMIN — HEPARIN SODIUM 5000 UNIT(S): 5000 INJECTION INTRAVENOUS; SUBCUTANEOUS at 15:03

## 2022-07-06 RX ADMIN — SENNA PLUS 2 TABLET(S): 8.6 TABLET ORAL at 23:21

## 2022-07-06 RX ADMIN — Medication 30 MILLIGRAM(S): at 23:22

## 2022-07-06 RX ADMIN — Medication 25 MILLIGRAM(S): at 06:55

## 2022-07-06 RX ADMIN — Medication 3 MILLIGRAM(S): at 23:21

## 2022-07-06 RX ADMIN — ONDANSETRON 4 MILLIGRAM(S): 8 TABLET, FILM COATED ORAL at 13:25

## 2022-07-06 RX ADMIN — Medication 40 MILLIGRAM(S): at 15:03

## 2022-07-06 RX ADMIN — Medication 650 MILLIGRAM(S): at 01:00

## 2022-07-06 RX ADMIN — Medication 30 MILLIGRAM(S): at 00:28

## 2022-07-06 RX ADMIN — PIPERACILLIN AND TAZOBACTAM 25 GRAM(S): 4; .5 INJECTION, POWDER, LYOPHILIZED, FOR SOLUTION INTRAVENOUS at 10:03

## 2022-07-06 RX ADMIN — Medication 325 MILLIGRAM(S): at 11:59

## 2022-07-06 RX ADMIN — Medication 650 MILLIGRAM(S): at 01:30

## 2022-07-06 RX ADMIN — ATORVASTATIN CALCIUM 40 MILLIGRAM(S): 80 TABLET, FILM COATED ORAL at 00:28

## 2022-07-06 RX ADMIN — HYDROMORPHONE HYDROCHLORIDE 4 MILLIGRAM(S): 2 INJECTION INTRAMUSCULAR; INTRAVENOUS; SUBCUTANEOUS at 15:58

## 2022-07-06 RX ADMIN — HYDROMORPHONE HYDROCHLORIDE 4 MILLIGRAM(S): 2 INJECTION INTRAMUSCULAR; INTRAVENOUS; SUBCUTANEOUS at 09:15

## 2022-07-06 RX ADMIN — POLYETHYLENE GLYCOL 3350 17 GRAM(S): 17 POWDER, FOR SOLUTION ORAL at 00:28

## 2022-07-06 RX ADMIN — POLYETHYLENE GLYCOL 3350 17 GRAM(S): 17 POWDER, FOR SOLUTION ORAL at 11:59

## 2022-07-06 NOTE — CONSULT NOTE ADULT - ASSESSMENT
nausea  abodminal pain    hida negative for acute cholecystitis  ? gastroparesis  cont clears  iv fluid  check nm ges  d/w patient/family  d/w primary

## 2022-07-06 NOTE — PATIENT PROFILE ADULT - VISION (WITH CORRECTIVE LENSES IF THE PATIENT USUALLY WEARS THEM):
Reading glasses  macular degeneration/Partially impaired: cannot see medication labels or newsprint, but can see obstacles in path, and the surrounding layout; can count fingers at arm's length

## 2022-07-06 NOTE — CONSULT NOTE ADULT - ASSESSMENT
89y F pmhx CAD x 2 stents (2012), T2DM, HTN, HLD, GERD, mild AS, chronic venous insufficiency, chronic neuropathy, macular degeneration, chronic constipation, depression, OA, lumbar degenerative disc disease, and spinal stenosis admitted for cholelithiasis, KERRY, and found to be COVID positive on admission.     COVID-19  Pleural Effusions  - Vaccinated x3 per patient.   - Reviewed notes, pt refusing monoclonal Ab  - Currently on RA, satting well  - No obvious consolidation/GGO on CT chest   Plan:  Will review remdesivir with daughter  Trend temps/WBC  Trend inflammatory biomarkers  Continue with supportive care  Maintain aspiration precautions  Maintain isolation per infection control policy    Cholelithiasis  - pt p/w nausea/decreased PO intake x 4 days  - CT abd/pel: Cholelithiasis with distended gallbladder and right upper quadrant inflammatory changes, findings suspicious for acute cholecystitis.  - RUQ US: Cholelithiasis with nonspecific pericholecystic fluid. Negative sonographic Scott sign. Dilated common bile duct.  - HIDA negative for acute cholecystitis  Plan:   F/u pending Cx  Overall low suspicion for acute infection  Can d/c Abx if w/u above remains negative    KERRY  - likely 2/2 dec PO intake, Pre Renal   Plan:   trend renal fxn  avoid nephrotoxins  renally dose medications    Infectious Diseases will continue to follow. Please call with any questions.   Candida Izquierdo M.D.  [facility], Division of Infectious Diseases [phone]   89y F pmhx CAD x 2 stents (2012), T2DM, HTN, HLD, GERD, mild AS, chronic venous insufficiency, chronic neuropathy, macular degeneration, chronic constipation, depression, OA, lumbar degenerative disc disease, and spinal stenosis admitted for cholelithiasis, KERRY, and found to be COVID positive on admission.     COVID-19  Pleural Effusions  - Vaccinated x3 per patient.   - Reviewed notes, pt refusing monoclonal Ab  - Currently on RA, satting well  - No obvious consolidation/GGO on CT chest   Plan:  Will review remdesivir with daughter  Trend temps/WBC  Trend inflammatory biomarkers  Continue with supportive care  Maintain aspiration precautions  Maintain isolation per infection control policy    Cholelithiasis  - pt p/w nausea/decreased PO intake x 4 days  - CT abd/pel: Cholelithiasis with distended gallbladder and right upper quadrant inflammatory changes, findings suspicious for acute cholecystitis.  - RUQ US: Cholelithiasis with nonspecific pericholecystic fluid. Negative sonographic Scott sign. Dilated common bile duct.  - HIDA negative for acute cholecystitis  Plan:   F/u pending Cx  Overall low suspicion for acute infection  Can d/c Abx if w/u above remains negative    KERRY  - likely 2/2 dec PO intake, Pre Renal   Plan:   trend renal fxn  avoid nephrotoxins  renally dose medications    Addendum 7:50 PM: Spoke to daughter re: remdesivir. Reviewed benefits, AE and encouraged patient to read about medication. Expressed concern giving medication in elderly pt.  Holding remdesivir for now, to discuss again with daughter.    Infectious Diseases will continue to follow. Please call with any questions.   Candida Izquierdo M.D.  [facility], Division of Infectious Diseases [phone]

## 2022-07-06 NOTE — H&P ADULT - PROBLEM SELECTOR PROBLEM 1
History     Chief Complaint   Patient presents with     Laceration     HPI  Sumit Estrella is a 11 year old male who presents to urgent care with father for evaluation of laceration to left bicep area.  Patient states that he slipped on a rock which caused a laceration to the inside of his left bicep area.  Bleeding is controlled with direct pressure.  Patient is up-to-date on tetanus.  Patient denies any peripheral tingling, numbness, weakness to left upper extremity.    Allergies:  No Known Allergies    Problem List:    Patient Active Problem List    Diagnosis Date Noted     Encounter for routine child health examination with abnormal findings 09/08/2016     Priority: Medium        Past Medical History:    Past Medical History:   Diagnosis Date     Hearing loss 09/08/2016       Past Surgical History:    Past Surgical History:   Procedure Laterality Date     CIRCUMCISION       cyst removal in neck  2011     ENT SURGERY  2010    tubes     PE Tubes Bilat  9/2011       Family History:    Family History   Problem Relation Age of Onset     Heart Disease Maternal Grandfather      Asthma No family hx of        Social History:  Marital Status:  Single [1]  Social History     Tobacco Use     Smoking status: Passive Smoke Exposure - Never Smoker     Smokeless tobacco: Never Used   Substance Use Topics     Alcohol use: No     Drug use: No        Medications:    Acetaminophen (TYLENOL PO)  albuterol (PROVENTIL) (2.5 MG/3ML) 0.083% neb solution  IBUPROFEN PO  order for DME          Review of Systems   Skin: Positive for wound.   All other systems reviewed and are negative.      Physical Exam   Pulse: 111  Temp: 98.5  F (36.9  C)  Resp: 18  Weight: 34.7 kg (76 lb 8 oz)  SpO2: 98 %      Physical Exam  Vitals and nursing note reviewed.   Constitutional:       General: He is active. He is not in acute distress.     Appearance: He is well-developed. He is not toxic-appearing.   Cardiovascular:      Rate and Rhythm: Regular rhythm.       Heart sounds: Normal heart sounds.   Skin:            Comments: Patient has approximately 7 cm laceration to medial aspect of left bicep area.  No visible foreign bodies.  The laceration extends through the dermal layer into the adipose tissue.  No muscle involvement.         ED Course                 [facility]    -Laceration Repair    Date/Time: 7/6/2022 2:55 PM  Performed by: Glenn Jeffery PA-C  Authorized by: Glenn Jeffery PA-C     Risks, benefits and alternatives discussed.      ANESTHESIA (see MAR for exact dosages):     Anesthesia method:  Topical application    Topical anesthetic:  LET  LACERATION DETAILS     Location:  Shoulder/arm    Shoulder/arm location:  L upper arm    Length (cm):  7    REPAIR TYPE:     Repair type:  Simple      EXPLORATION:     Hemostasis achieved with:  Direct pressure    Wound exploration: wound explored through full range of motion and entire depth of wound probed and visualized      Wound extent: areolar tissue violated      Wound extent: no foreign body, no signs of injury, no nerve damage, no tendon damage, no underlying fracture and no vascular damage      Contaminated: no      TREATMENT:     Area cleansed with:  Betadine    Amount of cleaning:  Standard    Irrigation solution:  Sterile saline    SKIN REPAIR     Repair method:  Sutures    Suture size:  5-0    Suture material:  Nylon    Suture technique:  Simple interrupted    Number of sutures:  10    APPROXIMATION     Approximation:  Close    POST-PROCEDURE DETAILS     Dressing:  Antibiotic ointment and sterile dressing        PROCEDURE    Patient Tolerance:  Patient tolerated the procedure well with no immediate complications               Critical Care time:               No results found for this or any previous visit (from the past 24 hour(s)).    Medications   lido-EPINEPHrine-tetracaine (LET) topical gel GEL (3 mLs Topical Given 7/6/22 1411)       Assessments & Plan (with Medical Decision  Making)   #1.  Laceration of left arm    Discussed exam findings with patient's father.  Patient had x10 simple interrupted sutures placed in laceration today in urgent care; please refer to procedure note.  Patient tolerated this very well.  Patient had antibiotic ointment and nonadherent dressing applied.  Wound care is discussed at length with father.  Any sign of infection patient should return to emergency department immediately.  Otherwise patient should return for suture removal in approximately 8 days.  Any additional concerns please return to urgent care or follow-up with primary care provider.  Patient's father verbalized understanding and agreement of plan.      I have reviewed the nursing notes.    I have reviewed the findings, diagnosis, plan and need for follow up with the patient.    Discharge Medication List as of 7/6/2022  2:46 PM          Final diagnoses:   Arm laceration, left, initial encounter       7/6/2022   [facility]     Glenn Jeffery PA-C  07/06/22 [id number]     Cholelithiasis

## 2022-07-06 NOTE — SWALLOW BEDSIDE ASSESSMENT ADULT - COMMENTS
Consult received and chart reviewed. Per discussion with Dr. Izquierdo's resident [phone], pt s/p HIDA and to be started on clear liquid diet at this time. MD to reconsult this service when pt is cleared to consume all consistencies for purposes of swallow assessment.

## 2022-07-06 NOTE — PROGRESS NOTE ADULT - PROBLEM SELECTOR PLAN 6
Patient with fall at home x 1 due to LE weakness  - likely 2/2 dec PO intake  - CT head negative  - no signs or sx of acute fx  - PT consulted  - fall precautions and out of bed to chair

## 2022-07-06 NOTE — CONSULT NOTE ADULT - SUBJECTIVE AND OBJECTIVE BOX
[facility], Division of Infectious Diseases  MIKHAIL Saenz, ROLANDO Coe G. [facility]  [phone]    DEVON NOLAND  89y, Female  [id number]    HPI--  HPI:  89y F pmhx CAD x 2 stents (), T2DM, HTN, HLD, GERD, mild AS, chronic venous insufficiency, chronic neuropathy, macular degeneration, chronic constipation, depression, OA, and recent admission to [facility] - 3/2 for gallstone pancreatitis with non-operative treatment presented to the ED with constant nausea x 4 days associated with decreased PO intake, generalized weakness, and fall today x 1 due to LE weakness. Patient denies fever, chills, cp, dizziness, sob, abd pain, vomiting, diarrhea. Patient was seen here in ED  for epigastric pain (since resolved) and discharged with outpatient follow up with Dr. Terrell. Patient has seen Dr. Terrell twice since then and epigastric pain resolved s/p PO zofran and carafate with planned esophagram. Daughter states epigastric pain resolved, but patient has had constant nausea x 4 days that has not improved despite increasing PO zofran from 4mg to 8mg q6h. Patient was unable to obtain appointment with Dr. Terrell today and after falling at home due to leg weakness, daughter brought her to the ED.     ED Course  Vitals: 99.2F, 85bpm, 143/66, 95% RA  Labs: Hgb 10.2, Hct 32.5, Na 132, K 5.5, BUN 30, Cr 2, GFR 23, lipase wnl  EKG:    CT Chest and abd/pel: Moderate-sized bilateral pleural effusion with partial bilateral lower lobe atelectasis. Cholelithiasis with distended gallbladder and right upper quadrant inflammatory changes, findings suspicious for acute cholecystitis.    RUQ US: Cholelithiasis with nonspecific pericholecystic fluid. Negative sonographic Scott sign. Dilated common bowel duct. Recommend correlating with LFTs. Cannot exclude distal choledocholithiasis.    HEAD CT: Mild volume loss, microvascular disease, no acute hemorrhage or midline shift.    Patient Received: Zosyn x1, NS 500cc x1, Lasix 40mg IV x1, IV dilaudid .5mg x1     (2022 23:51)    Pt seen and examined at bedside  No complaints          Active Medications--  acetaminophen     Tablet .. 650 milliGRAM(s) Oral every 6 hours PRN  aluminum hydroxide/magnesium hydroxide/simethicone Suspension 30 milliLiter(s) Oral every 4 hours PRN  amitriptyline 30 milliGRAM(s) Oral at bedtime  aspirin  chewable 81 milliGRAM(s) Oral daily  atorvastatin 40 milliGRAM(s) Oral at bedtime  dextrose 5%. 1000 milliLiter(s) IV Continuous <Continuous>  dextrose 5%. 1000 milliLiter(s) IV Continuous <Continuous>  dextrose 50% Injectable 25 Gram(s) IV Push once  dextrose 50% Injectable 12.5 Gram(s) IV Push once  dextrose 50% Injectable 25 Gram(s) IV Push once  dextrose Oral Gel 15 Gram(s) Oral once PRN  ferrous    sulfate 325 milliGRAM(s) Oral daily  glucagon  Injectable 1 milliGRAM(s) IntraMuscular once  heparin   Injectable 5000 Unit(s) SubCutaneous every 8 hours  HYDROmorphone   Tablet 4 milliGRAM(s) Oral <User Schedule>  insulin lispro (ADMELOG) corrective regimen sliding scale   SubCutaneous three times a day before meals  insulin lispro (ADMELOG) corrective regimen sliding scale   SubCutaneous at bedtime  melatonin 3 milliGRAM(s) Oral at bedtime PRN  metoprolol succinate ER 25 milliGRAM(s) Oral daily  ondansetron Injectable 4 milliGRAM(s) IV Push every 8 hours PRN  piperacillin/tazobactam IVPB.. 3.375 Gram(s) IV Intermittent every 12 hours  polyethylene glycol 3350 17 Gram(s) Oral daily  senna 2 Tablet(s) Oral at bedtime    Antimicrobials:   piperacillin/tazobactam IVPB.. 3.375 Gram(s) IV Intermittent every 12 hours    Immunologic:     ROS:  CONSTITUTIONAL: No fevers or chills. No weakness or headache. No weight changes.  EYES/ENT: No visual or hearing changes. No sore throat or throat pain .  NECK: No pain or stiffness  RESPIRATORY: No cough, wheezing, or hemoptysis. No shortness of breath  CARDIOVASCULAR: No chest pain or palpitations  GASTROINTESTINAL: No abdominal pain. No nausea or vomiting. No diarrhea or constipation.  GENITOURINARY: No dysuria, frequency or hematuria  NEUROLOGICAL: No numbness or weakness  SKIN: No itching or rashes  PSYCHIATRIC: Pleasant. Appropriate affect    Allergies: morphine (Other)    PMH -- H/O vertebral fracture repair    History of vertebral fracture    Dyslipidemia    DM type 2 with diabetic dyslipidemia    H/O gastroesophageal reflux (GERD)    Costochondritis    Coronary arteriosclerosis in native artery    Gastroparesis      PSH -- History of laminectomy    S/P hip hemiarthroplasty    S/P appendectomy      FH -- No pertinent family history in first degree relatives    No pertinent family history in first degree relatives    FHx: stroke (Mother)      Social History --  EtOH: denies   Tobacco: denies   Drug Use: denies     Travel/Environmental/Occupational History:    Physical Exam--  Vital Signs Last 24 Hrs  T(F): 98.8 (2022 12:30), Max: 99.2 (2022 21:21)  HR: 74 (2022 12:30) (69 - 88)  BP: 127/61 (2022 12:30) (105/57 - 154/88)  RR: 17 (2022 12:30) (15 - 18)  SpO2: 96% (2022 12:30) (94% - 97%)  General: nontoxic-appearing, no acute distress  HEENT: NC/AT, EOMI  Lungs: Clear bilaterally without rales, wheezing or rhonchi  Heart: Regular rate and rhythm. No murmur, rub or gallop.  Abdomen: Soft. Nondistended. Nontender.   Skin: Warm. Dry. Good turgor. No rash. No vasculitic stigmata.      Laboratory & Imaging Data--  CBC:                       10.6   4.68  )-----------( 220      ( 2022 07:14 )             32.9     CMP: 07-    135  |  100  |  29<H>  ----------------------------<  100<H>  4.1   |  27  |  2.10<H>    Ca    8.8      2022 07:14  Phos  3.6     07-06  Mg     2.1     07-06    TPro  6.0  /  Alb  2.6<L>  /  TBili  0.4  /  DBili  x   /  AST  22  /  ALT  12  /  AlkPhos  65  07-06    LIVER FUNCTIONS - ( 2022 07:14 )  Alb: 2.6 g/dL / Pro: 6.0 g/dL / ALK PHOS: 65 U/L / ALT: 12 U/L / AST: 22 U/L / GGT: x           Urinalysis Basic - ( 2022 19:00 )    Color: Yellow / Appearance: Clear / S.010 / pH: x  Gluc: x / Ketone: Negative  / Bili: Negative / Urobili: Negative   Blood: x / Protein: 30 mg/dL / Nitrite: Negative   Leuk Esterase: Small / RBC: 25-50 /HPF / WBC 3-5   Sq Epi: x / Non Sq Epi: Few / Bacteria: Few        Microbiology: reviewed      Radiology: reviewed    < from: NM Hepatobiliary Imaging (22 @ 11:35) >    ACC: [id number] EXAM:  NM HEPATOBILIARY IMG                          PROCEDURE DATE:  2022          INTERPRETATION:  RADIOPHARMACEUTICAL: 3.2 mCi Tc-99m-Mebrofenin, I.V.    CLINICAL INFORMATION: 89 year old female with cholelithiasis, nausea, and   distended gallbladder with pericholecystic fluid on CT; referred to   evaluate for acute cholecystitis.    TECHNIQUE:  Dynamic imaging of the anterior abdomen was performed for   approximately 20 minutes following radiopharmaceutical injection. The   patient was unable to cooperate for continued dynamic imaging and   consequently static images of the abdomen were obtained in the anterior   and right anterior oblique projections at approximately 1 hour.    COMPARISON: Hepatobiliary scan performed on 2022 was reviewed.    FINDINGS: There is prompt, homogeneous uptake of radiopharmaceutical by   the hepatocytes. The gallbladder and bowel are visualized on the one hour   images. There is good clearance of activity from the liver by the end of   the study.    IMPRESSION: Normal hepatobiliary scan. No radionuclide evidence of acute   cholecystitis.    No significant interval change since the previous hepatobiliary scan of   2022.    --- End of Report ---            JAN POLK MD; Attending Nuclear Medicine  This document has been electronically signed. 2022 12:01PM    < end of copied text >  < from: US Duplex Venous Lower Ext Complete, Bilateral (22 @ 22:03) >    ACC: [id number] EXAM:  US DPLX LWR EXT VEINS COMPL BI                          PROCEDURE DATE:  2022          INTERPRETATION:  CLINICAL INFORMATION: Bilateral leg swelling    COMPARISON: None available.    TECHNIQUE: Duplex sonography of the BILATERAL LOWER extremity veins with   color and spectral Doppler, with and without compression.    FINDINGS:    RIGHT:  Normal compressibility of the RIGHT common femoral, femoral and popliteal   veins.  Doppler examination shows normal spontaneous andphasic flow.  No RIGHT calf vein thrombosis is detected.  Subcutaneous edema in the right calf.    LEFT:  Normal compressibility of the LEFT common femoral, femoral and popliteal   veins.  Doppler examination shows normal spontaneous and phasic flow.  No LEFT calf vein thrombosis is detected.    IMPRESSION:  No evidence of deep venous thrombosis in either lower extremity.          --- End of Report ---            IVÁN WHITE MD; Attending Radiologist  This document has been electronically signed. 2022  6:17AM    < end of copied text >  < from: US Abdomen Upper Quadrant Right (22 @ 21:10) >    ACC: [id number] EXAM:  US ABDOMEN RT UPR QUADRANT                          PROCEDURE DATE:  2022          INTERPRETATION:  CLINICAL INFORMATION: Nausea    COMPARISON: CT abdomen pelvis from earlier the same day.    TECHNIQUE: Sonography of the right upper quadrant.    FINDINGS:  Liver: Within normal limits.  Bile ducts: Normal caliber. Common bile duct measures 12 mm.  Gallbladder: Distended gallbladder containing stones and sludge.   Pericholecystic fluid is noted. Sonographic Scott sign is negative.  Pancreas: Visualized portions are within normal limits.  Right kidney: 9.8 cm. Mild hydronephrosis..  Ascites: None.  IVC: Visualized portions are within normal limits.  Miscellaneous: Right pleural effusion noted.    IMPRESSION:  Cholelithiasis with nonspecific pericholecystic fluid. Negative   sonographic Scott sign.    Dilated common bowel duct. Recommend correlating with LFTs. Cannot   exclude distal choledocholithiasis.    --- End of Report ---            OMAR ROLAND MD; Attending Radiologist  This document has been electronically signed. 2022  9:59PM    < end of copied text >  < from: CT Chest No Cont (22 @ 19:46) >    ACC: [id number] EXAM:  CT ABDOMEN AND PELVIS                        ACC: [id number] EXAM:  CT CHEST                          PROCEDURE DATE:  2022          INTERPRETATION:  CLINICAL INFORMATION: Epigastric abdominal pain and   chest pain.    COMPARISON: CT angiogram chest abdomen pelvis 2022.    CONTRAST/COMPLICATIONS:  IV Contrast: NONE  0 cc administered   0 cc discarded  Oral Contrast: NONE  Complications: None reported at time of study completion    PROCEDURE:  CT of the Chest, Abdomen and Pelvis was performed.  Sagittal and coronal reformats were performed.    FINDINGS:    CHEST:    LUNGS AND LARGE AIRWAYS:  PLEURA:  There are moderate-sized bilateral pleural effusions with partial   atelectasis bilateral lower lobes.    The central airways are patent.    VESSELS: Atherosclerotic changes thoracic aorta with coronary artery   calcification.    HEART: Heart size is normal. Dense mitral calcification.  No pericardial effusion.    MEDIASTINUM AND VAL:  No enlarged lymphadenopathy.    CHEST WALL AND LOWER NECK: Within normal limits.    ABDOMEN AND PELVIS:    Streak artifact degrades image quality.    The evaluation of the solid organ parenchyma is limited without   intravenous contrast.    LIVER: Within normal limits.  BILE DUCTS: Normal caliber.  GALLBLADDER: Mild gallbladder distention with cholelithiasis.  Right upper quadrant stranding.  SPLEEN: Within normal limits.  PANCREAS: Atrophic.  ADRENALS: Within normal limits.  KIDNEYS/URETERS:  Punctate nonobstructing intrarenal calcification upper pole left kidney.  Right-sided hydronephrosis.    BLADDER: Bladder wall thickening.  REPRODUCTIVE ORGANS: No pelvic mass.    BOWEL:  Evaluation of the stomach is limited without distention.  Prominent colonic fecal retentionwith mild distention of the colon; no   bowel obstruction.  Mild perirectal and presacral stranding.  Sigmoid diverticulosis, without CT evidence of diverticulitis.  PERITONEUM:  Small perihepatic ascites.    VESSELS: Within normal limits.  RETROPERITONEUM/LYMPH NODES: Atherosclerotic changes.    ABDOMINAL WALL:  Small fat-containing umbilical hernia.  Subcutaneous edema.    BONES:  Degenerative changes.  Vertebroplasty with compression deformities T11 and T12 vertebral bodies.  Chronic compression deformity L5 vertebral body.  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  Chronic deformity of the sternum.    IMPRESSION:    Moderate-sized bilateral pleural effusion with partial bilateral lower   lobe atelectasis.      Cholelithiasis with distended gallbladder and right upper quadrant   inflammatory changes, findings suspicious for acute cholecystitis.  Recommend further evaluation with right upper quadrant ultrasonography.    Other findings as discussed above.    --- End of Report ---            LYUDMILA DOMINGUEZ MD; Attending Radiologist  This document has been electronically signed. 2022  8:32PM    < end of copied text >  < from: CT Head No Cont (22 @ 19:35) >    ACC: [id number] EXAM:  CT BRAIN                          PROCEDURE DATE:  2022          INTERPRETATION:  CT HEAD  HEAD CT    INDICATIONS: head injury, "Pt c/o extreme nausea, dehydration and swollen   left leg, multiple falls." No additional history was provided.    TECHNIQUE:    HEAD CT:  Serial axial images were obtained from the skull base to the vertex   without the use of intravenous contrast.    COMPARISON EXAMINATION: Head CT 2017    FINDINGS:    HEAD CT:    VENTRICLES AND SULCI: Ventricles and sulci are unremarkable for patient   age.  INTRA-AXIAL: No intracranial mass, acute hemorrhage, or midline shift is   present. There is non-specific decreased attenuation in the white matter   likely related to sequelae of microvascular disease.  EXTRA-AXIAL: No extra-axial fluid collection is present.  INTRACRANIAL HEMORRHAGE: None.    VISUALIZED SINUSES: No air-fluid levels are identified.  VISUALIZED MASTOIDS:  Clear.  CALVARIUM:  Intact.  MISCELLANEOUS:  None.    SOFT TISSUES: Unremarkable.  BONES: Unremarkable.      IMPRESSION:    HEAD CT: Mild volume loss, microvascular disease, no acute hemorrhage or   midline shift.    --- End of Report ---            VESNA LLANES MD; Attending Radiologist  This document has been electronically signed. 2022  7:44PM    < end of copied text >

## 2022-07-06 NOTE — PROGRESS NOTE ADULT - PROBLEM SELECTOR PLAN 4
patient covid positive on admission  - saturating well on room air and no sx  - continue to monitor on continuous pulse ox  - contact precautions  - ID consulted, Dr. Nash f/u recs  - Daughter Mary [phone] states she does not want monoclonal antibodies for patient  - patient has moderna 3/3 patient covid positive on admission  - saturating well on room air and no sx  - continue to monitor on continuous pulse ox  - contact precautions  - Daughter Mary [phone] states she does not want monoclonal antibodies for patient  - patient has moderna 3/3  - ID consulted, Dr. WESTLEY Izquierdo rec starting remdesivir but she will discuss with pt daughter first

## 2022-07-06 NOTE — PROGRESS NOTE ADULT - PROBLEM SELECTOR PLAN 5
Hgb 10.2 on admission  - patient on PO iron daily  - has outpatient appt with Dr. Nicholas for chronic anemia  - no signs sx of acute bleed  - f/u am iron studies, b12, folate Hgb 10.2 on admission, remains stable   - patient on PO iron daily  - has outpatient appt with Dr. Nicholas for chronic anemia  - no signs sx of acute bleed  - f/u iron studies, b12, folate Hgb 10.2 on admission, remains stable , B12 and folate wnl  - patient on PO iron daily  -iron studies show low total serum iron (19), low iron sat (8%)   TIBC and unsaturated iron binding capacity wnl  - has outpatient appt with Dr. Nicholas for chronic anemia  - no signs sx of acute bleed

## 2022-07-06 NOTE — GOALS OF CARE CONVERSATION - ADVANCED CARE PLANNING - CONVERSATION DETAILS
Writer met with pt daughter . Reviewed patient's medical and social history as well as events leading to patient's hospitalization. Writer discussed patient's current diagnosis . medical condition and management,  prognosis, and life expectancy. Inquired about patient's wishes regarding extent of medical care to be provided including escalation of medical care into the ICU intubation with vent and use of vasopressor support. In addition, the writer inquired about thoughts regarding cardiopulmonary resuscitation, artificial nutrition and hydration including use of feeding tubes and IVF, antibiotics, and further investigative studies such as blood draws and radiology daughter showed good. insight into medical condition. All questions answered. Writer recommended completion of advance directive Patient daughter  consented to DNR/DNI  status. MOLST form filled out and placed in chart. Writer met with pt daughter . Reviewed patient's medical and social history as well as events leading to patient's hospitalization. Writer discussed patient's current diagnosis cholelithiasis , CAD, HTN ,pleural effusion, COVID, advanced age, recent fall ,impaired mental status l medical condition and management, prognosis, and life expectancy. Inquired about patient's wishes regarding extent of medical care to be provided including escalation of medical care into the ICU intubation with vent and use of vasopressor support. In addition, the writer inquired about thoughts regarding cardiopulmonary resuscitation, artificial nutrition and hydration including use of feeding tubes and IVF, antibiotics, and further investigative studies such as blood draws and radiology daughter showed good. insight into medical condition. All questions answered. Writer recommended completion of advance directive Patient daughter  consented to DNR/DNI  status. MOLST form filled out and placed in chart.

## 2022-07-06 NOTE — PROGRESS NOTE ADULT - ASSESSMENT
89 YO Female w/ extensive PMHx presenting with nausea & poor PO intake x 2 weeks, CT revealed cholelithiasis w/ distended GB, RUQ U/S revealed distended GB w/ stones/sludge; KERRY; COVID+

## 2022-07-06 NOTE — PROGRESS NOTE ADULT - SUBJECTIVE AND OBJECTIVE BOX
Patient is a 89y old  Female who presents with a chief complaint of gallstones, covid, kerry (2022 08:21)    HPI:  89y F pmhx CAD x 2 stents (), T2DM, HTN, HLD, GERD, mild AS, chronic venous insufficiency, chronic neuropathy, macular degeneration, chronic constipation, depression, OA, and recent admission to [facility] - 3/2 for gallstone pancreatitis with non-operative treatment presented to the ED with constant nausea x 4 days associated with decreased PO intake, generalized weakness, and fall today x 1 due to LE weakness. Patient denies fever, chills, cp, dizziness, sob, abd pain, vomiting, diarrhea. Patient was seen here in ED  for epigastric pain (since resolved) and discharged with outpatient follow up with Dr. Terrell. Patient has seen Dr. Terrell twice since then and epigastric pain resolved s/p PO zofran and carafate with planned esophagram. Daughter states epigastric pain resolved, but patient has had constant nausea x 4 days that has not improved despite increasing PO zofran from 4mg to 8mg q6h. Patient was unable to obtain appointment with Dr. Terrell today and after falling at home due to leg weakness, daughter brought her to the ED.     ED Course  Vitals: 99.2F, 85bpm, 143/66, 95% RA  Labs: Hgb 10.2, Hct 32.5, Na 132, K 5.5, BUN 30, Cr 2, GFR 23, lipase wnl  EKG:    CT Chest and abd/pel: Moderate-sized bilateral pleural effusion with partial bilateral lower lobe atelectasis. Cholelithiasis with distended gallbladder and right upper quadrant inflammatory changes, findings suspicious for acute cholecystitis.    RUQ US: Cholelithiasis with nonspecific pericholecystic fluid. Negative sonographic Scott sign. Dilated common bowel duct. Recommend correlating with LFTs. Cannot exclude distal choledocholithiasis.    HEAD CT: Mild volume loss, microvascular disease, no acute hemorrhage or midline shift.    Patient Received: Zosyn x1, NS 500cc x1, Lasix 40mg IV x1, IV dilaudid .5mg x1           (2022 23:51)    INTERVAL HPI:  - pt seen and examined at bedside. Complaining of increased frequency of urination and suprapubic pain s/p 40mg IV lasix x1 in the ED. Otherwise, notes worsening of chronic back pain and general feeling of malaise. No sob, chest pain, nausea.      OVERNIGHT EVENTS: none    Home Medications:  amitriptyline 10 mg oral tablet: 3 tab(s) orally once a day (at bedtime) (:44)  aspirin 81 mg oral tablet: 1 tab(s) orally once a day (:44)  atorvastatin 40 mg oral tablet: 1 tab(s) orally once a day (:44)  Dilaudid 4 mg oral tablet: 1 tab(s) orally 3 times (:44)  ferrous sulfate 324 mg (65 mg elemental iron) oral delayed release tablet: 1 tab(s) orally once a day ()  ferrous sulfate 325 mg (65 mg elemental iron) oral tablet: 1 tablet oral daily (:44)  Metoprolol Succinate ER 25 mg oral tablet, extended release: 1 tab(s) orally once a day (44)  MiraLax oral powder for reconstitution: ng/kg orally (44)  PreserVision AREDS oral capsule: 1 tab(s) orally 2 times a day (:44)  Senna 8.6 mg oral tablet: 2 tab(s) orally once a day (at bedtime) (44)  Toprol-XL 25 mg oral tablet, extended release: 1 tab(s) orally once a day (:44)  Vitamin D3 1250 mcg (50,000 intl units) oral capsule: 1 cap(s) orally once a week (:44)  Zofran 4 mg oral tablet: 1 tab(s) orally every 6 hours (:44)      MEDICATIONS  (STANDING):  amitriptyline 30 milliGRAM(s) Oral at bedtime  aspirin  chewable 81 milliGRAM(s) Oral daily  atorvastatin 40 milliGRAM(s) Oral at bedtime  dextrose 5%. 1000 milliLiter(s) (50 mL/Hr) IV Continuous <Continuous>  dextrose 5%. 1000 milliLiter(s) (100 mL/Hr) IV Continuous <Continuous>  dextrose 50% Injectable 25 Gram(s) IV Push once  dextrose 50% Injectable 12.5 Gram(s) IV Push once  dextrose 50% Injectable 25 Gram(s) IV Push once  ferrous    sulfate 325 milliGRAM(s) Oral daily  glucagon  Injectable 1 milliGRAM(s) IntraMuscular once  heparin   Injectable 5000 Unit(s) SubCutaneous every 8 hours  HYDROmorphone   Tablet 4 milliGRAM(s) Oral <User Schedule>  insulin lispro (ADMELOG) corrective regimen sliding scale   SubCutaneous every 6 hours  metoprolol succinate ER 25 milliGRAM(s) Oral daily  piperacillin/tazobactam IVPB.. 3.375 Gram(s) IV Intermittent every 12 hours  polyethylene glycol 3350 17 Gram(s) Oral daily  senna 2 Tablet(s) Oral at bedtime    MEDICATIONS  (PRN):  acetaminophen     Tablet .. 650 milliGRAM(s) Oral every 6 hours PRN Temp greater or equal to 38C (100.4F), Mild Pain (1 - 3)  aluminum hydroxide/magnesium hydroxide/simethicone Suspension 30 milliLiter(s) Oral every 4 hours PRN Dyspepsia  dextrose Oral Gel 15 Gram(s) Oral once PRN Blood Glucose LESS THAN 70 milliGRAM(s)/deciliter  melatonin 3 milliGRAM(s) Oral at bedtime PRN Insomnia  ondansetron Injectable 4 milliGRAM(s) IV Push every 8 hours PRN Nausea and/or Vomiting      Allergies    morphine (Other)    Intolerances        Social History:  Former cigarette smoker, smoked <1/2 ppd for less than 15 years, quit more than 50 years ago   Denies ETOH use   Denies drug use   Ambulates with a walker. Lives at home with daughter. ADLs with assistance. (2022 23:51)      REVIEW OF SYSTEMS:  CONSTITUTIONAL: No fever, No chills, + fatigue, No myalgia, + Body ache, + Weakness  EYES: No eye pain,  No visual disturbances, No discharge, NO Redness  ENMT:  No ear pain, No nose bleed, No vertigo; No sinus pain, NO throat pain, No Congestion  NECK: No pain, No stiffness  RESPIRATORY: No cough, NO wheezing, No  hemoptysis, NO  shortness of breath  CARDIOVASCULAR: No chest pain, palpitations  GASTROINTESTINAL: No abdominal pain, NO epigastric pain. No nausea, No vomiting; No diarrhea, No constipation. [  ] BM  GENITOURINARY: No dysuria, + frequency, No urgency, No hematuria, NO incontinence  NEUROLOGICAL: No headaches, No dizziness, No numbness, No tingling, No tremors, + weakness  EXT: No Swelling, No Pain, + b/l LE Edema  SKIN:  [ x ] No itching, burning, rashes, or lesions   MUSCULOSKELETAL: No joint pain ,No Jt swelling; No muscle pain, + back pain, No extremity pain  PSYCHIATRIC: No depression,  No anxiety,  No mood swings ,No difficulty sleeping at night  PAIN SCALE: [+  ] None  [  ] Other-  ROS Unable to obtain due to - [  ] Dementia  [  ] Lethargy [  ] Drowsy [  ] Sedated [  ] non verbal  REST OF REVIEW Of SYSTEM - [x  ] Normal     Vital Signs Last 24 Hrs  T(C): 36.9 (2022 06:17), Max: 37.3 (2022 21:21)  T(F): 98.4 (2022 06:17), Max: 99.2 (2022 21:21)  HR: 77 (2022 06:17) (77 - 88)  BP: 122/60 (2022 06:17) (105/57 - 154/88)  BP(mean): --  RR: 15 (2022 06:17) (15 - 18)  SpO2: 97% (2022 06:17) (94% - 97%)  Finger Stick          PHYSICAL EXAM:  GENERAL:  [  ] NAD , [  ] well appearing, [  ] Agitated, [  ] Drowsy,  [ x ] Lethargy, [  ] confused   HEAD:  [x  ] Normal, [  ] Other  EYES:  [  x] EOMI, [ x ] PERRLA, [ x ] conjunctiva and sclera clear normal, [  ] Other,  [  ] Pallor,[  ] Discharge  ENMT:  [x  ] Normal, [x  ] dry mucous membranes, [x  ] Good dentition, [x  ] No Thrush  NECK:  [  x] Supple, [x ] No JVD, [  ] Normal thyroid, [  ] Lymphadenopathy [  ] Other  CHEST/LUNG:  [  ] Clear to auscultation bilaterally, [x  ] Breath Sounds B/L / Decrease, [  ] poor effort  [x  ] No rales, [ x ] No rhonchi  [ x ]  No wheezing,   HEART:  [x  ] Regular rate and rhythm, [  ] tachycardia, [  ] Bradycardia,  [  ] irregular  [  ] No murmurs, No rubs, No gallops, [  ] PPM in place (Mfr:  )  ABDOMEN:  [  ] Soft, [  ] Nontender, [  ] Nondistended, [  ] No mass, [  ] Bowel sounds present, [  ] obese  NERVOUS SYSTEM:  [  ] Alert & Oriented X3, [  ] Nonfocal  [  ] Confusion  [  ] Encephalopathic [  ] Sedated [  ] Unable to assess, [  ] Dementia [  ] Other-  EXTREMITIES: [  ] 2+ Peripheral Pulses, No clubbing, No cyanosis,  [  ] edema B/L lower EXT. [  ] PVD stasis skin changes B/L Lower EXT, [  ] wound  LYMPH: No lymphadenopathy noted  SKIN:  [  ] No rashes or lesions, [  ] Pressure Ulcers, [  ] ecchymosis, [  ] Skin Tears, [  ] Other    DIET: Diet, NPO:   Except Medications (22 @ 00:25)      LABS:                        10.6   4.68  )-----------( 220      ( 2022 07:14 )             32.9     2022 07:14    135    |  100    |  29     ----------------------------<  100    4.1     |  27     |  2.10     Ca    8.8        2022 07:14  Phos  3.6       2022 07:14  Mg     2.1       2022 07:14    TPro  6.0    /  Alb  2.6    /  TBili  0.4    /  DBili  x      /  AST  22     /  ALT  12     /  AlkPhos  65     2022 07:14    PT/INR - ( 2022 19:05 )   PT: 12.2 sec;   INR: 1.04 ratio         PTT - ( 2022 19:05 )  PTT:28.6 sec  Urinalysis Basic - ( 2022 19:00 )    Color: Yellow / Appearance: Clear / S.010 / pH: x  Gluc: x / Ketone: Negative  / Bili: Negative / Urobili: Negative   Blood: x / Protein: 30 mg/dL / Nitrite: Negative   Leuk Esterase: Small / RBC: 25-50 /HPF / WBC 3-5   Sq Epi: x / Non Sq Epi: Few / Bacteria: Few                           Anemia Panel:      Thyroid Panel:        Lipase, Serum: 78 U/L (22 @ 19:05)          RADIOLOGY & ADDITIONAL TESTS:      HEALTH ISSUES - PROBLEM Dx:  Cholelithiasis    KERRY (acute kidney injury)    Fall    2019 novel coronavirus disease (COVID-19)    CAD (coronary artery disease)    HTN (hypertension)    Type 2 diabetes mellitus    Chronic back pain    Anxiety and depression        DVT prophylaxis    Pleural effusion    Anemia            Consultant(s) Notes Reviewed:  [  ] YES     Care Discussed with [X] Consultants  [  ] Patient  [  ] Family [  ] HCP [  ]   [  ] Social Service  [  ] RN, [  ] Physical Therapy,[  ] Palliative care team  DVT PPX: [  ] Lovenox, [  ] S C Heparin, [  ] Coumadin, [  ] Xarelto, [  ] Eliquis, [  ] Pradaxa, [  ] IV Heparin drip, [  ] SCD [  ] Contraindication 2 to GI Bleed,[  ] Ambulation [  ] Contraindicated 2 to  bleed [  ] Contraindicated 2 to Brain Bleed  Advanced directive: [  ] None, [  ] DNR/DNI Patient is a 89y old  Female who presents with a chief complaint of gallstones, covid, kerry (2022 08:21)    HPI:  89y F pmhx CAD x 2 stents (), T2DM, HTN, HLD, GERD, mild AS, chronic venous insufficiency, chronic neuropathy, macular degeneration, chronic constipation, depression, OA, and recent admission to [facility] - 3/2 for gallstone pancreatitis with non-operative treatment presented to the ED with constant nausea x 4 days associated with decreased PO intake, generalized weakness, and fall today x 1 due to LE weakness. Patient denies fever, chills, cp, dizziness, sob, abd pain, vomiting, diarrhea. Patient was seen here in ED  for epigastric pain (since resolved) and discharged with outpatient follow up with Dr. Terrell. Patient has seen Dr. Terrell twice since then and epigastric pain resolved s/p PO zofran and carafate with planned esophagram. Daughter states epigastric pain resolved, but patient has had constant nausea x 4 days that has not improved despite increasing PO zofran from 4mg to 8mg q6h. Patient was unable to obtain appointment with Dr. Terrell today and after falling at home due to leg weakness, daughter brought her to the ED.     ED Course  Vitals: 99.2F, 85bpm, 143/66, 95% RA  Labs: Hgb 10.2, Hct 32.5, Na 132, K 5.5, BUN 30, Cr 2, GFR 23, lipase wnl  EKG:    CT Chest and abd/pel: Moderate-sized bilateral pleural effusion with partial bilateral lower lobe atelectasis. Cholelithiasis with distended gallbladder and right upper quadrant inflammatory changes, findings suspicious for acute cholecystitis.    RUQ US: Cholelithiasis with nonspecific pericholecystic fluid. Negative sonographic Scott sign. Dilated common bowel duct. Recommend correlating with LFTs. Cannot exclude distal choledocholithiasis.    HEAD CT: Mild volume loss, microvascular disease, no acute hemorrhage or midline shift.    Patient Received: Zosyn x1, NS 500cc x1, Lasix 40mg IV x1, IV dilaudid .5mg x1           (2022 23:51)    INTERVAL HPI:  - pt seen and examined at bedside. Complaining of increased frequency of urination and suprapubic pain s/p 40mg IV lasix x1 in the ED. Otherwise, notes worsening of chronic back pain and general feeling of malaise. No sob, chest pain, nausea.      OVERNIGHT EVENTS: none    Home Medications:  amitriptyline 10 mg oral tablet: 3 tab(s) orally once a day (at bedtime) (:44)  aspirin 81 mg oral tablet: 1 tab(s) orally once a day (:44)  atorvastatin 40 mg oral tablet: 1 tab(s) orally once a day (:44)  Dilaudid 4 mg oral tablet: 1 tab(s) orally 3 times (:44)  ferrous sulfate 324 mg (65 mg elemental iron) oral delayed release tablet: 1 tab(s) orally once a day ()  ferrous sulfate 325 mg (65 mg elemental iron) oral tablet: 1 tablet oral daily (:44)  Metoprolol Succinate ER 25 mg oral tablet, extended release: 1 tab(s) orally once a day (44)  MiraLax oral powder for reconstitution: ng/kg orally (44)  PreserVision AREDS oral capsule: 1 tab(s) orally 2 times a day (:44)  Senna 8.6 mg oral tablet: 2 tab(s) orally once a day (at bedtime) (44)  Toprol-XL 25 mg oral tablet, extended release: 1 tab(s) orally once a day (:44)  Vitamin D3 1250 mcg (50,000 intl units) oral capsule: 1 cap(s) orally once a week (:44)  Zofran 4 mg oral tablet: 1 tab(s) orally every 6 hours (:44)      MEDICATIONS  (STANDING):  amitriptyline 30 milliGRAM(s) Oral at bedtime  aspirin  chewable 81 milliGRAM(s) Oral daily  atorvastatin 40 milliGRAM(s) Oral at bedtime  dextrose 5%. 1000 milliLiter(s) (50 mL/Hr) IV Continuous <Continuous>  dextrose 5%. 1000 milliLiter(s) (100 mL/Hr) IV Continuous <Continuous>  dextrose 50% Injectable 25 Gram(s) IV Push once  dextrose 50% Injectable 12.5 Gram(s) IV Push once  dextrose 50% Injectable 25 Gram(s) IV Push once  ferrous    sulfate 325 milliGRAM(s) Oral daily  glucagon  Injectable 1 milliGRAM(s) IntraMuscular once  heparin   Injectable 5000 Unit(s) SubCutaneous every 8 hours  HYDROmorphone   Tablet 4 milliGRAM(s) Oral <User Schedule>  insulin lispro (ADMELOG) corrective regimen sliding scale   SubCutaneous every 6 hours  metoprolol succinate ER 25 milliGRAM(s) Oral daily  piperacillin/tazobactam IVPB.. 3.375 Gram(s) IV Intermittent every 12 hours  polyethylene glycol 3350 17 Gram(s) Oral daily  senna 2 Tablet(s) Oral at bedtime    MEDICATIONS  (PRN):  acetaminophen     Tablet .. 650 milliGRAM(s) Oral every 6 hours PRN Temp greater or equal to 38C (100.4F), Mild Pain (1 - 3)  aluminum hydroxide/magnesium hydroxide/simethicone Suspension 30 milliLiter(s) Oral every 4 hours PRN Dyspepsia  dextrose Oral Gel 15 Gram(s) Oral once PRN Blood Glucose LESS THAN 70 milliGRAM(s)/deciliter  melatonin 3 milliGRAM(s) Oral at bedtime PRN Insomnia  ondansetron Injectable 4 milliGRAM(s) IV Push every 8 hours PRN Nausea and/or Vomiting      Allergies    morphine (Other)    Intolerances        Social History:  Former cigarette smoker, smoked <1/2 ppd for less than 15 years, quit more than 50 years ago   Denies ETOH use   Denies drug use   Ambulates with a walker. Lives at home with daughter. ADLs with assistance. (2022 23:51)      REVIEW OF SYSTEMS:  CONSTITUTIONAL: No fever, No chills, + fatigue, No myalgia, + Body ache, + Weakness  EYES: No eye pain,  No visual disturbances, No discharge, NO Redness  ENMT:  No ear pain, No nose bleed, No vertigo; No sinus pain, NO throat pain, No Congestion  NECK: No pain, No stiffness  RESPIRATORY: No cough, NO wheezing, No  hemoptysis, NO  shortness of breath  CARDIOVASCULAR: No chest pain, palpitations  GASTROINTESTINAL: No abdominal pain, NO epigastric pain. No nausea, No vomiting; No diarrhea, No constipation. [  ] BM  GENITOURINARY: No dysuria, + frequency, No urgency, No hematuria, NO incontinence  NEUROLOGICAL: No headaches, No dizziness, No numbness, No tingling, No tremors, + weakness  EXT: No Swelling, No Pain, + b/l LE Edema  SKIN:  [ x ] No itching, burning, rashes, or lesions   MUSCULOSKELETAL: No joint pain ,No Jt swelling; No muscle pain, + back pain, No extremity pain  PSYCHIATRIC: No depression,  No anxiety,  No mood swings ,No difficulty sleeping at night  PAIN SCALE: [+  ] None  [  ] Other-  ROS Unable to obtain due to - [  ] Dementia  [  ] Lethargy [  ] Drowsy [  ] Sedated [  ] non verbal  REST OF REVIEW Of SYSTEM - [x  ] Normal     Vital Signs Last 24 Hrs  T(C): 36.9 (2022 06:17), Max: 37.3 (2022 21:21)  T(F): 98.4 (2022 06:17), Max: 99.2 (2022 21:21)  HR: 77 (2022 06:17) (77 - 88)  BP: 122/60 (2022 06:17) (105/57 - 154/88)  BP(mean): --  RR: 15 (2022 06:17) (15 - 18)  SpO2: 97% (2022 06:17) (94% - 97%)  Finger Stick          PHYSICAL EXAM:  GENERAL:  [  ] NAD , [  ] well appearing, [  ] Agitated, [  ] Drowsy,  [ x ] Lethargy, [  ] confused   HEAD:  [x  ] Normal, [  ] Other  EYES:  [  x] EOMI, [ x ] PERRLA, [ x ] conjunctiva and sclera clear normal, [  ] Other,  [  ] Pallor,[  ] Discharge  ENMT:  [x  ] Normal, [x  ] dry mucous membranes, [x  ] Good dentition, [x  ] No Thrush  NECK:  [  x] Supple, [x ] No JVD, [  ] Normal thyroid, [  ] Lymphadenopathy [  ] Other  CHEST/LUNG:  [  ] Clear to auscultation bilaterally, [x  ] Breath Sounds B/L / Decrease, [  ] poor effort  [x  ] No rales, [ x ] No rhonchi  [ x ]  No wheezing,   HEART:  [x  ] Regular rate and rhythm, [  ] tachycardia, [  ] Bradycardia,  [  ] irregular  [x  ] No murmurs, No rubs, No gallops, [  ] PPM in place (Mfr:  )  ABDOMEN:  [x  ] Soft, [x  ] suprapubic tenderness to palpation, [x  ] Nondistended, [  ] No mass, [ x ] Bowel sounds present, [  ] obese  NERVOUS SYSTEM:  [ x ] Alert & Oriented X3, [ x ] Nonfocal  [  ] Confusion  [  ] Encephalopathic [  ] Sedated [  ] Unable to assess, [  ] Dementia [  ] Other-  EXTREMITIES: [x  ] 2+ Peripheral Pulses, No clubbing, No cyanosis,  [x  ] 3+ pitting edema B/L lower EXT. [  ] PVD stasis skin changes B/L Lower EXT, [  ] wound  LYMPH: No lymphadenopathy noted  SKIN:  [ x ] No rashes or lesions, [  ] Pressure Ulcers, [  ] ecchymosis, [  ] Skin Tears, [  ] Other    DIET: Diet, NPO:   Except Medications (22 @ 00:25)      LABS:                        10.6   4.68  )-----------( 220      ( 2022 07:14 )             32.9     2022 07:14    135    |  100    |  29     ----------------------------<  100    4.1     |  27     |  2.10     Ca    8.8        2022 07:14  Phos  3.6       2022 07:14  Mg     2.1       2022 07:14    TPro  6.0    /  Alb  2.6    /  TBili  0.4    /  DBili  x      /  AST  22     /  ALT  12     /  AlkPhos  65     2022 07:14    PT/INR - ( 2022 19:05 )   PT: 12.2 sec;   INR: 1.04 ratio         PTT - ( 2022 19:05 )  PTT:28.6 sec  Urinalysis Basic - ( 2022 19:00 )    Color: Yellow / Appearance: Clear / S.010 / pH: x  Gluc: x / Ketone: Negative  / Bili: Negative / Urobili: Negative   Blood: x / Protein: 30 mg/dL / Nitrite: Negative   Leuk Esterase: Small / RBC: 25-50 /HPF / WBC 3-5   Sq Epi: x / Non Sq Epi: Few / Bacteria: Few                           Anemia Panel:      Thyroid Panel:        Lipase, Serum: 78 U/L (22 @ 19:05)          RADIOLOGY & ADDITIONAL TESTS:      HEALTH ISSUES - PROBLEM Dx:  Cholelithiasis    KERRY (acute kidney injury)    Fall    2019 novel coronavirus disease (COVID-19)    CAD (coronary artery disease)    HTN (hypertension)    Type 2 diabetes mellitus    Chronic back pain    Anxiety and depression        DVT prophylaxis    Pleural effusion    Anemia            Consultant(s) Notes Reviewed:  [ x ] YES     Care Discussed with [X] Consultants  [ x ] Patient  [  x] Family [  ] HCP [  ]   [  ] Social Service  [  ] RN, [  ] Physical Therapy,[  ] Palliative care team  DVT PPX: [  ] Lovenox, [ x ] S C Heparin, [  ] Coumadin, [  ] Xarelto, [  ] Eliquis, [  ] Pradaxa, [  ] IV Heparin drip, [  ] SCD [  ] Contraindication 2 to GI Bleed,[  ] Ambulation [  ] Contraindicated 2 to  bleed [  ] Contraindicated 2 to Brain Bleed  Advanced directive: [  ] None, [  ] DNR/DNI Patient is a 89y old  Female who presents with a chief complaint of gallstones, covid, fidelina (2022 08:21)    HPI:  89y F pmhx CAD x 2 stents (), T2DM, HTN, HLD, GERD, mild AS, chronic venous insufficiency, chronic neuropathy, macular degeneration, chronic constipation, depression, OA, and recent admission to \[facility]\"" - 3/2 for gallstone pancreatitis with non-operative treatment presented to the ED with constant nausea x 4 days associated with decreased PO intake, generalized weakness, and fall today x 1 due to LE weakness. Patient denies fever, chills, cp, dizziness, sob, abd pain, vomiting, diarrhea. Patient was seen here in ED  for epigastric pain (since resolved) and discharged with outpatient follow up with Dr. Terrell. Patient has seen Dr. Terrell twice since then and epigastric pain resolved s/p PO zofran and carafate with planned esophagram. Daughter states epigastric pain resolved, but patient has had constant nausea x 4 days that has not improved despite increasing PO zofran from 4mg to 8mg q6h. Patient was unable to obtain appointment with Dr. Terrell today and after falling at home due to leg weakness, daughter brought her to the ED.     ED Course  Vitals: 99.2F, 85bpm, 143/66, 95% RA  Labs: Hgb 10.2, Hct 32.5, Na 132, K 5.5, BUN 30, Cr 2, GFR 23, lipase wnl  EKG:    CT Chest and abd/pel: Moderate-sized bilateral pleural effusion with partial bilateral lower lobe atelectasis. Cholelithiasis with distended gallbladder and right upper quadrant inflammatory changes, findings suspicious for acute cholecystitis.    RUQ US: Cholelithiasis with nonspecific pericholecystic fluid. Negative sonographic Scott sign. Dilated common bowel duct. Recommend correlating with LFTs. Cannot exclude distal choledocholithiasis.    HEAD CT: Mild volume loss, microvascular disease, no acute hemorrhage or midline shift.    Patient Received: Zosyn x1, NS 500cc x1, Lasix 40mg IV x1, IV dilaudid .5mg x1   (2022 23:51)    INTERVAL HPI:  - pt seen and examined at bedside. Complaining of increased frequency of urination and suprapubic pain s/p 40mg IV lasix x1 in the ED. Otherwise, notes worsening of chronic back pain and general feeling of malaise. No sob, chest pain, nausea. Fidelina, Anemia.HIDA scan today      OVERNIGHT EVENTS: none    Home Medications:  amitriptyline 10 mg oral tablet: 3 tab(s) orally once a day (at bedtime) (2022 23:44)  aspirin 81 mg oral tablet: 1 tab(s) orally once a day (:44)  atorvastatin 40 mg oral tablet: 1 tab(s) orally once a day (:44)  Dilaudid 4 mg oral tablet: 1 tab(s) orally 3 times (:44)  ferrous sulfate 324 mg (65 mg elemental iron) oral delayed release tablet: 1 tab(s) orally once a day (:44)  ferrous sulfate 325 mg (65 mg elemental iron) oral tablet: 1 tablet oral daily (:44)  Metoprolol Succinate ER 25 mg oral tablet, extended release: 1 tab(s) orally once a day (:44)  MiraLax oral powder for reconstitution: ng/kg orally (:44)  PreserVision AREDS oral capsule: 1 tab(s) orally 2 times a day (:44)  Senna 8.6 mg oral tablet: 2 tab(s) orally once a day (at bedtime) (:44)  Toprol-XL 25 mg oral tablet, extended release: 1 tab(s) orally once a day (:44)  Vitamin D3 1250 mcg (50,000 intl units) oral capsule: 1 cap(s) orally once a week (:44)  Zofran 4 mg oral tablet: 1 tab(s) orally every 6 hours (:44)      MEDICATIONS  (STANDING):  amitriptyline 30 milliGRAM(s) Oral at bedtime  aspirin  chewable 81 milliGRAM(s) Oral daily  atorvastatin 40 milliGRAM(s) Oral at bedtime  dextrose 5%. 1000 milliLiter(s) (50 mL/Hr) IV Continuous <Continuous>  dextrose 5%. 1000 milliLiter(s) (100 mL/Hr) IV Continuous <Continuous>  dextrose 50% Injectable 25 Gram(s) IV Push once  dextrose 50% Injectable 12.5 Gram(s) IV Push once  dextrose 50% Injectable 25 Gram(s) IV Push once  ferrous    sulfate 325 milliGRAM(s) Oral daily  glucagon  Injectable 1 milliGRAM(s) IntraMuscular once  heparin   Injectable 5000 Unit(s) SubCutaneous every 8 hours  HYDROmorphone   Tablet 4 milliGRAM(s) Oral <User Schedule>  insulin lispro (ADMELOG) corrective regimen sliding scale   SubCutaneous every 6 hours  metoprolol succinate ER 25 milliGRAM(s) Oral daily  piperacillin/tazobactam IVPB.. 3.375 Gram(s) IV Intermittent every 12 hours  polyethylene glycol 3350 17 Gram(s) Oral daily  senna 2 Tablet(s) Oral at bedtime    MEDICATIONS  (PRN):  acetaminophen     Tablet .. 650 milliGRAM(s) Oral every 6 hours PRN Temp greater or equal to 38C (100.4F), Mild Pain (1 - 3)  aluminum hydroxide/magnesium hydroxide/simethicone Suspension 30 milliLiter(s) Oral every 4 hours PRN Dyspepsia  dextrose Oral Gel 15 Gram(s) Oral once PRN Blood Glucose LESS THAN 70 milliGRAM(s)/deciliter  melatonin 3 milliGRAM(s) Oral at bedtime PRN Insomnia  ondansetron Injectable 4 milliGRAM(s) IV Push every 8 hours PRN Nausea and/or Vomiting      Allergies    morphine (Other)    Intolerances        Social History:  Former cigarette smoker, smoked <1/2 ppd for less than 15 years, quit more than 50 years ago   Denies ETOH use   Denies drug use   Ambulates with a walker. Lives at home with daughter. ADLs with assistance. (2022 23:51)      REVIEW OF SYSTEMS: i am OK  CONSTITUTIONAL: No fever, No chills, + fatigue, No myalgia, + Body ache, + Weakness  EYES: No eye pain,  No visual disturbances, No discharge, NO Redness  ENMT:  No ear pain, No nose bleed, No vertigo; No sinus pain, NO throat pain, No Congestion  NECK: No pain, No stiffness  RESPIRATORY: No cough, NO wheezing, No  hemoptysis, NO  shortness of breath  CARDIOVASCULAR: No chest pain, palpitations  GASTROINTESTINAL: No abdominal pain, NO epigastric pain. No nausea, No vomiting; No diarrhea, No constipation. [  ] BM  GENITOURINARY: No dysuria, + frequency, No urgency, No hematuria, NO incontinence  NEUROLOGICAL: No headaches, No dizziness, No numbness, No tingling, No tremors, + weakness  EXT: No Swelling, No Pain, + b/l LE Edema  SKIN:  [ x ] No itching, burning, rashes, or lesions   MUSCULOSKELETAL: No joint pain ,No Jt swelling; No muscle pain, + back pain, No extremity pain  PSYCHIATRIC: No depression,  No anxiety,  No mood swings ,No difficulty sleeping at night  PAIN SCALE: [+  ] None  [  ] Other-  ROS Unable to obtain due to - [  ] Dementia  [  ] Lethargy [  ] Drowsy [  ] Sedated [  ] non verbal  REST OF REVIEW Of SYSTEM - [x  ] Normal     Vital Signs Last 24 Hrs  T(C): 36.9 (2022 06:17), Max: 37.3 (2022 21:21)  T(F): 98.4 (2022 06:17), Max: 99.2 (2022 21:21)  HR: 77 (2022 06:17) (77 - 88)  BP: 122/60 (2022 06:17) (105/57 - 154/88)  BP(mean): --  RR: 15 (2022 06:17) (15 - 18)  SpO2: 97% (2022 06:17) (94% - 97%)  Finger Stick          PHYSICAL EXAM:  GENERAL:  [ x ] NAD , [ x ] well appearing, [  ] Agitated, [ x ] Drowsy,  [  ] Lethargy, [  ] confused   HEAD:  [x  ] Normal, [  ] Other  EYES:  [ x] EOMI, [ x ] PERRLA, [ x ] conjunctiva and sclera clear normal, [  ] Other,  [  ] Pallor,[  ] Discharge  ENMT:  [x  ] Normal, [x  ] dry mucous membranes, [x  ] Good dentition, [x  ] No Thrush  NECK:  [  x] Supple, [x ] No JVD, [ x ] Normal thyroid, [  ] Lymphadenopathy [  ] Other  CHEST/LUNG:  [  ] Clear to auscultation bilaterally, [x  ] Breath Sounds B/L / Decrease, [ x ] poor effort  [x  ] No rales, [ x ] No rhonchi  [ x ]  No wheezing,   HEART:  [x  ] Regular rate and rhythm, [  ] tachycardia, [  ] Bradycardia,  [  ] irregular  [x  ] No murmurs, No rubs, No gallops, [  ] PPM in place (Mfr:  )  ABDOMEN:  [x  ] Soft, [x  ] suprapubic tenderness to palpation, [x  ] Nondistended, [ x ] No mass, [ x ] Bowel sounds present, [  ] obese  NERVOUS SYSTEM:  [ x ] Alert & Oriented X3, [ x ] Nonfocal  [ x ] Confusion  [  ] Encephalopathic [  ] Sedated [  ] Unable to assess, [  ] Dementia [  ] Other-  EXTREMITIES: [x  ] 2+ Peripheral Pulses, No clubbing, No cyanosis,  [x  ] 2+ pitting edema B/L lower EXT. [  ] PVD stasis skin changes B/L Lower EXT, [  ] wound  LYMPH: No lymphadenopathy noted  SKIN:  [ x ] No rashes or lesions, [  ] Pressure Ulcers, [x  ] ecchymosis- B/L Lower ext , [  ] Skin Tears, [  ] Other    DIET: Diet, NPO:   Except Medications (22 @ 00:25)      LABS:                        10.6   4.68  )-----------( 220      ( 2022 07:14 )             32.9     2022 07:14    135    |  100    |  29     ----------------------------<  100    4.1     |  27     |  2.10     Ca    8.8        2022 07:14  Phos  3.6       2022 07:14  Mg     2.1       2022 07:14    TPro  6.0    /  Alb  2.6    /  TBili  0.4    /  DBili  x      /  AST  22     /  ALT  12     /  AlkPhos  65     2022 07:14    PT/INR - ( 2022 19:05 )   PT: 12.2 sec;   INR: 1.04 ratio         PTT - ( 2022 19:05 )  PTT:28.6 sec  Urinalysis Basic - ( 2022 19:00 )    Color: Yellow / Appearance: Clear / S.010 / pH: x  Gluc: x / Ketone: Negative  / Bili: Negative / Urobili: Negative   Blood: x / Protein: 30 mg/dL / Nitrite: Negative   Leuk Esterase: Small / RBC: 25-50 /HPF / WBC 3-5   Sq Epi: x / Non Sq Epi: Few / Bacteria: Few      Lipase, Serum: 78 U/L (22 @ 19:05)      RADIOLOGY & ADDITIONAL TESTS:  < from: NM Hepatobiliary Imaging (22 @ 11:35) >  INTERPRETATION:  RADIOPHARMACEUTICAL: 3.2 mCi Tc-99m-Mebrofenin, I.V.    CLINICAL INFORMATION: 89 year old female with cholelithiasis, nausea, and   distended gallbladder with pericholecystic fluid on CT; referred to   evaluate for acute cholecystitis.    TECHNIQUE:  Dynamic imaging of the anterior abdomen was performed for   approximately 20 minutes following radiopharmaceutical injection. The   patient was unable to cooperate for continued dynamic imaging and   consequently static images of the abdomen were obtained in the anterior   and right anterior oblique projections at approximately 1 hour.    COMPARISON: Hepatobiliary scan performed on 2022 was reviewed.    FINDINGS: There is prompt, homogeneous uptake of radiopharmaceutical by   the hepatocytes. The gallbladder and bowel are visualized on the one hour   images. There is good clearance of activity from the liver by the end of   the study.    IMPRESSION: Normal hepatobiliary scan. No radionuclide evidence of acute   cholecystitis.    No significant interval change since the previous hepatobiliary scan of   2022.    --- End of Report ---        < end of copied text >  < from: US Duplex Venous Lower Ext Complete, Bilateral (22 @ 22:03) >    TECHNIQUE: Duplex sonography of the BILATERAL LOWER extremity veins with   color and spectral Doppler, with and without compression.    FINDINGS:    RIGHT:  Normal compressibility of the RIGHT common femoral, femoral and popliteal   veins.  Doppler examination shows normal spontaneous andphasic flow.  No RIGHT calf vein thrombosis is detected.  Subcutaneous edema in the right calf.    LEFT:  Normal compressibility of the LEFT common femoral, femoral and popliteal   veins.  Doppler examination shows normal spontaneous and phasic flow.  No LEFT calf vein thrombosis is detected.    IMPRESSION:  No evidence of deep venous thrombosis in either lower extremity.      < end of copied text >      HEALTH ISSUES - PROBLEM Dx:  Cholelithiasis    FIDELINA (acute kidney injury)    Fall    2019 novel coronavirus disease (COVID-19)    CAD (coronary artery disease)    HTN (hypertension)    Type 2 diabetes mellitus    Chronic back pain    Anxiety and depression    Sundowning    DVT prophylaxis    Pleural effusion    Anemia      Consultant(s) Notes Reviewed:  [ x ] YES     Care Discussed with [X] Consultants  [ x ] Patient  [  x] Family [  ] HCP [  ]   [  ] Social Service  [  ] RN, [  ] Physical Therapy,[  ] Palliative care team  DVT PPX: [  ] Lovenox, [ x ] S C Heparin, [  ] Coumadin, [  ] Xarelto, [  ] Eliquis, [  ] Pradaxa, [  ] IV Heparin drip, [  ] SCD [  ] Contraindication 2 to GI Bleed,[  ] Ambulation [  ] Contraindicated 2 to  bleed [  ] Contraindicated 2 to Brain Bleed  Advanced directive: [  ] None, [  ] DNR/DNI

## 2022-07-06 NOTE — CONSULT NOTE ADULT - SUBJECTIVE AND OBJECTIVE BOX
[facility]         Casper Mcgee, Hai, Briana, Connor, Eligio, Damon        [phone] (office)    Reason for Consult: chest pain    Interval HPI: Patient seen and examined at bedside. Patient A&O3 but appears to be a limited historian. Patient states she has had intermittent substernal chest pain that lasts a few minutes at a time. States it is unchanged with rest/exertion. Feels it sometimes worsens with inspiration. Also admits to abdominal pain similar to previous episodes. Patient denies SOB, palpitations.     HPI:  89y F pmhx CAD x 2 stents (2012), T2DM, HTN, HLD, GERD, mild AS, chronic venous insufficiency, chronic neuropathy, macular degeneration, chronic constipation, depression, OA, and recent admission to [facility] 2/25- 3/2 for gallstone pancreatitis with non-operative treatment presented to the ED with constant nausea x 4 days associated with decreased PO intake, generalized weakness, and fall today x 1 due to LE weakness. Patient denies fever, chills, cp, dizziness, sob, abd pain, vomiting, diarrhea. Patient was seen here in ED 6/20 for epigastric pain (since resolved) and discharged with outpatient follow up with Dr. Terrell. Patient has seen Dr. Terrell twice since then and epigastric pain resolved s/p PO zofran and carafate with planned esophagram. Daughter states epigastric pain resolved, but patient has had constant nausea x 4 days that has not improved despite increasing PO zofran from 4mg to 8mg q6h. Patient was unable to obtain appointment with Dr. Terrell today and after falling at home due to leg weakness, daughter brought her to the ED.     ED Course  Vitals: 99.2F, 85bpm, 143/66, 95% RA  Labs: Hgb 10.2, Hct 32.5, Na 132, K 5.5, BUN 30, Cr 2, GFR 23, lipase wnl  EKG:    CT Chest and abd/pel: Moderate-sized bilateral pleural effusion with partial bilateral lower lobe atelectasis. Cholelithiasis with distended gallbladder and right upper quadrant inflammatory changes, findings suspicious for acute cholecystitis.    RUQ US: Cholelithiasis with nonspecific pericholecystic fluid. Negative sonographic Scott sign. Dilated common bowel duct. Recommend correlating with LFTs. Cannot exclude distal choledocholithiasis.    HEAD CT: Mild volume loss, microvascular disease, no acute hemorrhage or midline shift.    Patient Received: Zosyn x1, NS 500cc x1, Lasix 40mg IV x1, IV dilaudid .5mg x1           (05 Jul 2022 23:51)      PAST MEDICAL & SURGICAL HISTORY:  H/O vertebral fracture repair      History of vertebral fracture      Dyslipidemia      DM type 2 with diabetic dyslipidemia      H/O gastroesophageal reflux (GERD)      Costochondritis      Coronary arteriosclerosis in native artery  s/p 2 stents. last placed 2 years ago      Gastroparesis      History of laminectomy      S/P hip hemiarthroplasty      S/P appendectomy          SOCIAL HISTORY: No active tobacco, alcohol or illicit drug use    FAMILY HISTORY:  FHx: stroke (Mother)        Home Medications:  amitriptyline 10 mg oral tablet: 3 tab(s) orally once a day (at bedtime) (05 Jul 2022 23:44)  aspirin 81 mg oral tablet: 1 tab(s) orally once a day (05 Jul 2022 23:44)  atorvastatin 40 mg oral tablet: 1 tab(s) orally once a day (05 Jul 2022 23:44)  Dilaudid 4 mg oral tablet: 1 tab(s) orally 3 times (05 Jul 2022 23:44)  ferrous sulfate 324 mg (65 mg elemental iron) oral delayed release tablet: 1 tab(s) orally once a day (05 Jul 2022 23:44)  ferrous sulfate 325 mg (65 mg elemental iron) oral tablet: 1 tablet oral daily (05 Jul 2022 23:44)  Metoprolol Succinate ER 25 mg oral tablet, extended release: 1 tab(s) orally once a day (05 Jul 2022 23:44)  MiraLax oral powder for reconstitution: ng/kg orally (05 Jul 2022 23:44)  PreserVision AREDS oral capsule: 1 tab(s) orally 2 times a day (05 Jul 2022 23:44)  Senna 8.6 mg oral tablet: 2 tab(s) orally once a day (at bedtime) (05 Jul 2022 23:44)  Toprol-XL 25 mg oral tablet, extended release: 1 tab(s) orally once a day (05 Jul 2022 23:44)  Vitamin D3 1250 mcg (50,000 intl units) oral capsule: 1 cap(s) orally once a week (05 Jul 2022 23:44)  Zofran 4 mg oral tablet: 1 tab(s) orally every 6 hours (05 Jul 2022 23:44)      MEDICATIONS  (STANDING):  amitriptyline 30 milliGRAM(s) Oral at bedtime  aspirin  chewable 81 milliGRAM(s) Oral daily  atorvastatin 40 milliGRAM(s) Oral at bedtime  dextrose 5%. 1000 milliLiter(s) (100 mL/Hr) IV Continuous <Continuous>  dextrose 5%. 1000 milliLiter(s) (50 mL/Hr) IV Continuous <Continuous>  dextrose 50% Injectable 25 Gram(s) IV Push once  dextrose 50% Injectable 12.5 Gram(s) IV Push once  dextrose 50% Injectable 25 Gram(s) IV Push once  ferrous    sulfate 325 milliGRAM(s) Oral daily  glucagon  Injectable 1 milliGRAM(s) IntraMuscular once  heparin   Injectable 5000 Unit(s) SubCutaneous every 8 hours  HYDROmorphone   Tablet 4 milliGRAM(s) Oral <User Schedule>  insulin lispro (ADMELOG) corrective regimen sliding scale   SubCutaneous three times a day before meals  insulin lispro (ADMELOG) corrective regimen sliding scale   SubCutaneous at bedtime  metoprolol succinate ER 25 milliGRAM(s) Oral daily  piperacillin/tazobactam IVPB.. 3.375 Gram(s) IV Intermittent every 12 hours  polyethylene glycol 3350 17 Gram(s) Oral daily  senna 2 Tablet(s) Oral at bedtime    MEDICATIONS  (PRN):  acetaminophen     Tablet .. 650 milliGRAM(s) Oral every 6 hours PRN Temp greater or equal to 38C (100.4F), Mild Pain (1 - 3)  aluminum hydroxide/magnesium hydroxide/simethicone Suspension 30 milliLiter(s) Oral every 4 hours PRN Dyspepsia  dextrose Oral Gel 15 Gram(s) Oral once PRN Blood Glucose LESS THAN 70 milliGRAM(s)/deciliter  melatonin 3 milliGRAM(s) Oral at bedtime PRN Insomnia  ondansetron Injectable 4 milliGRAM(s) IV Push every 8 hours PRN Nausea and/or Vomiting      Allergies    morphine (Other)    Intolerances        REVIEW OF SYSTEMS: Negative except as per HPI.    VITAL SIGNS:   Vital Signs Last 24 Hrs  T(C): 37.1 (06 Jul 2022 12:30), Max: 37.3 (05 Jul 2022 21:21)  T(F): 98.8 (06 Jul 2022 12:30), Max: 99.2 (05 Jul 2022 21:21)  HR: 74 (06 Jul 2022 12:30) (69 - 88)  BP: 127/61 (06 Jul 2022 12:30) (105/57 - 154/88)  BP(mean): --  RR: 17 (06 Jul 2022 12:30) (15 - 18)  SpO2: 96% (06 Jul 2022 12:30) (94% - 97%)    I&O's Summary    06 Jul 2022 07:01  -  06 Jul 2022 14:16  --------------------------------------------------------  IN: 0 mL / OUT: 1000 mL / NET: -1000 mL        PHYSICAL EXAM:  Constitutional: NAD, well-developed  HEENT NC/AT, moist mucous membranes  Pulmonary: decreased bibasilar breath sounds, no rales, rhonchi, wheezes  Cardiovascular: +S1, S2, RRR, no murmurs  Gastrointestinal: soft, tenderness to palpation suprapubically, not distended, no rebound/guarding  Extremities: 1-2+ b/l LE dependent pitting edema, R>L, chronic appearing venous changes R>L  Neurological: Alert, strength and sensitivity are grossly intact  Skin: as above  Psych: Mood & affect appropriate    LABS: All Labs Reviewed:                        10.6   4.68  )-----------( 220      ( 06 Jul 2022 07:14 )             32.9                         10.2   6.64  )-----------( 229      ( 05 Jul 2022 19:05 )             32.5     06 Jul 2022 07:14    135    |  100    |  29     ----------------------------<  100    4.1     |  27     |  2.10   05 Jul 2022 19:05    132    |  99     |  30     ----------------------------<  97     5.5     |  26     |  2.00     Ca    8.8        06 Jul 2022 07:14  Ca    8.9        05 Jul 2022 19:05  Phos  3.6       06 Jul 2022 07:14  Mg     2.1       06 Jul 2022 07:14    TPro  6.0    /  Alb  2.6    /  TBili  0.4    /  DBili  x      /  AST  22     /  ALT  12     /  AlkPhos  65     06 Jul 2022 07:14  TPro  5.9    /  Alb  2.7    /  TBili  0.3    /  DBili  x      /  AST  30     /  ALT  10     /  AlkPhos  67     05 Jul 2022 19:05    PT/INR - ( 05 Jul 2022 19:05 )   PT: 12.2 sec;   INR: 1.04 ratio         PTT - ( 05 Jul 2022 19:05 )  PTT:28.6 sec      Blood Culture:         EKG: NSR 80    RADIOLOGY:  < from: NM Hepatobiliary Imaging (07.06.22 @ 11:35) >    ACC: [id number] EXAM:  NM HEPATOBILIARY IMG                          PROCEDURE DATE:  07/06/2022          INTERPRETATION:  RADIOPHARMACEUTICAL: 3.2 mCi Tc-99m-Mebrofenin, I.V.    CLINICAL INFORMATION: 89 year old female with cholelithiasis, nausea, and   distended gallbladder with pericholecystic fluid on CT; referred to   evaluate for acute cholecystitis.    TECHNIQUE:  Dynamic imaging of the anterior abdomen was performed for   approximately 20 minutes following radiopharmaceutical injection. The   patient was unable to cooperate for continued dynamic imaging and   consequently static images of the abdomen were obtained in the anterior   and right anterior oblique projections at approximately 1 hour.    COMPARISON: Hepatobiliary scan performed on 2/25/2022 was reviewed.    FINDINGS: There is prompt, homogeneous uptake of radiopharmaceutical by   the hepatocytes. The gallbladder and bowel are visualized on the one hour   images. There is good clearance of activity from the liver by the end of   the study.    IMPRESSION: Normal hepatobiliary scan. No radionuclide evidence of acute   cholecystitis.    No significant interval change since the previous hepatobiliary scan of   2/25/2022.    --- End of Report ---      < end of copied text >  < from: US Duplex Venous Lower Ext Complete, Bilateral (07.05.22 @ 22:03) >    ACC: [id number] EXAM:  US DPLX LWR EXT VEINS COMPL BI                          PROCEDURE DATE:  07/05/2022          INTERPRETATION:  CLINICAL INFORMATION: Bilateral leg swelling    COMPARISON: None available.    TECHNIQUE: Duplex sonography of the BILATERAL LOWER extremity veins with   color and spectral Doppler, with and without compression.    FINDINGS:    RIGHT:  Normal compressibility of the RIGHT common femoral, femoral and popliteal   veins.  Doppler examination shows normal spontaneous andphasic flow.  No RIGHT calf vein thrombosis is detected.  Subcutaneous edema in the right calf.    LEFT:  Normal compressibility of the LEFT common femoral, femoral and popliteal   veins.  Doppler examination shows normal spontaneous and phasic flow.  No LEFT calf vein thrombosis is detected.    IMPRESSION:  No evidence of deep venous thrombosis in either lower extremity.          --- End of Report ---      < end of copied text >  < from: US Abdomen Upper Quadrant Right (07.05.22 @ 21:10) >    ACC: [id number] EXAM:  US ABDOMEN RT UPR QUADRANT                          PROCEDURE DATE:  07/05/2022          INTERPRETATION:  CLINICAL INFORMATION: Nausea    COMPARISON: CT abdomen pelvis from earlier the same day.    TECHNIQUE: Sonography of the right upper quadrant.    FINDINGS:  Liver: Within normal limits.  Bile ducts: Normal caliber. Common bile duct measures 12 mm.  Gallbladder: Distended gallbladder containing stones and sludge.   Pericholecystic fluid is noted. Sonographic Scott sign is negative.  Pancreas: Visualized portions are within normal limits.  Right kidney: 9.8 cm. Mild hydronephrosis..  Ascites: None.  IVC: Visualized portions are within normal limits.  Miscellaneous: Right pleural effusion noted.    IMPRESSION:  Cholelithiasis with nonspecific pericholecystic fluid. Negative   sonographic Scott sign.    Dilated common bowel duct. Recommend correlating with LFTs. Cannot   exclude distal choledocholithiasis.    --- End of Report ---    < end of copied text >  < from: CT Chest No Cont (07.05.22 @ 19:46) >    ACC: [id number] EXAM:  CT ABDOMEN AND PELVIS                        ACC: [id number] EXAM:  CT CHEST                          PROCEDURE DATE:  07/05/2022          INTERPRETATION:  CLINICAL INFORMATION: Epigastric abdominal pain and   chest pain.    COMPARISON: CT angiogram chest abdomen pelvis 6/20/2022.    CONTRAST/COMPLICATIONS:  IV Contrast: NONE  0 cc administered   0 cc discarded  Oral Contrast: NONE  Complications: None reported at time of study completion    PROCEDURE:  CT of the Chest, Abdomen and Pelvis was performed.  Sagittal and coronal reformats were performed.    FINDINGS:    CHEST:    LUNGS AND LARGE AIRWAYS:  PLEURA:  There are moderate-sized bilateral pleural effusions with partial   atelectasis bilateral lower lobes.    The central airways are patent.    VESSELS: Atherosclerotic changes thoracic aorta with coronary artery   calcification.    HEART: Heart size is normal. Dense mitral calcification.  No pericardial effusion.    MEDIASTINUM AND VAL:  No enlarged lymphadenopathy.    CHEST WALL AND LOWER NECK: Within normal limits.    ABDOMEN AND PELVIS:    Streak artifact degrades image quality.    The evaluation of the solid organ parenchyma is limited without   intravenous contrast.    LIVER: Within normal limits.  BILE DUCTS: Normal caliber.  GALLBLADDER: Mild gallbladder distention with cholelithiasis.  Right upper quadrant stranding.  SPLEEN: Within normal limits.  PANCREAS: Atrophic.  ADRENALS: Within normal limits.  KIDNEYS/URETERS:  Punctate nonobstructing intrarenal calcification upper pole left kidney.  Right-sided hydronephrosis.    BLADDER: Bladder wall thickening.  REPRODUCTIVE ORGANS: No pelvic mass.    BOWEL:  Evaluation of the stomach is limited without distention.  Prominent colonic fecal retentionwith mild distention of the colon; no   bowel obstruction.  Mild perirectal and presacral stranding.  Sigmoid diverticulosis, without CT evidence of diverticulitis.  PERITONEUM:  Small perihepatic ascites.    VESSELS: Within normal limits.  RETROPERITONEUM/LYMPH NODES: Atherosclerotic changes.    ABDOMINAL WALL:  Small fat-containing umbilical hernia.  Subcutaneous edema.    BONES:  Degenerative changes.  Vertebroplasty with compression deformities T11 and T12 vertebral bodies.  Chronic compression deformity L5 vertebral body.  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  Chronic deformity of the sternum.    IMPRESSION:    Moderate-sized bilateral pleural effusion with partial bilateral lower   lobe atelectasis.      Cholelithiasis with distended gallbladder and right upper quadrant   inflammatory changes, findings suspicious for acute cholecystitis.  Recommend further evaluation with right upper quadrant ultrasonography.    Other findings as discussed above.    --- End of Report ---    < end of copied text >      CXR performed, official read pending   [facility]         Casper Mcgee, Hai, Briana, Connor, Eligio, Damon        [phone] (office)    Reason for Consult: chest pain    Interval HPI: Patient seen and examined at bedside. Patient A&O3 but appears to be a limited historian. Patient states she has had intermittent substernal chest pain that lasts a few minutes at a time. States it is unchanged with rest/exertion. Feels it sometimes worsens with inspiration. Also admits to abdominal pain similar to previous episodes. Patient denies SOB, palpitations.     HPI:  89y F pmhx CAD x 2 stents (2012), T2DM, HTN, HLD, GERD, mild AS, chronic venous insufficiency, chronic neuropathy, macular degeneration, chronic constipation, depression, OA, and recent admission to [facility] 2/25- 3/2 for gallstone pancreatitis with non-operative treatment presented to the ED with constant nausea x 4 days associated with decreased PO intake, generalized weakness, and fall today x 1 due to LE weakness. Patient denies fever, chills, cp, dizziness, sob, abd pain, vomiting, diarrhea. Patient was seen here in ED 6/20 for epigastric pain (since resolved) and discharged with outpatient follow up with Dr. Terrell. Patient has seen Dr. Terrell twice since then and epigastric pain resolved s/p PO zofran and carafate with planned esophagram. Daughter states epigastric pain resolved, but patient has had constant nausea x 4 days that has not improved despite increasing PO zofran from 4mg to 8mg q6h. Patient was unable to obtain appointment with Dr. Terrell today and after falling at home due to leg weakness, daughter brought her to the ED.     ED Course  Vitals: 99.2F, 85bpm, 143/66, 95% RA  Labs: Hgb 10.2, Hct 32.5, Na 132, K 5.5, BUN 30, Cr 2, GFR 23, lipase wnl  EKG:    CT Chest and abd/pel: Moderate-sized bilateral pleural effusion with partial bilateral lower lobe atelectasis. Cholelithiasis with distended gallbladder and right upper quadrant inflammatory changes, findings suspicious for acute cholecystitis.    RUQ US: Cholelithiasis with nonspecific pericholecystic fluid. Negative sonographic Scott sign. Dilated common bowel duct. Recommend correlating with LFTs. Cannot exclude distal choledocholithiasis.    HEAD CT: Mild volume loss, microvascular disease, no acute hemorrhage or midline shift.    Patient Received: Zosyn x1, NS 500cc x1, Lasix 40mg IV x1, IV dilaudid .5mg x1           (05 Jul 2022 23:51)      PAST MEDICAL & SURGICAL HISTORY:  H/O vertebral fracture repair      History of vertebral fracture      Dyslipidemia      DM type 2 with diabetic dyslipidemia      H/O gastroesophageal reflux (GERD)      Costochondritis      Coronary arteriosclerosis in native artery  s/p 2 stents. last placed 2 years ago      Gastroparesis      History of laminectomy      S/P hip hemiarthroplasty      S/P appendectomy          SOCIAL HISTORY: No active tobacco, alcohol or illicit drug use    FAMILY HISTORY:  FHx: stroke (Mother)        Home Medications:  amitriptyline 10 mg oral tablet: 3 tab(s) orally once a day (at bedtime) (05 Jul 2022 23:44)  aspirin 81 mg oral tablet: 1 tab(s) orally once a day (05 Jul 2022 23:44)  atorvastatin 40 mg oral tablet: 1 tab(s) orally once a day (05 Jul 2022 23:44)  Dilaudid 4 mg oral tablet: 1 tab(s) orally 3 times (05 Jul 2022 23:44)  ferrous sulfate 324 mg (65 mg elemental iron) oral delayed release tablet: 1 tab(s) orally once a day (05 Jul 2022 23:44)  ferrous sulfate 325 mg (65 mg elemental iron) oral tablet: 1 tablet oral daily (05 Jul 2022 23:44)  Metoprolol Succinate ER 25 mg oral tablet, extended release: 1 tab(s) orally once a day (05 Jul 2022 23:44)  MiraLax oral powder for reconstitution: ng/kg orally (05 Jul 2022 23:44)  PreserVision AREDS oral capsule: 1 tab(s) orally 2 times a day (05 Jul 2022 23:44)  Senna 8.6 mg oral tablet: 2 tab(s) orally once a day (at bedtime) (05 Jul 2022 23:44)  Toprol-XL 25 mg oral tablet, extended release: 1 tab(s) orally once a day (05 Jul 2022 23:44)  Vitamin D3 1250 mcg (50,000 intl units) oral capsule: 1 cap(s) orally once a week (05 Jul 2022 23:44)  Zofran 4 mg oral tablet: 1 tab(s) orally every 6 hours (05 Jul 2022 23:44)      MEDICATIONS  (STANDING):  amitriptyline 30 milliGRAM(s) Oral at bedtime  aspirin  chewable 81 milliGRAM(s) Oral daily  atorvastatin 40 milliGRAM(s) Oral at bedtime  dextrose 5%. 1000 milliLiter(s) (100 mL/Hr) IV Continuous <Continuous>  dextrose 5%. 1000 milliLiter(s) (50 mL/Hr) IV Continuous <Continuous>  dextrose 50% Injectable 25 Gram(s) IV Push once  dextrose 50% Injectable 12.5 Gram(s) IV Push once  dextrose 50% Injectable 25 Gram(s) IV Push once  ferrous    sulfate 325 milliGRAM(s) Oral daily  glucagon  Injectable 1 milliGRAM(s) IntraMuscular once  heparin   Injectable 5000 Unit(s) SubCutaneous every 8 hours  HYDROmorphone   Tablet 4 milliGRAM(s) Oral <User Schedule>  insulin lispro (ADMELOG) corrective regimen sliding scale   SubCutaneous three times a day before meals  insulin lispro (ADMELOG) corrective regimen sliding scale   SubCutaneous at bedtime  metoprolol succinate ER 25 milliGRAM(s) Oral daily  piperacillin/tazobactam IVPB.. 3.375 Gram(s) IV Intermittent every 12 hours  polyethylene glycol 3350 17 Gram(s) Oral daily  senna 2 Tablet(s) Oral at bedtime    MEDICATIONS  (PRN):  acetaminophen     Tablet .. 650 milliGRAM(s) Oral every 6 hours PRN Temp greater or equal to 38C (100.4F), Mild Pain (1 - 3)  aluminum hydroxide/magnesium hydroxide/simethicone Suspension 30 milliLiter(s) Oral every 4 hours PRN Dyspepsia  dextrose Oral Gel 15 Gram(s) Oral once PRN Blood Glucose LESS THAN 70 milliGRAM(s)/deciliter  melatonin 3 milliGRAM(s) Oral at bedtime PRN Insomnia  ondansetron Injectable 4 milliGRAM(s) IV Push every 8 hours PRN Nausea and/or Vomiting      Allergies    morphine (Other)    Intolerances        REVIEW OF SYSTEMS: Negative except as per HPI.    VITAL SIGNS:   Vital Signs Last 24 Hrs  T(C): 37.1 (06 Jul 2022 12:30), Max: 37.3 (05 Jul 2022 21:21)  T(F): 98.8 (06 Jul 2022 12:30), Max: 99.2 (05 Jul 2022 21:21)  HR: 74 (06 Jul 2022 12:30) (69 - 88)  BP: 127/61 (06 Jul 2022 12:30) (105/57 - 154/88)  BP(mean): --  RR: 17 (06 Jul 2022 12:30) (15 - 18)  SpO2: 96% (06 Jul 2022 12:30) (94% - 97%)    I&O's Summary    06 Jul 2022 07:01  -  06 Jul 2022 14:16  --------------------------------------------------------  IN: 0 mL / OUT: 1000 mL / NET: -1000 mL        PHYSICAL EXAM:  Constitutional: NAD, well-developed  HEENT NC/AT, moist mucous membranes  Pulmonary: decreased bibasilar breath sounds, no rales, rhonchi, wheezes  Cardiovascular: +S1, S2, RRR, no murmurs  Gastrointestinal: soft, tenderness to palpation suprapubically, not distended, no rebound/guarding  Extremities: 1-2+ b/l LE dependent pitting edema, R>L, chronic appearing venous changes R>L  Neurological: Alert, strength and sensitivity are grossly intact  Skin: as above  Psych: Mood & affect appropriate    LABS: All Labs Reviewed:                        10.6   4.68  )-----------( 220      ( 06 Jul 2022 07:14 )             32.9                         10.2   6.64  )-----------( 229      ( 05 Jul 2022 19:05 )             32.5     06 Jul 2022 07:14    135    |  100    |  29     ----------------------------<  100    4.1     |  27     |  2.10   05 Jul 2022 19:05    132    |  99     |  30     ----------------------------<  97     5.5     |  26     |  2.00     Ca    8.8        06 Jul 2022 07:14  Ca    8.9        05 Jul 2022 19:05  Phos  3.6       06 Jul 2022 07:14  Mg     2.1       06 Jul 2022 07:14    TPro  6.0    /  Alb  2.6    /  TBili  0.4    /  DBili  x      /  AST  22     /  ALT  12     /  AlkPhos  65     06 Jul 2022 07:14  TPro  5.9    /  Alb  2.7    /  TBili  0.3    /  DBili  x      /  AST  30     /  ALT  10     /  AlkPhos  67     05 Jul 2022 19:05    PT/INR - ( 05 Jul 2022 19:05 )   PT: 12.2 sec;   INR: 1.04 ratio         PTT - ( 05 Jul 2022 19:05 )  PTT:28.6 sec      Blood Culture:         EKG: NSR 80    RADIOLOGY:  < from: NM Hepatobiliary Imaging (07.06.22 @ 11:35) >    ACC: [id number] EXAM:  NM HEPATOBILIARY IMG                          PROCEDURE DATE:  07/06/2022          INTERPRETATION:  RADIOPHARMACEUTICAL: 3.2 mCi Tc-99m-Mebrofenin, I.V.    CLINICAL INFORMATION: 89 year old female with cholelithiasis, nausea, and   distended gallbladder with pericholecystic fluid on CT; referred to   evaluate for acute cholecystitis.    TECHNIQUE:  Dynamic imaging of the anterior abdomen was performed for   approximately 20 minutes following radiopharmaceutical injection. The   patient was unable to cooperate for continued dynamic imaging and   consequently static images of the abdomen were obtained in the anterior   and right anterior oblique projections at approximately 1 hour.    COMPARISON: Hepatobiliary scan performed on 2/25/2022 was reviewed.    FINDINGS: There is prompt, homogeneous uptake of radiopharmaceutical by   the hepatocytes. The gallbladder and bowel are visualized on the one hour   images. There is good clearance of activity from the liver by the end of   the study.    IMPRESSION: Normal hepatobiliary scan. No radionuclide evidence of acute   cholecystitis.    No significant interval change since the previous hepatobiliary scan of   2/25/2022.    --- End of Report ---      < end of copied text >  < from: US Duplex Venous Lower Ext Complete, Bilateral (07.05.22 @ 22:03) >    ACC: [id number] EXAM:  US DPLX LWR EXT VEINS COMPL BI                          PROCEDURE DATE:  07/05/2022          INTERPRETATION:  CLINICAL INFORMATION: Bilateral leg swelling    COMPARISON: None available.    TECHNIQUE: Duplex sonography of the BILATERAL LOWER extremity veins with   color and spectral Doppler, with and without compression.    FINDINGS:    RIGHT:  Normal compressibility of the RIGHT common femoral, femoral and popliteal   veins.  Doppler examination shows normal spontaneous andphasic flow.  No RIGHT calf vein thrombosis is detected.  Subcutaneous edema in the right calf.    LEFT:  Normal compressibility of the LEFT common femoral, femoral and popliteal   veins.  Doppler examination shows normal spontaneous and phasic flow.  No LEFT calf vein thrombosis is detected.    IMPRESSION:  No evidence of deep venous thrombosis in either lower extremity.          --- End of Report ---      < end of copied text >  < from: US Abdomen Upper Quadrant Right (07.05.22 @ 21:10) >    ACC: [id number] EXAM:  US ABDOMEN RT UPR QUADRANT                          PROCEDURE DATE:  07/05/2022          INTERPRETATION:  CLINICAL INFORMATION: Nausea    COMPARISON: CT abdomen pelvis from earlier the same day.    TECHNIQUE: Sonography of the right upper quadrant.    FINDINGS:  Liver: Within normal limits.  Bile ducts: Normal caliber. Common bile duct measures 12 mm.  Gallbladder: Distended gallbladder containing stones and sludge.   Pericholecystic fluid is noted. Sonographic Scott sign is negative.  Pancreas: Visualized portions are within normal limits.  Right kidney: 9.8 cm. Mild hydronephrosis..  Ascites: None.  IVC: Visualized portions are within normal limits.  Miscellaneous: Right pleural effusion noted.    IMPRESSION:  Cholelithiasis with nonspecific pericholecystic fluid. Negative   sonographic Scott sign.    Dilated common bowel duct. Recommend correlating with LFTs. Cannot   exclude distal choledocholithiasis.    --- End of Report ---    < end of copied text >  < from: CT Chest No Cont (07.05.22 @ 19:46) >    ACC: [id number] EXAM:  CT ABDOMEN AND PELVIS                        ACC: [id number] EXAM:  CT CHEST                          PROCEDURE DATE:  07/05/2022          INTERPRETATION:  CLINICAL INFORMATION: Epigastric abdominal pain and   chest pain.    COMPARISON: CT angiogram chest abdomen pelvis 6/20/2022.    CONTRAST/COMPLICATIONS:  IV Contrast: NONE  0 cc administered   0 cc discarded  Oral Contrast: NONE  Complications: None reported at time of study completion    PROCEDURE:  CT of the Chest, Abdomen and Pelvis was performed.  Sagittal and coronal reformats were performed.    FINDINGS:    CHEST:    LUNGS AND LARGE AIRWAYS:  PLEURA:  There are moderate-sized bilateral pleural effusions with partial   atelectasis bilateral lower lobes.    The central airways are patent.    VESSELS: Atherosclerotic changes thoracic aorta with coronary artery   calcification.    HEART: Heart size is normal. Dense mitral calcification.  No pericardial effusion.    MEDIASTINUM AND VAL:  No enlarged lymphadenopathy.    CHEST WALL AND LOWER NECK: Within normal limits.    ABDOMEN AND PELVIS:    Streak artifact degrades image quality.    The evaluation of the solid organ parenchyma is limited without   intravenous contrast.    LIVER: Within normal limits.  BILE DUCTS: Normal caliber.  GALLBLADDER: Mild gallbladder distention with cholelithiasis.  Right upper quadrant stranding.  SPLEEN: Within normal limits.  PANCREAS: Atrophic.  ADRENALS: Within normal limits.  KIDNEYS/URETERS:  Punctate nonobstructing intrarenal calcification upper pole left kidney.  Right-sided hydronephrosis.    BLADDER: Bladder wall thickening.  REPRODUCTIVE ORGANS: No pelvic mass.    BOWEL:  Evaluation of the stomach is limited without distention.  Prominent colonic fecal retentionwith mild distention of the colon; no   bowel obstruction.  Mild perirectal and presacral stranding.  Sigmoid diverticulosis, without CT evidence of diverticulitis.  PERITONEUM:  Small perihepatic ascites.    VESSELS: Within normal limits.  RETROPERITONEUM/LYMPH NODES: Atherosclerotic changes.    ABDOMINAL WALL:  Small fat-containing umbilical hernia.  Subcutaneous edema.    BONES:  Degenerative changes.  Vertebroplasty with compression deformities T11 and T12 vertebral bodies.  Chronic compression deformity L5 vertebral body.  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  Chronic deformity of the sternum.    IMPRESSION:    Moderate-sized bilateral pleural effusion with partial bilateral lower   lobe atelectasis.      Cholelithiasis with distended gallbladder and right upper quadrant   inflammatory changes, findings suspicious for acute cholecystitis.  Recommend further evaluation with right upper quadrant ultrasonography.    Other findings as discussed above.    --- End of Report ---    < end of copied text >      CXR performed, official read pending

## 2022-07-06 NOTE — PROGRESS NOTE ADULT - PROBLEM SELECTOR PLAN 1
Patient with nausea x 4 days associated with dec PO intake, epigastric pain ~2 weeks ago resolved  - CT abd/pel: Cholelithiasis with distended gallbladder and right upper quadrant inflammatory changes, findings suspicious for acute cholecystitis.  - RUQ US: Cholelithiasis with nonspecific pericholecystic fluid. Negative sonographic Scott sign. Dilated common bile duct.  - recent admission to hospitals 2/25- 3/2 for gallstone pancreatitis with non-operative treatment   - no leukocytosis, lipase wnl, afebrile  - NPO, hold IVF for now given overload, IV Zofran prn, plan for HIDA in an as per surgery   - surgery consulted (Dr. Rodrigues), recs appreciated Patient with nausea x 4 days associated with dec PO intake, epigastric pain ~2 weeks ago resolved  - CT abd/pel: Cholelithiasis with distended gallbladder and right upper quadrant inflammatory changes, findings suspicious for acute cholecystitis.  - RUQ US: Cholelithiasis with nonspecific pericholecystic fluid. Negative sonographic Scott sign. Dilated common bile duct.  - recent admission to [facility] 2/25- 3/2 for gallstone pancreatitis with non-operative treatment   - no leukocytosis, lipase wnl, afebrile  - NPO, hold IVF for now given overload, IV Zofran prn, plan for HIDA in an as per surgery   - surgery consulted (Dr. Rodrigues), recs appreciated  -GI consulted, f/u recs Patient with nausea x 4 days associated with dec PO intake, epigastric pain ~2 weeks ago resolved  - CT abd/pel: Cholelithiasis with distended gallbladder and right upper quadrant inflammatory changes, findings suspicious for acute cholecystitis.  - RUQ US: Cholelithiasis with nonspecific pericholecystic fluid. Negative sonographic Scott sign. Dilated common bile duct.  - recent admission to [facility] 2/25- 3/2 for gallstone pancreatitis with non-operative treatment   - no leukocytosis, lipase wnl, afebrile  - NPO, hold IVF for now given overload, IV Zofran prn, plan for HIDA in am as per surgery   ***HIDA normal, can start regular diet??????  - surgery consulted (Dr. Rodrigues), recs appreciated  -GI consulted, f/u recs Patient with nausea x 4 days associated with dec PO intake, epigastric pain ~2 weeks ago resolved  - CT abd/pel: Cholelithiasis with distended gallbladder and right upper quadrant inflammatory changes, findings suspicious for acute cholecystitis.  - RUQ US: Cholelithiasis with nonspecific pericholecystic fluid. Negative sonographic Scott sign. Dilated common bile duct.  - recent admission to hospitals 2/25- 3/2 for gallstone pancreatitis with non-operative treatment   - no leukocytosis, lipase wnl, afebrile  - NPO, hold IVF for now given overload, IV Zofran prn, plan for HIDA in am as per surgery   -HIDA normal, can start clear liquid diet with carb restrictions  - surgery consulted (Dr. Rodrigues), recs appreciated  -GI consulted, f/u recs Patient with nausea x 4 days associated with dec PO intake, epigastric pain ~2 weeks ago resolved  - CT abd/pel: Cholelithiasis with distended gallbladder and right upper quadrant inflammatory changes, findings suspicious for acute cholecystitis.  - RUQ US: Cholelithiasis with nonspecific pericholecystic fluid. Negative sonographic Scott sign. Dilated common bile duct.  - recent admission to [facility] 2/25- 3/2 for gallstone pancreatitis with non-operative treatment   - no leukocytosis, lipase wnl, afebrile  - NPO, hold IVF for now given overload, IV Zofran prn, plan for HIDA in am as per surgery   -HIDA normal, can start clear liquid diet with carb restrictions  - surgery consulted (Dr. Rodrigues), recs appreciated d/w -HIDA -Neg   -GI Dr Montilla-NERIS Neg-Ok for clears, No Abx

## 2022-07-06 NOTE — PROGRESS NOTE ADULT - SUBJECTIVE AND OBJECTIVE BOX
SUBJECTIVE:  Patient seen and examined at bedside. No overnight events. Patient feeling well without complaints of pain or nausea. Currently NPO.     Vital Signs Last 24 Hrs  T(C): 36.9 (06 Jul 2022 06:17), Max: 37.3 (05 Jul 2022 21:21)  T(F): 98.4 (06 Jul 2022 06:17), Max: 99.2 (05 Jul 2022 21:21)  HR: 77 (06 Jul 2022 06:17) (77 - 88)  BP: 122/60 (06 Jul 2022 06:17) (105/57 - 154/88)  BP(mean): --  RR: 15 (06 Jul 2022 06:17) (15 - 18)  SpO2: 97% (06 Jul 2022 06:17) (94% - 97%)    PHYSICAL EXAM:  GENERAL: Elderly female, NAD, resting comfortably   HEAD:  Atraumatic, Normocephalic  ABDOMEN: Soft, nondistended. Very mild tenderness to palpation R mid abdomen. +Umbilical hernia noted.   NEUROLOGY: A&O x 3    MEDICATIONS  (STANDING):  amitriptyline 30 milliGRAM(s) Oral at bedtime  aspirin  chewable 81 milliGRAM(s) Oral daily  atorvastatin 40 milliGRAM(s) Oral at bedtime  dextrose 5%. 1000 milliLiter(s) (50 mL/Hr) IV Continuous <Continuous>  dextrose 5%. 1000 milliLiter(s) (100 mL/Hr) IV Continuous <Continuous>  dextrose 50% Injectable 25 Gram(s) IV Push once  dextrose 50% Injectable 12.5 Gram(s) IV Push once  dextrose 50% Injectable 25 Gram(s) IV Push once  ferrous    sulfate 325 milliGRAM(s) Oral daily  glucagon  Injectable 1 milliGRAM(s) IntraMuscular once  heparin   Injectable 5000 Unit(s) SubCutaneous every 8 hours  HYDROmorphone   Tablet 4 milliGRAM(s) Oral <User Schedule>  insulin lispro (ADMELOG) corrective regimen sliding scale   SubCutaneous every 6 hours  metoprolol succinate ER 25 milliGRAM(s) Oral daily  piperacillin/tazobactam IVPB.. 3.375 Gram(s) IV Intermittent every 12 hours  polyethylene glycol 3350 17 Gram(s) Oral daily  senna 2 Tablet(s) Oral at bedtime    MEDICATIONS  (PRN):  acetaminophen     Tablet .. 650 milliGRAM(s) Oral every 6 hours PRN Temp greater or equal to 38C (100.4F), Mild Pain (1 - 3)  aluminum hydroxide/magnesium hydroxide/simethicone Suspension 30 milliLiter(s) Oral every 4 hours PRN Dyspepsia  dextrose Oral Gel 15 Gram(s) Oral once PRN Blood Glucose LESS THAN 70 milliGRAM(s)/deciliter  melatonin 3 milliGRAM(s) Oral at bedtime PRN Insomnia  ondansetron Injectable 4 milliGRAM(s) IV Push every 8 hours PRN Nausea and/or Vomiting      LABS: 7/6 AM labs pending

## 2022-07-06 NOTE — PROGRESS NOTE ADULT - PROBLEM SELECTOR PLAN 9
A1c 6 in Feb 2022  - not on insulin at home  - low dose ISS   - f/u am A1c A1c 6 in Feb 2022  - not on insulin at home  - low dose ISS   - f/u A1c A1c 6 in Feb 2022, A1c 6.9 on admission  - not on insulin at home  - low dose ISS

## 2022-07-06 NOTE — CONSULT NOTE ADULT - SUBJECTIVE AND OBJECTIVE BOX
Patient is a 89y old  Female who presents with a chief complaint of gallstones, covid, kiah (2022 08:50)       HPI:  89y F pmhx CAD x 2 stents (), T2DM, HTN, HLD, GERD, mild AS, chronic venous insufficiency, chronic neuropathy, macular degeneration, chronic constipation, depression, OA, and recent admission to [facility] - 3/2 for gallstone pancreatitis with non-operative treatment presented to the ED with constant nausea x 4 days associated with decreased PO intake, generalized weakness, and fall today x 1 due to LE weakness. Patient denies fever, chills, cp, dizziness, sob, abd pain, vomiting, diarrhea. Patient was seen here in ED  for epigastric pain (since resolved) and discharged with outpatient follow up with Dr. Terrell. Patient has seen Dr. Terrell twice since then and epigastric pain resolved s/p PO zofran and carafate with planned esophagram. Daughter states epigastric pain resolved, but patient has had constant nausea x 4 days that has not improved despite increasing PO zofran from 4mg to 8mg q6h. Patient was unable to obtain appointment with Dr. Terrell today and after falling at home due to leg weakness, daughter brought her to the ED.     ED Course  Vitals: 99.2F, 85bpm, 143/66, 95% RA  Labs: Hgb 10.2, Hct 32.5, Na 132, K 5.5, BUN 30, Cr 2, GFR 23, lipase wnl  EKG:  CT Chest and abd/pel: Moderate-sized bilateral pleural effusion with partial bilateral lower lobe atelectasis. Cholelithiasis with distended gallbladder and right upper quadrant inflammatory changes, findings suspicious for acute cholecystitis.  RUQ US: Cholelithiasis with nonspecific pericholecystic fluid. Negative sonographic Scott sign. Dilated common bowel duct. Recommend correlating with LFTs. Cannot exclude distal choledocholithiasis.  HEAD CT: Mild volume loss, microvascular disease, no acute hemorrhage or midline shift.  Patient Received: Zosyn x1, NS 500cc x1, Lasix 40mg IV x1, IV dilaudid .5mg x1           (2022 23:51)       PAST MEDICAL & SURGICAL HISTORY:  H/O vertebral fracture repair  History of vertebral fracture  Dyslipidemia  DM type 2 with diabetic dyslipidemia  H/O gastroesophageal reflux (GERD)  Costochondritis  Coronary arteriosclerosis in native artery  s/p 2 stents. last placed 2 years ago  Gastroparesis  History of laminectomy  S/P hip hemiarthroplasty  S/P appendectomy      FAMILY HISTORY:  FHx: stroke (Mother)    NC    Social History:Non smoker    MEDICATIONS  (STANDING):  amitriptyline 30 milliGRAM(s) Oral at bedtime  aspirin  chewable 81 milliGRAM(s) Oral daily  atorvastatin 40 milliGRAM(s) Oral at bedtime  dextrose 5%. 1000 milliLiter(s) (50 mL/Hr) IV Continuous <Continuous>  dextrose 5%. 1000 milliLiter(s) (100 mL/Hr) IV Continuous <Continuous>  dextrose 50% Injectable 25 Gram(s) IV Push once  dextrose 50% Injectable 12.5 Gram(s) IV Push once  dextrose 50% Injectable 25 Gram(s) IV Push once  ferrous    sulfate 325 milliGRAM(s) Oral daily  glucagon  Injectable 1 milliGRAM(s) IntraMuscular once  heparin   Injectable 5000 Unit(s) SubCutaneous every 8 hours  HYDROmorphone   Tablet 4 milliGRAM(s) Oral <User Schedule>  insulin lispro (ADMELOG) corrective regimen sliding scale   SubCutaneous every 6 hours  metoprolol succinate ER 25 milliGRAM(s) Oral daily  piperacillin/tazobactam IVPB.. 3.375 Gram(s) IV Intermittent every 12 hours  polyethylene glycol 3350 17 Gram(s) Oral daily  senna 2 Tablet(s) Oral at bedtime    MEDICATIONS  (PRN):  acetaminophen     Tablet .. 650 milliGRAM(s) Oral every 6 hours PRN Temp greater or equal to 38C (100.4F), Mild Pain (1 - 3)  aluminum hydroxide/magnesium hydroxide/simethicone Suspension 30 milliLiter(s) Oral every 4 hours PRN Dyspepsia  dextrose Oral Gel 15 Gram(s) Oral once PRN Blood Glucose LESS THAN 70 milliGRAM(s)/deciliter  melatonin 3 milliGRAM(s) Oral at bedtime PRN Insomnia  ondansetron Injectable 4 milliGRAM(s) IV Push every 8 hours PRN Nausea and/or Vomiting   Meds reviewed    Allergies    morphine (Other)    Intolerances         REVIEW OF SYSTEMS:  Constitutional: Denies fatigue  HEENT: Denies headaches and dizziness  Respiratory: denies SOB, cough, or wheezing  Cardiovascular: denies CP, palpitations  Gastrointestinal: Denies nausea, denies vomiting, diarrhea, constipation, abdominal pain, or bloody stools  Genitourinary: +Urinary incontinence. denies painful urination or bloody urine  Neurologic: Denies generalized weakness.    Vital Signs Last 24 Hrs  T(C): 37.3 (2022 08:50), Max: 37.3 (2022 21:21)  T(F): 99.2 (2022 08:50), Max: 99.2 (2022 21:21)  HR: 69 (2022 08:50) (69 - 88)  BP: 128/75 (2022 08:50) (105/57 - 154/88)  BP(mean): --  RR: 16 (2022 08:50) (15 - 18)  SpO2: 96% (2022 08:50) (94% - 97%)  Daily Height in cm: 157.48 (2022 18:15)    Daily     PHYSICAL EXAM:    GENERAL: NAD  HEAD:  Atraumatic, Normocephalic  ENMT: No Drainage from nares, No drainage from ears  NECK: Supple, neck  veins full  NERVOUS SYSTEM:  Awake and Alert  CHEST/LUNG: Clear to percussion bilaterally; No rales, rhonchi, wheezing, or rubs  HEART: Regular rate and rhythm; No murmurs, rubs, or gallops  ABDOMEN: +Discomfort with palpation. Soft, Nondistended; Bowel sounds present  EXTREMITIES: +Mild edema of lower extremities.  SKIN: No rashes No obvious ecchymosis      LABS:                        10.6   4.68  )-----------( 220      ( 2022 07:14 )             32.9     07-06    135  |  100  |  29<H>  ----------------------------<  100<H>  4.1   |  27  |  2.10<H>    Ca    8.8      2022 07:14  Phos  3.6     07-06  Mg     2.1     07-06    TPro  6.0  /  Alb  2.6<L>  /  TBili  0.4  /  DBili  x   /  AST  22  /  ALT  12  /  AlkPhos  65  07-06    PT/INR - ( 2022 19:05 )   PT: 12.2 sec;   INR: 1.04 ratio         PTT - ( 2022 19:05 )  PTT:28.6 sec  Urinalysis Basic - ( 2022 19:00 )    Color: Yellow / Appearance: Clear / S.010 / pH: x  Gluc: x / Ketone: Negative  / Bili: Negative / Urobili: Negative   Blood: x / Protein: 30 mg/dL / Nitrite: Negative   Leuk Esterase: Small / RBC: 25-50 /HPF / WBC 3-5   Sq Epi: x / Non Sq Epi: Few / Bacteria: Few    Magnesium, Serum: 2.1 mg/dL ( @ 07:14)  Phosphorus Level, Serum: 3.6 mg/dL ( @ 07:14)      RADIOLOGY & ADDITIONAL TESTS:  < from: CT Chest No Cont (22 @ 19:46) >  ACC: [id number] EXAM:  CT ABDOMEN AND PELVIS                        ACC: [id number] EXAM:  CT CHEST                          PROCEDURE DATE:  2022      INTERPRETATION:  CLINICAL INFORMATION: Epigastric abdominal pain and   chest pain.    COMPARISON: CT angiogram chest abdomen pelvis 2022.    CONTRAST/COMPLICATIONS:  IV Contrast: NONE  0 cc administered   0 cc discarded  Oral Contrast: NONE  Complications: None reported at time of study completion    PROCEDURE:  CT of the Chest, Abdomen and Pelvis was performed.  Sagittal and coronal reformats were performed.    FINDINGS:    CHEST:    LUNGS AND LARGE AIRWAYS:  PLEURA:  There are moderate-sized bilateral pleural effusions with partial   atelectasis bilateral lower lobes.    The central airways are patent.    VESSELS: Atherosclerotic changes thoracic aorta with coronary artery   calcification.    HEART: Heart size is normal. Dense mitral calcification.  No pericardial effusion.    MEDIASTINUM AND VAL:  No enlarged lymphadenopathy.    CHEST WALL AND LOWER NECK: Within normal limits.    ABDOMEN AND PELVIS:    Streak artifact degrades image quality.    The evaluation of the solid organ parenchyma is limited without   intravenous contrast.    LIVER: Within normal limits.  BILE DUCTS: Normal caliber.  GALLBLADDER: Mild gallbladder distention with cholelithiasis.  Right upper quadrant stranding.  SPLEEN: Within normal limits.  PANCREAS: Atrophic.  ADRENALS: Within normal limits.  KIDNEYS/URETERS:  Punctate nonobstructing intrarenal calcification upper pole left kidney.  Right-sided hydronephrosis.    BLADDER: Bladder wall thickening.  REPRODUCTIVE ORGANS: No pelvic mass.    BOWEL:  Evaluation of the stomach is limited without distention.  Prominent colonic fecal retentionwith mild distention of the colon; no   bowel obstruction.  Mild perirectal and presacral stranding.  Sigmoid diverticulosis, without CT evidence of diverticulitis.  PERITONEUM:  Small perihepatic ascites.    VESSELS: Within normal limits.  RETROPERITONEUM/LYMPH NODES: Atherosclerotic changes.    ABDOMINAL WALL:  Small fat-containing umbilical hernia.  Subcutaneous edema.    BONES:  Degenerative changes.  Vertebroplasty with compression deformities T11 and T12 vertebral bodies.  Chronic compression deformity L5 vertebral body.  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  Chronic deformity of the sternum.    IMPRESSION:    Moderate-sized bilateral pleural effusion with partial bilateral lower   lobe atelectasis.      Cholelithiasis with distended gallbladder and right upper quadrant   inflammatory changes, findings suspicious for acute cholecystitis.  Recommend further evaluation with right upper quadrant ultrasonography.    Other findings as discussed above.    --- End of Report ---      LYUDMILA DOMINGUEZ MD; Attending Radiologist  This document has been electronically signed. 2022  8:32PM  < end of copied text >

## 2022-07-06 NOTE — PROGRESS NOTE ADULT - PROBLEM SELECTOR PLAN 3
CT Chest: Moderate-sized bilateral pleural effusion with partial bilateral lower lobe atelectasis  - s/p IV Lasix 40mg x 1 in ED  - patient saturating well on room air and denies sob  - will hold off on further diuretics for now   - pulmonology (Dr. Melo) consulted f/u recs CT Chest: Moderate-sized bilateral pleural effusion with partial bilateral lower lobe atelectasis  - s/p IV Lasix 40mg x 1 in ED  - patient saturating well on room air and denies sob  - will hold off on further diuretics for now   - pulmonology (Dr. Melo) consulted f/u recs  -consider echo pending cardio recs CT Chest: Moderate-sized bilateral pleural effusion with partial bilateral lower lobe atelectasis  - s/p IV Lasix 40mg x 1 in ED  - patient saturating well on room air and denies sob  - will hold off on further diuretics for now   - pulmonology (Dr. Melo) consulted f/u recs  -TTE pending CT Chest: Moderate-sized bilateral pleural effusion with partial bilateral lower lobe atelectasis  - s/p IV Lasix 40mg x 1 in ED  - patient saturating well on room air and denies sob  - will hold off on further diuretics for now   - pulmonology (Dr. Melo) consulted f/u recs   no need for thoracentesis in the immediate future - medical rx regimen and [facility] rx regimen  -TTE pending

## 2022-07-06 NOTE — PROGRESS NOTE ADULT - PROBLEM SELECTOR PLAN 1
Continue care as per primary team  Continue IV abx  Pain control, supportive care  NPO, IV fluids  F/u AM HIDA to r/o acute marilyn   F/U AM labs   Will discuss with Dr. Rodrigues.

## 2022-07-06 NOTE — PROGRESS NOTE ADULT - PROBLEM SELECTOR PLAN 12
As per daughter Mary patient sundowns while in hospital  - no known hx of dementia as per daughter  - daughter states she does not want haldol or zyprexa for patient and that patient does well with Xanax instead As per daughter Mary patient sundowns while in hospital  - no known hx of dementia as per daughter  - daughter states she does NOT  want haldol or zyprexa for patient and that patient does well with Xanax instead

## 2022-07-06 NOTE — PROGRESS NOTE ADULT - PROBLEM SELECTOR PLAN 2
BUN 30, Cr 2 on admission  - on last admission Cr fluctuated between .87-1.3  - KERRY likely 2/2 dec PO intake, Pre Renal   - unknown cardiac function and patient with edematous legs on exam  - will hold off on further IVF for now given overload  - Hold nephrotoxic meds   - nephrology consulted Dr. Wu f/u recs BUN 30, Cr 2 on admission  - on last admission Cr fluctuated between .87-1.3  - KERRY likely 2/2 dec PO intake, Pre Renal   - unknown cardiac function and patient with edematous legs on exam, consider echo pending cardio recs  - will hold off on further IVF for now given overload  - Hold nephrotoxic meds   - nephrology consulted Dr. Wu f/u recs BUN 30, Cr 2 on admission  - on last admission Cr fluctuated between .87-1.3  - KERRY likely 2/2 dec PO intake, Pre Renal   - unknown cardiac function and patient with edematous legs on exam, consider echo pending cardio recs  - will hold off on further IVF for now given overload  - Hold nephrotoxic meds   - nephrology consulted Dr. Wu - continue IV lasix BUN 30, Cr 2 on admission  - on last admission Cr fluctuated between .87-1.3  - KERRY likely 2/2 dec PO intake, Pre Renal   - unknown cardiac function and patient with edematous legs on exam, consider echo pending cardio recs  - will hold off on further IVF for now given overload  - Hold nephrotoxic meds   - straight cath (bladder scan showed retained urine > 350cc)  - nephrology consulted Dr. Wu - continue IV lasix BUN 30, Cr 2 on admission  - on last admission Cr fluctuated between .87-1.3  - KERRY likely 2/2 dec PO intake, Pre Renal   - unknown cardiac function and patient with edematous legs on exam, consider echo pending cardio recs  - will hold off on further IVF for now given overload  - Hold nephrotoxic meds   - nephrology consulted Dr. Wu - continue IV lasix  Found to have KERRY on CKD stage 3 along with  Pleural effusion.  Obtain urine lytes  Continue IV Lasix- Leg edema   Consider Thoracentesis to reduce diuretic burden given KERRY  Abx, renal dosing

## 2022-07-06 NOTE — CONSULT NOTE ADULT - ASSESSMENT
89y F pmhx CAD x 2 stents (2012), T2DM, HTN, HLD, GERD, mild AS, chronic venous insufficiency, chronic neuropathy, macular degeneration, chronic constipation, depression, OA, lumbar degenerative disc disease, and spinal stenosis admitted for cholelithiasis, KERRY, and found to be COVID positive on admission. Cardio consulted for chest pain.    #ischemic    #volume status  - LE edema likely 2/2 chronic venous insufficiency versus component of HF    #arrhythmia  - EKG NSR  - would monitor on tele, monitor O2 sat in setting of COVID  - no acute concerns     - Patient is not complaining of any cardiac symptoms at this time.  - No clear evidence of acute ischemia, trops negative x 2. Will follow up third set.  - His CKs are flat, suggesting against acute atherosclerotic plaque rupture.  - Biomarker trend is not consistent with plaque rupture but rather demand ischemia. Monitor closely for the development of anginal symptoms or clinical signs of ischemia.   - No acute changes on EKG compared to previous.  - No meaningful evidence of volume overload.  - Previous TTE shows ___.  - BP well controlled, monitor routine hemodynamics.  - Continue ___.  - Monitor and replete lytes, keep K>4, Mg>2.  - Strict I/Os, daily weights.  - Pt has no active ischemia, decompensated heart failure, unstable arrythmia, or severe stenotic valvular disease, and has __ cardiac risk factors. In the setting of low risk ___, he/she is optimized from cardiovascular standpoint to proceed with planned procedure with routine hemodynamic monitoring.   - Pt has no modifiable active cardiac risk factors and in the setting of low risk _____, patient is optimized as best as possible from cardiovascular standpoint to proceed with planned procedure with routine hemodynamic monitoring.  - Other cardiovascular workup will depend on clinical course.  - All other workup per primary team.  - Will continue to follow.             89y F pmhx CAD x 2 stents (2012), T2DM, HTN, HLD, GERD, mild AS, chronic venous insufficiency, chronic neuropathy, macular degeneration, chronic constipation, depression, OA, lumbar degenerative disc disease, and spinal stenosis admitted for cholelithiasis, KERRY, and found to be COVID positive on admission.     #volume status  - LE edema likely 2/2 chronic venous insufficiency versus component of HF  - Last TTE 6/21 with EF 60%, mod TR, mild pHTN, f/u repeat TTE  - CT c/a/p with b/l mod effusions, partial LLL atelectasis  - Pt is s/p Lasix 40mg IV in ED, would continue diuresis at this time with close monitoring of renal function and volume status  - may consider thoracentesis for b/l effusions but would trial diuresis first       #ischemia  -hx CAD s/p 2 stents in 2012  -pt c/o intermittent chest pain,   -EKG NSR 80, no acute ischemic changes  -trop WNL on admission, no need to further trend, doubt ACS as ekg trops WNL  -continue asa, statin    #arrhythmia  - EKG NSR  - would monitor on tele, monitor O2 sat in setting of COVID    #valvular  - Last TTE 6/21 with mod TR  - f/u repeat TTE    - BP well controlled, monitor routine hemodynamics.  - Monitor and replete lytes, keep K>4, Mg>2.  - Strict I/Os, daily weights.    - Other cardiovascular workup will depend on clinical course.  - All other workup per primary team.  - Will continue to follow.

## 2022-07-06 NOTE — PROGRESS NOTE ADULT - PROBLEM SELECTOR PLAN 8
VSS  - continue home Toprol Vital signs remain stable  - continue home Toprol Vital signs remain stable  - continue home Toprol & IV Lasix

## 2022-07-06 NOTE — PATIENT PROFILE ADULT - FALL HARM RISK - HARM RISK INTERVENTIONS

## 2022-07-06 NOTE — PROGRESS NOTE ADULT - ASSESSMENT
89y F pmhx CAD x 2 stents (2012), T2DM, HTN, HLD, GERD, mild AS, chronic venous insufficiency, chronic neuropathy, macular degeneration, chronic constipation, depression, OA, lumbar degenerative disc disease, and spinal stenosis admitted for cholelithiasis, KERRY, and found to be COVID positive on admission.

## 2022-07-06 NOTE — CONSULT NOTE ADULT - ATTENDING COMMENTS
89y F pmhx CAD x 2 stents (2012), T2DM, HTN, HLD, GERD, mild AS, chronic venous insufficiency, chronic neuropathy, macular degeneration, chronic constipation, depression, OA, and recent admission to [facility] 2/25- 3/2 for gallstone pancreatitis with non-operative treatment presented to the ED with constant nausea x 4 days associated with decreased PO intake, generalized weakness. Found to have KERRY on CKD stage 3 along with active urinary tract infection and Pleural effusion.  Obtain urine lytes  Continue IV lasix  Consider Thoracentesis to reduce diuretic burden given KERRY  Abx, renal dosing  Monitor chemistries    Thank you
known cad,  venous insuff  adm with multiple complaints  edema which is prob chronic at least in part. pleural effusions, unclear if symptomatic  cautious iv lasix, though given creatinine may need to consider thoracentesis  repeat tte  intermittent and vaguely described cp, unlikely acs

## 2022-07-06 NOTE — CONSULT NOTE ADULT - ASSESSMENT
89y F pmhx CAD x 2 stents (2012), T2DM, HTN, HLD, GERD, mild AS, chronic venous insufficiency, chronic neuropathy, macular degeneration, chronic constipation, depression, OA, lumbar degenerative disc disease, and spinal stenosis admitted for cholelithiasis, KERRY, and found to be COVID positive on admission    covid  OP  OA  Pleural eff  Atelectasis  KERRY  CAD  Fall  Ataxic gait  HLD  GERD  valv heart disease    cvs rx regimen  TTE -   covid management - sx management - monitor VS and Sat - isolation precs  fall prec - PT assessment - gait training - CM follow up  pleural eff - no need for thoracentesis in the immediate future - medical rx regimen and [facility]vs rx regimen  I devonte as able to tolerate  Surgery eval in progress - Acute Angelica eval in progress - serial ABD exam - emp ABX -   GOC discussion  assist with needs  repllisandro almanzar

## 2022-07-06 NOTE — ED ADULT NURSE REASSESSMENT NOTE - NS ED NURSE REASSESS COMMENT FT1
0214- pt medicated as ordered for back pain. safety measures maintained. pt repositioned in stretcher. call bell within reach. will continue to monitor. FABI, RN
0656- pt medicated as ordered. pt changed. new external urinary catheter placed. pt resting comfortably in stretcher, denies pain. safety measures maintained. will continue to monitor. FABI, RN
FS 79, call placed to Dr Izquierdo, awaiting callback. Pt asymptomatic, denies any S/S of hypoglycemia.  VSS.
Spoke to Dr Izquierdo's residents who were made aware of accucheck, stating they will call surgery to possibly advance pt's diet as HIDA scan is normal.  Resident stating she will call the ER back to determine plan of care.
Admitted for covid/cholecystitis.   Plan to have HIDA scan today, report given to MARLA Crowley from [facility].  VSS, maintaining at SPO2 96%.  Respirations nonlabored and eupneic.

## 2022-07-06 NOTE — PROGRESS NOTE ADULT - PROBLEM SELECTOR PLAN 7
S/p 2 stents 2012   - continue home aspirin and statin S/p 2 stents 2012   - continue home aspirin and statin  Cardiology Dr schumacher d/w -IV Lasix 40 mg daily

## 2022-07-06 NOTE — CONSULT NOTE ADULT - SUBJECTIVE AND OBJECTIVE BOX
Date/Time Patient Seen:  		  Referring MD:   Data Reviewed	       Patient is a 89y old  Female who presents with a chief complaint of gallstones, covid, kiah (06 Jul 2022 06:37)      Subjective/HPI  vs noted  labs reviewed  H and P reviewed  Ct chest reviewed  Surgery cx reviewed - eval for acute marilyn     89y F pmhx CAD x 2 stents (2012), T2DM, HTN, HLD, GERD, mild AS, chronic venous insufficiency, chronic neuropathy, macular degeneration, chronic constipation, depression, OA, and recent admission to [facility] 2/25- 3/2 for gallstone pancreatitis with non-operative treatment presented to the ED with constant nausea x 4 days associated with decreased PO intake, generalized weakness, and fall today x 1 due to LE weakness. Patient denies fever, chills, cp, dizziness, sob, abd pain, vomiting, diarrhea. Patient was seen here in ED 6/20 for epigastric pain (since resolved) and discharged with outpatient follow up with Dr. Terrell. Patient has seen Dr. Terrell twice since then and epigastric pain resolved s/p PO zofran and carafate with planned esophagram. Daughter states epigastric pain resolved, but patient has had constant nausea x 4 days that has not improved despite increasing PO zofran from 4mg to 8mg q6h. Patient was unable to obtain appointment with Dr. Terrell today and after falling at home due to leg weakness, daughter brought her to the ED.     PAST MEDICAL & SURGICAL HISTORY:  H/O vertebral fracture repair    History of vertebral fracture    Dyslipidemia    DM type 2 with diabetic dyslipidemia    H/O gastroesophageal reflux (GERD)    Costochondritis    Coronary arteriosclerosis in native artery  s/p 2 stents. last placed 2 years ago    Gastroparesis    History of laminectomy    S/P hip hemiarthroplasty    S/P appendectomy          Medication list         MEDICATIONS  (STANDING):  amitriptyline 30 milliGRAM(s) Oral at bedtime  aspirin  chewable 81 milliGRAM(s) Oral daily  atorvastatin 40 milliGRAM(s) Oral at bedtime  dextrose 5%. 1000 milliLiter(s) (50 mL/Hr) IV Continuous <Continuous>  dextrose 5%. 1000 milliLiter(s) (100 mL/Hr) IV Continuous <Continuous>  dextrose 50% Injectable 25 Gram(s) IV Push once  dextrose 50% Injectable 12.5 Gram(s) IV Push once  dextrose 50% Injectable 25 Gram(s) IV Push once  ferrous    sulfate 325 milliGRAM(s) Oral daily  glucagon  Injectable 1 milliGRAM(s) IntraMuscular once  heparin   Injectable 5000 Unit(s) SubCutaneous every 8 hours  HYDROmorphone   Tablet 4 milliGRAM(s) Oral <User Schedule>  insulin lispro (ADMELOG) corrective regimen sliding scale   SubCutaneous every 6 hours  metoprolol succinate ER 25 milliGRAM(s) Oral daily  piperacillin/tazobactam IVPB.. 3.375 Gram(s) IV Intermittent every 12 hours  polyethylene glycol 3350 17 Gram(s) Oral daily  senna 2 Tablet(s) Oral at bedtime    MEDICATIONS  (PRN):  acetaminophen     Tablet .. 650 milliGRAM(s) Oral every 6 hours PRN Temp greater or equal to 38C (100.4F), Mild Pain (1 - 3)  aluminum hydroxide/magnesium hydroxide/simethicone Suspension 30 milliLiter(s) Oral every 4 hours PRN Dyspepsia  dextrose Oral Gel 15 Gram(s) Oral once PRN Blood Glucose LESS THAN 70 milliGRAM(s)/deciliter  melatonin 3 milliGRAM(s) Oral at bedtime PRN Insomnia  ondansetron Injectable 4 milliGRAM(s) IV Push every 8 hours PRN Nausea and/or Vomiting         Vitals log        ICU Vital Signs Last 24 Hrs  T(C): 36.9 (06 Jul 2022 06:17), Max: 37.3 (05 Jul 2022 21:21)  T(F): 98.4 (06 Jul 2022 06:17), Max: 99.2 (05 Jul 2022 21:21)  HR: 77 (06 Jul 2022 06:17) (77 - 88)  BP: 122/60 (06 Jul 2022 06:17) (105/57 - 154/88)  BP(mean): --  ABP: --  ABP(mean): --  RR: 15 (06 Jul 2022 06:17) (15 - 18)  SpO2: 97% (06 Jul 2022 06:17) (94% - 97%)     PAST SURGICAL HISTORY:  History of laminectomy     S/P appendectomy     S/P hip hemiarthroplasty.     FAMILY HISTORY:  Mother  Still living? Unknown  FHx: stroke, Age at diagnosis: Age Unknown.     Social History:  Social History (marital status, living situation, occupation, tobacco use, alcohol and drug use, and sexual history): Former cigarette smoker, smoked <1/2 ppd for less than 15 years, quit more than 50 years ago   Denies ETOH use   Denies drug use   Ambulates with a walker. Lives at home with daughter. ADLs with assistance.     Tobacco Screening:  · Core Measure Site	Yes  · Has the patient used tobacco in the past 30 days?	No    Risk Assessment:    Present on Admission:  Deep Venous Thrombosis	no  Pulmonary Embolus	no  Urinary Catheter	no  Central Venous Catheter/PICC Line	no  Surgical Site Incision	no  Pressure Ulcer(s)	no     Heart Failure:  Does this patient have a history of or has been diagnosed with heart failure? no.        Input and Output:  I&O's Detail      Lab Data                        10.6   4.68  )-----------( 220      ( 06 Jul 2022 07:14 )             32.9     07-06    135  |  100  |  29<H>  ----------------------------<  100<H>  4.1   |  27  |  2.10<H>    Ca    8.8      06 Jul 2022 07:14  Phos  3.6     07-06  Mg     2.1     07-06    TPro  6.0  /  Alb  2.6<L>  /  TBili  0.4  /  DBili  x   /  AST  22  /  ALT  12  /  AlkPhos  65  07-06            Review of Systems	  weakness    Objective     Physical Examination    heart s1s2  lung dec BS  abd dec BS      Pertinent Lab findings & Imaging      Bah:  NO   Adequate UO     I&O's Detail           Discussed with:     Cultures:	        Radiology      ACC: [id number] EXAM:  CT ABDOMEN AND PELVIS                        ACC: [id number] EXAM:  CT CHEST                          PROCEDURE DATE:  07/05/2022          INTERPRETATION:  CLINICAL INFORMATION: Epigastric abdominal pain and   chest pain.    COMPARISON: CT angiogram chest abdomen pelvis 6/20/2022.    CONTRAST/COMPLICATIONS:  IV Contrast: NONE  0 cc administered   0 cc discarded  Oral Contrast: NONE  Complications: None reported at time of study completion    PROCEDURE:  CT of the Chest, Abdomen and Pelvis was performed.  Sagittal and coronal reformats were performed.    FINDINGS:    CHEST:    LUNGS AND LARGE AIRWAYS:  PLEURA:  There are moderate-sized bilateral pleural effusions with partial   atelectasis bilateral lower lobes.    The central airways are patent.    VESSELS: Atherosclerotic changes thoracic aorta with coronary artery   calcification.    HEART: Heart size is normal. Dense mitral calcification.  No pericardial effusion.    MEDIASTINUM AND VAL:  No enlarged lymphadenopathy.    CHEST WALL AND LOWER NECK: Within normal limits.    ABDOMEN AND PELVIS:    Streak artifact degrades image quality.    The evaluation of the solid organ parenchyma is limited without   intravenous contrast.    LIVER: Within normal limits.  BILE DUCTS: Normal caliber.  GALLBLADDER: Mild gallbladder distention with cholelithiasis.  Right upper quadrant stranding.  SPLEEN: Within normal limits.  PANCREAS: Atrophic.  ADRENALS: Within normal limits.  KIDNEYS/URETERS:  Punctate nonobstructing intrarenal calcification upper pole left kidney.  Right-sided hydronephrosis.    BLADDER: Bladder wall thickening.  REPRODUCTIVE ORGANS: No pelvic mass.    BOWEL:  Evaluation of the stomach is limited without distention.  Prominent colonic fecal retention with mild distention of the colon; no   bowel obstruction.  Mild perirectal and presacral stranding.  Sigmoid diverticulosis, without CT evidence of diverticulitis.  PERITONEUM:  Small perihepatic ascites.    VESSELS: Within normal limits.  RETROPERITONEUM/LYMPH NODES: Atherosclerotic changes.    ABDOMINAL WALL:  Small fat-containing umbilical hernia.  Subcutaneous edema.    BONES:  Degenerative changes.  Vertebroplasty with compression deformities T11 and T12 vertebral bodies.  Chronic compression deformity L5 vertebral body.  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  Chronic deformity of the sternum.    IMPRESSION:    Moderate-sized bilateral pleural effusion with partial bilateral lower   lobe atelectasis.      Cholelithiasis with distended gallbladder and right upper quadrant   inflammatory changes, findings suspicious for acute cholecystitis.  Recommend further evaluation with right upper quadrant ultrasonography.    Other findings as discussed above.    --- End of Report ---            LYUDMILA DOMINGUEZ MD; Attending Radiologist  This document has been electronically signed. Jul 5 2022  8:32PM

## 2022-07-06 NOTE — CONSULT NOTE ADULT - SUBJECTIVE AND OBJECTIVE BOX
[facility]  Shar Hopkins PA-C  [facility] [place] [address]  [phone]      Chief Complaint:  Patient is a 89y old  Female who presents with a chief complaint of gallstones, covid, kiah (2022 14:15)      HPI:89y F pmhx CAD x 2 stents (), T2DM, HTN, HLD, GERD, mild AS, chronic venous insufficiency, chronic neuropathy, macular degeneration, chronic constipation, depression, OA, and recent admission to \[facility]"" - 3/2 for gallstone pancreatitis with non-operative treatment presented to the ED with constant nausea x 4 days associated with decreased PO intake, generalized weakness, and fall today x 1 due to LE weakness. Patient denies fever, chills, cp, dizziness, sob, abd pain, vomiting, diarrhea. Patient was seen here in ED  for epigastric pain (since resolved) and discharged with outpatient follow up with Dr. Terrell. Patient has seen Dr. Terrell twice since then and epigastric pain resolved s/p PO zofran and carafate with planned esophagram. Daughter states epigastric pain resolved, but patient has had constant nausea x 4 days that has not improved despite increasing PO zofran from 4mg to 8mg q6h. Patient was unable to obtain appointment with Dr. Terrell today and after falling at home due to leg weakness, daughter brought her to the ED.     Allergies:  morphine (Other)      Medications:  acetaminophen     Tablet .. 650 milliGRAM(s) Oral every 6 hours PRN  aluminum hydroxide/magnesium hydroxide/simethicone Suspension 30 milliLiter(s) Oral every 4 hours PRN  amitriptyline 30 milliGRAM(s) Oral at bedtime  aspirin  chewable 81 milliGRAM(s) Oral daily  atorvastatin 40 milliGRAM(s) Oral at bedtime  dextrose 5%. 1000 milliLiter(s) IV Continuous <Continuous>  dextrose 5%. 1000 milliLiter(s) IV Continuous <Continuous>  dextrose 50% Injectable 25 Gram(s) IV Push once  dextrose 50% Injectable 12.5 Gram(s) IV Push once  dextrose 50% Injectable 25 Gram(s) IV Push once  dextrose Oral Gel 15 Gram(s) Oral once PRN  ferrous    sulfate 325 milliGRAM(s) Oral daily  glucagon  Injectable 1 milliGRAM(s) IntraMuscular once  heparin   Injectable 5000 Unit(s) SubCutaneous every 8 hours  HYDROmorphone   Tablet 4 milliGRAM(s) Oral <User Schedule>  insulin lispro (ADMELOG) corrective regimen sliding scale   SubCutaneous three times a day before meals  insulin lispro (ADMELOG) corrective regimen sliding scale   SubCutaneous at bedtime  melatonin 3 milliGRAM(s) Oral at bedtime PRN  metoprolol succinate ER 25 milliGRAM(s) Oral daily  ondansetron Injectable 4 milliGRAM(s) IV Push every 8 hours PRN  polyethylene glycol 3350 17 Gram(s) Oral daily  senna 2 Tablet(s) Oral at bedtime      PMHX/PSHX:  H/O vertebral fracture repair    History of vertebral fracture    Dyslipidemia    DM type 2 with diabetic dyslipidemia    H/O gastroesophageal reflux (GERD)    Costochondritis    Coronary arteriosclerosis in native artery    Gastroparesis    History of laminectomy    S/P hip hemiarthroplasty    S/P appendectomy        Family history:  No pertinent family history in first degree relatives    No pertinent family history in first degree relatives    FHx: stroke (Mother)        Social History:     ROS:     General:  No wt loss, fevers, chills, night sweats, fatigue,   Eyes:  Good vision, no reported pain  ENT:  No sore throat, pain, runny nose, dysphagia  CV:  No pain, palpitations, hypo/hypertension  Resp:  No dyspnea, cough, tachypnea, wheezing  GI:  No pain, No nausea, No vomiting, No diarrhea, No constipation, No weight loss, No fever, No pruritis, No rectal bleeding, No tarry stools, No dysphagia,  :  No pain, bleeding, incontinence, nocturia  Muscle:  No pain, weakness  Neuro:  No weakness, tingling, memory problems  Psych:  No fatigue, insomnia, mood problems, depression  Endocrine:  No polyuria, polydipsia, cold/heat intolerance  Heme:  No petechiae, ecchymosis, easy bruisability  Skin:  No rash, tattoos, scars, edema      PHYSICAL EXAM:   Vital Signs:  Vital Signs Last 24 Hrs  T(C): 36.9 (2022 16:05), Max: 37.3 (2022 21:21)  T(F): 98.5 (2022 16:05), Max: 99.2 (2022 21:21)  HR: 75 (2022 16:05) (69 - 88)  BP: 123/69 (2022 16:05) (105/57 - 154/88)  BP(mean): --  RR: 18 (2022 16:05) (15 - 18)  SpO2: 94% (2022 16:05) (94% - 97%)  Daily Height in cm: 157.48 (2022 18:15)    Daily     GENERAL:  Appears stated age,   HEENT:  NC/AT,    CHEST:  Full & symmetric excursion,   HEART:  Regular rhythm  ABDOMEN:  Soft, non-tender, non-distended,   EXTEREMITIES:  no cyanosis,clubbing or edema  SKIN:  No rash  NEURO:  Alert,    LABS:                        10.6   4.68  )-----------( 220      ( 2022 07:14 )             32.9     07-06    135  |  100  |  29<H>  ----------------------------<  100<H>  4.1   |  27  |  2.10<H>    Ca    8.8      2022 07:14  Phos  3.6     07-06  Mg     2.1     07-06    TPro  6.0  /  Alb  2.6<L>  /  TBili  0.4  /  DBili  x   /  AST  22  /  ALT  12  /  AlkPhos  65  07-06    LIVER FUNCTIONS - ( 2022 07:14 )  Alb: 2.6 g/dL / Pro: 6.0 g/dL / ALK PHOS: 65 U/L / ALT: 12 U/L / AST: 22 U/L / GGT: x           PT/INR - ( 2022 19:05 )   PT: 12.2 sec;   INR: 1.04 ratio         PTT - ( 2022 19:05 )  PTT:28.6 sec  Urinalysis Basic - ( 2022 19:00 )    Color: Yellow / Appearance: Clear / S.010 / pH: x  Gluc: x / Ketone: Negative  / Bili: Negative / Urobili: Negative   Blood: x / Protein: 30 mg/dL / Nitrite: Negative   Leuk Esterase: Small / RBC: 25-50 /HPF / WBC 3-5   Sq Epi: x / Non Sq Epi: Few / Bacteria: Few      Amylase Serum--      Lipase serum78       Ammonia--      Imaging:

## 2022-07-06 NOTE — CONSULT NOTE ADULT - ASSESSMENT
89y F pmhx CAD x 2 stents (2012), T2DM, HTN, HLD, GERD, mild AS, chronic venous insufficiency, chronic neuropathy, macular degeneration, chronic constipation, depression, OA, and recent admission to [facility] 2/25- 3/2 for gallstone pancreatitis with non-operative treatment presented to the ED with constant nausea x 4 days associated with decreased PO intake, generalized weakness found to have elevated creatinine.    #KERRY unspecified  CT revealed right sided hydronephrosis and   UA consistent with UTI  Obtain FeNa labs (Serum Na/Cr, Urine Na/Cr)  Avoid nephrotoxic agents  Monitor serum electrolytes 89y F pmhx CAD x 2 stents (2012), T2DM, HTN, HLD, GERD, mild AS, chronic venous insufficiency, chronic neuropathy, macular degeneration, chronic constipation, depression, OA, and recent admission to [facility] 2/25- 3/2 for gallstone pancreatitis with non-operative treatment presented to the ED with constant nausea x 4 days associated with decreased PO intake, generalized weakness found to have elevated creatinine.    #Acute on CKD stage III  CT revealed right sided hydronephrosis and pleural effusion  UA consistent with UTI    Likely due to changes volume status  Possible contributions from covid-19 or hydronephrosis  Continue IV lasix,   Consider thorocentesis, if pleural effusion if loculated.   Obtain FeNa labs (Serum Na/Cr, Urine Na/Cr)  Avoid nephrotoxic agents  Monitor serum electrolytes   89y F pmhx CAD x 2 stents (2012), T2DM, HTN, HLD, GERD, mild AS, chronic venous insufficiency, chronic neuropathy, macular degeneration, chronic constipation, depression, OA, and recent admission to [facility] 2/25- 3/2 for gallstone pancreatitis with non-operative treatment presented to the ED with constant nausea x 4 days associated with decreased PO intake, generalized weakness found to have elevated creatinine.    #Acute on CKD stage III  CT revealed right sided hydronephrosis and pleural effusion  UA consistent with UTI    Likely due to volume overload  Possible contributions from covid-19 or hydronephrosis  Continue IV lasix,   Consider thorocentesis, if pleural effusion if loculated.   Obtain FeNa labs (Serum Na/Cr, Urine Na/Cr)  Avoid nephrotoxic agents  Monitor serum electrolytes   89y F pmhx CAD x 2 stents (2012), T2DM, HTN, HLD, GERD, mild AS, chronic venous insufficiency, chronic neuropathy, macular degeneration, chronic constipation, depression, OA, and recent admission to [facility] 2/25- 3/2 for gallstone pancreatitis with non-operative treatment presented to the ED with constant nausea x 4 days associated with decreased PO intake, generalized weakness.    #Acute on CKD stage III  CT revealed right sided hydronephrosis and pleural effusion  UA consistent with UTI  Baseline creatinine ~1.3?    Likely due to volume overload  Possible contributions from covid-19 or hydronephrosis  Continue IV lasix,   Consider thoracentesis if pleural effusion is loculated.   Obtain FeNa labs (Serum Na/Cr, Urine Na/Cr)  Avoid nephrotoxic agents  Monitor serum electrolytes   89y F pmhx CAD x 2 stents (2012), T2DM, HTN, HLD, GERD, mild AS, chronic venous insufficiency, chronic neuropathy, macular degeneration, chronic constipation, depression, OA, and [facility]2 for gallstone pancreatitis with non-operative treatment presented to the ED with constant nausea x 4 days associated with decreased PO intake, generalized weakness.    #Acute on CKD stage III  CT revealed right sided hydronephrosis and pleural effusion  UA consistent with UTI  Baseline creatinine ~1.3?    Likely due to volume overload  Possible contributions from covid-19 or hydronephrosis  Continue IV lasix,   Consider thoracentesis if pleural effusion is loculated.   Obtain FeNa labs (Serum Na/Cr, Urine Na/Cr)  Avoid nephrotoxic agents  Monitor serum electrolytes

## 2022-07-07 ENCOUNTER — TRANSCRIPTION ENCOUNTER (OUTPATIENT)
Age: 87
End: 2022-07-07

## 2022-07-07 LAB
ALBUMIN SERPL ELPH-MCNC: 2.7 G/DL — LOW (ref 3.3–5)
ALP SERPL-CCNC: 68 U/L — SIGNIFICANT CHANGE UP (ref 40–120)
ALT FLD-CCNC: 12 U/L — SIGNIFICANT CHANGE UP (ref 12–78)
ANION GAP SERPL CALC-SCNC: 10 MMOL/L — SIGNIFICANT CHANGE UP (ref 5–17)
AST SERPL-CCNC: 28 U/L — SIGNIFICANT CHANGE UP (ref 15–37)
BASOPHILS # BLD AUTO: 0.05 K/UL — SIGNIFICANT CHANGE UP (ref 0–0.2)
BASOPHILS NFR BLD AUTO: 1 % — SIGNIFICANT CHANGE UP (ref 0–2)
BILIRUB SERPL-MCNC: 0.3 MG/DL — SIGNIFICANT CHANGE UP (ref 0.2–1.2)
BUN SERPL-MCNC: 30 MG/DL — HIGH (ref 7–23)
CALCIUM SERPL-MCNC: 8.5 MG/DL — SIGNIFICANT CHANGE UP (ref 8.5–10.1)
CHLORIDE SERPL-SCNC: 98 MMOL/L — SIGNIFICANT CHANGE UP (ref 96–108)
CO2 SERPL-SCNC: 27 MMOL/L — SIGNIFICANT CHANGE UP (ref 22–31)
CREAT ?TM UR-MCNC: 10 MG/DL — SIGNIFICANT CHANGE UP
CREAT SERPL-MCNC: 2.3 MG/DL — HIGH (ref 0.5–1.3)
EGFR: 20 ML/MIN/1.73M2 — LOW
EOSINOPHIL # BLD AUTO: 0.02 K/UL — SIGNIFICANT CHANGE UP (ref 0–0.5)
EOSINOPHIL NFR BLD AUTO: 0.4 % — SIGNIFICANT CHANGE UP (ref 0–6)
GLUCOSE SERPL-MCNC: 97 MG/DL — SIGNIFICANT CHANGE UP (ref 70–99)
HCT VFR BLD CALC: 32.7 % — LOW (ref 34.5–45)
HGB BLD-MCNC: 10.5 G/DL — LOW (ref 11.5–15.5)
IMM GRANULOCYTES NFR BLD AUTO: 0.4 % — SIGNIFICANT CHANGE UP (ref 0–1.5)
LYMPHOCYTES # BLD AUTO: 0.67 K/UL — LOW (ref 1–3.3)
LYMPHOCYTES # BLD AUTO: 13.9 % — SIGNIFICANT CHANGE UP (ref 13–44)
MCHC RBC-ENTMCNC: 28.5 PG — SIGNIFICANT CHANGE UP (ref 27–34)
MCHC RBC-ENTMCNC: 32.1 GM/DL — SIGNIFICANT CHANGE UP (ref 32–36)
MCV RBC AUTO: 88.6 FL — SIGNIFICANT CHANGE UP (ref 80–100)
MONOCYTES # BLD AUTO: 0.6 K/UL — SIGNIFICANT CHANGE UP (ref 0–0.9)
MONOCYTES NFR BLD AUTO: 12.4 % — SIGNIFICANT CHANGE UP (ref 2–14)
NEUTROPHILS # BLD AUTO: 3.47 K/UL — SIGNIFICANT CHANGE UP (ref 1.8–7.4)
NEUTROPHILS NFR BLD AUTO: 71.9 % — SIGNIFICANT CHANGE UP (ref 43–77)
NRBC # BLD: 0 /100 WBCS — SIGNIFICANT CHANGE UP (ref 0–0)
OSMOLALITY SERPL: 282 MOSMOL/KG — SIGNIFICANT CHANGE UP (ref 280–301)
OSMOLALITY UR: 266 MOSM/KG — SIGNIFICANT CHANGE UP (ref 50–1200)
PLATELET # BLD AUTO: 215 K/UL — SIGNIFICANT CHANGE UP (ref 150–400)
POTASSIUM SERPL-MCNC: 3.9 MMOL/L — SIGNIFICANT CHANGE UP (ref 3.5–5.3)
POTASSIUM SERPL-SCNC: 3.9 MMOL/L — SIGNIFICANT CHANGE UP (ref 3.5–5.3)
PROT SERPL-MCNC: 6.4 G/DL — SIGNIFICANT CHANGE UP (ref 6–8.3)
RBC # BLD: 3.69 M/UL — LOW (ref 3.8–5.2)
RBC # FLD: 13 % — SIGNIFICANT CHANGE UP (ref 10.3–14.5)
SODIUM SERPL-SCNC: 135 MMOL/L — SIGNIFICANT CHANGE UP (ref 135–145)
SODIUM UR-SCNC: 110 MMOL/L — SIGNIFICANT CHANGE UP
WBC # BLD: 4.83 K/UL — SIGNIFICANT CHANGE UP (ref 3.8–10.5)
WBC # FLD AUTO: 4.83 K/UL — SIGNIFICANT CHANGE UP (ref 3.8–10.5)

## 2022-07-07 PROCEDURE — 99232 SBSQ HOSP IP/OBS MODERATE 35: CPT

## 2022-07-07 PROCEDURE — 93306 TTE W/DOPPLER COMPLETE: CPT | Mod: 26

## 2022-07-07 RX ORDER — REMDESIVIR 5 MG/ML
200 INJECTION INTRAVENOUS EVERY 24 HOURS
Refills: 0 | Status: COMPLETED | OUTPATIENT
Start: 2022-07-07 | End: 2022-07-07

## 2022-07-07 RX ORDER — SUCRALFATE 1 G
1 TABLET ORAL
Refills: 0 | Status: DISCONTINUED | OUTPATIENT
Start: 2022-07-07 | End: 2022-07-15

## 2022-07-07 RX ORDER — LIDOCAINE 4 G/100G
1 CREAM TOPICAL ONCE
Refills: 0 | Status: COMPLETED | OUTPATIENT
Start: 2022-07-07 | End: 2022-07-07

## 2022-07-07 RX ORDER — ACETAMINOPHEN 500 MG
1000 TABLET ORAL ONCE
Refills: 0 | Status: COMPLETED | OUTPATIENT
Start: 2022-07-07 | End: 2022-07-07

## 2022-07-07 RX ORDER — METOCLOPRAMIDE HCL 10 MG
5 TABLET ORAL EVERY 6 HOURS
Refills: 0 | Status: DISCONTINUED | OUTPATIENT
Start: 2022-07-07 | End: 2022-07-12

## 2022-07-07 RX ORDER — HYDROMORPHONE HYDROCHLORIDE 2 MG/ML
2 INJECTION INTRAMUSCULAR; INTRAVENOUS; SUBCUTANEOUS ONCE
Refills: 0 | Status: DISCONTINUED | OUTPATIENT
Start: 2022-07-07 | End: 2022-07-07

## 2022-07-07 RX ORDER — HYDROMORPHONE HYDROCHLORIDE 2 MG/ML
4 INJECTION INTRAMUSCULAR; INTRAVENOUS; SUBCUTANEOUS THREE TIMES A DAY
Refills: 0 | Status: DISCONTINUED | OUTPATIENT
Start: 2022-07-07 | End: 2022-07-14

## 2022-07-07 RX ORDER — PANTOPRAZOLE SODIUM 20 MG/1
40 TABLET, DELAYED RELEASE ORAL
Refills: 0 | Status: DISCONTINUED | OUTPATIENT
Start: 2022-07-07 | End: 2022-07-10

## 2022-07-07 RX ORDER — REMDESIVIR 5 MG/ML
INJECTION INTRAVENOUS
Refills: 0 | Status: COMPLETED | OUTPATIENT
Start: 2022-07-07 | End: 2022-07-09

## 2022-07-07 RX ORDER — REMDESIVIR 5 MG/ML
100 INJECTION INTRAVENOUS EVERY 24 HOURS
Refills: 0 | Status: COMPLETED | OUTPATIENT
Start: 2022-07-08 | End: 2022-07-09

## 2022-07-07 RX ADMIN — Medication 400 MILLIGRAM(S): at 14:07

## 2022-07-07 RX ADMIN — HEPARIN SODIUM 5000 UNIT(S): 5000 INJECTION INTRAVENOUS; SUBCUTANEOUS at 05:45

## 2022-07-07 RX ADMIN — SENNA PLUS 2 TABLET(S): 8.6 TABLET ORAL at 21:38

## 2022-07-07 RX ADMIN — LIDOCAINE 1 PATCH: 4 CREAM TOPICAL at 19:00

## 2022-07-07 RX ADMIN — Medication 1 GRAM(S): at 18:18

## 2022-07-07 RX ADMIN — Medication 325 MILLIGRAM(S): at 12:34

## 2022-07-07 RX ADMIN — HYDROMORPHONE HYDROCHLORIDE 4 MILLIGRAM(S): 2 INJECTION INTRAMUSCULAR; INTRAVENOUS; SUBCUTANEOUS at 23:27

## 2022-07-07 RX ADMIN — Medication 5 MILLIGRAM(S): at 18:18

## 2022-07-07 RX ADMIN — HYDROMORPHONE HYDROCHLORIDE 4 MILLIGRAM(S): 2 INJECTION INTRAMUSCULAR; INTRAVENOUS; SUBCUTANEOUS at 00:21

## 2022-07-07 RX ADMIN — Medication 81 MILLIGRAM(S): at 12:34

## 2022-07-07 RX ADMIN — ATORVASTATIN CALCIUM 40 MILLIGRAM(S): 80 TABLET, FILM COATED ORAL at 21:39

## 2022-07-07 RX ADMIN — HEPARIN SODIUM 5000 UNIT(S): 5000 INJECTION INTRAVENOUS; SUBCUTANEOUS at 13:18

## 2022-07-07 RX ADMIN — HYDROMORPHONE HYDROCHLORIDE 4 MILLIGRAM(S): 2 INJECTION INTRAMUSCULAR; INTRAVENOUS; SUBCUTANEOUS at 17:00

## 2022-07-07 RX ADMIN — Medication 5 MILLIGRAM(S): at 23:27

## 2022-07-07 RX ADMIN — LIDOCAINE 1 PATCH: 4 CREAM TOPICAL at 15:59

## 2022-07-07 RX ADMIN — Medication 1000 MILLIGRAM(S): at 14:25

## 2022-07-07 RX ADMIN — HEPARIN SODIUM 5000 UNIT(S): 5000 INJECTION INTRAVENOUS; SUBCUTANEOUS at 21:37

## 2022-07-07 RX ADMIN — ONDANSETRON 4 MILLIGRAM(S): 8 TABLET, FILM COATED ORAL at 13:18

## 2022-07-07 RX ADMIN — Medication 3 MILLIGRAM(S): at 21:38

## 2022-07-07 RX ADMIN — POLYETHYLENE GLYCOL 3350 17 GRAM(S): 17 POWDER, FOR SOLUTION ORAL at 12:35

## 2022-07-07 RX ADMIN — Medication 30 MILLIGRAM(S): at 21:38

## 2022-07-07 RX ADMIN — Medication 25 MILLIGRAM(S): at 05:45

## 2022-07-07 RX ADMIN — REMDESIVIR 500 MILLIGRAM(S): 5 INJECTION INTRAVENOUS at 21:39

## 2022-07-07 RX ADMIN — PANTOPRAZOLE SODIUM 40 MILLIGRAM(S): 20 TABLET, DELAYED RELEASE ORAL at 13:19

## 2022-07-07 RX ADMIN — HYDROMORPHONE HYDROCHLORIDE 4 MILLIGRAM(S): 2 INJECTION INTRAMUSCULAR; INTRAVENOUS; SUBCUTANEOUS at 15:58

## 2022-07-07 NOTE — PROGRESS NOTE ADULT - ASSESSMENT
89 yo F presenting with nausea & poor PO intake x 2 weeks, CT revealed cholelithiasis w/ distended GB, RUQ U/S revealed distended GB w/ stones/sludge; HIDA scan is negative for evidence of acute cholecystitis. Pt is tolerating food. WBC count within normal limits, VSS.         PLAN:  -Advance diet to soft diet  -Continue IV abx, pain control, supportive care  -No surgical intervention needed at this time. Refer for outpatient follow up within 2-4 weeks of discharge

## 2022-07-07 NOTE — PHYSICAL THERAPY INITIAL EVALUATION ADULT - PERTINENT HX OF CURRENT PROBLEM, REHAB EVAL
89y F presented to ED with constant nausea x 4 days associated with decreased PO intake, generalized weakness, and fall. CT Chest and abd/pel: Moderate-sized bilateral pleural effusion with partial bilateral lower lobe atelectasis. Cholelithiasis with distended gallbladder. RUQ US: Cholelithiasis. HEAD CTno acute hemorrhage or midline shift.

## 2022-07-07 NOTE — PROGRESS NOTE ADULT - PROBLEM SELECTOR PLAN 7
S/p 2 stents 2012   - continue home aspirin and statin  Cardiology Dr schumacher d/w -IV Lasix 40 mg daily

## 2022-07-07 NOTE — DIETITIAN INITIAL EVALUATION ADULT - NS FNS DIET ORDER
Diet, NPO after Midnight:      NPO Start Date: 07-Jul-2022,   NPO Start Time: 23:59  Except Medications (07-07-22 @ 07:17)  Diet, Consistent Carbohydrate Clear Liquid (07-06-22 @ 13:01)

## 2022-07-07 NOTE — PROGRESS NOTE ADULT - SUBJECTIVE AND OBJECTIVE BOX
[facility], Division of Infectious Diseases  MIKHAIL Saenz Y. Patel, S. [facility], [facility]  [phone]    Name: DEVON NOLAND  Age: 89y  Gender: Female  MRN: [id number]    Interval History:  Patient seen at bedside  Daughter present--upset at discontinuation of pain medications  (pain medications needed to be held x24h prior to GI procedure/test)  Pt c/o of back pain--confused per daughter  No acute overnight events. Afebrile  on RA  Notes reviewed    Antibiotics:      Medications:  acetaminophen     Tablet .. 650 milliGRAM(s) Oral every 6 hours PRN  aluminum hydroxide/magnesium hydroxide/simethicone Suspension 30 milliLiter(s) Oral every 4 hours PRN  amitriptyline 30 milliGRAM(s) Oral at bedtime  aspirin  chewable 81 milliGRAM(s) Oral daily  atorvastatin 40 milliGRAM(s) Oral at bedtime  dextrose 5%. 1000 milliLiter(s) IV Continuous <Continuous>  dextrose 5%. 1000 milliLiter(s) IV Continuous <Continuous>  dextrose 50% Injectable 25 Gram(s) IV Push once  dextrose 50% Injectable 12.5 Gram(s) IV Push once  dextrose 50% Injectable 25 Gram(s) IV Push once  dextrose Oral Gel 15 Gram(s) Oral once PRN  ferrous    sulfate 325 milliGRAM(s) Oral daily  glucagon  Injectable 1 milliGRAM(s) IntraMuscular once  heparin   Injectable 5000 Unit(s) SubCutaneous every 8 hours  HYDROmorphone   Tablet 4 milliGRAM(s) Oral three times a day  HYDROmorphone   Tablet 2 milliGRAM(s) Oral once  insulin lispro (ADMELOG) corrective regimen sliding scale   SubCutaneous three times a day before meals  insulin lispro (ADMELOG) corrective regimen sliding scale   SubCutaneous at bedtime  lidocaine   4% Patch 1 Patch Transdermal once  melatonin 3 milliGRAM(s) Oral at bedtime PRN  metoprolol succinate ER 25 milliGRAM(s) Oral daily  ondansetron Injectable 4 milliGRAM(s) IV Push every 8 hours PRN  pantoprazole    Tablet 40 milliGRAM(s) Oral before breakfast  polyethylene glycol 3350 17 Gram(s) Oral daily  senna 2 Tablet(s) Oral at bedtime  sucralfate suspension 1 Gram(s) Oral two times a day      Review of Systems    Review of systems otherwise negative except as previously noted.    Allergies: morphine (Other)    For details regarding the patient's past medical history, social history, family history, and other miscellaneous elements, please refer the initial infectious diseases consultation and/or the admitting history and physical examination for this admission.    Objective:  Vitals:   T(C): 36.9 (22 @ 12:20), Max: 37.5 (22 @ 21:16)  HR: 84 (22 @ 12:20) (74 - 90)  BP: 143/70 (22 @ 12:20) (123/69 - 143/70)  RR: 18 (22 @ 12:20) (17 - 18)  SpO2: 91% (22 @ 12:20) (91% - 94%)    Physical Examination:  General: elderly W, NAD  HEENT: NC/AT, EOMI  Cardio: S1, S2 heard, RRR, no murmurs  Resp: decreased b/l breath sounds  Abd: soft, NT, ND  Ext: no edema or cyanosis  Skin: warm, dry, no visible rash      Laboratory Studies:  CBC:                       10.5   4.83  )-----------( 215      ( 2022 09:04 )             32.7     CMP:     135  |  98  |  30<H>  ----------------------------<  97  3.9   |  27  |  2.30<H>    Ca    8.5      2022 09:04  Phos  3.6     07-  Mg     2.1     -    TPro  6.4  /  Alb  2.7<L>  /  TBili  0.3  /  DBili  x   /  AST  28  /  ALT  12  /  AlkPhos  68  07-07    LIVER FUNCTIONS - ( 2022 09:04 )  Alb: 2.7 g/dL / Pro: 6.4 g/dL / ALK PHOS: 68 U/L / ALT: 12 U/L / AST: 28 U/L / GGT: x           Urinalysis Basic - ( 2022 19:00 )    Color: Yellow / Appearance: Clear / S.010 / pH: x  Gluc: x / Ketone: Negative  / Bili: Negative / Urobili: Negative   Blood: x / Protein: 30 mg/dL / Nitrite: Negative   Leuk Esterase: Small / RBC: 25-50 /HPF / WBC 3-5   Sq Epi: x / Non Sq Epi: Few / Bacteria: Few        Microbiology: reviewed    Culture - Blood (collected 22 @ 21:45)  Source: .Blood Blood-Peripheral  Preliminary Report (22 @ 01:02):    No growth to date.    Culture - Blood (collected 22 @ 21:40)  Source: .Blood Blood-Peripheral  Preliminary Report (22 @ 01:02):    No growth to date.        Radiology: reviewed

## 2022-07-07 NOTE — PROGRESS NOTE ADULT - PROBLEM SELECTOR PLAN 10
hx of spinal stenosis, OA, and neuropathy  -HOLDING home PO dilaudid 4mg @ 9am, 4pm, 10pm in preparation for gastric emptying study hx of spinal stenosis, OA, and neuropathy  -HOLDING home PO dilaudid 4mg @ 9am, 4pm, 10pm in preparation for gastric emptying study  -Consider IV tylenol

## 2022-07-07 NOTE — DISCHARGE NOTE PROVIDER - NSDCFUSCHEDAPPT_GEN_ALL_CORE_FT
Veronica Max Physician [facility]  UROLOGY [address]  Scheduled Appointment: 07/12/2022    Bry Warren  Warrenmauro Physician [facility]  CARDIOLOGY [address]  Scheduled Appointment: 07/14/2022     Veronica Max Physician [facility]  UROLOGY [address]  Scheduled Appointment: 07/12/2022

## 2022-07-07 NOTE — PROGRESS NOTE ADULT - PROBLEM SELECTOR PLAN 12
As per daughter Mary patient sundowns while in hospital  - no known hx of dementia as per daughter  - daughter states she does NOT  want haldol or zyprexa for patient and that patient does well with Xanax instead

## 2022-07-07 NOTE — PROGRESS NOTE ADULT - SUBJECTIVE AND OBJECTIVE BOX
[facility]  Shar Hopkins PA-C  [facility] [place] [address]  [phone]      INTERVAL HPI/OVERNIGHT EVENTS:  Pt s/e  Pt starting to get confused, discussed with daughter on phone  Pt reports no nausea currently    MEDICATIONS  (STANDING):  amitriptyline 30 milliGRAM(s) Oral at bedtime  aspirin  chewable 81 milliGRAM(s) Oral daily  atorvastatin 40 milliGRAM(s) Oral at bedtime  dextrose 5%. 1000 milliLiter(s) (50 mL/Hr) IV Continuous <Continuous>  dextrose 5%. 1000 milliLiter(s) (100 mL/Hr) IV Continuous <Continuous>  dextrose 50% Injectable 25 Gram(s) IV Push once  dextrose 50% Injectable 12.5 Gram(s) IV Push once  dextrose 50% Injectable 25 Gram(s) IV Push once  ferrous    sulfate 325 milliGRAM(s) Oral daily  glucagon  Injectable 1 milliGRAM(s) IntraMuscular once  heparin   Injectable 5000 Unit(s) SubCutaneous every 8 hours  insulin lispro (ADMELOG) corrective regimen sliding scale   SubCutaneous three times a day before meals  insulin lispro (ADMELOG) corrective regimen sliding scale   SubCutaneous at bedtime  metoprolol succinate ER 25 milliGRAM(s) Oral daily  pantoprazole    Tablet 40 milliGRAM(s) Oral before breakfast  polyethylene glycol 3350 17 Gram(s) Oral daily  senna 2 Tablet(s) Oral at bedtime  sucralfate suspension 1 Gram(s) Oral two times a day    MEDICATIONS  (PRN):  acetaminophen     Tablet .. 650 milliGRAM(s) Oral every 6 hours PRN Temp greater or equal to 38C (100.4F), Mild Pain (1 - 3)  aluminum hydroxide/magnesium hydroxide/simethicone Suspension 30 milliLiter(s) Oral every 4 hours PRN Dyspepsia  dextrose Oral Gel 15 Gram(s) Oral once PRN Blood Glucose LESS THAN 70 milliGRAM(s)/deciliter  melatonin 3 milliGRAM(s) Oral at bedtime PRN Insomnia  ondansetron Injectable 4 milliGRAM(s) IV Push every 8 hours PRN Nausea and/or Vomiting      Allergies  morphine (Other)      PHYSICAL EXAM:   Vital Signs:  Vital Signs Last 24 Hrs  T(C): 36.7 (2022 05:39), Max: 37.5 (2022 21:16)  T(F): 98 (2022 05:39), Max: 99.5 (2022 21:16)  HR: 75 (2022 05:39) (74 - 90)  BP: 125/71 (2022 05:39) (123/69 - 133/63)  BP(mean): --  RR: 17 (2022 05:39) (17 - 18)  SpO2: 92% (2022 05:39) (92% - 96%)  Daily     Daily Weight in k (2022 18:20)    GENERAL:  Appears stated age  ABDOMEN:  Soft, non-tender, non-distended  NEURO:  Alert      LABS:                        10.5   4.83  )-----------( 215      ( 2022 09:04 )             32.7     07-07    135  |  98  |  30<H>  ----------------------------<  97  3.9   |  27  |  2.30<H>    Ca    8.5      2022 09:04  Phos  3.6     07-06  Mg     2.1     07-06    TPro  6.4  /  Alb  2.7<L>  /  TBili  0.3  /  DBili  x   /  AST  28  /  ALT  12  /  AlkPhos  68  07-07    PT/INR - ( 2022 19:05 )   PT: 12.2 sec;   INR: 1.04 ratio         PTT - ( 2022 19:05 )  PTT:28.6 sec  Urinalysis Basic - ( 2022 19:00 )    Color: Yellow / Appearance: Clear / S.010 / pH: x  Gluc: x / Ketone: Negative  / Bili: Negative / Urobili: Negative   Blood: x / Protein: 30 mg/dL / Nitrite: Negative   Leuk Esterase: Small / RBC: 25-50 /HPF / WBC 3-5   Sq Epi: x / Non Sq Epi: Few / Bacteria: Few

## 2022-07-07 NOTE — DIETITIAN INITIAL EVALUATION ADULT - PERTINENT LABORATORY DATA
07-06    135  |  100  |  29<H>  ----------------------------<  100<H>  4.1   |  27  |  2.10<H>    Ca    8.8      06 Jul 2022 07:14  Phos  3.6     07-06  Mg     2.1     07-06    TPro  6.0  /  Alb  2.6<L>  /  TBili  0.4  /  DBili  x   /  AST  22  /  ALT  12  /  AlkPhos  65  07-06  POCT Blood Glucose.: 108 mg/dL (07-06-22 @ 21:14)  A1C with Estimated Average Glucose Result: 6.9 % (07-06-22 @ 07:14)  A1C with Estimated Average Glucose Result: 6.0 % (02-26-22 @ 11:06)

## 2022-07-07 NOTE — DISCHARGE NOTE PROVIDER - NSDCMRMEDTOKEN_GEN_ALL_CORE_FT
amitriptyline 10 mg oral tablet: 3 tab(s) orally once a day (at bedtime)  aspirin 81 mg oral tablet: 1 tab(s) orally once a day  atorvastatin 40 mg oral tablet: 1 tab(s) orally once a day  Dilaudid 4 mg oral tablet: 1 tab(s) orally 3 times  ferrous sulfate 324 mg (65 mg elemental iron) oral delayed release tablet: 1 tab(s) orally once a day  ferrous sulfate 325 mg (65 mg elemental iron) oral tablet: 1 tablet oral daily  Metoprolol Succinate ER 25 mg oral tablet, extended release: 1 tab(s) orally once a day  MiraLax oral powder for reconstitution: ng/kg orally  PreserVision AREDS oral capsule: 1 tab(s) orally 2 times a day  Senna 8.6 mg oral tablet: 2 tab(s) orally once a day (at bedtime)  Toprol-XL 25 mg oral tablet, extended release: 1 tab(s) orally once a day  Vitamin D3 1250 mcg (50,000 intl units) oral capsule: 1 cap(s) orally once a week  Zofran 4 mg oral tablet: 1 tab(s) orally every 6 hours   amitriptyline 10 mg oral tablet: 3 tab(s) orally once a day (at bedtime)  aspirin 81 mg oral tablet: 1 tab(s) orally once a day  atorvastatin 40 mg oral tablet: 1 tab(s) orally once a day  ferrous sulfate 325 mg (65 mg elemental iron) oral tablet: orally once a day  HYDROmorphone 4 mg oral tablet: 1 tab(s) orally every 6 hours, As Needed  Lasix 20 mg oral tablet: 1 tab(s) orally every 48 hours   Metoprolol Succinate ER 25 mg oral tablet, extended release: 1 tab(s) orally once a day  MiraLax oral powder for reconstitution: orally once a day, As Needed - for constipation  PreserVision AREDS oral capsule: 1 tab(s) orally 2 times a day  Senna 8.6 mg oral tablet: 2 tab(s) orally once a day (at bedtime)  Vitamin D3 1250 mcg (50,000 intl units) oral capsule: 1 cap(s) orally once a week  Zofran 4 mg oral tablet: 1 tab(s) orally every 6 hours   amitriptyline 10 mg oral tablet: 3 tab(s) orally once a day (at bedtime)  aspirin 81 mg oral tablet: 1 tab(s) orally once a day  atorvastatin 40 mg oral tablet: 1 tab(s) orally once a day  cyanocobalamin 1000 mcg oral tablet: 1 tab(s) orally once a day  ferrous sulfate 325 mg (65 mg elemental iron) oral tablet: orally once a day  HYDROmorphone 4 mg oral tablet: 1 tab(s) orally 3 times a day  Lasix 20 mg oral tablet: 1 tab(s) orally every 48 hours   lidocaine 4% topical film: Apply topically to affected area once a day  Apply to Lower Back daily 10 AM -10 PM daily 12 hrs on -12 hrs off  lidocaine 4% topical film: Apply topically to affected area once a day  Apply to Mid Back Daily 10 AM -10 PM daily 12 hrs on, 12 hrs Off.  Metoprolol Succinate ER 25 mg oral tablet, extended release: 1 tab(s) orally once a day  MiraLax oral powder for reconstitution: orally once a day, As Needed - for constipation  pantoprazole 40 mg oral delayed release tablet: 1 tab(s) orally 2 times a day  PreserVision AREDS oral capsule: 1 tab(s) orally 2 times a day  Senna 8.6 mg oral tablet: 2 tab(s) orally once a day (at bedtime)  Vitamin D3 1250 mcg (50,000 intl units) oral capsule: 1 cap(s) orally once a week  Zofran 4 mg oral tablet: 1 tab(s) orally every 6 hours

## 2022-07-07 NOTE — PROGRESS NOTE ADULT - SUBJECTIVE AND OBJECTIVE BOX
Patient is a 89y old  Female who presents with a chief complaint of gallstones, covid, kiah (2022 08:50)       HPI:  89y F pmhx CAD x 2 stents (), T2DM, HTN, HLD, GERD, mild AS, chronic venous insufficiency, chronic neuropathy, macular degeneration, chronic constipation, depression, OA, and recent admission to hospitals - 3/2 for gallstone pancreatitis with non-operative treatment presented to the ED with constant nausea x 4 days associated with decreased PO intake, generalized weakness, and fall today x 1 due to LE weakness. Patient denies fever, chills, cp, dizziness, sob, abd pain, vomiting, diarrhea. Patient was seen here in ED  for epigastric pain (since resolved) and discharged with outpatient follow up with Dr. Terrell. Patient has seen Dr. Terrell twice since then and epigastric pain resolved s/p PO zofran and carafate with planned esophagram. Daughter states epigastric pain resolved, but patient has had constant nausea x 4 days that has not improved despite increasing PO zofran from 4mg to 8mg q6h. Patient was unable to obtain appointment with Dr. Terrell today and after falling at home due to leg weakness, daughter brought her to the ED.     ED Course  Vitals: 99.2F, 85bpm, 143/66, 95% RA  Labs: Hgb 10.2, Hct 32.5, Na 132, K 5.5, BUN 30, Cr 2, GFR 23, lipase wnl  EKG:  CT Chest and abd/pel: Moderate-sized bilateral pleural effusion with partial bilateral lower lobe atelectasis. Cholelithiasis with distended gallbladder and right upper quadrant inflammatory changes, findings suspicious for acute cholecystitis.  RUQ US: Cholelithiasis with nonspecific pericholecystic fluid. Negative sonographic Scott sign. Dilated common bowel duct. Recommend correlating with LFTs. Cannot exclude distal choledocholithiasis.  HEAD CT: Mild volume loss, microvascular disease, no acute hemorrhage or midline shift.  Patient Received: Zosyn x1, NS 500cc x1, Lasix 40mg IV x1, IV dilaudid .5mg x1           (2022 23:51)       PAST MEDICAL & SURGICAL HISTORY:  H/O vertebral fracture repair  History of vertebral fracture  Dyslipidemia  DM type 2 with diabetic dyslipidemia  H/O gastroesophageal reflux (GERD)  Costochondritis  Coronary arteriosclerosis in native artery  s/p 2 stents. last placed 2 years ago  Gastroparesis  History of laminectomy  S/P hip hemiarthroplasty  S/P appendectomy      FAMILY HISTORY:  FHx: stroke (Mother)    NC    Social History:Non smoker    MEDICATIONS  (STANDING):  amitriptyline 30 milliGRAM(s) Oral at bedtime  aspirin  chewable 81 milliGRAM(s) Oral daily  atorvastatin 40 milliGRAM(s) Oral at bedtime  dextrose 5%. 1000 milliLiter(s) (50 mL/Hr) IV Continuous <Continuous>  dextrose 5%. 1000 milliLiter(s) (100 mL/Hr) IV Continuous <Continuous>  dextrose 50% Injectable 25 Gram(s) IV Push once  dextrose 50% Injectable 12.5 Gram(s) IV Push once  dextrose 50% Injectable 25 Gram(s) IV Push once  ferrous    sulfate 325 milliGRAM(s) Oral daily  glucagon  Injectable 1 milliGRAM(s) IntraMuscular once  heparin   Injectable 5000 Unit(s) SubCutaneous every 8 hours  HYDROmorphone   Tablet 4 milliGRAM(s) Oral <User Schedule>  insulin lispro (ADMELOG) corrective regimen sliding scale   SubCutaneous every 6 hours  metoprolol succinate ER 25 milliGRAM(s) Oral daily  piperacillin/tazobactam IVPB.. 3.375 Gram(s) IV Intermittent every 12 hours  polyethylene glycol 3350 17 Gram(s) Oral daily  senna 2 Tablet(s) Oral at bedtime    MEDICATIONS  (PRN):  acetaminophen     Tablet .. 650 milliGRAM(s) Oral every 6 hours PRN Temp greater or equal to 38C (100.4F), Mild Pain (1 - 3)  aluminum hydroxide/magnesium hydroxide/simethicone Suspension 30 milliLiter(s) Oral every 4 hours PRN Dyspepsia  dextrose Oral Gel 15 Gram(s) Oral once PRN Blood Glucose LESS THAN 70 milliGRAM(s)/deciliter  melatonin 3 milliGRAM(s) Oral at bedtime PRN Insomnia  ondansetron Injectable 4 milliGRAM(s) IV Push every 8 hours PRN Nausea and/or Vomiting   Meds reviewed    Allergies    morphine (Other)    Intolerances         REVIEW OF SYSTEMS:  Constitutional: Denies fatigue  HEENT: Denies headaches and dizziness  Respiratory: denies SOB, cough, or wheezing  Cardiovascular: denies CP, palpitations  Gastrointestinal: Denies nausea, denies vomiting, diarrhea, constipation, abdominal pain, or bloody stools  Genitourinary: +Urinary incontinence. denies painful urination or bloody urine  Neurologic: Denies generalized weakness.    ICU Vital Signs Last 24 Hrs  T(C): 36.9 (2022 12:20), Max: 37.5 (2022 21:16)  T(F): 98.5 (2022 12:20), Max: 99.5 (2022 21:16)  HR: 84 (2022 12:20) (74 - 90)  BP: 143/70 (2022 12:20) (123/69 - 143/70)  BP(mean): --  ABP: --  ABP(mean): --  RR: 18 (2022 12:20) (17 - 18)  SpO2: 91% (2022 12:20) (91% - 94%)    PHYSICAL EXAM:    GENERAL: NAD  HEAD:  Atraumatic, Normocephalic  ENMT: No Drainage from nares, No drainage from ears  NECK: Supple, neck  veins full  NERVOUS SYSTEM:  Awake and Alert  CHEST/LUNG: Clear to percussion bilaterally; No rales, rhonchi, wheezing, or rubs  HEART: Regular rate and rhythm; No murmurs, rubs, or gallops  ABDOMEN: Soft, Nondistended; Bowel sounds present  EXTREMITIES: +Mild edema of lower extremities.  SKIN: No rashes No obvious ecchymosis      LABS:                                    10.5   4.83  )-----------( 215      ( 2022 09:04 )             32.7     07-    135  |  98  |  30<H>  ----------------------------<  97  3.9   |  27  |  2.30<H>    Ca    8.5      2022 09:04  Phos  3.6     07-06  Mg     2.1     07-06    TPro  6.4  /  Alb  2.7<L>  /  TBili  0.3  /  DBili  x   /  AST  28  /  ALT  12  /  AlkPhos  68  07-07    PT/INR - ( 2022 19:05 )   PT: 12.2 sec;   INR: 1.04 ratio         PTT - ( 2022 19:05 )  PTT:28.6 sec  Urinalysis Basic - ( 2022 19:00 )    Color: Yellow / Appearance: Clear / S.010 / pH: x  Gluc: x / Ketone: Negative  / Bili: Negative / Urobili: Negative   Blood: x / Protein: 30 mg/dL / Nitrite: Negative   Leuk Esterase: Small / RBC: 25-50 /HPF / WBC 3-5   Sq Epi: x / Non Sq Epi: Few / Bacteria: Few            LYUDMILA DOMINGUEZ MD; Attending Radiologist  This document has been electronically signed. 2022  8:32PM  < end of copied text >

## 2022-07-07 NOTE — PROGRESS NOTE ADULT - ASSESSMENT
89y F pmhx CAD x 2 stents (2012), T2DM, HTN, HLD, GERD, mild AS, chronic venous insufficiency, chronic neuropathy, macular degeneration, chronic constipation, depression, OA, and recent admission to [facility] 2/25- 3/2 for gallstone pancreatitis with non-operative treatment presented to the ED with constant nausea x 4 days associated with decreased PO intake, generalized weakness.    #Acute on CKD stage III  CT revealed right sided hydronephrosis and pleural effusion  UA consistent with UTI      Baseline creatinine ~1.3?  Possible contributions from covid-19 or hydronephrosis  DC IV lasix,   Consider thoracentesis if pleural effusion is loculated.   Obtain FeNa labs (Serum Na/Cr, Urine Na/Cr)  Avoid nephrotoxic agents  Monitor serum electrolytes

## 2022-07-07 NOTE — DISCHARGE NOTE PROVIDER - NSDCFUADDINST_GEN_ALL_CORE_FT
Please follow up with your primary care doctor within 1 week of discharge from the hospital.  You or your family member are responsible for making all follow up appointments.  If you develop abdominal pain/nausea that does not go away, or inability to tolerate food/water, please return to the ED immediately. Please follow up with your primary care doctor Dr Alvarado within 1 week of discharge from the hospital.  -Take ensure & Liquid/ Pureed diet   You or your family member are responsible for making all follow up appointments.  If you develop abdominal pain/nausea that does not go away, or inability to tolerate food/water, please return to the ED immediately.

## 2022-07-07 NOTE — PHYSICAL THERAPY INITIAL EVALUATION ADULT - ADDITIONAL COMMENTS
Pt lives a private villa w/ 24/[facility] previously.  Pt report is primarily a household ambulator.

## 2022-07-07 NOTE — DISCHARGE NOTE PROVIDER - NPI NUMBER (FOR SYSADMIN USE ONLY) :
[[id number]] [[id number]],[[id number]],[[id number]],[[id number]],[[id number]] [[id number]],[[id number]],[[id number]],[[id number]],[[id number]],[[id number]],[[id number]]

## 2022-07-07 NOTE — DISCHARGE NOTE PROVIDER - DETAILS OF MALNUTRITION DIAGNOSIS/DIAGNOSES
This patient has been assessed with a concern for Malnutrition and was treated during this hospitalization for the following Nutrition diagnosis/diagnoses:     -  07/13/2022: Severe protein-calorie malnutrition

## 2022-07-07 NOTE — DISCHARGE NOTE PROVIDER - CARE PROVIDERS DIRECT ADDRESSES
,DirectAddress_Unknown,DirectAddress_Unknown,DirectAddress_Unknown,DirectAddress_Unknown,[email or web address] ,DirectAddress_Unknown,DirectAddress_Unknown,DirectAddress_Unknown,DirectAddress_Unknown,[email or web address] ,DirectAddress_Unknown,DirectAddress_Unknown,DirectAddress_Unknown,DirectAddress_Unknown,[email or web address],DirectAddress_Unknown

## 2022-07-07 NOTE — DIETITIAN INITIAL EVALUATION ADULT - OTHER INFO
89y F pmhx CAD x 2 stents (2012), T2DM, HTN, HLD, GERD, mild AS, chronic venous insufficiency, chronic neuropathy, macular degeneration, chronic constipation, depression, OA, lumbar degenerative disc disease, and spinal stenosis admitted for cholelithiasis, KERRY, and found to be COVID positive on admission     89 yo F presenting with nausea & poor PO intake x 2 weeks, CT revealed cholelithiasis w/ distended GB, RUQ U/S revealed distended GB w/ stones/sludge; HIDA scan is negative for evidence of acute cholecystitis. Pt is tolerating food. WBC count within normal limits, VSS.  Excerpt from MD progress note : 89 yo F presenting with nausea & poor PO intake x 2 weeks, CT revealed cholelithiasis w/ distended GB, RUQ U/S revealed distended GB w/ stones/sludge; HIDA scan is negative for evidence of acute cholecystitis. Pt is tolerating food. WBC count within normal limits, VSS.   Per nsg, taking very little clear liquids this am  Excerpt from MD progress note : 89 yo F presenting with nausea & poor PO intake x 2 weeks, CT revealed cholelithiasis w/ distended GB, RUQ U/S revealed distended GB w/ stones/sludge; HIDA scan is negative for evidence of acute cholecystitis. Pt is tolerating food. WBC count within normal limits, VSS.   Per nsg, taking very little clear liquids this am . RD aware NPO after mn for gastric emptying study tomorrow . RD also aware pt is covid +

## 2022-07-07 NOTE — PROGRESS NOTE ADULT - ASSESSMENT
89y F pmhx CAD x 2 stents (2012), T2DM, HTN, HLD, GERD, mild AS, chronic venous insufficiency, chronic neuropathy, macular degeneration, chronic constipation, depression, OA, lumbar degenerative disc disease, and spinal stenosis admitted for cholelithiasis, KERRY, and found to be COVID19.     Chest pain, Edema, HTN, HLD  -hx CAD s/p 2 stents in 2012  -pt c/o intermittent chest pain,   -EKG NSR 80, no acute ischemic changes  -trop WNL on admission, no need to further trend, doubt ACS as ekg trops WNL  -continue asa, statin  - LE edema likely 2/2 chronic venous insufficiency versus component of HF  - Last TTE 6/21 with EF 60%, mod TR, mild pHTN  - f/u repeat TTE to eval EF and mild AS  - CT c/a/p with b/l mod effusions, partial LLL atelectasis  - Pt is s/p Lasix 40mg IV in ED, would continue diuresis at this time with close monitoring of renal function and volume status  - may consider thoracentesis for b/l effusions but would trial diuresis first   - Telemetry reviewed, remains in sinus, with episodes of tachycardia up to 120s.  likely reactive to infection   - BP well controlled, monitor routine hemodynamics.  - Monitor and replete lytes, keep K>4, Mg>2.  - Will continue to follow.      Ruy Jin NP  Nurse Practitioner- Cardiology   Spectra #[phone]   89y F pmhx CAD x 2 stents (2012), T2DM, HTN, HLD, GERD, mild AS, chronic venous insufficiency, chronic neuropathy, macular degeneration, chronic constipation, depression, OA, lumbar degenerative disc disease, and spinal stenosis admitted for cholelithiasis, KERRY, and found to be COVID19.     Chest pain, Edema, HTN, HLD  -hx CAD s/p 2 stents in 2012  -pt c/o intermittent chest pain,   -EKG NSR 80, no acute ischemic changes  -trop WNL on admission, no need to further trend, doubt ACS as ekg trops WNL  -continue asa, statin    - LE edema likely 2/2 chronic venous insufficiency versus component of HF  - Last TTE 6/21 with EF 60%, mod TR, mild pHTN  - f/u repeat TTE to eval EF and mild AS  - CT c/a/p with b/l mod effusions, partial LLL atelectasis  - Pt is s/p Lasix 40mg IV in ED, would continue diuresis at this time with close monitoring of renal function and volume status  - may consider thoracentesis for b/l effusions but would trial diuresis first     - Telemetry reviewed, remains in sinus, with episodes of tachycardia up to 120s.  likely reactive to infection   - BP well controlled, monitor routine hemodynamics.  - Monitor and replete lytes, keep K>4, Mg>2.  - Will continue to follow.      Ruy Jin NP  Nurse Practitioner- Cardiology   Spectra #[phone]

## 2022-07-07 NOTE — PROGRESS NOTE ADULT - PROBLEM SELECTOR PLAN 1
Patient with nausea x 4 days associated with dec PO intake, epigastric pain ~2 weeks ago resolved  - CT abd/pel: Cholelithiasis with distended gallbladder and right upper quadrant inflammatory changes, findings suspicious for acute cholecystitis.  - RUQ US: Cholelithiasis with nonspecific pericholecystic fluid. Negative sonographic Scott sign. Dilated common bile duct.  - [facility] hospitals 2[facility]- 3/2 for gallstone pancreatitis with non-operative treatment   - no leukocytosis, lipase wnl, afebrile  - Was NPO, hold IVF for now given overload, IV Zofran prn, plan for HIDA in am as per surgery   -HIDA normal (7/6) and clear liquid diet with carb restrictions started  - surgery consulted (Dr. Rodrigues), recs appreciated d/w -HIDA -Neg   -GI Dr Montilla-RICHIA Neg-Ok for clears, No Abx  -NPO after midnight for Gastric Emptying study  -Dilaudid stopped this AM (7/7) for Gastric Emptying study Patient with nausea x 4 days associated with dec PO intake, epigastric pain ~2 weeks ago resolved  - CT abd/pel: Cholelithiasis with distended gallbladder and right upper quadrant inflammatory changes, findings suspicious for acute cholecystitis.  - RUQ US: Cholelithiasis with nonspecific pericholecystic fluid. Negative sonographic Scott sign. Dilated common bile duct.  - recent admission to [facility] 2/25- 3/2 for gallstone pancreatitis with non-operative treatment   - no leukocytosis, lipase wnl, afebrile  - Was NPO, hold IVF for now given overload, IV Zofran prn, plan for HIDA in am as per surgery   -HIDA normal (7/6) and clear liquid diet with carb restrictions started  - surgery consulted (Dr. Rodrigues), recs appreciated d/w -HIDA -Neg   -GI Dr Montilla-RICHIA Neg-Ok for clears, No Abx  -NPO after midnight for Gastric Emptying study  -Dilaudid stopped this AM (7/7) for Gastric Emptying study, consider IV tylenol

## 2022-07-07 NOTE — PROGRESS NOTE ADULT - SUBJECTIVE AND OBJECTIVE BOX
Patient is a 89y old  Female who presents with a chief complaint of Nausea     (2022 08:21)    HPI:  89y F pmhx CAD x 2 stents (), T2DM, HTN, HLD, GERD, mild AS, chronic venous insufficiency, chronic neuropathy, macular degeneration, chronic constipation, depression, OA, and recent admission to [facility] - 3/2 for gallstone pancreatitis with non-operative treatment presented to the ED with constant nausea x 4 days associated with decreased PO intake, generalized weakness, and fall today x 1 due to LE weakness. Patient denies fever, chills, cp, dizziness, sob, abd pain, vomiting, diarrhea. Patient was seen here in ED  for epigastric pain (since resolved) and discharged with outpatient follow up with Dr. Terrell. Patient has seen Dr. Terrell twice since then and epigastric pain resolved s/p PO zofran and carafate with planned esophagram. Daughter states epigastric pain resolved, but patient has had constant nausea x 4 days that has not improved despite increasing PO zofran from 4mg to 8mg q6h. Patient was unable to obtain appointment with Dr. Terrell today and after falling at home due to leg weakness, daughter brought her to the ED.     ED Course  Vitals: 99.2F, 85bpm, 143/66, 95% RA  Labs: Hgb 10.2, Hct 32.5, Na 132, K 5.5, BUN 30, Cr 2, GFR 23, lipase wnl  EKG:    CT Chest and abd/pel: Moderate-sized bilateral pleural effusion with partial bilateral lower lobe atelectasis. Cholelithiasis with distended gallbladder and right upper quadrant inflammatory changes, findings suspicious for acute cholecystitis.    RUQ US: Cholelithiasis with nonspecific pericholecystic fluid. Negative sonographic Scott sign. Dilated common bowel duct. Recommend correlating with LFTs. Cannot exclude distal choledocholithiasis.    HEAD CT: Mild volume loss, microvascular disease, no acute hemorrhage or midline shift.    Patient Received: Zosyn x1, NS 500cc x1, Lasix 40mg IV x1, IV dilaudid .5mg x1           (2022 23:51)    INTERVAL HPI:  - pt seen and examined at bedside. Complaining of increased frequency of urination and suprapubic pain s/p 40mg IV lasix x1 in the ED. Otherwise, notes worsening of chronic back pain and general feeling of malaise. No sob, chest pain, nausea. Fidelina, Anemia.HIDA scan today.  - pt seen and examined at bedside. Feeling confused. A&Ox1 (person). Has a hx of hospital delirium and has been able to be reoriented when her daughter is present. Notes pain over b/l LE but denies sob, chest pain, n/v/d. Waiting for gastric emptying study per GI.    OVERNIGHT EVENTS: none    Home Medications:  amitriptyline 10 mg oral tablet: 3 tab(s) orally once a day (at bedtime) (:44)  aspirin 81 mg oral tablet: 1 tab(s) orally once a day (:44)  atorvastatin 40 mg oral tablet: 1 tab(s) orally once a day (:44)  Dilaudid 4 mg oral tablet: 1 tab(s) orally 3 times (:44)  ferrous sulfate 324 mg (65 mg elemental iron) oral delayed release tablet: 1 tab(s) orally once a day (:44)  ferrous sulfate 325 mg (65 mg elemental iron) oral tablet: 1 tablet oral daily (:44)  Metoprolol Succinate ER 25 mg oral tablet, extended release: 1 tab(s) orally once a day (:44)  MiraLax oral powder for reconstitution: ng/kg orally (:44)  PreserVision AREDS oral capsule: 1 tab(s) orally 2 times a day (:44)  Senna 8.6 mg oral tablet: 2 tab(s) orally once a day (at bedtime) (:44)  Toprol-XL 25 mg oral tablet, extended release: 1 tab(s) orally once a day (:44)  Vitamin D3 1250 mcg (50,000 intl units) oral capsule: 1 cap(s) orally once a week (:44)  Zofran 4 mg oral tablet: 1 tab(s) orally every 6 hours (:44)      MEDICATIONS  (STANDING):  amitriptyline 30 milliGRAM(s) Oral at bedtime  aspirin  chewable 81 milliGRAM(s) Oral daily  atorvastatin 40 milliGRAM(s) Oral at bedtime  dextrose 5%. 1000 milliLiter(s) (50 mL/Hr) IV Continuous <Continuous>  dextrose 5%. 1000 milliLiter(s) (100 mL/Hr) IV Continuous <Continuous>  dextrose 50% Injectable 25 Gram(s) IV Push once  dextrose 50% Injectable 12.5 Gram(s) IV Push once  dextrose 50% Injectable 25 Gram(s) IV Push once  ferrous    sulfate 325 milliGRAM(s) Oral daily  glucagon  Injectable 1 milliGRAM(s) IntraMuscular once  heparin   Injectable 5000 Unit(s) SubCutaneous every 8 hours  insulin lispro (ADMELOG) corrective regimen sliding scale   SubCutaneous three times a day before meals  insulin lispro (ADMELOG) corrective regimen sliding scale   SubCutaneous at bedtime  metoprolol succinate ER 25 milliGRAM(s) Oral daily  polyethylene glycol 3350 17 Gram(s) Oral daily  senna 2 Tablet(s) Oral at bedtime    MEDICATIONS  (PRN):  acetaminophen     Tablet .. 650 milliGRAM(s) Oral every 6 hours PRN Temp greater or equal to 38C (100.4F), Mild Pain (1 - 3)  aluminum hydroxide/magnesium hydroxide/simethicone Suspension 30 milliLiter(s) Oral every 4 hours PRN Dyspepsia  dextrose Oral Gel 15 Gram(s) Oral once PRN Blood Glucose LESS THAN 70 milliGRAM(s)/deciliter  melatonin 3 milliGRAM(s) Oral at bedtime PRN Insomnia  ondansetron Injectable 4 milliGRAM(s) IV Push every 8 hours PRN Nausea and/or Vomiting      Allergies    morphine (Other)    Intolerances        Social History:  Former cigarette smoker, smoked <1/2 ppd for less than 15 years, quit more than 50 years ago   Denies ETOH use   Denies drug use   Ambulates with a walker. Lives at home with daughter. ADLs with assistance. (2022 23:51)      REVIEW OF SYSTEMS:  CONSTITUTIONAL: No fever, No chills, No fatigue, No myalgia, No Body ache, No Weakness  EYES: No eye pain,  No visual disturbances, No discharge, NO Redness  ENMT:  No ear pain, No nose bleed, No vertigo; No sinus pain, NO throat pain, No Congestion  NECK: No pain, No stiffness  RESPIRATORY: No cough, NO wheezing, No  hemoptysis, NO  shortness of breath  CARDIOVASCULAR: No chest pain, palpitations  GASTROINTESTINAL: No abdominal pain, NO epigastric pain. No nausea, No vomiting; No diarrhea, No constipation. [ x ] BM  GENITOURINARY: No dysuria, No frequency, No urgency, No hematuria, NO incontinence  NEUROLOGICAL: No headaches, No dizziness, No numbness, No tingling, No tremors, No weakness  EXT: No Swelling, + Pain b/l LE, + b/l LE Edema  SKIN:  [ x ] No itching, burning, rashes, or lesions   MUSCULOSKELETAL: No joint pain ,No Jt swelling; No muscle pain, No back pain, +B/L LE extremity pain  PSYCHIATRIC: +delirium No depression,  No anxiety,  No mood swings ,No difficulty sleeping at night  PAIN SCALE: [+  ] None  [  ] Other-  ROS Unable to obtain due to - [  ] Dementia  [  ] Lethargy [  ] Drowsy [  ] Sedated [  ] non verbal  REST OF REVIEW Of SYSTEM - [x  ] Normal       Vital Signs Last 24 Hrs  T(C): 36.7 (2022 05:39), Max: 37.5 (2022 21:16)  T(F): 98 (2022 05:39), Max: 99.5 (2022 21:16)  HR: 75 (2022 05:39) (74 - 90)  BP: 125/71 (2022 05:39) (123/69 - 133/63)  BP(mean): --  RR: 17 (2022 05:39) (17 - 18)  SpO2: 92% (2022 05:39) (92% - 96%)  Finger Stick        -06 @ 07:01  -  -07 @ 07:00  --------------------------------------------------------  IN: 0 mL / OUT: 1000 mL / NET: -1000 mL        PHYSICAL EXAM:  GENERAL:  [  ] NAD , [ x ] well appearing, [  ] Agitated, [  ] Drowsy,  [  ] Lethargy, [x  ] confused   HEAD:  [x  ] Normal, [  ] Other  EYES:  [ x] EOMI, [ x ] PERRLA, [ x ] conjunctiva and sclera clear normal, [  ] Other,  [  ] Pallor,[  ] Discharge  ENMT:  [x  ] Normal, [x  ] dry mucous membranes, [x  ] Good dentition, [x  ] No Thrush  NECK:  [  x] Supple, [x ] No JVD, [ x ] Normal thyroid, [  ] Lymphadenopathy [  ] Other  CHEST/LUNG:  [  ] Clear to auscultation bilaterally, [x  ] Breath Sounds B/L / Decrease, [ x ] poor effort  [x  ] No rales, [ x ] No rhonchi  [ x ]  No wheezing,   HEART:  [x  ] Regular rate and rhythm, [  ] tachycardia, [  ] Bradycardia,  [  ] irregular  [x  ] No murmurs, No rubs, No gallops, [  ] PPM in place (Mfr:  )  ABDOMEN:  [x  ] Soft, [x  ] suprapubic tenderness to palpation, [x  ] Nondistended, [ x ] No mass, [ x ] Bowel sounds present, [  ] obese  NERVOUS SYSTEM:  [ x ] Alert & Oriented X1, [ x ] Nonfocal  [ x ] Confusion  [  ] Encephalopathic [  ] Sedated [  ] Unable to assess, [  ] Dementia [  ] Other-  EXTREMITIES: [x  ] 2+ Peripheral Pulses, No clubbing, No cyanosis,  [x  ] 2+ pitting edema B/L lower EXT. [  ] PVD stasis skin changes B/L Lower EXT, [  ] wound  LYMPH: No lymphadenopathy noted  SKIN:  [ x ] No rashes or lesions, [  ] Pressure Ulcers, [x  ] ecchymosis- B/L Lower ext , [  ] Skin Tears, [  ] Other  DIET: Diet, NPO after Midnight:      NPO Start Date: 2022,   NPO Start Time: 23:59  Except Medications (22 @ 07:17)  Diet, Consistent Carbohydrate Clear Liquid (22 @ 13:01)      LABS:                        10.5   4.83  )-----------( 215      ( 2022 09:04 )             32.7     2022 09:04    135    |  98     |  30     ----------------------------<  97     3.9     |  27     |  2.30     Ca    8.5        2022 09:04    TPro  6.4    /  Alb  2.7    /  TBili  0.3    /  DBili  x      /  AST  28     /  ALT  12     /  AlkPhos  68     2022 09:04    PT/INR - ( 2022 19:05 )   PT: 12.2 sec;   INR: 1.04 ratio         PTT - ( 2022 19:05 )  PTT:28.6 sec  Urinalysis Basic - ( 2022 19:00 )    Color: Yellow / Appearance: Clear / S.010 / pH: x  Gluc: x / Ketone: Negative  / Bili: Negative / Urobili: Negative   Blood: x / Protein: 30 mg/dL / Nitrite: Negative   Leuk Esterase: Small / RBC: 25-50 /HPF / WBC 3-5   Sq Epi: x / Non Sq Epi: Few / Bacteria: Few        Culture Results:   No growth to date. ( @ 21:45)  Culture Results:   No growth to date. ( @ 21:40)      culture blood  -- .Blood Blood-Peripheral  @ 21:45    culture urine  --   @ 21:45  culture blood  -- .Blood Blood-Peripheral  @ 21:40    culture urine  --   @ 21:40              Culture - Blood (collected 2022 21:45)  Source: .Blood Blood-Peripheral  Preliminary Report (2022 01:02):    No growth to date.    Culture - Blood (collected 2022 21:40)  Source: .Blood Blood-Peripheral  Preliminary Report (2022 01:02):    No growth to date.         Anemia Panel:  Vitamin B12, Serum: 342 pg/mL (22 @ 07:14)  Folate, Serum: 11.8 ng/mL (22 @ 07:14)  Iron Total, Serum: 19 ug/dL (22 @ 07:14)  Iron - Total Binding Capacity.: 242 ug/dL (22 @ 07:14)  Ferritin, Serum: 67 ng/mL (22 @ 07:14)      Thyroid Panel:        Lipase, Serum: 78 U/L (22 @ 19:05)          RADIOLOGY & ADDITIONAL TESTS:      HEALTH ISSUES - PROBLEM Dx:  Cholelithiasis    FIDELINA (acute kidney injury)    Pleural effusion    2019 novel coronavirus disease (COVID-19)    Anemia    Fall    CAD (coronary artery disease)    HTN (hypertension)    Type 2 diabetes mellitus    Chronic back pain    Anxiety and depression    owning    DVT prophylaxis            Consultant(s) Notes Reviewed:  [ x ] YES     Care Discussed with [X] Consultants  [x  ] Patient  [ x ] Family [  ] HCP [  ]   [  ] Social Service  [  ] RN, [  ] Physical Therapy,[  ] Palliative care team  DVT PPX: [  ] Lovenox, [ x ] S C Heparin, [  ] Coumadin, [  ] Xarelto, [  ] Eliquis, [  ] Pradaxa, [  ] IV Heparin drip, [  ] SCD [  ] Contraindication 2 to GI Bleed,[  ] Ambulation [  ] Contraindicated 2 to  bleed [  ] Contraindicated 2 to Brain Bleed  Advanced directive: [x  ] None, [  ] DNR/DNI

## 2022-07-07 NOTE — DIETITIAN INITIAL EVALUATION ADULT - ORAL INTAKE PTA/DIET HISTORY
Pt very confused " obviously kike been kidnapped"  called daughter jesus at number on file, phone rang numerous times and given no ooportunity to leave a voice message

## 2022-07-07 NOTE — DISCHARGE NOTE PROVIDER - NSDCCPCAREPLAN_GEN_ALL_CORE_FT
PRINCIPAL DISCHARGE DIAGNOSIS  Diagnosis: Cholelithiasis  Assessment and Plan of Treatment: You were admitted to the hospital with gall stones and concern for infected gall bladder. You were seen by GI and general surgery, and further testing revealed your gall bladder was not infected. Given your persistence of symptoms, it is recommended you follow up with a general surgeon for scheduled removal of the gall bladder.   Please follow up with your primary care doctor and general surgeon within 1 week of discharge from the hospital.  You or your family member are responsible for making all follow up appointments.  If you develop abdominal pain/nausea that does not go away, or inability to tolerate food/water, please return to the ED immediately.      SECONDARY DISCHARGE DIAGNOSES  Diagnosis: KERRY (acute kidney injury)  Assessment and Plan of Treatment: You were admitted to the hospital with elevated kidney function. This was likely due to dehdyration and poor oral intake. You were seen by a nephrologist (kidney specialist) and your kidney function improved to baseline. Please follow up with your primary care doctor within 1 week of discharge from the hospital to monitor your kidney function.    Diagnosis: Pleural effusion  Assessment and Plan of Treatment: On CT imaging of your chest, a moderate sized pleural effusion was seen, which is a water collection in your lung. You were seen by a pulmonologist (lung specialist) who did not think draining the fluid was necessary. You were given IV forms of water pill to decrease the water in your lung.    Diagnosis: 2019 novel coronavirus disease (COVID-19)  Assessment and Plan of Treatment: You were found to be covid+ on admission, without significant symptoms. You were seen by an infectious disease specialist, but not treated with any anti-viral medications. Please follow up with your primary care doctor if you experience any significant or worsening symptoms.    Diagnosis: Type 2 diabetes mellitus  Assessment and Plan of Treatment: You have a history of Type 2 DM. Your Hemoglobin A1c was 6.9, which signifies good control of your diabetes. Please continue your diabetic medication as prescribed. Follow up with your primary care doctor within 1 week of discharge from the hospital.    Diagnosis: Anemia  Assessment and Plan of Treatment: You have a history of anemia, which is low blood count. Your blood level remained stable while in the hospital. Please follow up with your hematologist Dr. Nicholas once discharged from the hospital.       PRINCIPAL DISCHARGE DIAGNOSIS  Diagnosis: Cholelithiasis  Assessment and Plan of Treatment: You were admitted to the hospital with gall stones and concern for infected gall bladder. You were seen by GI and general surgery, futher testing revealed your gall bladder was not infected. Given your persistence of symptoms, it is recommended you follow up with a general surgeon for scheduled removal of the gall bladder.  Please follow up with your primary care doctor within 1 week of discharge from the hospital.  You or your family member are responsible for making all follow up appointments.  If you develop worsening abdominal pain, persistent nausea, pain after you eat, please return to the ED immediately.      SECONDARY DISCHARGE DIAGNOSES  Diagnosis: KERRY (acute kidney injury)  Assessment and Plan of Treatment: You were admitted to the hospital with elevated kidney function. This was likely due to dehdyration and poor oral intake. You were seen by a nephrologist (kidney specialist) and your kidney function improved to baseline. Please follow up with your primary care doctor within 1 week of discharge from the hospital to monitor your kidney function.    Diagnosis: Nausea & vomiting  Assessment and Plan of Treatment: You were admitted to the hospital with persistent nausea. You were seen by a GI doctor and an xray to evaluate the motility of your stomach was performed. This showed ********.  Please follow up with your primary care doctor AND GI DOCTOR within 1 week of discharge from the hospital.    Diagnosis: Pleural effusion  Assessment and Plan of Treatment: On CT imaging of your chest, a moderate sized pleural effusion was seen, which is a water collection in your lung. You were seen by a pulmonologist (lung specialist) who did not think draining the fluid was necessary. You were given IV forms of water pill to decrease the water in your lung.    Diagnosis: 2019 novel coronavirus disease (COVID-19)  Assessment and Plan of Treatment: You were found to be covid+ on admission, without significant symptoms. You were seen by an infectious disease specialist, but not treated with any anti-viral medications. Please follow up with your primary care doctor if you experience any significant or worsening symptoms.    Diagnosis: Type 2 diabetes mellitus  Assessment and Plan of Treatment: You have a history of Type 2 DM. Your Hemoglobin A1c was 6.9, which signifies good control of your diabetes. Please continue your diabetic medication as prescribed. Follow up with your primary care doctor within 1 week of discharge from the hospital.    Diagnosis: Anemia  Assessment and Plan of Treatment: You have a history of anemia, which is low blood count. Your blood level remained stable while in the hospital. Please follow up with your hematologist Dr. Nicholas once discharged from the hospital.       PRINCIPAL DISCHARGE DIAGNOSIS  Diagnosis: Cholelithiasis  Assessment and Plan of Treatment: You were admitted to the hospital with gall stones and concern for infected gall bladder. You were seen by GI and general surgery, futher testing revealed your gall bladder was not infected. Given your persistence of symptoms, it is recommended you follow up with a general surgeon for scheduled removal of the gall bladder.  Please follow up with your primary care doctor within 1 week of discharge from the hospital.  You or your family member are responsible for making all follow up appointments.  If you develop worsening abdominal pain, persistent nausea, pain after you eat, please return to the ED immediately.      SECONDARY DISCHARGE DIAGNOSES  Diagnosis: KERRY (acute kidney injury)  Assessment and Plan of Treatment: You were admitted to the hospital with elevated kidney function. This was likely due to dehdyration and poor oral intake. You were seen by a nephrologist (kidney specialist) and your kidney function improved to baseline. Please follow up with your primary care doctor within 1 week of discharge from the hospital to monitor your kidney function.    Diagnosis: Pleural effusion  Assessment and Plan of Treatment: On CT imaging of your chest, a moderate sized pleural effusion was seen, which is a water collection in your lung. You were seen by a pulmonologist (lung specialist) who did not think draining the fluid was necessary. You were given IV forms of water pill to decrease the water in your lung.    Diagnosis: 2019 novel coronavirus disease (COVID-19)  Assessment and Plan of Treatment: You were found to be covid+ on admission, without significant symptoms. You were seen by an infectious disease specialist, but not treated with any anti-viral medications. Please follow up with your primary care doctor if you experience any significant or worsening symptoms.    Diagnosis: Type 2 diabetes mellitus  Assessment and Plan of Treatment: You have a history of Type 2 DM. Your Hemoglobin A1c was 6.9, which signifies good control of your diabetes. Please continue your diabetic medication as prescribed. Follow up with your primary care doctor within 1 week of discharge from the hospital.    Diagnosis: Anemia  Assessment and Plan of Treatment: You have a history of anemia, which is low blood count. Your blood level remained stable while in the hospital. Please follow up with your hematologist Dr. Nicholas once discharged from the hospital.      Diagnosis: Nausea & vomiting  Assessment and Plan of Treatment: You were admitted to the hospital with persistent nausea. You were seen by a GI doctor and an xray to evaluate the motility of your stomach was performed. This showed ********.  Please follow up with your primary care doctor AND GI DOCTOR within 1 week of discharge from the hospital.     PRINCIPAL DISCHARGE DIAGNOSIS  Diagnosis: Esophageal dysmotility  Assessment and Plan of Treatment: You came with nausea and vomiting, you were seen by GI and nutrition.   Drink ensurance 3 time a day  Follow up with GI      SECONDARY DISCHARGE DIAGNOSES  Diagnosis: Pleural effusion  Assessment and Plan of Treatment: On CT imaging of your chest, a moderate sized pleural effusion was seen, which is a water collection in your lung. You were seen by a pulmonologist (lung specialist) who did not think draining the fluid was necessary. You were given IV forms of water pill to decrease the water in your lung.    Diagnosis: KERRY (acute kidney injury)  Assessment and Plan of Treatment: You were admitted to the hospital with elevated kidney function. This was likely due to dehdyration and poor oral intake. You were seen by a nephrologist (kidney specialist) and your kidney function improved to baseline. Please follow up with your primary care doctor within 1 week of discharge from the hospital to monitor your kidney function.    Diagnosis: Hematuria  Assessment and Plan of Treatment: You were found to have bllod in the Urine that could be secondary to UTI/ COVID-19  You were seen by urology who recommended to follow up with urology as a outpatient for further work up    Diagnosis: 2019 novel coronavirus disease (COVID-19)  Assessment and Plan of Treatment: You were found to be covid+ on admission, without significant symptoms. You were seen by an infectious disease specialist, but not treated with any anti-viral medications. Please follow up with your primary care doctor if you experience any significant or worsening symptoms.    Diagnosis: Type 2 diabetes mellitus  Assessment and Plan of Treatment: You have a history of Type 2 DM. Your Hemoglobin A1c was 6.9, which signifies good control of your diabetes. Please continue your diabetic medication as prescribed. Follow up with your primary care doctor within 1 week of discharge from the hospital.    Diagnosis: Anemia  Assessment and Plan of Treatment: You have a history of anemia, which is low blood count. Your blood level remained stable while in the hospital. Please follow up with your hematologist Dr. Nicholas once discharged from the hospital.      Diagnosis: Nausea & vomiting  Assessment and Plan of Treatment: You were admitted to the hospital with persistent nausea. You were seen by a GI doctor and an xray to evaluate the motility of your stomach was performed. That showed abnormal esophagus mobility.   Please follow up with your primary care doctor AND GI DOCTOR within 1 week of discharge from the hospital.     PRINCIPAL DISCHARGE DIAGNOSIS  Diagnosis: Esophageal dysmotility  Assessment and Plan of Treatment: You came with nausea and vomiting, Poor Oral intake  you were seen by GI and nutrition.   -You Had CT scan & Esophogram done -found to have Esophageal Dysmotility.  -On Zofran as needed 4x day  -Protonix 2x day   Drink ensurance 3 time a day along with pureed food.   Follow up with GI DR Terrell in 2-3 weeks      SECONDARY DISCHARGE DIAGNOSES  Diagnosis: KERRY (acute kidney injury)  Assessment and Plan of Treatment: KERRY/CKD 3   -You were admitted to the hospital with elevated kidney function. This was likely due to dehdyration and poor oral intake.  -CT scan-A/p & Renal stone hunt done showed RT Hydronephrosis    -Urology saw you-No Urological intervention  -You were seen by a nephrologist (kidney specialist) and your kidney function improved to baseline. Please follow up with your primary care doctor within 1 week of discharge from the hospital to monitor your kidney function. -[facility]    Diagnosis: 2019 novel coronavirus disease (COVID-19)  Assessment and Plan of Treatment: You were found to be covid+ on admission, without significant symptoms. You were seen by an infectious disease specialist, but not treated with any anti-viral medications. Please follow up with your primary care doctor if you experience any significant or worsening symptoms.  -Off Isolation now    Diagnosis: Anemia  Assessment and Plan of Treatment: You have a history of anemia, which is low blood count. Your blood level remained stable while in the hospital. Please follow up with your hematologist Dr. Nicholas once discharged from the hospital.  -On Iron daily  On Vit B12 daily   -CBC after 2 weeks      Diagnosis: Type 2 diabetes mellitus  Assessment and Plan of Treatment: You have a history of Type 2 DM. Your Hemoglobin A1c was 6.9, which signifies good control of your diabetes. Please continue your diabetic medication as prescribed. Follow up with your primary care doctor within 1 week of discharge from the hospital.    Diagnosis: CAD (coronary artery disease)  Assessment and Plan of Treatment: -On ASA 81 mg daily  Atorvastatin daily  Toprol XL 25 mg daily    Diagnosis: Chronic back pain  Assessment and Plan of Treatment: 2/2 Spinal stenosis & Chronic Compression Fractures.  -On Dilaudid 4 mg 1 tab 3x day  -Amitriptyline 10 mg 3 tab Q HS -bedtime  HCPT -Home PT  -Lidoderm Patches daily on MID Back & Lower Back  Follow up with Pain management    Diagnosis: Hematuria  Assessment and Plan of Treatment: You were found to have blood in the Urine .  -ID Saw you -Urine cultures-NEG  You Got few days of IV Abx   You were seen by urology who recommended to follow up with urology as a outpatient for further work Aliza carmen    Diagnosis: Leg edema  Assessment and Plan of Treatment: Rt Lower ExT   On Lasix 20 every 2 days   -Keep leg elevated  -Doppler Venous x 2 -NEG DVT    Diagnosis: Cholelithiasis  Assessment and Plan of Treatment: CT A/P   IMPRESSION:  Cholelithiasis.      Diagnosis: Pleural effusion  Assessment and Plan of Treatment: On CT imaging of your chest, a moderate sized pleural effusion was seen, . You were seen by a pulmonologist (lung specialist) who did not think draining the fluid was necessary.  - You were given Lasix - water pill , Incentive spirometry

## 2022-07-07 NOTE — PROGRESS NOTE ADULT - ASSESSMENT
89y F pmhx CAD x 2 stents (2012), T2DM, HTN, HLD, GERD, mild AS, chronic venous insufficiency, chronic neuropathy, macular degeneration, chronic constipation, depression, OA, lumbar degenerative disc disease, and spinal stenosis admitted for cholelithiasis, KERRY, and found to be COVID positive on admission.     COVID-19  Pleural Effusions  - Vaccinated x3 per patient.   - Currently on RA, satting well  - No obvious consolidation/GGO on CT chest   Plan:  Reviewed remdesivir w/ daughter again  Explained at length benefits and AE. Daughter undecided for now  Will hold remdesivir  Trend temps/WBC  Trend inflammatory biomarkers  Continue with supportive care  Maintain aspiration precautions  Maintain isolation per infection control policy    Cholelithiasis  - pt p/w nausea/decreased PO intake x 4 days  - CT abd/pel: Cholelithiasis with distended gallbladder and right upper quadrant inflammatory changes, findings suspicious for acute cholecystitis.  - RUQ US: Cholelithiasis with nonspecific pericholecystic fluid. Negative sonographic Scott sign. Dilated common bile duct.  - HIDA negative for acute cholecystitis  - Cx negative  Plan:   Overall low suspicion for acute infection  S/p zosyn  Monitor off Abx    KERRY  - likely 2/2 dec PO intake, Pre Renal   Plan:   trend renal fxn  avoid nephrotoxins  renally dose medications    D/w daughter Mary  D/w Dr. Izquierdo    Infectious Diseases will continue to follow. Please call with any questions.   Candida Izquierdo M.D.  [facility], Division of Infectious Diseases [phone]   89y F pmhx CAD x 2 stents (2012), T2DM, HTN, HLD, GERD, mild AS, chronic venous insufficiency, chronic neuropathy, macular degeneration, chronic constipation, depression, OA, lumbar degenerative disc disease, and spinal stenosis admitted for cholelithiasis, KERRY, and found to be COVID positive on admission.     COVID-19  Pleural Effusions  - Vaccinated x3 per patient.   - Currently on RA, satting well  - No obvious consolidation/GGO on CT chest   Plan:  Reviewed remdesivir w/ daughter again  Explained at length benefits and AE. Daughter undecided for now  Will hold remdesivir  Trend temps/WBC  Trend inflammatory biomarkers  Continue with supportive care  Maintain aspiration precautions  Maintain isolation per infection control policy    Cholelithiasis  - pt p/w nausea/decreased PO intake x 4 days  - CT abd/pel: Cholelithiasis with distended gallbladder and right upper quadrant inflammatory changes, findings suspicious for acute cholecystitis.  - RUQ US: Cholelithiasis with nonspecific pericholecystic fluid. Negative sonographic Scott sign. Dilated common bile duct.  - HIDA negative for acute cholecystitis  - Cx negative  Plan:   Overall low suspicion for acute infection  S/p zosyn  Monitor off Abx    KERRY  - likely 2/2 dec PO intake, Pre Renal   Plan:   trend renal fxn  avoid nephrotoxins  renally dose medications    D/w daughter Mary  D/w Dr. Izquierdo    Addendum 6:20 PM  Daughter Mary agreed to remdesivir. Ordered x3 days    Infectious Diseases will continue to follow. Please call with any questions.   Candida Izquierdo M.D.  [facility], Division of Infectious Diseases [phone]

## 2022-07-07 NOTE — DISCHARGE NOTE PROVIDER - CARE PROVIDER_API CALL
Tucker Nicholas)  Hematology; Internal Medicine; Medical Oncology  [address], [address]  [place] [address]  Phone: [phone]  Fax: [phone]  Follow Up Time: 1 week    Gary Alvarado  INTERNAL MEDICINE  [address] [place] [address]  Phone: [phone]  Fax: [phone]  Follow Up Time: 1 week    Dallin Rodrigues)  Surgery  [address] [place] [address]  Phone: [phone]  Fax: [phone]  Follow Up Time: 1 week    Lucio Montilla (DO)  Gastroenterology; Internal Medicine  [address] [place] [address]  Phone: [phone]  Fax: [phone]  Follow Up Time: 1 week    Bry Warren)  Cardiovascular Disease  [address] [place] [address]  Phone: [phone]  Fax: [phone]  Follow Up Time: 1 week   Tucker Nicholas)  Hematology; Internal Medicine; Medical Oncology  [address], [address]  [place] [address]  Phone: [phone]  Fax: [phone]  Follow Up Time: 1 week    Gary Alvarado  INTERNAL MEDICINE  [address] [place] [address]  Phone: [phone]  Fax: [phone]  Follow Up Time: 1 week    Dallin Rodrigues)  Surgery  [address] [place] [address]  Phone: [phone]  Fax: [phone]  Follow Up Time: 1 week    Lucio Montilla (DO)  Gastroenterology; Internal Medicine  [address] [place] [address]  Phone: [phone]  Fax: [phone]  Follow Up Time: 1 week    Bry Warren)  Cardiovascular Disease  [address] [place] [address]  Phone: [phone]  Fax: [phone]  Follow Up Time: 1 week    Saul Hill)  Urology  [address], [address]  [place] [address]  Phone: [phone]  Fax: [phone]  Follow Up Time: 1 week   Tucker Nicholas)  Hematology; Internal Medicine; Medical Oncology  [address], [address]  [place] [address]  Phone: [phone]  Fax: [phone]  Follow Up Time: 1 week    Gary Alvarado  INTERNAL MEDICINE  [address] [place] [address]  Phone: [phone]  Fax: [phone]  Follow Up Time: 1 week    Dallin Rodrigues)  Surgery  [address] [place] [address]  Phone: [phone]  Fax: [phone]  Follow Up Time: 1 week    Lucio Montilla (DO)  Gastroenterology; Internal Medicine  [address] [place] [address]  Phone: [phone]  Fax: [phone]  Follow Up Time: 1 week    Bry Warren)  Cardiovascular Disease  [address] [place] [address]  Phone: [phone]  Fax: [phone]  Follow Up Time: 1 week    Saul Hill)  Urology  [address] [facility] [address], [address]  [place] [address]  Phone: [phone]  Fax: [phone]  Follow Up Time: 1 week    Dario Hernandez)  Nephrology  [address] [facility] [address]  [place] [address]  Phone: [phone]  Fax: [phone]  Follow Up Time:

## 2022-07-07 NOTE — PROGRESS NOTE ADULT - ASSESSMENT
nausea  abodminal pain    hida negative for acute cholecystitis  ? gastroparesis  cont clears  iv fluid  check nm ges  d/w patient/family  d/w primary    I reviewed the overnight course of events on the unit, re-confirming the patient history. I discussed the care with the patient and their family  Differential diagnosis and plan of care discussed with patient after the evaluation  35 minutes spent on total encounter of which more than fifty percent of the encounter was spent counseling and/or coordinating care by the attending physician.  Advanced care planning was discussed with patient and family.  Advanced care planning forms were reviewed and discussed.  Risks, benefits and alternatives of gastroenterologic procedures were discussed in detail and all questions were answered.

## 2022-07-07 NOTE — DIETITIAN INITIAL EVALUATION ADULT - ETIOLOGY
likely multifactorial - confusion, covid, nausea likely multifactorial - confusion, covid, nausea, ? gastroparesis

## 2022-07-07 NOTE — DISCHARGE NOTE PROVIDER - NSDCFUADDAPPT_GEN_ALL_CORE_FT
Info for House call Program   [facility] House Call [phone]   [facility] NP call [phone]  [facility] (formally [facility])-[phone]  [facility] call [phone]  [facility] call [phone]  [facility] House Calls call [phone] Dr Jonn Rogers

## 2022-07-07 NOTE — DIETITIAN INITIAL EVALUATION ADULT - PERTINENT MEDS FT
MEDICATIONS  (STANDING):  amitriptyline 30 milliGRAM(s) Oral at bedtime  aspirin  chewable 81 milliGRAM(s) Oral daily  atorvastatin 40 milliGRAM(s) Oral at bedtime  dextrose 5%. 1000 milliLiter(s) (50 mL/Hr) IV Continuous <Continuous>  dextrose 5%. 1000 milliLiter(s) (100 mL/Hr) IV Continuous <Continuous>  dextrose 50% Injectable 25 Gram(s) IV Push once  dextrose 50% Injectable 12.5 Gram(s) IV Push once  dextrose 50% Injectable 25 Gram(s) IV Push once  ferrous    sulfate 325 milliGRAM(s) Oral daily  glucagon  Injectable 1 milliGRAM(s) IntraMuscular once  heparin   Injectable 5000 Unit(s) SubCutaneous every 8 hours  insulin lispro (ADMELOG) corrective regimen sliding scale   SubCutaneous three times a day before meals  insulin lispro (ADMELOG) corrective regimen sliding scale   SubCutaneous at bedtime  metoprolol succinate ER 25 milliGRAM(s) Oral daily  polyethylene glycol 3350 17 Gram(s) Oral daily  senna 2 Tablet(s) Oral at bedtime    MEDICATIONS  (PRN):  acetaminophen     Tablet .. 650 milliGRAM(s) Oral every 6 hours PRN Temp greater or equal to 38C (100.4F), Mild Pain (1 - 3)  aluminum hydroxide/magnesium hydroxide/simethicone Suspension 30 milliLiter(s) Oral every 4 hours PRN Dyspepsia  dextrose Oral Gel 15 Gram(s) Oral once PRN Blood Glucose LESS THAN 70 milliGRAM(s)/deciliter  melatonin 3 milliGRAM(s) Oral at bedtime PRN Insomnia  ondansetron Injectable 4 milliGRAM(s) IV Push every 8 hours PRN Nausea and/or Vomiting

## 2022-07-07 NOTE — PROGRESS NOTE ADULT - SUBJECTIVE AND OBJECTIVE BOX
Date/Time Patient Seen:  		  Referring MD:   Data Reviewed	       Patient is a 89y old  Female who presents with a chief complaint of gallstones, covid, kiah (07 Jul 2022 07:29)      Subjective/HPI     PAST MEDICAL & SURGICAL HISTORY:  H/O vertebral fracture repair    History of vertebral fracture    Dyslipidemia    DM type 2 with diabetic dyslipidemia    H/O gastroesophageal reflux (GERD)    Costochondritis    Coronary arteriosclerosis in native artery  s/p 2 stents. last placed 2 years ago    Gastroparesis    History of laminectomy    S/P hip hemiarthroplasty    S/P appendectomy          Medication list         MEDICATIONS  (STANDING):  amitriptyline 30 milliGRAM(s) Oral at bedtime  aspirin  chewable 81 milliGRAM(s) Oral daily  atorvastatin 40 milliGRAM(s) Oral at bedtime  dextrose 5%. 1000 milliLiter(s) (50 mL/Hr) IV Continuous <Continuous>  dextrose 5%. 1000 milliLiter(s) (100 mL/Hr) IV Continuous <Continuous>  dextrose 50% Injectable 25 Gram(s) IV Push once  dextrose 50% Injectable 12.5 Gram(s) IV Push once  dextrose 50% Injectable 25 Gram(s) IV Push once  ferrous    sulfate 325 milliGRAM(s) Oral daily  glucagon  Injectable 1 milliGRAM(s) IntraMuscular once  heparin   Injectable 5000 Unit(s) SubCutaneous every 8 hours  insulin lispro (ADMELOG) corrective regimen sliding scale   SubCutaneous three times a day before meals  insulin lispro (ADMELOG) corrective regimen sliding scale   SubCutaneous at bedtime  metoprolol succinate ER 25 milliGRAM(s) Oral daily  polyethylene glycol 3350 17 Gram(s) Oral daily  senna 2 Tablet(s) Oral at bedtime    MEDICATIONS  (PRN):  acetaminophen     Tablet .. 650 milliGRAM(s) Oral every 6 hours PRN Temp greater or equal to 38C (100.4F), Mild Pain (1 - 3)  aluminum hydroxide/magnesium hydroxide/simethicone Suspension 30 milliLiter(s) Oral every 4 hours PRN Dyspepsia  dextrose Oral Gel 15 Gram(s) Oral once PRN Blood Glucose LESS THAN 70 milliGRAM(s)/deciliter  melatonin 3 milliGRAM(s) Oral at bedtime PRN Insomnia  ondansetron Injectable 4 milliGRAM(s) IV Push every 8 hours PRN Nausea and/or Vomiting         Vitals log        ICU Vital Signs Last 24 Hrs  T(C): 36.7 (07 Jul 2022 05:39), Max: 37.5 (06 Jul 2022 21:16)  T(F): 98 (07 Jul 2022 05:39), Max: 99.5 (06 Jul 2022 21:16)  HR: 75 (07 Jul 2022 05:39) (69 - 90)  BP: 125/71 (07 Jul 2022 05:39) (123/69 - 133/63)  BP(mean): --  ABP: --  ABP(mean): --  RR: 17 (07 Jul 2022 05:39) (16 - 18)  SpO2: 92% (07 Jul 2022 05:39) (92% - 96%)           Input and Output:  I&O's Detail    06 Jul 2022 07:01  -  07 Jul 2022 07:00  --------------------------------------------------------  IN:  Total IN: 0 mL    OUT:    Voided (mL): 1000 mL  Total OUT: 1000 mL    Total NET: -1000 mL          Lab Data                        10.6   4.68  )-----------( 220      ( 06 Jul 2022 07:14 )             32.9     07-06    135  |  100  |  29<H>  ----------------------------<  100<H>  4.1   |  27  |  2.10<H>    Ca    8.8      06 Jul 2022 07:14  Phos  3.6     07-06  Mg     2.1     07-06    TPro  6.0  /  Alb  2.6<L>  /  TBili  0.4  /  DBili  x   /  AST  22  /  ALT  12  /  AlkPhos  65  07-06            Review of Systems	      Objective     Physical Examination    heart s1s2  lung dec BS  abd soft      Pertinent Lab findings & Imaging      Olvin:  NO   Adequate UO     I&O's Detail    06 Jul 2022 07:01  -  07 Jul 2022 07:00  --------------------------------------------------------  IN:  Total IN: 0 mL    OUT:    Voided (mL): 1000 mL  Total OUT: 1000 mL    Total NET: -1000 mL               Discussed with:     Cultures:	        Radiology

## 2022-07-07 NOTE — DISCHARGE NOTE PROVIDER - HOSPITAL COURSE
HPI:  89y F pmhx CAD x 2 stents (2012), T2DM, HTN, HLD, GERD, mild AS, chronic venous insufficiency, chronic neuropathy, macular degeneration, chronic constipation, depression, OA, and recent admission to [facility] 2/25- 3/2 for gallstone pancreatitis with non-operative treatment presented to the ED with constant nausea x 4 days associated with decreased PO intake, generalized weakness, and fall today x 1 due to LE weakness. Patient denies fever, chills, cp, dizziness, sob, abd pain, vomiting, diarrhea. Patient was seen here in ED 6/20 for epigastric pain (since resolved) and discharged with outpatient follow up with Dr. Terrell. Patient has seen Dr. Terrell twice since then and epigastric pain resolved s/p PO zofran and carafate with planned esophagram. Daughter states epigastric pain resolved, but patient has had constant nausea x 4 days that has not improved despite increasing PO zofran from 4mg to 8mg q6h. Patient was unable to obtain appointment with Dr. Terrell today and after falling at home due to leg weakness, daughter brought her to the ED.     ED Course  Vitals: 99.2F, 85bpm, 143/66, 95% RA  Labs: Hgb 10.2, Hct 32.5, Na 132, K 5.5, BUN 30, Cr 2, GFR 23, lipase wnl  EKG:    CT Chest and abd/pel: Moderate-sized bilateral pleural effusion with partial bilateral lower lobe atelectasis. Cholelithiasis with distended gallbladder and right upper quadrant inflammatory changes, findings suspicious for acute cholecystitis.    RUQ US: Cholelithiasis with nonspecific pericholecystic fluid. Negative sonographic Scott sign. Dilated common bowel duct. Recommend correlating with LFTs. Cannot exclude distal choledocholithiasis.    HEAD CT: Mild volume loss, microvascular disease, no acute hemorrhage or midline shift.    Patient Received: Zosyn x1, NS 500cc x1, Lasix 40mg IV x1, IV dilaudid .5mg x1           (05 Jul 2022 23:51)      ---  HOSPITAL COURSE:   Patient admitted to the general medical floor for cholelithiasis, KERRY and found to be incidentally covid+. CT abd/p showed cholelithiasis with distended gallbladder and RUQ inflammatory changes, findings suspicious for acute cholecystitis. RUQ US showed cholelithiasis with nonspecific pericholecystic fluid. neg scott sign, dilated CBD. Gen surg and GI consulted, HIDA scan was normal. No surgical intervention pursued, patient diet advanced as tolerated. Patient also admitted with KERRY, likely pre-renal in setting of dehydration. Nephrology consulted, patient renal indices improved??*******. On admission CT chest did show moderate size bilat pleural effusion with partial bilateral lower lobe atelectasis. Pulmonology consulted, no need for immediate thoracentesis during admission. Cardiology consulted, patient given intermittent doses of IV lasix with careful monitoring of renal function. Patient also found to be incidentally covid positive, ID consulted, no treatment pursued. Given PMH anemia, hgb remained stable throughout hospitalization.  For PMH Type 2 DM, A1c 6.9 on admission. Patient also with fall at home over 1 week ago. CT head negative. PT consulted, rec ***********    Patient seen and examined on day of discharge. Overall feeling well, denies any acute complaints.     Vitals  Physical Exam    Patient optimized from medical standpoint for discharge **** with close outpatient follow up.     ---  CONSULTANTS:   Pulm: Dr. Melo  Nephro: Dr. Wu  GI: Dr. Montilla  Cardio: Dr. Warren  ID: Dr. Izquierdo    ---  TIME SPENT:  I, the attending physician, was physically present for the key portions of the evaluation and management (E/M) service provided. The total amount of time spent reviewing the hospital notes, laboratory values, imaging findings, assessing/counseling the patient, discussing with consultant physicians, social work, nursing staff was -- minutes    --- HPI:  89y F pmhx CAD x 2 stents (2012), T2DM, HTN, HLD, GERD, mild AS, chronic venous insufficiency, chronic neuropathy, macular degeneration, chronic constipation, depression, OA, and recent admission to [facility] 2/25- 3/2 for gallstone pancreatitis with non-operative treatment presented to the ED with constant nausea x 4 days associated with decreased PO intake, generalized weakness, and fall today x 1 due to LE weakness. Patient denies fever, chills, cp, dizziness, sob, abd pain, vomiting, diarrhea. Patient was seen here in ED 6/20 for epigastric pain (since resolved) and discharged with outpatient follow up with Dr. Terrell. Patient has seen Dr. Terrell twice since then and epigastric pain resolved s/p PO zofran and carafate with planned esophagram. Daughter states epigastric pain resolved, but patient has had constant nausea x 4 days that has not improved despite increasing PO zofran from 4mg to 8mg q6h. Patient was unable to obtain appointment with Dr. Terrell today and after falling at home due to leg weakness, daughter brought her to the ED.     ED Course  Vitals: 99.2F, 85bpm, 143/66, 95% RA  Labs: Hgb 10.2, Hct 32.5, Na 132, K 5.5, BUN 30, Cr 2, GFR 23, lipase wnl  EKG:    CT Chest and abd/pel: Moderate-sized bilateral pleural effusion with partial bilateral lower lobe atelectasis. Cholelithiasis with distended gallbladder and right upper quadrant inflammatory changes, findings suspicious for acute cholecystitis.    RUQ US: Cholelithiasis with nonspecific pericholecystic fluid. Negative sonographic Scott sign. Dilated common bowel duct. Recommend correlating with LFTs. Cannot exclude distal choledocholithiasis.    HEAD CT: Mild volume loss, microvascular disease, no acute hemorrhage or midline shift.    Patient Received: Zosyn x1, NS 500cc x1, Lasix 40mg IV x1, IV dilaudid .5mg x1           (05 Jul 2022 23:51)      ---  HOSPITAL COURSE:   Patient admitted to the general medical floor for cholelithiasis, KERRY and found to be incidentally covid+. CT abd/p showed cholelithiasis with distended gallbladder and RUQ inflammatory changes, findings suspicious for acute cholecystitis. RUQ US showed cholelithiasis with nonspecific pericholecystic fluid. neg scott sign, dilated CBD. Gen surg and GI consulted, HIDA scan was normal. No surgical intervention pursued, patient diet advanced as tolerated. Patient also admitted with KERRY, likely pre-renal in setting of dehydration. Nephrology consulted, patient renal indices improved??*******. On admission CT chest did show moderate size bilat pleural effusion with partial bilateral lower lobe atelectasis. Pulmonology consulted, no need for immediate thoracentesis during admission. Cardiology consulted, patient given intermittent doses of IV lasix with careful monitoring of renal function. TTE showed ****************. Patient also found to be incidentally covid positive, ID consulted, no treatment pursued. Given PMH anemia, hgb remained stable throughout hospitalization.  For PMH Type 2 DM, A1c 6.9 on admission. Patient also with fall at home over 1 week ago. CT head negative. PT consulted, rec ***********    Patient seen and examined on day of discharge. Overall feeling well, denies any acute complaints.     Vitals  Physical Exam    Patient optimized from medical standpoint for discharge **** with close outpatient follow up.     ---  CONSULTANTS:   Pulm: Dr. Melo  Nephro: Dr. Wu  GI: Dr. Montilla  Cardio: Dr. Warren  ID: Dr. Izquierdo    ---  TIME SPENT:  I, the attending physician, was physically present for the key portions of the evaluation and management (E/M) service provided. The total amount of time spent reviewing the hospital notes, laboratory values, imaging findings, assessing/counseling the patient, discussing with consultant physicians, social work, nursing staff was -- minutes    --- HPI:  89y F pmhx CAD x 2 stents (2012), T2DM, HTN, HLD, GERD, mild AS, chronic venous insufficiency, chronic neuropathy, macular degeneration, chronic constipation, depression, OA, and recent admission to [facility] 2/25- 3/2 for gallstone pancreatitis with non-operative treatment presented to the ED with constant nausea x 4 days associated with decreased PO intake, generalized weakness, and fall today x 1 due to LE weakness. Patient denies fever, chills, cp, dizziness, sob, abd pain, vomiting, diarrhea. Patient was seen here in ED 6/20 for epigastric pain (since resolved) and discharged with outpatient follow up with Dr. Terrell. Patient has seen Dr. Terrell twice since then and epigastric pain resolved s/p PO zofran and carafate with planned esophagram. Daughter states epigastric pain resolved, but patient has had constant nausea x 4 days that has not improved despite increasing PO zofran from 4mg to 8mg q6h. Patient was unable to obtain appointment with Dr. Terrell today and after falling at home due to leg weakness, daughter brought her to the ED.     ED Course  Vitals: 99.2F, 85bpm, 143/66, 95% RA  Labs: Hgb 10.2, Hct 32.5, Na 132, K 5.5, BUN 30, Cr 2, GFR 23, lipase wnl  EKG:    CT Chest and abd/pel: Moderate-sized bilateral pleural effusion with partial bilateral lower lobe atelectasis. Cholelithiasis with distended gallbladder and right upper quadrant inflammatory changes, findings suspicious for acute cholecystitis.    RUQ US: Cholelithiasis with nonspecific pericholecystic fluid. Negative sonographic Scott sign. Dilated common bowel duct. Recommend correlating with LFTs. Cannot exclude distal choledocholithiasis.    HEAD CT: Mild volume loss, microvascular disease, no acute hemorrhage or midline shift.    Patient Received: Zosyn x1, NS 500cc x1, Lasix 40mg IV x1, IV dilaudid .5mg x1           (05 Jul 2022 23:51)      ---  HOSPITAL COURSE:   Patient admitted to the general medical floor for cholelithiasis, KERRY and found to be incidentally covid+. CT abd/p showed cholelithiasis with distended gallbladder and RUQ inflammatory changes, findings suspicious for acute cholecystitis. RUQ US showed cholelithiasis with nonspecific pericholecystic fluid. neg scott sign, dilated CBD. Gen surg and GI consulted, HIDA scan was normal. No surgical intervention pursued, patient diet advanced as tolerated. Patient also admitted with KERRY, likely pre-renal in setting of dehydration. Nephrology consulted, patient renal indices improved??*******. On admission CT chest did show moderate size bilat pleural effusion with partial bilateral lower lobe atelectasis. Pulmonology consulted, no need for immediate thoracentesis during admission. Cardiology consulted, patient given intermittent doses of IV lasix with careful monitoring of renal function. TTE showed normal LV systolic function, LVEF 65%, mild/mod MR. Patient also found to be incidentally covid positive, ID consulted, patient treated with 3 day course of Remdesivir. Given PMH anemia, hgb remained stable throughout hospitalization.  For PMH Type 2 DM, A1c 6.9 on admission. Patient also with fall at home over 1 week ago. CT head negative. PT consulted, rec ***********    Patient seen and examined on day of discharge. Overall feeling well, denies any acute complaints.     Vitals  Physical Exam    Patient optimized from medical standpoint for discharge **** with close outpatient follow up.     ---  CONSULTANTS:   Pulm: Dr. Melo  Nephro: Dr. Wu  GI: Dr. Montilla  Cardio: Dr. Warren  ID: Dr. Izquierdo    ---  TIME SPENT:  I, the attending physician, was physically present for the key portions of the evaluation and management (E/M) service provided. The total amount of time spent reviewing the hospital notes, laboratory values, imaging findings, assessing/counseling the patient, discussing with consultant physicians, social work, nursing staff was -- minutes    --- HPI:  89y F pmhx CAD x 2 stents (2012), T2DM, HTN, HLD, GERD, mild AS, chronic venous insufficiency, chronic neuropathy, macular degeneration, chronic constipation, depression, OA, and recent admission to [facility] 2/25- 3/2 for gallstone pancreatitis with non-operative treatment presented to the ED with constant nausea x 4 days associated with decreased PO intake, generalized weakness, and fall today x 1 due to LE weakness. Patient denies fever, chills, cp, dizziness, sob, abd pain, vomiting, diarrhea. Patient was seen here in ED 6/20 for epigastric pain (since resolved) and discharged with outpatient follow up with Dr. Terrell. Patient has seen Dr. Terrell twice since then and epigastric pain resolved s/p PO zofran and carafate with planned esophagram. Daughter states epigastric pain resolved, but patient has had constant nausea x 4 days that has not improved despite increasing PO zofran from 4mg to 8mg q6h. Patient was unable to obtain appointment with Dr. Terrell today and after falling at home due to leg weakness, daughter brought her to the ED.     ED Course  Vitals: 99.2F, 85bpm, 143/66, 95% RA  Labs: Hgb 10.2, Hct 32.5, Na 132, K 5.5, BUN 30, Cr 2, GFR 23, lipase wnl  EKG:    CT Chest and abd/pel: Moderate-sized bilateral pleural effusion with partial bilateral lower lobe atelectasis. Cholelithiasis with distended gallbladder and right upper quadrant inflammatory changes, findings suspicious for acute cholecystitis.    RUQ US: Cholelithiasis with nonspecific pericholecystic fluid. Negative sonographic Scott sign. Dilated common bowel duct. Recommend correlating with LFTs. Cannot exclude distal choledocholithiasis.    HEAD CT: Mild volume loss, microvascular disease, no acute hemorrhage or midline shift.    Patient Received: Zosyn x1, NS 500cc x1, Lasix 40mg IV x1, IV dilaudid .5mg x1           (05 Jul 2022 23:51)      ---  HOSPITAL COURSE:   Patient admitted to the general medical floor for cholelithiasis, KERRY and found to be incidentally covid+. CT abd/p showed cholelithiasis with distended gallbladder and RUQ inflammatory changes, findings suspicious for acute cholecystitis. RUQ US showed cholelithiasis with nonspecific pericholecystic fluid. neg scott sign, dilated CBD. Gen surg and GI consulted, HIDA scan was normal. No surgical intervention pursued, patient diet advanced as tolerated. GI requested a gastric emptying study to r/o gastroparesis, however patient could not be without her scheduled pain medication, so testing was cancelled. Family then requested Esophagram study which showed *******. Patient also admitted with KERRY, likely pre-renal in setting of dehydration. Nephrology consulted, patient renal indices improved to baseline****. On admission CT chest did show moderate size bilat pleural effusion with partial bilateral lower lobe atelectasis. Pulmonology consulted, no need for immediate thoracentesis during admission. Cardiology consulted, patient given intermittent doses of IV lasix with careful monitoring of renal function. TTE showed normal LV systolic function, LVEF 65%, mild/mod MR. Patient also found to be incidentally covid positive, ID consulted, patient treated with 3 day course of Remdesivir. Given PMH anemia, hgb remained stable throughout hospitalization.  For PMH Type 2 DM, A1c 6.9 on admission. Patient also with fall at home over 1 week ago. CT head negative. PT consulted, rec home with home PT.    Patient seen and examined on day of discharge. Overall feeling well, denies any acute complaints.     Vitals  Physical Exam    Patient optimized from medical standpoint for discharge **** with close outpatient follow up.     ---  CONSULTANTS:   Pulm: Dr. Melo  Nephro: Dr. Wu  GI: Dr. Montilla  Cardio: Dr. Warren  ID: Dr. Izquierdo    ---  TIME SPENT:  I, the attending physician, was physically present for the key portions of the evaluation and management (E/M) service provided. The total amount of time spent reviewing the hospital notes, laboratory values, imaging findings, assessing/counseling the patient, discussing with consultant physicians, social work, nursing staff was -- minutes    --- HPI:  89y F pmhx CAD x 2 stents (2012), T2DM, HTN, HLD, GERD, mild AS, chronic venous insufficiency, chronic neuropathy, macular degeneration, chronic constipation, depression, OA, and recent admission to [facility] 2/25- 3/2 for gallstone pancreatitis with non-operative treatment presented to the ED with constant nausea x 4 days associated with decreased PO intake, generalized weakness, and fall today x 1 due to LE weakness. Patient denies fever, chills, cp, dizziness, sob, abd pain, vomiting, diarrhea. Patient was seen here in ED 6/20 for epigastric pain (since resolved) and discharged with outpatient follow up with Dr. Terrell. Patient has seen Dr. Tererll twice since then and epigastric pain resolved s/p PO zofran and carafate with planned esophagram. Daughter states epigastric pain resolved, but patient has had constant nausea x 4 days that has not improved despite increasing PO zofran from 4mg to 8mg q6h. Patient was unable to obtain appointment with Dr. Terrell today and after falling at home due to leg weakness, daughter brought her to the ED.     ED Course  Vitals: 99.2F, 85bpm, 143/66, 95% RA  Labs: Hgb 10.2, Hct 32.5, Na 132, K 5.5, BUN 30, Cr 2, GFR 23, lipase wnl  EKG:    CT Chest and abd/pel: Moderate-sized bilateral pleural effusion with partial bilateral lower lobe atelectasis. Cholelithiasis with distended gallbladder and right upper quadrant inflammatory changes, findings suspicious for acute cholecystitis.    RUQ US: Cholelithiasis with nonspecific pericholecystic fluid. Negative sonographic Scott sign. Dilated common bowel duct. Recommend correlating with LFTs. Cannot exclude distal choledocholithiasis.    HEAD CT: Mild volume loss, microvascular disease, no acute hemorrhage or midline shift.    Patient Received: Zosyn x1, NS 500cc x1, Lasix 40mg IV x1, IV dilaudid .5mg x1           (05 Jul 2022 23:51)      ---  HOSPITAL COURSE:   Patient admitted to the general medical floor for cholelithiasis, KERRY and found to be incidentally covid+. CT abd/p showed cholelithiasis with distended gallbladder and RUQ inflammatory changes, findings suspicious for acute cholecystitis. RUQ US showed cholelithiasis with nonspecific pericholecystic fluid. neg scott sign, dilated CBD. Gen surg and GI consulted, HIDA scan was normal. No surgical intervention pursued, patient diet advanced as tolerated. GI requested a gastric emptying study to r/o gastroparesis, however patient could not be without her scheduled pain medication, so testing was cancelled. Family then requested Esophagram study which showed *******. Patient also admitted with KERRY, likely pre-renal in setting of dehydration. Nephrology consulted, patient renal indices improved to baseline****. On admission CT chest did show moderate size bilat pleural effusion with partial bilateral lower lobe atelectasis. Pulmonology consulted, no need for immediate thoracentesis during admission. Cardiology consulted, patient given intermittent doses of IV lasix with careful monitoring of renal function. TTE showed normal LV systolic function, LVEF 65%, mild/mod MR. Patient also found to be incidentally covid positive, ID consulted, patient treated with 3 day course of Remdesivir. After completion of remdesivir course, patient was found to have hematuria. CT Abd/Pelvis from 06/22 showed right sided hydronephrosis. A CT stone hunt was done and showed no obstructing ureteral calculus. Urology was consulted and recommended further evaluation of right sided hydronephrosis when pt condition is stable. Hematuria may have been 2/2 UTI.  Pt has a PMH anemia, hgb dropped to 7.2 by 7/11/22. Heme/Onc was consulted and recommended****.  For PMH Type 2 DM, A1c 6.9 on admission. Patient also with fall at home over 1 week ago. CT head negative. PT consulted, rec home with home PT.    Patient seen and examined on day of discharge. Overall feeling well, denies any acute complaints.     Vitals  Physical Exam    Patient optimized from medical standpoint for discharge **** with close outpatient follow up.     ---  CONSULTANTS:   Pulm: Dr. Melo  Nephro: Dr. Wu  GI: Dr. Montilla  Cardio: Dr. Warren  ID: Dr. Izquierdo    ---  TIME SPENT:  I, the attending physician, was physically present for the key portions of the evaluation and management (E/M) service provided. The total amount of time spent reviewing the hospital notes, laboratory values, imaging findings, assessing/counseling the patient, discussing with consultant physicians, social work, nursing staff was -- minutes    --- HPI:  89y F pmhx CAD x 2 stents (2012), T2DM, HTN, HLD, GERD, mild AS, chronic venous insufficiency, chronic neuropathy, macular degeneration, chronic constipation, depression, OA, and recent admission to [facility] 2/25- 3/2 for gallstone pancreatitis with non-operative treatment presented to the ED with constant nausea x 4 days associated with decreased PO intake, generalized weakness, and fall today x 1 due to LE weakness. Patient denies fever, chills, cp, dizziness, sob, abd pain, vomiting, diarrhea. Patient was seen here in ED 6/20 for epigastric pain (since resolved) and discharged with outpatient follow up with Dr. Terrell. Patient has seen Dr. Terrell twice since then and epigastric pain resolved s/p PO zofran and carafate with planned esophagram. Daughter states epigastric pain resolved, but patient has had constant nausea x 4 days that has not improved despite increasing PO zofran from 4mg to 8mg q6h. Patient was unable to obtain appointment with Dr. Terrell today and after falling at home due to leg weakness, daughter brought her to the ED.     ED Course  Vitals: 99.2F, 85bpm, 143/66, 95% RA  Labs: Hgb 10.2, Hct 32.5, Na 132, K 5.5, BUN 30, Cr 2, GFR 23, lipase wnl  EKG:    CT Chest and abd/pel: Moderate-sized bilateral pleural effusion with partial bilateral lower lobe atelectasis. Cholelithiasis with distended gallbladder and right upper quadrant inflammatory changes, findings suspicious for acute cholecystitis.    RUQ US: Cholelithiasis with nonspecific pericholecystic fluid. Negative sonographic Scott sign. Dilated common bowel duct. Recommend correlating with LFTs. Cannot exclude distal choledocholithiasis.    HEAD CT: Mild volume loss, microvascular disease, no acute hemorrhage or midline shift.    Patient Received: Zosyn x1, NS 500cc x1, Lasix 40mg IV x1, IV dilaudid .5mg x1           (05 Jul 2022 23:51)      ---  HOSPITAL COURSE:   Patient admitted to the general medical floor for cholelithiasis, KERRY and found to be incidentally covid+. CT abd/p showed cholelithiasis with distended gallbladder and RUQ inflammatory changes, findings suspicious for acute cholecystitis. RUQ US showed cholelithiasis with nonspecific pericholecystic fluid. neg scott sign, dilated CBD. Gen surg and GI consulted, HIDA scan was normal. No surgical intervention pursued, patient diet advanced as tolerated. GI requested a gastric emptying study to r/o gastroparesis, however patient could not be without her scheduled pain medication, so testing was cancelled. Family then requested Esophagram study which showed *******. Patient also admitted with KERRY, likely pre-renal in setting of dehydration. Nephrology consulted, patient renal indices improved to baseline****. On admission CT chest did show moderate size bilat pleural effusion with partial bilateral lower lobe atelectasis. Pulmonology consulted, no need for immediate thoracentesis during admission. Cardiology consulted, patient given intermittent doses of IV lasix with careful monitoring of renal function. TTE showed normal LV systolic function, LVEF 65%, mild/mod MR. Patient also found to be incidentally covid positive, ID consulted, patient treated with 3 day course of Remdesivir. After completion of remdesivir course, patient was found to have hematuria. CT Abd/Pelvis from 06/22 showed right sided hydronephrosis. A CT stone hunt was done and showed no obstructing ureteral calculus. Urology was consulted and recommended further evaluation of right sided hydronephrosis when pt condition is stable. Hematuria may have been 2/2 UTI.  Pt has a PMH anemia, hgb dropped to 7.2 by 7/11/22. Heme/Onc was consulted and recommended****.  For PMH Type 2 DM, A1c 6.9 on admission. Patient also with fall at home over 1 week ago. CT head negative. PT consulted, rec home with home PT.    Patient seen and examined on day of discharge. Overall feeling well, denies any acute complaints.       Patient optimized from medical standpoint for discharge **** with close outpatient follow up.     ---  CONSULTANTS:   Pulm: Dr. Melo  Nephro: Dr. Wu  GI: Dr. Montilla  Cardio: Dr. Warren  ID: Dr. Izquierdo    ---  TIME SPENT:  I, the attending physician, was physically present for the key portions of the evaluation and management (E/M) service provided. The total amount of time spent reviewing the hospital notes, laboratory values, imaging findings, assessing/counseling the patient, discussing with consultant physicians, social work, nursing staff was -- minutes    --- HPI:  89y F pmhx CAD x 2 stents (2012), T2DM, HTN, HLD, GERD, mild AS, chronic venous insufficiency, chronic neuropathy, macular degeneration, chronic constipation, depression, OA, and recent admission to [facility] 2/25- 3/2 for gallstone pancreatitis with non-operative treatment presented to the ED with constant nausea x 4 days associated with decreased PO intake, generalized weakness, and fall today x 1 due to LE weakness. Patient denies fever, chills, cp, dizziness, sob, abd pain, vomiting, diarrhea. Patient was seen here in ED 6/20 for epigastric pain (since resolved) and discharged with outpatient follow up with Dr. Terrell. Patient has seen Dr. Terrell twice since then and epigastric pain resolved s/p PO zofran and carafate with planned esophagram. Daughter states epigastric pain resolved, but patient has had constant nausea x 4 days that has not improved despite increasing PO zofran from 4mg to 8mg q6h. Patient was unable to obtain appointment with Dr. Terrell today and after falling at home due to leg weakness, daughter brought her to the ED.     ED Course  Vitals: 99.2F, 85bpm, 143/66, 95% RA  Labs: Hgb 10.2, Hct 32.5, Na 132, K 5.5, BUN 30, Cr 2, GFR 23, lipase wnl  EKG:  CT Chest and abd/pel: Moderate-sized bilateral pleural effusion with partial bilateral lower lobe atelectasis. Cholelithiasis with distended gallbladder and right upper quadrant inflammatory changes, findings suspicious for acute cholecystitis.  RUQ US: Cholelithiasis with nonspecific pericholecystic fluid. Negative sonographic Scott sign. Dilated common bowel duct. Recommend correlating with LFTs. Cannot exclude distal choledocholithiasis.  HEAD CT: Mild volume loss, microvascular disease, no acute hemorrhage or midline shift.    Patient Received: Zosyn x1, NS 500cc x1, Lasix 40mg IV x1, IV dilaudid .5mg x1           (05 Jul 2022 23:51)      ---  HOSPITAL COURSE:   Patient admitted to the general medical floor for cholelithiasis, KERRY and found to be incidentally covid+. CT abd/p showed cholelithiasis with distended gallbladder and RUQ inflammatory changes, findings suspicious for acute cholecystitis. RUQ US showed cholelithiasis with nonspecific pericholecystic fluid. neg scott sign, dilated CBD. Gen surg and GI consulted, HIDA scan was normal. No surgical intervention pursued, patient diet advanced as tolerated. GI requested a gastric emptying study to r/o gastroparesis, however patient could not be without her scheduled pain medication, so testing was cancelled. Family then requested Esophagram study which showed showed Mid/distal esophagus demonstrates findings of presbyesophagus, including prominent tertiary contractions, areas of mucosal irregularity and possible small traction diverticula. Significant intraesophageal reflux and stasis were noted. Small pleural effusions and findings of pulmonary edema. Patient also admitted with KERRY, likely pre-renal in setting of dehydration. . On admission CT chest did show moderate size bilat pleural effusion with partial bilateral lower lobe atelectasis. Pulmonology consulted, no need for immediate thoracentesis during admission. Cardiology consulted, patient given intermittent doses of IV lasix with careful monitoring of renal function. TTE showed normal LV systolic function, LVEF 65%, mild/mod MR. Patient also found to be incidentally covid positive, ID consulted, patient treated with 3 day course of Remdesivir. After completion of remdesivir course, patient was found to have hematuria. CT Abd/Pelvis from 06/22 showed right sided hydronephrosis. A CT stone hunt was done and showed no obstructing ureteral calculus. Urology was consulted and recommended further evaluation of right sided hydronephrosis when pt condition is stable. Hematuria may have been 2/2 UTI.  Pt has a PMH anemia, hgb dropped to 7.2 by 7/11/22. Heme/Onc was consulted.  For PMH Type 2 DM, A1c 6.9 on admission.    PT consulted, rec home with home PT.    Patient seen and examined on day of discharge. Overall feeling well, denies any acute complaints.         ---  CONSULTANTS:   Pulm: Dr. Melo  Nephro: Dr. Wu  GI: Dr. Montilla  Cardio: Dr. Warren  ID: Dr. Izquierdo    ---  TIME SPENT:  I, the attending physician, was physically present for the key portions of the evaluation and management (E/M) service provided. The total amount of time spent reviewing the hospital notes, laboratory values, imaging findings, assessing/counseling the patient, discussing with consultant physicians, social work, nursing staff was -- minutes    --- HPI:  89y F pmhx CAD x 2 stents (2012), T2DM, HTN, HLD, GERD, mild AS, chronic venous insufficiency, chronic neuropathy, macular degeneration, chronic constipation, depression, OA, and recent admission to [facility] 2/25- 3/2 for gallstone pancreatitis with non-operative treatment presented to the ED with constant nausea x 4 days associated with decreased PO intake, generalized weakness, and fall today x 1 due to LE weakness. Patient denies fever, chills, cp, dizziness, sob, abd pain, vomiting, diarrhea. Patient was seen here in ED 6/20 for epigastric pain (since resolved) and discharged with outpatient follow up with Dr. Terrell. Patient has seen Dr. Terrell twice since then and epigastric pain resolved s/p PO zofran and carafate with planned esophagram. Daughter states epigastric pain resolved, but patient has had constant nausea x 4 days that has not improved despite increasing PO zofran from 4mg to 8mg q6h. Patient was unable to obtain appointment with Dr. Terrell today and after falling at home due to leg weakness, daughter brought her to the ED.     ED Course  Vitals: 99.2F, 85bpm, 143/66, 95% RA  Labs: Hgb 10.2, Hct 32.5, Na 132, K 5.5, BUN 30, Cr 2, GFR 23, lipase wnl  EKG:  CT Chest and abd/pel: Moderate-sized bilateral pleural effusion with partial bilateral lower lobe atelectasis. Cholelithiasis with distended gallbladder and right upper quadrant inflammatory changes, findings suspicious for acute cholecystitis.  RUQ US: Cholelithiasis with nonspecific pericholecystic fluid. Negative sonographic Scott sign. Dilated common bowel duct. Recommend correlating with LFTs. Cannot exclude distal choledocholithiasis.  HEAD CT: Mild volume loss, microvascular disease, no acute hemorrhage or midline shift.    Patient Received: Zosyn x1, NS 500cc x1, Lasix 40mg IV x1, IV dilaudid .5mg x1           (05 Jul 2022 23:51)      ---  HOSPITAL COURSE:   Patient admitted to the general medical floor for cholelithiasis, KERRY and found to be incidentally covid+. CT abd/p showed cholelithiasis with distended gallbladder and RUQ inflammatory changes, findings suspicious for acute cholecystitis. RUQ US showed cholelithiasis with nonspecific pericholecystic fluid. neg scott sign, dilated CBD. Gen surg and GI consulted, HIDA scan was normal. No surgical intervention pursued, patient diet advanced as tolerated. GI requested a gastric emptying study to r/o gastroparesis, however patient could not be without her scheduled pain medication, so testing was cancelled. Family then requested Esophagram study which showed showed Mid/distal esophagus demonstrates findings of presbyesophagus, including prominent tertiary contractions, areas of mucosal irregularity and possible small traction diverticula. Significant intraesophageal reflux and stasis were noted. Small pleural effusions and findings of pulmonary edema. Patient also admitted with KERRY, likely pre-renal in setting of dehydration. . On admission CT chest did show moderate size bilat pleural effusion with partial bilateral lower lobe atelectasis. Pulmonology consulted, no need for immediate thoracentesis during admission. Cardiology consulted, patient given intermittent doses of IV lasix with careful monitoring of renal function. TTE showed normal LV systolic function, LVEF 65%, mild/mod MR. Patient also found to be incidentally covid positive, ID consulted, patient treated with 3 day course of Remdesivir. After completion of remdesivir course, patient was found to have hematuria. CT Abd/Pelvis from 06/22 showed right sided hydronephrosis. A CT stone hunt was done and showed no obstructing ureteral calculus. Urology was consulted and recommended further evaluation of right sided hydronephrosis when pt condition is stable. Hematuria may have been 2/2 UTI.  Pt has a PMH anemia, hgb dropped to 7.2 by 7/11/22. Heme/Onc was consulted.  For PMH Type 2 DM, A1c 6.9 on admission.    PT consulted, rec home with home PT.    Patient seen and examined on day of discharge. Overall feeling well, denies any acute complaints.         ---  CONSULTANTS:   Pulm: Dr. Melo  Nephro: Dr. Wu  GI: Dr. Montilla  Cardio: Dr. Warren  ID: Dr. Izquierdo    ---  TIME SPENT:  I, the attending physician, was physically present for the key portions of the evaluation and management (E/M) service provided. The total amount of time spent reviewing the hospital notes, laboratory values, imaging findings, assessing/counseling the patient, discussing with consultant physicians, social work, nursing staff was 60 minutes    --- HPI:  89y F pmhx CAD x 2 stents (2012), T2DM, HTN, HLD, GERD, mild AS, chronic venous insufficiency, chronic neuropathy, macular degeneration, chronic constipation, depression, OA, and recent admission to [facility] 2/25- 3/2 for gallstone pancreatitis with non-operative treatment presented to the ED with constant nausea x 4 days associated with decreased PO intake, generalized weakness, and fall today x 1 due to LE weakness. Patient denies fever, chills, cp, dizziness, sob, abd pain, vomiting, diarrhea. Patient was seen here in ED 6/20 for epigastric pain (since resolved) and discharged with outpatient follow up with Dr. Terrell. Patient has seen Dr. Terrell twice since then and epigastric pain resolved s/p PO zofran and carafate with planned esophagram. Daughter states epigastric pain resolved, but patient has had constant nausea x 4 days that has not improved despite increasing PO zofran from 4mg to 8mg q6h. Patient was unable to obtain appointment with Dr. Terrell today and after falling at home due to leg weakness, daughter brought her to the ED.     ED Course  Vitals: 99.2F, 85bpm, 143/66, 95% RA  Labs: Hgb 10.2, Hct 32.5, Na 132, K 5.5, BUN 30, Cr 2, GFR 23, lipase wnl  EKG:  CT Chest and abd/pel: Moderate-sized bilateral pleural effusion with partial bilateral lower lobe atelectasis. Cholelithiasis with distended gallbladder and right upper quadrant inflammatory changes, findings suspicious for acute cholecystitis.  RUQ US: Cholelithiasis with nonspecific pericholecystic fluid. Negative sonographic Scott sign. Dilated common bowel duct. Recommend correlating with LFTs. Cannot exclude distal choledocholithiasis.  HEAD CT: Mild volume loss, microvascular disease, no acute hemorrhage or midline shift.    Patient Received: Zosyn x1, NS 500cc x1, Lasix 40mg IV x1, IV dilaudid .5mg x1           (05 Jul 2022 23:51)      ---  HOSPITAL COURSE:   Patient admitted to the general medical floor for cholelithiasis, KERRY and found to be incidentally covid+. CT abd/p showed cholelithiasis with distended gallbladder and RUQ inflammatory changes, findings suspicious for acute cholecystitis. RUQ US showed cholelithiasis with nonspecific pericholecystic fluid. neg scott sign, dilated CBD. Gen surg and GI consulted, HIDA scan was normal. No surgical intervention pursued, patient diet advanced as tolerated. GI requested a gastric emptying study to r/o gastroparesis, however patient could not be without her scheduled pain medication, so testing was cancelled. Family then requested Esophagram study which showed showed Mid/distal esophagus demonstrates findings of presbyesophagus, including prominent tertiary contractions, areas of mucosal irregularity and possible small traction diverticula. Significant intraesophageal reflux and stasis were noted. Small pleural effusions and findings of pulmonary edema. Patient also admitted with KERRY, likely pre-renal in setting of dehydration. . On admission CT chest did show moderate size bilat pleural effusion with partial bilateral lower lobe atelectasis. Pulmonology consulted, no need for immediate thoracentesis during admission. Cardiology consulted, patient given intermittent doses of IV lasix with careful monitoring of renal function. TTE showed normal LV systolic function, LVEF 65%, mild/mod MR. Patient also found to be incidentally covid positive, ID consulted, patient treated with 3 day course of Remdesivir. After completion of remdesivir course, patient was found to have hematuria. CT Abd/Pelvis from 06/22 showed right sided hydronephrosis. A CT stone hunt was done and showed no obstructing ureteral calculus. Urology was consulted and recommended further evaluation of right sided hydronephrosis when pt condition is stable. Hematuria may have been 2/2 UTI.  Pt has a PMH anemia, hgb dropped to 7.2 by 7/11/22. Heme/Onc was consulted.  For PMH Type 2 DM, A1c 6.9 on admission.    PT consulted, rec home with home PT.    Patient seen and examined on day of discharge. Overall feeling well, denies any acute complaints.     ECHO-  IMPRESSION:  Technically difficult study  Normal left ventricular internal dimensions and systolic function,   estimated LVEF of 65%.  Grossly normal RV size and systolic function.  Sclerotic trileaflet aortic valve with grossly normal opening by visual   estimation, without AI.  Mild to moderate MR  No significant pericardial effusion.      CT C/A/P     IMPRESSION:    Moderate-sized bilateral pleural effusion with partial bilateral lower   lobe atelectasis.      Cholelithiasis with distended gallbladder and right upper quadrant   inflammatory changes, findings suspicious for acute cholecystitis.  Recommend further evaluation with right upper quadrant ultrasonography.    Other findings as discussed above.    Pt was seen by Palliative care Eval-DNR/DNI, as d/w Palliative care Team -pt does  NOT a Qualify for  Hospice at this time. HCPT arranged.  ---  CONSULTANTS:   Pulm: Dr. Melo  Nephro: Dr. Wu  GI: Dr. Montilla  Cardio: Dr. Warren  ID: Dr. Izquierdo    ---  TIME SPENT:  I, the attending physician, was physically present for the key portions of the evaluation and management (E/M) service provided. The total amount of time spent reviewing the hospital notes, laboratory values, imaging findings, assessing/counseling the patient, discussing with consultant physicians, social work, nursing staff was 60 minutes    ---

## 2022-07-07 NOTE — PROGRESS NOTE ADULT - SUBJECTIVE AND OBJECTIVE BOX
SUBJECTIVE:  No acute overnight events. Afebrile, VSS. Pt denies abdominal pain, nausea, vomiting, diarrhea. Pt reports normal bowel movements and passing gas, denies any abdominal pain with food.  Denies any acute complaints.     MEDICATIONS  (STANDING):  amitriptyline 30 milliGRAM(s) Oral at bedtime  aspirin  chewable 81 milliGRAM(s) Oral daily  atorvastatin 40 milliGRAM(s) Oral at bedtime  dextrose 5%. 1000 milliLiter(s) (50 mL/Hr) IV Continuous <Continuous>  dextrose 5%. 1000 milliLiter(s) (100 mL/Hr) IV Continuous <Continuous>  dextrose 50% Injectable 25 Gram(s) IV Push once  dextrose 50% Injectable 12.5 Gram(s) IV Push once  dextrose 50% Injectable 25 Gram(s) IV Push once  ferrous    sulfate 325 milliGRAM(s) Oral daily  glucagon  Injectable 1 milliGRAM(s) IntraMuscular once  heparin   Injectable 5000 Unit(s) SubCutaneous every 8 hours  insulin lispro (ADMELOG) corrective regimen sliding scale   SubCutaneous three times a day before meals  insulin lispro (ADMELOG) corrective regimen sliding scale   SubCutaneous at bedtime  metoprolol succinate ER 25 milliGRAM(s) Oral daily  polyethylene glycol 3350 17 Gram(s) Oral daily  senna 2 Tablet(s) Oral at bedtime    MEDICATIONS  (PRN):  acetaminophen     Tablet .. 650 milliGRAM(s) Oral every 6 hours PRN Temp greater or equal to 38C (100.4F), Mild Pain (1 - 3)  aluminum hydroxide/magnesium hydroxide/simethicone Suspension 30 milliLiter(s) Oral every 4 hours PRN Dyspepsia  dextrose Oral Gel 15 Gram(s) Oral once PRN Blood Glucose LESS THAN 70 milliGRAM(s)/deciliter  melatonin 3 milliGRAM(s) Oral at bedtime PRN Insomnia  ondansetron Injectable 4 milliGRAM(s) IV Push every 8 hours PRN Nausea and/or Vomiting      Vital Signs Last 24 Hrs  T(C): 36.7 (2022 05:39), Max: 37.5 (2022 21:16)  T(F): 98 (2022 05:39), Max: 99.5 (2022 21:16)  HR: 75 (2022 05:39) (69 - 90)  BP: 125/71 (2022 05:39) (123/69 - 133/63)  BP(mean): --  RR: 17 (2022 05:39) (16 - 18)  SpO2: 92% (2022 05:39) (92% - 96%)    PHYSICAL EXAM:    Constitutional: well nourished in no acute distress  Cardio: S1,S2 +  Respiratory: breathing on room air without distress. No use of accessory muscles upon inspiration or expiration.   Abdominal: Mild tenderness to palpation in RLQ and LLQ. No rebound tenderness, guarding.         I&O's Detail    2022 07:01  -  2022 07:00  --------------------------------------------------------  IN:  Total IN: 0 mL    OUT:    Voided (mL): 1000 mL  Total OUT: 1000 mL    Total NET: -1000 mL          LABS:                        10.6   4.68  )-----------( 220      ( 2022 07:14 )             32.9     07-06    135  |  100  |  29<H>  ----------------------------<  100<H>  4.1   |  27  |  2.10<H>    Ca    8.8      2022 07:14  Phos  3.6     07-06  Mg     2.1     07-06    TPro  6.0  /  Alb  2.6<L>  /  TBili  0.4  /  DBili  x   /  AST  22  /  ALT  12  /  AlkPhos  65  07-06    PT/INR - ( 2022 19:05 )   PT: 12.2 sec;   INR: 1.04 ratio         PTT - ( 2022 19:05 )  PTT:28.6 sec  Urinalysis Basic - ( 2022 19:00 )    Color: Yellow / Appearance: Clear / S.010 / pH: x  Gluc: x / Ketone: Negative  / Bili: Negative / Urobili: Negative   Blood: x / Protein: 30 mg/dL / Nitrite: Negative   Leuk Esterase: Small / RBC: 25-50 /HPF / WBC 3-5   Sq Epi: x / Non Sq Epi: Few / Bacteria: Few        RADIOLOGY & ADDITIONAL STUDIES:  HIDA scan:  IMPRESSION: Normal hepatobiliary scan. No radionuclide evidence of acute cholecystitis.

## 2022-07-07 NOTE — PROGRESS NOTE ADULT - PROBLEM SELECTOR PLAN 3
CT Chest: Moderate-sized bilateral pleural effusion with partial bilateral lower lobe atelectasis  - s/p IV Lasix 40mg x 1 in ED  - patient saturating well on room air and denies sob  - will hold off on further diuretics for now   - pulmonology (Dr. Melo) consulted f/u recs   no need for thoracentesis in the immediate future - medical rx regimen and [facility] regimen  -TTE pending

## 2022-07-07 NOTE — PROGRESS NOTE ADULT - PROBLEM SELECTOR PLAN 2
BUN 30, Cr 2 on admission  - on last admission Cr fluctuated between .87-1.3  -Cr trending up s/p lasix   - KERRY likely 2/2 dec PO intake, Pre Renal   - unknown cardiac function and patient with edematous legs on exam, consider echo pending cardio recs  - will hold off on further IVF for now given overload  - Hold nephrotoxic meds   - nephrology consulted Dr. Wu - continue IV lasix???  Found to have KERRY on CKD stage 3 along with  Pleural effusion.  Obtain urine lytes  Continue IV Lasix- Leg edema ????  Consider Thoracentesis to reduce diuretic burden given KERRY  Abx discontinued BUN 30, Cr 2 on admission  - on last admission Cr fluctuated between .87-1.3  -Cr trending up s/p lasix   - KERRY likely 2/2 dec PO intake, Pre Renal   - unknown cardiac function and patient with edematous legs on exam, consider echo pending cardio recs  - will hold off on further IVF for now given overload  - Hold nephrotoxic meds   - nephrology consulted Dr. Bryce moreno  Found to have KERRY on CKD stage 3 along with  Pleural effusion.  Obtain urine lytes  Continue IV Lasix- Leg edema ????  Consider Thoracentesis to reduce diuretic burden given KERRY  Abx discontinued

## 2022-07-07 NOTE — PROGRESS NOTE ADULT - PROBLEM SELECTOR PLAN 4
patient covid positive on admission  - saturating well on room air and no sx  - continue to monitor on continuous pulse ox  - contact precautions  - Daughter Mary [phone] states she does not want monoclonal antibodies for patient  - patient has moderna 3/3  - ID consulted, Dr. WESTLEY Izquierdo rec starting remdesivir but pt daughter has declined. Will discuss again with daughter.

## 2022-07-07 NOTE — PROGRESS NOTE ADULT - PROBLEM SELECTOR PLAN 5
Hgb 10.2 on admission, remains stable , B12 and folate wnl  - patient on PO iron daily  -iron studies show low total serum iron (19), low iron sat (8%)   TIBC and unsaturated iron binding capacity wnl  - has outpatient appt with Dr. Nicholas for chronic anemia  - no signs sx of acute bleed

## 2022-07-07 NOTE — DIETITIAN INITIAL EVALUATION ADULT - NSFNSPHYEXAMSKINFT_GEN_A_CORE
Pressure Injury 1: sacrum, Stage I  Pressure Injury 2: none, none  Pressure Injury 3: none, none  Pressure Injury 4: none, none  Pressure Injury 5: none, none  Pressure Injury 6: none, none  Pressure Injury 7: none, none  Pressure Injury 8: none, none  Pressure Injury 9: none, none  Pressure Injury 10: none, none  Pressure Injury 11: none, none Pressure Injury 1: sacrum, Stage I

## 2022-07-07 NOTE — DISCHARGE NOTE PROVIDER - PROVIDER TOKENS
PROVIDER:[[id number]],FOLLOWUP:[1 week]],PROVIDER:[[id number]],FOLLOWUP:[1 week]],PROVIDER:[TOKEN:[[id number]],FOLLOWUP:[1 week]],PROVIDER:[TOKEN:[[id number]],FOLLOWUP:[1 week]],PROVIDER:[TOKEN:[[id number]],FOLLOWUP:[1 week]] PROVIDER:[TOKEN:[[id number]],FOLLOWUP:[1 week]],PROVIDER:[TOKEN:[[id number]],FOLLOWUP:[1 week]],PROVIDER:[TOKEN:[[id number]],FOLLOWUP:[1 week]],PROVIDER:[TOKEN:[[id number]],FOLLOWUP:[1 week]],PROVIDER:[TOKEN:[[id number]],FOLLOWUP:[1 week]],PROVIDER:[TOKEN:[[id number]],FOLLOWUP:[1 week]] PROVIDER:[TOKEN:[[id number]],FOLLOWUP:[1 week]],PROVIDER:[TOKEN:[[id number]],FOLLOWUP:[1 week]],PROVIDER:[TOKEN:[[id number]],FOLLOWUP:[1 week]],PROVIDER:[TOKEN:[[id number]],FOLLOWUP:[1 week]],PROVIDER:[TOKEN:[[id number]],FOLLOWUP:[1 week]],PROVIDER:[[id number]],FOLLOWUP:[1 week]],PROVIDER:[TOKEN:[[id number]]]

## 2022-07-07 NOTE — PROGRESS NOTE ADULT - ASSESSMENT
89y F pmhx CAD x 2 stents (2012), T2DM, HTN, HLD, GERD, mild AS, chronic venous insufficiency, chronic neuropathy, macular degeneration, chronic constipation, depression, OA, lumbar degenerative disc disease, and spinal stenosis admitted for cholelithiasis, KERRY, and found to be COVID positive on admission    covid  OP  OA  Pleural eff  Atelectasis  KERRY  CAD  Fall  Ataxic gait  HLD  GERD  valv heart disease    HIDA scan NEG  Surgery and GI following  VS noted  Labs reviewed      [facility] rx regimen  TTE -   covid management - sx management - monitor VS and Sat - isolation precs  fall prec - PT assessment - gait training - CM follow up  pleural eff - no need for thoracentesis in the immediate future - medical rx regimen and [facility] rx regimen  I devonte as able to tolerate  Surgery eval in progress - Acute Angelica eval noted  GOC discussion  assist with needs  repllisandro almanzar

## 2022-07-08 LAB
ALBUMIN SERPL ELPH-MCNC: 2.6 G/DL — LOW (ref 3.3–5)
ALP SERPL-CCNC: 64 U/L — SIGNIFICANT CHANGE UP (ref 40–120)
ALT FLD-CCNC: 14 U/L — SIGNIFICANT CHANGE UP (ref 12–78)
ANION GAP SERPL CALC-SCNC: 9 MMOL/L — SIGNIFICANT CHANGE UP (ref 5–17)
AST SERPL-CCNC: 28 U/L — SIGNIFICANT CHANGE UP (ref 15–37)
BASOPHILS # BLD AUTO: 0.02 K/UL — SIGNIFICANT CHANGE UP (ref 0–0.2)
BASOPHILS NFR BLD AUTO: 0.5 % — SIGNIFICANT CHANGE UP (ref 0–2)
BILIRUB SERPL-MCNC: 0.2 MG/DL — SIGNIFICANT CHANGE UP (ref 0.2–1.2)
BUN SERPL-MCNC: 27 MG/DL — HIGH (ref 7–23)
CALCIUM SERPL-MCNC: 8.4 MG/DL — LOW (ref 8.5–10.1)
CHLORIDE SERPL-SCNC: 98 MMOL/L — SIGNIFICANT CHANGE UP (ref 96–108)
CO2 SERPL-SCNC: 27 MMOL/L — SIGNIFICANT CHANGE UP (ref 22–31)
CREAT SERPL-MCNC: 2.1 MG/DL — HIGH (ref 0.5–1.3)
EGFR: 22 ML/MIN/1.73M2 — LOW
EOSINOPHIL # BLD AUTO: 0.02 K/UL — SIGNIFICANT CHANGE UP (ref 0–0.5)
EOSINOPHIL NFR BLD AUTO: 0.5 % — SIGNIFICANT CHANGE UP (ref 0–6)
GLUCOSE SERPL-MCNC: 103 MG/DL — HIGH (ref 70–99)
HCT VFR BLD CALC: 31.4 % — LOW (ref 34.5–45)
HGB BLD-MCNC: 10.1 G/DL — LOW (ref 11.5–15.5)
IMM GRANULOCYTES NFR BLD AUTO: 0.7 % — SIGNIFICANT CHANGE UP (ref 0–1.5)
LYMPHOCYTES # BLD AUTO: 0.59 K/UL — LOW (ref 1–3.3)
LYMPHOCYTES # BLD AUTO: 13.8 % — SIGNIFICANT CHANGE UP (ref 13–44)
MCHC RBC-ENTMCNC: 28.6 PG — SIGNIFICANT CHANGE UP (ref 27–34)
MCHC RBC-ENTMCNC: 32.2 GM/DL — SIGNIFICANT CHANGE UP (ref 32–36)
MCV RBC AUTO: 89 FL — SIGNIFICANT CHANGE UP (ref 80–100)
MONOCYTES # BLD AUTO: 0.41 K/UL — SIGNIFICANT CHANGE UP (ref 0–0.9)
MONOCYTES NFR BLD AUTO: 9.6 % — SIGNIFICANT CHANGE UP (ref 2–14)
NEUTROPHILS # BLD AUTO: 3.22 K/UL — SIGNIFICANT CHANGE UP (ref 1.8–7.4)
NEUTROPHILS NFR BLD AUTO: 74.9 % — SIGNIFICANT CHANGE UP (ref 43–77)
NRBC # BLD: 0 /100 WBCS — SIGNIFICANT CHANGE UP (ref 0–0)
PLATELET # BLD AUTO: 202 K/UL — SIGNIFICANT CHANGE UP (ref 150–400)
POTASSIUM SERPL-MCNC: 3.5 MMOL/L — SIGNIFICANT CHANGE UP (ref 3.5–5.3)
POTASSIUM SERPL-SCNC: 3.5 MMOL/L — SIGNIFICANT CHANGE UP (ref 3.5–5.3)
PROT SERPL-MCNC: 5.9 G/DL — LOW (ref 6–8.3)
RBC # BLD: 3.53 M/UL — LOW (ref 3.8–5.2)
RBC # FLD: 13.2 % — SIGNIFICANT CHANGE UP (ref 10.3–14.5)
SODIUM SERPL-SCNC: 134 MMOL/L — LOW (ref 135–145)
WBC # BLD: 4.29 K/UL — SIGNIFICANT CHANGE UP (ref 3.8–10.5)
WBC # FLD AUTO: 4.29 K/UL — SIGNIFICANT CHANGE UP (ref 3.8–10.5)

## 2022-07-08 PROCEDURE — 99232 SBSQ HOSP IP/OBS MODERATE 35: CPT

## 2022-07-08 RX ORDER — POTASSIUM CHLORIDE 20 MEQ
40 PACKET (EA) ORAL ONCE
Refills: 0 | Status: COMPLETED | OUTPATIENT
Start: 2022-07-08 | End: 2022-07-08

## 2022-07-08 RX ADMIN — Medication 1: at 12:35

## 2022-07-08 RX ADMIN — REMDESIVIR 500 MILLIGRAM(S): 5 INJECTION INTRAVENOUS at 20:33

## 2022-07-08 RX ADMIN — HYDROMORPHONE HYDROCHLORIDE 4 MILLIGRAM(S): 2 INJECTION INTRAMUSCULAR; INTRAVENOUS; SUBCUTANEOUS at 09:50

## 2022-07-08 RX ADMIN — HYDROMORPHONE HYDROCHLORIDE 4 MILLIGRAM(S): 2 INJECTION INTRAMUSCULAR; INTRAVENOUS; SUBCUTANEOUS at 17:30

## 2022-07-08 RX ADMIN — HEPARIN SODIUM 5000 UNIT(S): 5000 INJECTION INTRAVENOUS; SUBCUTANEOUS at 22:09

## 2022-07-08 RX ADMIN — HYDROMORPHONE HYDROCHLORIDE 4 MILLIGRAM(S): 2 INJECTION INTRAMUSCULAR; INTRAVENOUS; SUBCUTANEOUS at 08:57

## 2022-07-08 RX ADMIN — LIDOCAINE 1 PATCH: 4 CREAM TOPICAL at 03:00

## 2022-07-08 RX ADMIN — Medication 25 MILLIGRAM(S): at 06:06

## 2022-07-08 RX ADMIN — HYDROMORPHONE HYDROCHLORIDE 4 MILLIGRAM(S): 2 INJECTION INTRAMUSCULAR; INTRAVENOUS; SUBCUTANEOUS at 22:09

## 2022-07-08 RX ADMIN — HYDROMORPHONE HYDROCHLORIDE 4 MILLIGRAM(S): 2 INJECTION INTRAMUSCULAR; INTRAVENOUS; SUBCUTANEOUS at 00:27

## 2022-07-08 RX ADMIN — SENNA PLUS 2 TABLET(S): 8.6 TABLET ORAL at 22:09

## 2022-07-08 RX ADMIN — Medication 40 MILLIEQUIVALENT(S): at 20:33

## 2022-07-08 RX ADMIN — Medication 5 MILLIGRAM(S): at 12:34

## 2022-07-08 RX ADMIN — Medication 81 MILLIGRAM(S): at 12:34

## 2022-07-08 RX ADMIN — Medication 1 GRAM(S): at 06:07

## 2022-07-08 RX ADMIN — Medication 5 MILLIGRAM(S): at 19:15

## 2022-07-08 RX ADMIN — Medication 30 MILLIGRAM(S): at 22:10

## 2022-07-08 RX ADMIN — ATORVASTATIN CALCIUM 40 MILLIGRAM(S): 80 TABLET, FILM COATED ORAL at 22:09

## 2022-07-08 RX ADMIN — HEPARIN SODIUM 5000 UNIT(S): 5000 INJECTION INTRAVENOUS; SUBCUTANEOUS at 06:07

## 2022-07-08 RX ADMIN — Medication 5 MILLIGRAM(S): at 06:06

## 2022-07-08 RX ADMIN — PANTOPRAZOLE SODIUM 40 MILLIGRAM(S): 20 TABLET, DELAYED RELEASE ORAL at 06:06

## 2022-07-08 RX ADMIN — Medication 325 MILLIGRAM(S): at 12:34

## 2022-07-08 RX ADMIN — HYDROMORPHONE HYDROCHLORIDE 4 MILLIGRAM(S): 2 INJECTION INTRAMUSCULAR; INTRAVENOUS; SUBCUTANEOUS at 22:39

## 2022-07-08 RX ADMIN — HEPARIN SODIUM 5000 UNIT(S): 5000 INJECTION INTRAVENOUS; SUBCUTANEOUS at 13:26

## 2022-07-08 RX ADMIN — HYDROMORPHONE HYDROCHLORIDE 4 MILLIGRAM(S): 2 INJECTION INTRAMUSCULAR; INTRAVENOUS; SUBCUTANEOUS at 16:37

## 2022-07-08 RX ADMIN — Medication 1 GRAM(S): at 19:13

## 2022-07-08 NOTE — PROGRESS NOTE ADULT - PROBLEM SELECTOR PLAN 3
CT Chest: Moderate-sized bilateral pleural effusion with partial bilateral lower lobe atelectasis  - s/p IV Lasix 40mg x 1 in ED  - patient saturating well on room air and denies sob  - will hold off on further diuretics for now   - pulmonology (Dr. Melo) consulted f/u recs   no need for thoracentesis in the immediate future - medical rx regimen and [facility] rx regimen  -TTE pending CT Chest: Moderate-sized bilateral pleural effusion with partial bilateral lower lobe atelectasis  - s/p IV Lasix 40mg x 1 in ED  - patient saturating well on room air and denies sob  - will hold off on further diuretics for now   - pulmonology (Dr. Melo) consulted- no need for thoracentesis in the immediate future - medical rx regimen and [facility] rx regimen  -TTE pending

## 2022-07-08 NOTE — PROGRESS NOTE ADULT - ASSESSMENT
89y F pmhx CAD x 2 stents (2012), T2DM, HTN, HLD, GERD, mild AS, chronic venous insufficiency, chronic neuropathy, macular degeneration, chronic constipation, depression, OA, and recent admission to [facility] 2/25- 3/2 for gallstone pancreatitis with non-operative treatment presented to the ED with constant nausea x 4 days associated with decreased PO intake, generalized weakness.    #Acute on CKD stage III  CT revealed right sided hydronephrosis and pleural effusion  UA consistent with UTI      Baseline creatinine ~1.3?  Possible contributions from covid-19 or hydronephrosis  Hold Lasix for now; renal indices are starting to improve again  No plans for thoracentesis per Pulm  Urine studies reviewed; may have been skewed by Lasix due to very high urine sodium  COVID management per primary team  Consider repeating lung imaging to assess effusions  Avoid nephrotoxic agents  Monitor serum electrolytes

## 2022-07-08 NOTE — PROGRESS NOTE ADULT - ASSESSMENT
89y F pmhx CAD x 2 stents (2012), T2DM, HTN, HLD, GERD, mild AS, chronic venous insufficiency, chronic neuropathy, macular degeneration, chronic constipation, depression, OA, lumbar degenerative disc disease, and spinal stenosis admitted for cholelithiasis, KERRY, and found to be COVID positive on admission    covid  OP  OA  Pleural eff  Atelectasis  KERRY  CAD  Fall  Ataxic gait  HLD  GERD  valv heart disease    HIDA scan NEG  Surgery and GI following  VS noted  Labs reviewed  on Opioids for pain    [facility] rx regimen  TTE -   covid management - sx management - monitor VS and Sat - isolation precs  fall prec - PT assessment - gait training - CM follow up  pleural eff - no need for thoracentesis in the immediate future - medical rx regimen and [facility] rx regimen  I devonte as able to tolerate  Surgery eval in progress - Acute Angelica eval noted  GOC discussion  assist with needs  repllisandro almanzar

## 2022-07-08 NOTE — PROGRESS NOTE ADULT - SUBJECTIVE AND OBJECTIVE BOX
[facility]  Shar Hopkins PA-C  [facility] [place] [address]  [phone]      INTERVAL HPI/OVERNIGHT EVENTS:  Pt s/e  Pt reports no nausea/vomiting today  Tolerating diet    MEDICATIONS  (STANDING):  amitriptyline 30 milliGRAM(s) Oral at bedtime  aspirin  chewable 81 milliGRAM(s) Oral daily  atorvastatin 40 milliGRAM(s) Oral at bedtime  dextrose 5%. 1000 milliLiter(s) (100 mL/Hr) IV Continuous <Continuous>  dextrose 5%. 1000 milliLiter(s) (50 mL/Hr) IV Continuous <Continuous>  dextrose 50% Injectable 25 Gram(s) IV Push once  dextrose 50% Injectable 12.5 Gram(s) IV Push once  dextrose 50% Injectable 25 Gram(s) IV Push once  ferrous    sulfate 325 milliGRAM(s) Oral daily  glucagon  Injectable 1 milliGRAM(s) IntraMuscular once  heparin   Injectable 5000 Unit(s) SubCutaneous every 8 hours  HYDROmorphone   Tablet 4 milliGRAM(s) Oral three times a day  insulin lispro (ADMELOG) corrective regimen sliding scale   SubCutaneous three times a day before meals  insulin lispro (ADMELOG) corrective regimen sliding scale   SubCutaneous at bedtime  metoclopramide Injectable 5 milliGRAM(s) IV Push every 6 hours  metoprolol succinate ER 25 milliGRAM(s) Oral daily  pantoprazole    Tablet 40 milliGRAM(s) Oral before breakfast  polyethylene glycol 3350 17 Gram(s) Oral daily  remdesivir  IVPB   IV Intermittent   remdesivir  IVPB 100 milliGRAM(s) IV Intermittent every 24 hours  senna 2 Tablet(s) Oral at bedtime  sucralfate suspension 1 Gram(s) Oral two times a day    MEDICATIONS  (PRN):  acetaminophen     Tablet .. 650 milliGRAM(s) Oral every 6 hours PRN Temp greater or equal to 38C (100.4F), Mild Pain (1 - 3)  aluminum hydroxide/magnesium hydroxide/simethicone Suspension 30 milliLiter(s) Oral every 4 hours PRN Dyspepsia  dextrose Oral Gel 15 Gram(s) Oral once PRN Blood Glucose LESS THAN 70 milliGRAM(s)/deciliter  melatonin 3 milliGRAM(s) Oral at bedtime PRN Insomnia  ondansetron Injectable 4 milliGRAM(s) IV Push every 8 hours PRN Nausea and/or Vomiting      Allergies  morphine (Other)      PHYSICAL EXAM:   Vital Signs:  Vital Signs Last 24 Hrs  T(C): 37.1 (08 Jul 2022 05:37), Max: 37.1 (08 Jul 2022 05:37)  T(F): 98.8 (08 Jul 2022 05:37), Max: 98.8 (08 Jul 2022 05:37)  HR: 74 (08 Jul 2022 08:15) (74 - 84)  BP: 150/68 (08 Jul 2022 05:37) (143/70 - 166/78)  BP(mean): --  RR: 18 (08 Jul 2022 05:37) (18 - 18)  SpO2: 91% (08 Jul 2022 08:15) (91% - 92%)    Parameters below as of 08 Jul 2022 08:15  Patient On (Oxygen Delivery Method): room air      Daily     Daily     GENERAL:  Appears stated age  ABDOMEN:  Soft, non-tender, non-distended  NEURO:  Alert      LABS:                        10.1   4.29  )-----------( 202      ( 08 Jul 2022 06:45 )             31.4     07-08    134<L>  |  98  |  27<H>  ----------------------------<  103<H>  3.5   |  27  |  2.10<H>    Ca    8.4<L>      08 Jul 2022 06:45    TPro  5.9<L>  /  Alb  2.6<L>  /  TBili  0.2  /  DBili  x   /  AST  28  /  ALT  14  /  AlkPhos  64  07-08

## 2022-07-08 NOTE — PROGRESS NOTE ADULT - SUBJECTIVE AND OBJECTIVE BOX
[facility], Division of Infectious Diseases  MIKHAIL Sa[facility] Y. Patel, S. [facility], [facility]  [phone]    Name: DEVON NOLAND  Age: 89y  Gender: Female  MRN: [id number]    Interval History:  Patient seen and examined at bedside  No acute overnight events. Afebrile  Daughter at bedside  Notes reviewed    Antibiotics:  remdesivir  IVPB   IV Intermittent   remdesivir  IVPB 100 milliGRAM(s) IV Intermittent every 24 hours      Medications:  acetaminophen     Tablet .. 650 milliGRAM(s) Oral every 6 hours PRN  aluminum hydroxide/magnesium hydroxide/simethicone Suspension 30 milliLiter(s) Oral every 4 hours PRN  amitriptyline 30 milliGRAM(s) Oral at bedtime  aspirin  chewable 81 milliGRAM(s) Oral daily  atorvastatin 40 milliGRAM(s) Oral at bedtime  dextrose 5%. 1000 milliLiter(s) IV Continuous <Continuous>  dextrose 5%. 1000 milliLiter(s) IV Continuous <Continuous>  dextrose 50% Injectable 25 Gram(s) IV Push once  dextrose 50% Injectable 12.5 Gram(s) IV Push once  dextrose 50% Injectable 25 Gram(s) IV Push once  dextrose Oral Gel 15 Gram(s) Oral once PRN  ferrous    sulfate 325 milliGRAM(s) Oral daily  glucagon  Injectable 1 milliGRAM(s) IntraMuscular once  heparin   Injectable 5000 Unit(s) SubCutaneous every 8 hours  HYDROmorphone   Tablet 4 milliGRAM(s) Oral three times a day  insulin lispro (ADMELOG) corrective regimen sliding scale   SubCutaneous three times a day before meals  insulin lispro (ADMELOG) corrective regimen sliding scale   SubCutaneous at bedtime  melatonin 3 milliGRAM(s) Oral at bedtime PRN  metoclopramide Injectable 5 milliGRAM(s) IV Push every 6 hours  metoprolol succinate ER 25 milliGRAM(s) Oral daily  ondansetron Injectable 4 milliGRAM(s) IV Push every 8 hours PRN  pantoprazole    Tablet 40 milliGRAM(s) Oral before breakfast  polyethylene glycol 3350 17 Gram(s) Oral daily  remdesivir  IVPB   IV Intermittent   remdesivir  IVPB 100 milliGRAM(s) IV Intermittent every 24 hours  senna 2 Tablet(s) Oral at bedtime  sucralfate suspension 1 Gram(s) Oral two times a day      Review of Systems:  Review of systems otherwise negative except as previously noted.    Allergies: morphine (Other)    For details regarding the patient's past medical history, social history, family history, and other miscellaneous elements, please refer the initial infectious diseases consultation and/or the admitting history and physical examination for this admission.    Objective:  Vitals:   T(C): 36.9 (07-08-22 @ 12:29), Max: 37.1 (07-08-22 @ 05:37)  HR: 103 (07-08-22 @ 12:29) (74 - 103)  BP: 144/81 (07-08-22 @ 12:29) (144/81 - 166/78)  RR: 18 (07-08-22 @ 12:29) (18 - 18)  SpO2: 93% (07-08-22 @ 12:29) (91% - 93%)    Physical Examination:  General: no acute distress  HEENT: NC/AT, EOMI  Cardio: S1, S2 heard, RRR, no murmurs  Resp: decreased b/l breath sounds  Abd: soft, NT, ND  Ext: no edema or cyanosis  Skin: warm, dry, no visible rash      Laboratory Studies:  CBC:                       10.1   4.29  )-----------( 202      ( 08 Jul 2022 06:45 )             31.4     CMP: 07-08    134<L>  |  98  |  27<H>  ----------------------------<  103<H>  3.5   |  27  |  2.10<H>    Ca    8.4<L>      08 Jul 2022 06:45    TPro  5.9<L>  /  Alb  2.6<L>  /  TBili  0.2  /  DBili  x   /  AST  28  /  ALT  14  /  AlkPhos  64  07-08    LIVER FUNCTIONS - ( 08 Jul 2022 06:45 )  Alb: 2.6 g/dL / Pro: 5.9 g/dL / ALK PHOS: 64 U/L / ALT: 14 U/L / AST: 28 U/L / GGT: x               Microbiology: reviewed    Culture - Blood (collected 07-05-22 @ 21:45)  Source: .Blood Blood-Peripheral  Preliminary Report (07-07-22 @ 01:02):    No growth to date.    Culture - Blood (collected 07-05-22 @ 21:40)  Source: .Blood Blood-Peripheral  Preliminary Report (07-07-22 @ 01:02):    No growth to date.        Radiology: reviewed

## 2022-07-08 NOTE — PROGRESS NOTE ADULT - SUBJECTIVE AND OBJECTIVE BOX
Patient is a 89y old  Female who presents with a chief complaint of gallstones, covid, kerry (06 Jul 2022 08:50)       HPI:  89y F pmhx CAD x 2 stents (2012), T2DM, HTN, HLD, GERD, mild AS, chronic venous insufficiency, chronic neuropathy, macular degeneration, chronic constipation, depression, OA, and recent admission to [facility] 2/25- 3/2 for gallstone pancreatitis with non-operative treatment presented to the ED with constant nausea x 4 days associated with decreased PO intake, generalized weakness, and fall today x 1 due to LE weakness. Patient denies fever, chills, cp, dizziness, sob, abd pain, vomiting, diarrhea. Patient was seen here in ED 6/20 for epigastric pain (since resolved) and discharged with outpatient follow up with Dr. Terrell. Patient has seen Dr. Terrell twice since then and epigastric pain resolved s/p PO zofran and carafate with planned esophagram. Daughter states epigastric pain resolved, but patient has had constant nausea x 4 days that has not improved despite increasing PO zofran from 4mg to 8mg q6h. Patient was unable to obtain appointment with Dr. Terrell today and after falling at home due to leg weakness, daughter brought her to the ED.     ED Course  Vitals: 99.2F, 85bpm, 143/66, 95% RA  Labs: Hgb 10.2, Hct 32.5, Na 132, K 5.5, BUN 30, Cr 2, GFR 23, lipase wnl  EKG:  CT Chest and abd/pel: Moderate-sized bilateral pleural effusion with partial bilateral lower lobe atelectasis. Cholelithiasis with distended gallbladder and right upper quadrant inflammatory changes, findings suspicious for acute cholecystitis.  RUQ US: Cholelithiasis with nonspecific pericholecystic fluid. Negative sonographic Scott sign. Dilated common bowel duct. Recommend correlating with LFTs. Cannot exclude distal choledocholithiasis.  HEAD CT: Mild volume loss, microvascular disease, no acute hemorrhage or midline shift.  Patient Received: Zosyn x1, NS 500cc x1, Lasix 40mg IV x1, IV dilaudid .5mg x1      Follow up KERRY  No acute events noted     (05 Jul 2022 23:51)       PAST MEDICAL & SURGICAL HISTORY:  H/O vertebral fracture repair  History of vertebral fracture  Dyslipidemia  DM type 2 with diabetic dyslipidemia  H/O gastroesophageal reflux (GERD)  Costochondritis  Coronary arteriosclerosis in native artery  s/p 2 stents. last placed 2 years ago  Gastroparesis  History of laminectomy  S/P hip hemiarthroplasty  S/P appendectomy      FAMILY HISTORY:  FHx: stroke (Mother)    NC    Social History:Non smoker    MEDICATIONS  (STANDING):  amitriptyline 30 milliGRAM(s) Oral at bedtime  aspirin  chewable 81 milliGRAM(s) Oral daily  atorvastatin 40 milliGRAM(s) Oral at bedtime  dextrose 5%. 1000 milliLiter(s) (50 mL/Hr) IV Continuous <Continuous>  dextrose 5%. 1000 milliLiter(s) (100 mL/Hr) IV Continuous <Continuous>  dextrose 50% Injectable 25 Gram(s) IV Push once  dextrose 50% Injectable 12.5 Gram(s) IV Push once  dextrose 50% Injectable 25 Gram(s) IV Push once  ferrous    sulfate 325 milliGRAM(s) Oral daily  glucagon  Injectable 1 milliGRAM(s) IntraMuscular once  heparin   Injectable 5000 Unit(s) SubCutaneous every 8 hours  HYDROmorphone   Tablet 4 milliGRAM(s) Oral <User Schedule>  insulin lispro (ADMELOG) corrective regimen sliding scale   SubCutaneous every 6 hours  metoprolol succinate ER 25 milliGRAM(s) Oral daily  piperacillin/tazobactam IVPB.. 3.375 Gram(s) IV Intermittent every 12 hours  polyethylene glycol 3350 17 Gram(s) Oral daily  senna 2 Tablet(s) Oral at bedtime    MEDICATIONS  (PRN):  acetaminophen     Tablet .. 650 milliGRAM(s) Oral every 6 hours PRN Temp greater or equal to 38C (100.4F), Mild Pain (1 - 3)  aluminum hydroxide/magnesium hydroxide/simethicone Suspension 30 milliLiter(s) Oral every 4 hours PRN Dyspepsia  dextrose Oral Gel 15 Gram(s) Oral once PRN Blood Glucose LESS THAN 70 milliGRAM(s)/deciliter  melatonin 3 milliGRAM(s) Oral at bedtime PRN Insomnia  ondansetron Injectable 4 milliGRAM(s) IV Push every 8 hours PRN Nausea and/or Vomiting   Meds reviewed    Allergies    morphine (Other)    Intolerances         REVIEW OF SYSTEMS:  Constitutional: Denies fatigue  HEENT: Denies headaches and dizziness  Respiratory: denies SOB, cough, or wheezing  Cardiovascular: denies CP, palpitations  Gastrointestinal: Denies nausea, denies vomiting, diarrhea, constipation, abdominal pain, or bloody stools  Genitourinary: +Urinary incontinence. denies painful urination or bloody urine  Neurologic: Denies generalized weakness.    Vital Signs Last 24 Hrs  T(C): 37.1 (08 Jul 2022 05:37), Max: 37.1 (08 Jul 2022 05:37)  T(F): 98.8 (08 Jul 2022 05:37), Max: 98.8 (08 Jul 2022 05:37)  HR: 75 (08 Jul 2022 05:37) (75 - 84)  BP: 150/68 (08 Jul 2022 05:37) (143/70 - 166/78)  BP(mean): --  RR: 18 (08 Jul 2022 05:37) (18 - 18)  SpO2: 92% (08 Jul 2022 05:37) (91% - 92%)    Parameters below as of 08 Jul 2022 05:37  Patient On (Oxygen Delivery Method): room air        PHYSICAL EXAM:    GENERAL: NAD  HEAD:  Atraumatic, Normocephalic  ENMT: No Drainage from nares, No drainage from ears  NECK: Supple, neck  veins full  NERVOUS SYSTEM:  Awake and Alert  Remainder of exam deferred due to COVID 19 isolation and reduced risk of transmission. Pulm exam noted      LABS:                                                        10.1   4.29  )-----------( 202      ( 08 Jul 2022 06:45 )             31.4     07-08    134<L>  |  98  |  27<H>  ----------------------------<  103<H>  3.5   |  27  |  2.10<H>    Ca    8.4<L>      08 Jul 2022 06:45    TPro  5.9<L>  /  Alb  2.6<L>  /  TBili  0.2  /  DBili  x   /  AST  28  /  ALT  14  /  AlkPhos  64  07-08

## 2022-07-08 NOTE — PROGRESS NOTE ADULT - PROBLEM SELECTOR PLAN 2
BUN 30, Cr 2 on admission  - on last admission Cr fluctuated between .87-1.3  -Cr trending up s/p lasix   - KERRY likely 2/2 dec PO intake, Pre Renal   - unknown cardiac function and patient with edematous legs on exam, consider echo pending cardio recs  - will hold off on further IVF for now given overload  - Hold nephrotoxic meds   - nephrology consulted Dr. Bryce moreno  Found to have KERRY on CKD stage 3 along with  Pleural effusion.  Obtain urine lytes  Continue IV Lasix- Leg edema ????  Consider Thoracentesis to reduce diuretic burden given KERRY  Abx discontinued BUN 30, Cr 2 on admission  - on last admission Cr fluctuated between .87-1.3  -Cr trending up s/p lasix - lasix stopped, urine osm- 266, serum osm- 282, urine sodium 110  urine cr 10  - KERRY likely 2/2 dec PO intake, Pre Renal   - Last TTE 6/21 with EF 60%, mod TR, mild pHTN and patient with edematous legs on exam likely 2/2 chronic venous insufficiency versus component of HF  - will hold off on further IVF for now given overload  - Hold nephrotoxic meds   - nephrology consulted Dr. Bryce moreno  Found to have KERRY on CKD stage 3 along with  Pleural effusion.  Consider Thoracentesis to reduce diuretic burden given KERRY  Abx discontinued

## 2022-07-08 NOTE — PROGRESS NOTE ADULT - ASSESSMENT
89y F pmhx CAD x 2 stents (2012), T2DM, HTN, HLD, GERD, mild AS, chronic venous insufficiency, chronic neuropathy, macular degeneration, chronic constipation, depression, OA, lumbar degenerative disc disease, and spinal stenosis admitted for cholelithiasis, KERRY, and found to be COVID19.     Chest pain, Edema, HTN, HLD  -hx CAD s/p 2 stents in 2012  -EKG NSR 80, no acute ischemic changes  -trop WNL on admission, no need to further trend, doubt ACS as ekg trops WNL  -denied any anginal symptoms overnight  -continue asa, statin    - LE edema likely 2/2 chronic venous insufficiency versus component of HF  - 7/7/22 TTE unchanged from previous;   EF 65% with norm LV/RV systolic function, with mild/mod MR.  - CT c/a/p with b/l mod effusions, partial LLL atelectasis  - s/p Lasix 40mg IV in ED  - last dose 7/6,  continue Lasix 40mg IVP for diuresing  - monitor renal function and volume status  - pulm following, no plan for thoracentesis for b/l effusions, plan for medical management    - Telemetry reviewed, remains in sinus, with episodes of tachycardia up to 110s.  likely reactive to infection   - BP well controlled, monitor routine hemodynamics.  - Monitor and replete lytes, keep K>4, Mg>2.  - Will continue to follow.      Ruy Jin NP  Nurse Practitioner- Cardiology   Spectra #[phone]

## 2022-07-08 NOTE — PROGRESS NOTE ADULT - SUBJECTIVE AND OBJECTIVE BOX
Date/Time Patient Seen:  		  Referring MD:   Data Reviewed	       Patient is a 89y old  Female who presents with a chief complaint of gallstones, kiah egan (07 Jul 2022 14:42)      Subjective/HPI     PAST MEDICAL & SURGICAL HISTORY:  H/O vertebral fracture repair    History of vertebral fracture    Dyslipidemia    DM type 2 with diabetic dyslipidemia    H/O gastroesophageal reflux (GERD)    Costochondritis    Coronary arteriosclerosis in native artery  s/p 2 stents. last placed 2 years ago    Gastroparesis    History of laminectomy    S/P hip hemiarthroplasty    S/P appendectomy          Medication list         MEDICATIONS  (STANDING):  amitriptyline 30 milliGRAM(s) Oral at bedtime  aspirin  chewable 81 milliGRAM(s) Oral daily  atorvastatin 40 milliGRAM(s) Oral at bedtime  dextrose 5%. 1000 milliLiter(s) (100 mL/Hr) IV Continuous <Continuous>  dextrose 5%. 1000 milliLiter(s) (50 mL/Hr) IV Continuous <Continuous>  dextrose 50% Injectable 25 Gram(s) IV Push once  dextrose 50% Injectable 12.5 Gram(s) IV Push once  dextrose 50% Injectable 25 Gram(s) IV Push once  ferrous    sulfate 325 milliGRAM(s) Oral daily  glucagon  Injectable 1 milliGRAM(s) IntraMuscular once  heparin   Injectable 5000 Unit(s) SubCutaneous every 8 hours  HYDROmorphone   Tablet 4 milliGRAM(s) Oral three times a day  insulin lispro (ADMELOG) corrective regimen sliding scale   SubCutaneous three times a day before meals  insulin lispro (ADMELOG) corrective regimen sliding scale   SubCutaneous at bedtime  metoclopramide Injectable 5 milliGRAM(s) IV Push every 6 hours  metoprolol succinate ER 25 milliGRAM(s) Oral daily  pantoprazole    Tablet 40 milliGRAM(s) Oral before breakfast  polyethylene glycol 3350 17 Gram(s) Oral daily  remdesivir  IVPB   IV Intermittent   remdesivir  IVPB 100 milliGRAM(s) IV Intermittent every 24 hours  senna 2 Tablet(s) Oral at bedtime  sucralfate suspension 1 Gram(s) Oral two times a day    MEDICATIONS  (PRN):  acetaminophen     Tablet .. 650 milliGRAM(s) Oral every 6 hours PRN Temp greater or equal to 38C (100.4F), Mild Pain (1 - 3)  aluminum hydroxide/magnesium hydroxide/simethicone Suspension 30 milliLiter(s) Oral every 4 hours PRN Dyspepsia  dextrose Oral Gel 15 Gram(s) Oral once PRN Blood Glucose LESS THAN 70 milliGRAM(s)/deciliter  melatonin 3 milliGRAM(s) Oral at bedtime PRN Insomnia  ondansetron Injectable 4 milliGRAM(s) IV Push every 8 hours PRN Nausea and/or Vomiting         Vitals log        ICU Vital Signs Last 24 Hrs  T(C): 37.1 (08 Jul 2022 05:37), Max: 37.1 (08 Jul 2022 05:37)  T(F): 98.8 (08 Jul 2022 05:37), Max: 98.8 (08 Jul 2022 05:37)  HR: 75 (08 Jul 2022 05:37) (75 - 84)  BP: 150/68 (08 Jul 2022 05:37) (143/70 - 166/78)  BP(mean): --  ABP: --  ABP(mean): --  RR: 18 (08 Jul 2022 05:37) (18 - 18)  SpO2: 92% (08 Jul 2022 05:37) (91% - 92%)    O2 Parameters below as of 08 Jul 2022 05:37  Patient On (Oxygen Delivery Method): room air                 Input and Output:  I&O's Detail    06 Jul 2022 07:01  -  07 Jul 2022 07:00  --------------------------------------------------------  IN:  Total IN: 0 mL    OUT:    Voided (mL): 1000 mL  Total OUT: 1000 mL    Total NET: -1000 mL          Lab Data                        10.5   4.83  )-----------( 215      ( 07 Jul 2022 09:04 )             32.7     07-07    135  |  98  |  30<H>  ----------------------------<  97  3.9   |  27  |  2.30<H>    Ca    8.5      07 Jul 2022 09:04  Phos  3.6     07-06  Mg     2.1     07-06    TPro  6.4  /  Alb  2.7<L>  /  TBili  0.3  /  DBili  x   /  AST  28  /  ALT  12  /  AlkPhos  68  07-07            Review of Systems	      Objective     Physical Examination  heart s1s2  lung dec BS  abd soft        Pertinent Lab findings & Imaging      Olvin:  NO   Adequate UO     I&O's Detail    06 Jul 2022 07:01  -  07 Jul 2022 07:00  --------------------------------------------------------  IN:  Total IN: 0 mL    OUT:    Voided (mL): 1000 mL  Total OUT: 1000 mL    Total NET: -1000 mL               Discussed with:     Cultures:	        Radiology

## 2022-07-08 NOTE — PROGRESS NOTE ADULT - SUBJECTIVE AND OBJECTIVE BOX
89 YO Female w/ PMHx of CAD s/p stents (2012), DMII, HTN, HLD, GERD, mild AS, chronic venous insufficiency, neuropathy, macular degeneration, chronic constipation, depression, OA, lumbar degenerative disc disease, spinal stenosis p/w nausea & poor PO intake x 2 weeks. Of note, pt was admitted to [facility] from 2/25/22- 3/2/22 and was seen by surgery & GI for gallstone pancreatitis, treated medically.     No acute overnight events. Pt is afebrile, VSS. Patient states she is doing well with no issues. Reports she has been able to make BM, ready for another BM, nursing notified. Denies any issues' no abdominal pain, nausea, vomiting, diarrhea, chest pain.     PAST MEDICAL & SURGICAL HISTORY:  H/O vertebral fracture repair  History of vertebral fracture  Dyslipidemia  DM type 2 with diabetic dyslipidemia  H/O gastroesophageal reflux (GERD)  Costochondritis  Coronary arteriosclerosis in native artery  s/p 2 stents. last placed 2 years ago  Gastroparesis  History of laminectomy  S/P hip hemiarthroplasty  S/P appendectomy      MEDICATIONS  (STANDING):  amitriptyline 30 milliGRAM(s) Oral at bedtime  aspirin  chewable 81 milliGRAM(s) Oral daily  atorvastatin 40 milliGRAM(s) Oral at bedtime  dextrose 5%. 1000 milliLiter(s) (100 mL/Hr) IV Continuous <Continuous>  dextrose 5%. 1000 milliLiter(s) (50 mL/Hr) IV Continuous <Continuous>  dextrose 50% Injectable 25 Gram(s) IV Push once  dextrose 50% Injectable 12.5 Gram(s) IV Push once  dextrose 50% Injectable 25 Gram(s) IV Push once  ferrous    sulfate 325 milliGRAM(s) Oral daily  glucagon  Injectable 1 milliGRAM(s) IntraMuscular once  heparin   Injectable 5000 Unit(s) SubCutaneous every 8 hours  HYDROmorphone   Tablet 4 milliGRAM(s) Oral three times a day  insulin lispro (ADMELOG) corrective regimen sliding scale   SubCutaneous three times a day before meals  insulin lispro (ADMELOG) corrective regimen sliding scale   SubCutaneous at bedtime  metoclopramide Injectable 5 milliGRAM(s) IV Push every 6 hours  metoprolol succinate ER 25 milliGRAM(s) Oral daily  pantoprazole    Tablet 40 milliGRAM(s) Oral before breakfast  polyethylene glycol 3350 17 Gram(s) Oral daily  remdesivir  IVPB   IV Intermittent   remdesivir  IVPB 100 milliGRAM(s) IV Intermittent every 24 hours  senna 2 Tablet(s) Oral at bedtime  sucralfate suspension 1 Gram(s) Oral two times a day    MEDICATIONS  (PRN):  acetaminophen     Tablet .. 650 milliGRAM(s) Oral every 6 hours PRN Temp greater or equal to 38C (100.4F), Mild Pain (1 - 3)  aluminum hydroxide/magnesium hydroxide/simethicone Suspension 30 milliLiter(s) Oral every 4 hours PRN Dyspepsia  dextrose Oral Gel 15 Gram(s) Oral once PRN Blood Glucose LESS THAN 70 milliGRAM(s)/deciliter  melatonin 3 milliGRAM(s) Oral at bedtime PRN Insomnia  ondansetron Injectable 4 milliGRAM(s) IV Push every 8 hours PRN Nausea and/or Vomiting      Vital Signs Last 24 Hrs  T(C): 37.1 (08 Jul 2022 05:37), Max: 37.1 (08 Jul 2022 05:37)  T(F): 98.8 (08 Jul 2022 05:37), Max: 98.8 (08 Jul 2022 05:37)  HR: 74 (08 Jul 2022 08:15) (74 - 84)  BP: 150/68 (08 Jul 2022 05:37) (143/70 - 166/78)  BP(mean): --  RR: 18 (08 Jul 2022 05:37) (18 - 18)  SpO2: 91% (08 Jul 2022 08:15) (91% - 92%)    Parameters below as of 08 Jul 2022 08:15  Patient On (Oxygen Delivery Method): room air

## 2022-07-08 NOTE — PROGRESS NOTE ADULT - ASSESSMENT
89y F pmhx CAD x 2 stents (2012), T2DM, HTN, HLD, GERD, mild AS, chronic venous insufficiency, chronic neuropathy, macular degeneration, chronic constipation, depression, OA, lumbar degenerative disc disease, and spinal stenosis admitted for cholelithiasis, KERRY, and found to be COVID positive on admission.     COVID-19  Pleural Effusions  - Vaccinated x3 per patient.   - Currently on RA, satting well  - No obvious consolidation/GGO on CT chest   Plan:  C/w remdesivir x3 day course w/ last dose 7/9  Trend temps/WBC  Trend inflammatory biomarkers  Continue with supportive care  Maintain aspiration precautions  Maintain isolation per infection control policy    Cholelithiasis  - pt p/w nausea/decreased PO intake x 4 days  - CT abd/pel: Cholelithiasis with distended gallbladder and right upper quadrant inflammatory changes, findings suspicious for acute cholecystitis.  - RUQ US: Cholelithiasis with nonspecific pericholecystic fluid. Negative sonographic Scott sign. Dilated common bile duct.  - HIDA negative for acute cholecystitis  - Cx negative  Plan:   Overall low suspicion for acute infection  S/p zosyn  Monitor off Abx    KERRY  - likely 2/2 dec PO intake, Pre Renal   Plan:   trend renal fxn  avoid nephrotoxins  renally dose medications    D/w daughter Mary  D/w Dr. Izquierdo    Infectious Diseases will continue to follow. Please call with any questions.   Candida Izquierdo M.D.  [facility], Division of Infectious Diseases [phone]

## 2022-07-08 NOTE — PROGRESS NOTE ADULT - SUBJECTIVE AND OBJECTIVE BOX
[facility] -- Casper Mcgee, Briana Valles, Eligio Ryan Savella, [place]  Office # [phone]    Follow Up:   chest pain    Subjective/Observations: Patient is confuse, alert to self only. Unable to obtain comprehensive ROS.  Remains on RA, not in acute respiratory distress.     REVIEW OF SYSTEMS: All other review of systems is negative unless indicated above  PAST MEDICAL & SURGICAL HISTORY:  H/O vertebral fracture repair      History of vertebral fracture      Dyslipidemia      DM type 2 with diabetic dyslipidemia      H/O gastroesophageal reflux (GERD)      Costochondritis      Coronary arteriosclerosis in native artery  s/p 2 stents. last placed 2 years ago      Gastroparesis      History of laminectomy      S/P hip hemiarthroplasty      S/P appendectomy        MEDICATIONS  (STANDING):  amitriptyline 30 milliGRAM(s) Oral at bedtime  aspirin  chewable 81 milliGRAM(s) Oral daily  atorvastatin 40 milliGRAM(s) Oral at bedtime  dextrose 5%. 1000 milliLiter(s) (100 mL/Hr) IV Continuous <Continuous>  dextrose 5%. 1000 milliLiter(s) (50 mL/Hr) IV Continuous <Continuous>  dextrose 50% Injectable 25 Gram(s) IV Push once  dextrose 50% Injectable 12.5 Gram(s) IV Push once  dextrose 50% Injectable 25 Gram(s) IV Push once  ferrous    sulfate 325 milliGRAM(s) Oral daily  glucagon  Injectable 1 milliGRAM(s) IntraMuscular once  heparin   Injectable 5000 Unit(s) SubCutaneous every 8 hours  HYDROmorphone   Tablet 4 milliGRAM(s) Oral three times a day  insulin lispro (ADMELOG) corrective regimen sliding scale   SubCutaneous three times a day before meals  insulin lispro (ADMELOG) corrective regimen sliding scale   SubCutaneous at bedtime  metoclopramide Injectable 5 milliGRAM(s) IV Push every 6 hours  metoprolol succinate ER 25 milliGRAM(s) Oral daily  pantoprazole    Tablet 40 milliGRAM(s) Oral before breakfast  polyethylene glycol 3350 17 Gram(s) Oral daily  remdesivir  IVPB   IV Intermittent   remdesivir  IVPB 100 milliGRAM(s) IV Intermittent every 24 hours  senna 2 Tablet(s) Oral at bedtime  sucralfate suspension 1 Gram(s) Oral two times a day    MEDICATIONS  (PRN):  acetaminophen     Tablet .. 650 milliGRAM(s) Oral every 6 hours PRN Temp greater or equal to 38C (100.4F), Mild Pain (1 - 3)  aluminum hydroxide/magnesium hydroxide/simethicone Suspension 30 milliLiter(s) Oral every 4 hours PRN Dyspepsia  dextrose Oral Gel 15 Gram(s) Oral once PRN Blood Glucose LESS THAN 70 milliGRAM(s)/deciliter  melatonin 3 milliGRAM(s) Oral at bedtime PRN Insomnia  ondansetron Injectable 4 milliGRAM(s) IV Push every 8 hours PRN Nausea and/or Vomiting    Allergies    morphine (Other)    Intolerances      Vital Signs Last 24 Hrs  T(C): 37.1 (08 Jul 2022 05:37), Max: 37.1 (08 Jul 2022 05:37)  T(F): 98.8 (08 Jul 2022 05:37), Max: 98.8 (08 Jul 2022 05:37)  HR: 74 (08 Jul 2022 08:15) (74 - 84)  BP: 150/68 (08 Jul 2022 05:37) (143/70 - 166/78)  BP(mean): --  RR: 18 (08 Jul 2022 05:37) (18 - 18)  SpO2: 91% (08 Jul 2022 08:15) (91% - 92%)    Parameters below as of 08 Jul 2022 08:15  Patient On (Oxygen Delivery Method): room air      I&O's Summary     Telemetry: NSR 60-70s, episode of sinus tachycardia 110~ 5 mins    PHYSICAL EXAM:  GENERAL: NAD  HEAD:  Atraumatic, Normocephalic  EYES: EOMI, PERRLA, conjunctiva and sclera clear  NECK: Supple, No JVD  CHEST/LUNG: Diminished bilaterally ; No wheeze, rales  HEART: Regular rate and rhythm; No murmurs, rubs, or gallops  ABDOMEN: Soft, Nontender, Nondistended; Bowel sounds present  Extremities: 1-2+ b/l LE dependent pitting edema, R>L, chronic appearing venous changes R>L  PSYCH: AAOx1  NEUROLOGY: non-focal    LABS: All Labs Reviewed:                        10.1   4.29  )-----------( 202      ( 08 Jul 2022 06:45 )             31.4                         10.5   4.83  )-----------( 215      ( 07 Jul 2022 09:04 )             32.7                         10.6   4.68  )-----------( 220      ( 06 Jul 2022 07:14 )             32.9     08 Jul 2022 06:45    134    |  98     |  27     ----------------------------<  103    3.5     |  27     |  2.10   07 Jul 2022 09:04    135    |  98     |  30     ----------------------------<  97     3.9     |  27     |  2.30   06 Jul 2022 07:14    135    |  100    |  29     ----------------------------<  100    4.1     |  27     |  2.10     Ca    8.4        08 Jul 2022 06:45  Ca    8.5        07 Jul 2022 09:04  Ca    8.8        06 Jul 2022 07:14  Phos  3.6       06 Jul 2022 07:14  Mg     2.1       06 Jul 2022 07:14    TPro  5.9    /  Alb  2.6    /  TBili  0.2    /  DBili  x      /  AST  28     /  ALT  14     /  AlkPhos  64     08 Jul 2022 06:45  TPro  6.4    /  Alb  2.7    /  TBili  0.3    /  DBili  x      /  AST  28     /  ALT  12     /  AlkPhos  68     07 Jul 2022 09:04  TPro  6.0    /  Alb  2.6    /  TBili  0.4    /  DBili  x      /  AST  22     /  ALT  12     /  AlkPhos  65     06 Jul 2022 07:14      EKG: NSR 80    RADIOLOGY:  < from: NM Hepatobiliary Imaging (07.06.22 @ 11:35) >    ACC: [id number] EXAM:  NM HEPATOBILIARY IMG                          PROCEDURE DATE:  07/06/2022          INTERPRETATION:  RADIOPHARMACEUTICAL: 3.2 mCi Tc-99m-Mebrofenin, I.V.    CLINICAL INFORMATION: 89 year old female with cholelithiasis, nausea, and   distended gallbladder with pericholecystic fluid on CT; referred to   evaluate for acute cholecystitis.    TECHNIQUE:  Dynamic imaging of the anterior abdomen was performed for   approximately 20 minutes following radiopharmaceutical injection. The   patient was unable to cooperate for continued dynamic imaging and   consequently static images of the abdomen were obtained in the anterior   and right anterior oblique projections at approximately 1 hour.    COMPARISON: Hepatobiliary scan performed on 2/25/2022 was reviewed.    FINDINGS: There is prompt, homogeneous uptake of radiopharmaceutical by   the hepatocytes. The gallbladder and bowel are visualized on the one hour   images. There is good clearance of activity from the liver by the end of   the study.    IMPRESSION: Normal hepatobiliary scan. No radionuclide evidence of acute   cholecystitis.    No significant interval change since the previous hepatobiliary scan of   2/25/2022.    --- End of Report ---      < end of copied text >  < from: US Duplex Venous Lower Ext Complete, Bilateral (07.05.22 @ 22:03) >    ACC: [id number] EXAM:  US DPLX LWR EXT VEINS COMPL BI                          PROCEDURE DATE:  07/05/2022          INTERPRETATION:  CLINICAL INFORMATION: Bilateral leg swelling    COMPARISON: None available.    TECHNIQUE: Duplex sonography of the BILATERAL LOWER extremity veins with   color and spectral Doppler, with and without compression.    FINDINGS:    RIGHT:  Normal compressibility of the RIGHT common femoral, femoral and popliteal   veins.  Doppler examination shows normal spontaneous andphasic flow.  No RIGHT calf vein thrombosis is detected.  Subcutaneous edema in the right calf.    LEFT:  Normal compressibility of the LEFT common femoral, femoral and popliteal   veins.  Doppler examination shows normal spontaneous and phasic flow.  No LEFT calf vein thrombosis is detected.    IMPRESSION:  No evidence of deep venous thrombosis in either lower extremity.          --- End of Report ---      < end of copied text >  < from: US Abdomen Upper Quadrant Right (07.05.22 @ 21:10) >    ACC: [id number] EXAM:  US ABDOMEN RT UPR QUADRANT                          PROCEDURE DATE:  07/05/2022          INTERPRETATION:  CLINICAL INFORMATION: Nausea    COMPARISON: CT abdomen pelvis from earlier the same day.    TECHNIQUE: Sonography of the right upper quadrant.    FINDINGS:  Liver: Within normal limits.  Bile ducts: Normal caliber. Common bile duct measures 12 mm.  Gallbladder: Distended gallbladder containing stones and sludge.   Pericholecystic fluid is noted. Sonographic Scott sign is negative.  Pancreas: Visualized portions are within normal limits.  Right kidney: 9.8 cm. Mild hydronephrosis..  Ascites: None.  IVC: Visualized portions are within normal limits.  Miscellaneous: Right pleural effusion noted.    IMPRESSION:  Cholelithiasis with nonspecific pericholecystic fluid. Negative   sonographic Scott sign.    Dilated common bowel duct. Recommend correlating with LFTs. Cannot   exclude distal choledocholithiasis.    --- End of Report ---    < end of copied text >  < from: CT Chest No Cont (07.05.22 @ 19:46) >    ACC: [id number] EXAM:  CT ABDOMEN AND PELVIS                        ACC: [id number] EXAM:  CT CHEST                          PROCEDURE DATE:  07/05/2022          INTERPRETATION:  CLINICAL INFORMATION: Epigastric abdominal pain and   chest pain.    COMPARISON: CT angiogram chest abdomen pelvis 6/20/2022.    CONTRAST/COMPLICATIONS:  IV Contrast: NONE  0 cc administered   0 cc discarded  Oral Contrast: NONE  Complications: None reported at time of study completion    PROCEDURE:  CT of the Chest, Abdomen and Pelvis was performed.  Sagittal and coronal reformats were performed.    FINDINGS:    CHEST:    LUNGS AND LARGE AIRWAYS:  PLEURA:  There are moderate-sized bilateral pleural effusions with partial   atelectasis bilateral lower lobes.    The central airways are patent.    VESSELS: Atherosclerotic changes thoracic aorta with coronary artery   calcification.    HEART: Heart size is normal. Dense mitral calcification.  No pericardial effusion.    MEDIASTINUM AND VAL:  No enlarged lymphadenopathy.    CHEST WALL AND LOWER NECK: Within normal limits.    ABDOMEN AND PELVIS:    Streak artifact degrades image quality.    The evaluation of the solid organ parenchyma is limited without   intravenous contrast.    LIVER: Within normal limits.  BILE DUCTS: Normal caliber.  GALLBLADDER: Mild gallbladder distention with cholelithiasis.  Right upper quadrant stranding.  SPLEEN: Within normal limits.  PANCREAS: Atrophic.  ADRENALS: Within normal limits.  KIDNEYS/URETERS:  Punctate nonobstructing intrarenal calcification upper pole left kidney.  Right-sided hydronephrosis.    BLADDER: Bladder wall thickening.  REPRODUCTIVE ORGANS: No pelvic mass.    BOWEL:  Evaluation of the stomach is limited without distention.  Prominent colonic fecal retentionwith mild distention of the colon; no   bowel obstruction.  Mild perirectal and presacral stranding.  Sigmoid diverticulosis, without CT evidence of diverticulitis.  PERITONEUM:  Small perihepatic ascites.    VESSELS: Within normal limits.  RETROPERITONEUM/LYMPH NODES: Atherosclerotic changes.    ABDOMINAL WALL:  Small fat-containing umbilical hernia.  Subcutaneous edema.    BONES:  Degenerative changes.  Vertebroplasty with compression deformities T11 and T12 vertebral bodies.  Chronic compression deformity L5 vertebral body.  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  Chronic deformity of the sternum.    IMPRESSION:    Moderate-sized bilateral pleural effusion with partial bilateral lower   lobe atelectasis.      Cholelithiasis with distended gallbladder and right upper quadrant   inflammatory changes, findings suspicious for acute cholecystitis.  Recommend further evaluation with right upper quadrant ultrasonography.    Other findings as discussed above.    --- End of Report ---    < end of copied text >    ACC: [id number] EXAM:  ECHO TTE WO CON COMP W DOPP                          PROCEDURE DATE:  07/07/2022          INTERPRETATION:  INDICATION: dyspnea  Sonographer LK    Blood Pressure 143/70    Height 157 cm     Weight 52 kg       BSA 1.5 sq m    Dimensions:  LA unavailable       Normal Values: 2.0 - 4.0 cm  Ao unavailable        Normal Values: 2.0 - 3.8 cm  SEPTUM unavailable       Normal Values: 0.6 - 1.2 cm  PWT unavailable       Normal Values: 0.6 - 1.1 cm  LVIDd unavailable         Normal Values: 3.0 - 5.6 cm  LVIDs unavailable         Normal Values: 1.8 - 4.0 cm      OBSERVATIONS:  Technically difficult study  Mitral Valve: Mitral annular calcification with thickened leaflets, mild   to moderate MR.  Aortic Valve/Aorta: Sclerotic trileaflet aortic valve with grossly normal   opening by visual estimation.  Tricuspid Valve: normal with trace TR.  Pulmonic Valve: Not well-visualized  Left Atrium: Enlarged  Right Atrium: Not well-visualized  Left Ventricle: normal LV size and systolic function, estimated LVEF of   65%.  Right Ventricle: Grossly normal size and systolic function.  Pericardium: no significant pericardial effusion.  Pulmonary/RV Pressure: estimated PA systolic pressure of 20mmHg  IVC measures 1.85 cm          IMPRESSION:  Technically difficult study  Normal left ventricular internal dimensions and systolic function,   estimated LVEF of 65%.  Grossly normal RV size and systolic function.  Sclerotic trileaflet aortic valve with grossly normal opening by visual   estimation, without AI.  Mild to moderate MR  No significant pericardial effusion.    --- End of Report ---

## 2022-07-08 NOTE — PROGRESS NOTE ADULT - PROBLEM SELECTOR PLAN 10
hx of spinal stenosis, OA, and neuropathy  -HOLDING home PO dilaudid 4mg @ 9am, 4pm, 10pm in preparation for gastric emptying study  -Consider IV tylenol hx of spinal stenosis, OA, and neuropathy  -Restarted home PO dilaudid 4mg @ 9am, 4pm, 10pm

## 2022-07-08 NOTE — PROGRESS NOTE ADULT - PROBLEM SELECTOR PLAN 1
Patient with nausea x 4 days associated with dec PO intake, epigastric pain ~2 weeks ago resolved  - CT abd/pel: Cholelithiasis with distended gallbladder and right upper quadrant inflammatory changes, findings suspicious for acute cholecystitis.  - RUQ US: Cholelithiasis with nonspecific pericholecystic fluid. Negative sonographic Scott sign. Dilated common bile duct.  - recent admission to hospitals 2/25- 3/2 for gallstone pancreatitis with non-operative treatment   - no leukocytosis, lipase wnl, afebrile  - Was NPO, hold IVF for now given overload, IV Zofran prn, plan for HIDA in am as per surgery   -HIDA normal (7/6) and clear liquid diet with carb restrictions started  - surgery consulted (Dr. Rodrigues), recs appreciated d/w -HIDA -Neg   -GI Dr Montilla-RICHIA Neg-Ok for clears, No Abx  -NPO after midnight for Gastric Emptying study  -Dilaudid stopped this AM (7/7) for Gastric Emptying study, consider IV tylenol Patient with nausea x 4 days associated with dec PO intake, epigastric pain ~2 weeks ago resolved  - CT abd/pel: Cholelithiasis with distended gallbladder and right upper quadrant inflammatory changes, findings suspicious for acute cholecystitis.  - RUQ US: Cholelithiasis with nonspecific pericholecystic fluid. Negative sonographic Scott sign. Dilated common bile duct.  - recent admission to [facility] 2/25- 3/2 for gallstone pancreatitis with non-operative treatment   - no leukocytosis, lipase wnl, afebrile  - Was NPO, hold IVF for now given overload, IV Zofran prn, plan for HIDA in am as per surgery   -HIDA normal (7/6) and clear liquid diet with carb restrictions started  - surgery consulted (Dr. Rodrigues), recs appreciated d/w -HIDA -Neg   -GI Dr Montilla-HIDA Neg-Ok for clears, No Abx  -Was NPO after midnight for Gastric Emptying study- CANCELLED, pt unable to tolerate being off pain meds  -Dilaudid restarted Patient with nausea x 4 days associated with dec PO intake, epigastric pain ~2 weeks ago resolved  - CT abd/pel: Cholelithiasis with distended gallbladder and right upper quadrant inflammatory changes, findings suspicious for acute cholecystitis.  - RUQ US: Cholelithiasis with nonspecific pericholecystic fluid. Negative sonographic Scott sign. Dilated common bile duct.  - recent admission to [facility] 2/25- 3/2 for gallstone pancreatitis with non-operative treatment   - no leukocytosis, lipase wnl, afebrile  - Was NPO, hold IVF for now given overload, IV Zofran prn, plan for HIDA in am as per surgery   -HIDA normal (7/6) and clear liquid diet with carb restrictions started  - surgery consulted (Dr. Rodrigues), recs appreciated d/w -HIDA -Neg   -GI Dr Montilla-HIDA Neg-Ok for clears, No Abx  -Was NPO after midnight for Gastric Emptying study- CANCELLED, pt unable to tolerate being off pain meds  -Start Reglan to help tolerate diet  -Dilaudid restarted

## 2022-07-08 NOTE — PROGRESS NOTE ADULT - SUBJECTIVE AND OBJECTIVE BOX
Patient is a 89y old  Female who presents with a chief complaint of gallstones, covid, fidelina (08 Jul 2022 09:39)    HPI:  89y F pmhx CAD x 2 stents (2012), T2DM, HTN, HLD, GERD, mild AS, chronic venous insufficiency, chronic neuropathy, macular degeneration, chronic constipation, depression, OA, and recent admission to [facility] 2/25- 3/2 for gallstone pancreatitis with non-operative treatment presented to the ED with constant nausea x 4 days associated with decreased PO intake, generalized weakness, and fall today x 1 due to LE weakness. Patient denies fever, chills, cp, dizziness, sob, abd pain, vomiting, diarrhea. Patient was seen here in ED 6/20 for epigastric pain (since resolved) and discharged with outpatient follow up with Dr. Terrell. Patient has seen Dr. Terrell twice since then and epigastric pain resolved s/p PO zofran and carafate with planned esophagram. Daughter states epigastric pain resolved, but patient has had constant nausea x 4 days that has not improved despite increasing PO zofran from 4mg to 8mg q6h. Patient was unable to obtain appointment with Dr. Terrell today and after falling at home due to leg weakness, daughter brought her to the ED.     ED Course  Vitals: 99.2F, 85bpm, 143/66, 95% RA  Labs: Hgb 10.2, Hct 32.5, Na 132, K 5.5, BUN 30, Cr 2, GFR 23, lipase wnl  EKG:    CT Chest and abd/pel: Moderate-sized bilateral pleural effusion with partial bilateral lower lobe atelectasis. Cholelithiasis with distended gallbladder and right upper quadrant inflammatory changes, findings suspicious for acute cholecystitis.    RUQ US: Cholelithiasis with nonspecific pericholecystic fluid. Negative sonographic Scott sign. Dilated common bowel duct. Recommend correlating with LFTs. Cannot exclude distal choledocholithiasis.    HEAD CT: Mild volume loss, microvascular disease, no acute hemorrhage or midline shift.    Patient Received: Zosyn x1, NS 500cc x1, Lasix 40mg IV x1, IV dilaudid .5mg x1           (05 Jul 2022 23:51)    INTERVAL HPI:  07/06- pt seen and examined at bedside. Complaining of increased frequency of urination and suprapubic pain s/p 40mg IV lasix x1 in the ED. Otherwise, notes worsening of chronic back pain and general feeling of malaise. No sob, chest pain, nausea. Fidelina, Anemia.HIDA scan today.  07/07- pt seen and examined at bedside. Feeling confused. A&Ox1 (person). Has a hx of hospital delirium and has been able to be reoriented when her daughter is present. Notes pain over b/l LE but denies sob, chest pain, n/v/d. Waiting for gastric emptying study per GI.  07/08- pt seen and examined at bedside. Feeling confused again this AM possibly chronic hospital AM delirium. A&Ox1 (person). Notes decreased in b/l LE edema but has mild pain.    OVERNIGHT EVENTS: none    Home Medications:  amitriptyline 10 mg oral tablet: 3 tab(s) orally once a day (at bedtime) (05 Jul 2022 23:44)  aspirin 81 mg oral tablet: 1 tab(s) orally once a day (05 Jul 2022 23:44)  atorvastatin 40 mg oral tablet: 1 tab(s) orally once a day (05 Jul 2022 23:44)  Dilaudid 4 mg oral tablet: 1 tab(s) orally 3 times (05 Jul 2022 23:44)  ferrous sulfate 324 mg (65 mg elemental iron) oral delayed release tablet: 1 tab(s) orally once a day (05 Jul 2022 23:44)  ferrous sulfate 325 mg (65 mg elemental iron) oral tablet: 1 tablet oral daily (05 Jul 2022 23:44)  Metoprolol Succinate ER 25 mg oral tablet, extended release: 1 tab(s) orally once a day (05 Jul 2022 23:44)  MiraLax oral powder for reconstitution: ng/kg orally (05 Jul 2022 23:44)  PreserVision AREDS oral capsule: 1 tab(s) orally 2 times a day (05 Jul 2022 23:44)  Senna 8.6 mg oral tablet: 2 tab(s) orally once a day (at bedtime) (05 Jul 2022 23:44)  Toprol-XL 25 mg oral tablet, extended release: 1 tab(s) orally once a day (05 Jul 2022 23:44)  Vitamin D3 1250 mcg (50,000 intl units) oral capsule: 1 cap(s) orally once a week (05 Jul 2022 23:44)  Zofran 4 mg oral tablet: 1 tab(s) orally every 6 hours (05 Jul 2022 23:44)      MEDICATIONS  (STANDING):  amitriptyline 30 milliGRAM(s) Oral at bedtime  aspirin  chewable 81 milliGRAM(s) Oral daily  atorvastatin 40 milliGRAM(s) Oral at bedtime  dextrose 5%. 1000 milliLiter(s) (100 mL/Hr) IV Continuous <Continuous>  dextrose 5%. 1000 milliLiter(s) (50 mL/Hr) IV Continuous <Continuous>  dextrose 50% Injectable 25 Gram(s) IV Push once  dextrose 50% Injectable 12.5 Gram(s) IV Push once  dextrose 50% Injectable 25 Gram(s) IV Push once  ferrous    sulfate 325 milliGRAM(s) Oral daily  glucagon  Injectable 1 milliGRAM(s) IntraMuscular once  heparin   Injectable 5000 Unit(s) SubCutaneous every 8 hours  HYDROmorphone   Tablet 4 milliGRAM(s) Oral three times a day  insulin lispro (ADMELOG) corrective regimen sliding scale   SubCutaneous three times a day before meals  insulin lispro (ADMELOG) corrective regimen sliding scale   SubCutaneous at bedtime  metoclopramide Injectable 5 milliGRAM(s) IV Push every 6 hours  metoprolol succinate ER 25 milliGRAM(s) Oral daily  pantoprazole    Tablet 40 milliGRAM(s) Oral before breakfast  polyethylene glycol 3350 17 Gram(s) Oral daily  remdesivir  IVPB   IV Intermittent   remdesivir  IVPB 100 milliGRAM(s) IV Intermittent every 24 hours  senna 2 Tablet(s) Oral at bedtime  sucralfate suspension 1 Gram(s) Oral two times a day    MEDICATIONS  (PRN):  acetaminophen     Tablet .. 650 milliGRAM(s) Oral every 6 hours PRN Temp greater or equal to 38C (100.4F), Mild Pain (1 - 3)  aluminum hydroxide/magnesium hydroxide/simethicone Suspension 30 milliLiter(s) Oral every 4 hours PRN Dyspepsia  dextrose Oral Gel 15 Gram(s) Oral once PRN Blood Glucose LESS THAN 70 milliGRAM(s)/deciliter  melatonin 3 milliGRAM(s) Oral at bedtime PRN Insomnia  ondansetron Injectable 4 milliGRAM(s) IV Push every 8 hours PRN Nausea and/or Vomiting      Allergies    morphine (Other)    Intolerances        Social History:  Former cigarette smoker, smoked <1/2 ppd for less than 15 years, quit more than 50 years ago   Denies ETOH use   Denies drug use   Ambulates with a walker. Lives at home with daughter. ADLs with assistance. (05 Jul 2022 23:51)      REVIEW OF SYSTEMS:  CONSTITUTIONAL: No fever, No chills, No fatigue, No myalgia, No Body ache, No Weakness  EYES: No eye pain,  No visual disturbances, No discharge, NO Redness  ENMT:  No ear pain, No nose bleed, No vertigo; No sinus pain, NO throat pain, No Congestion  NECK: No pain, No stiffness  RESPIRATORY: No cough, NO wheezing, No  hemoptysis, NO  shortness of breath  CARDIOVASCULAR: No chest pain, palpitations  GASTROINTESTINAL: No abdominal pain, NO epigastric pain. No nausea, No vomiting; No diarrhea, No constipation. [  ] BM  GENITOURINARY: No dysuria, No frequency, No urgency, No hematuria, NO incontinence  NEUROLOGICAL: No headaches, No dizziness, No numbness, No tingling, No tremors, No weakness  EXT: No Swelling, No Pain, No Edema  SKIN:  [  ] No itching, burning, rashes, or lesions   MUSCULOSKELETAL: No joint pain ,No Jt swelling; No muscle pain, No back pain, No extremity pain  PSYCHIATRIC: No depression,  No anxiety,  No mood swings ,No difficulty sleeping at night  PAIN SCALE: [  ] None  [  ] Other-  ROS Unable to obtain due to - [  ] Dementia  [  ] Lethargy [  ] Drowsy [  ] Sedated [  ] non verbal  REST OF REVIEW Of SYSTEM - [  ] Normal     Vital Signs Last 24 Hrs  T(C): 37.1 (08 Jul 2022 05:37), Max: 37.1 (08 Jul 2022 05:37)  T(F): 98.8 (08 Jul 2022 05:37), Max: 98.8 (08 Jul 2022 05:37)  HR: 74 (08 Jul 2022 08:15) (74 - 84)  BP: 150/68 (08 Jul 2022 05:37) (143/70 - 166/78)  BP(mean): --  RR: 18 (08 Jul 2022 05:37) (18 - 18)  SpO2: 91% (08 Jul 2022 08:15) (91% - 92%)    Parameters below as of 08 Jul 2022 08:15  Patient On (Oxygen Delivery Method): room air      Finger Stick          PHYSICAL EXAM:  GENERAL:  [  ] NAD , [  ] well appearing, [  ] Agitated, [  ] Drowsy,  [  ] Lethargy, [  ] confused   HEAD:  [  ] Normal, [  ] Other  EYES:  [  ] EOMI, [  ] PERRLA, [  ] conjunctiva and sclera clear normal, [  ] Other,  [  ] Pallor,[  ] Discharge  ENMT:  [  ] Normal, [  ] Moist mucous membranes, [  ] Good dentition, [  ] No Thrush  NECK:  [  ] Supple, [  ] No JVD, [  ] Normal thyroid, [  ] Lymphadenopathy [  ] Other  CHEST/LUNG:  [  ] Clear to auscultation bilaterally, [  ] Breath Sounds equal B/L / Decrease, [  ] poor effort  [  ] No rales, [  ] No rhonchi  [  ]  No wheezing,   HEART:  [  ] Regular rate and rhythm, [  ] tachycardia, [  ] Bradycardia,  [  ] irregular  [  ] No murmurs, No rubs, No gallops, [  ] PPM in place (Mfr:  )  ABDOMEN:  [  ] Soft, [  ] Nontender, [  ] Nondistended, [  ] No mass, [  ] Bowel sounds present, [  ] obese  NERVOUS SYSTEM:  [  ] Alert & Oriented X3, [  ] Nonfocal  [  ] Confusion  [  ] Encephalopathic [  ] Sedated [  ] Unable to assess, [  ] Dementia [  ] Other-  EXTREMITIES: [  ] 2+ Peripheral Pulses, No clubbing, No cyanosis,  [  ] edema B/L lower EXT. [  ] PVD stasis skin changes B/L Lower EXT, [  ] wound  LYMPH: No lymphadenopathy noted  SKIN:  [  ] No rashes or lesions, [  ] Pressure Ulcers, [  ] ecchymosis, [  ] Skin Tears, [  ] Other    DIET:     LABS:                        10.1   4.29  )-----------( 202      ( 08 Jul 2022 06:45 )             31.4     08 Jul 2022 06:45    134    |  98     |  27     ----------------------------<  103    3.5     |  27     |  2.10     Ca    8.4        08 Jul 2022 06:45    TPro  5.9    /  Alb  2.6    /  TBili  0.2    /  DBili  x      /  AST  28     /  ALT  14     /  AlkPhos  64     08 Jul 2022 06:45          Culture Results:   No growth to date. (07-05 @ 21:45)  Culture Results:   No growth to date. (07-05 @ 21:40)                  Culture - Blood (collected 05 Jul 2022 21:45)  Source: .Blood Blood-Peripheral  Preliminary Report (07 Jul 2022 01:02):    No growth to date.    Culture - Blood (collected 05 Jul 2022 21:40)  Source: .Blood Blood-Peripheral  Preliminary Report (07 Jul 2022 01:02):    No growth to date.         Anemia Panel:  Vitamin B12, Serum: 342 pg/mL (07-06-22 @ 07:14)  Folate, Serum: 11.8 ng/mL (07-06-22 @ 07:14)  Iron Total, Serum: 19 ug/dL (07-06-22 @ 07:14)  Iron - Total Binding Capacity.: 242 ug/dL (07-06-22 @ 07:14)  Ferritin, Serum: 67 ng/mL (07-06-22 @ 07:14)      Thyroid Panel:        Lipase, Serum: 78 U/L (07-05-22 @ 19:05)          RADIOLOGY & ADDITIONAL TESTS:      HEALTH ISSUES - PROBLEM Dx:  Cholelithiasis    FIDELINA (acute kidney injury)    Pleural effusion    2019 novel coronavirus disease (COVID-19)    Anemia    Fall    CAD (coronary artery disease)    HTN (hypertension)    Type 2 diabetes mellitus    Chronic back pain    Anxiety and depression    Sundowning    DVT prophylaxis            Consultant(s) Notes Reviewed:  [  ] YES     Care Discussed with [X] Consultants  [  ] Patient  [  ] Family [  ] HCP [  ]   [  ] Social Service  [  ] RN, [  ] Physical Therapy,[  ] Palliative care team  DVT PPX: [  ] Lovenox, [  ] S C Heparin, [  ] Coumadin, [  ] Xarelto, [  ] Eliquis, [  ] Pradaxa, [  ] IV Heparin drip, [  ] SCD [  ] Contraindication 2 to GI Bleed,[  ] Ambulation [  ] Contraindicated 2 to  bleed [  ] Contraindicated 2 to Brain Bleed  Advanced directive: [  ] None, [  ] DNR/DNI Patient is a 89y old  Female who presents with a chief complaint of gallstones, covid, fidelina (08 Jul 2022 09:39)    HPI:  89y F pmhx CAD x 2 stents (2012), T2DM, HTN, HLD, GERD, mild AS, chronic venous insufficiency, chronic neuropathy, macular degeneration, chronic constipation, depression, OA, and recent admission to [facility] 2/25- 3/2 for gallstone pancreatitis with non-operative treatment presented to the ED with constant nausea x 4 days associated with decreased PO intake, generalized weakness, and fall today x 1 due to LE weakness. Patient denies fever, chills, cp, dizziness, sob, abd pain, vomiting, diarrhea. Patient was seen here in ED 6/20 for epigastric pain (since resolved) and discharged with outpatient follow up with Dr. Terrell. Patient has seen Dr. Terrell twice since then and epigastric pain resolved s/p PO zofran and carafate with planned esophagram. Daughter states epigastric pain resolved, but patient has had constant nausea x 4 days that has not improved despite increasing PO zofran from 4mg to 8mg q6h. Patient was unable to obtain appointment with Dr. Terrell today and after falling at home due to leg weakness, daughter brought her to the ED.     ED Course  Vitals: 99.2F, 85bpm, 143/66, 95% RA  Labs: Hgb 10.2, Hct 32.5, Na 132, K 5.5, BUN 30, Cr 2, GFR 23, lipase wnl  EKG:    CT Chest and abd/pel: Moderate-sized bilateral pleural effusion with partial bilateral lower lobe atelectasis. Cholelithiasis with distended gallbladder and right upper quadrant inflammatory changes, findings suspicious for acute cholecystitis.    RUQ US: Cholelithiasis with nonspecific pericholecystic fluid. Negative sonographic Scott sign. Dilated common bowel duct. Recommend correlating with LFTs. Cannot exclude distal choledocholithiasis.    HEAD CT: Mild volume loss, microvascular disease, no acute hemorrhage or midline shift.    Patient Received: Zosyn x1, NS 500cc x1, Lasix 40mg IV x1, IV dilaudid .5mg x1           (05 Jul 2022 23:51)    INTERVAL HPI:  07/06- pt seen and examined at bedside. Complaining of increased frequency of urination and suprapubic pain s/p 40mg IV lasix x1 in the ED. Otherwise, notes worsening of chronic back pain and general feeling of malaise. No sob, chest pain, nausea. Fidelina, Anemia.HIDA scan today.  07/07- pt seen and examined at bedside. Feeling confused. A&Ox1 (person). Has a hx of hospital delirium and has been able to be reoriented when her daughter is present. Notes pain over b/l LE but denies sob, chest pain, n/v/d. Waiting for gastric emptying study per GI.  07/08- pt seen and examined at bedside. Feeling confused again this AM possibly chronic hospital AM delirium. A&Ox1 (person). Notes constipation and decrease in b/l LE edema but has mild pain. Gastric emptying study cancelled due to pt inability to tolerate being off pain medication. Denies SOB, chest pain, n/v/d. Received dose 1/3 of remdesivir for COVID-19.     OVERNIGHT EVENTS: none    Home Medications:  amitriptyline 10 mg oral tablet: 3 tab(s) orally once a day (at bedtime) (05 Jul 2022 23:44)  aspirin 81 mg oral tablet: 1 tab(s) orally once a day (05 Jul 2022 23:44)  atorvastatin 40 mg oral tablet: 1 tab(s) orally once a day (05 Jul 2022 23:44)  Dilaudid 4 mg oral tablet: 1 tab(s) orally 3 times (05 Jul 2022 23:44)  ferrous sulfate 324 mg (65 mg elemental iron) oral delayed release tablet: 1 tab(s) orally once a day (05 Jul 2022 23:44)  ferrous sulfate 325 mg (65 mg elemental iron) oral tablet: 1 tablet oral daily (05 Jul 2022 23:44)  Metoprolol Succinate ER 25 mg oral tablet, extended release: 1 tab(s) orally once a day (05 Jul 2022 23:44)  MiraLax oral powder for reconstitution: ng/kg orally (05 Jul 2022 23:44)  PreserVision AREDS oral capsule: 1 tab(s) orally 2 times a day (05 Jul 2022 23:44)  Senna 8.6 mg oral tablet: 2 tab(s) orally once a day (at bedtime) (05 Jul 2022 23:44)  Toprol-XL 25 mg oral tablet, extended release: 1 tab(s) orally once a day (05 Jul 2022 23:44)  Vitamin D3 1250 mcg (50,000 intl units) oral capsule: 1 cap(s) orally once a week (05 Jul 2022 23:44)  Zofran 4 mg oral tablet: 1 tab(s) orally every 6 hours (05 Jul 2022 23:44)      MEDICATIONS  (STANDING):  amitriptyline 30 milliGRAM(s) Oral at bedtime  aspirin  chewable 81 milliGRAM(s) Oral daily  atorvastatin 40 milliGRAM(s) Oral at bedtime  dextrose 5%. 1000 milliLiter(s) (100 mL/Hr) IV Continuous <Continuous>  dextrose 5%. 1000 milliLiter(s) (50 mL/Hr) IV Continuous <Continuous>  dextrose 50% Injectable 25 Gram(s) IV Push once  dextrose 50% Injectable 12.5 Gram(s) IV Push once  dextrose 50% Injectable 25 Gram(s) IV Push once  ferrous    sulfate 325 milliGRAM(s) Oral daily  glucagon  Injectable 1 milliGRAM(s) IntraMuscular once  heparin   Injectable 5000 Unit(s) SubCutaneous every 8 hours  HYDROmorphone   Tablet 4 milliGRAM(s) Oral three times a day  insulin lispro (ADMELOG) corrective regimen sliding scale   SubCutaneous three times a day before meals  insulin lispro (ADMELOG) corrective regimen sliding scale   SubCutaneous at bedtime  metoclopramide Injectable 5 milliGRAM(s) IV Push every 6 hours  metoprolol succinate ER 25 milliGRAM(s) Oral daily  pantoprazole    Tablet 40 milliGRAM(s) Oral before breakfast  polyethylene glycol 3350 17 Gram(s) Oral daily  remdesivir  IVPB   IV Intermittent   remdesivir  IVPB 100 milliGRAM(s) IV Intermittent every 24 hours  senna 2 Tablet(s) Oral at bedtime  sucralfate suspension 1 Gram(s) Oral two times a day    MEDICATIONS  (PRN):  acetaminophen     Tablet .. 650 milliGRAM(s) Oral every 6 hours PRN Temp greater or equal to 38C (100.4F), Mild Pain (1 - 3)  aluminum hydroxide/magnesium hydroxide/simethicone Suspension 30 milliLiter(s) Oral every 4 hours PRN Dyspepsia  dextrose Oral Gel 15 Gram(s) Oral once PRN Blood Glucose LESS THAN 70 milliGRAM(s)/deciliter  melatonin 3 milliGRAM(s) Oral at bedtime PRN Insomnia  ondansetron Injectable 4 milliGRAM(s) IV Push every 8 hours PRN Nausea and/or Vomiting      Allergies    morphine (Other)    Intolerances        Social History:  Former cigarette smoker, smoked <1/2 ppd for less than 15 years, quit more than 50 years ago   Denies ETOH use   Denies drug use   Ambulates with a walker. Lives at home with daughter. ADLs with assistance. (05 Jul 2022 23:51)      REVIEW OF SYSTEMS:  CONSTITUTIONAL: No fever, No chills, No fatigue, No myalgia, No Body ache, No Weakness  EYES: No eye pain,  No visual disturbances, No discharge, NO Redness  ENMT:  No ear pain, No nose bleed, No vertigo; No sinus pain, NO throat pain, No Congestion  NECK: No pain, No stiffness  RESPIRATORY: No cough, NO wheezing, No  hemoptysis, NO  shortness of breath  CARDIOVASCULAR: No chest pain, palpitations  GASTROINTESTINAL: No abdominal pain, NO epigastric pain. No nausea, No vomiting; No diarrhea, + constipation. [  ] BM  GENITOURINARY: No dysuria, No frequency, No urgency, No hematuria, NO incontinence  NEUROLOGICAL: No headaches, No dizziness, No numbness, No tingling, No tremors, No weakness  EXT: No Swelling, + Pain b/l LE, + b/l LE Edema  SKIN:  [ x ] No itching, burning, rashes, or lesions   MUSCULOSKELETAL: No joint pain ,No Jt swelling; No muscle pain, No back pain, +B/L LE extremity pain  PSYCHIATRIC: +delirium No depression,  No anxiety,  No mood swings ,No difficulty sleeping at night  PAIN SCALE: [+  ] None  [  ] Other-  ROS Unable to obtain due to - [  ] Dementia  [  ] Lethargy [  ] Drowsy [  ] Sedated [  ] non verbal  REST OF REVIEW Of SYSTEM - [x  ] Normal     Vital Signs Last 24 Hrs  T(C): 37.1 (08 Jul 2022 05:37), Max: 37.1 (08 Jul 2022 05:37)  T(F): 98.8 (08 Jul 2022 05:37), Max: 98.8 (08 Jul 2022 05:37)  HR: 74 (08 Jul 2022 08:15) (74 - 84)  BP: 150/68 (08 Jul 2022 05:37) (143/70 - 166/78)  BP(mean): --  RR: 18 (08 Jul 2022 05:37) (18 - 18)  SpO2: 91% (08 Jul 2022 08:15) (91% - 92%)    Parameters below as of 08 Jul 2022 08:15  Patient On (Oxygen Delivery Method): room air      Finger Stick          PHYSICAL EXAM:  GENERAL:  [  ] NAD , [ x ] well appearing, [  ] Agitated, [  ] Drowsy,  [  ] Lethargy, [x  ] confused   HEAD:  [x  ] Normal, [  ] Other  EYES:  [ x] EOMI, [ x ] PERRLA, [ x ] conjunctiva and sclera clear normal, [  ] Other,  [  ] Pallor,[  ] Discharge  ENMT:  [x  ] Normal, [x  ] dry mucous membranes, [x  ] Good dentition, [x  ] No Thrush  NECK:  [  x] Supple, [x ] No JVD, [ x ] Normal thyroid, [  ] Lymphadenopathy [  ] Other  CHEST/LUNG:  [  ] Clear to auscultation bilaterally, [x  ] Breath Sounds B/L / Decrease, [ x ] poor effort  [x  ] No rales, [ x ] No rhonchi  [ x ]  No wheezing,   HEART:  [x  ] Regular rate and rhythm, [  ] tachycardia, [  ] Bradycardia,  [  ] irregular  [x  ] No murmurs, No rubs, No gallops, [  ] PPM in place (Mfr:  )  ABDOMEN:  [x  ] Soft, [x  ] nontender, [x  ] Nondistended, [ x ] No mass, [ x ] Bowel sounds present, [  ] obese  NERVOUS SYSTEM:  [ x ] Alert & Oriented X1, [ x ] Nonfocal  [ x ] Confusion  [  ] Encephalopathic [  ] Sedated [  ] Unable to assess, [  ] Dementia [  ] Other-  EXTREMITIES: [x  ] 2+ Peripheral Pulses, No clubbing, No cyanosis,  [x  ] 1+ pitting edema B/L lower EXT. [  ] PVD stasis skin changes B/L Lower EXT, [  ] wound  LYMPH: No lymphadenopathy noted  SKIN:  [ x ] No rashes or lesions, [  ] Pressure Ulcers, [x  ] ecchymosis- B/L Lower ext , [  ] Skin Tears, [  ] Other    DIET:     LABS:                        10.1   4.29  )-----------( 202      ( 08 Jul 2022 06:45 )             31.4     08 Jul 2022 06:45    134    |  98     |  27     ----------------------------<  103    3.5     |  27     |  2.10     Ca    8.4        08 Jul 2022 06:45    TPro  5.9    /  Alb  2.6    /  TBili  0.2    /  DBili  x      /  AST  28     /  ALT  14     /  AlkPhos  64     08 Jul 2022 06:45          Culture Results:   No growth to date. (07-05 @ 21:45)  Culture Results:   No growth to date. (07-05 @ 21:40)                  Culture - Blood (collected 05 Jul 2022 21:45)  Source: .Blood Blood-Peripheral  Preliminary Report (07 Jul 2022 01:02):    No growth to date.    Culture - Blood (collected 05 Jul 2022 21:40)  Source: .Blood Blood-Peripheral  Preliminary Report (07 Jul 2022 01:02):    No growth to date.         Anemia Panel:  Vitamin B12, Serum: 342 pg/mL (07-06-22 @ 07:14)  Folate, Serum: 11.8 ng/mL (07-06-22 @ 07:14)  Iron Total, Serum: 19 ug/dL (07-06-22 @ 07:14)  Iron - Total Binding Capacity.: 242 ug/dL (07-06-22 @ 07:14)  Ferritin, Serum: 67 ng/mL (07-06-22 @ 07:14)      Thyroid Panel:        Lipase, Serum: 78 U/L (07-05-22 @ 19:05)          RADIOLOGY & ADDITIONAL TESTS:      HEALTH ISSUES - PROBLEM Dx:  Cholelithiasis    FIDELINA (acute kidney injury)    Pleural effusion    2019 novel coronavirus disease (COVID-19)    Anemia    Fall    CAD (coronary artery disease)    HTN (hypertension)    Type 2 diabetes mellitus    Chronic back pain    Anxiety and depression    Sundowning    DVT prophylaxis            Consultant(s) Notes Reviewed:  [ x ] YES     Care Discussed with [X] Consultants  [ x ] Patient  [ x ] Family [  ] HCP [  ]   [  ] Social Service  [ x ] RN, [  ] Physical Therapy,[  ] Palliative care team  DVT PPX: [  ] Lovenox, [ x ] S C Heparin, [  ] Coumadin, [  ] Xarelto, [  ] Eliquis, [  ] Pradaxa, [  ] IV Heparin drip, [  ] SCD [  ] Contraindication 2 to GI Bleed,[  ] Ambulation [  ] Contraindicated 2 to  bleed [  ] Contraindicated 2 to Brain Bleed  Advanced directive: [ x ] None, [  ] DNR/DNI Patient is a 89y old  Female who presents with a chief complaint of gallstones, covid, fidelina (08 Jul 2022 09:39)    HPI:  89y F pmhx CAD x 2 stents (2012), T2DM, HTN, HLD, GERD, mild AS, chronic venous insufficiency, chronic neuropathy, macular degeneration, chronic constipation, depression, OA, and recent admission to [facility] 2/25- 3/2 for gallstone pancreatitis with non-operative treatment presented to the ED with constant nausea x 4 days associated with decreased PO intake, generalized weakness, and fall today x 1 due to LE weakness. Patient denies fever, chills, cp, dizziness, sob, abd pain, vomiting, diarrhea. Patient was seen here in ED 6/20 for epigastric pain (since resolved) and discharged with outpatient follow up with Dr. Terrell. Patient has seen Dr. Terrell twice since then and epigastric pain resolved s/p PO zofran and carafate with planned esophagram. Daughter states epigastric pain resolved, but patient has had constant nausea x 4 days that has not improved despite increasing PO zofran from 4mg to 8mg q6h. Patient was unable to obtain appointment with Dr. Terrell today and after falling at home due to leg weakness, daughter brought her to the ED.     ED Course  Vitals: 99.2F, 85bpm, 143/66, 95% RA  Labs: Hgb 10.2, Hct 32.5, Na 132, K 5.5, BUN 30, Cr 2, GFR 23, lipase wnl  EKG:    CT Chest and abd/pel: Moderate-sized bilateral pleural effusion with partial bilateral lower lobe atelectasis. Cholelithiasis with distended gallbladder and right upper quadrant inflammatory changes, findings suspicious for acute cholecystitis.    RUQ US: Cholelithiasis with nonspecific pericholecystic fluid. Negative sonographic Scott sign. Dilated common bowel duct. Recommend correlating with LFTs. Cannot exclude distal choledocholithiasis.    HEAD CT: Mild volume loss, microvascular disease, no acute hemorrhage or midline shift.    Patient Received: Zosyn x1, NS 500cc x1, Lasix 40mg IV x1, IV dilaudid .5mg x1           (05 Jul 2022 23:51)    INTERVAL HPI:  07/06- pt seen and examined at bedside. Complaining of increased frequency of urination and suprapubic pain s/p 40mg IV lasix x1 in the ED. Otherwise, notes worsening of chronic back pain and general feeling of malaise. No sob, chest pain, nausea. Fidelina, Anemia.HIDA scan today.  07/07- pt seen and examined at bedside. Feeling confused. A&Ox1 (person). Has a hx of hospital delirium and has been able to be reoriented when her daughter is present. Notes pain over b/l LE but denies sob, chest pain, n/v/d. Waiting for gastric emptying study per GI.  07/08- pt seen and examined at bedside. Feeling confused again this AM possibly chronic hospital AM delirium. A&Ox1 (person). Notes constipation and decrease in b/l LE edema but has mild pain. Gastric emptying study cancelled due to pt inability to tolerate being off pain medication. Denies SOB, chest pain, n/v/d. Received dose 1/3 of remdesivir for COVID-19.     OVERNIGHT EVENTS: none    Home Medications:  amitriptyline 10 mg oral tablet: 3 tab(s) orally once a day (at bedtime) (05 Jul 2022 23:44)  aspirin 81 mg oral tablet: 1 tab(s) orally once a day (05 Jul 2022 23:44)  atorvastatin 40 mg oral tablet: 1 tab(s) orally once a day (05 Jul 2022 23:44)  Dilaudid 4 mg oral tablet: 1 tab(s) orally 3 times (05 Jul 2022 23:44)  ferrous sulfate 324 mg (65 mg elemental iron) oral delayed release tablet: 1 tab(s) orally once a day (05 Jul 2022 23:44)  ferrous sulfate 325 mg (65 mg elemental iron) oral tablet: 1 tablet oral daily (05 Jul 2022 23:44)  Metoprolol Succinate ER 25 mg oral tablet, extended release: 1 tab(s) orally once a day (05 Jul 2022 23:44)  MiraLax oral powder for reconstitution: ng/kg orally (05 Jul 2022 23:44)  PreserVision AREDS oral capsule: 1 tab(s) orally 2 times a day (05 Jul 2022 23:44)  Senna 8.6 mg oral tablet: 2 tab(s) orally once a day (at bedtime) (05 Jul 2022 23:44)  Toprol-XL 25 mg oral tablet, extended release: 1 tab(s) orally once a day (05 Jul 2022 23:44)  Vitamin D3 1250 mcg (50,000 intl units) oral capsule: 1 cap(s) orally once a week (05 Jul 2022 23:44)  Zofran 4 mg oral tablet: 1 tab(s) orally every 6 hours (05 Jul 2022 23:44)      MEDICATIONS  (STANDING):  amitriptyline 30 milliGRAM(s) Oral at bedtime  aspirin  chewable 81 milliGRAM(s) Oral daily  atorvastatin 40 milliGRAM(s) Oral at bedtime  dextrose 5%. 1000 milliLiter(s) (100 mL/Hr) IV Continuous <Continuous>  dextrose 5%. 1000 milliLiter(s) (50 mL/Hr) IV Continuous <Continuous>  dextrose 50% Injectable 25 Gram(s) IV Push once  dextrose 50% Injectable 12.5 Gram(s) IV Push once  dextrose 50% Injectable 25 Gram(s) IV Push once  ferrous    sulfate 325 milliGRAM(s) Oral daily  glucagon  Injectable 1 milliGRAM(s) IntraMuscular once  heparin   Injectable 5000 Unit(s) SubCutaneous every 8 hours  HYDROmorphone   Tablet 4 milliGRAM(s) Oral three times a day  insulin lispro (ADMELOG) corrective regimen sliding scale   SubCutaneous three times a day before meals  insulin lispro (ADMELOG) corrective regimen sliding scale   SubCutaneous at bedtime  metoclopramide Injectable 5 milliGRAM(s) IV Push every 6 hours  metoprolol succinate ER 25 milliGRAM(s) Oral daily  pantoprazole    Tablet 40 milliGRAM(s) Oral before breakfast  polyethylene glycol 3350 17 Gram(s) Oral daily  remdesivir  IVPB   IV Intermittent   remdesivir  IVPB 100 milliGRAM(s) IV Intermittent every 24 hours  senna 2 Tablet(s) Oral at bedtime  sucralfate suspension 1 Gram(s) Oral two times a day    MEDICATIONS  (PRN):  acetaminophen     Tablet .. 650 milliGRAM(s) Oral every 6 hours PRN Temp greater or equal to 38C (100.4F), Mild Pain (1 - 3)  aluminum hydroxide/magnesium hydroxide/simethicone Suspension 30 milliLiter(s) Oral every 4 hours PRN Dyspepsia  dextrose Oral Gel 15 Gram(s) Oral once PRN Blood Glucose LESS THAN 70 milliGRAM(s)/deciliter  melatonin 3 milliGRAM(s) Oral at bedtime PRN Insomnia  ondansetron Injectable 4 milliGRAM(s) IV Push every 8 hours PRN Nausea and/or Vomiting      Allergies    morphine (Other)    Intolerances        Social History:  Former cigarette smoker, smoked <1/2 ppd for less than 15 years, quit more than 50 years ago   Denies ETOH use   Denies drug use   Ambulates with a walker. Lives at home with daughter. ADLs with assistance. (05 Jul 2022 23:51)      REVIEW OF SYSTEMS:  CONSTITUTIONAL: No fever, No chills, No fatigue, No myalgia, No Body ache, No Weakness  EYES: No eye pain,  No visual disturbances, No discharge, NO Redness  ENMT:  No ear pain, No nose bleed, No vertigo; No sinus pain, NO throat pain, No Congestion  NECK: No pain, No stiffness  RESPIRATORY: No cough, NO wheezing, No  hemoptysis, NO  shortness of breath  CARDIOVASCULAR: No chest pain, palpitations  GASTROINTESTINAL: No abdominal pain, NO epigastric pain. No nausea, No vomiting; No diarrhea, + constipation. [  ] BM  GENITOURINARY: No dysuria, No frequency, No urgency, No hematuria, NO incontinence  NEUROLOGICAL: No headaches, No dizziness, No numbness, No tingling, No tremors, No weakness  EXT: No Swelling, + Pain b/l LE, + b/l LE Edema  SKIN:  [ x ] No itching, burning, rashes, or lesions   MUSCULOSKELETAL: No joint pain ,No Jt swelling; No muscle pain, No back pain, +B/L LE extremity pain  PSYCHIATRIC: +delirium No depression,  No anxiety,  No mood swings ,No difficulty sleeping at night  PAIN SCALE: [+  ] None  [  ] Other-  ROS Unable to obtain due to - [  ] Dementia  [  ] Lethargy [  ] Drowsy [  ] Sedated [  ] non verbal  REST OF REVIEW Of SYSTEM - [x  ] Normal     Vital Signs Last 24 Hrs  T(C): 37.1 (08 Jul 2022 05:37), Max: 37.1 (08 Jul 2022 05:37)  T(F): 98.8 (08 Jul 2022 05:37), Max: 98.8 (08 Jul 2022 05:37)  HR: 74 (08 Jul 2022 08:15) (74 - 84)  BP: 150/68 (08 Jul 2022 05:37) (143/70 - 166/78)  BP(mean): --  RR: 18 (08 Jul 2022 05:37) (18 - 18)  SpO2: 91% (08 Jul 2022 08:15) (91% - 92%)    Parameters below as of 08 Jul 2022 08:15  Patient On (Oxygen Delivery Method): room air      Finger Stick          PHYSICAL EXAM:  GENERAL:  [  ] NAD , [ x ] well appearing, [  ] Agitated, [  ] Drowsy,  [  ] Lethargy, [x  ] confused   HEAD:  [x  ] Normal, [  ] Other  EYES:  [ x] EOMI, [ x ] PERRLA, [ x ] conjunctiva and sclera clear normal, [  ] Other,  [  ] Pallor,[  ] Discharge  ENMT:  [x  ] Normal, [x  ] dry mucous membranes, [x  ] Good dentition, [x  ] No Thrush  NECK:  [  x] Supple, [x ] No JVD, [ x ] Normal thyroid, [  ] Lymphadenopathy [  ] Other  CHEST/LUNG:  [  ] Clear to auscultation bilaterally, [x  ] Breath Sounds B/L / Decrease, [ x ] poor effort  [x  ] No rales, [ x ] No rhonchi  [ x ]  No wheezing,   HEART:  [x  ] Regular rate and rhythm, [  ] tachycardia, [  ] Bradycardia,  [  ] irregular  [x  ] No murmurs, No rubs, No gallops, [  ] PPM in place (Mfr:  )  ABDOMEN:  [x  ] Soft, [x  ] nontender, [x  ] Nondistended, [ x ] No mass, [ x ] Bowel sounds present, [  ] obese  NERVOUS SYSTEM:  [ x ] Alert & Oriented X1, [ x ] Nonfocal  [ x ] Confusion  [  ] Encephalopathic [  ] Sedated [  ] Unable to assess, [  ] Dementia [  ] Other-  EXTREMITIES: [x  ] 2+ Peripheral Pulses, No clubbing, No cyanosis,  [x  ]2+ pitting edema B/L lower EXT. [ x ] PVD stasis skin changes B/L Lower EXT, [  ] wound  LYMPH: No lymphadenopathy noted  SKIN:  [ x ] No rashes or lesions, [  ] Pressure Ulcers, [x  ] ecchymosis- B/L Lower ext , [  ] Skin Tears, [  ] Other    DIET: Soft regular     LABS:                        10.1   4.29  )-----------( 202      ( 08 Jul 2022 06:45 )             31.4     08 Jul 2022 06:45    134    |  98     |  27     ----------------------------<  103    3.5     |  27     |  2.10     Ca    8.4        08 Jul 2022 06:45    TPro  5.9    /  Alb  2.6    /  TBili  0.2    /  DBili  x      /  AST  28     /  ALT  14     /  AlkPhos  64     08 Jul 2022 06:45          Culture Results:   No growth to date. (07-05 @ 21:45)  Culture Results:   No growth to date. (07-05 @ 21:40)      Culture - Blood (collected 05 Jul 2022 21:45)  Source: .Blood Blood-Peripheral  Preliminary Report (07 Jul 2022 01:02):    No growth to date.    Culture - Blood (collected 05 Jul 2022 21:40)  Source: .Blood Blood-Peripheral  Preliminary Report (07 Jul 2022 01:02):    No growth to date.         Anemia Panel:  Vitamin B12, Serum: 342 pg/mL (07-06-22 @ 07:14)  Folate, Serum: 11.8 ng/mL (07-06-22 @ 07:14)  Iron Total, Serum: 19 ug/dL (07-06-22 @ 07:14)  Iron - Total Binding Capacity.: 242 ug/dL (07-06-22 @ 07:14)  Ferritin, Serum: 67 ng/mL (07-06-22 @ 07:14)      Thyroid Panel:        Lipase, Serum: 78 U/L (07-05-22 @ 19:05)    RADIOLOGY & ADDITIONAL TESTS: NONE      HEALTH ISSUES - PROBLEM Dx:  Cholelithiasis    FIDELINA (acute kidney injury)    Pleural effusion    2019 novel coronavirus disease (COVID-19)    Anemia    Fall    CAD (coronary artery disease)    HTN (hypertension)    Type 2 diabetes mellitus    Chronic back pain    Anxiety and depression    Sundowning    DVT prophylaxis            Consultant(s) Notes Reviewed:  [ x ] YES     Care Discussed with [X] Consultants  [ x ] Patient  [ x ] Family- dtr  [  ] HCP [  ]   [  ] Social Service  [ x ] RN, [  ] Physical Therapy,[  ] Palliative care team  DVT PPX: [  ] Lovenox, [ x ] S C Heparin, [  ] Coumadin, [  ] Xarelto, [  ] Eliquis, [  ] Pradaxa, [  ] IV Heparin drip, [  ] SCD [  ] Contraindication 2 to GI Bleed,[  ] Ambulation [  ] Contraindicated 2 to  bleed [  ] Contraindicated 2 to Brain Bleed  Advanced directive: [ x ] None, [  ] DNR/DNI

## 2022-07-08 NOTE — PROGRESS NOTE ADULT - PROBLEM SELECTOR PLAN 4
patient covid positive on admission  - saturating well on room air and no sx  - continue to monitor on continuous pulse ox  - contact precautions  - Daughter Mary [phone] states she does not want monoclonal antibodies for patient  - patient has moderna 3/3  - ID consulted, Dr. WESTLEY Izquierdo rec starting remdesivir but pt daughter has declined. Will discuss again with daughter. patient covid positive on admission  - started on 3 days of remdesivir (7/7), end 7/9  - saturating well on room air and no sx  - continue to monitor on continuous pulse ox  - contact precautions  - Daughter Mary [phone] states she does not want monoclonal antibodies for patient  - patient has moderna 3/3  - ID consulted, Dr. WESTLEY Izquierdo following

## 2022-07-08 NOTE — PROGRESS NOTE ADULT - ASSESSMENT
89 yo F presenting with nausea & poor PO intake x 2 weeks, CT revealed cholelithiasis w/ distended GB, RUQ U/S revealed distended GB w/ stones/sludge; HIDA scan is negative for evidence of acute cholecystitis. Pt is tolerating food. WBC count within normal limits, VSS.     PLAN    -Continue IV abx, pain control, supportive care as per primary team  -Pending Gastric study, will follow up for results  -D/w Dr. Rodrigues

## 2022-07-08 NOTE — PROGRESS NOTE ADULT - ASSESSMENT
nausea  abodminal pain    hida negative for acute cholecystitis  ? gastroparesis  cont clears  iv fluid  check nm ges  d/w patient/family  d/w primary    I reviewed the overnight course of events on the unit, re-confirming the patient history. I discussed the care with the patient and their family  Differential diagnosis and plan of care discussed with patient after the evaluation  35 minutes spent on total encounter of which more than fifty percent of the encounter was spent counseling and/or coordinating care by the attending physician.  Advanced care planning was discussed with patient and family.  Advanced care planning forms were reviewed and discussed.  Risks, benefits and alternatives of gastroenterologic procedures were discussed in detail and all questions were answered.   nausea  abodminal pain    hida negative for acute cholecystitis  ? gastroparesis  iv fluid  unable to obtain nm ges 2/2 pt's dilaudid  cont trial of diet with reglan    I reviewed the overnight course of events on the unit, re-confirming the patient history. I discussed the care with the patient and their family  Differential diagnosis and plan of care discussed with patient after the evaluation  35 minutes spent on total encounter of which more than fifty percent of the encounter was spent counseling and/or coordinating care by the attending physician.  Advanced care planning was discussed with patient and family.  Advanced care planning forms were reviewed and discussed.  Risks, benefits and alternatives of gastroenterologic procedures were discussed in detail and all questions were answered.

## 2022-07-09 LAB
ALBUMIN SERPL ELPH-MCNC: 2.3 G/DL — LOW (ref 3.3–5)
ALP SERPL-CCNC: 57 U/L — SIGNIFICANT CHANGE UP (ref 40–120)
ALT FLD-CCNC: 12 U/L — SIGNIFICANT CHANGE UP (ref 12–78)
ANION GAP SERPL CALC-SCNC: 8 MMOL/L — SIGNIFICANT CHANGE UP (ref 5–17)
APPEARANCE UR: ABNORMAL
AST SERPL-CCNC: 30 U/L — SIGNIFICANT CHANGE UP (ref 15–37)
BASOPHILS # BLD AUTO: 0.02 K/UL — SIGNIFICANT CHANGE UP (ref 0–0.2)
BASOPHILS NFR BLD AUTO: 0.5 % — SIGNIFICANT CHANGE UP (ref 0–2)
BILIRUB SERPL-MCNC: 0.2 MG/DL — SIGNIFICANT CHANGE UP (ref 0.2–1.2)
BILIRUB UR-MCNC: NEGATIVE — SIGNIFICANT CHANGE UP
BUN SERPL-MCNC: 26 MG/DL — HIGH (ref 7–23)
CALCIUM SERPL-MCNC: 8.4 MG/DL — LOW (ref 8.5–10.1)
CHLORIDE SERPL-SCNC: 103 MMOL/L — SIGNIFICANT CHANGE UP (ref 96–108)
CO2 SERPL-SCNC: 25 MMOL/L — SIGNIFICANT CHANGE UP (ref 22–31)
COLOR SPEC: YELLOW — SIGNIFICANT CHANGE UP
CREAT SERPL-MCNC: 1.8 MG/DL — HIGH (ref 0.5–1.3)
DIFF PNL FLD: ABNORMAL
EGFR: 26 ML/MIN/1.73M2 — LOW
EOSINOPHIL # BLD AUTO: 0.01 K/UL — SIGNIFICANT CHANGE UP (ref 0–0.5)
EOSINOPHIL NFR BLD AUTO: 0.3 % — SIGNIFICANT CHANGE UP (ref 0–6)
GLUCOSE SERPL-MCNC: 98 MG/DL — SIGNIFICANT CHANGE UP (ref 70–99)
GLUCOSE UR QL: NEGATIVE — SIGNIFICANT CHANGE UP
HCT VFR BLD CALC: 29.6 % — LOW (ref 34.5–45)
HGB BLD-MCNC: 9.8 G/DL — LOW (ref 11.5–15.5)
IMM GRANULOCYTES NFR BLD AUTO: 0.8 % — SIGNIFICANT CHANGE UP (ref 0–1.5)
KETONES UR-MCNC: ABNORMAL
LEUKOCYTE ESTERASE UR-ACNC: ABNORMAL
LYMPHOCYTES # BLD AUTO: 0.79 K/UL — LOW (ref 1–3.3)
LYMPHOCYTES # BLD AUTO: 20.2 % — SIGNIFICANT CHANGE UP (ref 13–44)
MCHC RBC-ENTMCNC: 28.6 PG — SIGNIFICANT CHANGE UP (ref 27–34)
MCHC RBC-ENTMCNC: 33.1 GM/DL — SIGNIFICANT CHANGE UP (ref 32–36)
MCV RBC AUTO: 86.3 FL — SIGNIFICANT CHANGE UP (ref 80–100)
MONOCYTES # BLD AUTO: 0.49 K/UL — SIGNIFICANT CHANGE UP (ref 0–0.9)
MONOCYTES NFR BLD AUTO: 12.5 % — SIGNIFICANT CHANGE UP (ref 2–14)
NEUTROPHILS # BLD AUTO: 2.58 K/UL — SIGNIFICANT CHANGE UP (ref 1.8–7.4)
NEUTROPHILS NFR BLD AUTO: 65.7 % — SIGNIFICANT CHANGE UP (ref 43–77)
NITRITE UR-MCNC: NEGATIVE — SIGNIFICANT CHANGE UP
NRBC # BLD: 0 /100 WBCS — SIGNIFICANT CHANGE UP (ref 0–0)
PH UR: 8 — SIGNIFICANT CHANGE UP (ref 5–8)
PLATELET # BLD AUTO: 188 K/UL — SIGNIFICANT CHANGE UP (ref 150–400)
POTASSIUM SERPL-MCNC: 4.1 MMOL/L — SIGNIFICANT CHANGE UP (ref 3.5–5.3)
POTASSIUM SERPL-SCNC: 4.1 MMOL/L — SIGNIFICANT CHANGE UP (ref 3.5–5.3)
PROT SERPL-MCNC: 5.5 G/DL — LOW (ref 6–8.3)
PROT UR-MCNC: 25 MG/DL
RBC # BLD: 3.43 M/UL — LOW (ref 3.8–5.2)
RBC # FLD: 12.9 % — SIGNIFICANT CHANGE UP (ref 10.3–14.5)
SODIUM SERPL-SCNC: 136 MMOL/L — SIGNIFICANT CHANGE UP (ref 135–145)
SP GR SPEC: 1 — LOW (ref 1.01–1.02)
UROBILINOGEN FLD QL: NEGATIVE — SIGNIFICANT CHANGE UP
WBC # BLD: 3.92 K/UL — SIGNIFICANT CHANGE UP (ref 3.8–10.5)
WBC # FLD AUTO: 3.92 K/UL — SIGNIFICANT CHANGE UP (ref 3.8–10.5)

## 2022-07-09 PROCEDURE — 99231 SBSQ HOSP IP/OBS SF/LOW 25: CPT

## 2022-07-09 PROCEDURE — 99232 SBSQ HOSP IP/OBS MODERATE 35: CPT

## 2022-07-09 RX ADMIN — Medication 650 MILLIGRAM(S): at 13:13

## 2022-07-09 RX ADMIN — Medication 5 MILLIGRAM(S): at 17:55

## 2022-07-09 RX ADMIN — PANTOPRAZOLE SODIUM 40 MILLIGRAM(S): 20 TABLET, DELAYED RELEASE ORAL at 06:21

## 2022-07-09 RX ADMIN — Medication 30 MILLIGRAM(S): at 22:17

## 2022-07-09 RX ADMIN — HEPARIN SODIUM 5000 UNIT(S): 5000 INJECTION INTRAVENOUS; SUBCUTANEOUS at 06:21

## 2022-07-09 RX ADMIN — HYDROMORPHONE HYDROCHLORIDE 4 MILLIGRAM(S): 2 INJECTION INTRAMUSCULAR; INTRAVENOUS; SUBCUTANEOUS at 23:03

## 2022-07-09 RX ADMIN — Medication 650 MILLIGRAM(S): at 14:00

## 2022-07-09 RX ADMIN — HYDROMORPHONE HYDROCHLORIDE 4 MILLIGRAM(S): 2 INJECTION INTRAMUSCULAR; INTRAVENOUS; SUBCUTANEOUS at 17:20

## 2022-07-09 RX ADMIN — Medication 5 MILLIGRAM(S): at 13:16

## 2022-07-09 RX ADMIN — Medication 5 MILLIGRAM(S): at 06:21

## 2022-07-09 RX ADMIN — HYDROMORPHONE HYDROCHLORIDE 4 MILLIGRAM(S): 2 INJECTION INTRAMUSCULAR; INTRAVENOUS; SUBCUTANEOUS at 10:25

## 2022-07-09 RX ADMIN — Medication 1 GRAM(S): at 06:21

## 2022-07-09 RX ADMIN — HEPARIN SODIUM 5000 UNIT(S): 5000 INJECTION INTRAVENOUS; SUBCUTANEOUS at 22:18

## 2022-07-09 RX ADMIN — Medication 1: at 17:47

## 2022-07-09 RX ADMIN — HYDROMORPHONE HYDROCHLORIDE 4 MILLIGRAM(S): 2 INJECTION INTRAMUSCULAR; INTRAVENOUS; SUBCUTANEOUS at 16:28

## 2022-07-09 RX ADMIN — HYDROMORPHONE HYDROCHLORIDE 4 MILLIGRAM(S): 2 INJECTION INTRAMUSCULAR; INTRAVENOUS; SUBCUTANEOUS at 09:30

## 2022-07-09 RX ADMIN — REMDESIVIR 500 MILLIGRAM(S): 5 INJECTION INTRAVENOUS at 20:54

## 2022-07-09 RX ADMIN — Medication 81 MILLIGRAM(S): at 13:16

## 2022-07-09 RX ADMIN — Medication 325 MILLIGRAM(S): at 13:16

## 2022-07-09 RX ADMIN — SENNA PLUS 2 TABLET(S): 8.6 TABLET ORAL at 22:18

## 2022-07-09 RX ADMIN — HYDROMORPHONE HYDROCHLORIDE 4 MILLIGRAM(S): 2 INJECTION INTRAMUSCULAR; INTRAVENOUS; SUBCUTANEOUS at 22:17

## 2022-07-09 RX ADMIN — Medication 1: at 22:21

## 2022-07-09 RX ADMIN — HEPARIN SODIUM 5000 UNIT(S): 5000 INJECTION INTRAVENOUS; SUBCUTANEOUS at 13:16

## 2022-07-09 RX ADMIN — Medication 5 MILLIGRAM(S): at 00:31

## 2022-07-09 RX ADMIN — Medication 25 MILLIGRAM(S): at 06:21

## 2022-07-09 RX ADMIN — ATORVASTATIN CALCIUM 40 MILLIGRAM(S): 80 TABLET, FILM COATED ORAL at 22:18

## 2022-07-09 RX ADMIN — Medication 1 GRAM(S): at 17:54

## 2022-07-09 NOTE — PROGRESS NOTE ADULT - PROBLEM SELECTOR PLAN 7
S/p 2 stents 2012   - continue home aspirin and statin  - Cardiology Dr Warren/ Dr. Valles consulted, recs appreciated S/p 2 stents 2012   - continue home aspirin and statin  - Cardiology Dr Warren follow up

## 2022-07-09 NOTE — PROGRESS NOTE ADULT - SUBJECTIVE AND OBJECTIVE BOX
pt seen  confused  ICU Vital Signs Last 24 Hrs  T(C): 37.1 (09 Jul 2022 05:33), Max: 37.1 (08 Jul 2022 19:36)  T(F): 98.7 (09 Jul 2022 05:33), Max: 98.7 (08 Jul 2022 19:36)  HR: 80 (09 Jul 2022 05:33) (80 - 103)  BP: 156/73 (09 Jul 2022 05:33) (144/81 - 178/81)  BP(mean): --  ABP: --  ABP(mean): --  RR: 17 (09 Jul 2022 05:33) (17 - 18)  SpO2: 92% (09 Jul 2022 05:33) (92% - 93%)    O2 Parameters below as of 09 Jul 2022 05:33  Patient On (Oxygen Delivery Method): room air    gen-NAD  resp-clear  abd-soft, mild tenderness                        9.8    3.92  )-----------( 188      ( 09 Jul 2022 07:45 )             29.6

## 2022-07-09 NOTE — PROGRESS NOTE ADULT - ASSESSMENT
89y F PMH CAD x 2 stents (2012), T2DM, HTN, HLD, GERD, mild AS, chronic venous insufficiency, chronic neuropathy, macular degeneration, chronic constipation, depression, OA, lumbar degenerative disc disease, and spinal stenosis admitted for cholelithiasis, KERRY, and found to be COVID19.     Chest pain, Edema, HTN, HLD, CAD, s/p stents   - hx CAD s/p 2 stents in 2012  - EKG NSR 80, no acute ischemic changes  - Troponin WNL on admission, no need to further trend, doubt ACS as ekg trops WNL  - Continue asa, statin    - LE edema likely 2/2 chronic venous insufficiency versus component of HF  - 7/7/22 TTE unchanged from previous;   EF 65% with norm LV/RV systolic function, with mild/mod MR.  - CT c/a/p with b/l mod effusions, partial LLL atelectasis  - Lasix on hold 2/2 KERRY. Creatinine improving   - Pulm following, no plan for thoracentesis for b/l effusions, plan for medical management    - Telemetry reviewed, remains in sinus, with episodes of tachycardia up to 110s.  likely reactive to infection.     - -170s  - Continue BB. Can up titrate if needed     - COVID per primary   - Monitor and replete lytes, keep K>4, Mg>2.  - Will continue to follow.    Abdulkadir Giraldo, MS FNP, [facility]  Nurse Practitioner- Cardiology   Spectra #[id number] /[phone]

## 2022-07-09 NOTE — PROGRESS NOTE ADULT - PROBLEM SELECTOR PLAN 9
A1c 6 in Feb 2022, A1c 6.9 on admission  - not on insulin at home  - low dose ISS  - hypoglycemia protocol

## 2022-07-09 NOTE — PROGRESS NOTE ADULT - PROBLEM SELECTOR PLAN 6
Patient with fall at home x 1 due to LE weakness  - likely 2/2 dec PO intake  - CT head negative  - no signs or sx of acute fx  - fall precautions and out of bed to chair  - PT consulted, rec PT at home

## 2022-07-09 NOTE — PROGRESS NOTE ADULT - PROBLEM SELECTOR PLAN 3
Patient with nausea x 4 days associated with dec PO intake, epigastric pain ~2 weeks ago IMPROVED  - CT abd/pel: Cholelithiasis with distended gallbladder and right upper quadrant inflammatory changes, findings suspicious for acute cholecystitis.  - RUQ US: Cholelithiasis with nonspecific pericholecystic fluid. Negative sonographic Scott sign. Dilated common bile duct.  - recent admission to [facility] 2/25- 3/2 for gallstone pancreatitis with non-operative treatment   -HIDA normal (7/6) and clear liquid diet with carb restrictions started  -Gastric emptying study cancelled due to patient's inability to go without pain meds  -Advance diet as tolerated, anti-emetics PRN  - surgery consulted (Dr. Rodrigues), f/u outpatient  -GI Dr Montilla consulted, recs appreciated

## 2022-07-09 NOTE — PROGRESS NOTE ADULT - SUBJECTIVE AND OBJECTIVE BOX
Patient is a 89y old  Female who presents with a chief complaint of gallstones, covid, fidelina (09 Jul 2022 08:36)    HPI:  89y F pmhx CAD x 2 stents (2012), T2DM, HTN, HLD, GERD, mild AS, chronic venous insufficiency, chronic neuropathy, macular degeneration, chronic constipation, depression, OA, and recent admission to [facility] 2/25- 3/2 for gallstone pancreatitis with non-operative treatment presented to the ED with constant nausea x 4 days associated with decreased PO intake, generalized weakness, and fall today x 1 due to LE weakness. Patient denies fever, chills, cp, dizziness, sob, abd pain, vomiting, diarrhea. Patient was seen here in ED 6/20 for epigastric pain (since resolved) and discharged with outpatient follow up with Dr. Terrell. Patient has seen Dr. Terrell twice since then and epigastric pain resolved s/p PO zofran and carafate with planned esophagram. Daughter states epigastric pain resolved, but patient has had constant nausea x 4 days that has not improved despite increasing PO zofran from 4mg to 8mg q6h. Patient was unable to obtain appointment with Dr. Terrell today and after falling at home due to leg weakness, daughter brought her to the ED.     ED Course  Vitals: 99.2F, 85bpm, 143/66, 95% RA  Labs: Hgb 10.2, Hct 32.5, Na 132, K 5.5, BUN 30, Cr 2, GFR 23, lipase wnl  EKG:  CT Chest and abd/pel: Moderate-sized bilateral pleural effusion with partial bilateral lower lobe atelectasis. Cholelithiasis with distended gallbladder and right upper quadrant inflammatory changes, findings suspicious for acute cholecystitis.  RUQ US: Cholelithiasis with nonspecific pericholecystic fluid. Negative sonographic Scott sign. Dilated common bowel duct. Recommend correlating with LFTs. Cannot exclude distal choledocholithiasis.  HEAD CT: Mild volume loss, microvascular disease, no acute hemorrhage or midline shift.  Patient Received: Zosyn x1, NS 500cc x1, Lasix 40mg IV x1, IV dilaudid .5mg x1    (05 Jul 2022 23:51)    INTERVAL HPI:  07/06- pt seen and examined at bedside. Complaining of increased frequency of urination and suprapubic pain s/p 40mg IV lasix x1 in the ED. Otherwise, notes worsening of chronic back pain and general feeling of malaise. No sob, chest pain, nausea. Fidelina, Anemia.HIDA scan today.  07/07- pt seen and examined at bedside. Feeling confused. A&Ox1 (person). Has a hx of hospital delirium and has been able to be reoriented when her daughter is present. Notes pain over b/l LE but denies sob, chest pain, n/v/d. Waiting for gastric emptying study per GI.  07/08- pt seen and examined at bedside. Feeling confused again this AM possibly chronic hospital AM delirium. A&Ox1 (person). Notes constipation and decrease in b/l LE edema but has mild pain. Gastric emptying study cancelled due to pt inability to tolerate being off pain medication. Denies SOB, chest pain, n/v/d. Received dose 1/3 of remdesivir for COVID-19.   7/9: Patient seen and examined at bedside. Patient states she did not sleep much overnight, but otherwise feeling okay. Denies any CP, SOB, abd pain, N/V/D. Patient still c/o mild leg pain.     OVERNIGHT EVENTS: None    Home Medications:  amitriptyline 10 mg oral tablet: 3 tab(s) orally once a day (at bedtime) (05 Jul 2022 23:44)  aspirin 81 mg oral tablet: 1 tab(s) orally once a day (05 Jul 2022 23:44)  atorvastatin 40 mg oral tablet: 1 tab(s) orally once a day (05 Jul 2022 23:44)  Dilaudid 4 mg oral tablet: 1 tab(s) orally 3 times (05 Jul 2022 23:44)  ferrous sulfate 324 mg (65 mg elemental iron) oral delayed release tablet: 1 tab(s) orally once a day (05 Jul 2022 23:44)  ferrous sulfate 325 mg (65 mg elemental iron) oral tablet: 1 tablet oral daily (05 Jul 2022 23:44)  Metoprolol Succinate ER 25 mg oral tablet, extended release: 1 tab(s) orally once a day (05 Jul 2022 23:44)  MiraLax oral powder for reconstitution: ng/kg orally (05 Jul 2022 23:44)  PreserVision AREDS oral capsule: 1 tab(s) orally 2 times a day (05 Jul 2022 23:44)  Senna 8.6 mg oral tablet: 2 tab(s) orally once a day (at bedtime) (05 Jul 2022 23:44)  Toprol-XL 25 mg oral tablet, extended release: 1 tab(s) orally once a day (05 Jul 2022 23:44)  Vitamin D3 1250 mcg (50,000 intl units) oral capsule: 1 cap(s) orally once a week (05 Jul 2022 23:44)  Zofran 4 mg oral tablet: 1 tab(s) orally every 6 hours (05 Jul 2022 23:44)      MEDICATIONS  (STANDING):  amitriptyline 30 milliGRAM(s) Oral at bedtime  aspirin  chewable 81 milliGRAM(s) Oral daily  atorvastatin 40 milliGRAM(s) Oral at bedtime  dextrose 5%. 1000 milliLiter(s) (50 mL/Hr) IV Continuous <Continuous>  dextrose 5%. 1000 milliLiter(s) (100 mL/Hr) IV Continuous <Continuous>  dextrose 50% Injectable 12.5 Gram(s) IV Push once  dextrose 50% Injectable 25 Gram(s) IV Push once  dextrose 50% Injectable 25 Gram(s) IV Push once  ferrous    sulfate 325 milliGRAM(s) Oral daily  glucagon  Injectable 1 milliGRAM(s) IntraMuscular once  heparin   Injectable 5000 Unit(s) SubCutaneous every 8 hours  HYDROmorphone   Tablet 4 milliGRAM(s) Oral three times a day  insulin lispro (ADMELOG) corrective regimen sliding scale   SubCutaneous three times a day before meals  insulin lispro (ADMELOG) corrective regimen sliding scale   SubCutaneous at bedtime  metoclopramide Injectable 5 milliGRAM(s) IV Push every 6 hours  metoprolol succinate ER 25 milliGRAM(s) Oral daily  pantoprazole    Tablet 40 milliGRAM(s) Oral before breakfast  polyethylene glycol 3350 17 Gram(s) Oral daily  remdesivir  IVPB   IV Intermittent   remdesivir  IVPB 100 milliGRAM(s) IV Intermittent every 24 hours  senna 2 Tablet(s) Oral at bedtime  sucralfate suspension 1 Gram(s) Oral two times a day    MEDICATIONS  (PRN):  acetaminophen     Tablet .. 650 milliGRAM(s) Oral every 6 hours PRN Temp greater or equal to 38C (100.4F), Mild Pain (1 - 3)  aluminum hydroxide/magnesium hydroxide/simethicone Suspension 30 milliLiter(s) Oral every 4 hours PRN Dyspepsia  dextrose Oral Gel 15 Gram(s) Oral once PRN Blood Glucose LESS THAN 70 milliGRAM(s)/deciliter  melatonin 3 milliGRAM(s) Oral at bedtime PRN Insomnia  ondansetron Injectable 4 milliGRAM(s) IV Push every 8 hours PRN Nausea and/or Vomiting      morphine (Other)      Social History:  Former cigarette smoker, smoked <1/2 ppd for less than 15 years, quit more than 50 years ago   Denies ETOH use   Denies drug use   Ambulates with a walker. Lives at home with daughter. ADLs with assistance. (05 Jul 2022 23:51)      REVIEW OF SYSTEMS:  CONSTITUTIONAL: No fever, No chills, No fatigue, No myalgia, No Body ache, No Weakness  EYES: No eye pain,  No visual disturbances, No discharge, No Redness  ENMT: No ear pain, No nose bleed, No vertigo; No sinus pain, No throat pain, No Congestion  NECK: No pain, No stiffness  RESPIRATORY: No cough, No wheezing, No hemoptysis, No shortness of breath  CARDIOVASCULAR: No chest pain, No palpitations  GASTROINTESTINAL: No abdominal pain, No epigastric pain. No nausea, No vomiting, No diarrhea, No constipation; [  ] BM  GENITOURINARY: No dysuria, No frequency, No urgency, No hematuria, No incontinence  NEUROLOGICAL: No headaches, No dizziness, No numbness, No tingling, No tremors, No weakness  EXTREMITIES: No Swelling, No Pain, No Edema  SKIN: [  ] No itching, burning, rashes, or lesions   MUSCULOSKELETAL: No joint pain, No joint swelling; No muscle pain, No back pain, No extremity pain  PSYCHIATRIC: No depression, No anxiety, No mood swings, No difficulty sleeping at night  PAIN SCALE: [  ] None  [  ] Other-  ROS Unable to obtain due to: [  ] Dementia  [  ] Lethargy  [  ] Sedated  [  ] Non verbal  REST OF REVIEW OF SYSTEMS: [  ] Normal     Vital Signs Last 24 Hrs  T(C): 37.1 (09 Jul 2022 05:33), Max: 37.1 (08 Jul 2022 19:36)  T(F): 98.7 (09 Jul 2022 05:33), Max: 98.7 (08 Jul 2022 19:36)  HR: 82 (09 Jul 2022 08:23) (80 - 103)  BP: 156/73 (09 Jul 2022 05:33) (144/81 - 178/81)  BP(mean): --  RR: 17 (09 Jul 2022 05:33) (17 - 18)  SpO2: 96% (09 Jul 2022 08:23) (92% - 96%)    Parameters below as of 09 Jul 2022 08:23  Patient On (Oxygen Delivery Method): room air        CAPILLARY BLOOD GLUCOSE      POCT Blood Glucose.: 102 mg/dL (09 Jul 2022 08:18)  POCT Blood Glucose.: 188 mg/dL (08 Jul 2022 21:21)  POCT Blood Glucose.: 119 mg/dL (08 Jul 2022 16:50)  POCT Blood Glucose.: 190 mg/dL (08 Jul 2022 12:17)      I&O's Summary    PHYSICAL EXAM:  GENERAL:  [  ] NAD, [  ] Well appearing, [  ] Agitated, [  ] Drowsy, [  ] Lethargy, [  ] Confused   HEAD:  [  ] Normal, [  ] Other  EYES:  [  ] EOMI, [  ] PERRLA, [  ] Conjunctiva and sclera clear normal, [  ] Other, [  ] Pallor, [  ] Discharge  ENMT:  [  ] Normal, [  ] Moist mucous membranes, [  ] Good dentition, [  ] No thrush  NECK:  [  ] Supple, [  ] No JVD, [  ] Normal thyroid, [  ] Lymphadenopathy, [  ] Other  CHEST/LUNG:  [  ] Clear to auscultation bilaterally, [  ] Breath Sounds equal B/L / decreased, [  ] Poor effort, [  ] No rales, [  ] No rhonchi, [  ] No wheezing  HEART:  [  ] Regular rate and rhythm, [  ] Tachycardia, [  ] Bradycardia, [  ] Irregular, [  ] No murmurs, No rubs, No gallops, [  ] PPM in place (Mfr:  )  ABDOMEN:  [  ] Soft, [  ] Nontender, [  ] Nondistended, [  ] No mass, [  ] Bowel sounds present, [  ] Obese  NERVOUS SYSTEM:  [  ] Alert & Oriented x3, [  ] Nonfocal, [  ] Confusion, [  ] Encephalopathic, [  ] Sedated, [  ] Unable to assess, [  ] Dementia, [  ] Other-  EXTREMITIES:  [  ] 2+ Peripheral Pulses, No clubbing, No cyanosis,  [  ] Edema B/L lower EXT, [  ] PVD stasis skin changes B/L lower EXT, [  ] Wound  LYMPH:  No lymphadenopathy noted  SKIN:  [  ] No rashes or lesions, [  ] Pressure ulcers, [  ] Ecchymosis, [  ] Skin tears, [  ] Other    DIET:     LABS:                        9.8    3.92  )-----------( 188      ( 09 Jul 2022 07:45 )             29.6     09 Jul 2022 07:45    136    |  103    |  26     ----------------------------<  98     4.1     |  25     |  1.80     Ca    8.4        09 Jul 2022 07:45    TPro  5.5    /  Alb  2.3    /  TBili  0.2    /  DBili  x      /  AST  30     /  ALT  12     /  AlkPhos  57     09 Jul 2022 07:45        Culture Results:   No growth to date. (07-05 @ 21:45)  Culture Results:   No growth to date. (07-05 @ 21:40)            Culture - Blood (collected 05 Jul 2022 21:45)  Source: .Blood Blood-Peripheral  Preliminary Report (07 Jul 2022 01:02):    No growth to date.    Culture - Blood (collected 05 Jul 2022 21:40)  Source: .Blood Blood-Peripheral  Preliminary Report (07 Jul 2022 01:02):    No growth to date.       Anemia Panel:  Vitamin B12, Serum: 342 pg/mL (07-06-22 @ 07:14)  Folate, Serum: 11.8 ng/mL (07-06-22 @ 07:14)  Iron Total, Serum: 19 ug/dL (07-06-22 @ 07:14)  Iron - Total Binding Capacity.: 242 ug/dL (07-06-22 @ 07:14)  Ferritin, Serum: 67 ng/mL (07-06-22 @ 07:14)      Thyroid Panel:        Lipase, Serum: 78 U/L (07-05-22 @ 19:05)          RADIOLOGY & ADDITIONAL TESTS:      HEALTH ISSUES - PROBLEM Dx:  Cholelithiasis  FIDELINA (acute kidney injury)  Fall  CAD (coronary artery disease)  HTN (hypertension)  Chronic back pain  Anxiety and depression  Sundowning  DVT prophylaxis  Pleural effusion  2019 novel coronavirus disease (COVID-19)  Type 2 diabetes mellitus  Anemia          Consultant(s) Notes Reviewed:  [  ] YES     Care Discussed with [ x ] Consultants, [  ] Patient, [  ] Family, [  ] HCP, [  ] , [  ] Social Service, [  ] RN, [  ] Physical Therapy, [  ] Palliative Care Team  DVT PPX: [  ] Lovenox, [  ] SC Heparin, [  ] Coumadin, [  ] Xarelto, [  ] Eliquis, [  ] Pradaxa, [  ] IV Heparin drip, [  ] SCD, [  ] Ambulation, [  ] Contraindicated 2/2 GI Bleed, [  ] Contraindicated 2/2  Bleed, [  ] Contraindicated 2/2 Brain Bleed  Advanced Directive: [  ] None, [  ] DNR/DNI Patient is a 89y old  Female who presents with a chief complaint of gallstones, covid, fidelina (09 Jul 2022 08:36)    HPI:  89y F pmhx CAD x 2 stents (2012), T2DM, HTN, HLD, GERD, mild AS, chronic venous insufficiency, chronic neuropathy, macular degeneration, chronic constipation, depression, OA, and recent admission to [facility] 2/25- 3/2 for gallstone pancreatitis with non-operative treatment presented to the ED with constant nausea x 4 days associated with decreased PO intake, generalized weakness, and fall today x 1 due to LE weakness. Patient denies fever, chills, cp, dizziness, sob, abd pain, vomiting, diarrhea. Patient was seen here in ED 6/20 for epigastric pain (since resolved) and discharged with outpatient follow up with Dr. Terrell. Patient has seen Dr. Terrell twice since then and epigastric pain resolved s/p PO zofran and carafate with planned esophagram. Daughter states epigastric pain resolved, but patient has had constant nausea x 4 days that has not improved despite increasing PO zofran from 4mg to 8mg q6h. Patient was unable to obtain appointment with Dr. Terrell today and after falling at home due to leg weakness, daughter brought her to the ED.     ED Course  Vitals: 99.2F, 85bpm, 143/66, 95% RA  Labs: Hgb 10.2, Hct 32.5, Na 132, K 5.5, BUN 30, Cr 2, GFR 23, lipase wnl  EKG:  CT Chest and abd/pel: Moderate-sized bilateral pleural effusion with partial bilateral lower lobe atelectasis. Cholelithiasis with distended gallbladder and right upper quadrant inflammatory changes, findings suspicious for acute cholecystitis.  RUQ US: Cholelithiasis with nonspecific pericholecystic fluid. Negative sonographic Scott sign. Dilated common bowel duct. Recommend correlating with LFTs. Cannot exclude distal choledocholithiasis.  HEAD CT: Mild volume loss, microvascular disease, no acute hemorrhage or midline shift.  Patient Received: Zosyn x1, NS 500cc x1, Lasix 40mg IV x1, IV dilaudid .5mg x1    (05 Jul 2022 23:51)    INTERVAL HPI:  07/06- pt seen and examined at bedside. Complaining of increased frequency of urination and suprapubic pain s/p 40mg IV lasix x1 in the ED. Otherwise, notes worsening of chronic back pain and general feeling of malaise. No sob, chest pain, nausea. Fidelina, Anemia.HIDA scan today.  07/07- pt seen and examined at bedside. Feeling confused. A&Ox1 (person). Has a hx of hospital delirium and has been able to be reoriented when her daughter is present. Notes pain over b/l LE but denies sob, chest pain, n/v/d. Waiting for gastric emptying study per GI.  07/08- pt seen and examined at bedside. Feeling confused again this AM possibly chronic hospital AM delirium. A&Ox1 (person). Notes constipation and decrease in b/l LE edema but has mild pain. Gastric emptying study cancelled due to pt inability to tolerate being off pain medication. Denies SOB, chest pain, n/v/d. Received dose 1/3 of remdesivir for COVID-19.   7/9: Patient seen and examined at bedside. Patient states she did not sleep much overnight, but otherwise feeling okay. Denies any CP, SOB, abd pain, N/V/D. Patient still c/o mild leg pain.     OVERNIGHT EVENTS: None    Home Medications:  amitriptyline 10 mg oral tablet: 3 tab(s) orally once a day (at bedtime) (05 Jul 2022 23:44)  aspirin 81 mg oral tablet: 1 tab(s) orally once a day (05 Jul 2022 23:44)  atorvastatin 40 mg oral tablet: 1 tab(s) orally once a day (05 Jul 2022 23:44)  Dilaudid 4 mg oral tablet: 1 tab(s) orally 3 times (05 Jul 2022 23:44)  ferrous sulfate 324 mg (65 mg elemental iron) oral delayed release tablet: 1 tab(s) orally once a day (05 Jul 2022 23:44)  ferrous sulfate 325 mg (65 mg elemental iron) oral tablet: 1 tablet oral daily (05 Jul 2022 23:44)  Metoprolol Succinate ER 25 mg oral tablet, extended release: 1 tab(s) orally once a day (05 Jul 2022 23:44)  MiraLax oral powder for reconstitution: ng/kg orally (05 Jul 2022 23:44)  PreserVision AREDS oral capsule: 1 tab(s) orally 2 times a day (05 Jul 2022 23:44)  Senna 8.6 mg oral tablet: 2 tab(s) orally once a day (at bedtime) (05 Jul 2022 23:44)  Toprol-XL 25 mg oral tablet, extended release: 1 tab(s) orally once a day (05 Jul 2022 23:44)  Vitamin D3 1250 mcg (50,000 intl units) oral capsule: 1 cap(s) orally once a week (05 Jul 2022 23:44)  Zofran 4 mg oral tablet: 1 tab(s) orally every 6 hours (05 Jul 2022 23:44)      MEDICATIONS  (STANDING):  amitriptyline 30 milliGRAM(s) Oral at bedtime  aspirin  chewable 81 milliGRAM(s) Oral daily  atorvastatin 40 milliGRAM(s) Oral at bedtime  dextrose 5%. 1000 milliLiter(s) (50 mL/Hr) IV Continuous <Continuous>  dextrose 5%. 1000 milliLiter(s) (100 mL/Hr) IV Continuous <Continuous>  dextrose 50% Injectable 12.5 Gram(s) IV Push once  dextrose 50% Injectable 25 Gram(s) IV Push once  dextrose 50% Injectable 25 Gram(s) IV Push once  ferrous    sulfate 325 milliGRAM(s) Oral daily  glucagon  Injectable 1 milliGRAM(s) IntraMuscular once  heparin   Injectable 5000 Unit(s) SubCutaneous every 8 hours  HYDROmorphone   Tablet 4 milliGRAM(s) Oral three times a day  insulin lispro (ADMELOG) corrective regimen sliding scale   SubCutaneous three times a day before meals  insulin lispro (ADMELOG) corrective regimen sliding scale   SubCutaneous at bedtime  metoclopramide Injectable 5 milliGRAM(s) IV Push every 6 hours  metoprolol succinate ER 25 milliGRAM(s) Oral daily  pantoprazole    Tablet 40 milliGRAM(s) Oral before breakfast  polyethylene glycol 3350 17 Gram(s) Oral daily  remdesivir  IVPB   IV Intermittent   remdesivir  IVPB 100 milliGRAM(s) IV Intermittent every 24 hours  senna 2 Tablet(s) Oral at bedtime  sucralfate suspension 1 Gram(s) Oral two times a day    MEDICATIONS  (PRN):  acetaminophen     Tablet .. 650 milliGRAM(s) Oral every 6 hours PRN Temp greater or equal to 38C (100.4F), Mild Pain (1 - 3)  aluminum hydroxide/magnesium hydroxide/simethicone Suspension 30 milliLiter(s) Oral every 4 hours PRN Dyspepsia  dextrose Oral Gel 15 Gram(s) Oral once PRN Blood Glucose LESS THAN 70 milliGRAM(s)/deciliter  melatonin 3 milliGRAM(s) Oral at bedtime PRN Insomnia  ondansetron Injectable 4 milliGRAM(s) IV Push every 8 hours PRN Nausea and/or Vomiting      morphine (Other)      Social History:  Former cigarette smoker, smoked <1/2 ppd for less than 15 years, quit more than 50 years ago   Denies ETOH use   Denies drug use   Ambulates with a walker. Lives at home with daughter. ADLs with assistance. (05 Jul 2022 23:51)      REVIEW OF SYSTEMS:  CONSTITUTIONAL: No fever, No chills, No fatigue, No myalgia, No Body ache, No Weakness  EYES: No eye pain,  No visual disturbances, No discharge, No Redness  ENMT: No ear pain, No nose bleed, No vertigo; No sinus pain, No throat pain, No Congestion  NECK: No pain, No stiffness  RESPIRATORY: No cough, No wheezing, No hemoptysis, No shortness of breath  CARDIOVASCULAR: No chest pain, No palpitations  GASTROINTESTINAL: No abdominal pain, No epigastric pain. No nausea, No vomiting, No diarrhea, No constipation; [  ] BM  GENITOURINARY: No dysuria, No frequency, No urgency, No hematuria, No incontinence  NEUROLOGICAL: No headaches, No dizziness, No numbness, No tingling, No tremors, No weakness  EXTREMITIES: +mild leg edema and pain  SKIN: [x  ] No itching, burning, rashes, or lesions   MUSCULOSKELETAL: No joint pain, No joint swelling; No muscle pain, No back pain, No extremity pain  PSYCHIATRIC: No depression, No anxiety, No mood swings, No difficulty sleeping at night  PAIN SCALE: [ x ] None  [  ] Other-  ROS Unable to obtain due to: [  ] Dementia  [  ] Lethargy  [  ] Sedated  [  ] Non verbal  REST OF REVIEW OF SYSTEMS: [  ] Normal     Vital Signs Last 24 Hrs  T(C): 37.1 (09 Jul 2022 05:33), Max: 37.1 (08 Jul 2022 19:36)  T(F): 98.7 (09 Jul 2022 05:33), Max: 98.7 (08 Jul 2022 19:36)  HR: 82 (09 Jul 2022 08:23) (80 - 103)  BP: 156/73 (09 Jul 2022 05:33) (144/81 - 178/81)  BP(mean): --  RR: 17 (09 Jul 2022 05:33) (17 - 18)  SpO2: 96% (09 Jul 2022 08:23) (92% - 96%)    Parameters below as of 09 Jul 2022 08:23  Patient On (Oxygen Delivery Method): room air        CAPILLARY BLOOD GLUCOSE      POCT Blood Glucose.: 102 mg/dL (09 Jul 2022 08:18)  POCT Blood Glucose.: 188 mg/dL (08 Jul 2022 21:21)  POCT Blood Glucose.: 119 mg/dL (08 Jul 2022 16:50)  POCT Blood Glucose.: 190 mg/dL (08 Jul 2022 12:17)      I&O's Summary    PHYSICAL EXAM:  GENERAL:  [x  ] NAD , [ x ] well appearing, [  ] Agitated, [  ] Drowsy,  [  ] Lethargy, [x  ] confused   HEAD:  [x  ] Normal, [  ] Other  EYES:  [ x] EOMI, [ x ] PERRLA, [ x ] conjunctiva and sclera clear normal, [  ] Other,  [  ] Pallor,[  ] Discharge  ENMT:  [x  ] Normal, [x  ] dry mucous membranes, [x  ] Good dentition, [x  ] No Thrush  NECK:  [  x] Supple, [x ] No JVD, [ x ] Normal thyroid, [  ] Lymphadenopathy [  ] Other  CHEST/LUNG:  [  ] Clear to auscultation bilaterally, [x  ] Breath Sounds B/L / Decrease, [ x ] poor effort  [x  ] No rales, [ x ] No rhonchi  [ x ]  No wheezing,   HEART:  [x  ] Regular rate and rhythm, [  ] tachycardia, [  ] Bradycardia,  [  ] irregular  [x  ] No murmurs, No rubs, No gallops, [  ] PPM in place (Mfr:  )  ABDOMEN:  [x  ] Soft, [x  ] nontender, [x  ] Nondistended, [ x ] No mass, [ x ] Bowel sounds present, [  ] obese  NERVOUS SYSTEM:  [ x ] Alert & Oriented X1, [ x ] Nonfocal  [ x ] Confusion  [  ] Encephalopathic [  ] Sedated [  ] Unable to assess, [  ] Dementia [  ] Other-  EXTREMITIES: [x  ] 2+ Peripheral Pulses, No clubbing, No cyanosis,  [x  ]2+ pitting edema B/L lower EXT. [ x ] PVD stasis skin changes B/L Lower EXT, [  ] wound  LYMPH: No lymphadenopathy noted  SKIN:  [ x ] No rashes or lesions, [  ] Pressure Ulcers, [x  ] ecchymosis- B/L Lower ext , [  ] Skin Tears, [  ] Other    DIET: soft    LABS:                        9.8    3.92  )-----------( 188      ( 09 Jul 2022 07:45 )             29.6     09 Jul 2022 07:45    136    |  103    |  26     ----------------------------<  98     4.1     |  25     |  1.80     Ca    8.4        09 Jul 2022 07:45    TPro  5.5    /  Alb  2.3    /  TBili  0.2    /  DBili  x      /  AST  30     /  ALT  12     /  AlkPhos  57     09 Jul 2022 07:45        Culture Results:   No growth to date. (07-05 @ 21:45)  Culture Results:   No growth to date. (07-05 @ 21:40)            Culture - Blood (collected 05 Jul 2022 21:45)  Source: .Blood Blood-Peripheral  Preliminary Report (07 Jul 2022 01:02):    No growth to date.    Culture - Blood (collected 05 Jul 2022 21:40)  Source: .Blood Blood-Peripheral  Preliminary Report (07 Jul 2022 01:02):    No growth to date.       Anemia Panel:  Vitamin B12, Serum: 342 pg/mL (07-06-22 @ 07:14)  Folate, Serum: 11.8 ng/mL (07-06-22 @ 07:14)  Iron Total, Serum: 19 ug/dL (07-06-22 @ 07:14)  Iron - Total Binding Capacity.: 242 ug/dL (07-06-22 @ 07:14)  Ferritin, Serum: 67 ng/mL (07-06-22 @ 07:14)      Thyroid Panel:        Lipase, Serum: 78 U/L (07-05-22 @ 19:05)          RADIOLOGY & ADDITIONAL TESTS: NA      HEALTH ISSUES - PROBLEM Dx:  Cholelithiasis  FIDELINA (acute kidney injury)  Fall  CAD (coronary artery disease)  HTN (hypertension)  Chronic back pain  Anxiety and depression  Sundowning  DVT prophylaxis  Pleural effusion  2019 novel coronavirus disease (COVID-19)  Type 2 diabetes mellitus  Anemia          Consultant(s) Notes Reviewed:  [x  ] YES     Care Discussed with [ x ] Consultants, [x  ] Patient, [  x] Family, [  ] HCP, [  ] , [  ] Social Service, [  ] RN, [  ] Physical Therapy, [  ] Palliative Care Team  DVT PPX: [  ] Lovenox, [ x ] SC Heparin, [  ] Coumadin, [  ] Xarelto, [  ] Eliquis, [  ] Pradaxa, [  ] IV Heparin drip, [  ] SCD, [  ] Ambulation, [  ] Contraindicated 2/2 GI Bleed, [  ] Contraindicated 2/2  Bleed, [  ] Contraindicated 2/2 Brain Bleed  Advanced Directive: [  ] None, [x  ] DNR/DNI Patient is a 89y old  Female who presents with a chief complaint of gallstones, covid, fidelina (09 Jul 2022 08:36)    HPI:  89y F pmhx CAD x 2 stents (2012), T2DM, HTN, HLD, GERD, mild AS, chronic venous insufficiency, chronic neuropathy, macular degeneration, chronic constipation, depression, OA, and [facility] for gallstone pancreatitis with non-operative treatment presented to the ED with constant nausea x 4 days associated with decreased PO intake, generalized weakness, and fall today x 1 due to LE weakness. Patient denies fever, chills, cp, dizziness, sob, abd pain, vomiting, diarrhea. Patient was seen here in ED 6/20 for epigastric pain (since resolved) and discharged with outpatient follow up with Dr. Terrell. Patient has seen Dr. Terrell twice since then and epigastric pain resolved s/p PO zofran and carafate with planned esophagram. Daughter states epigastric pain resolved, but patient has had constant nausea x 4 days that has not improved despite increasing PO zofran from 4mg to 8mg q6h. Patient was unable to obtain appointment with Dr. Terrell today and after falling at home due to leg weakness, daughter brought her to the ED.     ED Course  Vitals: 99.2F, 85bpm, 143/66, 95% RA  Labs: Hgb 10.2, Hct 32.5, Na 132, K 5.5, BUN 30, Cr 2, GFR 23, lipase wnl  EKG:  CT Chest and abd/pel: Moderate-sized bilateral pleural effusion with partial bilateral lower lobe atelectasis. Cholelithiasis with distended gallbladder and right upper quadrant inflammatory changes, findings suspicious for acute cholecystitis.  RUQ US: Cholelithiasis with nonspecific pericholecystic fluid. Negative sonographic Scott sign. Dilated common bowel duct. Recommend correlating with LFTs. Cannot exclude distal choledocholithiasis.  HEAD CT: Mild volume loss, microvascular disease, no acute hemorrhage or midline shift.  Patient Received: Zosyn x1, NS 500cc x1, Lasix 40mg IV x1, IV dilaudid .5mg x1    (05 Jul 2022 23:51)    INTERVAL HPI:  07/06- pt seen and examined at bedside. Complaining of increased frequency of urination and suprapubic pain s/p 40mg IV lasix x1 in the ED. Otherwise, notes worsening of chronic back pain and general feeling of malaise. No sob, chest pain, nausea. Fidelina, Anemia.HIDA scan today.  07/07- pt seen and examined at bedside. Feeling confused. A&Ox1 (person). Has a hx of hospital delirium and has been able to be reoriented when her daughter is present. Notes pain over b/l LE but denies sob, chest pain, n/v/d. Waiting for gastric emptying study per GI.  07/08- pt seen and examined at bedside. Feeling confused again this AM possibly chronic hospital AM delirium. A&Ox1 (person). Notes constipation and decrease in b/l LE edema but has mild pain. Gastric emptying study cancelled due to pt inability to tolerate being off pain medication. Denies SOB, chest pain, n/v/d. Received dose 1/3 of remdesivir for COVID-19.   7/9: Patient seen and examined at bedside. Patient states she did not sleep much overnight, but otherwise feeling okay. Denies any CP, SOB, abd pain, N/V/D. Patient still c/o mild leg pain. Cr stable. Loose BM Low stable H/H     OVERNIGHT EVENTS: None    Home Medications:  amitriptyline 10 mg oral tablet: 3 tab(s) orally once a day (at bedtime) (05 Jul 2022 23:44)  aspirin 81 mg oral tablet: 1 tab(s) orally once a day (05 Jul 2022 23:44)  atorvastatin 40 mg oral tablet: 1 tab(s) orally once a day (05 Jul 2022 23:44)  Dilaudid 4 mg oral tablet: 1 tab(s) orally 3 times (05 Jul 2022 23:44)  ferrous sulfate 324 mg (65 mg elemental iron) oral delayed release tablet: 1 tab(s) orally once a day (05 Jul 2022 23:44)  ferrous sulfate 325 mg (65 mg elemental iron) oral tablet: 1 tablet oral daily (05 Jul 2022 23:44)  Metoprolol Succinate ER 25 mg oral tablet, extended release: 1 tab(s) orally once a day (05 Jul 2022 23:44)  MiraLax oral powder for reconstitution: ng/kg orally (05 Jul 2022 23:44)  PreserVision AREDS oral capsule: 1 tab(s) orally 2 times a day (05 Jul 2022 23:44)  Senna 8.6 mg oral tablet: 2 tab(s) orally once a day (at bedtime) (05 Jul 2022 23:44)  Toprol-XL 25 mg oral tablet, extended release: 1 tab(s) orally once a day (05 Jul 2022 23:44)  Vitamin D3 1250 mcg (50,000 intl units) oral capsule: 1 cap(s) orally once a week (05 Jul 2022 23:44)  Zofran 4 mg oral tablet: 1 tab(s) orally every 6 hours (05 Jul 2022 23:44)      MEDICATIONS  (STANDING):  amitriptyline 30 milliGRAM(s) Oral at bedtime  aspirin  chewable 81 milliGRAM(s) Oral daily  atorvastatin 40 milliGRAM(s) Oral at bedtime  dextrose 5%. 1000 milliLiter(s) (50 mL/Hr) IV Continuous <Continuous>  dextrose 5%. 1000 milliLiter(s) (100 mL/Hr) IV Continuous <Continuous>  dextrose 50% Injectable 12.5 Gram(s) IV Push once  dextrose 50% Injectable 25 Gram(s) IV Push once  dextrose 50% Injectable 25 Gram(s) IV Push once  ferrous    sulfate 325 milliGRAM(s) Oral daily  glucagon  Injectable 1 milliGRAM(s) IntraMuscular once  heparin   Injectable 5000 Unit(s) SubCutaneous every 8 hours  HYDROmorphone   Tablet 4 milliGRAM(s) Oral three times a day  insulin lispro (ADMELOG) corrective regimen sliding scale   SubCutaneous three times a day before meals  insulin lispro (ADMELOG) corrective regimen sliding scale   SubCutaneous at bedtime  metoclopramide Injectable 5 milliGRAM(s) IV Push every 6 hours  metoprolol succinate ER 25 milliGRAM(s) Oral daily  pantoprazole    Tablet 40 milliGRAM(s) Oral before breakfast  polyethylene glycol 3350 17 Gram(s) Oral daily  remdesivir  IVPB   IV Intermittent   remdesivir  IVPB 100 milliGRAM(s) IV Intermittent every 24 hours  senna 2 Tablet(s) Oral at bedtime  sucralfate suspension 1 Gram(s) Oral two times a day    MEDICATIONS  (PRN):  acetaminophen     Tablet .. 650 milliGRAM(s) Oral every 6 hours PRN Temp greater or equal to 38C (100.4F), Mild Pain (1 - 3)  aluminum hydroxide/magnesium hydroxide/simethicone Suspension 30 milliLiter(s) Oral every 4 hours PRN Dyspepsia  dextrose Oral Gel 15 Gram(s) Oral once PRN Blood Glucose LESS THAN 70 milliGRAM(s)/deciliter  melatonin 3 milliGRAM(s) Oral at bedtime PRN Insomnia  ondansetron Injectable 4 milliGRAM(s) IV Push every 8 hours PRN Nausea and/or Vomiting      morphine (Other)      Social History:  Former cigarette smoker, smoked <1/2 ppd for less than 15 years, quit more than 50 years ago   Denies ETOH use   Denies drug use   Ambulates with a walker. Lives at home with daughter. ADLs with assistance. (05 Jul 2022 23:51)      REVIEW OF SYSTEMS: no appetite   CONSTITUTIONAL: No fever, No chills, No fatigue, No myalgia, No Body ache, No Weakness  EYES: No eye pain,  No visual disturbances, No discharge, No Redness  ENMT: No ear pain, No nose bleed, No vertigo; No sinus pain, No throat pain, No Congestion  NECK: No pain, No stiffness  RESPIRATORY: No cough, No wheezing, No hemoptysis, No shortness of breath  CARDIOVASCULAR: No chest pain, No palpitations  GASTROINTESTINAL: No abdominal pain, No epigastric pain. No nausea, No vomiting, No diarrhea, No constipation; [  ] BM  GENITOURINARY: No dysuria, No frequency, No urgency, No hematuria, No incontinence  NEUROLOGICAL: No headaches, No dizziness, No numbness, No tingling, No tremors, No weakness  EXTREMITIES: +mild leg edema and pain  SKIN: [x  ] No itching, burning, rashes, or lesions   MUSCULOSKELETAL: No joint pain, No joint swelling; No muscle pain, No back pain, No extremity pain  PSYCHIATRIC: No depression, No anxiety, No mood swings, No difficulty sleeping at night  PAIN SCALE: [ x ] None  [  ] Other-  ROS Unable to obtain due to: [  ] Dementia  [  ] Lethargy  [  ] Sedated  [  ] Non verbal  REST OF REVIEW OF SYSTEMS: [  ] Normal     Vital Signs Last 24 Hrs  T(C): 37.1 (09 Jul 2022 05:33), Max: 37.1 (08 Jul 2022 19:36)  T(F): 98.7 (09 Jul 2022 05:33), Max: 98.7 (08 Jul 2022 19:36)  HR: 82 (09 Jul 2022 08:23) (80 - 103)  BP: 156/73 (09 Jul 2022 05:33) (144/81 - 178/81)  BP(mean): --  RR: 17 (09 Jul 2022 05:33) (17 - 18)  SpO2: 96% (09 Jul 2022 08:23) (92% - 96%)    Parameters below as of 09 Jul 2022 08:23  Patient On (Oxygen Delivery Method): room air        CAPILLARY BLOOD GLUCOSE      POCT Blood Glucose.: 102 mg/dL (09 Jul 2022 08:18)  POCT Blood Glucose.: 188 mg/dL (08 Jul 2022 21:21)  POCT Blood Glucose.: 119 mg/dL (08 Jul 2022 16:50)  POCT Blood Glucose.: 190 mg/dL (08 Jul 2022 12:17)      I&O's Summary    PHYSICAL EXAM:  GENERAL:  [x  ] NAD , [ x ] well appearing, [  ] Agitated, [  ] Drowsy,  [  ] Lethargy, [x  ] confused   HEAD:  [x  ] Normal, [  ] Other  EYES:  [ x] EOMI, [ x ] PERRLA, [ x ] conjunctiva and sclera clear normal, [  ] Other,  [  ] Pallor,[  ] Discharge  ENMT:  [x  ] Normal, [x  ] dry mucous membranes, [x  ] Good dentition, [x  ] No Thrush  NECK:  [  x] Supple, [x ] No JVD, [ x ] Normal thyroid, [  ] Lymphadenopathy [  ] Other  CHEST/LUNG:  [  ] Clear to auscultation bilaterally, [x  ] Breath Sounds B/L / Decrease, [ x ] poor effort  [x  ] No rales, [ x ] No rhonchi  [ x ]  No wheezing,   HEART:  [x  ] Regular rate and rhythm, [  ] tachycardia, [  ] Bradycardia,  [  ] irregular  [x  ] No murmurs, No rubs, No gallops, [  ] PPM in place (Mfr:  )  ABDOMEN:  [x  ] Soft, [x  ] nontender, [x  ] Nondistended, [ x ] No mass, [ x ] Bowel sounds present, [  ] obese  NERVOUS SYSTEM:  [ x ] Alert & Oriented X1, [ x ] Nonfocal  [ x ] Confusion  [  ] Encephalopathic [  ] Sedated [  ] Unable to assess, [ x ] Dementia [  ] Other-  EXTREMITIES: [x  ] 2+ Peripheral Pulses, No clubbing, No cyanosis,  [x  ]2+ pitting edema B/L lower EXT. [ x ] PVD stasis skin changes B/L Lower EXT, [  ] wound  LYMPH: No lymphadenopathy noted  SKIN:  [ x ] No rashes or lesions, [  ] Pressure Ulcers, [x  ] ecchymosis- B/L Lower ext , [  ] Skin Tears, [  ] Other    DIET: soft    LABS:                        9.8    3.92  )-----------( 188      ( 09 Jul 2022 07:45 )             29.6     09 Jul 2022 07:45    136    |  103    |  26     ----------------------------<  98     4.1     |  25     |  1.80     Ca    8.4        09 Jul 2022 07:45    TPro  5.5    /  Alb  2.3    /  TBili  0.2    /  DBili  x      /  AST  30     /  ALT  12     /  AlkPhos  57     09 Jul 2022 07:45        Culture Results:   No growth to date. (07-05 @ 21:45)  Culture Results:   No growth to date. (07-05 @ 21:40    Culture - Blood (collected 05 Jul 2022 21:45)  Source: .Blood Blood-Peripheral  Preliminary Report (07 Jul 2022 01:02):    No growth to date.    Culture - Blood (collected 05 Jul 2022 21:40)  Source: .Blood Blood-Peripheral  Preliminary Report (07 Jul 2022 01:02):    No growth to date.       Anemia Panel:  Vitamin B12, Serum: 342 pg/mL (07-06-22 @ 07:14)  Folate, Serum: 11.8 ng/mL (07-06-22 @ 07:14)  Iron Total, Serum: 19 ug/dL (07-06-22 @ 07:14)  Iron - Total Binding Capacity.: 242 ug/dL (07-06-22 @ 07:14)  Ferritin, Serum: 67 ng/mL (07-06-22 @ 07:14)      Thyroid Panel:        Lipase, Serum: 78 U/L (07-05-22 @ 19:05)    RADIOLOGY & ADDITIONAL TESTS: NA      HEALTH ISSUES - PROBLEM Dx:  Cholelithiasis  FIDELINA (acute kidney injury)  Fall  CAD (coronary artery disease)  HTN (hypertension)  Chronic back pain  Anxiety and depression  Sundowning  DVT prophylaxis  Pleural effusion  2019 novel coronavirus disease (COVID-19)  Type 2 diabetes mellitus  Anemia          Consultant(s) Notes Reviewed:  [x  ] YES     Care Discussed with [ x ] Consultants, [x  ] Patient, [ x] Family,-Dtr  [  ] HCP, [  ] , [  ] Social Service, [ x ] RN, [  ] Physical Therapy, [  ] Palliative Care Team  DVT PPX: [  ] Lovenox, [ x ] SC Heparin, [  ] Coumadin, [  ] Xarelto, [  ] Eliquis, [  ] Pradaxa, [  ] IV Heparin drip, [  ] SCD, [  ] Ambulation, [  ] Contraindicated 2/2 GI Bleed, [  ] Contraindicated 2/2  Bleed, [  ] Contraindicated 2/2 Brain Bleed  Advanced Directive: [  ] None, [x  ] DNR/DNI

## 2022-07-09 NOTE — PROGRESS NOTE ADULT - ASSESSMENT
89y F pmhx CAD x 2 stents (2012), T2DM, HTN, HLD, GERD, mild AS, chronic venous insufficiency, chronic neuropathy, macular degeneration, chronic constipation, depression, OA, lumbar degenerative disc disease, and spinal stenosis admitted for cholelithiasis, KERRY, and found to be COVID positive on admission    covid  OP  OA  Pleural eff  Atelectasis  KERRY  CAD  Fall  Ataxic gait  HLD  GERD  valv heart disease    HIDA scan NEG  Surgery and GI following  VS noted, on RA -   Labs reviewed  on Opioids for pain  on Remdesivir -     [facility] rx regimen  TTE -   covid management - sx management - monitor VS and Sat - isolation precs  fall prec - PT assessment - gait training - CM follow up  pleural eff - no need for thoracentesis in the immediate future - medical rx regimen and [facility] rx regimen  I devonte as able to tolerate  Surgery eval in progress - Acute Angelica eval noted  GOC discussion  assist with needs  repllisandro almanzar

## 2022-07-09 NOTE — PROGRESS NOTE ADULT - PROBLEM SELECTOR PLAN 4
CT Chest: Moderate-sized bilateral pleural effusion with partial bilateral lower lobe atelectasis  - s/p IV Lasix 40mg x 1 in ED, hold off additional lasix given KERRY  - patient saturating well on room air and denies sob  -TTE shows normal LV systolic function, LVEF 65%, mild/mod MR  - pulmonology (Dr. Melo) consulted- no need for thoracentesis in the immediate future

## 2022-07-09 NOTE — PROGRESS NOTE ADULT - PROBLEM SELECTOR PLAN 1
patient covid positive on admission, s/p moderna 3/3  - started on 3 days of remdesivir, last dose 7/9  - saturating well on room air and no sx  - contact precautions  - Daughter Mary [phone] states she does not want monoclonal antibodies for patient  - ID consulted, Dr. WESTLEY Izquierdo following

## 2022-07-09 NOTE — PROGRESS NOTE ADULT - SUBJECTIVE AND OBJECTIVE BOX
[facility] -- Casper Mcgee Grossman, [place], Eligio Ryan, Thalia Jose: Office # [phone]    Follow Up:  chest pain     Subjective/Observations: Patient awake, alert, forgetful, resting in bed. Denies chest pain, palpitations and dizziness. Denies any difficulty breathing. No orthopnea and PND. Tolerating room air.     REVIEW OF SYSTEMS: All other review of systems are negative unless indicated above    PAST MEDICAL & SURGICAL HISTORY:  H/O vertebral fracture repair      History of vertebral fracture      Dyslipidemia      DM type 2 with diabetic dyslipidemia      H/O gastroesophageal reflux (GERD)      Costochondritis      Coronary arteriosclerosis in native artery  s/p 2 stents. last placed 2 years ago      Gastroparesis      History of laminectomy      S/P hip hemiarthroplasty      S/P appendectomy    MEDICATIONS  (STANDING):  amitriptyline 30 milliGRAM(s) Oral at bedtime  aspirin  chewable 81 milliGRAM(s) Oral daily  atorvastatin 40 milliGRAM(s) Oral at bedtime  dextrose 5%. 1000 milliLiter(s) (50 mL/Hr) IV Continuous <Continuous>  dextrose 5%. 1000 milliLiter(s) (100 mL/Hr) IV Continuous <Continuous>  dextrose 50% Injectable 25 Gram(s) IV Push once  dextrose 50% Injectable 12.5 Gram(s) IV Push once  dextrose 50% Injectable 25 Gram(s) IV Push once  ferrous    sulfate 325 milliGRAM(s) Oral daily  glucagon  Injectable 1 milliGRAM(s) IntraMuscular once  heparin   Injectable 5000 Unit(s) SubCutaneous every 8 hours  HYDROmorphone   Tablet 4 milliGRAM(s) Oral three times a day  insulin lispro (ADMELOG) corrective regimen sliding scale   SubCutaneous three times a day before meals  insulin lispro (ADMELOG) corrective regimen sliding scale   SubCutaneous at bedtime  metoclopramide Injectable 5 milliGRAM(s) IV Push every 6 hours  metoprolol succinate ER 25 milliGRAM(s) Oral daily  pantoprazole    Tablet 40 milliGRAM(s) Oral before breakfast  polyethylene glycol 3350 17 Gram(s) Oral daily  remdesivir  IVPB   IV Intermittent   remdesivir  IVPB 100 milliGRAM(s) IV Intermittent every 24 hours  senna 2 Tablet(s) Oral at bedtime  sucralfate suspension 1 Gram(s) Oral two times a day    MEDICATIONS  (PRN):  acetaminophen     Tablet .. 650 milliGRAM(s) Oral every 6 hours PRN Temp greater or equal to 38C (100.4F), Mild Pain (1 - 3)  aluminum hydroxide/magnesium hydroxide/simethicone Suspension 30 milliLiter(s) Oral every 4 hours PRN Dyspepsia  dextrose Oral Gel 15 Gram(s) Oral once PRN Blood Glucose LESS THAN 70 milliGRAM(s)/deciliter  melatonin 3 milliGRAM(s) Oral at bedtime PRN Insomnia  ondansetron Injectable 4 milliGRAM(s) IV Push every 8 hours PRN Nausea and/or Vomiting    Allergies  morphine (Other)    Intolerances      Vital Signs Last 24 Hrs  T(C): 37.1 (09 Jul 2022 05:33), Max: 37.1 (08 Jul 2022 19:36)  T(F): 98.7 (09 Jul 2022 05:33), Max: 98.7 (08 Jul 2022 19:36)  HR: 82 (09 Jul 2022 08:23) (80 - 103)  BP: 156/73 (09 Jul 2022 05:33) (144/81 - 178/81)  BP(mean): --  RR: 17 (09 Jul 2022 05:33) (17 - 18)  SpO2: 96% (09 Jul 2022 08:23) (92% - 96%)    Parameters below as of 09 Jul 2022 08:23  Patient On (Oxygen Delivery Method): room air      I&O's Summary        TELE: SR 70-90s nonsustained 110s   PHYSICAL EXAM:  Constitutional: NAD, awake and alert, Frail   HEENT: Moist Mucous Membranes, Anicteric  Pulmonary: Non-labored, breath sounds are clear bilaterally, No wheezing, rales or rhonchi  Cardiovascular: Regular, S1 and S2, No murmurs, No rubs, gallops or clicks  Gastrointestinal:  soft, nontender, nondistended   Lymph: +2  peripheral edema with chronic venous stasis skin changes. No lymphadenopathy.   Skin: No visible rashes or ulcers.  Psych:  confused       LABS: All Labs Reviewed:                        9.8    3.92  )-----------( 188      ( 09 Jul 2022 07:45 )             29.6                         10.1   4.29  )-----------( 202      ( 08 Jul 2022 06:45 )             31.4                         10.5   4.83  )-----------( 215      ( 07 Jul 2022 09:04 )             32.7     09 Jul 2022 07:45    136    |  103    |  26     ----------------------------<  98     4.1     |  25     |  1.80   08 Jul 2022 06:45    134    |  98     |  27     ----------------------------<  103    3.5     |  27     |  2.10   07 Jul 2022 09:04    135    |  98     |  30     ----------------------------<  97     3.9     |  27     |  2.30     Ca    8.4        09 Jul 2022 07:45  Ca    8.4        08 Jul 2022 06:45  Ca    8.5        07 Jul 2022 09:04    TPro  5.5    /  Alb  2.3    /  TBili  0.2    /  DBili  x      /  AST  30     /  ALT  12     /  AlkPhos  57     09 Jul 2022 07:45  TPro  5.9    /  Alb  2.6    /  TBili  0.2    /  DBili  x      /  AST  28     /  ALT  14     /  AlkPhos  64     08 Jul 2022 06:45  TPro  6.4    /  Alb  2.7    /  TBili  0.3    /  DBili  x      /  AST  28     /  ALT  12     /  AlkPhos  68     07 Jul 2022 09:04   LIVER FUNCTIONS - ( 09 Jul 2022 07:45 )  Alb: 2.3 g/dL / Pro: 5.5 g/dL / ALK PHOS: 57 U/L / ALT: 12 U/L / AST: 30 U/L / GGT: x             12 Lead ECG:   Ventricular Rate 71 BPM    Atrial Rate 71 BPM    P-R Interval 142 ms    QRS Duration 84 ms    Q-T Interval 392 ms    QTC Calculation(Bazett) 425 ms    P Axis 64 degrees    R Axis 18 degrees    T Axis 7 degrees    Diagnosis Line Normal sinus rhythm  Normal ECG  When compared with ECG of 05-JUL-2022 19:57, (Unconfirmed)  No significant change was found  Confirmed by Bry Warren MD ([id number]) on 7/6/2022 1:56:48 PM (07-06-22 @ 13:32)      ACC: [id number] EXAM:  ECHO TTE WO CON COMP W DOPP                          PROCEDURE DATE:  07/07/2022          INTERPRETATION:  INDICATION: dyspnea  Sonographer LK    Blood Pressure 143/70    Height 157 cm     Weight 52 kg       BSA 1.5 sq m    Dimensions:  LA unavailable       Normal Values: 2.0 - 4.0 cm  Ao unavailable        Normal Values: 2.0 - 3.8 cm  SEPTUM unavailable       Normal Values: 0.6 - 1.2 cm  PWT unavailable       Normal Values: 0.6 - 1.1 cm  LVIDd unavailable         Normal Values: 3.0 - 5.6 cm  LVIDs unavailable         Normal Values: 1.8 - 4.0 cm      OBSERVATIONS:  Technically difficult study  Mitral Valve: Mitral annular calcification with thickened leaflets, mild   to moderate MR.  Aortic Valve/Aorta: Sclerotic trileaflet aortic valve with grossly normal   opening by visual estimation.  Tricuspid Valve: normal with trace TR.  Pulmonic Valve: Not well-visualized  Left Atrium: Enlarged  Right Atrium: Not well-visualized  Left Ventricle: normal LV size and systolic function, estimated LVEF of   65%.  Right Ventricle: Grossly normal size and systolic function.  Pericardium: no significant pericardial effusion.  Pulmonary/RV Pressure: estimated PA systolic pressure of 20mmHg  IVC measures 1.85 cm    IMPRESSION:  Technically difficult study  Normal left ventricular internal dimensions and systolic function,   estimated LVEF of 65%.  Grossly normal RV size and systolic function.  Sclerotic trileaflet aortic valve with grossly normal opening by visual   estimation, without AI.  Mild to moderate MR  No significant pericardial effusion.    --- End of Report ---    BAM STEVENS MD; Attending Cardiologist  This document has been electronically signed. Jul 8 2022 10:54AM

## 2022-07-09 NOTE — PROGRESS NOTE ADULT - ASSESSMENT
nausea  abodminal pain    hida negative for acute cholecystitis  ? gastroparesis  iv fluid  unable to obtain nm ges 2/2 pt's dilaudid  cont trial of diet with reglan    I reviewed the overnight course of events on the unit, re-confirming the patient history. I discussed the care with the patient and their family  Differential diagnosis and plan of care discussed with patient after the evaluation  35 minutes spent on total encounter of which more than fifty percent of the encounter was spent counseling and/or coordinating care by the attending physician.  Advanced care planning was discussed with patient and family.  Advanced care planning forms were reviewed and discussed.  Risks, benefits and alternatives of gastroenterologic procedures were discussed in detail and all questions were answered.

## 2022-07-09 NOTE — CHART NOTE - NSCHARTNOTEFT_GEN_A_CORE
RN called for hematuria. Pt's daughter reports blood in the urine this afternoon. Per chart review patient has history of CAD x 2 stents (2012), T2DM, HTN, HLD, GERD, mild AS, chronic venous insufficiency, chronic neuropathy, macular degeneration, chronic constipation, depression, OA, lumbar degenerative disc disease, and spinal stenosis admitted for cholelithiasis, KERRY, and found to be COVID positive on admission.     Plan   # Hematuria.  - On admission UA showed large blood.  H/H low stable at this time  - Currently hemodynamically stable, monitor for signs of active bleeding  - Consider transfusion for hemoglobin <7   - Will repeat UA   - Will continue AC for now given CAD and COVID-19 infection.   - Continue to monitor     Suzie Bell PGY3  Nusrat Drew PGY1

## 2022-07-09 NOTE — PROGRESS NOTE ADULT - ASSESSMENT
89 yo with nausea/abdominal pain.  HIDA neg for acute marilyn  cont reglan  supportive care    D/w daughter that until gastroparesis is ruled out, will hold off on cholecystectomy   signing off, please call if needed

## 2022-07-09 NOTE — PROGRESS NOTE ADULT - PROBLEM SELECTOR PLAN 5
Hgb 10.2 on admission, remains stable 9-10,   - Iron studies show low iron and iron saturation  - Continue PO iron daily  - no signs sx of acute bleed, monitor  - has outpatient appt with Dr. Nicholas for chronic anemia

## 2022-07-09 NOTE — PROGRESS NOTE ADULT - SUBJECTIVE AND OBJECTIVE BOX
INTERVAL HPI/OVERNIGHT EVENTS:        Allergies    morphine (Other)    Intolerances          General:  No wt loss, fevers, chills, night sweats, fatigue,   Eyes:  Good vision, no reported pain  ENT:  No sore throat, pain, runny nose, dysphagia  CV:  No pain, palpitations, hypo/hypertension  Resp:  No dyspnea, cough, tachypnea, wheezing  GI:  No pain, No nausea, No vomiting, No diarrhea, No constipation, No weight loss, No fever, No pruritis, No rectal bleeding, No tarry stools, No dysphagia,  :  No pain, bleeding, incontinence, nocturia  Muscle:  No pain, weakness  Neuro:  No weakness, tingling, memory problems  Psych:  No fatigue, insomnia, mood problems, depression  Endocrine:  No polyuria, polydipsia, cold/heat intolerance  Heme:  No petechiae, ecchymosis, easy bruisability  Skin:  No rash, tattoos, scars, edema      PHYSICAL EXAM:   Vital Signs:  Vital Signs Last 24 Hrs  T(C): 37.1 (09 Jul 2022 05:33), Max: 37.1 (08 Jul 2022 19:36)  T(F): 98.7 (09 Jul 2022 05:33), Max: 98.7 (08 Jul 2022 19:36)  HR: 82 (09 Jul 2022 08:23) (80 - 103)  BP: 156/73 (09 Jul 2022 05:33) (144/81 - 178/81)  BP(mean): --  RR: 17 (09 Jul 2022 05:33) (17 - 18)  SpO2: 96% (09 Jul 2022 08:23) (92% - 96%)    Parameters below as of 09 Jul 2022 08:23  Patient On (Oxygen Delivery Method): room air      Daily     Daily I&O's Summary      GENERAL:  Appears stated age, well-groomed, well-nourished, no distress  HEENT:  NC/AT,  conjunctivae clear and pink, no thyromegaly, nodules, adenopathy, no JVD, sclera -anicteric  CHEST:  Full & symmetric excursion, no increased effort, breath sounds clear  HEART:  Regular rhythm, S1, S2, no murmur/rub/S3/S4, no abdominal bruit, no edema  ABDOMEN:  Soft, non-tender, non-distended, normoactive bowel sounds,  no masses ,no hepato-splenomegaly, no signs of chronic liver disease  EXTEREMITIES:  no cyanosis,clubbing or edema  SKIN:  No rash/erythema/ecchymoses/petechiae/wounds/abscess/warm/dry  NEURO:  Alert, oriented, no asterixis, no tremor, no encephalopathy      LABS:                        9.8    3.92  )-----------( 188      ( 09 Jul 2022 07:45 )             29.6     07-09    136  |  103  |  26<H>  ----------------------------<  98  4.1   |  25  |  1.80<H>    Ca    8.4<L>      09 Jul 2022 07:45    TPro  5.5<L>  /  Alb  2.3<L>  /  TBili  0.2  /  DBili  x   /  AST  30  /  ALT  12  /  AlkPhos  57  07-09        amylase   lipaseLipase, Serum: 78 U/L (07-05 @ 19:05)    RADIOLOGY & ADDITIONAL TESTS:   INTERVAL HPI/OVERNIGHT EVENTS:  No new overnight event.  No N/V/D.  Tolerating diet.  patient having multiple semi formed BMs  will monitor      Allergies    morphine (Other)    Intolerances          General:  No wt loss, fevers, chills, night sweats, fatigue,   Eyes:  Good vision, no reported pain  ENT:  No sore throat, pain, runny nose, dysphagia  CV:  No pain, palpitations, hypo/hypertension  Resp:  No dyspnea, cough, tachypnea, wheezing  GI:  No pain, No nausea, No vomiting, No diarrhea, No constipation, No weight loss, No fever, No pruritis, No rectal bleeding, No tarry stools, No dysphagia,  :  No pain, bleeding, incontinence, nocturia  Muscle:  No pain, weakness  Neuro:  No weakness, tingling, memory problems  Psych:  No fatigue, insomnia, mood problems, depression  Endocrine:  No polyuria, polydipsia, cold/heat intolerance  Heme:  No petechiae, ecchymosis, easy bruisability  Skin:  No rash, tattoos, scars, edema      PHYSICAL EXAM:   Vital Signs:  Vital Signs Last 24 Hrs  T(C): 37.1 (09 Jul 2022 05:33), Max: 37.1 (08 Jul 2022 19:36)  T(F): 98.7 (09 Jul 2022 05:33), Max: 98.7 (08 Jul 2022 19:36)  HR: 82 (09 Jul 2022 08:23) (80 - 103)  BP: 156/73 (09 Jul 2022 05:33) (144/81 - 178/81)  BP(mean): --  RR: 17 (09 Jul 2022 05:33) (17 - 18)  SpO2: 96% (09 Jul 2022 08:23) (92% - 96%)    Parameters below as of 09 Jul 2022 08:23  Patient On (Oxygen Delivery Method): room air      Daily     Daily I&O's Summary      GENERAL:  Appears stated age, well-groomed, well-nourished, no distress  HEENT:  NC/AT,  conjunctivae clear and pink, no thyromegaly, nodules, adenopathy, no JVD, sclera -anicteric  CHEST:  Full & symmetric excursion, no increased effort, breath sounds clear  HEART:  Regular rhythm, S1, S2, no murmur/rub/S3/S4, no abdominal bruit, no edema  ABDOMEN:  Soft, non-tender, non-distended, normoactive bowel sounds,  no masses ,no hepato-splenomegaly, no signs of chronic liver disease  EXTEREMITIES:  no cyanosis,clubbing or edema  SKIN:  No rash/erythema/ecchymoses/petechiae/wounds/abscess/warm/dry  NEURO:  Alert, oriented, no asterixis, no tremor, no encephalopathy      LABS:                        9.8    3.92  )-----------( 188      ( 09 Jul 2022 07:45 )             29.6     07-09    136  |  103  |  26<H>  ----------------------------<  98  4.1   |  25  |  1.80<H>    Ca    8.4<L>      09 Jul 2022 07:45    TPro  5.5<L>  /  Alb  2.3<L>  /  TBili  0.2  /  DBili  x   /  AST  30  /  ALT  12  /  AlkPhos  57  07-09        amylase   lipaseLipase, Serum: 78 U/L (07-05 @ 19:05)    RADIOLOGY & ADDITIONAL TESTS:

## 2022-07-09 NOTE — PROGRESS NOTE ADULT - SUBJECTIVE AND OBJECTIVE BOX
Date/Time Patient Seen:  		  Referring MD:   Data Reviewed	       Patient is a 89y old  Female who presents with a chief complaint of gallstones, covid, kiah (08 Jul 2022 15:08)      Subjective/HPI     PAST MEDICAL & SURGICAL HISTORY:  H/O vertebral fracture repair    History of vertebral fracture    Dyslipidemia    DM type 2 with diabetic dyslipidemia    H/O gastroesophageal reflux (GERD)    Costochondritis    Coronary arteriosclerosis in native artery  s/p 2 stents. last placed 2 years ago    Gastroparesis    History of laminectomy    S/P hip hemiarthroplasty    S/P appendectomy          Medication list         MEDICATIONS  (STANDING):  amitriptyline 30 milliGRAM(s) Oral at bedtime  aspirin  chewable 81 milliGRAM(s) Oral daily  atorvastatin 40 milliGRAM(s) Oral at bedtime  dextrose 5%. 1000 milliLiter(s) (50 mL/Hr) IV Continuous <Continuous>  dextrose 5%. 1000 milliLiter(s) (100 mL/Hr) IV Continuous <Continuous>  dextrose 50% Injectable 25 Gram(s) IV Push once  dextrose 50% Injectable 12.5 Gram(s) IV Push once  dextrose 50% Injectable 25 Gram(s) IV Push once  ferrous    sulfate 325 milliGRAM(s) Oral daily  glucagon  Injectable 1 milliGRAM(s) IntraMuscular once  heparin   Injectable 5000 Unit(s) SubCutaneous every 8 hours  HYDROmorphone   Tablet 4 milliGRAM(s) Oral three times a day  insulin lispro (ADMELOG) corrective regimen sliding scale   SubCutaneous three times a day before meals  insulin lispro (ADMELOG) corrective regimen sliding scale   SubCutaneous at bedtime  metoclopramide Injectable 5 milliGRAM(s) IV Push every 6 hours  metoprolol succinate ER 25 milliGRAM(s) Oral daily  pantoprazole    Tablet 40 milliGRAM(s) Oral before breakfast  polyethylene glycol 3350 17 Gram(s) Oral daily  remdesivir  IVPB   IV Intermittent   remdesivir  IVPB 100 milliGRAM(s) IV Intermittent every 24 hours  senna 2 Tablet(s) Oral at bedtime  sucralfate suspension 1 Gram(s) Oral two times a day    MEDICATIONS  (PRN):  acetaminophen     Tablet .. 650 milliGRAM(s) Oral every 6 hours PRN Temp greater or equal to 38C (100.4F), Mild Pain (1 - 3)  aluminum hydroxide/magnesium hydroxide/simethicone Suspension 30 milliLiter(s) Oral every 4 hours PRN Dyspepsia  dextrose Oral Gel 15 Gram(s) Oral once PRN Blood Glucose LESS THAN 70 milliGRAM(s)/deciliter  melatonin 3 milliGRAM(s) Oral at bedtime PRN Insomnia  ondansetron Injectable 4 milliGRAM(s) IV Push every 8 hours PRN Nausea and/or Vomiting         Vitals log        ICU Vital Signs Last 24 Hrs  T(C): 37.1 (09 Jul 2022 05:33), Max: 37.1 (08 Jul 2022 19:36)  T(F): 98.7 (09 Jul 2022 05:33), Max: 98.7 (08 Jul 2022 19:36)  HR: 80 (09 Jul 2022 05:33) (74 - 103)  BP: 156/73 (09 Jul 2022 05:33) (144/81 - 178/81)  BP(mean): --  ABP: --  ABP(mean): --  RR: 17 (09 Jul 2022 05:33) (17 - 18)  SpO2: 92% (09 Jul 2022 05:33) (91% - 93%)    O2 Parameters below as of 09 Jul 2022 05:33  Patient On (Oxygen Delivery Method): room air                 Input and Output:  I&O's Detail      Lab Data                        10.1   4.29  )-----------( 202      ( 08 Jul 2022 06:45 )             31.4     07-08    134<L>  |  98  |  27<H>  ----------------------------<  103<H>  3.5   |  27  |  2.10<H>    Ca    8.4<L>      08 Jul 2022 06:45    TPro  5.9<L>  /  Alb  2.6<L>  /  TBili  0.2  /  DBili  x   /  AST  28  /  ALT  14  /  AlkPhos  64  07-08            Review of Systems	      Objective     Physical Examination  heart s1s2  lung dec BS  abd soft        Pertinent Lab findings & Imaging      Olvin:  NO   Adequate UO     I&O's Detail           Discussed with:     Cultures:	        Radiology

## 2022-07-09 NOTE — PROGRESS NOTE ADULT - PROBLEM SELECTOR PLAN 2
BUN 30, Cr 2 on admission, baseline appears .87-1.3, also CKD stage 3  - CT abd/p shows R sided hydronephrosis  - Lasix on hold given KERRY, urine studies may have been skewed by lasix due to very high urine sodium per nephro  - Use caution with IVF given know pleural effusion  - Hold nephrotoxic meds   - nephrology consulted Dr. Wu, recs appreciated

## 2022-07-09 NOTE — PROGRESS NOTE ADULT - PROBLEM SELECTOR PLAN 10
hx of spinal stenosis, OA, and neuropathy  -Restarted home PO dilaudid 4mg @ 9am, 4pm, 10pm hx of spinal stenosis, OA, and neuropathy  -Restarted home PO Dilaudid 4mg @ 9am, 4pm, 10pm

## 2022-07-09 NOTE — PROGRESS NOTE ADULT - PROBLEM SELECTOR PLAN 12
As per daughter Mary patient sundowns while in hospital  - no known hx of dementia as per daughter  - daughter states she does NOT  want haldol or zyprexa for patient and that patient does well with Xanax instead    Dispo: Home tomorrow with home PT after last dose Remdesivir

## 2022-07-10 DIAGNOSIS — R31.9 HEMATURIA, UNSPECIFIED: ICD-10-CM

## 2022-07-10 LAB
ANION GAP SERPL CALC-SCNC: 8 MMOL/L — SIGNIFICANT CHANGE UP (ref 5–17)
BUN SERPL-MCNC: 34 MG/DL — HIGH (ref 7–23)
CALCIUM SERPL-MCNC: 8.3 MG/DL — LOW (ref 8.5–10.1)
CHLORIDE SERPL-SCNC: 105 MMOL/L — SIGNIFICANT CHANGE UP (ref 96–108)
CO2 SERPL-SCNC: 24 MMOL/L — SIGNIFICANT CHANGE UP (ref 22–31)
CREAT SERPL-MCNC: 1.8 MG/DL — HIGH (ref 0.5–1.3)
EGFR: 26 ML/MIN/1.73M2 — LOW
GLUCOSE SERPL-MCNC: 141 MG/DL — HIGH (ref 70–99)
HCT VFR BLD CALC: 26.3 % — LOW (ref 34.5–45)
HGB BLD-MCNC: 8.6 G/DL — LOW (ref 11.5–15.5)
MCHC RBC-ENTMCNC: 28.5 PG — SIGNIFICANT CHANGE UP (ref 27–34)
MCHC RBC-ENTMCNC: 32.7 GM/DL — SIGNIFICANT CHANGE UP (ref 32–36)
MCV RBC AUTO: 87.1 FL — SIGNIFICANT CHANGE UP (ref 80–100)
NRBC # BLD: 0 /100 WBCS — SIGNIFICANT CHANGE UP (ref 0–0)
PLATELET # BLD AUTO: 199 K/UL — SIGNIFICANT CHANGE UP (ref 150–400)
POTASSIUM SERPL-MCNC: 3.9 MMOL/L — SIGNIFICANT CHANGE UP (ref 3.5–5.3)
POTASSIUM SERPL-SCNC: 3.9 MMOL/L — SIGNIFICANT CHANGE UP (ref 3.5–5.3)
RBC # BLD: 3.02 M/UL — LOW (ref 3.8–5.2)
RBC # FLD: 13 % — SIGNIFICANT CHANGE UP (ref 10.3–14.5)
SODIUM SERPL-SCNC: 137 MMOL/L — SIGNIFICANT CHANGE UP (ref 135–145)
WBC # BLD: 5.55 K/UL — SIGNIFICANT CHANGE UP (ref 3.8–10.5)
WBC # FLD AUTO: 5.55 K/UL — SIGNIFICANT CHANGE UP (ref 3.8–10.5)

## 2022-07-10 PROCEDURE — 99232 SBSQ HOSP IP/OBS MODERATE 35: CPT

## 2022-07-10 PROCEDURE — 74176 CT ABD & PELVIS W/O CONTRAST: CPT | Mod: 26

## 2022-07-10 RX ORDER — CEFTRIAXONE 500 MG/1
INJECTION, POWDER, FOR SOLUTION INTRAMUSCULAR; INTRAVENOUS
Refills: 0 | Status: DISCONTINUED | OUTPATIENT
Start: 2022-07-10 | End: 2022-07-12

## 2022-07-10 RX ORDER — CEFTRIAXONE 500 MG/1
1000 INJECTION, POWDER, FOR SOLUTION INTRAMUSCULAR; INTRAVENOUS EVERY 24 HOURS
Refills: 0 | Status: DISCONTINUED | OUTPATIENT
Start: 2022-07-11 | End: 2022-07-12

## 2022-07-10 RX ORDER — CEFTRIAXONE 500 MG/1
1000 INJECTION, POWDER, FOR SOLUTION INTRAMUSCULAR; INTRAVENOUS ONCE
Refills: 0 | Status: COMPLETED | OUTPATIENT
Start: 2022-07-10 | End: 2022-07-10

## 2022-07-10 RX ORDER — PANTOPRAZOLE SODIUM 20 MG/1
40 TABLET, DELAYED RELEASE ORAL
Refills: 0 | Status: DISCONTINUED | OUTPATIENT
Start: 2022-07-10 | End: 2022-07-15

## 2022-07-10 RX ADMIN — HYDROMORPHONE HYDROCHLORIDE 4 MILLIGRAM(S): 2 INJECTION INTRAMUSCULAR; INTRAVENOUS; SUBCUTANEOUS at 08:27

## 2022-07-10 RX ADMIN — Medication 81 MILLIGRAM(S): at 11:46

## 2022-07-10 RX ADMIN — Medication 5 MILLIGRAM(S): at 06:52

## 2022-07-10 RX ADMIN — Medication 325 MILLIGRAM(S): at 11:46

## 2022-07-10 RX ADMIN — SENNA PLUS 2 TABLET(S): 8.6 TABLET ORAL at 22:35

## 2022-07-10 RX ADMIN — Medication 1 GRAM(S): at 17:30

## 2022-07-10 RX ADMIN — HEPARIN SODIUM 5000 UNIT(S): 5000 INJECTION INTRAVENOUS; SUBCUTANEOUS at 06:53

## 2022-07-10 RX ADMIN — HYDROMORPHONE HYDROCHLORIDE 4 MILLIGRAM(S): 2 INJECTION INTRAMUSCULAR; INTRAVENOUS; SUBCUTANEOUS at 23:04

## 2022-07-10 RX ADMIN — Medication 5 MILLIGRAM(S): at 17:31

## 2022-07-10 RX ADMIN — ONDANSETRON 4 MILLIGRAM(S): 8 TABLET, FILM COATED ORAL at 13:27

## 2022-07-10 RX ADMIN — ONDANSETRON 4 MILLIGRAM(S): 8 TABLET, FILM COATED ORAL at 00:50

## 2022-07-10 RX ADMIN — Medication 25 MILLIGRAM(S): at 06:52

## 2022-07-10 RX ADMIN — HYDROMORPHONE HYDROCHLORIDE 4 MILLIGRAM(S): 2 INJECTION INTRAMUSCULAR; INTRAVENOUS; SUBCUTANEOUS at 16:40

## 2022-07-10 RX ADMIN — Medication 650 MILLIGRAM(S): at 11:45

## 2022-07-10 RX ADMIN — PANTOPRAZOLE SODIUM 40 MILLIGRAM(S): 20 TABLET, DELAYED RELEASE ORAL at 17:31

## 2022-07-10 RX ADMIN — POLYETHYLENE GLYCOL 3350 17 GRAM(S): 17 POWDER, FOR SOLUTION ORAL at 11:46

## 2022-07-10 RX ADMIN — Medication 1: at 11:44

## 2022-07-10 RX ADMIN — HYDROMORPHONE HYDROCHLORIDE 4 MILLIGRAM(S): 2 INJECTION INTRAMUSCULAR; INTRAVENOUS; SUBCUTANEOUS at 09:25

## 2022-07-10 RX ADMIN — Medication 1 GRAM(S): at 06:55

## 2022-07-10 RX ADMIN — Medication 30 MILLIGRAM(S): at 22:34

## 2022-07-10 RX ADMIN — Medication 5 MILLIGRAM(S): at 00:51

## 2022-07-10 RX ADMIN — HYDROMORPHONE HYDROCHLORIDE 4 MILLIGRAM(S): 2 INJECTION INTRAMUSCULAR; INTRAVENOUS; SUBCUTANEOUS at 15:41

## 2022-07-10 RX ADMIN — Medication 650 MILLIGRAM(S): at 12:40

## 2022-07-10 RX ADMIN — PANTOPRAZOLE SODIUM 40 MILLIGRAM(S): 20 TABLET, DELAYED RELEASE ORAL at 06:59

## 2022-07-10 RX ADMIN — CEFTRIAXONE 100 MILLIGRAM(S): 500 INJECTION, POWDER, FOR SOLUTION INTRAMUSCULAR; INTRAVENOUS at 13:28

## 2022-07-10 RX ADMIN — HYDROMORPHONE HYDROCHLORIDE 4 MILLIGRAM(S): 2 INJECTION INTRAMUSCULAR; INTRAVENOUS; SUBCUTANEOUS at 22:34

## 2022-07-10 RX ADMIN — ATORVASTATIN CALCIUM 40 MILLIGRAM(S): 80 TABLET, FILM COATED ORAL at 22:35

## 2022-07-10 RX ADMIN — Medication 5 MILLIGRAM(S): at 11:46

## 2022-07-10 RX ADMIN — Medication 30 MILLILITER(S): at 13:28

## 2022-07-10 NOTE — PROGRESS NOTE ADULT - ASSESSMENT
89y F pmhx CAD x 2 stents (2012), T2DM, HTN, HLD, GERD, mild AS, chronic venous insufficiency, chronic neuropathy, macular degeneration, chronic constipation, depression, OA, lumbar degenerative disc disease, and spinal stenosis admitted for cholelithiasis, KERRY, and found to be COVID positive on admission    covid  OP  OA  Pleural eff  Atelectasis  KERRY  CAD  Fall  Ataxic gait  HLD  GERD  valv heart disease    hematuria reported  on heparin sq  vs noted  UA noted  labs reviewed  CM following    cvs rx regimen  TTE -   covid management - sx management - monitor VS and Sat - isolation precs  fall prec - PT assessment - gait training - CM follow up  pleural eff - no need for thoracentesis in the immediate future - medical rx regimen and cvs rx regimen  I devonte as able to tolerate  Surgery eval in progress - Acute Angelica eval noted  GOC discussion  assist with needs  repllisandro almanzar

## 2022-07-10 NOTE — PROGRESS NOTE ADULT - ASSESSMENT
89y F PMH CAD x 2 stents (2012), T2DM, HTN, HLD, GERD, mild AS, chronic venous insufficiency, chronic neuropathy, macular degeneration, chronic constipation, depression, OA, lumbar degenerative disc disease, and spinal stenosis admitted for cholelithiasis, KERRY, and found to be COVID19.     Chest pain, Edema, HTN, HLD, CAD s/p stents   - hx CAD s/p 2 stents in 2012  - EKG NSR 80, no acute ischemic changes  - Troponin WNL on admission, no need to further trend, doubt ACS as ekg trops WNL  - Continue asa, statin    - LE edema likely 2/2 chronic venous insufficiency versus component of HF  - 7/7/22 TTE unchanged from previous;   EF 65% with norm LV/RV systolic function, with mild/mod MR.  - CT c/a/p with b/l mod effusions, partial LLL atelectasis  - Lasix on hold 2/2 KERRY. Creatinine improving   - Pulm following, no plan for thoracentesis for b/l effusions, plan for medical management    - Rate controlled per flow sheet     - BP stable and controlled  - Continue BB. Can up titrate if needed     - COVID per primary   - Monitor and replete lytes, keep K>4, Mg>2.  - Will continue to follow.    Abdulkadir Giraldo, MS FNP, [facility]  Nurse Practitioner- Cardiology   Spectra #[phone] /[phone]

## 2022-07-10 NOTE — PROGRESS NOTE ADULT - PROBLEM SELECTOR PLAN 2
Hematuria since 07/09, UA significant for large blood, many bacteria, Hgb down to 8.6 from 9.8  -F/u urine culture  -consider antibiotics  -consider Urology consult

## 2022-07-10 NOTE — PROGRESS NOTE ADULT - PROBLEM SELECTOR PLAN 1
patient covid positive on admission, s/p moderna 3/3  - started on 3 days of remdesivir, last dose completed 7/9  - saturating well on room air and no sx  - contact precautions  - Daughter Mary [phone] states she does not want monoclonal antibodies for patient  - ID consulted, Dr. WESTLEY Izquierdo following patient covid positive on admission, s/p moderna 3/3  - started on 3 days of remdesivir, last dose completed 7/9  - saturating well on room air and no sx  - contact precautions  - Daughter Mary [phone] states she does not want monoclonal antibodies for patient  - ID consulted, Dr. WESTLEY Izquierdo following- S/p IV Remdesivir

## 2022-07-10 NOTE — PROGRESS NOTE ADULT - SUBJECTIVE AND OBJECTIVE BOX
[facility] -- Casper Mcgee Grossman, [place], Eligio Ryan, Thalia Jose: Office # [phone]    Follow Up:  chest pain     Subjective/Observations: Patient awake, alert, forgetful, resting in bed. Denies chest pain, palpitations and dizziness. Denies any difficulty breathing. No orthopnea and PND. Tolerating room air.     REVIEW OF SYSTEMS: All other review of systems are negative unless indicated above    PAST MEDICAL & SURGICAL HISTORY:  H/O vertebral fracture repair      History of vertebral fracture      Dyslipidemia      DM type 2 with diabetic dyslipidemia      H/O gastroesophageal reflux (GERD)      Costochondritis      Coronary arteriosclerosis in native artery  s/p 2 stents. last placed 2 years ago      Gastroparesis      History of laminectomy      S/P hip hemiarthroplasty      S/P appendectomy    MEDICATIONS  (STANDING):  amitriptyline 30 milliGRAM(s) Oral at bedtime  aspirin  chewable 81 milliGRAM(s) Oral daily  atorvastatin 40 milliGRAM(s) Oral at bedtime  cefTRIAXone   IVPB      dextrose 5%. 1000 milliLiter(s) (100 mL/Hr) IV Continuous <Continuous>  dextrose 5%. 1000 milliLiter(s) (50 mL/Hr) IV Continuous <Continuous>  dextrose 50% Injectable 25 Gram(s) IV Push once  dextrose 50% Injectable 12.5 Gram(s) IV Push once  dextrose 50% Injectable 25 Gram(s) IV Push once  ferrous    sulfate 325 milliGRAM(s) Oral daily  glucagon  Injectable 1 milliGRAM(s) IntraMuscular once  HYDROmorphone   Tablet 4 milliGRAM(s) Oral three times a day  insulin lispro (ADMELOG) corrective regimen sliding scale   SubCutaneous three times a day before meals  insulin lispro (ADMELOG) corrective regimen sliding scale   SubCutaneous at bedtime  metoclopramide Injectable 5 milliGRAM(s) IV Push every 6 hours  metoprolol succinate ER 25 milliGRAM(s) Oral daily  pantoprazole    Tablet 40 milliGRAM(s) Oral before breakfast  polyethylene glycol 3350 17 Gram(s) Oral daily  senna 2 Tablet(s) Oral at bedtime  sucralfate suspension 1 Gram(s) Oral two times a day    MEDICATIONS  (PRN):  acetaminophen     Tablet .. 650 milliGRAM(s) Oral every 6 hours PRN Temp greater or equal to 38C (100.4F), Mild Pain (1 - 3)  aluminum hydroxide/magnesium hydroxide/simethicone Suspension 30 milliLiter(s) Oral every 4 hours PRN Dyspepsia  dextrose Oral Gel 15 Gram(s) Oral once PRN Blood Glucose LESS THAN 70 milliGRAM(s)/deciliter  melatonin 3 milliGRAM(s) Oral at bedtime PRN Insomnia  ondansetron Injectable 4 milliGRAM(s) IV Push every 8 hours PRN Nausea and/or Vomiting    Allergies  morphine (Other)    Vital Signs Last 24 Hrs  T(C): 36.9 (10 Jul 2022 11:53), Max: 37.4 (09 Jul 2022 21:00)  T(F): 98.4 (10 Jul 2022 11:53), Max: 99.4 (09 Jul 2022 21:00)  HR: 90 (10 Jul 2022 11:53) (90 - 95)  BP: 104/58 (10 Jul 2022 11:53) (104/58 - 123/73)  BP(mean): --  RR: 16 (10 Jul 2022 11:53) (16 - 17)  SpO2: 95% (10 Jul 2022 11:53) (74% - 95%)    Parameters below as of 10 Jul 2022 11:53  Patient On (Oxygen Delivery Method): room air      I&O's Summary      TELE: Not on telemetry   PHYSICAL EXAM:  Constitutional: NAD, awake and alert, Frail   HEENT: Moist Mucous Membranes, Anicteric  Pulmonary: Non-labored, breath sounds are clear bilaterally, No wheezing, rales or rhonchi  Cardiovascular: Regular, S1 and S2, No murmurs, No rubs, gallops or clicks  Gastrointestinal:  soft, nontender, nondistended   Lymph: +2  peripheral edema with chronic venous stasis skin changes. No lymphadenopathy.   Skin: No visible rashes or ulcers.  Psych:  confused       LABS: All Labs Reviewed:                        8.6    5.55  )-----------( 199      ( 10 Jul 2022 07:44 )             26.3                         9.8    3.92  )-----------( 188      ( 09 Jul 2022 07:45 )             29.6                         10.1   4.29  )-----------( 202      ( 08 Jul 2022 06:45 )             31.4     10 Jul 2022 07:44    137    |  105    |  34     ----------------------------<  141    3.9     |  24     |  1.80   09 Jul 2022 07:45    136    |  103    |  26     ----------------------------<  98     4.1     |  25     |  1.80   08 Jul 2022 06:45    134    |  98     |  27     ----------------------------<  103    3.5     |  27     |  2.10     Ca    8.3        10 Jul 2022 07:44  Ca    8.4        09 Jul 2022 07:45  Ca    8.4        08 Jul 2022 06:45    TPro  5.5    /  Alb  2.3    /  TBili  0.2    /  DBili  x      /  AST  30     /  ALT  12     /  AlkPhos  57     09 Jul 2022 07:45  TPro  5.9    /  Alb  2.6    /  TBili  0.2    /  DBili  x      /  AST  28     /  ALT  14     /  AlkPhos  64     08 Jul 2022 06:45   LIVER FUNCTIONS - ( 09 Jul 2022 07:45 )  Alb: 2.3 g/dL / Pro: 5.5 g/dL / ALK PHOS: 57 U/L / ALT: 12 U/L / AST: 30 U/L / GGT: x             12 Lead ECG:   Ventricular Rate 71 BPM    Atrial Rate 71 BPM    P-R Interval 142 ms    QRS Duration 84 ms    Q-T Interval 392 ms    QTC Calculation(Bazett) 425 ms    P Axis 64 degrees    R Axis 18 degrees    T Axis 7 degrees    Diagnosis Line Normal sinus rhythm  Normal ECG  When compared with ECG of 05-JUL-2022 19:57, (Unconfirmed)  No significant change was found  Confirmed by Bry Warren MD ([id number]) on 7/6/2022 1:56:48 PM (07-06-22 @ 13:32)      ACC: [id number] EXAM:  ECHO TTE WO CON COMP W DOPP                        PROCEDURE DATE:  07/07/2022    INTERPRETATION:  INDICATION: dyspnea  Sonographer LK    Blood Pressure 143/70    Height 157 cm     Weight 52 kg       BSA 1.5 sq m    Dimensions:  LA unavailable       Normal Values: 2.0 - 4.0 cm  Ao unavailable        Normal Values: 2.0 - 3.8 cm  SEPTUM unavailable       Normal Values: 0.6 - 1.2 cm  PWT unavailable       Normal Values: 0.6 - 1.1 cm  LVIDd unavailable         Normal Values: 3.0 - 5.6 cm  LVIDs unavailable         Normal Values: 1.8 - 4.0 cm      OBSERVATIONS:  Technically difficult study  Mitral Valve: Mitral annular calcification with thickened leaflets, mild   to moderate MR.  Aortic Valve/Aorta: Sclerotic trileaflet aortic valve with grossly normal   opening by visual estimation.  Tricuspid Valve: normal with trace TR.  Pulmonic Valve: Not well-visualized  Left Atrium: Enlarged  Right Atrium: Not well-visualized  Left Ventricle: normal LV size and systolic function, estimated LVEF of   65%.  Right Ventricle: Grossly normal size and systolic function.  Pericardium: no significant pericardial effusion.  Pulmonary/RV Pressure: estimated PA systolic pressure of 20mmHg  IVC measures 1.85 cm    IMPRESSION:  Technically difficult study  Normal left ventricular internal dimensions and systolic function,   estimated LVEF of 65%.  Grossly normal RV size and systolic function.  Sclerotic trileaflet aortic valve with grossly normal opening by visual   estimation, without AI.  Mild to moderate MR  No significant pericardial effusion.  --- End of Report ---  BAM STEVENS MD; Attending Cardiologist  This document has been electronically signed. Jul 8 2022 10:54AM

## 2022-07-10 NOTE — PROGRESS NOTE ADULT - PROBLEM SELECTOR PLAN 4
Patient with nausea x 4 days associated with dec PO intake, epigastric pain ~2 weeks ago IMPROVED  - CT abd/pel: Cholelithiasis with distended gallbladder and right upper quadrant inflammatory changes, findings suspicious for acute cholecystitis.  - RUQ US: Cholelithiasis with nonspecific pericholecystic fluid. Negative sonographic Scott sign. Dilated common bile duct.  - recent admission to [facility] 2/25- 3/2 for gallstone pancreatitis with non-operative treatment   -HIDA normal (7/6) and clear liquid diet with carb restrictions started  -Gastric emptying study cancelled due to patient's inability to go without pain meds  -Advance diet as tolerated, anti-emetics PRN  -Soft and bite sized diet now  - surgery consulted (Dr. Rodrigues), f/u outpatient  -GI Dr Montilla consulted, recs appreciated- esophagram scheduled for Monday

## 2022-07-10 NOTE — PROGRESS NOTE ADULT - SUBJECTIVE AND OBJECTIVE BOX
Date/Time Patient Seen:  		  Referring MD:   Data Reviewed	       Patient is a 89y old  Female who presents with a chief complaint of gallstones, kiah egan (09 Jul 2022 10:52)      Subjective/HPI     PAST MEDICAL & SURGICAL HISTORY:  H/O vertebral fracture repair    History of vertebral fracture    Dyslipidemia    DM type 2 with diabetic dyslipidemia    H/O gastroesophageal reflux (GERD)    Costochondritis    Coronary arteriosclerosis in native artery  s/p 2 stents. last placed 2 years ago    Gastroparesis    History of laminectomy    S/P hip hemiarthroplasty    S/P appendectomy          Medication list         MEDICATIONS  (STANDING):  amitriptyline 30 milliGRAM(s) Oral at bedtime  aspirin  chewable 81 milliGRAM(s) Oral daily  atorvastatin 40 milliGRAM(s) Oral at bedtime  dextrose 5%. 1000 milliLiter(s) (50 mL/Hr) IV Continuous <Continuous>  dextrose 5%. 1000 milliLiter(s) (100 mL/Hr) IV Continuous <Continuous>  dextrose 50% Injectable 25 Gram(s) IV Push once  dextrose 50% Injectable 12.5 Gram(s) IV Push once  dextrose 50% Injectable 25 Gram(s) IV Push once  ferrous    sulfate 325 milliGRAM(s) Oral daily  glucagon  Injectable 1 milliGRAM(s) IntraMuscular once  heparin   Injectable 5000 Unit(s) SubCutaneous every 8 hours  HYDROmorphone   Tablet 4 milliGRAM(s) Oral three times a day  insulin lispro (ADMELOG) corrective regimen sliding scale   SubCutaneous three times a day before meals  insulin lispro (ADMELOG) corrective regimen sliding scale   SubCutaneous at bedtime  metoclopramide Injectable 5 milliGRAM(s) IV Push every 6 hours  metoprolol succinate ER 25 milliGRAM(s) Oral daily  pantoprazole    Tablet 40 milliGRAM(s) Oral before breakfast  polyethylene glycol 3350 17 Gram(s) Oral daily  senna 2 Tablet(s) Oral at bedtime  sucralfate suspension 1 Gram(s) Oral two times a day    MEDICATIONS  (PRN):  acetaminophen     Tablet .. 650 milliGRAM(s) Oral every 6 hours PRN Temp greater or equal to 38C (100.4F), Mild Pain (1 - 3)  aluminum hydroxide/magnesium hydroxide/simethicone Suspension 30 milliLiter(s) Oral every 4 hours PRN Dyspepsia  dextrose Oral Gel 15 Gram(s) Oral once PRN Blood Glucose LESS THAN 70 milliGRAM(s)/deciliter  melatonin 3 milliGRAM(s) Oral at bedtime PRN Insomnia  ondansetron Injectable 4 milliGRAM(s) IV Push every 8 hours PRN Nausea and/or Vomiting         Vitals log        ICU Vital Signs Last 24 Hrs  T(C): 37.2 (10 Jul 2022 05:34), Max: 37.4 (09 Jul 2022 21:00)  T(F): 99 (10 Jul 2022 05:34), Max: 99.4 (09 Jul 2022 21:00)  HR: 95 (10 Jul 2022 05:34) (82 - 95)  BP: 123/73 (10 Jul 2022 05:34) (121/73 - 149/80)  BP(mean): --  ABP: --  ABP(mean): --  RR: 17 (10 Jul 2022 05:34) (17 - 17)  SpO2: 74% (10 Jul 2022 05:34) (74% - 96%)    O2 Parameters below as of 10 Jul 2022 05:34  Patient On (Oxygen Delivery Method): room air                 Input and Output:  I&O's Detail      Lab Data                        9.8    3.92  )-----------( 188      ( 09 Jul 2022 07:45 )             29.6     07-09    136  |  103  |  26<H>  ----------------------------<  98  4.1   |  25  |  1.80<H>    Ca    8.4<L>      09 Jul 2022 07:45    TPro  5.5<L>  /  Alb  2.3<L>  /  TBili  0.2  /  DBili  x   /  AST  30  /  ALT  12  /  AlkPhos  57  07-09            Review of Systems	      Objective     Physical Examination    heart s1s2  lung dec BS      Pertinent Lab findings & Imaging      Bah:  NO   Adequate UO     I&O's Detail           Discussed with:     Cultures:	        Radiology

## 2022-07-10 NOTE — PROGRESS NOTE ADULT - SUBJECTIVE AND OBJECTIVE BOX
INTERVAL HPI/OVERNIGHT EVENTS:        Allergies    morphine (Other)    Intolerances          General:  No wt loss, fevers, chills, night sweats, fatigue,   Eyes:  Good vision, no reported pain  ENT:  No sore throat, pain, runny nose, dysphagia  CV:  No pain, palpitations, hypo/hypertension  Resp:  No dyspnea, cough, tachypnea, wheezing  GI:  No pain, No nausea, No vomiting, No diarrhea, No constipation, No weight loss, No fever, No pruritis, No rectal bleeding, No tarry stools, No dysphagia,  :  No pain, bleeding, incontinence, nocturia  Muscle:  No pain, weakness  Neuro:  No weakness, tingling, memory problems  Psych:  No fatigue, insomnia, mood problems, depression  Endocrine:  No polyuria, polydipsia, cold/heat intolerance  Heme:  No petechiae, ecchymosis, easy bruisability  Skin:  No rash, tattoos, scars, edema      PHYSICAL EXAM:   Vital Signs:  Vital Signs Last 24 Hrs  T(C): 37.2 (10 Jul 2022 05:34), Max: 37.4 (2022 21:00)  T(F): 99 (10 Jul 2022 05:34), Max: 99.4 (2022 21:00)  HR: 95 (10 Jul 2022 05:34) (87 - 95)  BP: 123/73 (10 Jul 2022 05:34) (121/73 - 149/80)  BP(mean): --  RR: 17 (10 Jul 2022 05:34) (17 - 17)  SpO2: 74% (10 Jul 2022 05:34) (74% - 94%)    Parameters below as of 10 Jul 2022 05:34  Patient On (Oxygen Delivery Method): room air      Daily     Daily I&O's Summary      GENERAL:  Appears stated age, well-groomed, well-nourished, no distress  HEENT:  NC/AT,  conjunctivae clear and pink, no thyromegaly, nodules, adenopathy, no JVD, sclera -anicteric  CHEST:  Full & symmetric excursion, no increased effort, breath sounds clear  HEART:  Regular rhythm, S1, S2, no murmur/rub/S3/S4, no abdominal bruit, no edema  ABDOMEN:  Soft, non-tender, non-distended, normoactive bowel sounds,  no masses ,no hepato-splenomegaly, no signs of chronic liver disease  EXTEREMITIES:  no cyanosis,clubbing or edema  SKIN:  No rash/erythema/ecchymoses/petechiae/wounds/abscess/warm/dry  NEURO:  Alert, oriented, no asterixis, no tremor, no encephalopathy      LABS:                        8.6    5.55  )-----------( 199      ( 10 Jul 2022 07:44 )             26.3     07-10    137  |  105  |  34<H>  ----------------------------<  141<H>  3.9   |  24  |  1.80<H>    Ca    8.3<L>      10 Jul 2022 07:44    TPro  5.5<L>  /  Alb  2.3<L>  /  TBili  0.2  /  DBili  x   /  AST  30  /  ALT  12  /  AlkPhos  57  07-09      Urinalysis Basic - ( 2022 22:30 )    Color: Yellow / Appearance: Slightly Turbid / S.005 / pH: x  Gluc: x / Ketone: Trace  / Bili: Negative / Urobili: Negative   Blood: x / Protein: 25 mg/dL / Nitrite: Negative   Leuk Esterase: Trace / RBC: 3-5 /HPF / WBC 3-5   Sq Epi: x / Non Sq Epi: Negative / Bacteria: Many      amylase   lipaseLipase, Serum: 78 U/L ( @ 19:05)    RADIOLOGY & ADDITIONAL TESTS:   INTERVAL HPI/OVERNIGHT EVENTS:  No new overnight event.  No N/V/D.  Tolerating diet.        Allergies    morphine (Other)    Intolerances          General:  No wt loss, fevers, chills, night sweats, fatigue,   Eyes:  Good vision, no reported pain  ENT:  No sore throat, pain, runny nose, dysphagia  CV:  No pain, palpitations, hypo/hypertension  Resp:  No dyspnea, cough, tachypnea, wheezing  GI:  No pain, No nausea, No vomiting, No diarrhea, No constipation, No weight loss, No fever, No pruritis, No rectal bleeding, No tarry stools, No dysphagia,  :  No pain, bleeding, incontinence, nocturia  Muscle:  No pain, weakness  Neuro:  No weakness, tingling, memory problems  Psych:  No fatigue, insomnia, mood problems, depression  Endocrine:  No polyuria, polydipsia, cold/heat intolerance  Heme:  No petechiae, ecchymosis, easy bruisability  Skin:  No rash, tattoos, scars, edema      PHYSICAL EXAM:   Vital Signs:  Vital Signs Last 24 Hrs  T(C): 37.2 (10 Jul 2022 05:34), Max: 37.4 (2022 21:00)  T(F): 99 (10 Jul 2022 05:34), Max: 99.4 (2022 21:00)  HR: 95 (10 Jul 2022 05:34) (87 - 95)  BP: 123/73 (10 Jul 2022 05:34) (121/73 - 149/80)  BP(mean): --  RR: 17 (10 Jul 2022 05:34) (17 - 17)  SpO2: 74% (10 Jul 2022 05:34) (74% - 94%)    Parameters below as of 10 Jul 2022 05:34  Patient On (Oxygen Delivery Method): room air      Daily     Daily I&O's Summary      GENERAL:  Appears stated age, well-groomed, well-nourished, no distress  HEENT:  NC/AT,  conjunctivae clear and pink, no thyromegaly, nodules, adenopathy, no JVD, sclera -anicteric  CHEST:  Full & symmetric excursion, no increased effort, breath sounds clear  HEART:  Regular rhythm, S1, S2, no murmur/rub/S3/S4, no abdominal bruit, no edema  ABDOMEN:  Soft, non-tender, non-distended, normoactive bowel sounds,  no masses ,no hepato-splenomegaly, no signs of chronic liver disease  EXTEREMITIES:  no cyanosis,clubbing or edema  SKIN:  No rash/erythema/ecchymoses/petechiae/wounds/abscess/warm/dry  NEURO:  Alert, oriented, no asterixis, no tremor, no encephalopathy      LABS:                        8.6    5.55  )-----------( 199      ( 10 Jul 2022 07:44 )             26.3     07-10    137  |  105  |  34<H>  ----------------------------<  141<H>  3.9   |  24  |  1.80<H>    Ca    8.3<L>      10 Jul 2022 07:44    TPro  5.5<L>  /  Alb  2.3<L>  /  TBili  0.2  /  DBili  x   /  AST  30  /  ALT  12  /  AlkPhos  57  07-09      Urinalysis Basic - ( 2022 22:30 )    Color: Yellow / Appearance: Slightly Turbid / S.005 / pH: x  Gluc: x / Ketone: Trace  / Bili: Negative / Urobili: Negative   Blood: x / Protein: 25 mg/dL / Nitrite: Negative   Leuk Esterase: Trace / RBC: 3-5 /HPF / WBC 3-5   Sq Epi: x / Non Sq Epi: Negative / Bacteria: Many      amylase   lipaseLipase, Serum: 78 U/L ( @ 19:05)    RADIOLOGY & ADDITIONAL TESTS:

## 2022-07-10 NOTE — PROGRESS NOTE ADULT - PROBLEM SELECTOR PLAN 3
BUN 30, Cr 2 on admission, baseline appears .87-1.3, also CKD stage 3, stable at cr. 1.8 now  - CT abd/p shows R sided hydronephrosis  - Lasix on hold given KERRY, urine studies may have been skewed by lasix due to very high urine sodium per nephro  - Use caution with IVF given know pleural effusion  - Hold nephrotoxic meds   - nephrology consulted Dr. Wu, recs appreciated

## 2022-07-10 NOTE — PROGRESS NOTE ADULT - PROBLEM SELECTOR PLAN 6
Hgb 10.2 on admission, dropped to 8.6 (7/10) from 9.8,   - Iron studies show low iron and iron saturation  - Continue PO iron daily  -experiencing hematuria since 7/09/22  - no signs sx of acute bleed, monitor  - has outpatient appt with Dr. Nicholas for chronic anemia

## 2022-07-11 LAB
ALBUMIN SERPL ELPH-MCNC: 2.3 G/DL — LOW (ref 3.3–5)
ALP SERPL-CCNC: 56 U/L — SIGNIFICANT CHANGE UP (ref 40–120)
ALT FLD-CCNC: 33 U/L — SIGNIFICANT CHANGE UP (ref 12–78)
ANION GAP SERPL CALC-SCNC: 5 MMOL/L — SIGNIFICANT CHANGE UP (ref 5–17)
APPEARANCE UR: ABNORMAL
AST SERPL-CCNC: 80 U/L — HIGH (ref 15–37)
BASOPHILS # BLD AUTO: 0.01 K/UL — SIGNIFICANT CHANGE UP (ref 0–0.2)
BASOPHILS NFR BLD AUTO: 0.2 % — SIGNIFICANT CHANGE UP (ref 0–2)
BILIRUB SERPL-MCNC: 0.2 MG/DL — SIGNIFICANT CHANGE UP (ref 0.2–1.2)
BILIRUB UR-MCNC: NEGATIVE — SIGNIFICANT CHANGE UP
BUN SERPL-MCNC: 41 MG/DL — HIGH (ref 7–23)
CALCIUM SERPL-MCNC: 8.3 MG/DL — LOW (ref 8.5–10.1)
CHLORIDE SERPL-SCNC: 104 MMOL/L — SIGNIFICANT CHANGE UP (ref 96–108)
CO2 SERPL-SCNC: 29 MMOL/L — SIGNIFICANT CHANGE UP (ref 22–31)
COLOR SPEC: YELLOW — SIGNIFICANT CHANGE UP
CREAT SERPL-MCNC: 1.7 MG/DL — HIGH (ref 0.5–1.3)
CULTURE RESULTS: SIGNIFICANT CHANGE UP
CULTURE RESULTS: SIGNIFICANT CHANGE UP
DIFF PNL FLD: ABNORMAL
EGFR: 28 ML/MIN/1.73M2 — LOW
EOSINOPHIL # BLD AUTO: 0.03 K/UL — SIGNIFICANT CHANGE UP (ref 0–0.5)
EOSINOPHIL NFR BLD AUTO: 0.6 % — SIGNIFICANT CHANGE UP (ref 0–6)
GLUCOSE SERPL-MCNC: 120 MG/DL — HIGH (ref 70–99)
GLUCOSE UR QL: NEGATIVE — SIGNIFICANT CHANGE UP
HCT VFR BLD CALC: 22.5 % — LOW (ref 34.5–45)
HCT VFR BLD CALC: 25 % — LOW (ref 34.5–45)
HGB BLD-MCNC: 7.2 G/DL — LOW (ref 11.5–15.5)
HGB BLD-MCNC: 8 G/DL — LOW (ref 11.5–15.5)
IMM GRANULOCYTES NFR BLD AUTO: 0.4 % — SIGNIFICANT CHANGE UP (ref 0–1.5)
KETONES UR-MCNC: NEGATIVE — SIGNIFICANT CHANGE UP
LEUKOCYTE ESTERASE UR-ACNC: NEGATIVE — SIGNIFICANT CHANGE UP
LYMPHOCYTES # BLD AUTO: 0.76 K/UL — LOW (ref 1–3.3)
LYMPHOCYTES # BLD AUTO: 15.9 % — SIGNIFICANT CHANGE UP (ref 13–44)
MCHC RBC-ENTMCNC: 28.3 PG — SIGNIFICANT CHANGE UP (ref 27–34)
MCHC RBC-ENTMCNC: 32 GM/DL — SIGNIFICANT CHANGE UP (ref 32–36)
MCV RBC AUTO: 88.6 FL — SIGNIFICANT CHANGE UP (ref 80–100)
MONOCYTES # BLD AUTO: 0.48 K/UL — SIGNIFICANT CHANGE UP (ref 0–0.9)
MONOCYTES NFR BLD AUTO: 10 % — SIGNIFICANT CHANGE UP (ref 2–14)
NEUTROPHILS # BLD AUTO: 3.48 K/UL — SIGNIFICANT CHANGE UP (ref 1.8–7.4)
NEUTROPHILS NFR BLD AUTO: 72.9 % — SIGNIFICANT CHANGE UP (ref 43–77)
NITRITE UR-MCNC: NEGATIVE — SIGNIFICANT CHANGE UP
NRBC # BLD: 0 /100 WBCS — SIGNIFICANT CHANGE UP (ref 0–0)
PH UR: 8 — SIGNIFICANT CHANGE UP (ref 5–8)
PLATELET # BLD AUTO: 171 K/UL — SIGNIFICANT CHANGE UP (ref 150–400)
POTASSIUM SERPL-MCNC: 4.5 MMOL/L — SIGNIFICANT CHANGE UP (ref 3.5–5.3)
POTASSIUM SERPL-SCNC: 4.5 MMOL/L — SIGNIFICANT CHANGE UP (ref 3.5–5.3)
PROT SERPL-MCNC: 5.1 G/DL — LOW (ref 6–8.3)
PROT UR-MCNC: 30 MG/DL
RBC # BLD: 2.54 M/UL — LOW (ref 3.8–5.2)
RBC # FLD: 13 % — SIGNIFICANT CHANGE UP (ref 10.3–14.5)
SODIUM SERPL-SCNC: 138 MMOL/L — SIGNIFICANT CHANGE UP (ref 135–145)
SP GR SPEC: 1 — LOW (ref 1.01–1.02)
SPECIMEN SOURCE: SIGNIFICANT CHANGE UP
SPECIMEN SOURCE: SIGNIFICANT CHANGE UP
UROBILINOGEN FLD QL: NEGATIVE — SIGNIFICANT CHANGE UP
WBC # BLD: 4.78 K/UL — SIGNIFICANT CHANGE UP (ref 3.8–10.5)
WBC # FLD AUTO: 4.78 K/UL — SIGNIFICANT CHANGE UP (ref 3.8–10.5)

## 2022-07-11 PROCEDURE — 99232 SBSQ HOSP IP/OBS MODERATE 35: CPT

## 2022-07-11 RX ORDER — PREGABALIN 225 MG/1
1000 CAPSULE ORAL DAILY
Refills: 0 | Status: DISCONTINUED | OUTPATIENT
Start: 2022-07-11 | End: 2022-07-15

## 2022-07-11 RX ORDER — INSULIN LISPRO 100/ML
VIAL (ML) SUBCUTANEOUS EVERY 6 HOURS
Refills: 0 | Status: DISCONTINUED | OUTPATIENT
Start: 2022-07-11 | End: 2022-07-12

## 2022-07-11 RX ORDER — IRON SUCROSE 20 MG/ML
100 INJECTION, SOLUTION INTRAVENOUS EVERY 24 HOURS
Refills: 0 | Status: COMPLETED | OUTPATIENT
Start: 2022-07-11 | End: 2022-07-13

## 2022-07-11 RX ORDER — LIDOCAINE 4 G/100G
1 CREAM TOPICAL DAILY
Refills: 0 | Status: DISCONTINUED | OUTPATIENT
Start: 2022-07-11 | End: 2022-07-15

## 2022-07-11 RX ORDER — FUROSEMIDE 40 MG
20 TABLET ORAL DAILY
Refills: 0 | Status: DISCONTINUED | OUTPATIENT
Start: 2022-07-11 | End: 2022-07-12

## 2022-07-11 RX ORDER — METOPROLOL TARTRATE 50 MG
25 TABLET ORAL DAILY
Refills: 0 | Status: DISCONTINUED | OUTPATIENT
Start: 2022-07-11 | End: 2022-07-15

## 2022-07-11 RX ADMIN — HYDROMORPHONE HYDROCHLORIDE 4 MILLIGRAM(S): 2 INJECTION INTRAMUSCULAR; INTRAVENOUS; SUBCUTANEOUS at 09:45

## 2022-07-11 RX ADMIN — LIDOCAINE 1 PATCH: 4 CREAM TOPICAL at 16:52

## 2022-07-11 RX ADMIN — SENNA PLUS 2 TABLET(S): 8.6 TABLET ORAL at 22:09

## 2022-07-11 RX ADMIN — Medication 5 MILLIGRAM(S): at 00:26

## 2022-07-11 RX ADMIN — PANTOPRAZOLE SODIUM 40 MILLIGRAM(S): 20 TABLET, DELAYED RELEASE ORAL at 18:07

## 2022-07-11 RX ADMIN — Medication 5 MILLIGRAM(S): at 06:13

## 2022-07-11 RX ADMIN — HYDROMORPHONE HYDROCHLORIDE 4 MILLIGRAM(S): 2 INJECTION INTRAMUSCULAR; INTRAVENOUS; SUBCUTANEOUS at 22:08

## 2022-07-11 RX ADMIN — HYDROMORPHONE HYDROCHLORIDE 4 MILLIGRAM(S): 2 INJECTION INTRAMUSCULAR; INTRAVENOUS; SUBCUTANEOUS at 08:56

## 2022-07-11 RX ADMIN — ATORVASTATIN CALCIUM 40 MILLIGRAM(S): 80 TABLET, FILM COATED ORAL at 22:09

## 2022-07-11 RX ADMIN — IRON SUCROSE 100 MILLIGRAM(S): 20 INJECTION, SOLUTION INTRAVENOUS at 22:08

## 2022-07-11 RX ADMIN — Medication 5 MILLIGRAM(S): at 18:54

## 2022-07-11 RX ADMIN — LIDOCAINE 1 PATCH: 4 CREAM TOPICAL at 19:00

## 2022-07-11 RX ADMIN — Medication 81 MILLIGRAM(S): at 13:31

## 2022-07-11 RX ADMIN — HYDROMORPHONE HYDROCHLORIDE 4 MILLIGRAM(S): 2 INJECTION INTRAMUSCULAR; INTRAVENOUS; SUBCUTANEOUS at 23:08

## 2022-07-11 RX ADMIN — Medication 20 MILLIGRAM(S): at 16:51

## 2022-07-11 RX ADMIN — CEFTRIAXONE 100 MILLIGRAM(S): 500 INJECTION, POWDER, FOR SOLUTION INTRAMUSCULAR; INTRAVENOUS at 13:36

## 2022-07-11 RX ADMIN — POLYETHYLENE GLYCOL 3350 17 GRAM(S): 17 POWDER, FOR SOLUTION ORAL at 13:34

## 2022-07-11 RX ADMIN — Medication 3 MILLIGRAM(S): at 22:08

## 2022-07-11 RX ADMIN — PANTOPRAZOLE SODIUM 40 MILLIGRAM(S): 20 TABLET, DELAYED RELEASE ORAL at 06:13

## 2022-07-11 RX ADMIN — HYDROMORPHONE HYDROCHLORIDE 4 MILLIGRAM(S): 2 INJECTION INTRAMUSCULAR; INTRAVENOUS; SUBCUTANEOUS at 16:51

## 2022-07-11 RX ADMIN — Medication 325 MILLIGRAM(S): at 13:31

## 2022-07-11 RX ADMIN — Medication 1 GRAM(S): at 18:07

## 2022-07-11 RX ADMIN — Medication 1 GRAM(S): at 06:13

## 2022-07-11 RX ADMIN — Medication 2: at 08:53

## 2022-07-11 RX ADMIN — Medication 5 MILLIGRAM(S): at 13:31

## 2022-07-11 RX ADMIN — Medication 30 MILLIGRAM(S): at 22:09

## 2022-07-11 NOTE — PROGRESS NOTE ADULT - PROBLEM SELECTOR PLAN 4
BUN 30, Cr 2 on admission, baseline appears .87-1.3, also CKD stage 3, stable at cr. 1.7 now  - CT abd/p shows R sided hydronephrosis  - Lasix on hold given KERRY, urine studies may have been skewed by lasix due to very high urine sodium per nephro  - Use caution with IVF given know pleural effusion  - Hold nephrotoxic meds   - nephrology consulted Dr. Wu, recs appreciated BUN 30, Cr 2 on admission, baseline appears .87-1.3, also CKD stage 3, stable at cr. 1.7 now   -Pt had recent IV Contrast in ER ~2 weeks ago ? [facility]   - CT abd/p shows R sided hydronephrosis  - Lasix on hold given KERRY, urine studies may have been skewed by lasix due to very high urine sodium per nephro  - Use caution with IVF given know pleural effusion  - Hold nephrotoxic meds   - nephrology Dr. Wu,/DR PATEL d/w  recs appreciated Acute KERRY on CKD III.   - Creatinine improved this AM    - CT abd/p shows R sided hydronephrosis  - Lasix on hold given KERRY, urine studies may have been skewed by lasix due to very high urine sodium per nephro  - Use caution with IVF given know pleural effusion  - Hold nephrotoxic meds   - nephrology Dr. Wu,/DR PATEL d/w  recs appreciated Acute KERRY on CKD III.   - Creatinine improved this AM    - CT abd/p shows R sided hydronephrosis  - Lasix on hold given KERRY, urine studies may have been skewed by lasix due to very high urine sodium per nephro  - Use caution with IVF given know pleural effusion  - Hold nephrotoxic meds   - nephrology Dr. Wu,/DR PATEL d/w  -Lasix 1 dose OK

## 2022-07-11 NOTE — CONSULT NOTE ADULT - ASSESSMENT
COVID  Episode gross hematuria  with UA suggestive UTI on Rocephin,   Right Hydronephrosis of  undetermined etiology,   CKD -   confusion  - assessment from patient complaints limited  history pancreatitis and gall stones    will obtain cath UA and C&S  Further evaluation right hydronephrosis pending patient condition

## 2022-07-11 NOTE — PROGRESS NOTE ADULT - PROBLEM SELECTOR PLAN 9
Vital signs remain stable  - continue home Toprol Normotensive this AM   - continue home Toprol with hold parameters

## 2022-07-11 NOTE — PROGRESS NOTE ADULT - SUBJECTIVE AND OBJECTIVE BOX
[facility] -- Casper Mcgee, Briana Valles, Eligio Ryan Savella, [place]  Office # [phone]    Follow Up:  chest pain    Subjective/Observations: Patient is confuse, alert to self only. Unable to obtain comprehensive ROS.  Remains on RA, not in acute respiratory distress.       REVIEW OF SYSTEMS: All other review of systems is negative unless indicated above  PAST MEDICAL & SURGICAL HISTORY:  H/O vertebral fracture repair      History of vertebral fracture      Dyslipidemia      DM type 2 with diabetic dyslipidemia      H/O gastroesophageal reflux (GERD)      Costochondritis      Coronary arteriosclerosis in native artery  s/p 2 stents. last placed 2 years ago      Gastroparesis      History of laminectomy      S/P hip hemiarthroplasty      S/P appendectomy        MEDICATIONS  (STANDING):  amitriptyline 30 milliGRAM(s) Oral at bedtime  aspirin  chewable 81 milliGRAM(s) Oral daily  atorvastatin 40 milliGRAM(s) Oral at bedtime  cefTRIAXone   IVPB      cefTRIAXone   IVPB 1000 milliGRAM(s) IV Intermittent every 24 hours  dextrose 5%. 1000 milliLiter(s) (100 mL/Hr) IV Continuous <Continuous>  dextrose 5%. 1000 milliLiter(s) (50 mL/Hr) IV Continuous <Continuous>  dextrose 50% Injectable 25 Gram(s) IV Push once  dextrose 50% Injectable 12.5 Gram(s) IV Push once  dextrose 50% Injectable 25 Gram(s) IV Push once  ferrous    sulfate 325 milliGRAM(s) Oral daily  glucagon  Injectable 1 milliGRAM(s) IntraMuscular once  HYDROmorphone   Tablet 4 milliGRAM(s) Oral three times a day  insulin lispro (ADMELOG) corrective regimen sliding scale   SubCutaneous three times a day before meals  insulin lispro (ADMELOG) corrective regimen sliding scale   SubCutaneous at bedtime  metoclopramide Injectable 5 milliGRAM(s) IV Push every 6 hours  metoprolol succinate ER 25 milliGRAM(s) Oral daily  pantoprazole    Tablet 40 milliGRAM(s) Oral two times a day  polyethylene glycol 3350 17 Gram(s) Oral daily  senna 2 Tablet(s) Oral at bedtime  sucralfate suspension 1 Gram(s) Oral two times a day    MEDICATIONS  (PRN):  acetaminophen     Tablet .. 650 milliGRAM(s) Oral every 6 hours PRN Temp greater or equal to 38C (100.4F), Mild Pain (1 - 3)  aluminum hydroxide/magnesium hydroxide/simethicone Suspension 30 milliLiter(s) Oral every 4 hours PRN Dyspepsia  dextrose Oral Gel 15 Gram(s) Oral once PRN Blood Glucose LESS THAN 70 milliGRAM(s)/deciliter  melatonin 3 milliGRAM(s) Oral at bedtime PRN Insomnia  ondansetron Injectable 4 milliGRAM(s) IV Push every 8 hours PRN Nausea and/or Vomiting    Allergies    morphine (Other)    Intolerances      Vital Signs Last 24 Hrs  T(C): 36.6 (11 Jul 2022 05:53), Max: 36.9 (10 Jul 2022 11:53)  T(F): 97.8 (11 Jul 2022 05:53), Max: 98.4 (10 Jul 2022 11:53)  HR: 81 (11 Jul 2022 05:53) (81 - 90)  BP: 108/61 (11 Jul 2022 05:53) (102/59 - 108/61)  BP(mean): --  RR: 17 (11 Jul 2022 05:53) (16 - 17)  SpO2: 94% (11 Jul 2022 05:53) (93% - 95%)    Parameters below as of 11 Jul 2022 05:53  Patient On (Oxygen Delivery Method): room air      I&O's Summary      PHYSICAL EXAM:  TELE: Not on telemetry   PHYSICAL EXAM:  Constitutional: NAD, awake and alert, Frail   HEENT: Moist Mucous Membranes, Anicteric  Pulmonary: Non-labored, breath sounds are clear bilaterally, No wheezing, rales or rhonchi  Cardiovascular: Regular, S1 and S2, No murmurs, No rubs, gallops or clicks  Gastrointestinal:  soft, nontender, nondistended   Lymph: +2  peripheral edema with chronic venous stasis skin changes. No lymphadenopathy.   Skin: No visible rashes or ulcers.  Psych:  confused     LABS: All Labs Reviewed:                        7.2    4.78  )-----------( 171      ( 11 Jul 2022 06:42 )             22.5                         8.6    5.55  )-----------( 199      ( 10 Jul 2022 07:44 )             26.3                         9.8    3.92  )-----------( 188      ( 09 Jul 2022 07:45 )             29.6     11 Jul 2022 06:42    138    |  104    |  41     ----------------------------<  120    4.5     |  29     |  1.70   10 Jul 2022 07:44    137    |  105    |  34     ----------------------------<  141    3.9     |  24     |  1.80   09 Jul 2022 07:45    136    |  103    |  26     ----------------------------<  98     4.1     |  25     |  1.80     Ca    8.3        11 Jul 2022 06:42  Ca    8.3        10 Jul 2022 07:44  Ca    8.4        09 Jul 2022 07:45    TPro  5.1    /  Alb  2.3    /  TBili  0.2    /  DBili  x      /  AST  80     /  ALT  33     /  AlkPhos  56     11 Jul 2022 06:42  TPro  5.5    /  Alb  2.3    /  TBili  0.2    /  DBili  x      /  AST  30     /  ALT  12     /  AlkPhos  57     09 Jul 2022 07:45      12 Lead ECG:   Ventricular Rate 71 BPM    Atrial Rate 71 BPM    P-R Interval 142 ms    QRS Duration 84 ms    Q-T Interval 392 ms    QTC Calculation(Bazett) 425 ms    P Axis 64 degrees    R Axis 18 degrees    T Axis 7 degrees    Diagnosis Line Normal sinus rhythm  Normal ECG  When compared with ECG of 05-JUL-2022 19:57, (Unconfirmed)  No significant change was found  Confirmed by Bry Warren MD ([id number]) on 7/6/2022 1:56:48 PM (07-06-22 @ 13:32)      ACC: [id number] EXAM:  ECHO TTE WO CON COMP W DOPP                        PROCEDURE DATE:  07/07/2022    INTERPRETATION:  INDICATION: dyspnea  Sonographer LK    Blood Pressure 143/70    Height 157 cm     Weight 52 kg       BSA 1.5 sq m    Dimensions:  LA unavailable       Normal Values: 2.0 - 4.0 cm  Ao unavailable        Normal Values: 2.0 - 3.8 cm  SEPTUM unavailable       Normal Values: 0.6 - 1.2 cm  PWT unavailable       Normal Values: 0.6 - 1.1 cm  LVIDd unavailable         Normal Values: 3.0 - 5.6 cm  LVIDs unavailable         Normal Values: 1.8 - 4.0 cm      OBSERVATIONS:  Technically difficult study  Mitral Valve: Mitral annular calcification with thickened leaflets, mild   to moderate MR.  Aortic Valve/Aorta: Sclerotic trileaflet aortic valve with grossly normal   opening by visual estimation.  Tricuspid Valve: normal with trace TR.  Pulmonic Valve: Not well-visualized  Left Atrium: Enlarged  Right Atrium: Not well-visualized  Left Ventricle: normal LV size and systolic function, estimated LVEF of   65%.  Right Ventricle: Grossly normal size and systolic function.  Pericardium: no significant pericardial effusion.  Pulmonary/RV Pressure: estimated PA systolic pressure of 20mmHg  IVC measures 1.85 cm    IMPRESSION:  Technically difficult study  Normal left ventricular internal dimensions and systolic function,   estimated LVEF of 65%.  Grossly normal RV size and systolic function.  Sclerotic trileaflet aortic valve with grossly normal opening by visual   estimation, without AI.  Mild to moderate MR  No significant pericardial effusion.  --- End of Report ---  BAM STEVENS MD; Attending Cardiologist  This document has been electronically signed. Jul 8 2022 10:54AM

## 2022-07-11 NOTE — CONSULT NOTE ADULT - ASSESSMENT
89 yo woman with recent admission for gallstone pancreatitis in 2/2022, found to have iron deficiency anemia at that time, now admitted with nausea/vomiting and noted to have cholelethiasis but no evidence of cholecystitis on HIDA; Incidentally found to have KERRY with Creatinine of 2 and also tested +COVID; noted to have progressive anemia    - suspect anemia is multifocal with iron deficiency, B12 deficiency, renal insufficiency all in the setting of infection/inflammation with COVID  - iron studies c/w iron deficiency; in setting of COVID and recent pancreatitis would expect much higher ferritin levels; patient taking oral supplements but likely PPI use is impeding absorption; will order IV iron  - B12 at low normal; will start oral B12  - continue current medical management; would transfuse to maintain Hg >7g/dL  - upon discharge, patient to schedule follow-up in office with Dr. Nicholas  - case discussed with Dr. Khoi Izquierdo; please call with any additional questions 89 yo woman with recent admission for gallstone pancreatitis in 2/2022, found to have iron deficiency anemia at that time, now admitted with nausea/vomiting and noted to have cholelethiasis but no evidence of cholecystitis on HIDA; Incidentally found to have KERRY with Creatinine of 2 and also tested +COVID; noted to have progressive anemia    - suspect anemia is multifocal with iron deficiency, B12 deficiency, renal insufficiency all in the setting of infection/inflammation with COVID  - iron studies c/w iron deficiency; in setting of COVID and recent pancreatitis would expect much higher ferritin levels; patient taking oral supplements but likely PPI use is impeding absorption; will order IV iron  - B12 at low normal; will start oral B12  - hematuria -  input appreciated; currently on an antibiotics for suspected UTI  - continue current medical management; would transfuse to maintain Hg >7g/dL  - upon discharge, patient to schedule follow-up in office with Dr. Nicholas  - case discussed with Dr. Khoi Izquierdo; please call with any additional questions

## 2022-07-11 NOTE — PROGRESS NOTE ADULT - PROBLEM SELECTOR PLAN 11
hx of spinal stenosis, OA, and neuropathy  -Restarted home PO Dilaudid 4mg @ 9am, 4pm, 10pm hx of spinal stenosis, OA, and neuropathy  -Restarted home PO Dilaudid 4mg @ 9am, 4pm, 10pm  - Lido patched Lower Back b/l for  Back pain

## 2022-07-11 NOTE — PROGRESS NOTE ADULT - ASSESSMENT
nausea  abodminal pain    hida negative for acute cholecystitis  ? gastroparesis  iv fluid  unable to obtain nm ges 2/2 pt's dilaudid  cont trial of diet with reglan  NPO after midnight for esophagram tomorrow    I reviewed the overnight course of events on the unit, re-confirming the patient history. I discussed the care with the patient and their family  Differential diagnosis and plan of care discussed with patient after the evaluation  35 minutes spent on total encounter of which more than fifty percent of the encounter was spent counseling and/or coordinating care by the attending physician.  Advanced care planning was discussed with patient and family.  Advanced care planning forms were reviewed and discussed.  Risks, benefits and alternatives of gastroenterologic procedures were discussed in detail and all questions were answered.

## 2022-07-11 NOTE — PROGRESS NOTE ADULT - PROBLEM SELECTOR PLAN 1
patient covid positive on admission, s/p moderna 3/3  - started on 3 days of remdesivir, last dose completed 7/9  - saturating well on room air and no sx  - contact precautions  - Daughter Mary [phone] states she does not want monoclonal antibodies for patient  - ID consulted, Dr. WESTLEY Izquierdo following- S/p IV Remdesivir Hematuria since 07/09, UA significant for large blood, many bacteria, Hgb down to 8.6 from 9.8  ? Etiology unclear   Ct Abd/Pelvis w contrast (6/22)- Right sided hydronephrosis with bladder wall thickening  -F/u urine culture  -Started Rocephin 1 gm IVPB Daily   -Urology consulted- f/u recs- DR carmen   UA, C/S to follow Pt presented with hematuria since admission. Unclear etiology   - Today UA demonstrated large blood  - Hb dropped from 8.6 to 8.0 this PM   - Ct Abd/Pelvis w contrast (6/22)- Right sided hydronephrosis with bladder wall thickening  - F/u urine culture (07/11/22)   - On Rocephin 1 gm IVPB Daily   - Urology following Dr. Hill

## 2022-07-11 NOTE — PROGRESS NOTE ADULT - SUBJECTIVE AND OBJECTIVE BOX
Patient is a 89y old  Female who presents with a chief complaint of gallstones, covid, keryr (2022 08:50)       HPI:  89y F pmhx CAD x 2 stents (), T2DM, HTN, HLD, GERD, mild AS, chronic venous insufficiency, chronic neuropathy, macular degeneration, chronic constipation, depression, OA, and recent admission to [facility] - 3/2 for gallstone pancreatitis with non-operative treatment presented to the ED with constant nausea x 4 days associated with decreased PO intake, generalized weakness, and fall today x 1 due to LE weakness. Patient denies fever, chills, cp, dizziness, sob, abd pain, vomiting, diarrhea. Patient was seen here in ED  for epigastric pain (since resolved) and discharged with outpatient follow up with Dr. Terrell. Patient has seen Dr. Terrell twice since then and epigastric pain resolved s/p PO zofran and carafate with planned esophagram. Daughter states epigastric pain resolved, but patient has had constant nausea x 4 days that has not improved despite increasing PO zofran from 4mg to 8mg q6h. Patient was unable to obtain appointment with Dr. Terrell today and after falling at home due to leg weakness, daughter brought her to the ED.     ED Course  Vitals: 99.2F, 85bpm, 143/66, 95% RA  Labs: Hgb 10.2, Hct 32.5, Na 132, K 5.5, BUN 30, Cr 2, GFR 23, lipase wnl  EKG:  CT Chest and abd/pel: Moderate-sized bilateral pleural effusion with partial bilateral lower lobe atelectasis. Cholelithiasis with distended gallbladder and right upper quadrant inflammatory changes, findings suspicious for acute cholecystitis.  RUQ US: Cholelithiasis with nonspecific pericholecystic fluid. Negative sonographic Scott sign. Dilated common bowel duct. Recommend correlating with LFTs. Cannot exclude distal choledocholithiasis.  HEAD CT: Mild volume loss, microvascular disease, no acute hemorrhage or midline shift.  Patient Received: Zosyn x1, NS 500cc x1, Lasix 40mg IV x1, IV dilaudid .5mg x1      Follow up KERRY  No acute events noted     (2022 23:51)       PAST MEDICAL & SURGICAL HISTORY:  H/O vertebral fracture repair  History of vertebral fracture  Dyslipidemia  DM type 2 with diabetic dyslipidemia  H/O gastroesophageal reflux (GERD)  Costochondritis  Coronary arteriosclerosis in native artery  s/p 2 stents. last placed 2 years ago  Gastroparesis  History of laminectomy  S/P hip hemiarthroplasty  S/P appendectomy      FAMILY HISTORY:  FHx: stroke (Mother)    NC    Social History:Non smoker    MEDICATIONS  (STANDING):  amitriptyline 30 milliGRAM(s) Oral at bedtime  aspirin  chewable 81 milliGRAM(s) Oral daily  atorvastatin 40 milliGRAM(s) Oral at bedtime  dextrose 5%. 1000 milliLiter(s) (50 mL/Hr) IV Continuous <Continuous>  dextrose 5%. 1000 milliLiter(s) (100 mL/Hr) IV Continuous <Continuous>  dextrose 50% Injectable 25 Gram(s) IV Push once  dextrose 50% Injectable 12.5 Gram(s) IV Push once  dextrose 50% Injectable 25 Gram(s) IV Push once  ferrous    sulfate 325 milliGRAM(s) Oral daily  glucagon  Injectable 1 milliGRAM(s) IntraMuscular once  heparin   Injectable 5000 Unit(s) SubCutaneous every 8 hours  HYDROmorphone   Tablet 4 milliGRAM(s) Oral <User Schedule>  insulin lispro (ADMELOG) corrective regimen sliding scale   SubCutaneous every 6 hours  metoprolol succinate ER 25 milliGRAM(s) Oral daily  piperacillin/tazobactam IVPB.. 3.375 Gram(s) IV Intermittent every 12 hours  polyethylene glycol 3350 17 Gram(s) Oral daily  senna 2 Tablet(s) Oral at bedtime    MEDICATIONS  (PRN):  acetaminophen     Tablet .. 650 milliGRAM(s) Oral every 6 hours PRN Temp greater or equal to 38C (100.4F), Mild Pain (1 - 3)  aluminum hydroxide/magnesium hydroxide/simethicone Suspension 30 milliLiter(s) Oral every 4 hours PRN Dyspepsia  dextrose Oral Gel 15 Gram(s) Oral once PRN Blood Glucose LESS THAN 70 milliGRAM(s)/deciliter  melatonin 3 milliGRAM(s) Oral at bedtime PRN Insomnia  ondansetron Injectable 4 milliGRAM(s) IV Push every 8 hours PRN Nausea and/or Vomiting   Meds reviewed    Allergies    morphine (Other)    Intolerances         REVIEW OF SYSTEMS:  Constitutional: Denies fatigue  HEENT: Denies headaches and dizziness  Respiratory: denies SOB, cough, or wheezing  Cardiovascular: denies CP, palpitations  Gastrointestinal: Denies nausea, denies vomiting, diarrhea, constipation, abdominal pain, or bloody stools  Genitourinary: +Urinary incontinence. denies painful urination or bloody urine  Neurologic: Denies generalized weakness.    ICU Vital Signs Last 24 Hrs  T(C): 36.9 (2022 12:19), Max: 36.9 (2022 12:19)  T(F): 98.5 (2022 12:19), Max: 98.5 (2022 12:19)  HR: 87 (2022 12:19) (81 - 87)  BP: 128/57 (2022 12:19) (102/59 - 128/57)  BP(mean): --  ABP: --  ABP(mean): --  RR: 18 (2022 12:19) (17 - 18)  SpO2: 96% (2022 12:19) (93% - 96%)    O2 Parameters below as of 2022 12:19  Patient On (Oxygen Delivery Method): room air            PHYSICAL EXAM:    GENERAL: NAD  HEAD:  Atraumatic, Normocephalic  ENMT: No Drainage from nares, No drainage from ears  NECK: Supple, neck  veins full  NERVOUS SYSTEM:  Awake and Alert  Remainder of exam deferred due to COVID 19 isolation and reduced risk of transmission. Pulm exam noted      LABS:                                               8.0    x     )-----------( x        ( 2022 14:00 )             25.0     07-11    138  |  104  |  41<H>  ----------------------------<  120<H>  4.5   |  29  |  1.70<H>    Ca    8.3<L>      2022 06:42    TPro  5.1<L>  /  Alb  2.3<L>  /  TBili  0.2  /  DBili  x   /  AST  80<H>  /  ALT  33  /  AlkPhos  56  07-11      Urinalysis Basic - ( 2022 22:30 )    Color: Yellow / Appearance: Slightly Turbid / S.005 / pH: x  Gluc: x / Ketone: Trace  / Bili: Negative / Urobili: Negative   Blood: x / Protein: 25 mg/dL / Nitrite: Negative   Leuk Esterase: Trace / RBC: 3-5 /HPF / WBC 3-5   Sq Epi: x / Non Sq Epi: Negative / Bacteria: Many

## 2022-07-11 NOTE — PROGRESS NOTE ADULT - ASSESSMENT
89y F PMH CAD x 2 stents (2012), T2DM, HTN, HLD, GERD, mild AS, chronic venous insufficiency, chronic neuropathy, macular degeneration, chronic constipation, depression, OA, lumbar degenerative disc disease, and spinal stenosis admitted for cholelithiasis, KERRY, and found to be COVID19.     Chest pain, Edema, HTN, HLD, CAD s/p stents   - hx CAD s/p 2 stents in 2012  - EKG NSR 80, no acute ischemic changes  - Troponin WNL on admission, no need to further trend, doubt ACS as ekg trops WNL  - Continue asa, statin    - LE edema likely 2/2 chronic venous insufficiency versus component of HF  - 7/7/22 TTE unchanged from previous;   EF 65% with norm LV/RV systolic function, with mild/mod MR.  - CT c/a/p with b/l mod effusions, partial LLL atelectasis  - Lasix last given 7/6, now on hold 2/2 KERRY. Creatinine improving   - Pulm following, no plan for thoracentesis for b/l effusions, plan for medical management    - BP and HR stable and controlled  - Continue BB.    - COVID per primary   - Monitor and replete lytes, keep K>4, Mg>2.  - Will continue to follow.    Ruy Jin NP  Nurse Practitioner- Cardiology   Spectra #[phone]

## 2022-07-11 NOTE — PROGRESS NOTE ADULT - ASSESSMENT
89y F pmhx CAD x 2 stents (2012), T2DM, HTN, HLD, GERD, mild AS, chronic venous insufficiency, chronic neuropathy, macular degeneration, chronic constipation, depression, OA, lumbar degenerative disc disease, and spinal stenosis admitted for cholelithiasis, KERRY, and found to be COVID positive on admission.     COVID-19  Pleural Effusions  - Vaccinated x3 per patient.   - Currently on RA, satting well  - No obvious consolidation/GGO on CT chest   Plan:  C/w remdesivir x3 day course w/ last dose 7/9  Trend temps/WBC  Trend inflammatory biomarkers  Continue with supportive care  Maintain aspiration precautions  Maintain isolation per infection control policy    Cholelithiasis  - pt p/w nausea/decreased PO intake x 4 days  - CT abd/pel: Cholelithiasis with distended gallbladder and right upper quadrant inflammatory changes, findings suspicious for acute cholecystitis.  - RUQ US: Cholelithiasis with nonspecific pericholecystic fluid. Negative sonographic Scott sign. Dilated common bile duct.  - HIDA negative for acute cholecystitis  - Cx negative  Plan:   Overall low suspicion for acute infection  S/p zosyn  Monitor off Abx    KERRY  - likely 2/2 dec PO intake, Pre Renal   Plan:   trend renal fxn  avoid nephrotoxins  renally dose medications      7/11- urology consulted for hematuria, UA w/o pyuria but w/ bacteria and per daughter pt recently w/ dysuria and suprapubic tenderness though none on exam today  reasonable to continue w/ ceftriaxone for now  f/u urine culture  completed 3 day course of remdesivir  D/w daughter Mary    Alonso Doll M.D.  [facility], Division of Infectious Diseases  [phone]  After 5pm on weekdays and all day on weekends - please call [phone]    89y F pmhx CAD x 2 stents (2012), T2DM, HTN, HLD, GERD, mild AS, chronic venous insufficiency, chronic neuropathy, macular degeneration, chronic constipation, depression, OA, lumbar degenerative disc disease, and spinal stenosis admitted for cholelithiasis, KERRY, and found to be COVID positive on admission.     COVID-19  Pleural Effusions  - Vaccinated x3 per patient.   - Currently on RA, satting well  - No obvious consolidation/GGO on CT chest   Plan:  C/w remdesivir x3 day course w/ last dose 7/9  Trend temps/WBC  Trend inflammatory biomarkers  Continue with supportive care  Maintain aspiration precautions  Maintain isolation per infection control policy    Cholelithiasis  - pt p/w nausea/decreased PO intake x 4 days  - CT abd/pel: Cholelithiasis with distended gallbladder and right upper quadrant inflammatory changes, findings suspicious for acute cholecystitis.  - RUQ US: Cholelithiasis with nonspecific pericholecystic fluid. Negative sonographic Scott sign. Dilated common bile duct.  - HIDA negative for acute cholecystitis  - Cx negative  Plan:   Overall low suspicion for acute infection  S/p zosyn  Monitor off Abx    KERRY  - likely 2/2 dec PO intake, Pre Renal   Plan:   trend renal fxn  avoid nephrotoxins  renally dose medications      7/11- urology consulted for hematuria, UA w/o pyuria but w/ bacteria and per daughter pt recently w/ dysuria and suprapubic tenderness though none on exam today  reasonable to continue w/ ceftriaxone for now  f/u urine culture  completed 3 day course of remdesivir  D/w daughter Mary  tentative plan for esophagram    Alonso Doll M.D.  [facility], Division of Infectious Diseases  [phone]  After 5pm on weekdays and all day on weekends - please call [phone]

## 2022-07-11 NOTE — PROGRESS NOTE ADULT - PROBLEM SELECTOR PLAN 8
S/p 2 stents 2012   - continue home aspirin and statin  - Cardiology Dr Warren follow up S/p 2 stents 2012   - continue home aspirin, Toprol XL  and statin  - Cardiology Dr Jose  follow up S/p 2 stents 2012   - continue home aspirin, Toprol XL  and statin  - Cardiology Dr Jose following

## 2022-07-11 NOTE — PROGRESS NOTE ADULT - PROBLEM SELECTOR PLAN 6
CT Chest: Moderate-sized bilateral pleural effusion with partial bilateral lower lobe atelectasis  - s/p IV Lasix 40mg x 1 in ED, hold off additional lasix given KERRY  - patient saturating well on room air and denies sob  -TTE shows normal LV systolic function, LVEF 65%, mild/mod MR  - pulmonology (Dr. Melo) consulted- no need for thoracentesis in the immediate future -CT Chest: Moderate-sized bilateral pleural effusion with partial bilateral lower lobe atelectasis  - s/p IV Lasix 40mg x 1 in ED, hold off additional lasix given KERRY  - patient saturating well on room air and denies sob  -TTE shows normal LV systolic function, LVEF 65%, mild/mod MR  - pulmonology (Dr. Melo) consulted- no need for thoracentesis in the immediate future

## 2022-07-11 NOTE — PROGRESS NOTE ADULT - SUBJECTIVE AND OBJECTIVE BOX
Patient is a 89y old  Female who presents with a chief complaint of gallstones, covid, fidelina (2022 05:43)    HPI:  89y F pmhx CAD x 2 stents (), T2DM, HTN, HLD, GERD, mild AS, chronic venous insufficiency, chronic neuropathy, macular degeneration, chronic constipation, depression, OA, and recent admission to [facility] - 3/2 for gallstone pancreatitis with non-operative treatment presented to the ED with constant nausea x 4 days associated with decreased PO intake, generalized weakness, and fall today x 1 due to LE weakness. Patient denies fever, chills, cp, dizziness, sob, abd pain, vomiting, diarrhea. Patient was seen here in ED  for epigastric pain (since resolved) and discharged with outpatient follow up with Dr. Terrell. Patient has seen Dr. Terrell twice since then and epigastric pain resolved s/p PO zofran and carafate with planned esophagram. Daughter states epigastric pain resolved, but patient has had constant nausea x 4 days that has not improved despite increasing PO zofran from 4mg to 8mg q6h. Patient was unable to obtain appointment with Dr. Terrell today and after falling at home due to leg weakness, daughter brought her to the ED.     ED Course  Vitals: 99.2F, 85bpm, 143/66, 95% RA  Labs: Hgb 10.2, Hct 32.5, Na 132, K 5.5, BUN 30, Cr 2, GFR 23, lipase wnl  EKG:    CT Chest and abd/pel: Moderate-sized bilateral pleural effusion with partial bilateral lower lobe atelectasis. Cholelithiasis with distended gallbladder and right upper quadrant inflammatory changes, findings suspicious for acute cholecystitis.    RUQ US: Cholelithiasis with nonspecific pericholecystic fluid. Negative sonographic Scott sign. Dilated common bowel duct. Recommend correlating with LFTs. Cannot exclude distal choledocholithiasis.    HEAD CT: Mild volume loss, microvascular disease, no acute hemorrhage or midline shift.    Patient Received: Zosyn x1, NS 500cc x1, Lasix 40mg IV x1, IV dilaudid .5mg x1           (2022 23:51)    INTERVAL HPI:  - pt seen and examined at bedside. Complaining of increased frequency of urination and suprapubic pain s/p 40mg IV lasix x1 in the ED. Otherwise, notes worsening of chronic back pain and general feeling of malaise. No sob, chest pain, nausea. Fidelina, Anemia.HIDA scan today.  - pt seen and examined at bedside. Feeling confused. A&Ox1 (person). Has a hx of hospital delirium and has been able to be reoriented when her daughter is present. Notes pain over b/l LE but denies sob, chest pain, n/v/d. Waiting for gastric emptying study per GI.  - pt seen and examined at bedside. Feeling confused again this AM possibly chronic hospital AM delirium. A&Ox1 (person). Notes constipation and decrease in b/l LE edema but has mild pain. Gastric emptying study cancelled due to pt inability to tolerate being off pain medication. Denies SOB, chest pain, n/v/d. Received dose 1/3 of remdesivir for COVID-19.   : Patient seen and examined at bedside. Patient states she did not sleep much overnight, but otherwise feeling okay. Denies any CP, SOB, abd pain, N/V/D. Patient still c/o mild leg pain. Cr stable. Loose BM Low stable H/H   7/10: Patient seen and examined at bedside. Complaining of increased frequency of urination that kept her up all night. States she needs her Dilaudid for back pain and b/l leg pain. Otherwise feeling alright Denies any CP, SOB, abd pain, N/V/D. Cr. stable.  Low stable H/H   : Patient seen and examined at bedside. Feeling very weak and tired. Complaining of pain wrapping around her right flank. Notes pain and increased swelling in her right LE. Decreased urinary frequency and urgency. Also feeling confused this AM but has a hx of hospital delirium in the AM. Denies chest pain, sob, nausea, vomiting, diarrhea.      OVERNIGHT EVENTS: none    Home Medications:  amitriptyline 10 mg oral tablet: 3 tab(s) orally once a day (at bedtime) (2022 23:44)  aspirin 81 mg oral tablet: 1 tab(s) orally once a day (2022 23:44)  atorvastatin 40 mg oral tablet: 1 tab(s) orally once a day (2022 23:44)  Dilaudid 4 mg oral tablet: 1 tab(s) orally 3 times (:44)  ferrous sulfate 324 mg (65 mg elemental iron) oral delayed release tablet: 1 tab(s) orally once a day (:44)  ferrous sulfate 325 mg (65 mg elemental iron) oral tablet: 1 tablet oral daily (:44)  Metoprolol Succinate ER 25 mg oral tablet, extended release: 1 tab(s) orally once a day (:44)  MiraLax oral powder for reconstitution: ng/kg orally (:44)  PreserVision AREDS oral capsule: 1 tab(s) orally 2 times a day (:44)  Senna 8.6 mg oral tablet: 2 tab(s) orally once a day (at bedtime) (:44)  Toprol-XL 25 mg oral tablet, extended release: 1 tab(s) orally once a day (:44)  Vitamin D3 1250 mcg (50,000 intl units) oral capsule: 1 cap(s) orally once a week (:44)  Zofran 4 mg oral tablet: 1 tab(s) orally every 6 hours (:44)      MEDICATIONS  (STANDING):  amitriptyline 30 milliGRAM(s) Oral at bedtime  aspirin  chewable 81 milliGRAM(s) Oral daily  atorvastatin 40 milliGRAM(s) Oral at bedtime  cefTRIAXone   IVPB      cefTRIAXone   IVPB 1000 milliGRAM(s) IV Intermittent every 24 hours  dextrose 5%. 1000 milliLiter(s) (100 mL/Hr) IV Continuous <Continuous>  dextrose 5%. 1000 milliLiter(s) (50 mL/Hr) IV Continuous <Continuous>  dextrose 50% Injectable 25 Gram(s) IV Push once  dextrose 50% Injectable 12.5 Gram(s) IV Push once  dextrose 50% Injectable 25 Gram(s) IV Push once  ferrous    sulfate 325 milliGRAM(s) Oral daily  glucagon  Injectable 1 milliGRAM(s) IntraMuscular once  HYDROmorphone   Tablet 4 milliGRAM(s) Oral three times a day  insulin lispro (ADMELOG) corrective regimen sliding scale   SubCutaneous three times a day before meals  insulin lispro (ADMELOG) corrective regimen sliding scale   SubCutaneous at bedtime  metoclopramide Injectable 5 milliGRAM(s) IV Push every 6 hours  metoprolol succinate ER 25 milliGRAM(s) Oral daily  pantoprazole    Tablet 40 milliGRAM(s) Oral two times a day  polyethylene glycol 3350 17 Gram(s) Oral daily  senna 2 Tablet(s) Oral at bedtime  sucralfate suspension 1 Gram(s) Oral two times a day    MEDICATIONS  (PRN):  acetaminophen     Tablet .. 650 milliGRAM(s) Oral every 6 hours PRN Temp greater or equal to 38C (100.4F), Mild Pain (1 - 3)  aluminum hydroxide/magnesium hydroxide/simethicone Suspension 30 milliLiter(s) Oral every 4 hours PRN Dyspepsia  dextrose Oral Gel 15 Gram(s) Oral once PRN Blood Glucose LESS THAN 70 milliGRAM(s)/deciliter  melatonin 3 milliGRAM(s) Oral at bedtime PRN Insomnia  ondansetron Injectable 4 milliGRAM(s) IV Push every 8 hours PRN Nausea and/or Vomiting      Allergies    morphine (Other)    Intolerances        Social History:  Former cigarette smoker, smoked <1/2 ppd for less than 15 years, quit more than 50 years ago   Denies ETOH use   Denies drug use   Ambulates with a walker. Lives at home with daughter. ADLs with assistance. (2022 23:51)      REVIEW OF SYSTEMS:  CONSTITUTIONAL: No fever, No chills, No fatigue, No myalgia, + Body ache, No Weakness  EYES: No eye pain,  No visual disturbances, No discharge, NO Redness  ENMT:  No ear pain, No nose bleed, No vertigo; No sinus pain, NO throat pain, No Congestion  NECK: No pain, No stiffness  RESPIRATORY: No cough, NO wheezing, No  hemoptysis, NO  shortness of breath  CARDIOVASCULAR: No chest pain, palpitations  GASTROINTESTINAL:  No abdominal pain, NO epigastric pain. No nausea, No vomiting; No diarrhea, No constipation. [  ] BM  GENITOURINARY: +R flank pain No dysuria, No frequency, No urgency, + hematuria, NO incontinence  NEUROLOGICAL: No headaches, No dizziness, No numbness, No tingling, No tremors, No weakness  EXT: No Swelling, No Pain, +edema b/l LE, more so R side  SKIN:  [ x ] No itching, burning, rashes, or lesions   MUSCULOSKELETAL: No joint pain ,No Jt swelling; No muscle pain, No back pain, No extremity pain  PSYCHIATRIC: No depression,  No anxiety,  No mood swings ,No difficulty sleeping at night  PAIN SCALE: [  ] None  [ x ] Other- dilaudid 4mg 9am, 4pm, 10pm  ROS Unable to obtain due to - [  ] Dementia  [  ] Lethargy [  ] Drowsy [  ] Sedated [  ] non verbal  REST OF REVIEW Of SYSTEM - [  ] Normal       Vital Signs Last 24 Hrs  T(C): 36.6 (2022 05:53), Max: 36.9 (10 Jul 2022 11:53)  T(F): 97.8 (2022 05:53), Max: 98.4 (10 Jul 2022 11:53)  HR: 81 (2022 05:53) (81 - 90)  BP: 108/61 (2022 05:53) (102/59 - 108/61)  BP(mean): --  RR: 17 (2022 05:53) (16 - 17)  SpO2: 94% (2022 05:53) (93% - 95%)    Parameters below as of 2022 05:53  Patient On (Oxygen Delivery Method): room air      Finger Stick          PHYSICAL EXAM:  GENERAL:  [x  ] NAD , [ ] well appearing, [  ] Agitated, [  ] Drowsy,  [x  ] Lethargy, [x  ] confused   HEAD:  [  x] Normal, [  ] Other  EYES:  [ x ] EOMI, [ x ] PERRLA, [  x] conjunctiva and sclera clear normal, [  ] Other,  [  ] Pallor,[  ] Discharge  ENMT:  [x  ] Normal, [ x ] Moist mucous membranes, [x] Good dentition, [ x ] No Thrush  NECK:  [ x ] Supple, [ x ] No JVD, [ x ] Normal thyroid, [  ] Lymphadenopathy [  ] Other  CHEST/LUNG:  [x  ] Clear to auscultation bilaterally, [ x ] Breath Sounds equal B/L  [  ] poor effort  [ x ] No rales, [ x ] No rhonchi  [ x ]  No wheezing,   HEART:  [x  ] Regular rate and rhythm, [  ] tachycardia, [  ] Bradycardia,  [  ] irregular  [ x ] No murmurs, No rubs, No gallops, [  ] PPM in place (Mfr:  )  ABDOMEN:  [ x ] Soft, [ x ] Nontender, [  x] Nondistended, [  ] No mass, [ x ] Bowel sounds present, [  ] obese  NERVOUS SYSTEM:  [ x ] Alert & Oriented X1, [x  ] Nonfocal  [ x ] Confusion  [  ] Encephalopathic [  ] Sedated [  ] Unable to assess, [  ] Dementia [  ] Other-  EXTREMITIES: [ x ] 2+ Peripheral Pulses, No clubbing, No cyanosis,  [x  ] 2+ edema R lower EXT, 1+ edema L lower EXT [  ] PVD stasis skin changes B/L Lower EXT, [  ] wound  LYMPH: No lymphadenopathy noted  SKIN:  [ x ] No rashes or lesions, [  ] Pressure Ulcers, [ x ] ecchymosis, [  ] Skin Tears, [  ] Other      DIET: Diet, NPO after Midnight:      NPO Start Date: 2022,   NPO Start Time: 23:59 (22 @ 09:08)      LABS:                        7.2    4.78  )-----------( 171      ( 2022 06:42 )             22.5     2022 06:42    138    |  104    |  41     ----------------------------<  120    4.5     |  29     |  1.70     Ca    8.3        2022 06:42    TPro  5.1    /  Alb  2.3    /  TBili  0.2    /  DBili  x      /  AST  80     /  ALT  33     /  AlkPhos  56     2022 06:42      Urinalysis Basic - ( 2022 22:30 )    Color: Yellow / Appearance: Slightly Turbid / S.005 / pH: x  Gluc: x / Ketone: Trace  / Bili: Negative / Urobili: Negative   Blood: x / Protein: 25 mg/dL / Nitrite: Negative   Leuk Esterase: Trace / RBC: 3-5 /HPF / WBC 3-5   Sq Epi: x / Non Sq Epi: Negative / Bacteria: Many        Culture Results:   No Growth Final ( @ 21:45)  Culture Results:   No Growth Final ( @ 21:40)                  Culture - Blood (collected 2022 21:45)  Source: .Blood Blood-Peripheral  Final Report (2022 01:01):    No Growth Final    Culture - Blood (collected 2022 21:40)  Source: .Blood Blood-Peripheral  Final Report (2022 01:01):    No Growth Final         Anemia Panel:  Vitamin B12, Serum: 342 pg/mL (22 @ 07:14)  Folate, Serum: 11.8 ng/mL (22 @ 07:14)  Iron Total, Serum: 19 ug/dL (22 @ 07:14)  Iron - Total Binding Capacity.: 242 ug/dL (22 @ 07:14)  Ferritin, Serum: 67 ng/mL (22 @ 07:14)      Thyroid Panel:        Lipase, Serum: 78 U/L (22 @ 19:05)          RADIOLOGY & ADDITIONAL TESTS:      HEALTH ISSUES - PROBLEM Dx:  2019 novel coronavirus disease (COVID-19)    Hematuria    FIDELINA (acute kidney injury)    Cholelithiasis    Pleural effusion    Anemia    Fall    CAD (coronary artery disease)    HTN (hypertension)    Type 2 diabetes mellitus    Chronic back pain    Anxiety and depression    owning    DVT prophylaxis            Consultant(s) Notes Reviewed:  [ x ] YES     Care Discussed with [X] Consultants  [x  ] Patient  [ x ] Family [  ] HCP [  ]   [  ] Social Service  [  ] RN, [  ] Physical Therapy,[  ] Palliative care team  DVT PPX: [  ] Lovenox, [  ] S C Heparin, [  ] Coumadin, [  ] Xarelto, [  ] Eliquis, [  ] Pradaxa, [  ] IV Heparin drip, [ x ] SCD [  ] Contraindication 2 to GI Bleed,[  ] Ambulation [  ] Contraindicated 2 to  bleed [  ] Contraindicated 2 to Brain Bleed  Advanced directive: [  ] None, [ x ] DNR/DNI Patient is a 89y old  Female who presents with a chief complaint of gallstones, covid, fidelina (2022 05:43)    HPI:  89y F pmhx CAD x 2 stents (), T2DM, HTN, HLD, GERD, mild AS, chronic venous insufficiency, chronic neuropathy, macular degeneration, chronic constipation, depression, OA, and recent admission to [facility] - 3/2 for gallstone pancreatitis with non-operative treatment presented to the ED with constant nausea x 4 days associated with decreased PO intake, generalized weakness, and fall today x 1 due to LE weakness. Patient denies fever, chills, cp, dizziness, sob, abd pain, vomiting, diarrhea. Patient was seen here in ED  for epigastric pain (since resolved) and discharged with outpatient follow up with Dr. Terrell. Patient has seen Dr. Terrell twice since then and epigastric pain resolved s/p PO zofran and carafate with planned esophagram. Daughter states epigastric pain resolved, but patient has had constant nausea x 4 days that has not improved despite increasing PO zofran from 4mg to 8mg q6h. Patient was unable to obtain appointment with Dr. Terrell today and after falling at home due to leg weakness, daughter brought her to the ED.     ED Course  Vitals: 99.2F, 85bpm, 143/66, 95% RA  Labs: Hgb 10.2, Hct 32.5, Na 132, K 5.5, BUN 30, Cr 2, GFR 23, lipase wnl  EKG:    CT Chest and abd/pel: Moderate-sized bilateral pleural effusion with partial bilateral lower lobe atelectasis. Cholelithiasis with distended gallbladder and right upper quadrant inflammatory changes, findings suspicious for acute cholecystitis.    RUQ US: Cholelithiasis with nonspecific pericholecystic fluid. Negative sonographic Scott sign. Dilated common bowel duct. Recommend correlating with LFTs. Cannot exclude distal choledocholithiasis.    HEAD CT: Mild volume loss, microvascular disease, no acute hemorrhage or midline shift.    Patient Received: Zosyn x1, NS 500cc x1, Lasix 40mg IV x1, IV dilaudid .5mg x1     (2022 23:51)    INTERVAL HPI:  - pt seen and examined at bedside. Complaining of increased frequency of urination and suprapubic pain s/p 40mg IV lasix x1 in the ED. Otherwise, notes worsening of chronic back pain and general feeling of malaise. No sob, chest pain, nausea. Fidelina, Anemia.HIDA scan today.  - pt seen and examined at bedside. Feeling confused. A&Ox1 (person). Has a hx of hospital delirium and has been able to be reoriented when her daughter is present. Notes pain over b/l LE but denies sob, chest pain, n/v/d. Waiting for gastric emptying study per GI.  - pt seen and examined at bedside. Feeling confused again this AM possibly chronic hospital AM delirium. A&Ox1 (person). Notes constipation and decrease in b/l LE edema but has mild pain. Gastric emptying study cancelled due to pt inability to tolerate being off pain medication. Denies SOB, chest pain, n/v/d. Received dose 1/3 of remdesivir for COVID-19.   : Patient seen and examined at bedside. Patient states she did not sleep much overnight, but otherwise feeling okay. Denies any CP, SOB, abd pain, N/V/D. Patient still c/o mild leg pain. Cr stable. Loose BM Low stable H/H   7/10: Patient seen and examined at bedside. Complaining of increased frequency of urination that kept her up all night. States she needs her Dilaudid for back pain and b/l leg pain. Otherwise feeling alright Denies any CP, SOB, abd pain, N/V/D. Cr. stable.  Low stable H/H   : Patient seen and examined at bedside. Feeling very weak and tired. Complaining of pain wrapping around her right flank. Notes pain and increased swelling in her right LE. Decreased urinary frequency and urgency. Also feeling confused this AM but has a hx of hospital delirium in the AM. Denies chest pain, sob, nausea, vomiting, diarrhea.Low stable H/H, CR stable On IV Rocephin daily      OVERNIGHT EVENTS: none    Home Medications:  amitriptyline 10 mg oral tablet: 3 tab(s) orally once a day (at bedtime) (2022 23:44)  aspirin 81 mg oral tablet: 1 tab(s) orally once a day (2022 23:44)  atorvastatin 40 mg oral tablet: 1 tab(s) orally once a day (:44)  Dilaudid 4 mg oral tablet: 1 tab(s) orally 3 times (:44)  ferrous sulfate 324 mg (65 mg elemental iron) oral delayed release tablet: 1 tab(s) orally once a day (:44)  ferrous sulfate 325 mg (65 mg elemental iron) oral tablet: 1 tablet oral daily (:44)  Metoprolol Succinate ER 25 mg oral tablet, extended release: 1 tab(s) orally once a day (:44)  MiraLax oral powder for reconstitution: ng/kg orally (:44)  PreserVision AREDS oral capsule: 1 tab(s) orally 2 times a day (:44)  Senna 8.6 mg oral tablet: 2 tab(s) orally once a day (at bedtime) (:44)  Toprol-XL 25 mg oral tablet, extended release: 1 tab(s) orally once a day (:44)  Vitamin D3 1250 mcg (50,000 intl units) oral capsule: 1 cap(s) orally once a week (:44)  Zofran 4 mg oral tablet: 1 tab(s) orally every 6 hours (:44)      MEDICATIONS  (STANDING):  amitriptyline 30 milliGRAM(s) Oral at bedtime  aspirin  chewable 81 milliGRAM(s) Oral daily  atorvastatin 40 milliGRAM(s) Oral at bedtime  cefTRIAXone   IVPB      cefTRIAXone   IVPB 1000 milliGRAM(s) IV Intermittent every 24 hours  dextrose 5%. 1000 milliLiter(s) (100 mL/Hr) IV Continuous <Continuous>  dextrose 5%. 1000 milliLiter(s) (50 mL/Hr) IV Continuous <Continuous>  dextrose 50% Injectable 25 Gram(s) IV Push once  dextrose 50% Injectable 12.5 Gram(s) IV Push once  dextrose 50% Injectable 25 Gram(s) IV Push once  ferrous    sulfate 325 milliGRAM(s) Oral daily  glucagon  Injectable 1 milliGRAM(s) IntraMuscular once  HYDROmorphone   Tablet 4 milliGRAM(s) Oral three times a day  insulin lispro (ADMELOG) corrective regimen sliding scale   SubCutaneous three times a day before meals  insulin lispro (ADMELOG) corrective regimen sliding scale   SubCutaneous at bedtime  metoclopramide Injectable 5 milliGRAM(s) IV Push every 6 hours  metoprolol succinate ER 25 milliGRAM(s) Oral daily  pantoprazole    Tablet 40 milliGRAM(s) Oral two times a day  polyethylene glycol 3350 17 Gram(s) Oral daily  senna 2 Tablet(s) Oral at bedtime  sucralfate suspension 1 Gram(s) Oral two times a day    MEDICATIONS  (PRN):  acetaminophen     Tablet .. 650 milliGRAM(s) Oral every 6 hours PRN Temp greater or equal to 38C (100.4F), Mild Pain (1 - 3)  aluminum hydroxide/magnesium hydroxide/simethicone Suspension 30 milliLiter(s) Oral every 4 hours PRN Dyspepsia  dextrose Oral Gel 15 Gram(s) Oral once PRN Blood Glucose LESS THAN 70 milliGRAM(s)/deciliter  melatonin 3 milliGRAM(s) Oral at bedtime PRN Insomnia  ondansetron Injectable 4 milliGRAM(s) IV Push every 8 hours PRN Nausea and/or Vomiting      Allergies    morphine (Other)    Intolerances        Social History:  Former cigarette smoker, smoked <1/2 ppd for less than 15 years, quit more than 50 years ago   Denies ETOH use   Denies drug use   Ambulates with a walker. Lives at home with daughter. ADLs with assistance. (2022 23:51)      REVIEW OF SYSTEMS: C/O Lower Back pain   CONSTITUTIONAL: No fever, No chills, No fatigue, No myalgia, + Body ache, No Weakness  EYES: No eye pain,  No visual disturbances, No discharge, NO Redness  ENMT:  No ear pain, No nose bleed, No vertigo; No sinus pain, NO throat pain, No Congestion  NECK: No pain, No stiffness  RESPIRATORY: No cough, NO wheezing, No  hemoptysis, NO  shortness of breath  CARDIOVASCULAR: No chest pain, palpitations  GASTROINTESTINAL:  No abdominal pain, NO epigastric pain. No nausea, No vomiting; No diarrhea, No constipation. [ x ] BM  GENITOURINARY: +R flank pain No dysuria, No frequency, No urgency, + hematuria, NO incontinence  NEUROLOGICAL: No headaches, No dizziness, No numbness, No tingling, No tremors, No weakness  EXT: No Swelling, No Pain, +edema b/l LE, more so R side  SKIN:  [ x ] No itching, burning, rashes, or lesions   MUSCULOSKELETAL: No joint pain ,No Jt swelling; No muscle pain, No back pain, No extremity pain  PSYCHIATRIC: No depression,  No anxiety,  No mood swings ,No difficulty sleeping at night  PAIN SCALE: [  ] None  [ x ] Other- Dilaudid 4mg 9am, 4pm, 10pm  ROS Unable to obtain due to - [  ] Dementia  [  ] Lethargy [  ] Drowsy [  ] Sedated [  ] non verbal  REST OF REVIEW Of SYSTEM - [ x ] Normal       Vital Signs Last 24 Hrs  T(C): 36.6 (2022 05:53), Max: 36.9 (10 Jul 2022 11:53)  T(F): 97.8 (2022 05:53), Max: 98.4 (10 Jul 2022 11:53)  HR: 81 (2022 05:53) (81 - 90)  BP: 108/61 (2022 05:53) (102/59 - 108/61)  BP(mean): --  RR: 17 (2022 05:53) (16 - 17)  SpO2: 94% (2022 05:53) (93% - 95%)    Parameters below as of 2022 05:53  Patient On (Oxygen Delivery Method): room air      Finger Stick          PHYSICAL EXAM: OOB to chair   GENERAL:  [x  ] NAD , [x ] well appearing, [  ] Agitated, [  ] Drowsy,  [x  ] Lethargy, [x  ] confused   HEAD:  [  x] Normal, [  ] Other  EYES:  [ x ] EOMI, [ x ] PERRLA, [  x] conjunctiva and sclera clear normal, [  ] Other,  [  ] Pallor,[  ] Discharge  ENMT:  [x  ] Normal, [ x ] Moist mucous membranes, [x] Good dentition, [ x ] No Thrush  NECK:  [ x ] Supple, [ x ] No JVD, [ x ] Normal thyroid, [  ] Lymphadenopathy [  ] Other  CHEST/LUNG:  [x  ] Clear to auscultation bilaterally, [ x ] Breath Sounds equal B/L  [ x ] poor effort  [ x ] No rales, [ x ] No rhonchi  [ x ]  No wheezing,   HEART:  [x  ] Regular rate and rhythm, [  ] tachycardia, [  ] Bradycardia,  [  ] irregular  [ x ] No murmurs, No rubs, No gallops, [  ] PPM in place (Mfr:  )  ABDOMEN:  [ x ] Soft, [ x ] Nontender, [  x] Nondistended, [ x ] No mass, [ x ] Bowel sounds present, [  ] obese  NERVOUS SYSTEM:  [ x ] Alert & Oriented X1, [x  ] Nonfocal  [ x ] Confusion  [  ] Encephalopathic [  ] Sedated [  ] Unable to assess, [x  ] Dementia [  ] Other-  EXTREMITIES: [ x ] 2+ Peripheral Pulses, No clubbing, No cyanosis,  [x  ] 2+ edema R lower EXT rt lower ext > left lower ext , 1+ edema L lower EXT [ x ] PVD stasis skin changes B/L Lower EXT, [  ] wound  LYMPH: No lymphadenopathy noted  SKIN:  [ x ] No rashes or lesions, [  ] Pressure Ulcers, [ x ] ecchymosis, [  ] Skin Tears, [  ] Other      DIET: Diet, NPO after Midnight:      NPO Start Date: 2022,   NPO Start Time: 23:59 (22 @ 09:08)      LABS:                        7.2    4.78  )-----------( 171      ( 2022 06:42 )             22.5     2022 06:42    138    |  104    |  41     ----------------------------<  120    4.5     |  29     |  1.70     Ca    8.3        2022 06:42    TPro  5.1    /  Alb  2.3    /  TBili  0.2    /  DBili  x      /  AST  80     /  ALT  33     /  AlkPhos  56     2022 06:42      Urinalysis Basic - ( 2022 22:30 )    Color: Yellow / Appearance: Slightly Turbid / S.005 / pH: x  Gluc: x / Ketone: Trace  / Bili: Negative / Urobili: Negative   Blood: x / Protein: 25 mg/dL / Nitrite: Negative   Leuk Esterase: Trace / RBC: 3-5 /HPF / WBC 3-5   Sq Epi: x / Non Sq Epi: Negative / Bacteria: Many        Culture Results:   No Growth Final ( @ 21:45)  Culture Results:   No Growth Final ( @ 21:40)      Culture - Blood (collected 2022 21:45)  Source: .Blood Blood-Peripheral  Final Report (2022 01:01):    No Growth Final    Culture - Blood (collected 2022 21:40)  Source: .Blood Blood-Peripheral  Final Report (2022 01:01):    No Growth Final         Anemia Panel:  Vitamin B12, Serum: 342 pg/mL (22 @ 07:14)  Folate, Serum: 11.8 ng/mL (22 @ 07:14)  Iron Total, Serum: 19 ug/dL (22 @ 07:14)  Iron - Total Binding Capacity.: 242 ug/dL (22 @ 07:14)  Ferritin, Serum: 67 ng/mL (22 @ 07:14)      Thyroid Panel:        Lipase, Serum: 78 U/L (22 @ 19:05)          RADIOLOGY & ADDITIONAL TESTS:   < from: CT Renal Stone Hunt (07.10.22 @ 16:23) >  PROCEDURE:  CT of the Abdomen and Pelvis was performed.  Sagittal and coronal reformats were performed.    FINDINGS:    LOWER CHEST:  Bilateral pleural effusions with partial lower lobe atelectasis.    Streak artifact degrades image quality limiting evaluation.  The evaluation of the solid organ parenchyma is limited without   intravenous contrast.    LIVER: Within normal limits.  BILE DUCTS: Normal caliber.  GALLBLADDER: Distended, with dependent biliary sludge/gallstones.  SPLEEN: Within normal limits.  PANCREAS: Atrophic.  ADRENALS: Within normal limits.  KIDNEYS/URETERS:  Right-sided hydronephrosis with dilated right extrarenal pelvis to the   level of the ureteropelvic junction.  No obstructing ureteral calculus is noted.  Punctate calcification upper pole left kidney.    BLADDER: Nondistended.  REPRODUCTIVE ORGANS:  No pelvic mass.    BOWEL:  Suggestion of gastric wallthickening and thickening along the descending   and transverse portion of the duodenum. Diverticulosis.  Mild distention of small bowel and colon, without obstruction.  Perirectal and presacral stranding.  PERITONEUM:  Small amount of ascites.  Mildstranding along the bilateral retroperitoneal fascial planes.    VESSELS: Within normal limits.  RETROPERITONEUM/LYMPH NODES: No lymphadenopathy.    ABDOMINAL WALL: Within normal limits.    BONES: Degenerative changes.  No acute osseous abnormality.    IMPRESSION:    Technically limited study.    Persistent bilateral pleural effusions and partial lower lobe atelectasis.    Suggestion of gastric wall thickening and duodenal thickening.  Recommend further clinical correlation for gastroduodenitis and/or peptic   ulcer disease.    Right-sided hydronephrosis, which may be secondary to UPJ obstruction. No   obstructing ureteral calculus noted.    Other findings as discussed above.        --- End of Report ---    < end of copied text >  < from: CT Renal Stone Hunt (07.10.22 @ 16:23) >  PROCEDURE:  CT of the Abdomen and Pelvis was performed.  Sagittal and coronal reformats were performed.    FINDINGS:    LOWER CHEST:  Bilateral pleural effusions with partial lower lobe atelectasis.    Streak artifact degrades image quality limiting evaluation.  The evaluation of the solid organ parenchyma is limited without   intravenous contrast.    LIVER: Within normal limits.  BILE DUCTS: Normal caliber.  GALLBLADDER: Distended, with dependent biliary sludge/gallstones.  SPLEEN: Within normal limits.  PANCREAS: Atrophic.  ADRENALS: Within normal limits.  KIDNEYS/URETERS:  Right-sided hydronephrosis with dilated right extrarenal pelvis to the   level of the ureteropelvic junction.  No obstructing ureteral calculus is noted.  Punctate calcification upper pole left kidney.    BLADDER: Nondistended.  REPRODUCTIVE ORGANS:  No pelvic mass.    BOWEL:  Suggestion of gastric wallthickening and thickening along the descending   and transverse portion of the duodenum. Diverticulosis.  Mild distention of small bowel and colon, without obstruction.  Perirectal and presacral stranding.  PERITONEUM:  Small amount of ascites.  Mildstranding along the bilateral retroperitoneal fascial planes.    VESSELS: Within normal limits.  RETROPERITONEUM/LYMPH NODES: No lymphadenopathy.    ABDOMINAL WALL: Within normal limits.    BONES: Degenerative changes.  No acute osseous abnormality.    IMPRESSION:    Technically limited study.    Persistent bilateral pleural effusions and partial lower lobe atelectasis.    Suggestion of gastric wall thickening and duodenal thickening.  Recommend further clinical correlation for gastroduodenitis and/or peptic   ulcer disease.    Right-sided hydronephrosis, which may be secondary to UPJ obstruction. No   obstructing ureteral calculus noted.    Other findings as discussed above.        --- End of Report ---    < end of copied text >      HEALTH ISSUES - PROBLEM Dx:  2019 novel coronavirus disease (COVID-19)    Hematuria    FIDELINA (acute kidney injury)    Cholelithiasis    Pleural effusion    Anemia    Fall    CAD (coronary artery disease)    HTN (hypertension)    Type 2 diabetes mellitus    Chronic back pain    Anxiety and depression        DVT prophylaxis            Consultant(s) Notes Reviewed:  [ x ] YES     Care Discussed with [X] Consultants  [x  ] Patient  [ x ] Family [  ] HCP [x  ]   [  ] Social Service  [ x ] RN, [  ] Physical Therapy,[  ] Palliative care team  DVT PPX: [  ] Lovenox, [  ] S C Heparin, [  ] Coumadin, [  ] Xarelto, [  ] Eliquis, [  ] Pradaxa, [  ] IV Heparin drip, [ x ] SCD [  ] Contraindication 2 to GI Bleed,[  ] Ambulation [ x ] Contraindicated 2 to  bleed [  ] Contraindicated 2 to Brain Bleed  Advanced directive: [  ] None, [ x ] DNR/DNI

## 2022-07-11 NOTE — PROGRESS NOTE ADULT - SUBJECTIVE AND OBJECTIVE BOX
Date/Time Patient Seen:  		  Referring MD:   Data Reviewed	       Patient is a 89y old  Female who presents with a chief complaint of gallstones, julisa, kiah (10 Jul 2022 13:41)      Subjective/HPI     PAST MEDICAL & SURGICAL HISTORY:  H/O vertebral fracture repair    History of vertebral fracture    Dyslipidemia    DM type 2 with diabetic dyslipidemia    H/O gastroesophageal reflux (GERD)    Costochondritis    Coronary arteriosclerosis in native artery  s/p 2 stents. last placed 2 years ago    Gastroparesis    History of laminectomy    S/P hip hemiarthroplasty    S/P appendectomy          Medication list         MEDICATIONS  (STANDING):  amitriptyline 30 milliGRAM(s) Oral at bedtime  aspirin  chewable 81 milliGRAM(s) Oral daily  atorvastatin 40 milliGRAM(s) Oral at bedtime  cefTRIAXone   IVPB      cefTRIAXone   IVPB 1000 milliGRAM(s) IV Intermittent every 24 hours  dextrose 5%. 1000 milliLiter(s) (50 mL/Hr) IV Continuous <Continuous>  dextrose 5%. 1000 milliLiter(s) (100 mL/Hr) IV Continuous <Continuous>  dextrose 50% Injectable 25 Gram(s) IV Push once  dextrose 50% Injectable 12.5 Gram(s) IV Push once  dextrose 50% Injectable 25 Gram(s) IV Push once  ferrous    sulfate 325 milliGRAM(s) Oral daily  glucagon  Injectable 1 milliGRAM(s) IntraMuscular once  HYDROmorphone   Tablet 4 milliGRAM(s) Oral three times a day  insulin lispro (ADMELOG) corrective regimen sliding scale   SubCutaneous at bedtime  insulin lispro (ADMELOG) corrective regimen sliding scale   SubCutaneous three times a day before meals  metoclopramide Injectable 5 milliGRAM(s) IV Push every 6 hours  metoprolol succinate ER 25 milliGRAM(s) Oral daily  pantoprazole    Tablet 40 milliGRAM(s) Oral two times a day  polyethylene glycol 3350 17 Gram(s) Oral daily  senna 2 Tablet(s) Oral at bedtime  sucralfate suspension 1 Gram(s) Oral two times a day    MEDICATIONS  (PRN):  acetaminophen     Tablet .. 650 milliGRAM(s) Oral every 6 hours PRN Temp greater or equal to 38C (100.4F), Mild Pain (1 - 3)  aluminum hydroxide/magnesium hydroxide/simethicone Suspension 30 milliLiter(s) Oral every 4 hours PRN Dyspepsia  dextrose Oral Gel 15 Gram(s) Oral once PRN Blood Glucose LESS THAN 70 milliGRAM(s)/deciliter  melatonin 3 milliGRAM(s) Oral at bedtime PRN Insomnia  ondansetron Injectable 4 milliGRAM(s) IV Push every 8 hours PRN Nausea and/or Vomiting         Vitals log        ICU Vital Signs Last 24 Hrs  T(C): 36.7 (10 Jul 2022 20:47), Max: 36.9 (10 Jul 2022 11:53)  T(F): 98.1 (10 Jul 2022 20:47), Max: 98.4 (10 Jul 2022 11:53)  HR: 87 (10 Jul 2022 20:47) (87 - 90)  BP: 102/59 (10 Jul 2022 20:47) (102/59 - 104/58)  BP(mean): --  ABP: --  ABP(mean): --  RR: 17 (10 Jul 2022 20:47) (16 - 17)  SpO2: 93% (10 Jul 2022 20:47) (93% - 95%)    O2 Parameters below as of 10 Jul 2022 20:47  Patient On (Oxygen Delivery Method): room air                 Input and Output:  I&O's Detail      Lab Data                        8.6    5.55  )-----------( 199      ( 10 Jul 2022 07:44 )             26.3     07-10    137  |  105  |  34<H>  ----------------------------<  141<H>  3.9   |  24  |  1.80<H>    Ca    8.3<L>      10 Jul 2022 07:44    TPro  5.5<L>  /  Alb  2.3<L>  /  TBili  0.2  /  DBili  x   /  AST  30  /  ALT  12  /  AlkPhos  57  07-09            Review of Systems	      Objective     Physical Examination    heart s1s2  lung dec BS  abd soft      Pertinent Lab findings & Imaging      Olvin:  NO   Adequate UO     I&O's Detail           Discussed with:     Cultures:	        Radiology

## 2022-07-11 NOTE — PROGRESS NOTE ADULT - SUBJECTIVE AND OBJECTIVE BOX
[facility], Division of Infectious Diseases  MIKHAIL Saenz Y. Patel, S. Shah, G. Casimir  [phone]  ([phone] - weekdays after 5pm and weekends)    Name: DEVON NOLAND  Age/Gender: 89y Female  MRN: [id number]    Interval History:  Patient seen this afternoon  Notes reviewed.   No concerning overnight events.  Afebrile.   states she has back pain  daughter at bedside    Allergies: morphine (Other)      Objective:  Vitals:   T(F): 98.5 (22 @ 12:19), Max: 98.5 (22 @ 12:19)  HR: 87 (22 @ 12:19) (81 - 87)  BP: 128/57 (22 @ 12:19) (102/59 - 128/57)  RR: 18 (22 @ 12:19) (17 - 18)  SpO2: 96% (22 @ 12:19) (93% - 96%)  Physical Examination:  General: no acute distress  HEENT: anicteric  Cardio: S1, S2, normal rate  Resp: breathing comfortably on RA  Abd: soft, ND, NT, no suprapubic tenderness  Ext: RLE edema  Skin: warm, dry    Laboratory Studies:  CBC:                       8.0    x     )-----------( x        ( 2022 14:00 )             25.0     WBC Trend:  4.78 22 @ 06:42  5.55 07-10-22 @ 07:44  3.92 22 @ 07:45  4.29 22 @ 06:45  4.83 22 @ 09:04  4.68 22 @ 07:14  6.64 22 @ 19:05    CMP:     138  |  104  |  41<H>  ----------------------------<  120<H>  4.5   |  29  |  1.70<H>    Ca    8.3<L>      2022 06:42    TPro  5.1<L>  /  Alb  2.3<L>  /  TBili  0.2  /  DBili  x   /  AST  80<H>  /  ALT  33  /  AlkPhos  56  07-11      LIVER FUNCTIONS - ( 2022 06:42 )  Alb: 2.3 g/dL / Pro: 5.1 g/dL / ALK PHOS: 56 U/L / ALT: 33 U/L / AST: 80 U/L / GGT: x             Urinalysis Basic - ( 2022 22:30 )    Color: Yellow / Appearance: Slightly Turbid / S.005 / pH: x  Gluc: x / Ketone: Trace  / Bili: Negative / Urobili: Negative   Blood: x / Protein: 25 mg/dL / Nitrite: Negative   Leuk Esterase: Trace / RBC: 3-5 /HPF / WBC 3-5   Sq Epi: x / Non Sq Epi: Negative / Bacteria: Many      Microbiology: reviewed     Culture - Blood (collected 22 @ 21:45)  Source: .Blood Blood-Peripheral  Final Report (22 @ 01:01):    No Growth Final    Culture - Blood (collected 22 @ 21:40)  Source: .Blood Blood-Peripheral  Final Report (22 @ 01:01):    No Growth Final      Radiology: reviewed     Medications:  acetaminophen     Tablet .. 650 milliGRAM(s) Oral every 6 hours PRN  aluminum hydroxide/magnesium hydroxide/simethicone Suspension 30 milliLiter(s) Oral every 4 hours PRN  amitriptyline 30 milliGRAM(s) Oral at bedtime  aspirin  chewable 81 milliGRAM(s) Oral daily  atorvastatin 40 milliGRAM(s) Oral at bedtime  cefTRIAXone   IVPB 1000 milliGRAM(s) IV Intermittent every 24 hours  cefTRIAXone   IVPB      dextrose 5%. 1000 milliLiter(s) IV Continuous <Continuous>  dextrose 5%. 1000 milliLiter(s) IV Continuous <Continuous>  dextrose 50% Injectable 25 Gram(s) IV Push once  dextrose 50% Injectable 12.5 Gram(s) IV Push once  dextrose 50% Injectable 25 Gram(s) IV Push once  dextrose Oral Gel 15 Gram(s) Oral once PRN  ferrous    sulfate 325 milliGRAM(s) Oral daily  glucagon  Injectable 1 milliGRAM(s) IntraMuscular once  HYDROmorphone   Tablet 4 milliGRAM(s) Oral three times a day  insulin lispro (ADMELOG) corrective regimen sliding scale   SubCutaneous three times a day before meals  insulin lispro (ADMELOG) corrective regimen sliding scale   SubCutaneous at bedtime  lidocaine   4% Patch 1 Patch Transdermal daily  lidocaine   4% Patch 1 Patch Transdermal daily  melatonin 3 milliGRAM(s) Oral at bedtime PRN  metoclopramide Injectable 5 milliGRAM(s) IV Push every 6 hours  metoprolol succinate ER 25 milliGRAM(s) Oral daily  ondansetron Injectable 4 milliGRAM(s) IV Push every 8 hours PRN  pantoprazole    Tablet 40 milliGRAM(s) Oral two times a day  polyethylene glycol 3350 17 Gram(s) Oral daily  senna 2 Tablet(s) Oral at bedtime  sucralfate suspension 1 Gram(s) Oral two times a day    Antimicrobials:  cefTRIAXone   IVPB 1000 milliGRAM(s) IV Intermittent every 24 hours  cefTRIAXone   IVPB

## 2022-07-11 NOTE — PROGRESS NOTE ADULT - PROBLEM SELECTOR PLAN 3
Hgb 10.2 on admission, down to 7.2 from 8.6 (7/10)   - Iron studies show low iron and iron saturation  - Continue PO iron daily  -experiencing hematuria since 7/09/22  - no signs sx of acute bleed, monitor  -Type and screen for possible transfusion  -consent...  - has outpatient appt with Dr. Nicholas for chronic anemia Hgb 10.2 on admission, down to 7.2 from 8.6 (7/10)   - Iron studies show low iron and iron saturation  - Continue PO iron daily  -experiencing hematuria since 7/09/22  - no signs sx of acute bleed, monitor  -Type and screen for possible transfusion  -Consulted heme/onc - f/u recs  - has outpatient appt with Dr. Nicholas for chronic anemia patient covid positive on admission, s/p moderna 3/3  - started on 3 days of remdesivir, last dose completed 7/9  - saturating well on room air and no sx  - contact precautions  - Daughter Mayr [phone] states she does not want monoclonal antibodies for patient  - ID consulted, Dr. WESTLEY Izquierdo following- S/p IV Remdesivir Patient covid positive on admission, s/p moderna 3/3  - started on 3 days of remdesivir, last dose completed 7/9  - saturating well on room air and no sx  - contact precautions  - Daughter Mary [phone] states she does not want monoclonal antibodies for patient  - ID consulted, Dr. WESTLEY Izquierdo following- S/p IV Remdesivir

## 2022-07-11 NOTE — PROGRESS NOTE ADULT - ASSESSMENT
89y F pmhx CAD x 2 stents (2012), T2DM, HTN, HLD, GERD, mild AS, chronic venous insufficiency, chronic neuropathy, macular degeneration, chronic constipation, depression, OA, lumbar degenerative disc disease, and spinal stenosis admitted for cholelithiasis, KERRY, and found to be COVID positive on admission    covid  OP  OA  Pleural eff  Atelectasis  KERRY  CAD  Fall  Ataxic gait  HLD  GERD  valv heart disease    ct renal stone hunt noted - nephrolithiasis - effusion - atelectasis -   VS noted  labs reviewed  plan for MBS    cvs rx regimen  TTE -   covid management - sx management - monitor VS and Sat - isolation precs  fall prec - PT assessment - gait training - CM follow up  pleural eff - no need for thoracentesis in the immediate future - medical rx regimen and [facility]vs rx regimen  I devonte as able to tolerate  Surgery eval in progress - Acute Angelica eval noted  GOC discussion  assist with needs  repllisandro almanzar

## 2022-07-11 NOTE — PROGRESS NOTE ADULT - ASSESSMENT
89y F pmhx CAD x 2 stents (2012), T2DM, HTN, HLD, GERD, mild AS, chronic venous insufficiency, chronic neuropathy, macular degeneration, chronic constipation, depression, OA, and recent admission to [facility] 2/25- 3/2 for gallstone pancreatitis with non-operative treatment presented to the ED with constant nausea x 4 days associated with decreased PO intake, generalized weakness.    #Acute on CKD stage III  CT revealed right sided hydronephrosis and pleural effusion  UA consistent with UTI      Baseline creatinine ~1.3?  Possible contributions from covid-19 or hydronephrosis  No plans for thoracentesis per Pulm  Urine studies reviewed; may have been skewed by Lasix due to very high urine sodium  COVID management per primary team  Consider repeating lung imaging to assess effusions  Avoid nephrotoxic agents  Monitor serum electrolytes  Give dose of lasix today

## 2022-07-11 NOTE — PROGRESS NOTE ADULT - PROBLEM SELECTOR PLAN 2
Hematuria since 07/09, UA significant for large blood, many bacteria, Hgb down to 8.6 from 9.8  Ct Abd/Pelvis w contrast (6/22)- Right sided hydronephrosis with bladder wall thickening  -F/u urine culture  -Started Rocephin  -Urology consulted- f/u recs Hgb 10.2 on admission, down to 7.2 from 8.6 (7/10) - Ac on Chronic 2/2 KERRY/Hematuria now   - Iron studies show low iron and iron saturation  - Continue PO iron daily  -experiencing hematuria since 7/09/22  - no signs sx of acute bleed, monitor  -Type and screen for possible transfusion  -Consulted heme/onc - f/u recs  - has outpatient appt with Dr. Nicholas for chronic anemia  Dr Calero called today.  Low stable h/h Probably multifactorial in the setting of active hematuria/ COVID-19 infection/ KERRY   - Hb dropped to 8.0 this PM   - Iron studies show low iron and iron saturation  - Continue PO iron daily  -Type and screen for possible transfusion  -Consulted heme/onc Dr Calero

## 2022-07-11 NOTE — PROGRESS NOTE ADULT - SUBJECTIVE AND OBJECTIVE BOX
[facility]  Shar Hopkins PA-C  [address] [place] [id number] [phone]      INTERVAL HPI/OVERNIGHT EVENTS:  Pt s/e  Pt reports no nausea today. Eating little  Denies further GI complaints    MEDICATIONS  (STANDING):  amitriptyline 30 milliGRAM(s) Oral at bedtime  aspirin  chewable 81 milliGRAM(s) Oral daily  atorvastatin 40 milliGRAM(s) Oral at bedtime  cefTRIAXone   IVPB      cefTRIAXone   IVPB 1000 milliGRAM(s) IV Intermittent every 24 hours  dextrose 5%. 1000 milliLiter(s) (100 mL/Hr) IV Continuous <Continuous>  dextrose 5%. 1000 milliLiter(s) (50 mL/Hr) IV Continuous <Continuous>  dextrose 50% Injectable 25 Gram(s) IV Push once  dextrose 50% Injectable 12.5 Gram(s) IV Push once  dextrose 50% Injectable 25 Gram(s) IV Push once  ferrous    sulfate 325 milliGRAM(s) Oral daily  glucagon  Injectable 1 milliGRAM(s) IntraMuscular once  HYDROmorphone   Tablet 4 milliGRAM(s) Oral three times a day  insulin lispro (ADMELOG) corrective regimen sliding scale   SubCutaneous three times a day before meals  insulin lispro (ADMELOG) corrective regimen sliding scale   SubCutaneous at bedtime  metoclopramide Injectable 5 milliGRAM(s) IV Push every 6 hours  metoprolol succinate ER 25 milliGRAM(s) Oral daily  pantoprazole    Tablet 40 milliGRAM(s) Oral two times a day  polyethylene glycol 3350 17 Gram(s) Oral daily  senna 2 Tablet(s) Oral at bedtime  sucralfate suspension 1 Gram(s) Oral two times a day    MEDICATIONS  (PRN):  acetaminophen     Tablet .. 650 milliGRAM(s) Oral every 6 hours PRN Temp greater or equal to 38C (100.4F), Mild Pain (1 - 3)  aluminum hydroxide/magnesium hydroxide/simethicone Suspension 30 milliLiter(s) Oral every 4 hours PRN Dyspepsia  dextrose Oral Gel 15 Gram(s) Oral once PRN Blood Glucose LESS THAN 70 milliGRAM(s)/deciliter  melatonin 3 milliGRAM(s) Oral at bedtime PRN Insomnia  ondansetron Injectable 4 milliGRAM(s) IV Push every 8 hours PRN Nausea and/or Vomiting      Allergies    morphine (Other)      PHYSICAL EXAM:   Vital Signs:  Vital Signs Last 24 Hrs  T(C): 36.6 (2022 05:53), Max: 36.9 (10 Jul 2022 11:53)  T(F): 97.8 (2022 05:53), Max: 98.4 (10 Jul 2022 11:53)  HR: 81 (2022 05:53) (81 - 90)  BP: 108/61 (2022 05:53) (102/59 - 108/61)  BP(mean): --  RR: 17 (2022 05:53) (16 - 17)  SpO2: 94% (2022 05:53) (93% - 95%)    Parameters below as of 2022 05:53  Patient On (Oxygen Delivery Method): room air      Daily     Daily     GENERAL:  Appears stated age  ABDOMEN:  Soft, non-tender, non-distended  NEURO:  Alert      LABS:                        7.2    4.78  )-----------( 171      ( 2022 06:42 )             22.5     07-11    138  |  104  |  41<H>  ----------------------------<  120<H>  4.5   |  29  |  1.70<H>    Ca    8.3<L>      2022 06:42    TPro  5.1<L>  /  Alb  2.3<L>  /  TBili  0.2  /  DBili  x   /  AST  80<H>  /  ALT  33  /  AlkPhos  56  07-11      Urinalysis Basic - ( 2022 22:30 )    Color: Yellow / Appearance: Slightly Turbid / S.005 / pH: x  Gluc: x / Ketone: Trace  / Bili: Negative / Urobili: Negative   Blood: x / Protein: 25 mg/dL / Nitrite: Negative   Leuk Esterase: Trace / RBC: 3-5 /HPF / WBC 3-5   Sq Epi: x / Non Sq Epi: Negative / Bacteria: Many

## 2022-07-11 NOTE — CONSULT NOTE ADULT - SUBJECTIVE AND OBJECTIVE BOX
Patient is a 89y old  Female who presents with a chief complaint of gallstones, covid, kerry (11 Jul 2022 16:02)      HPI:  89y F pmhx CAD x 2 stents (2012), T2DM, HTN, HLD, GERD, mild AS, chronic venous insufficiency, chronic neuropathy, macular degeneration, chronic constipation, depression, OA, and recent admission to [facility] 2/25- 3/2/22 for gallstone pancreatitis with non-operative treatment.  At that time patient was seen by our group and she followed up in the office with Dr. Nicholas in 4/2022 and once more in 5/2022 with Hg overall stable at that time at around 10 g/dL.       She presented to the ED on 7/5/22with constant nausea x 4 days associated with decreased PO intake, generalized weakness, and fall today x 1 due to LE weakness. Patient denied fever, chills, cp, dizziness, sob, abd pain, vomiting, diarrhea. She was found to have KERRY with Creatinine of 2, initially stable Hg at 10.2 that declined to 7.2 over course of 1 week and was found to be + for COVID. Imaging studies showed gallstones on U/S and CT but HIDA scan was normal.    Hospital course was further complicated by hematuria; evaluated by urology and thought to be due to cystitis with additional work-up ordered.    Asked now to evaluate for anemia       ROS:  Negative except for: generalized malaise and weakness; hematuria as per HPI; denies any dyspnea or chest pain; back pain which is chronic    PAST MEDICAL & SURGICAL HISTORY:  H/O vertebral fracture repair  History of vertebral fracture  Dyslipidemia  DM type 2 with diabetic dyslipidemia  H/O gastroesophageal reflux (GERD)  Costochondritis  Coronary arteriosclerosis in native artery- s/p 2 stents  Gastroparesis  History of laminectomy  S/P hip hemiarthroplasty  S/P appendectomy      SOCIAL HISTORY:  - lives at home  - denies current ETOH or tobacco use    FAMILY HISTORY:  FHx: stroke (Mother)    MEDICATIONS  (STANDING):  amitriptyline 30 milliGRAM(s) Oral at bedtime  aspirin  chewable 81 milliGRAM(s) Oral daily  atorvastatin 40 milliGRAM(s) Oral at bedtime  cefTRIAXone   IVPB 1000 milliGRAM(s) IV Intermittent every 24 hours  cefTRIAXone   IVPB      ferrous    sulfate 325 milliGRAM(s) Oral daily  furosemide    Tablet 20 milliGRAM(s) Oral daily  glucagon  Injectable 1 milliGRAM(s) IntraMuscular once  HYDROmorphone   Tablet 4 milliGRAM(s) Oral three times a day  insulin lispro (ADMELOG) corrective regimen sliding scale   SubCutaneous three times a day before meals  insulin lispro (ADMELOG) corrective regimen sliding scale   SubCutaneous at bedtime  lidocaine   4% Patch 1 Patch Transdermal daily  lidocaine   4% Patch 1 Patch Transdermal daily  metoclopramide Injectable 5 milliGRAM(s) IV Push every 6 hours  metoprolol succinate ER 25 milliGRAM(s) Oral daily  pantoprazole    Tablet 40 milliGRAM(s) Oral two times a day  polyethylene glycol 3350 17 Gram(s) Oral daily  senna 2 Tablet(s) Oral at bedtime  sucralfate suspension 1 Gram(s) Oral two times a day    MEDICATIONS  (PRN):  acetaminophen     Tablet .. 650 milliGRAM(s) Oral every 6 hours PRN Temp greater or equal to 38C (100.4F), Mild Pain (1 - 3)  aluminum hydroxide/magnesium hydroxide/simethicone Suspension 30 milliLiter(s) Oral every 4 hours PRN Dyspepsia  dextrose Oral Gel 15 Gram(s) Oral once PRN Blood Glucose LESS THAN 70 milliGRAM(s)/deciliter  melatonin 3 milliGRAM(s) Oral at bedtime PRN Insomnia  ondansetron Injectable 4 milliGRAM(s) IV Push every 8 hours PRN Nausea and/or Vomiting      Allergies    morphine (Other)    Intolerances        Vital Signs Last 24 Hrs  T(C): 36.9 (11 Jul 2022 12:19), Max: 36.9 (11 Jul 2022 12:19)  T(F): 98.5 (11 Jul 2022 12:19), Max: 98.5 (11 Jul 2022 12:19)  HR: 87 (11 Jul 2022 12:19) (81 - 87)  BP: 128/57 (11 Jul 2022 12:19) (102/59 - 128/57)  RR: 18 (11 Jul 2022 12:19) (17 - 18)  SpO2: 96% (11 Jul 2022 12:19) (93% - 96%)    Parameters below as of 11 Jul 2022 12:19  Patient On (Oxygen Delivery Method): room air        PHYSICAL EXAM  General: adult in NAD; thin and frail; severe kyphosis  HEENT: clear oropharynx, anicteric sclera, pink conjunctivae  Neck: supple  CV: normal S1S2 with no murmur rubs or gallops  Lungs: clear to auscultation on anterior exam  Abdomen: soft non-tender non-distended, no hepato/splenomegaly  Ext: no clubbing cyanosis or edema  Skin: no rashes and no petichiae  Neuro: alert and oriented X3 no focal deficits      LABS:  WBC Count: 4.78 K/uL (07-11 @ 06:42)  WBC Count: 5.55 K/uL (07-10 @ 07:44)  WBC Count: 3.92 K/uL (07-09 @ 07:45)  WBC Count: 4.29 K/uL (07-08 @ 06:45)  WBC Count: 4.83 K/uL (07-07 @ 09:04)    Hemoglobin: 8.0 g/dL (07-11 @ 14:00)  Hemoglobin: 7.2 g/dL (07-11 @ 06:42)  Hemoglobin: 8.6 g/dL (07-10 @ 07:44)  Hemoglobin: 9.8 g/dL (07-09 @ 07:45)  Hemoglobin: 10.1 g/dL (07-08 @ 06:45)    Platelet Count - Automated: 171 K/uL (07-11 @ 06:42)  Platelet Count - Automated: 199 K/uL (07-10 @ 07:44)  Platelet Count - Automated: 188 K/uL (07-09 @ 07:45)  Platelet Count - Automated: 202 K/uL (07-08 @ 06:45)  Platelet Count - Automated: 215 K/uL (07-07 @ 09:04)        07-11    138  |  104  |  41<H>  ----------------------------<  120<H>  4.5   |  29  |  1.70<H>    Ca    8.3<L>      11 Jul 2022 06:42    TPro  5.1<L>  /  Alb  2.3<L>  /  TBili  0.2  /  DBili  x   /  AST  80<H>  /  ALT  33  /  AlkPhos  56  07-11    Iorn 19  TIBC 242  Iron sat 8%  Ferritin 67    B12 342  Folate 11.8

## 2022-07-11 NOTE — PROGRESS NOTE ADULT - PROBLEM SELECTOR PLAN 5
Patient with nausea x 4 days associated with dec PO intake, epigastric pain ~2 weeks ago IMPROVED  - CT abd/pel: Cholelithiasis with distended gallbladder and right upper quadrant inflammatory changes, findings suspicious for acute cholecystitis.  - RUQ US: Cholelithiasis with nonspecific pericholecystic fluid. Negative sonographic Scott sign. Dilated common bile duct.  - recent admission to [facility] 2/25- 3/2 for gallstone pancreatitis with non-operative treatment   -HIDA normal (7/6) and clear liquid diet with carb restrictions started  -Gastric emptying study cancelled due to patient's inability to go without pain meds  -Advance diet as tolerated, anti-emetics PRN  -Soft and bite sized diet now  -NPO after midnight for esophagram tomorrow (7/12)  - surgery consulted (Dr. Rodrigues), f/u outpatient  -GI Dr Montilla consulted, recs appreciated- esophagram scheduled for Monday Abdominal pain improved.   -HIDA normal (7/6) and clear liquid diet with carb restrictions started  -Gastric emptying study cancelled due to patient's inability to go without pain meds  -Advance diet as tolerated, anti-emetics PRN  -Soft and bite sized diet now  -NPO after midnight for esophagram tomorrow (7/12)  -surgery consulted (Dr. Rodrigues), f/u outpatient  -GI Dr Montilla consulted, recs appreciated- esophagram scheduled for Monday

## 2022-07-11 NOTE — PROGRESS NOTE ADULT - PROBLEM SELECTOR PLAN 10
A1c 6 in Feb 2022, A1c 6.9 on admission  - not on insulin at home  - low dose ISS  - hypoglycemia protocol not on insulin at home  - low dose ISS  - hypoglycemia protocol

## 2022-07-11 NOTE — CONSULT NOTE ADULT - SUBJECTIVE AND OBJECTIVE BOX
CHIEF COMPLAINT: abdominal pain  -gallstones     HISTORY OF PRESENT ILLNESS:  89y F pmhx CAD x 2 stents (), T2DM, HTN, HLD, GERD, mild AS, chronic venous insufficiency, chronic neuropathy, macular degeneration, chronic constipation, depression, OA, and recent admission to [facility] - 3/2 for gallstone pancreatitis with non-operative treatment presented to the ED with constant nausea x 4 days associated with decreased PO intake, generalized weakness, and fall today x 1 due to LE weakness. Patient denies fever, chills, cp, dizziness, sob, abd pain, vomiting, diarrhea. Patient was seen here in ED  for epigastric pain (since resolved) and discharged with outpatient follow up with Dr. Terrell. Patient has seen Dr. Terrell twice since then and epigastric pain resolved s/p PO zofran and carafate with planned esophagram. Daughter states epigastric pain resolved, but patient has had constant nausea x 4 days that has not improved despite increasing PO zofran from 4mg to 8mg q6h. Patient was unable to obtain appointment with Dr. Terrell today and after falling at home due to leg weakness, daughter brought her to the ED. - pt seen and examined at bedside. Complaining of increased frequency of urination and suprapubic pain s/p 40mg IV lasix x1 in the ED. Otherwise, notes worsening of chronic back pain and general feeling of malaise. No sob, chest pain, nausea. Fidelina, Anemia.HIDA scan today.  - pt seen and examined at bedside. Feeling confused. A&Ox1 (person). Has a hx of hospital delirium and has been able to be reoriented when her daughter is present. Notes pain over b/l LE but denies sob, chest pain, n/v/d. Waiting for gastric emptying study per GI.  - pt seen and examined at bedside. Feeling confused again this AM possibly chronic hospital AM delirium. A&Ox1 (person). Notes constipation and decrease in b/l LE edema but has mild pain. Gastric emptying study cancelled due to pt inability to tolerate being off pain medication. Denies SOB, chest pain, n/v/d. Received dose 1/3 of remdesivir for COVID-19.   : Patient seen and examined at bedside. Patient states she did not sleep much overnight, but otherwise feeling okay. Denies any CP, SOB, abd pain, N/V/D. Patient still c/o mild leg pain. Cr stable. Loose BM Low stable H/H - pt seen and examined at bedside. Complaining of increased frequency of urination and suprapubic pain s/p 40mg IV lasix x1 in the ED. Otherwise, notes worsening of chronic back pain and general feeling of malaise. No sob, chest pain, nausea. Fidelina, Anemia.HIDA scan today.  - pt seen and examined at bedside. Feeling confused. A&Ox1 (person). Has a hx of hospital delirium and has been able to be reoriented when her daughter is present. Notes pain over b/l LE but denies sob, chest pain, n/v/d. Waiting for gastric emptying study per GI.  - pt seen and examined at bedside. Feeling confused again this AM possibly chronic hospital AM delirium. A&Ox1 (person). Notes constipation and decrease in b/l LE edema but has mild pain. Gastric emptying study cancelled due to pt inability to tolerate being off pain medication. Denies SOB, chest pain, n/v/d. Received dose 1/3 of remdesivir for COVID-19.   : Patient seen and examined at bedside. Patient states she did not sleep much overnight, but otherwise feeling okay. Denies any CP, SOB, abd pain, N/V/D. Patient still c/o mild leg pain. Cr stable. Loose BM Low stable H/H      Consult requested for episode gross hematuria and right hydronephrosis, has chronin RUQ/CVAT -       PAST MEDICAL & SURGICAL HISTORY:  H/O vertebral fracture repair      History of vertebral fracture      Dyslipidemia      DM type 2 with diabetic dyslipidemia      H/O gastroesophageal reflux (GERD)      Costochondritis      Coronary arteriosclerosis in native artery  s/p 2 stents. last placed 2 years ago      Gastroparesis      History of laminectomy      S/P hip hemiarthroplasty      S/P appendectomy          REVIEW OF SYSTEMS:    Confused this AM - some answers were inappropriate           MEDICATIONS  (STANDING):  amitriptyline 30 milliGRAM(s) Oral at bedtime  aspirin  chewable 81 milliGRAM(s) Oral daily  atorvastatin 40 milliGRAM(s) Oral at bedtime  cefTRIAXone   IVPB      cefTRIAXone   IVPB 1000 milliGRAM(s) IV Intermittent every 24 hours  dextrose 5%. 1000 milliLiter(s) (100 mL/Hr) IV Continuous <Continuous>  dextrose 5%. 1000 milliLiter(s) (50 mL/Hr) IV Continuous <Continuous>  dextrose 50% Injectable 25 Gram(s) IV Push once  dextrose 50% Injectable 12.5 Gram(s) IV Push once  dextrose 50% Injectable 25 Gram(s) IV Push once  ferrous    sulfate 325 milliGRAM(s) Oral daily  glucagon  Injectable 1 milliGRAM(s) IntraMuscular once  HYDROmorphone   Tablet 4 milliGRAM(s) Oral three times a day  insulin lispro (ADMELOG) corrective regimen sliding scale   SubCutaneous three times a day before meals  insulin lispro (ADMELOG) corrective regimen sliding scale   SubCutaneous at bedtime  metoclopramide Injectable 5 milliGRAM(s) IV Push every 6 hours  metoprolol succinate ER 25 milliGRAM(s) Oral daily  pantoprazole    Tablet 40 milliGRAM(s) Oral two times a day  polyethylene glycol 3350 17 Gram(s) Oral daily  senna 2 Tablet(s) Oral at bedtime  sucralfate suspension 1 Gram(s) Oral two times a day    MEDICATIONS  (PRN):  acetaminophen     Tablet .. 650 milliGRAM(s) Oral every 6 hours PRN Temp greater or equal to 38C (100.4F), Mild Pain (1 - 3)  aluminum hydroxide/magnesium hydroxide/simethicone Suspension 30 milliLiter(s) Oral every 4 hours PRN Dyspepsia  dextrose Oral Gel 15 Gram(s) Oral once PRN Blood Glucose LESS THAN 70 milliGRAM(s)/deciliter  melatonin 3 milliGRAM(s) Oral at bedtime PRN Insomnia  ondansetron Injectable 4 milliGRAM(s) IV Push every 8 hours PRN Nausea and/or Vomiting      Allergies    morphine (Other)    Intolerances        SOCIAL HISTORY:    FAMILY HISTORY:  FHx: stroke (Mother)        Vital Signs Last 24 Hrs  T(C): 36.6 (2022 05:53), Max: 36.9 (10 Jul 2022 11:53)  T(F): 97.8 (2022 05:53), Max: 98.4 (10 Jul 2022 11:53)  HR: 81 (2022 05:53) (81 - 90)  BP: 108/61 (2022 05:53) (102/59 - 108/61)  BP(mean): --  RR: 17 (2022 05:53) (16 - 17)  SpO2: 94% (2022 05:53) (93% - 95%)    Parameters below as of 2022 05:53  Patient On (Oxygen Delivery Method): room air        PHYSICAL EXAM:    Constitutional: frail, confused  HEENT: LENCHO, EOMI, Normal Hearing, MMM  Neck: No LAD, No JVD  Back: Normal spine flexure, No CVA tenderness  Respiratory: CTAB   Cardiovascular: S1 and S2, RRR, no M/G/R  Abd: BS+, soft, NT/ND, No CVAT  Extremities: No peripheral edema  Vascular: 2+ peripheral pulses  Neurological: oriented to person  Psychiatric: Normal mood, normal affect      LABS:                        7.2    4.78  )-----------( 171      ( 2022 06:42 )             22.5     07    138  |  104  |  41<H>  ----------------------------<  120<H>  4.5   |  29  |  1.70<H>    Ca    8.3<L>      2022 06:42    TPro  5.1<L>  /  Alb  2.3<L>  /  TBili  0.2  /  DBili  x   /  AST  80<H>  /  ALT  33  /  AlkPhos  56  -11      Urinalysis Basic - ( 2022 22:30 )    Color: Yellow / Appearance: Slightly Turbid / S.005 / pH: x  Gluc: x / Ketone: Trace  / Bili: Negative / Urobili: Negative   Blood: x / Protein: 25 mg/dL / Nitrite: Negative   Leuk Esterase: Trace / RBC: 3-5 /HPF / WBC 3-5   Sq Epi: x / Non Sq Epi: Negative / Bacteria: Many      Urine Culture:   Hemoglobin: 7.2 g/dL ( @ 06:42)  Hematocrit: 22.5 % ( @ 06:42)  Hemoglobin: 8.6 g/dL (07-10 @ 07:44)  Hematocrit: 26.3 % (07-10 @ 07:44)      RADIOLOGY & ADDITIONAL STUDIES:    < from: CT Renal Stone Hunt (07.10.22 @ 16:23) >  ACC: [id number] EXAM:  CT RENAL STONE HUNT                          PROCEDURE DATE:  07/10/2022          INTERPRETATION:  CLINICAL INFORMATION: Hematuria.    COMPARISON: CT scan abdomen pelvis 2022.    CONTRAST/COMPLICATIONS:  IV Contrast: NONE  0 cc administered   0 cc discarded  Oral Contrast: NONE  Complications: None reported at time of study completion    PROCEDURE:  CT of the Abdomen and Pelvis was performed.  Sagittal and coronal reformats were performed.    FINDINGS:    LOWER CHEST:  Bilateral pleural effusions with partial lower lobe atelectasis.    Streak artifact degrades image quality limiting evaluation.  The evaluation of the solid organ parenchyma is limited without   intravenous contrast.    LIVER: Within normal limits.  BILE DUCTS: Normal caliber.  GALLBLADDER: Distended, with dependent biliary sludge/gallstones.  SPLEEN: Within normal limits.  PANCREAS: Atrophic.  ADRENALS: Within normal limits.  KIDNEYS/URETERS:  Right-sided hydronephrosis with dilated right extrarenal pelvis to the   level of the ureteropelvic junction.  No obstructing ureteral calculus is noted.  Punctate calcification upper pole left kidney.    BLADDER: Nondistended.  REPRODUCTIVE ORGANS:  No pelvic mass.    BOWEL:  Suggestion of gastric wallthickening and thickening along the descending   and transverse portion of the duodenum. Diverticulosis.  Mild distention of small bowel and colon, without obstruction.  Perirectal and presacral stranding.  PERITONEUM:  Small amount of ascites.  Mildstranding along the bilateral retroperitoneal fascial planes.    VESSELS: Within normal limits.  RETROPERITONEUM/LYMPH NODES: No lymphadenopathy.    ABDOMINAL WALL: Within normal limits.    BONES: Degenerative changes.  No acute osseous abnormality.    IMPRESSION:    Technically limited study.    Persistent bilateral pleural effusions and partial lower lobe atelectasis.    Suggestion of gastric wall thickening and duodenal thickening.  Recommend further clinical correlation for gastroduodenitis and/or peptic   ulcer disease.    Right-sided hydronephrosis, which may be secondary to UPJ obstruction. No   obstructing ureteral calculus noted.    Other findings as discussed above.        --- End of Report ---            LYUDMILA DOMINGUEZ MD; Attending Radiologist  This document has been electronically signed. Jul 10 2022  7:05PM    < end of copied text >  < from: CT Abdomen and Pelvis No Cont (22 @ 19:35) >  ACC: [id number] EXAM:  CT ABDOMEN AND PELVIS                        ACC: [id number] EXAM:  CT CHEST                          PROCEDURE DATE:  2022          INTERPRETATION:  CLINICAL INFORMATION: Epigastric abdominal pain and   chest pain.    COMPARISON: CT angiogram chest abdomen pelvis 2022.    CONTRAST/COMPLICATIONS:  IV Contrast: NONE  0 cc administered   0 cc discarded  Oral Contrast: NONE  Complications: None reported at time of study completion    PROCEDURE:  CT of the Chest, Abdomen and Pelvis was performed.  Sagittal and coronal reformats were performed.    FINDINGS:    CHEST:    LUNGS AND LARGE AIRWAYS:  PLEURA:  There are moderate-sized bilateral pleural effusions with partial   atelectasis bilateral lower lobes.    The central airways are patent.    VESSELS: Atherosclerotic changes thoracic aorta with coronary artery   calcification.    HEART: Heart size is normal. Dense mitral calcification.  No pericardial effusion.    MEDIASTINUM AND VAL:  No enlarged lymphadenopathy.    CHEST WALL AND LOWER NECK: Within normal limits.    ABDOMEN AND PELVIS:    Streak artifact degrades image quality.    The evaluation of the solid organ parenchyma is limited without   intravenous contrast.    LIVER: Within normal limits.  BILE DUCTS: Normal caliber.  GALLBLADDER: Mild gallbladder distention with cholelithiasis.  Right upper quadrant stranding.  SPLEEN: Within normal limits.  PANCREAS: Atrophic.  ADRENALS: Within normal limits.  KIDNEYS/URETERS:  Punctate nonobstructing intrarenal calcification upper pole left kidney.  Right-sided hydronephrosis.    BLADDER: Bladder wall thickening.  REPRODUCTIVE ORGANS: No pelvic mass.    BOWEL:  Evaluation of the stomach is limited without distention.  Prominent colonic fecal retentionwith mild distention of the colon; no   bowel obstruction.  Mild perirectal and presacral stranding.  Sigmoid diverticulosis, without CT evidence of diverticulitis.  PERITONEUM:  Small perihepatic ascites.    VESSELS: Within normal limits.  RETROPERITONEUM/LYMPH NODES: Atherosclerotic changes.    ABDOMINAL WALL:  Small fat-containing umbilical hernia.  Subcutaneous edema.    BONES:  Degenerative changes.  Vertebroplasty with compression deformities T11 and T12 vertebral bodies.  Chronic compression deformity L5 vertebral body.  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  Chronic deformity of the sternum.    IMPRESSION:    Moderate-sized bilateral pleural effusion with partial bilateral lower   lobe atelectasis.      Cholelithiasis with distended gallbladder and right upper quadrant   inflammatory changes, findings suspicious for acute cholecystitis.  Recommend further evaluation with right upper quadrant ultrasonography.    Other findings as discussed above.    --- End of Report ---            LYUDMILA DOMINGUEZ MD; Attending Radiologist  This document has been electronically signed. 2022  8:32PM    < end of copied text >

## 2022-07-12 ENCOUNTER — APPOINTMENT (OUTPATIENT)
Dept: UROLOGY | Facility: CLINIC | Age: 87
End: 2022-07-12

## 2022-07-12 DIAGNOSIS — M48.00 SPINAL STENOSIS, SITE UNSPECIFIED: ICD-10-CM

## 2022-07-12 LAB
ALBUMIN SERPL ELPH-MCNC: 2.6 G/DL — LOW (ref 3.3–5)
ALP SERPL-CCNC: 63 U/L — SIGNIFICANT CHANGE UP (ref 40–120)
ALT FLD-CCNC: 37 U/L — SIGNIFICANT CHANGE UP (ref 12–78)
ANION GAP SERPL CALC-SCNC: 8 MMOL/L — SIGNIFICANT CHANGE UP (ref 5–17)
AST SERPL-CCNC: 82 U/L — HIGH (ref 15–37)
BASOPHILS # BLD AUTO: 0 K/UL — SIGNIFICANT CHANGE UP (ref 0–0.2)
BASOPHILS NFR BLD AUTO: 0 % — SIGNIFICANT CHANGE UP (ref 0–2)
BILIRUB SERPL-MCNC: 0.3 MG/DL — SIGNIFICANT CHANGE UP (ref 0.2–1.2)
BUN SERPL-MCNC: 46 MG/DL — HIGH (ref 7–23)
CALCIUM SERPL-MCNC: 8.7 MG/DL — SIGNIFICANT CHANGE UP (ref 8.5–10.1)
CHLORIDE SERPL-SCNC: 102 MMOL/L — SIGNIFICANT CHANGE UP (ref 96–108)
CO2 SERPL-SCNC: 26 MMOL/L — SIGNIFICANT CHANGE UP (ref 22–31)
CREAT SERPL-MCNC: 1.8 MG/DL — HIGH (ref 0.5–1.3)
CULTURE RESULTS: SIGNIFICANT CHANGE UP
EGFR: 26 ML/MIN/1.73M2 — LOW
EOSINOPHIL # BLD AUTO: 0.07 K/UL — SIGNIFICANT CHANGE UP (ref 0–0.5)
EOSINOPHIL NFR BLD AUTO: 1.3 % — SIGNIFICANT CHANGE UP (ref 0–6)
GLUCOSE SERPL-MCNC: 136 MG/DL — HIGH (ref 70–99)
HCT VFR BLD CALC: 25.5 % — LOW (ref 34.5–45)
HGB BLD-MCNC: 8.1 G/DL — LOW (ref 11.5–15.5)
IMM GRANULOCYTES NFR BLD AUTO: 0.6 % — SIGNIFICANT CHANGE UP (ref 0–1.5)
LYMPHOCYTES # BLD AUTO: 0.66 K/UL — LOW (ref 1–3.3)
LYMPHOCYTES # BLD AUTO: 12.3 % — LOW (ref 13–44)
MCHC RBC-ENTMCNC: 28.7 PG — SIGNIFICANT CHANGE UP (ref 27–34)
MCHC RBC-ENTMCNC: 31.8 GM/DL — LOW (ref 32–36)
MCV RBC AUTO: 90.4 FL — SIGNIFICANT CHANGE UP (ref 80–100)
MONOCYTES # BLD AUTO: 0.43 K/UL — SIGNIFICANT CHANGE UP (ref 0–0.9)
MONOCYTES NFR BLD AUTO: 8 % — SIGNIFICANT CHANGE UP (ref 2–14)
NEUTROPHILS # BLD AUTO: 4.19 K/UL — SIGNIFICANT CHANGE UP (ref 1.8–7.4)
NEUTROPHILS NFR BLD AUTO: 77.8 % — HIGH (ref 43–77)
NRBC # BLD: 0 /100 WBCS — SIGNIFICANT CHANGE UP (ref 0–0)
PLATELET # BLD AUTO: 194 K/UL — SIGNIFICANT CHANGE UP (ref 150–400)
POTASSIUM SERPL-MCNC: 4.3 MMOL/L — SIGNIFICANT CHANGE UP (ref 3.5–5.3)
POTASSIUM SERPL-SCNC: 4.3 MMOL/L — SIGNIFICANT CHANGE UP (ref 3.5–5.3)
PROT SERPL-MCNC: 5.8 G/DL — LOW (ref 6–8.3)
RBC # BLD: 2.82 M/UL — LOW (ref 3.8–5.2)
RBC # FLD: 13.2 % — SIGNIFICANT CHANGE UP (ref 10.3–14.5)
SODIUM SERPL-SCNC: 136 MMOL/L — SIGNIFICANT CHANGE UP (ref 135–145)
SPECIMEN SOURCE: SIGNIFICANT CHANGE UP
WBC # BLD: 5.38 K/UL — SIGNIFICANT CHANGE UP (ref 3.8–10.5)
WBC # FLD AUTO: 5.38 K/UL — SIGNIFICANT CHANGE UP (ref 3.8–10.5)

## 2022-07-12 PROCEDURE — 93970 EXTREMITY STUDY: CPT | Mod: 26

## 2022-07-12 PROCEDURE — 74220 X-RAY XM ESOPHAGUS 1CNTRST: CPT | Mod: 26

## 2022-07-12 PROCEDURE — 99232 SBSQ HOSP IP/OBS MODERATE 35: CPT

## 2022-07-12 RX ORDER — GLUCAGON INJECTION, SOLUTION 0.5 MG/.1ML
1 INJECTION, SOLUTION SUBCUTANEOUS ONCE
Refills: 0 | Status: DISCONTINUED | OUTPATIENT
Start: 2022-07-12 | End: 2022-07-15

## 2022-07-12 RX ORDER — INSULIN LISPRO 100/ML
VIAL (ML) SUBCUTANEOUS
Refills: 0 | Status: DISCONTINUED | OUTPATIENT
Start: 2022-07-12 | End: 2022-07-15

## 2022-07-12 RX ORDER — DEXTROSE 50 % IN WATER 50 %
15 SYRINGE (ML) INTRAVENOUS ONCE
Refills: 0 | Status: DISCONTINUED | OUTPATIENT
Start: 2022-07-12 | End: 2022-07-15

## 2022-07-12 RX ORDER — DEXTROSE 50 % IN WATER 50 %
25 SYRINGE (ML) INTRAVENOUS ONCE
Refills: 0 | Status: DISCONTINUED | OUTPATIENT
Start: 2022-07-12 | End: 2022-07-15

## 2022-07-12 RX ORDER — INSULIN LISPRO 100/ML
VIAL (ML) SUBCUTANEOUS AT BEDTIME
Refills: 0 | Status: DISCONTINUED | OUTPATIENT
Start: 2022-07-12 | End: 2022-07-15

## 2022-07-12 RX ORDER — SODIUM CHLORIDE 9 MG/ML
1000 INJECTION, SOLUTION INTRAVENOUS
Refills: 0 | Status: DISCONTINUED | OUTPATIENT
Start: 2022-07-12 | End: 2022-07-15

## 2022-07-12 RX ORDER — DEXTROSE 50 % IN WATER 50 %
12.5 SYRINGE (ML) INTRAVENOUS ONCE
Refills: 0 | Status: DISCONTINUED | OUTPATIENT
Start: 2022-07-12 | End: 2022-07-15

## 2022-07-12 RX ORDER — METOCLOPRAMIDE HCL 10 MG
5 TABLET ORAL EVERY 6 HOURS
Refills: 0 | Status: DISCONTINUED | OUTPATIENT
Start: 2022-07-12 | End: 2022-07-15

## 2022-07-12 RX ADMIN — Medication 30 MILLIGRAM(S): at 22:53

## 2022-07-12 RX ADMIN — IRON SUCROSE 100 MILLIGRAM(S): 20 INJECTION, SOLUTION INTRAVENOUS at 23:17

## 2022-07-12 RX ADMIN — Medication 20 MILLIGRAM(S): at 06:32

## 2022-07-12 RX ADMIN — HYDROMORPHONE HYDROCHLORIDE 4 MILLIGRAM(S): 2 INJECTION INTRAMUSCULAR; INTRAVENOUS; SUBCUTANEOUS at 10:15

## 2022-07-12 RX ADMIN — Medication 1: at 17:35

## 2022-07-12 RX ADMIN — Medication 25 MILLIGRAM(S): at 06:32

## 2022-07-12 RX ADMIN — HYDROMORPHONE HYDROCHLORIDE 4 MILLIGRAM(S): 2 INJECTION INTRAMUSCULAR; INTRAVENOUS; SUBCUTANEOUS at 22:52

## 2022-07-12 RX ADMIN — LIDOCAINE 1 PATCH: 4 CREAM TOPICAL at 19:00

## 2022-07-12 RX ADMIN — Medication 5 MILLIGRAM(S): at 06:32

## 2022-07-12 RX ADMIN — SENNA PLUS 2 TABLET(S): 8.6 TABLET ORAL at 22:53

## 2022-07-12 RX ADMIN — Medication 5 MILLIGRAM(S): at 00:20

## 2022-07-12 RX ADMIN — ATORVASTATIN CALCIUM 40 MILLIGRAM(S): 80 TABLET, FILM COATED ORAL at 22:53

## 2022-07-12 RX ADMIN — PANTOPRAZOLE SODIUM 40 MILLIGRAM(S): 20 TABLET, DELAYED RELEASE ORAL at 06:32

## 2022-07-12 RX ADMIN — LIDOCAINE 1 PATCH: 4 CREAM TOPICAL at 04:00

## 2022-07-12 RX ADMIN — LIDOCAINE 1 PATCH: 4 CREAM TOPICAL at 13:38

## 2022-07-12 RX ADMIN — PREGABALIN 1000 MICROGRAM(S): 225 CAPSULE ORAL at 13:39

## 2022-07-12 RX ADMIN — HYDROMORPHONE HYDROCHLORIDE 4 MILLIGRAM(S): 2 INJECTION INTRAMUSCULAR; INTRAVENOUS; SUBCUTANEOUS at 17:20

## 2022-07-12 RX ADMIN — Medication 5 MILLIGRAM(S): at 18:38

## 2022-07-12 RX ADMIN — Medication 325 MILLIGRAM(S): at 13:40

## 2022-07-12 RX ADMIN — POLYETHYLENE GLYCOL 3350 17 GRAM(S): 17 POWDER, FOR SOLUTION ORAL at 14:17

## 2022-07-12 RX ADMIN — HYDROMORPHONE HYDROCHLORIDE 4 MILLIGRAM(S): 2 INJECTION INTRAMUSCULAR; INTRAVENOUS; SUBCUTANEOUS at 23:52

## 2022-07-12 RX ADMIN — HYDROMORPHONE HYDROCHLORIDE 4 MILLIGRAM(S): 2 INJECTION INTRAMUSCULAR; INTRAVENOUS; SUBCUTANEOUS at 16:21

## 2022-07-12 RX ADMIN — Medication 1: at 02:48

## 2022-07-12 RX ADMIN — Medication 1 GRAM(S): at 17:36

## 2022-07-12 RX ADMIN — Medication 5 MILLIGRAM(S): at 23:18

## 2022-07-12 RX ADMIN — Medication 1 GRAM(S): at 06:32

## 2022-07-12 RX ADMIN — Medication 3 MILLIGRAM(S): at 22:53

## 2022-07-12 RX ADMIN — Medication 81 MILLIGRAM(S): at 13:39

## 2022-07-12 RX ADMIN — PANTOPRAZOLE SODIUM 40 MILLIGRAM(S): 20 TABLET, DELAYED RELEASE ORAL at 17:35

## 2022-07-12 RX ADMIN — Medication 5 MILLIGRAM(S): at 14:17

## 2022-07-12 RX ADMIN — HYDROMORPHONE HYDROCHLORIDE 4 MILLIGRAM(S): 2 INJECTION INTRAMUSCULAR; INTRAVENOUS; SUBCUTANEOUS at 09:19

## 2022-07-12 NOTE — PROGRESS NOTE ADULT - PROBLEM SELECTOR PLAN 2
Probably multifactorial in the setting of active hematuria/ COVID-19 infection/ KERRY   - Hb stable 8.1 this AM  - Iron studies show low iron and iron saturation  - Continue PO iron daily  -Type and screen completed (7/11) for possible transfusion  -Consulted heme/onc Dr Calero- -Start IV iron, starting po b12, transfuse as needed to maintain hg>7  -Follow up Dr. Nicholas upon discharge Probably multifactorial in the setting of active hematuria/ COVID-19 infection/ KERRY   - Hb stable 8.1 this AM  - Iron studies show low iron and iron saturation  - Continue PO iron daily  -Type and screen completed (7/11) for possible transfusion  -Consulted heme/onc Dr Calero- -Start IV iron, starting po b12, transfuse as needed to maintain hg>7  -Follow up Dr. Nicholas upon discharge.

## 2022-07-12 NOTE — PROGRESS NOTE ADULT - ASSESSMENT
nausea  abodminal pain    hida negative for acute cholecystitis  ? gastroparesis  iv fluid  unable to obtain nm ges 2/2 pt's dilaudid  cont trial of diet with reglan  awaiting esophagram    I reviewed the overnight course of events on the unit, re-confirming the patient history. I discussed the care with the patient and their family  Differential diagnosis and plan of care discussed with patient after the evaluation  35 minutes spent on total encounter of which more than fifty percent of the encounter was spent counseling and/or coordinating care by the attending physician.  Advanced care planning was discussed with patient and family.  Advanced care planning forms were reviewed and discussed.  Risks, benefits and alternatives of gastroenterologic procedures were discussed in detail and all questions were answered.

## 2022-07-12 NOTE — PROGRESS NOTE ADULT - SUBJECTIVE AND OBJECTIVE BOX
[facility]  Shar Hopkins PA-C  [address] [place] [address]  [phone]      INTERVAL HPI/OVERNIGHT EVENTS:  Pt s/e  Pt confused but reports no nausea  Reported black stool likely 2/2 IV iron supplement    MEDICATIONS  (STANDING):  amitriptyline 30 milliGRAM(s) Oral at bedtime  aspirin  chewable 81 milliGRAM(s) Oral daily  atorvastatin 40 milliGRAM(s) Oral at bedtime  cyanocobalamin 1000 MICROGram(s) Oral daily  dextrose 5%. 1000 milliLiter(s) (100 mL/Hr) IV Continuous <Continuous>  dextrose 5%. 1000 milliLiter(s) (50 mL/Hr) IV Continuous <Continuous>  dextrose 50% Injectable 25 Gram(s) IV Push once  dextrose 50% Injectable 12.5 Gram(s) IV Push once  dextrose 50% Injectable 25 Gram(s) IV Push once  ferrous    sulfate 325 milliGRAM(s) Oral daily  furosemide    Tablet 20 milliGRAM(s) Oral daily  glucagon  Injectable 1 milliGRAM(s) IntraMuscular once  HYDROmorphone   Tablet 4 milliGRAM(s) Oral three times a day  insulin lispro (ADMELOG) corrective regimen sliding scale   SubCutaneous every 6 hours  iron sucrose Injectable 100 milliGRAM(s) IV Push every 24 hours  lidocaine   4% Patch 1 Patch Transdermal daily  lidocaine   4% Patch 1 Patch Transdermal daily  metoclopramide Injectable 5 milliGRAM(s) IV Push every 6 hours  metoprolol succinate ER 25 milliGRAM(s) Oral daily  pantoprazole    Tablet 40 milliGRAM(s) Oral two times a day  polyethylene glycol 3350 17 Gram(s) Oral daily  senna 2 Tablet(s) Oral at bedtime  sucralfate suspension 1 Gram(s) Oral two times a day    MEDICATIONS  (PRN):  acetaminophen     Tablet .. 650 milliGRAM(s) Oral every 6 hours PRN Temp greater or equal to 38C (100.4F), Mild Pain (1 - 3)  aluminum hydroxide/magnesium hydroxide/simethicone Suspension 30 milliLiter(s) Oral every 4 hours PRN Dyspepsia  dextrose Oral Gel 15 Gram(s) Oral once PRN Blood Glucose LESS THAN 70 milliGRAM(s)/deciliter  melatonin 3 milliGRAM(s) Oral at bedtime PRN Insomnia  ondansetron Injectable 4 milliGRAM(s) IV Push every 8 hours PRN Nausea and/or Vomiting      Allergies  morphine (Other)      PHYSICAL EXAM:   Vital Signs:  Vital Signs Last 24 Hrs  T(C): 36.7 (2022 06:17), Max: 36.9 (2022 12:19)  T(F): 98.1 (2022 06:17), Max: 98.5 (2022 12:19)  HR: 95 (2022 06:17) (87 - 95)  BP: 125/71 (2022 06:17) (125/71 - 137/68)  BP(mean): --  RR: 17 (2022 06:17) (17 - 18)  SpO2: 96% (2022 06:17) (96% - 96%)    Parameters below as of 2022 06:17  Patient On (Oxygen Delivery Method): room air      Daily     Daily     GENERAL:  Appears stated age  ABDOMEN:  Soft, non-tender, non-distended  NEURO:  Alert      LABS:                        8.1    5.38  )-----------( 194      ( 2022 09:00 )             25.5     07-12    136  |  102  |  46<H>  ----------------------------<  136<H>  4.3   |  26  |  1.80<H>    Ca    8.7      2022 09:00    TPro  5.8<L>  /  Alb  2.6<L>  /  TBili  0.3  /  DBili  x   /  AST  82<H>  /  ALT  37  /  AlkPhos  63  07-12      Urinalysis Basic - ( 2022 15:35 )    Color: Yellow / Appearance: Slightly Turbid / S.005 / pH: x  Gluc: x / Ketone: Negative  / Bili: Negative / Urobili: Negative   Blood: x / Protein: 30 mg/dL / Nitrite: Negative   Leuk Esterase: Negative / RBC: 11-25 /HPF / WBC 0-2   Sq Epi: x / Non Sq Epi: Occasional / Bacteria: Moderate

## 2022-07-12 NOTE — PROGRESS NOTE ADULT - SUBJECTIVE AND OBJECTIVE BOX
Patient is a 89y old  Female who presents with a chief complaint of gallstones, covid, fidelina (2022 06:08)    HPI:  89y F pmhx CAD x 2 stents (), T2DM, HTN, HLD, GERD, mild AS, chronic venous insufficiency, chronic neuropathy, macular degeneration, chronic constipation, depression, OA, and recent admission to [facility] - 3/2 for gallstone pancreatitis with non-operative treatment presented to the ED with constant nausea x 4 days associated with decreased PO intake, generalized weakness, and fall today x 1 due to LE weakness. Patient denies fever, chills, cp, dizziness, sob, abd pain, vomiting, diarrhea. Patient was seen here in ED  for epigastric pain (since resolved) and discharged with outpatient follow up with Dr. Terrell. Patient has seen Dr. Terrell twice since then and epigastric pain resolved s/p PO zofran and carafate with planned esophagram. Daughter states epigastric pain resolved, but patient has had constant nausea x 4 days that has not improved despite increasing PO zofran from 4mg to 8mg q6h. Patient was unable to obtain appointment with Dr. Terrell today and after falling at home due to leg weakness, daughter brought her to the ED.     ED Course  Vitals: 99.2F, 85bpm, 143/66, 95% RA  Labs: Hgb 10.2, Hct 32.5, Na 132, K 5.5, BUN 30, Cr 2, GFR 23, lipase wnl  EKG:    CT Chest and abd/pel: Moderate-sized bilateral pleural effusion with partial bilateral lower lobe atelectasis. Cholelithiasis with distended gallbladder and right upper quadrant inflammatory changes, findings suspicious for acute cholecystitis.    RUQ US: Cholelithiasis with nonspecific pericholecystic fluid. Negative sonographic Scott sign. Dilated common bowel duct. Recommend correlating with LFTs. Cannot exclude distal choledocholithiasis.    HEAD CT: Mild volume loss, microvascular disease, no acute hemorrhage or midline shift.    Patient Received: Zosyn x1, NS 500cc x1, Lasix 40mg IV x1, IV dilaudid .5mg x1           (2022 23:51)    INTERVAL HPI:  - pt seen and examined at bedside. Complaining of increased frequency of urination and suprapubic pain s/p 40mg IV lasix x1 in the ED. Otherwise, notes worsening of chronic back pain and general feeling of malaise. No sob, chest pain, nausea. Fidelina, Anemia.HIDA scan today.  - pt seen and examined at bedside. Feeling confused. A&Ox1 (person). Has a hx of hospital delirium and has been able to be reoriented when her daughter is present. Notes pain over b/l LE but denies sob, chest pain, n/v/d. Waiting for gastric emptying study per GI.  - pt seen and examined at bedside. Feeling confused again this AM possibly chronic hospital AM delirium. A&Ox1 (person). Notes constipation and decrease in b/l LE edema but has mild pain. Gastric emptying study cancelled due to pt inability to tolerate being off pain medication. Denies SOB, chest pain, n/v/d. Received dose 1/3 of remdesivir for COVID-19.   : Patient seen and examined at bedside. Patient states she did not sleep much overnight, but otherwise feeling okay. Denies any CP, SOB, abd pain, N/V/D. Patient still c/o mild leg pain. Cr stable. Loose BM Low stable H/H   7/10: Patient seen and examined at bedside. Complaining of increased frequency of urination that kept her up all night. States she needs her Dilaudid for back pain and b/l leg pain. Otherwise feeling alright Denies any CP, SOB, abd pain, N/V/D. Cr. stable.  Low stable H/H   : Patient seen and examined at bedside. Feeling very weak and tired. Complaining of pain wrapping around her right flank. Notes pain and increased swelling in her right LE. Decreased urinary frequency and urgency. Also feeling confused this AM but has a hx of hospital delirium in the AM. Denies chest pain, sob, nausea, vomiting, diarrhea. Low stable H/H, CR stable On IV Rocephin daily  : Patient seen and examined. Was having a bowel movement this AM which showed black stool 2/2 IV iron. Continues to feel weak and complaining of pain and edema in B/L lower extremities, especially her RLE. Feeling confused this AM (hx of hospital delirium). Denies chest pain, sob, nausea, vomiting, diarrhea. Low stable H/H, Cr ______. On IV Rocephin daily.     OVERNIGHT EVENTS: none    Home Medications:  amitriptyline 10 mg oral tablet: 3 tab(s) orally once a day (at bedtime) (:44)  aspirin 81 mg oral tablet: 1 tab(s) orally once a day (:44)  atorvastatin 40 mg oral tablet: 1 tab(s) orally once a day (:44)  Dilaudid 4 mg oral tablet: 1 tab(s) orally 3 times (:44)  ferrous sulfate 324 mg (65 mg elemental iron) oral delayed release tablet: 1 tab(s) orally once a day (:44)  ferrous sulfate 325 mg (65 mg elemental iron) oral tablet: 1 tablet oral daily (:44)  Metoprolol Succinate ER 25 mg oral tablet, extended release: 1 tab(s) orally once a day (:44)  MiraLax oral powder for reconstitution: ng/kg orally (:44)  PreserVision AREDS oral capsule: 1 tab(s) orally 2 times a day (:44)  Senna 8.6 mg oral tablet: 2 tab(s) orally once a day (at bedtime) (:44)  Toprol-XL 25 mg oral tablet, extended release: 1 tab(s) orally once a day (:44)  Vitamin D3 1250 mcg (50,000 intl units) oral capsule: 1 cap(s) orally once a week (:44)  Zofran 4 mg oral tablet: 1 tab(s) orally every 6 hours (:44)      MEDICATIONS  (STANDING):  amitriptyline 30 milliGRAM(s) Oral at bedtime  aspirin  chewable 81 milliGRAM(s) Oral daily  atorvastatin 40 milliGRAM(s) Oral at bedtime  cefTRIAXone   IVPB      cefTRIAXone   IVPB 1000 milliGRAM(s) IV Intermittent every 24 hours  cyanocobalamin 1000 MICROGram(s) Oral daily  dextrose 5%. 1000 milliLiter(s) (100 mL/Hr) IV Continuous <Continuous>  dextrose 5%. 1000 milliLiter(s) (50 mL/Hr) IV Continuous <Continuous>  dextrose 50% Injectable 25 Gram(s) IV Push once  dextrose 50% Injectable 12.5 Gram(s) IV Push once  dextrose 50% Injectable 25 Gram(s) IV Push once  ferrous    sulfate 325 milliGRAM(s) Oral daily  furosemide    Tablet 20 milliGRAM(s) Oral daily  glucagon  Injectable 1 milliGRAM(s) IntraMuscular once  HYDROmorphone   Tablet 4 milliGRAM(s) Oral three times a day  insulin lispro (ADMELOG) corrective regimen sliding scale   SubCutaneous every 6 hours  iron sucrose Injectable 100 milliGRAM(s) IV Push every 24 hours  lidocaine   4% Patch 1 Patch Transdermal daily  lidocaine   4% Patch 1 Patch Transdermal daily  metoclopramide Injectable 5 milliGRAM(s) IV Push every 6 hours  metoprolol succinate ER 25 milliGRAM(s) Oral daily  pantoprazole    Tablet 40 milliGRAM(s) Oral two times a day  polyethylene glycol 3350 17 Gram(s) Oral daily  senna 2 Tablet(s) Oral at bedtime  sucralfate suspension 1 Gram(s) Oral two times a day    MEDICATIONS  (PRN):  acetaminophen     Tablet .. 650 milliGRAM(s) Oral every 6 hours PRN Temp greater or equal to 38C (100.4F), Mild Pain (1 - 3)  aluminum hydroxide/magnesium hydroxide/simethicone Suspension 30 milliLiter(s) Oral every 4 hours PRN Dyspepsia  dextrose Oral Gel 15 Gram(s) Oral once PRN Blood Glucose LESS THAN 70 milliGRAM(s)/deciliter  melatonin 3 milliGRAM(s) Oral at bedtime PRN Insomnia  ondansetron Injectable 4 milliGRAM(s) IV Push every 8 hours PRN Nausea and/or Vomiting      Allergies    morphine (Other)    Intolerances        Social History:  Former cigarette smoker, smoked <1/2 ppd for less than 15 years, quit more than 50 years ago   Denies ETOH use   Denies drug use   Ambulates with a walker. Lives at home with daughter. ADLs with assistance. (2022 23:51)      REVIEW OF SYSTEMS:  CONSTITUTIONAL: No fever, No chills, No fatigue, No myalgia, + Body ache, No Weakness  EYES: No eye pain,  No visual disturbances, No discharge, NO Redness  ENMT:  No ear pain, No nose bleed, No vertigo; No sinus pain, NO throat pain, No Congestion  NECK: No pain, No stiffness  RESPIRATORY: No cough, NO wheezing, No  hemoptysis, NO  shortness of breath  CARDIOVASCULAR: No chest pain, palpitations  GASTROINTESTINAL: +black stool 2/2 IV iron No abdominal pain, NO epigastric pain. No nausea, No vomiting; No diarrhea, No constipation. [ x ] BM  GENITOURINARY:  No dysuria, No frequency, No urgency, + hematuria, NO incontinence  NEUROLOGICAL: No headaches, No dizziness, No numbness, No tingling, No tremors, No weakness  EXT: No Swelling, No Pain, +edema b/l LE, more so R side  SKIN:  [ x ] No itching, burning, rashes, or lesions   MUSCULOSKELETAL: No joint pain ,No Jt swelling; No muscle pain, No back pain, No extremity pain  PSYCHIATRIC: +AM hospital delirium No depression,  No anxiety,  No mood swings ,No difficulty sleeping at night  PAIN SCALE: [  ] None  [ x ] Other- Dilaudid 4mg 9am, 4pm, 10pm  ROS Unable to obtain due to - [  ] Dementia  [  ] Lethargy [  ] Drowsy [  ] Sedated [  ] non verbal  REST OF REVIEW Of SYSTEM - [ x ] Normal       Vital Signs Last 24 Hrs  T(C): 36.7 (2022 06:17), Max: 36.9 (2022 12:19)  T(F): 98.1 (2022 06:17), Max: 98.5 (2022 12:19)  HR: 95 (2022 06:17) (87 - 95)  BP: 125/71 (2022 06:17) (125/71 - 137/68)  BP(mean): --  RR: 17 (2022 06:17) (17 - 18)  SpO2: 96% (2022 06:17) (96% - 96%)    Parameters below as of 2022 06:17  Patient On (Oxygen Delivery Method): room air      Finger Stick          PHYSICAL EXAM: OOB to chair  GENERAL:  [x  ] NAD , [x ] well appearing, [  ] Agitated, [  ] Drowsy,  [ ] Lethargy, [x  ] confused   HEAD:  [  x] Normal, [  ] Other  EYES:  [ x ] EOMI, [ x ] PERRLA, [  x] conjunctiva and sclera clear normal, [  ] Other,  [  ] Pallor,[  ] Discharge  ENMT:  [x  ] Normal, [ x ] Moist mucous membranes, [] Good dentition, [ x ] No Thrush  NECK:  [ x ] Supple, [ x ] No JVD, [ x ] Normal thyroid, [  ] Lymphadenopathy [  ] Other  CHEST/LUNG:  [x  ] Clear to auscultation bilaterally, [ x ] Breath Sounds equal B/L  [ x ] poor effort  [ x ] No rales, [ x ] No rhonchi  [ x ]  No wheezing,   HEART:  [x  ] Regular rate and rhythm, [  ] tachycardia, [  ] Bradycardia,  [  ] irregular  [ x ] No murmurs, No rubs, No gallops, [  ] PPM in place (Mfr:  )  ABDOMEN:  [ x ] Soft, [ x ] Nontender, [  x] Nondistended, [ x ] No mass, [ x ] Bowel sounds present, [  ] obese  NERVOUS SYSTEM:  [ x ] Alert & Oriented X1, [x  ] Nonfocal  [ x ] Confusion  [  ] Encephalopathic [  ] Sedated [  ] Unable to assess, [x  ] Dementia [  ] Other-  EXTREMITIES: [ x ] 2+ Peripheral Pulses, No clubbing, No cyanosis,  [x  ] 2+ edema R lower EXT rt lower ext > left lower ext , 1+ edema L lower EXT [ x ] PVD stasis skin changes B/L Lower EXT, [  ] wound  LYMPH: No lymphadenopathy noted  SKIN:  [ x ] No rashes or lesions, [  ] Pressure Ulcers, [ x ] ecchymosis, [  ] Skin Tears, [  ] Other    DIET: Diet, NPO:   Except Medications (22 @ 23:52)      LABS:                        8.0    x     )-----------( x        ( 2022 14:00 )             25.0       Ca    8.3        2022 06:42        Urinalysis Basic - ( 2022 15:35 )    Color: Yellow / Appearance: Slightly Turbid / S.005 / pH: x  Gluc: x / Ketone: Negative  / Bili: Negative / Urobili: Negative   Blood: x / Protein: 30 mg/dL / Nitrite: Negative   Leuk Esterase: Negative / RBC: 11-25 /HPF / WBC 0-2   Sq Epi: x / Non Sq Epi: Occasional / Bacteria: Moderate        Culture Results:   >=3 organisms. Probable collection contamination. (07-10 @ 13:20)  Culture Results:   No Growth Final ( @ 21:45)  Culture Results:   No Growth Final ( 21:40)      culture blood  -- Clean Catch Clean Catch (Midstream) 07-10 @ 13:20    culture urine  --  07-10 @ 13:20              Culture - Urine (collected 10 Jul 2022 13:20)  Source: Clean Catch Clean Catch (Midstream)  Final Report (2022 08:35):    >=3 organisms. Probable collection contamination.    Culture - Blood (collected 2022 21:45)  Source: .Blood Blood-Peripheral  Final Report (2022 01:01):    No Growth Final    Culture - Blood (collected 2022 21:40)  Source: .Blood Blood-Peripheral  Final Report (2022 01:01):    No Growth Final         Anemia Panel:  Vitamin B12, Serum: 342 pg/mL (22 @ 07:14)  Folate, Serum: 11.8 ng/mL (22 @ 07:14)  Iron Total, Serum: 19 ug/dL (22 @ 07:14)  Iron - Total Binding Capacity.: 242 ug/dL (22 @ 07:14)  Ferritin, Serum: 67 ng/mL (22 @ 07:14)      Thyroid Panel:        Lipase, Serum: 78 U/L (22 @ 19:05)          RADIOLOGY & ADDITIONAL TESTS:      HEALTH ISSUES - PROBLEM Dx:  2019 novel coronavirus disease (COVID-19)    Hematuria    Anemia    FIDELINA (acute kidney injury)    Cholelithiasis    Pleural effusion    Fall    CAD (coronary artery disease)    HTN (hypertension)    Type 2 diabetes mellitus    Chronic back pain    Anxiety and depression        DVT prophylaxis            Consultant(s) Notes Reviewed:  [x  ] YES     Care Discussed with [X] Consultants  [  x] Patient  [ x ] Family [  ] HCP [  ]   [  ] Social Service  [  ] RN, [  ] Physical Therapy,[  ] Palliative care team  DVT PPX: [  ] Lovenox, [  ] S C Heparin, [  ] Coumadin, [  ] Xarelto, [  ] Eliquis, [  ] Pradaxa, [  ] IV Heparin drip, [x  ] SCD [  ] Contraindication 2 to GI Bleed,[  ] Ambulation [  ] Contraindicated 2 to  bleed [  ] Contraindicated 2 to Brain Bleed  Advanced directive: [  ] None, [ x ] DNR/DNI Patient is a 89y old  Female who presents with a chief complaint of gallstones, covid, fidelina (2022 09:58)    HPI:  89y F pmhx CAD x 2 stents (), T2DM, HTN, HLD, GERD, mild AS, chronic venous insufficiency, chronic neuropathy, macular degeneration, chronic constipation, depression, OA, and recent admission to [facility] - 3/2 for gallstone pancreatitis with non-operative treatment presented to the ED with constant nausea x 4 days associated with decreased PO intake, generalized weakness, and fall today x 1 due to LE weakness. Patient denies fever, chills, cp, dizziness, sob, abd pain, vomiting, diarrhea. Patient was seen here in ED  for epigastric pain (since resolved) and discharged with outpatient follow up with Dr. Terrell. Patient has seen Dr. Terrell twice since then and epigastric pain resolved s/p PO zofran and carafate with planned esophagram. Daughter states epigastric pain resolved, but patient has had constant nausea x 4 days that has not improved despite increasing PO zofran from 4mg to 8mg q6h. Patient was unable to obtain appointment with Dr. Terrell today and after falling at home due to leg weakness, daughter brought her to the ED.     ED Course  Vitals: 99.2F, 85bpm, 143/66, 95% RA  Labs: Hgb 10.2, Hct 32.5, Na 132, K 5.5, BUN 30, Cr 2, GFR 23, lipase wnl  EKG:    CT Chest and abd/pel: Moderate-sized bilateral pleural effusion with partial bilateral lower lobe atelectasis. Cholelithiasis with distended gallbladder and right upper quadrant inflammatory changes, findings suspicious for acute cholecystitis.    RUQ US: Cholelithiasis with nonspecific pericholecystic fluid. Negative sonographic Scott sign. Dilated common bowel duct. Recommend correlating with LFTs. Cannot exclude distal choledocholithiasis.    HEAD CT: Mild volume loss, microvascular disease, no acute hemorrhage or midline shift.    Patient Received: Zosyn x1, NS 500cc x1, Lasix 40mg IV x1, IV dilaudid .5mg x1           (2022 23:51)    INTERVAL HPI:  - pt seen and examined at bedside. Complaining of increased frequency of urination and suprapubic pain s/p 40mg IV lasix x1 in the ED. Otherwise, notes worsening of chronic back pain and general feeling of malaise. No sob, chest pain, nausea. Fidelina, Anemia.HIDA scan today.  - pt seen and examined at bedside. Feeling confused. A&Ox1 (person). Has a hx of hospital delirium and has been able to be reoriented when her daughter is present. Notes pain over b/l LE but denies sob, chest pain, n/v/d. Waiting for gastric emptying study per GI.  - pt seen and examined at bedside. Feeling confused again this AM possibly chronic hospital AM delirium. A&Ox1 (person). Notes constipation and decrease in b/l LE edema but has mild pain. Gastric emptying study cancelled due to pt inability to tolerate being off pain medication. Denies SOB, chest pain, n/v/d. Received dose 1/3 of remdesivir for COVID-19.   : Patient seen and examined at bedside. Patient states she did not sleep much overnight, but otherwise feeling okay. Denies any CP, SOB, abd pain, N/V/D. Patient still c/o mild leg pain. Cr stable. Loose BM Low stable H/H   7/10: Patient seen and examined at bedside. Complaining of increased frequency of urination that kept her up all night. States she needs her Dilaudid for back pain and b/l leg pain. Otherwise feeling alright Denies any CP, SOB, abd pain, N/V/D. Cr. stable.  Low stable H/H   : Patient seen and examined at bedside. Feeling very weak and tired. Complaining of pain wrapping around her right flank. Notes pain and increased swelling in her right LE. Decreased urinary frequency and urgency. Also feeling confused this AM but has a hx of hospital delirium in the AM. Denies chest pain, sob, nausea, vomiting, diarrhea. Low stable H/H, CR stable On IV Rocephin daily  : Patient seen and examined. Was having a bowel movement this AM which showed black stool 2/2 IV iron. Continues to feel weak and complaining of pain and edema in B/L lower extremities, especially her RLE. Feeling confused this AM (hx of hospital delirium). Denies chest pain, sob, nausea, vomiting, diarrhea. Low stable H/H, Cr 1.8 s/p lasix. On IV Rocephin daily.        OVERNIGHT EVENTS: none    Home Medications:  amitriptyline 10 mg oral tablet: 3 tab(s) orally once a day (at bedtime) (2022 23:44)  aspirin 81 mg oral tablet: 1 tab(s) orally once a day (:44)  atorvastatin 40 mg oral tablet: 1 tab(s) orally once a day (:44)  Dilaudid 4 mg oral tablet: 1 tab(s) orally 3 times (:44)  ferrous sulfate 324 mg (65 mg elemental iron) oral delayed release tablet: 1 tab(s) orally once a day (:44)  ferrous sulfate 325 mg (65 mg elemental iron) oral tablet: 1 tablet oral daily (:44)  Metoprolol Succinate ER 25 mg oral tablet, extended release: 1 tab(s) orally once a day (:44)  MiraLax oral powder for reconstitution: ng/kg orally (:44)  PreserVision AREDS oral capsule: 1 tab(s) orally 2 times a day (:44)  Senna 8.6 mg oral tablet: 2 tab(s) orally once a day (at bedtime) (:44)  Toprol-XL 25 mg oral tablet, extended release: 1 tab(s) orally once a day (:44)  Vitamin D3 1250 mcg (50,000 intl units) oral capsule: 1 cap(s) orally once a week (:44)  Zofran 4 mg oral tablet: 1 tab(s) orally every 6 hours (:44)      MEDICATIONS  (STANDING):  amitriptyline 30 milliGRAM(s) Oral at bedtime  aspirin  chewable 81 milliGRAM(s) Oral daily  atorvastatin 40 milliGRAM(s) Oral at bedtime  cefTRIAXone   IVPB      cefTRIAXone   IVPB 1000 milliGRAM(s) IV Intermittent every 24 hours  cyanocobalamin 1000 MICROGram(s) Oral daily  dextrose 5%. 1000 milliLiter(s) (100 mL/Hr) IV Continuous <Continuous>  dextrose 5%. 1000 milliLiter(s) (50 mL/Hr) IV Continuous <Continuous>  dextrose 50% Injectable 25 Gram(s) IV Push once  dextrose 50% Injectable 12.5 Gram(s) IV Push once  dextrose 50% Injectable 25 Gram(s) IV Push once  ferrous    sulfate 325 milliGRAM(s) Oral daily  furosemide    Tablet 20 milliGRAM(s) Oral daily  glucagon  Injectable 1 milliGRAM(s) IntraMuscular once  HYDROmorphone   Tablet 4 milliGRAM(s) Oral three times a day  insulin lispro (ADMELOG) corrective regimen sliding scale   SubCutaneous every 6 hours  iron sucrose Injectable 100 milliGRAM(s) IV Push every 24 hours  lidocaine   4% Patch 1 Patch Transdermal daily  lidocaine   4% Patch 1 Patch Transdermal daily  metoclopramide Injectable 5 milliGRAM(s) IV Push every 6 hours  metoprolol succinate ER 25 milliGRAM(s) Oral daily  pantoprazole    Tablet 40 milliGRAM(s) Oral two times a day  polyethylene glycol 3350 17 Gram(s) Oral daily  senna 2 Tablet(s) Oral at bedtime  sucralfate suspension 1 Gram(s) Oral two times a day    MEDICATIONS  (PRN):  acetaminophen     Tablet .. 650 milliGRAM(s) Oral every 6 hours PRN Temp greater or equal to 38C (100.4F), Mild Pain (1 - 3)  aluminum hydroxide/magnesium hydroxide/simethicone Suspension 30 milliLiter(s) Oral every 4 hours PRN Dyspepsia  dextrose Oral Gel 15 Gram(s) Oral once PRN Blood Glucose LESS THAN 70 milliGRAM(s)/deciliter  melatonin 3 milliGRAM(s) Oral at bedtime PRN Insomnia  ondansetron Injectable 4 milliGRAM(s) IV Push every 8 hours PRN Nausea and/or Vomiting      Allergies    morphine (Other)    Intolerances        Social History:  Former cigarette smoker, smoked <1/2 ppd for less than 15 years, quit more than 50 years ago   Denies ETOH use   Denies drug use   Ambulates with a walker. Lives at home with daughter. ADLs with assistance. (2022 23:51)    REVIEW OF SYSTEMS:  CONSTITUTIONAL: No fever, No chills, No fatigue, No myalgia, + Body ache, No Weakness  EYES: No eye pain,  No visual disturbances, No discharge, NO Redness  ENMT:  No ear pain, No nose bleed, No vertigo; No sinus pain, NO throat pain, No Congestion  NECK: No pain, No stiffness  RESPIRATORY: No cough, NO wheezing, No  hemoptysis, NO  shortness of breath  CARDIOVASCULAR: No chest pain, palpitations  GASTROINTESTINAL: +black stool 2/2 IV iron No abdominal pain, NO epigastric pain. No nausea, No vomiting; No diarrhea, No constipation. [ x ] BM  GENITOURINARY:  No dysuria, No frequency, No urgency, + hematuria, NO incontinence  NEUROLOGICAL: No headaches, No dizziness, No numbness, No tingling, No tremors, No weakness  EXT: No Swelling, No Pain, +edema b/l LE, more so R side  SKIN:  [ x ] No itching, burning, rashes, or lesions   MUSCULOSKELETAL: No joint pain ,No Jt swelling; No muscle pain, No back pain, No extremity pain  PSYCHIATRIC: +AM hospital delirium No depression,  No anxiety,  No mood swings ,No difficulty sleeping at night  PAIN SCALE: [  ] None  [ x ] Other- Dilaudid 4mg 9am, 4pm, 10pm  ROS Unable to obtain due to - [  ] Dementia  [  ] Lethargy [  ] Drowsy [  ] Sedated [  ] non verbal  REST OF REVIEW Of SYSTEM - [ x ] Normal       Vital Signs Last 24 Hrs  T(C): 36.7 (2022 06:17), Max: 36.9 (2022 12:19)  T(F): 98.1 (2022 06:17), Max: 98.5 (2022 12:19)  HR: 95 (2022 06:17) (87 - 95)  BP: 125/71 (2022 06:17) (125/71 - 137/68)  BP(mean): --  RR: 17 (2022 06:17) (17 - 18)  SpO2: 96% (2022 06:17) (96% - 96%)    Parameters below as of 2022 06:17  Patient On (Oxygen Delivery Method): room air      Finger Stick          PHYSICAL EXAM: OOB to chair  GENERAL:  [x  ] NAD , [x ] well appearing, [  ] Agitated, [  ] Drowsy,  [ ] Lethargy, [x  ] confused   HEAD:  [  x] Normal, [  ] Other  EYES:  [ x ] EOMI, [ x ] PERRLA, [  x] conjunctiva and sclera clear normal, [  ] Other,  [  ] Pallor,[  ] Discharge  ENMT:  [x  ] Normal, [ x ] Moist mucous membranes, [] Good dentition, [ x ] No Thrush  NECK:  [ x ] Supple, [ x ] No JVD, [ x ] Normal thyroid, [  ] Lymphadenopathy [  ] Other  CHEST/LUNG:  [x  ] Clear to auscultation bilaterally, [ x ] Breath Sounds equal B/L  [ x ] poor effort  [ x ] No rales, [ x ] No rhonchi  [ x ]  No wheezing,   HEART:  [x  ] Regular rate and rhythm, [  ] tachycardia, [  ] Bradycardia,  [  ] irregular  [ x ] No murmurs, No rubs, No gallops, [  ] PPM in place (Mfr:  )  ABDOMEN:  [ x ] Soft, [ x ] Nontender, [  x] Nondistended, [ x ] No mass, [ x ] Bowel sounds present, [  ] obese  NERVOUS SYSTEM:  [ x ] Alert & Oriented X1, [x  ] Nonfocal  [ x ] Confusion  [  ] Encephalopathic [  ] Sedated [  ] Unable to assess, [x  ] Dementia [  ] Other-  EXTREMITIES: [ x ] 2+ Peripheral Pulses, No clubbing, No cyanosis,  [x  ] 2+ edema R lower EXT rt lower ext > left lower ext , 1+ edema L lower EXT [ x ] PVD stasis skin changes B/L Lower EXT, [  ] wound  LYMPH: No lymphadenopathy noted  SKIN:  [ x ] No rashes or lesions, [  ] Pressure Ulcers, [ x ] ecchymosis, [  ] Skin Tears, [  ] Other      DIET: Diet, NPO:   Except Medications (22 @ 23:52)      LABS:                        8.1    5.38  )-----------( 194      ( 2022 09:00 )             25.5     2022 09:00    136    |  102    |  46     ----------------------------<  136    4.3     |  26     |  1.80     Ca    8.7        2022 09:00    TPro  5.8    /  Alb  2.6    /  TBili  0.3    /  DBili  x      /  AST  82     /  ALT  37     /  AlkPhos  63     2022 09:00      Urinalysis Basic - ( 2022 15:35 )    Color: Yellow / Appearance: Slightly Turbid / S.005 / pH: x  Gluc: x / Ketone: Negative  / Bili: Negative / Urobili: Negative   Blood: x / Protein: 30 mg/dL / Nitrite: Negative   Leuk Esterase: Negative / RBC: 11-25 /HPF / WBC 0-2   Sq Epi: x / Non Sq Epi: Occasional / Bacteria: Moderate        Culture Results:   >=3 organisms. Probable collection contamination. (07-10 @ 13:20)  Culture Results:   No Growth Final ( @ 21:45)  Culture Results:   No Growth Final ( @ 21:40)      culture blood  -- Clean Catch Clean Catch (Midstream) 07-10 @ 13:20    culture urine  --  07-10 @ 13:20              Culture - Urine (collected 10 Jul 2022 13:20)  Source: Clean Catch Clean Catch (Midstream)  Final Report (2022 08:35):    >=3 organisms. Probable collection contamination.    Culture - Blood (collected 2022 21:45)  Source: .Blood Blood-Peripheral  Final Report (2022 01:01):    No Growth Final    Culture - Blood (collected 2022 21:40)  Source: .Blood Blood-Peripheral  Final Report (2022 01:01):    No Growth Final         Anemia Panel:  Vitamin B12, Serum: 342 pg/mL (22 @ 07:14)  Folate, Serum: 11.8 ng/mL (22 @ 07:14)  Iron Total, Serum: 19 ug/dL (22 @ 07:14)  Iron - Total Binding Capacity.: 242 ug/dL (22 @ 07:14)  Ferritin, Serum: 67 ng/mL (22 @ 07:14)      Thyroid Panel:        Lipase, Serum: 78 U/L (22 @ 19:05)          RADIOLOGY & ADDITIONAL TESTS:      HEALTH ISSUES - PROBLEM Dx:  2019 novel coronavirus disease (COVID-19)    Hematuria    Anemia    FIDELINA (acute kidney injury)    Cholelithiasis    Pleural effusion    Fall    CAD (coronary artery disease)    HTN (hypertension)    Type 2 diabetes mellitus    Chronic back pain    Anxiety and depression    Sundowning    DVT prophylaxis          Consultant(s) Notes Reviewed:  [x  ] YES     Care Discussed with [X] Consultants  [  x] Patient  [ x ] Family [  ] HCP [  ]   [  ] Social Service  [  ] RN, [  ] Physical Therapy,[  ] Palliative care team  DVT PPX: [  ] Lovenox, [  ] S C Heparin, [  ] Coumadin, [  ] Xarelto, [  ] Eliquis, [  ] Pradaxa, [  ] IV Heparin drip, [x  ] SCD [  ] Contraindication 2 to GI Bleed,[  ] Ambulation [  ] Contraindicated 2 to  bleed [  ] Contraindicated 2 to Brain Bleed  Advanced directive: [  ] None, [ x ] DNR/DNI Patient is a 89y old  Female who presents with a chief complaint of gallstones, covid, fidelina (2022 09:58)    HPI:  89y F pmhx CAD x 2 stents (), T2DM, HTN, HLD, GERD, mild AS, chronic venous insufficiency, chronic neuropathy, macular degeneration, chronic constipation, depression, OA, and recent admission to [facility] - 3/2 for gallstone pancreatitis with non-operative treatment presented to the ED with constant nausea x 4 days associated with decreased PO intake, generalized weakness, and fall today x 1 due to LE weakness. Patient denies fever, chills, cp, dizziness, sob, abd pain, vomiting, diarrhea. Patient was seen here in ED  for epigastric pain (since resolved) and discharged with outpatient follow up with Dr. Terrell. Patient has seen Dr. Terrell twice since then and epigastric pain resolved s/p PO zofran and carafate with planned esophagram. Daughter states epigastric pain resolved, but patient has had constant nausea x 4 days that has not improved despite increasing PO zofran from 4mg to 8mg q6h. Patient was unable to obtain appointment with Dr. Terrell today and after falling at home due to leg weakness, daughter brought her to the ED.     ED Course  Vitals: 99.2F, 85bpm, 143/66, 95% RA  Labs: Hgb 10.2, Hct 32.5, Na 132, K 5.5, BUN 30, Cr 2, GFR 23, lipase wnl  EKG:    CT Chest and abd/pel: Moderate-sized bilateral pleural effusion with partial bilateral lower lobe atelectasis. Cholelithiasis with distended gallbladder and right upper quadrant inflammatory changes, findings suspicious for acute cholecystitis.    RUQ US: Cholelithiasis with nonspecific pericholecystic fluid. Negative sonographic Scott sign. Dilated common bowel duct. Recommend correlating with LFTs. Cannot exclude distal choledocholithiasis.    HEAD CT: Mild volume loss, microvascular disease, no acute hemorrhage or midline shift.    Patient Received: Zosyn x1, NS 500cc x1, Lasix 40mg IV x1, IV dilaudid .5mg x1           (2022 23:51)    INTERVAL HPI:  - pt seen and examined at bedside. Complaining of increased frequency of urination and suprapubic pain s/p 40mg IV lasix x1 in the ED. Otherwise, notes worsening of chronic back pain and general feeling of malaise. No sob, chest pain, nausea. Fidelina, Anemia.HIDA scan today.  - pt seen and examined at bedside. Feeling confused. A&Ox1 (person). Has a hx of hospital delirium and has been able to be reoriented when her daughter is present. Notes pain over b/l LE but denies sob, chest pain, n/v/d. Waiting for gastric emptying study per GI.  - pt seen and examined at bedside. Feeling confused again this AM possibly chronic hospital AM delirium. A&Ox1 (person). Notes constipation and decrease in b/l LE edema but has mild pain. Gastric emptying study cancelled due to pt inability to tolerate being off pain medication. Denies SOB, chest pain, n/v/d. Received dose 1/3 of remdesivir for COVID-19.   : Patient seen and examined at bedside. Patient states she did not sleep much overnight, but otherwise feeling okay. Denies any CP, SOB, abd pain, N/V/D. Patient still c/o mild leg pain. Cr stable. Loose BM Low stable H/H   7/10: Patient seen and examined at bedside. Complaining of increased frequency of urination that kept her up all night. States she needs her Dilaudid for back pain and b/l leg pain. Otherwise feeling alright Denies any CP, SOB, abd pain, N/V/D. Cr. stable.  Low stable H/H   : Patient seen and examined at bedside. Feeling very weak and tired. Complaining of pain wrapping around her right flank. Notes pain and increased swelling in her right LE. Decreased urinary frequency and urgency. Also feeling confused this AM but has a hx of hospital delirium in the AM. Denies chest pain, sob, nausea, vomiting, diarrhea. Low stable H/H, CR stable On IV Rocephin daily  : Patient seen and examined. Was having a bowel movement this AM which showed black stool 2/2 IV iron. Continues to feel weak and complaining of pain and edema in B/L lower extremities, especially her RLE. Feeling confused this AM (hx of hospital delirium). Denies chest pain, sob, nausea, vomiting, diarrhea. Low stable H/H, Cr 1.8 s/p lasix. On IV Rocephin daily.STOP Abx         OVERNIGHT EVENTS: none    Home Medications:  amitriptyline 10 mg oral tablet: 3 tab(s) orally once a day (at bedtime) (2022 23:44)  aspirin 81 mg oral tablet: 1 tab(s) orally once a day (:44)  atorvastatin 40 mg oral tablet: 1 tab(s) orally once a day (:44)  Dilaudid 4 mg oral tablet: 1 tab(s) orally 3 times (:44)  ferrous sulfate 324 mg (65 mg elemental iron) oral delayed release tablet: 1 tab(s) orally once a day (:44)  ferrous sulfate 325 mg (65 mg elemental iron) oral tablet: 1 tablet oral daily (:44)  Metoprolol Succinate ER 25 mg oral tablet, extended release: 1 tab(s) orally once a day (:44)  MiraLax oral powder for reconstitution: ng/kg orally (:44)  PreserVision AREDS oral capsule: 1 tab(s) orally 2 times a day (:44)  Senna 8.6 mg oral tablet: 2 tab(s) orally once a day (at bedtime) (:44)  Toprol-XL 25 mg oral tablet, extended release: 1 tab(s) orally once a day (:44)  Vitamin D3 1250 mcg (50,000 intl units) oral capsule: 1 cap(s) orally once a week (:44)  Zofran 4 mg oral tablet: 1 tab(s) orally every 6 hours (:44)      MEDICATIONS  (STANDING):  amitriptyline 30 milliGRAM(s) Oral at bedtime  aspirin  chewable 81 milliGRAM(s) Oral daily  atorvastatin 40 milliGRAM(s) Oral at bedtime  cefTRIAXone   IVPB      cefTRIAXone   IVPB 1000 milliGRAM(s) IV Intermittent every 24 hours  cyanocobalamin 1000 MICROGram(s) Oral daily  dextrose 5%. 1000 milliLiter(s) (100 mL/Hr) IV Continuous <Continuous>  dextrose 5%. 1000 milliLiter(s) (50 mL/Hr) IV Continuous <Continuous>  dextrose 50% Injectable 25 Gram(s) IV Push once  dextrose 50% Injectable 12.5 Gram(s) IV Push once  dextrose 50% Injectable 25 Gram(s) IV Push once  ferrous    sulfate 325 milliGRAM(s) Oral daily  furosemide    Tablet 20 milliGRAM(s) Oral daily  glucagon  Injectable 1 milliGRAM(s) IntraMuscular once  HYDROmorphone   Tablet 4 milliGRAM(s) Oral three times a day  insulin lispro (ADMELOG) corrective regimen sliding scale   SubCutaneous every 6 hours  iron sucrose Injectable 100 milliGRAM(s) IV Push every 24 hours  lidocaine   4% Patch 1 Patch Transdermal daily  lidocaine   4% Patch 1 Patch Transdermal daily  metoclopramide Injectable 5 milliGRAM(s) IV Push every 6 hours  metoprolol succinate ER 25 milliGRAM(s) Oral daily  pantoprazole    Tablet 40 milliGRAM(s) Oral two times a day  polyethylene glycol 3350 17 Gram(s) Oral daily  senna 2 Tablet(s) Oral at bedtime  sucralfate suspension 1 Gram(s) Oral two times a day    MEDICATIONS  (PRN):  acetaminophen     Tablet .. 650 milliGRAM(s) Oral every 6 hours PRN Temp greater or equal to 38C (100.4F), Mild Pain (1 - 3)  aluminum hydroxide/magnesium hydroxide/simethicone Suspension 30 milliLiter(s) Oral every 4 hours PRN Dyspepsia  dextrose Oral Gel 15 Gram(s) Oral once PRN Blood Glucose LESS THAN 70 milliGRAM(s)/deciliter  melatonin 3 milliGRAM(s) Oral at bedtime PRN Insomnia  ondansetron Injectable 4 milliGRAM(s) IV Push every 8 hours PRN Nausea and/or Vomiting      Allergies    morphine (Other)    Intolerances        Social History:  Former cigarette smoker, smoked <1/2 ppd for less than 15 years, quit more than 50 years ago   Denies ETOH use   Denies drug use   Ambulates with a walker. Lives at home with daughter. ADLs with assistance. (2022 23:51)    REVIEW OF SYSTEMS:  CONSTITUTIONAL: No fever, No chills, No fatigue, No myalgia, + Body ache, No Weakness  EYES: No eye pain,  No visual disturbances, No discharge, NO Redness  ENMT:  No ear pain, No nose bleed, No vertigo; No sinus pain, NO throat pain, No Congestion  NECK: No pain, No stiffness  RESPIRATORY: No cough, NO wheezing, No  hemoptysis, NO  shortness of breath  CARDIOVASCULAR: No chest pain, palpitations  GASTROINTESTINAL: +black stool 2/2 IV iron No abdominal pain, NO epigastric pain. No nausea, No vomiting; No diarrhea, No constipation. [ x ] BM  GENITOURINARY:  No dysuria, No frequency, No urgency, + hematuria, NO incontinence  NEUROLOGICAL: No headaches, No dizziness, No numbness, No tingling, No tremors, No weakness  EXT: No Swelling, No Pain, +edema b/l LE, more so R side  SKIN:  [ x ] No itching, burning, rashes, or lesions   MUSCULOSKELETAL: No joint pain ,No Jt swelling; No muscle pain, No back pain, No extremity pain  PSYCHIATRIC: +AM hospital delirium No depression,  No anxiety,  No mood swings ,No difficulty sleeping at night  PAIN SCALE: [  ] None  [ x ] Other- Dilaudid 4mg 9am, 4pm, 10pm  ROS Unable to obtain due to - [  ] Dementia  [  ] Lethargy [  ] Drowsy [  ] Sedated [  ] non verbal  REST OF REVIEW Of SYSTEM - [ x ] Normal       Vital Signs Last 24 Hrs  T(C): 36.7 (2022 06:17), Max: 36.9 (2022 12:19)  T(F): 98.1 (2022 06:17), Max: 98.5 (2022 12:19)  HR: 95 (2022 06:17) (87 - 95)  BP: 125/71 (2022 06:17) (125/71 - 137/68)  BP(mean): --  RR: 17 (2022 06:17) (17 - 18)  SpO2: 96% (2022 06:17) (96% - 96%)    Parameters below as of 2022 06:17  Patient On (Oxygen Delivery Method): room air      Finger Stick          PHYSICAL EXAM: OOB to chair  GENERAL:  [x  ] NAD , [x ] well appearing, [  ] Agitated, [  ] Drowsy,  [ ] Lethargy, [x  ] confused   HEAD:  [  x] Normal, [  ] Other  EYES:  [ x ] EOMI, [ x ] PERRLA, [  x] conjunctiva and sclera clear normal, [  ] Other,  [  ] Pallor,[  ] Discharge  ENMT:  [x  ] Normal, [ x ] Moist mucous membranes, [  x] Good dentition, [ x ] No Thrush  NECK:  [ x ] Supple, [ x ] No JVD, [ x ] Normal thyroid, [  ] Lymphadenopathy [  ] Other  CHEST/LUNG:  [x  ] Clear to auscultation bilaterally, [ x ] Breath Sounds equal B/L  [ x ] poor effort  [ x ] No rales, [ x ] No rhonchi  [ x ]  No wheezing,   HEART:  [x  ] Regular rate and rhythm, [  ] tachycardia, [  ] Bradycardia,  [  ] irregular  [ x ] No murmurs, No rubs, No gallops, [  ] PPM in place (Mfr:  )  ABDOMEN:  [ x ] Soft, [ x ] Nontender, [  x] Nondistended, [ x ] No mass, [ x ] Bowel sounds present, [  ] obese  NERVOUS SYSTEM:  [ x ] Alert & Oriented X1, [x  ] Nonfocal  [ x ] Confusion  [  ] Encephalopathic [  ] Sedated [  ] Unable to assess, [x  ] Dementia [  ] Other-  EXTREMITIES: [ x ] 2+ Peripheral Pulses, No clubbing, No cyanosis,  [x  ] 2+ edema R lower EXT rt lower ext > left lower ext , 1+ edema L lower EXT [ x ] PVD stasis skin changes B/L Lower EXT, [  ] wound  LYMPH: No lymphadenopathy noted  SKIN:  [ x ] No rashes or lesions, [  ] Pressure Ulcers, [ x ] ecchymosis, [  ] Skin Tears, [  ] Other      DIET: Diet, NPO:   Except Medications (22 @ 23:52)      LABS:                        8.1    5.38  )-----------( 194      ( 2022 09:00 )             25.5     2022 09:00    136    |  102    |  46     ----------------------------<  136    4.3     |  26     |  1.80     Ca    8.7        2022 09:00    TPro  5.8    /  Alb  2.6    /  TBili  0.3    /  DBili  x      /  AST  82     /  ALT  37     /  AlkPhos  63     2022 09:00      Urinalysis Basic - ( 2022 15:35 )    Color: Yellow / Appearance: Slightly Turbid / S.005 / pH: x  Gluc: x / Ketone: Negative  / Bili: Negative / Urobili: Negative   Blood: x / Protein: 30 mg/dL / Nitrite: Negative   Leuk Esterase: Negative / RBC: 11-25 /HPF / WBC 0-2   Sq Epi: x / Non Sq Epi: Occasional / Bacteria: Moderate        Culture Results:   >=3 organisms. Probable collection contamination. (07-10 @ 13:20)  Culture Results:   No Growth Final ( @ 21:45)  Culture Results:   No Growth Final ( @ 21:40)      culture blood  -- Clean Catch Clean Catch (Midstream) 07-10 @ 13:20    culture urine  --  07-10 @ 13:20              Culture - Urine (collected 10 Jul 2022 13:20)  Source: Clean Catch Clean Catch (Midstream)  Final Report (2022 08:35):    >=3 organisms. Probable collection contamination.    Culture - Blood (collected 2022 21:45)  Source: .Blood Blood-Peripheral  Final Report (2022 01:01):    No Growth Final    Culture - Blood (collected 2022 21:40)  Source: .Blood Blood-Peripheral  Final Report (2022 01:01):    No Growth Final         Anemia Panel:  Vitamin B12, Serum: 342 pg/mL (22 @ 07:14)  Folate, Serum: 11.8 ng/mL (22 @ 07:14)  Iron Total, Serum: 19 ug/dL (22 @ 07:14)  Iron - Total Binding Capacity.: 242 ug/dL (22 @ 07:14)  Ferritin, Serum: 67 ng/mL (22 @ 07:14)      Thyroid Panel:        Lipase, Serum: 78 U/L (22 @ 19:05)    RADIOLOGY & ADDITIONAL TESTS:  < from: US Duplex Venous Lower Ext Complete, Bilateral (22 @ 13:01) >  IMPRESSION:    Limited evaluation of the calf veins withno evidence of deep venous   thrombosis in the visualized bilateral lower extremity veins.      < end of copied text >  < from: Xray Esophagram Single Contrast (22 @ 12:11) >  performed due to limited patient positioning and prominent esophageal   stasis of contrast.    IMPRESSION:    Upper/cervical esophagus is limited evaluation due to severe patient   kyphosis and limitations of patient positioning. Limited images of the   upper esophagus demonstrate tortuosity (series 31).    Mid/distal esophagus demonstrates findings of presbyesophagus, including   prominent tertiary contractions, areas of mucosal irregularity and   possible small traction diverticula. Significant intraesophageal reflux   and stasis were noted. Distal stricture cannot be excluded.    Small pleural effusionsand findings of pulmonary edema.      < end of copied text >        HEALTH ISSUES - PROBLEM Dx:  2019 novel coronavirus disease (COVID-19)    Hematuria    Anemia    FIDELINA (acute kidney injury)    Cholelithiasis    Pleural effusion    Fall    CAD (coronary artery disease)    HTN (hypertension)    Type 2 diabetes mellitus    Chronic back pain    Anxiety and depression    Sundowning    DVT prophylaxis          Consultant(s) Notes Reviewed:  [x  ] YES     Care Discussed with [X] Consultants  [  x] Patient  [ x ] Family [  ] HCP [x  ]   [  ] Social Service  [ x] RN, [  ] Physical Therapy,[  ] Palliative care team  DVT PPX: [  ] Lovenox, [  ] S C Heparin, [  ] Coumadin, [  ] Xarelto, [  ] Eliquis, [  ] Pradaxa, [  ] IV Heparin drip, [x  ] SCD [  ] Contraindication 2 to GI Bleed,[  ] Ambulation [  ] Contraindicated 2 to  bleed [  ] Contraindicated 2 to Brain Bleed  Advanced directive: [  ] None, [ x ] DNR/DNI

## 2022-07-12 NOTE — PROGRESS NOTE ADULT - PROBLEM SELECTOR PLAN 3
Patient covid positive on admission, s/p moderna 3/3  - started on 3 days of remdesivir, last dose completed 7/9  - saturating well on room air and no sx  - contact precautions  - Daughter Mary [phone] states she does not want monoclonal antibodies for patient  - ID consulted, Dr. WESTLEY Izquierdo following- S/p IV Remdesivir Pt requires a hospital bed due to spinal stenosis with limit mobility and cognitive impairment. Pt requires frequent and immediate position changes that are not feasible in a regular bed with pillows. Patient needs HOB elevated more than 30 degrees most of the time duet to spinal stenosis, cognitive impairment, multiple falls, CKD.   The patient requires a gel  bed overlay due to patient has limit mobility and CAD requires a gel overlay.

## 2022-07-12 NOTE — CHART NOTE - NSCHARTNOTEFT_GEN_A_CORE
Called by RN for bruising  - As per nurse, pt was taken to radiology for ultrasound and esophagram at ~11:24am. New ecchymosis located on the right knee was noted by nurse after return from procedures.  - Pt seen and examined at bedside.  - On PE, mild ecchymosis located on anterior right patella. No pain elicited with flexion and extension with knee. No tenderness with direct palpation of patella. Mild tenderness with palpation of quadriceps above the knee.  - Nurse to call for worsening pain or any changes Called by RN for bruising  - As per nurse, pt was taken to radiology for ultrasound and esophagram at ~11:24am. New ecchymosis located on the right knee was noted by nurse after return from procedures.  - Pt seen and examined at bedside at ~7:30pm.  - On PE, mild ecchymosis located on anterior right patella. No pain elicited with flexion and extension with knee. No tenderness with direct palpation of patella. Mild tenderness with palpation of quadriceps above the knee.  - Nurse to call for worsening pain or any changes Called by RN for bruising  - As per nurse, pt was taken to radiology for ultrasound and esophagram at ~11:24am. New ecchymosis located on the right knee was noted by nurse after return from procedures.  - Pt seen and examined at bedside at ~7:30pm.  - On PE, mild ecchymosis located on anterior right patella. No pain elicited with flexion and extension with knee. No tenderness with direct palpation of patella. Mild tenderness with palpation of quadriceps above the knee.  - will hold off on imaging for now given patient has no pain  - Nurse to call for worsening pain or any changes at that point would consider xr

## 2022-07-12 NOTE — PROGRESS NOTE ADULT - PROBLEM SELECTOR PLAN 8
S/p 2 stents 2012   - continue home aspirin, Toprol XL  and statin  - Cardiology [facility] following Patient with fall at home x 1 due to LE weakness  - likely 2/2 dec PO intake  - CT head negative  - no signs or sx of acute fx  - fall precautions and out of bed to chair  - PT consulted, rec PT at home

## 2022-07-12 NOTE — PROGRESS NOTE ADULT - ASSESSMENT
89y F pmhx CAD x 2 stents (2012), T2DM, HTN, HLD, GERD, mild AS, chronic venous insufficiency, chronic neuropathy, macular degeneration, chronic constipation, depression, OA, lumbar degenerative disc disease, and spinal stenosis admitted for cholelithiasis, KERRY, and found to be COVID positive on admission.     COVID-19/Pleural Effusions  - Vaccinated x3 per patient.   - Currently on RA, sating well  - No obvious consolidation/GGO on CT chest     sp remdesivir x3 day course w/ last dose 7/9  Trend temps/WBC  Trend inflammatory biomarkers  Continue with supportive care  Maintain aspiration precautions  Maintain isolation per infection control policy    Cholelithiasis  - pt p/w nausea/decreased PO intake x 4 days  - CT abd/pel: Cholelithiasis with distended gallbladder and right upper quadrant inflammatory changes, findings suspicious for acute cholecystitis.  - RUQ US: Cholelithiasis with nonspecific pericholecystic fluid. Negative sonographic Scott sign. Dilated common bile duct.  - HIDA negative for acute cholecystitis  - Cx negative  Overall low suspicion for acute infection, S/p zosyn    KERRY  - likely 2/2 dec PO intake, Pre Renal , trend renal fxn, avoid nephrotoxins, renally dose medications    Hematuria  7/11- urology consulted for hematuria, UA w/o pyuria and mixed growth on urine culture  -dc ceftriaxone and follow off abx    Thank you for consulting us and involving us in the management of this most interesting and challenging case.  We will follow along in the care of this patient. Please call us at [phone] or text me directly on my cell# at [phone] with any concerns.

## 2022-07-12 NOTE — PROGRESS NOTE ADULT - PROBLEM SELECTOR PLAN 6
-CT Chest: Moderate-sized bilateral pleural effusion with partial bilateral lower lobe atelectasis  - s/p IV Lasix 40mg x 1 in ED, restarting lasix 20mg (ok per nephro)  - patient saturating well on room air and denies sob  -TTE shows normal LV systolic function, LVEF 65%, mild/mod MR  - pulmonology (Dr. Melo) consulted- no need for thoracentesis in the immediate future, incentive devonte as tolerated Abdominal pain improved.   -HIDA normal (7/6) and clear liquid diet with carb restrictions started  -Gastric emptying study cancelled due to patient's inability to go without pain meds  -Advance diet as tolerated, anti-emetics PRN  -Currently NPO for esophagram today(7/12), will advance diet after esophagram   -surgery consulted (Dr. Rodrigues), f/u outpatient  -GI Dr Montilla consulted, recs appreciated- esophagram to be done today (7/12)

## 2022-07-12 NOTE — PROGRESS NOTE ADULT - PROVIDER SPECIALTY LIST ADULT
Anesthesia Pre Eval Note    Anesthesia ROS/Med Hx        Anesthetic Complication History:  Patient does not have a history of anesthetic complications      Pulmonary Review:  Patient does not have a pulmonary history      Neuro/Psych Review:  Patient does not have a neuro/psych history       Cardiovascular Review:  Patient does not have a cardiovascular history   Exercise tolerance: good (>4 METS)    GI/HEPATIC/RENAL Review:  Patient does not have a GI/hepatic/renalhistory       End/Other Review:  Comments: Polycystic Ovarian Syndrome  Additional Results:     ALLERGIES:  No Known Allergies     Prior to Admission medications :  Not on File          Relevant Problems   No relevant active problems       Physical Exam     Airway   Mallampati: II  TM Distance: >3 FB  Neck ROM: Full  Neck: Non-tender and Able to place in sniff position  TMJ Mobility: Good    Cardiovascular  Cardiovascular exam normal  Cardio Rhythm: Regular  Cardio Rate: Normal    Head Assessment  Head assessment: Normocephalic and Atraumatic    General Assessment  General Assessment: Alert and oriented and No acute distress    Dental Exam  Dental exam normal    Pulmonary Exam  Pulmonary exam normal  Breath sounds clear to auscultation:  Yes    Abdominal Exam  Abdominal exam normal      Anesthesia Plan:  Anesthesia Plan    ASA Status: 2    Anesthesia Type: MAC      Consent/Risks Discussed Statement:  The proposed anesthetic plan, including its risks and benefits, have been discussed with the along with the risks and benefits of alternatives. Questions were encouraged and answered and the patient and/or representative understands and agrees to proceed.        I discussed with the patient (and/or patient's legal representative) the risks and benefits of the proposed anesthesia plan, MAC, which may include services performed by other anesthesia providers.    Alternative anesthesia plans, if available, were reviewed with the patient (and/or patient's legal  representative). Discussion has been held with the patient (and/or patient's legal representative) regarding risks of anesthesia, which include emergent situations that may require change in anesthesia plan.  The patient (and/or patient's legal representative) has indicated understanding, his/her questions have been answered, and he/she wishes to proceed with the planned anesthetic.     Hospitalist

## 2022-07-12 NOTE — PROGRESS NOTE ADULT - ASSESSMENT
89y F pmhx CAD x 2 stents (2012), T2DM, HTN, HLD, GERD, mild AS, chronic venous insufficiency, chronic neuropathy, macular degeneration, chronic constipation, depression, OA, and recent admission to [facility]"" 2/25- 3/2 for gallstone pancreatitis with non-operative treatment presented to the ED with constant nausea x 4 days associated with decreased PO intake, generalized weakness.    #Acute on CKD stage III  CT revealed right sided hydronephrosis and pleural effusion  UA consistent with UTI      Baseline creatinine ~1.3?  Possible contributions from covid-19 or hydronephrosis  No plans for thoracentesis per Pulm  Urine studies reviewed; may have been skewed by Lasix due to very high urine sodium  COVID management per primary team  Consider repeating lung imaging to assess effusions  Avoid nephrotoxic agents  Monitor serum electrolytes  Give dose of lasix today??     89y F pmhx CAD x 2 stents (2012), T2DM, HTN, HLD, GERD, mild AS, chronic venous insufficiency, chronic neuropathy, macular degeneration, chronic constipation, depression, OA, and recent admission to [facility] 2/25- 3/2 for gallstone pancreatitis with non-operative treatment presented to the ED with constant nausea x 4 days associated with decreased PO intake, generalized weakness.    #Acute on CKD stage III  CT revealed right sided hydronephrosis and pleural effusion  UA consistent with UTI    Baseline creatinine ~1.3?  Possible contributions from covid-19 or hydronephrosis  No plans for thoracentesis per Pulm  Urine studies reviewed; may have been skewed by Lasix due to very high urine sodium  COVID management per primary team  Consider repeating lung imaging to assess effusions  Avoid nephrotoxic agents  Monitor serum electrolytes  Hold lasix today as patient in NPO?     89y F pmhx CAD x 2 stents (2012), T2DM, HTN, HLD, GERD, mild AS, chronic venous insufficiency, chronic neuropathy, macular degeneration, chronic constipation, depression, OA, and recent admission to [facility] 2/25- 3/2 for gallstone pancreatitis with non-operative treatment presented to the ED with constant nausea x 4 days associated with decreased PO intake, generalized weakness.    #Acute on CKD stage III  CT revealed right sided hydronephrosis and pleural effusion  UA consistent with UTI    Baseline creatinine ~1.3?  Possible contributions from covid-19 or hydronephrosis  No plans for thoracentesis per Pulm  Urine studies reviewed; may have been skewed by Lasix due to very high urine sodium  COVID management per primary team  Consider repeating lung imaging to assess effusions  Avoid nephrotoxic agents  Monitor serum electrolytes  Creatinine stable ~1.8  Hold lasix today as patient in NPO, consider resuming tomorrow

## 2022-07-12 NOTE — PROGRESS NOTE ADULT - PROBLEM SELECTOR PLAN 10
not on insulin at home  - low dose ISS  - hypoglycemia protocol Normotensive this AM   - continue home Toprol with hold parameters

## 2022-07-12 NOTE — PROGRESS NOTE ADULT - SUBJECTIVE AND OBJECTIVE BOX
[facility] DIVISION of INFECTIOUS DISEASE  Bry Nash MD PhD, Mariel Gtz MD, Candida Izquierdo MD, Marti Del Valle MD, Alonso Doll MD  and providing coverage with Isreal Woodward MD  Providing Infectious Disease Consultations at [facility], [facility], [facility]'s      Office# [phone] to schedule follow up appointments  Answering Service for urgent calls or New Consults [phone]  Cell# to text for urgent issues Bry Nash [phone]     Infectious diseases progress note:    DEVON NOLAND is a 89y y. o. Female patient    COVID Patient    Allergies    morphine (Other)    Intolerances        ANTIBIOTICS/RELEVANT:  antimicrobials  cefTRIAXone   IVPB      cefTRIAXone   IVPB 1000 milliGRAM(s) IV Intermittent every 24 hours    immunologic:    OTHER:  acetaminophen     Tablet .. 650 milliGRAM(s) Oral every 6 hours PRN  aluminum hydroxide/magnesium hydroxide/simethicone Suspension 30 milliLiter(s) Oral every 4 hours PRN  amitriptyline 30 milliGRAM(s) Oral at bedtime  aspirin  chewable 81 milliGRAM(s) Oral daily  atorvastatin 40 milliGRAM(s) Oral at bedtime  cyanocobalamin 1000 MICROGram(s) Oral daily  dextrose 5%. 1000 milliLiter(s) IV Continuous <Continuous>  dextrose 5%. 1000 milliLiter(s) IV Continuous <Continuous>  dextrose 50% Injectable 25 Gram(s) IV Push once  dextrose 50% Injectable 12.5 Gram(s) IV Push once  dextrose 50% Injectable 25 Gram(s) IV Push once  dextrose Oral Gel 15 Gram(s) Oral once PRN  ferrous    sulfate 325 milliGRAM(s) Oral daily  furosemide    Tablet 20 milliGRAM(s) Oral daily  glucagon  Injectable 1 milliGRAM(s) IntraMuscular once  HYDROmorphone   Tablet 4 milliGRAM(s) Oral three times a day  insulin lispro (ADMELOG) corrective regimen sliding scale   SubCutaneous every 6 hours  iron sucrose Injectable 100 milliGRAM(s) IV Push every 24 hours  lidocaine   4% Patch 1 Patch Transdermal daily  lidocaine   4% Patch 1 Patch Transdermal daily  melatonin 3 milliGRAM(s) Oral at bedtime PRN  metoclopramide Injectable 5 milliGRAM(s) IV Push every 6 hours  metoprolol succinate ER 25 milliGRAM(s) Oral daily  ondansetron Injectable 4 milliGRAM(s) IV Push every 8 hours PRN  pantoprazole    Tablet 40 milliGRAM(s) Oral two times a day  polyethylene glycol 3350 17 Gram(s) Oral daily  senna 2 Tablet(s) Oral at bedtime  sucralfate suspension 1 Gram(s) Oral two times a day      Objective:  Vital Signs Last 24 Hrs  T(C): 36.7 (2022 06:17), Max: 36.9 (2022 12:19)  T(F): 98.1 (2022 06:17), Max: 98.5 (2022 12:19)  HR: 95 (2022 06:17) (87 - 95)  BP: 125/71 (2022 06:17) (125/71 - 137/68)  BP(mean): --  RR: 17 (2022 06:17) (17 - 18)  SpO2: 96% (2022 06:17) (96% - 96%)    Parameters below as of 2022 06:17  Patient On (Oxygen Delivery Method): room air        T(C): 36.7 (22 @ 06:17), Max: 36.9 (07-10-22 @ 11:53)  T(C): 36.7 (22 @ 06:17), Max: 37.4 (22 @ 21:00)  T(C): 36.7 (22 @ 06:17), Max: 37.4 (22 @ 21:00)    PHYSICAL EXAM:  HEENT: NC atraumatic  Neck: supple  Respiratory: no accessory muscle use, breathing comfortably  Cardiovascular: distant  Gastrointestinal: normal appearing, nondistended  Extremities: no clubbing, no cyanosis,        LABS:                          8.1    5.38  )-----------( 194      ( 2022 09:00 )             25.5       WBC  5.38 - @ 09:00  4.78 07-11 @ 06:42  5.55 07-10 @ 07:44  3.92 - @ 07:45  4.29 - @ 06:45  4.83 - @ 09:04  4.68 - @ 07:14  6.64 07- @ 19:05      07-12    136  |  102  |  46<H>  ----------------------------<  136<H>  4.3   |  26  |  1.80<H>    Ca    8.7      2022 09:00    TPro  5.8<L>  /  Alb  2.6<L>  /  TBili  0.3  /  DBili  x   /  AST  82<H>  /  ALT  37  /  AlkPhos  63        Creatinine, Serum: 1.80 mg/dL (22 @ 09:00)  Creatinine, Serum: 1.70 mg/dL (22 @ 06:42)  Creatinine, Serum: 1.80 mg/dL (07-10-22 @ 07:44)  Creatinine, Serum: 1.80 mg/dL (22 @ 07:45)  Creatinine, Serum: 2.10 mg/dL (22 @ 06:45)  Creatinine, Serum: 2.30 mg/dL (22 @ 09:04)  Creatinine, Serum: 2.10 mg/dL (22 @ 07:14)  Creatinine, Serum: 2.00 mg/dL (22 @ 19:05)        Urinalysis Basic - ( 2022 15:35 )    Color: Yellow / Appearance: Slightly Turbid / S.005 / pH: x  Gluc: x / Ketone: Negative  / Bili: Negative / Urobili: Negative   Blood: x / Protein: 30 mg/dL / Nitrite: Negative   Leuk Esterase: Negative / RBC: 11-25 /HPF / WBC 0-2   Sq Epi: x / Non Sq Epi: Occasional / Bacteria: Moderate            COVID RISK SCORE  Auto Neutrophil #: 4.19 K/uL (22 @ 09:00)  Auto Lymphocyte #: 0.66 K/uL (22 @ 09:00)  Auto Neutrophil #: 3.48 K/uL (22 @ 06:42)  Auto Lymphocyte #: 0.76 K/uL (22 @ 06:42)  Auto Neutrophil #: 2.58 K/uL (22 @ 07:45)  Auto Lymphocyte #: 0.79 K/uL (22 @ 07:45)  Auto Neutrophil #: 3.22 K/uL (22 @ 06:45)  Auto Lymphocyte #: 0.59 K/uL (22 @ 06:45)  Auto Neutrophil #: 3.47 K/uL (22 @ 09:04)  Auto Lymphocyte #: 0.67 K/uL (22 @ 09:04)  Auto Neutrophil #: 3.85 K/uL (22 @ 07:14)  Auto Lymphocyte #: 0.33 K/uL (22 @ 07:14)  Auto Neutrophil #: 5.15 K/uL (22 @ 19:05)  Auto Lymphocyte #: 0.46 K/uL (22 @ 19:05)    Lactate, Blood: 1.2 mmol/L (22 @ 21:45)    Auto Eosinophil #: 0.07 K/uL (22 @ 09:00)  Auto Eosinophil #: 0.03 K/uL (22 @ 06:42)  Auto Eosinophil #: 0.01 K/uL (22 @ 07:45)  Auto Eosinophil #: 0.02 K/uL (22 @ 06:45)  Auto Eosinophil #: 0.02 K/uL (22 @ 09:04)  Auto Eosinophil #: 0.07 K/uL (22 @ 07:14)  Auto Eosinophil #: 0.15 K/uL (22 @ 19:05)                  Ferritin, Serum: 67 ng/mL (22 @ 07:14)        INR: 1.04 ratio (22 @ 19:05)  Activated Partial Thromboplastin Time: 28.6 sec (22 @ 19:05)          MICROBIOLOGY:              SARS-CoV-2: Detected (2022 19:05)  COVID-19 PCR: NotDetec (2022 15:46)  COVID-19 PCR: NotDetec (2022 03:28)      RADIOLOGY & ADDITIONAL STUDIES:

## 2022-07-12 NOTE — PROGRESS NOTE ADULT - PROBLEM SELECTOR PLAN 9
Normotensive this AM   - continue home Toprol with hold parameters S/p 2 stents 2012   - continue home aspirin, Toprol XL  and statin  - Cardiology [facility] following

## 2022-07-12 NOTE — PROGRESS NOTE ADULT - ASSESSMENT
89y F PMH CAD x 2 stents (2012), T2DM, HTN, HLD, GERD, mild AS, chronic venous insufficiency, chronic neuropathy, macular degeneration, chronic constipation, depression, OA, lumbar degenerative disc disease, and spinal stenosis admitted for cholelithiasis, KERRY, and found to be COVID19.     Chest pain, Edema, HTN, HLD, CAD s/p stents   - hx CAD s/p 2 stents in 2012  - EKG NSR 80, no acute ischemic changes  - Troponin WNL on admission, no need to further trend, doubt ACS as ekg trops WNL  - Continue asa, statin  -continue off diuretics  - LE edema likely 2/2 chronic venous insufficiency versus component of HF  -does not appear volume overloaded on exam  - 7/7/22  EF 65% with norm LV/RV systolic function, with mild/mod MR.  - CT c/a/p with b/l mod effusions, partial LLL atelectasis- Pulm following, no plan for thoracentesis for b/l effusions, plan for medical management    - BP and HR stable and controlled  - Continue BB.    - COVID per primary   - Monitor and replete lytes, keep K>4, Mg>2.  - Will continue to follow.

## 2022-07-12 NOTE — PROVIDER CONTACT NOTE (OTHER) - ACTION/TREATMENT ORDERED:
per md  pt gets nothing other then Reglan IV she will change reglan dose to PO. no need for IV at this time. No other new orders.
per resident md he will be in to assess pt . No other new orders.

## 2022-07-12 NOTE — PROGRESS NOTE ADULT - SUBJECTIVE AND OBJECTIVE BOX
[facility] -- Casper Mcgee, Hai, Briana, Eligio Ryan Savella, Goodger  Office # [phone]    Follow Up:    cholelithiasis and KERRY found to be covid +  Subjective/Observations:   Patient is in bed, confused, no complaints of chest pain or SOB. No cough at this time.      REVIEW OF SYSTEMS: All other review of systems is negative unless indicated above  PAST MEDICAL & SURGICAL HISTORY:  H/O vertebral fracture repair  History of vertebral fracture  Dyslipidemia  DM type 2 with diabetic dyslipidemia  H/O gastroesophageal reflux (GERD)  Costochondritis  Coronary arteriosclerosis in native artery  s/p 2 stents. last placed 2 years ago  Gastroparesis  History of laminectomy  S/P hip hemiarthroplasty  S/P appendectomy      MEDICATIONS  (STANDING):  amitriptyline 30 milliGRAM(s) Oral at bedtime  aspirin  chewable 81 milliGRAM(s) Oral daily  atorvastatin 40 milliGRAM(s) Oral at bedtime  cyanocobalamin 1000 MICROGram(s) Oral daily  dextrose 5%. 1000 milliLiter(s) (100 mL/Hr) IV Continuous <Continuous>  dextrose 5%. 1000 milliLiter(s) (50 mL/Hr) IV Continuous <Continuous>  dextrose 50% Injectable 25 Gram(s) IV Push once  dextrose 50% Injectable 12.5 Gram(s) IV Push once  dextrose 50% Injectable 25 Gram(s) IV Push once  ferrous    sulfate 325 milliGRAM(s) Oral daily  glucagon  Injectable 1 milliGRAM(s) IntraMuscular once  HYDROmorphone   Tablet 4 milliGRAM(s) Oral three times a day  insulin lispro (ADMELOG) corrective regimen sliding scale   SubCutaneous every 6 hours  iron sucrose Injectable 100 milliGRAM(s) IV Push every 24 hours  lidocaine   4% Patch 1 Patch Transdermal daily  lidocaine   4% Patch 1 Patch Transdermal daily  metoclopramide Injectable 5 milliGRAM(s) IV Push every 6 hours  metoprolol succinate ER 25 milliGRAM(s) Oral daily  pantoprazole    Tablet 40 milliGRAM(s) Oral two times a day  polyethylene glycol 3350 17 Gram(s) Oral daily  senna 2 Tablet(s) Oral at bedtime  sucralfate suspension 1 Gram(s) Oral two times a day    MEDICATIONS  (PRN):  acetaminophen     Tablet .. 650 milliGRAM(s) Oral every 6 hours PRN Temp greater or equal to 38C (100.4F), Mild Pain (1 - 3)  aluminum hydroxide/magnesium hydroxide/simethicone Suspension 30 milliLiter(s) Oral every 4 hours PRN Dyspepsia  dextrose Oral Gel 15 Gram(s) Oral once PRN Blood Glucose LESS THAN 70 milliGRAM(s)/deciliter  melatonin 3 milliGRAM(s) Oral at bedtime PRN Insomnia  ondansetron Injectable 4 milliGRAM(s) IV Push every 8 hours PRN Nausea and/or Vomiting    Allergies    morphine (Other)    Intolerances      Vital Signs Last 24 Hrs  T(C): 36.7 (12 Jul 2022 06:17), Max: 36.7 (11 Jul 2022 20:55)  T(F): 98.1 (12 Jul 2022 06:17), Max: 98.1 (12 Jul 2022 06:17)  HR: 95 (12 Jul 2022 06:17) (90 - 95)  BP: 125/71 (12 Jul 2022 06:17) (125/71 - 137/68)  BP(mean): --  RR: 17 (12 Jul 2022 06:17) (17 - 18)  SpO2: 96% (12 Jul 2022 06:17) (96% - 96%)    Parameters below as of 12 Jul 2022 06:17  Patient On (Oxygen Delivery Method): room air      I&O's Summary      PHYSICAL EXAM:  TELE:   Constitutional: NAD, awake and alert, well-developed  HEENT: Moist Mucous Membranes, Anicteric  Pulmonary: Non-labored, breath sounds are clear bilaterally, No wheezing, rales or rhonchi  Cardiovascular: Regular, S1 and S2, No murmurs, rubs, gallops or clicks  Gastrointestinal: Bowel Sounds present, soft, nontender.   Lymph: No peripheral edema. No lymphadenopathy.  Skin: No visible rashes or ulcers.  Psych:  Mood & affect appropriate  LABS: All Labs Reviewed:                        8.1    5.38  )-----------( 194      ( 12 Jul 2022 09:00 )             25.5                         8.0    x     )-----------( x        ( 11 Jul 2022 14:00 )             25.0                         7.2    4.78  )-----------( 171      ( 11 Jul 2022 06:42 )             22.5     12 Jul 2022 09:00    136    |  102    |  46     ----------------------------<  136    4.3     |  26     |  1.80   11 Jul 2022 06:42    138    |  104    |  41     ----------------------------<  120    4.5     |  29     |  1.70   10 Jul 2022 07:44    137    |  105    |  34     ----------------------------<  141    3.9     |  24     |  1.80     Ca    8.7        12 Jul 2022 09:00  Ca    8.3        11 Jul 2022 06:42  Ca    8.3        10 Jul 2022 07:44    TPro  5.8    /  Alb  2.6    /  TBili  0.3    /  DBili  x      /  AST  82     /  ALT  37     /  AlkPhos  63     12 Jul 2022 09:00  TPro  5.1    /  Alb  2.3    /  TBili  0.2    /  DBili  x      /  AST  80     /  ALT  33     /  AlkPhos  56     11 Jul 2022 06:42        12 Lead ECG:   Ventricular Rate 71 BPM    Atrial Rate 71 BPM    P-R Interval 142 ms    QRS Duration 84 ms    Q-T Interval 392 ms    QTC Calculation(Bazett) 425 ms    P Axis 64 degrees    R Axis 18 degrees    T Axis 7 degrees    Diagnosis Line Normal sinus rhythm  Normal ECG  When compared with ECG of 05-JUL-2022 19:57, (Unconfirmed)  No significant change was found  Confirmed by Bry Warren MD ([id number]) on 7/6/2022 1:56:48 PM (07-06-22 @ 13:32)      ACC: [id number] EXAM:  ECHO TTE WO CON COMP W DOPP                        PROCEDURE DATE:  07/07/2022    INTERPRETATION:  INDICATION: dyspnea  Sonographer LK    Blood Pressure 143/70    Height 157 cm     Weight 52 kg       BSA 1.5 sq m    Dimensions:  LA unavailable       Normal Values: 2.0 - 4.0 cm  Ao unavailable        Normal Values: 2.0 - 3.8 cm  SEPTUM unavailable       Normal Values: 0.6 - 1.2 cm  PWT unavailable       Normal Values: 0.6 - 1.1 cm  LVIDd unavailable         Normal Values: 3.0 - 5.6 cm  LVIDs unavailable         Normal Values: 1.8 - 4.0 cm      OBSERVATIONS:  Technically difficult study  Mitral Valve: Mitral annular calcification with thickened leaflets, mild   to moderate MR.  Aortic Valve/Aorta: Sclerotic trileaflet aortic valve with grossly normal   opening by visual estimation.  Tricuspid Valve: normal with trace TR.  Pulmonic Valve: Not well-visualized  Left Atrium: Enlarged  Right Atrium: Not well-visualized  Left Ventricle: normal LV size and systolic function, estimated LVEF of   65%.  Right Ventricle: Grossly normal size and systolic function.  Pericardium: no significant pericardial effusion.  Pulmonary/RV Pressure: estimated PA systolic pressure of 20mmHg  IVC measures 1.85 cm    IMPRESSION:  Technically difficult study  Normal left ventricular internal dimensions and systolic function,   estimated LVEF of 65%.  Grossly normal RV size and systolic function.  Sclerotic trileaflet aortic valve with grossly normal opening by visual   estimation, without AI.  Mild to moderate MR  No significant pericardial effusion.  --- End of Report ---  BAM STEVENS MD; Attending Cardiologist  This document has been electronically signed. Jul 8 2022 10:54AM

## 2022-07-12 NOTE — PROGRESS NOTE ADULT - PROBLEM SELECTOR PLAN 4
Acute KERRY on CKD III.   - Creatinine up to 1.8 from 1.7 this AM    - CT abd/p shows R sided hydronephrosis  - Lasix was on hold given KERRY, urine studies may have been skewed by lasix due to very high urine sodium per nephro  - Use caution with IVF given know pleural effusion  - Hold nephrotoxic meds   - nephrology Dr. Wu,/DR PATEL d/w  -Restart Lasix 20mg dose OK Patient covid positive on admission, s/p moderna 3/3  - started on 3 days of remdesivir, last dose completed 7/9  - saturating well on room air and no sx  - contact precautions  - Daughter Mary [phone] states she does not want monoclonal antibodies for patient  - ID consulted, Dr. WESTLEY Izquierdo following- S/p IV Remdesivir

## 2022-07-12 NOTE — PROGRESS NOTE ADULT - PROBLEM SELECTOR PLAN 1
Pt presented with hematuria since admission. Unclear etiology   - UA (7/11) demonstrated large blood  - Hb low but stable (8.1 today)  - Ct Abd/Pelvis w contrast (6/22)- Right sided hydronephrosis with bladder wall thickening  - F/u urine culture (07/11/22)   - On Rocephin 1 gm IVPB Daily   - Urology following Dr. Hill Pt presented with hematuria since admission. Unclear etiology   - UA (7/11) demonstrated large blood  - Hb low but stable (8.1 today)  - Ct Abd/Pelvis w contrast (6/22)- Right sided hydronephrosis with bladder wall thickening  - F/u urine culture (07/11/22) -contamination  - On Rocephin 1 gm IVPB Daily   - Urology following Dr. Hill- no further work up

## 2022-07-12 NOTE — PROVIDER CONTACT NOTE (OTHER) - SITUATION
pt in pain from IV while flushing. some redness noted. IV removed at this time.  multiple attempts made at  IV. No IV access at this time due to pt being hard stick.
RLE already had swelling to knee and leg prior to US.

## 2022-07-12 NOTE — PROGRESS NOTE ADULT - PROBLEM SELECTOR PLAN 7
Patient with fall at home x 1 due to LE weakness  - likely 2/2 dec PO intake  - CT head negative  - no signs or sx of acute fx  - fall precautions and out of bed to chair  - PT consulted, rec PT at home -CT Chest: Moderate-sized bilateral pleural effusion with partial bilateral lower lobe atelectasis  - s/p IV Lasix 40mg x 1 in ED, restarting lasix 20mg (ok per nephro)  - patient saturating well on room air and denies sob  -TTE shows normal LV systolic function, LVEF 65%, mild/mod MR  - pulmonology (Dr. Melo) consulted- no need for thoracentesis in the immediate future, incentive devonte as tolerated

## 2022-07-12 NOTE — PROGRESS NOTE ADULT - SUBJECTIVE AND OBJECTIVE BOX
Date/Time Patient Seen:  		  Referring MD:   Data Reviewed	       Patient is a 89y old  Female who presents with a chief complaint of gallstones, julisa, kiah (11 Jul 2022 18:09)      Subjective/HPI     PAST MEDICAL & SURGICAL HISTORY:  H/O vertebral fracture repair    History of vertebral fracture    Dyslipidemia    DM type 2 with diabetic dyslipidemia    H/O gastroesophageal reflux (GERD)    Costochondritis    Coronary arteriosclerosis in native artery  s/p 2 stents. last placed 2 years ago    Gastroparesis    History of laminectomy    S/P hip hemiarthroplasty    S/P appendectomy          Medication list         MEDICATIONS  (STANDING):  amitriptyline 30 milliGRAM(s) Oral at bedtime  aspirin  chewable 81 milliGRAM(s) Oral daily  atorvastatin 40 milliGRAM(s) Oral at bedtime  cefTRIAXone   IVPB      cefTRIAXone   IVPB 1000 milliGRAM(s) IV Intermittent every 24 hours  cyanocobalamin 1000 MICROGram(s) Oral daily  dextrose 5%. 1000 milliLiter(s) (100 mL/Hr) IV Continuous <Continuous>  dextrose 5%. 1000 milliLiter(s) (50 mL/Hr) IV Continuous <Continuous>  dextrose 50% Injectable 25 Gram(s) IV Push once  dextrose 50% Injectable 12.5 Gram(s) IV Push once  dextrose 50% Injectable 25 Gram(s) IV Push once  ferrous    sulfate 325 milliGRAM(s) Oral daily  furosemide    Tablet 20 milliGRAM(s) Oral daily  glucagon  Injectable 1 milliGRAM(s) IntraMuscular once  HYDROmorphone   Tablet 4 milliGRAM(s) Oral three times a day  insulin lispro (ADMELOG) corrective regimen sliding scale   SubCutaneous three times a day before meals  insulin lispro (ADMELOG) corrective regimen sliding scale   SubCutaneous every 6 hours  iron sucrose Injectable 100 milliGRAM(s) IV Push every 24 hours  lidocaine   4% Patch 1 Patch Transdermal daily  lidocaine   4% Patch 1 Patch Transdermal daily  metoclopramide Injectable 5 milliGRAM(s) IV Push every 6 hours  metoprolol succinate ER 25 milliGRAM(s) Oral daily  pantoprazole    Tablet 40 milliGRAM(s) Oral two times a day  polyethylene glycol 3350 17 Gram(s) Oral daily  senna 2 Tablet(s) Oral at bedtime  sucralfate suspension 1 Gram(s) Oral two times a day    MEDICATIONS  (PRN):  acetaminophen     Tablet .. 650 milliGRAM(s) Oral every 6 hours PRN Temp greater or equal to 38C (100.4F), Mild Pain (1 - 3)  aluminum hydroxide/magnesium hydroxide/simethicone Suspension 30 milliLiter(s) Oral every 4 hours PRN Dyspepsia  dextrose Oral Gel 15 Gram(s) Oral once PRN Blood Glucose LESS THAN 70 milliGRAM(s)/deciliter  melatonin 3 milliGRAM(s) Oral at bedtime PRN Insomnia  ondansetron Injectable 4 milliGRAM(s) IV Push every 8 hours PRN Nausea and/or Vomiting         Vitals log        ICU Vital Signs Last 24 Hrs  T(C): 36.7 (11 Jul 2022 20:55), Max: 36.9 (11 Jul 2022 12:19)  T(F): 98 (11 Jul 2022 20:55), Max: 98.5 (11 Jul 2022 12:19)  HR: 90 (11 Jul 2022 20:55) (87 - 90)  BP: 137/68 (11 Jul 2022 20:55) (128/57 - 137/68)  BP(mean): --  ABP: --  ABP(mean): --  RR: 18 (11 Jul 2022 20:55) (18 - 18)  SpO2: 96% (11 Jul 2022 20:55) (96% - 96%)    O2 Parameters below as of 11 Jul 2022 20:55  Patient On (Oxygen Delivery Method): room air                 Input and Output:  I&O's Detail      Lab Data                        8.0    x     )-----------( x        ( 11 Jul 2022 14:00 )             25.0     07-11    138  |  104  |  41<H>  ----------------------------<  120<H>  4.5   |  29  |  1.70<H>    Ca    8.3<L>      11 Jul 2022 06:42    TPro  5.1<L>  /  Alb  2.3<L>  /  TBili  0.2  /  DBili  x   /  AST  80<H>  /  ALT  33  /  AlkPhos  56  07-11            Review of Systems	      Objective     Physical Examination    heart s1s2  lung dc BS  abd soft      Pertinent Lab findings & Imaging      Olvin:  NO   Adequate UO     I&O's Detail           Discussed with:     Cultures:	        Radiology

## 2022-07-12 NOTE — PROGRESS NOTE ADULT - PROBLEM SELECTOR PLAN 12
Chronic- stable  - Holding home sertraline hx of spinal stenosis, OA, and neuropathy  -Restarted home PO Dilaudid 4mg @ 9am, 4pm, 10pm  - Lido patched Lower Back b/l for  Back pain

## 2022-07-12 NOTE — PROGRESS NOTE ADULT - ASSESSMENT
89y F pmhx CAD x 2 stents (2012), T2DM, HTN, HLD, GERD, mild AS, chronic venous insufficiency, chronic neuropathy, macular degeneration, chronic constipation, depression, OA, lumbar degenerative disc disease, and spinal stenosis admitted for cholelithiasis, KERRY, and found to be COVID positive on admission    covid  OP  OA  Pleural eff  Atelectasis  KERRY  CAD  Fall  Ataxic gait  HLD  GERD  valv heart disease    ct renal stone hunt noted - nephrolithiasis - effusion - atelectasis -   VS noted  labs reviewed  plan for MBS   and Heme Onc eval noted  on LASIX    cvs rx regimen  TTE -   covid management - sx management - monitor VS and Sat - isolation precs  fall prec - PT assessment - gait training - CM follow up  pleural eff - no need for thoracentesis in the immediate future - medical rx regimen and cvs rx regimen  I devonte as able to tolerate  Surgery eval in progress - Acute Angelica eval noted  GOC discussion  assist with needs  repllisandro almanzar

## 2022-07-12 NOTE — PROGRESS NOTE ADULT - PROBLEM SELECTOR PLAN 5
Abdominal pain improved.   -HIDA normal (7/6) and clear liquid diet with carb restrictions started  -Gastric emptying study cancelled due to patient's inability to go without pain meds  -Advance diet as tolerated, anti-emetics PRN  -Currently NPO for esophagram today(7/12), will advance diet after esophagram   -surgery consulted (Dr. Rodrigues), f/u outpatient  -GI Dr Montilla consulted, recs appreciated- esophagram to be done today (7/12) Acute KERRY on CKD III.   - Creatinine up to 1.8 from 1.7 this AM    - CT abd/p shows R sided hydronephrosis  - Lasix was on hold given KERRY, urine studies may have been skewed by lasix due to very high urine sodium per nephro  - Use caution with IVF given know pleural effusion  - Hold nephrotoxic meds   - nephrology Dr. Wu,/DR PATEL d/w  -Restart Lasix 20mg dose OK

## 2022-07-13 ENCOUNTER — APPOINTMENT (OUTPATIENT)
Dept: CARDIOLOGY | Facility: CLINIC | Age: 87
End: 2022-07-13

## 2022-07-13 DIAGNOSIS — Z29.9 ENCOUNTER FOR PROPHYLACTIC MEASURES, UNSPECIFIED: ICD-10-CM

## 2022-07-13 DIAGNOSIS — N17.9 ACUTE KIDNEY FAILURE, UNSPECIFIED: ICD-10-CM

## 2022-07-13 LAB
ALBUMIN SERPL ELPH-MCNC: 2.4 G/DL — LOW (ref 3.3–5)
ALP SERPL-CCNC: 56 U/L — SIGNIFICANT CHANGE UP (ref 40–120)
ALT FLD-CCNC: 31 U/L — SIGNIFICANT CHANGE UP (ref 12–78)
ANION GAP SERPL CALC-SCNC: 6 MMOL/L — SIGNIFICANT CHANGE UP (ref 5–17)
APPEARANCE UR: ABNORMAL
AST SERPL-CCNC: 57 U/L — HIGH (ref 15–37)
BASOPHILS # BLD AUTO: 0.01 K/UL — SIGNIFICANT CHANGE UP (ref 0–0.2)
BASOPHILS NFR BLD AUTO: 0.2 % — SIGNIFICANT CHANGE UP (ref 0–2)
BILIRUB SERPL-MCNC: 0.4 MG/DL — SIGNIFICANT CHANGE UP (ref 0.2–1.2)
BILIRUB UR-MCNC: NEGATIVE — SIGNIFICANT CHANGE UP
BUN SERPL-MCNC: 40 MG/DL — HIGH (ref 7–23)
CALCIUM SERPL-MCNC: 8.6 MG/DL — SIGNIFICANT CHANGE UP (ref 8.5–10.1)
CHLORIDE SERPL-SCNC: 102 MMOL/L — SIGNIFICANT CHANGE UP (ref 96–108)
CO2 SERPL-SCNC: 28 MMOL/L — SIGNIFICANT CHANGE UP (ref 22–31)
COLOR SPEC: YELLOW — SIGNIFICANT CHANGE UP
CREAT SERPL-MCNC: 1.9 MG/DL — HIGH (ref 0.5–1.3)
CULTURE RESULTS: NO GROWTH — SIGNIFICANT CHANGE UP
DIFF PNL FLD: ABNORMAL
EGFR: 25 ML/MIN/1.73M2 — LOW
EOSINOPHIL # BLD AUTO: 0.09 K/UL — SIGNIFICANT CHANGE UP (ref 0–0.5)
EOSINOPHIL NFR BLD AUTO: 1.8 % — SIGNIFICANT CHANGE UP (ref 0–6)
GLUCOSE SERPL-MCNC: 124 MG/DL — HIGH (ref 70–99)
GLUCOSE UR QL: NEGATIVE — SIGNIFICANT CHANGE UP
HCT VFR BLD CALC: 23.3 % — LOW (ref 34.5–45)
HGB BLD-MCNC: 7.4 G/DL — LOW (ref 11.5–15.5)
IMM GRANULOCYTES NFR BLD AUTO: 0.4 % — SIGNIFICANT CHANGE UP (ref 0–1.5)
KETONES UR-MCNC: NEGATIVE — SIGNIFICANT CHANGE UP
LEUKOCYTE ESTERASE UR-ACNC: ABNORMAL
LYMPHOCYTES # BLD AUTO: 0.57 K/UL — LOW (ref 1–3.3)
LYMPHOCYTES # BLD AUTO: 11.3 % — LOW (ref 13–44)
MCHC RBC-ENTMCNC: 28.1 PG — SIGNIFICANT CHANGE UP (ref 27–34)
MCHC RBC-ENTMCNC: 31.8 GM/DL — LOW (ref 32–36)
MCV RBC AUTO: 88.6 FL — SIGNIFICANT CHANGE UP (ref 80–100)
MONOCYTES # BLD AUTO: 0.5 K/UL — SIGNIFICANT CHANGE UP (ref 0–0.9)
MONOCYTES NFR BLD AUTO: 9.9 % — SIGNIFICANT CHANGE UP (ref 2–14)
NEUTROPHILS # BLD AUTO: 3.85 K/UL — SIGNIFICANT CHANGE UP (ref 1.8–7.4)
NEUTROPHILS NFR BLD AUTO: 76.4 % — SIGNIFICANT CHANGE UP (ref 43–77)
NITRITE UR-MCNC: NEGATIVE — SIGNIFICANT CHANGE UP
NRBC # BLD: 0 /100 WBCS — SIGNIFICANT CHANGE UP (ref 0–0)
PH UR: 7 — SIGNIFICANT CHANGE UP (ref 5–8)
PLATELET # BLD AUTO: 189 K/UL — SIGNIFICANT CHANGE UP (ref 150–400)
POTASSIUM SERPL-MCNC: 4.1 MMOL/L — SIGNIFICANT CHANGE UP (ref 3.5–5.3)
POTASSIUM SERPL-SCNC: 4.1 MMOL/L — SIGNIFICANT CHANGE UP (ref 3.5–5.3)
PROT SERPL-MCNC: 5.4 G/DL — LOW (ref 6–8.3)
PROT UR-MCNC: 30 MG/DL
RBC # BLD: 2.63 M/UL — LOW (ref 3.8–5.2)
RBC # FLD: 13.2 % — SIGNIFICANT CHANGE UP (ref 10.3–14.5)
SODIUM SERPL-SCNC: 136 MMOL/L — SIGNIFICANT CHANGE UP (ref 135–145)
SP GR SPEC: 1.01 — SIGNIFICANT CHANGE UP (ref 1.01–1.02)
SPECIMEN SOURCE: SIGNIFICANT CHANGE UP
UROBILINOGEN FLD QL: NEGATIVE — SIGNIFICANT CHANGE UP
WBC # BLD: 5.04 K/UL — SIGNIFICANT CHANGE UP (ref 3.8–10.5)
WBC # FLD AUTO: 5.04 K/UL — SIGNIFICANT CHANGE UP (ref 3.8–10.5)

## 2022-07-13 PROCEDURE — 99232 SBSQ HOSP IP/OBS MODERATE 35: CPT

## 2022-07-13 RX ORDER — HYDROMORPHONE HYDROCHLORIDE 2 MG/ML
4 INJECTION INTRAMUSCULAR; INTRAVENOUS; SUBCUTANEOUS
Refills: 0 | Status: DISCONTINUED | OUTPATIENT
Start: 2022-07-15 | End: 2022-07-15

## 2022-07-13 RX ORDER — FUROSEMIDE 40 MG
20 TABLET ORAL
Refills: 0 | Status: DISCONTINUED | OUTPATIENT
Start: 2022-07-13 | End: 2022-07-15

## 2022-07-13 RX ORDER — CEFTRIAXONE 500 MG/1
1000 INJECTION, POWDER, FOR SOLUTION INTRAMUSCULAR; INTRAVENOUS EVERY 24 HOURS
Refills: 0 | Status: DISCONTINUED | OUTPATIENT
Start: 2022-07-13 | End: 2022-07-14

## 2022-07-13 RX ORDER — LIDOCAINE 4 G/100G
1 CREAM TOPICAL ONCE
Refills: 0 | Status: DISCONTINUED | OUTPATIENT
Start: 2022-07-13 | End: 2022-07-13

## 2022-07-13 RX ADMIN — LIDOCAINE 1 PATCH: 4 CREAM TOPICAL at 19:00

## 2022-07-13 RX ADMIN — Medication 1 GRAM(S): at 18:05

## 2022-07-13 RX ADMIN — Medication 1 GRAM(S): at 06:05

## 2022-07-13 RX ADMIN — HYDROMORPHONE HYDROCHLORIDE 4 MILLIGRAM(S): 2 INJECTION INTRAMUSCULAR; INTRAVENOUS; SUBCUTANEOUS at 08:31

## 2022-07-13 RX ADMIN — Medication 81 MILLIGRAM(S): at 11:47

## 2022-07-13 RX ADMIN — HYDROMORPHONE HYDROCHLORIDE 4 MILLIGRAM(S): 2 INJECTION INTRAMUSCULAR; INTRAVENOUS; SUBCUTANEOUS at 21:54

## 2022-07-13 RX ADMIN — ATORVASTATIN CALCIUM 40 MILLIGRAM(S): 80 TABLET, FILM COATED ORAL at 21:53

## 2022-07-13 RX ADMIN — LIDOCAINE 1 PATCH: 4 CREAM TOPICAL at 23:00

## 2022-07-13 RX ADMIN — Medication 5 MILLIGRAM(S): at 06:05

## 2022-07-13 RX ADMIN — Medication 5 MILLIGRAM(S): at 18:05

## 2022-07-13 RX ADMIN — Medication 30 MILLIGRAM(S): at 21:54

## 2022-07-13 RX ADMIN — Medication 3 MILLIGRAM(S): at 21:53

## 2022-07-13 RX ADMIN — PANTOPRAZOLE SODIUM 40 MILLIGRAM(S): 20 TABLET, DELAYED RELEASE ORAL at 06:05

## 2022-07-13 RX ADMIN — CEFTRIAXONE 100 MILLIGRAM(S): 500 INJECTION, POWDER, FOR SOLUTION INTRAMUSCULAR; INTRAVENOUS at 16:17

## 2022-07-13 RX ADMIN — SENNA PLUS 2 TABLET(S): 8.6 TABLET ORAL at 21:54

## 2022-07-13 RX ADMIN — HYDROMORPHONE HYDROCHLORIDE 4 MILLIGRAM(S): 2 INJECTION INTRAMUSCULAR; INTRAVENOUS; SUBCUTANEOUS at 17:17

## 2022-07-13 RX ADMIN — PANTOPRAZOLE SODIUM 40 MILLIGRAM(S): 20 TABLET, DELAYED RELEASE ORAL at 18:06

## 2022-07-13 RX ADMIN — Medication 325 MILLIGRAM(S): at 11:47

## 2022-07-13 RX ADMIN — LIDOCAINE 1 PATCH: 4 CREAM TOPICAL at 11:48

## 2022-07-13 RX ADMIN — IRON SUCROSE 100 MILLIGRAM(S): 20 INJECTION, SOLUTION INTRAVENOUS at 21:57

## 2022-07-13 RX ADMIN — PREGABALIN 1000 MICROGRAM(S): 225 CAPSULE ORAL at 11:48

## 2022-07-13 RX ADMIN — HYDROMORPHONE HYDROCHLORIDE 4 MILLIGRAM(S): 2 INJECTION INTRAMUSCULAR; INTRAVENOUS; SUBCUTANEOUS at 09:30

## 2022-07-13 RX ADMIN — HYDROMORPHONE HYDROCHLORIDE 4 MILLIGRAM(S): 2 INJECTION INTRAMUSCULAR; INTRAVENOUS; SUBCUTANEOUS at 16:17

## 2022-07-13 RX ADMIN — LIDOCAINE 1 PATCH: 4 CREAM TOPICAL at 01:00

## 2022-07-13 RX ADMIN — Medication 2: at 12:41

## 2022-07-13 RX ADMIN — Medication 5 MILLIGRAM(S): at 11:47

## 2022-07-13 RX ADMIN — Medication 25 MILLIGRAM(S): at 06:05

## 2022-07-13 RX ADMIN — POLYETHYLENE GLYCOL 3350 17 GRAM(S): 17 POWDER, FOR SOLUTION ORAL at 18:05

## 2022-07-13 RX ADMIN — HYDROMORPHONE HYDROCHLORIDE 4 MILLIGRAM(S): 2 INJECTION INTRAMUSCULAR; INTRAVENOUS; SUBCUTANEOUS at 22:54

## 2022-07-13 NOTE — PROGRESS NOTE ADULT - ASSESSMENT
89y F PMH CAD x 2 stents (2012), T2DM, HTN, HLD, GERD, mild AS, chronic venous insufficiency, chronic neuropathy, macular degeneration, chronic constipation, depression, OA, lumbar degenerative disc disease, and spinal stenosis admitted for cholelithiasis, KERRY, and found to be COVID19.     Chest pain, Edema, HTN, HLD, CAD s/p stents   - hx CAD s/p 2 stents in 2012  - EKG NSR 80, no acute ischemic changes  - Troponin WNL on admission, no need to further trend, doubt ACS as EKG WNL  - Continue asa, statin    - LE edema likely 2/2 chronic venous insufficiency versus component of HF  - Continue Lasix 20 every 2 days   - Does not appear volume overloaded on exam  - 7/7/22  EF 65% with norm LV/RV systolic function, with mild/mod MR.  - CT c/a/p with b/l mod effusions, partial LLL atelectasis  - Pulm following, no plan for thoracentesis for b/l effusions, plan for medical management    - BP and HR stable and controlled  - Continue BB.    - COVID per primary   - Monitor and replete lytes, keep K>4, Mg>2.  - Will continue to follow.    Abdulkadir Giraldo, MS FNP, [facility]  Nurse Practitioner- Cardiology   Spectra #[phone]

## 2022-07-13 NOTE — PROGRESS NOTE ADULT - ASSESSMENT
nausea  abodminal pain    hida negative for acute cholecystitis  ? gastroparesis  iv fluid  unable to obtain nm ges 2/2 pt's dilaudid  esophagram noted, will need to d/w pt's daughter    I reviewed the overnight course of events on the unit, re-confirming the patient history. I discussed the care with the patient and their family  Differential diagnosis and plan of care discussed with patient after the evaluation  35 minutes spent on total encounter of which more than fifty percent of the encounter was spent counseling and/or coordinating care by the attending physician.  Advanced care planning was discussed with patient and family.  Advanced care planning forms were reviewed and discussed.  Risks, benefits and alternatives of gastroenterologic procedures were discussed in detail and all questions were answered. nausea  abodminal pain    hida negative for acute cholecystitis  suspect dysmotility  iv fluid  unable to obtain nm ges 2/2 pt's dilaudid  esophagram noted, will need to d/w pt's daughter regarding nutrition    I reviewed the overnight course of events on the unit, re-confirming the patient history. I discussed the care with the patient and their family  Differential diagnosis and plan of care discussed with patient after the evaluation  35 minutes spent on total encounter of which more than fifty percent of the encounter was spent counseling and/or coordinating care by the attending physician.  Advanced care planning was discussed with patient and family.  Advanced care planning forms were reviewed and discussed.  Risks, benefits and alternatives of gastroenterologic procedures were discussed in detail and all questions were answered.

## 2022-07-13 NOTE — DIETITIAN NUTRITION RISK NOTIFICATION - TREATMENT: THE FOLLOWING DIET HAS BEEN RECOMMENDED
Diet, Soft and Bite Sized:   Low Fat (LOWFAT)  Supplement Feeding Modality:  Oral  Ensure Enlive Cans or Servings Per Day:  3       Frequency:  Three Times a day (07-12-22 @ 12:51) [Active]

## 2022-07-13 NOTE — CHART NOTE - NSCHARTNOTEFT_GEN_A_CORE
Assessment:   Pt seen for nutrition follow up.  Chart reviewed, hospital course noted.     Brief History: 89y F pmhx CAD x 2 stents (2012), T2DM, HTN, HLD, GERD, mild AS, chronic venous insufficiency, chronic neuropathy, macular degeneration, chronic constipation, depression, OA, lumbar degenerative disc disease, and spinal stenosis admitted for cholelithiasis, KERRY, and found to be COVID positive on admission.    Pt seen at bedside, easily roused.  Lunch tray basically uneaten, <1/2 Ensure shake completed.   States -130# but pt noted to be confused. Pt had esophagram on 7/12 showing "Mid/distal esophagus demonstrates findings of presbyesophagus, including prominent tertiary contractions, areas of mucosal irregularity and possible small traction diverticula. Significant intraesophageal reflux and stasis were noted.".  GES unable to be done due to pt on Dilaudid.    Pt's daughter arrived shortly after I interviewed pt.  States pt was eating grilled cheese, PB+J s/w, yogurt at home. Explained pt cannot have bread on current dysphagia diet. No formal swallow study has been done this admit.  NFPE done- +severe muscle wasting of temples, clavicles, shoulders, severe fat loss at triceps, mild-mod buccal loss, moderate orbital fat loss.  3+ ankle edema. Pt with poor po intake since admission.    +BM 7/12, on bowel regimen    Factors impacting intake: [ ] none [ ] nausea  [ ] vomiting [ ] diarrhea [ ] constipation  [ ]chewing problems [x] swallowing issues  [x] other: acute illness, poor appetite    Diet Prescription: low fat, soft/bite sized, Ensure tid  Intake: poor    Current Weight: 7/6 119#; no updated wt      Pertinent Medications: MEDICATIONS  (STANDING):  amitriptyline 30 milliGRAM(s) Oral at bedtime  aspirin  chewable 81 milliGRAM(s) Oral daily  atorvastatin 40 milliGRAM(s) Oral at bedtime  cyanocobalamin 1000 MICROGram(s) Oral daily  dextrose 5%. 1000 milliLiter(s) (50 mL/Hr) IV Continuous <Continuous>  dextrose 5%. 1000 milliLiter(s) (100 mL/Hr) IV Continuous <Continuous>  dextrose 50% Injectable 25 Gram(s) IV Push once  dextrose 50% Injectable 12.5 Gram(s) IV Push once  dextrose 50% Injectable 25 Gram(s) IV Push once  ferrous    sulfate 325 milliGRAM(s) Oral daily  furosemide    Tablet 20 milliGRAM(s) Oral <User Schedule>  glucagon  Injectable 1 milliGRAM(s) IntraMuscular once  HYDROmorphone   Tablet 4 milliGRAM(s) Oral three times a day  insulin lispro (ADMELOG) corrective regimen sliding scale   SubCutaneous at bedtime  insulin lispro (ADMELOG) corrective regimen sliding scale   SubCutaneous three times a day before meals  iron sucrose Injectable 100 milliGRAM(s) IV Push every 24 hours  lidocaine   4% Patch 1 Patch Transdermal daily  lidocaine   4% Patch 1 Patch Transdermal daily  metoclopramide 5 milliGRAM(s) Oral every 6 hours  metoprolol succinate ER 25 milliGRAM(s) Oral daily  pantoprazole    Tablet 40 milliGRAM(s) Oral two times a day  polyethylene glycol 3350 17 Gram(s) Oral daily  senna 2 Tablet(s) Oral at bedtime  sucralfate suspension 1 Gram(s) Oral two times a day    MEDICATIONS  (PRN):  acetaminophen     Tablet .. 650 milliGRAM(s) Oral every 6 hours PRN Temp greater or equal to 38C (100.4F), Mild Pain (1 - 3)  aluminum hydroxide/magnesium hydroxide/simethicone Suspension 30 milliLiter(s) Oral every 4 hours PRN Dyspepsia  dextrose Oral Gel 15 Gram(s) Oral once PRN Blood Glucose LESS THAN 70 milliGRAM(s)/deciliter  melatonin 3 milliGRAM(s) Oral at bedtime PRN Insomnia  ondansetron Injectable 4 milliGRAM(s) IV Push every 8 hours PRN Nausea and/or Vomiting    Pertinent Labs: 07-13 Na136 mmol/L Glu 124 mg/dL<H> K+ 4.1 mmol/L Cr  1.90 mg/dL<H> BUN 40 mg/dL<H> 07-13 Alb 2.4 g/dL<L>     CAPILLARY BLOOD GLUCOSE      POCT Blood Glucose.: 227 mg/dL (13 Jul 2022 11:53)  POCT Blood Glucose.: 142 mg/dL (13 Jul 2022 08:03)  POCT Blood Glucose.: 163 mg/dL (12 Jul 2022 21:55)  POCT Blood Glucose.: 193 mg/dL (12 Jul 2022 17:03)      Skin: sacral I  Edema: 3+ ankles    Estimated Needs:   [x] no change since previous assessment on: 7/7 based on wt 116#  kcal/day: 6299-0766  gms protein/day: 57-68    Previous Nutrition Diagnosis:   [x] Inadequate Oral Intake     Nutrition Diagnosis is [x] ongoing  [ ] resolved [ ] not applicable     New Nutrition Diagnosis: [x] Severe malnutrition related to inadequate protein-energy intake in the setting of acute on chronic illness evidenced by moderate to severe muscle wasting and mild to severe fat loss, poor po intake since admission, 3+ LE edema.        Interventions:   Recommend  [x] Continue current diet  [ ] Change Diet To:  [x] Nutrition Supplement- Ensure TID  [ ] Nutrition Support  [x] Other: request swallow eval for possible diet upgrade; daily MVI    Monitoring and Evaluation:   [x] PO intake [ x ] Tolerance to diet prescription [ x ] weights [ x ] labs [ x ] follow up per protocol  [x] other: s/s GI distress, bowel function, skin integrity/ edema

## 2022-07-13 NOTE — PROGRESS NOTE ADULT - SUBJECTIVE AND OBJECTIVE BOX
Patient is a 89y old  Female who presents with a chief complaint of gallstones, covid, fidelina (2022 07:57)    HPI:  89y F pmhx CAD x 2 stents (), T2DM, HTN, HLD, GERD, mild AS, chronic venous insufficiency, chronic neuropathy, macular degeneration, chronic constipation, depression, OA, and recent admission to [facility] - 3/2 for gallstone pancreatitis with non-operative treatment presented to the ED with constant nausea x 4 days associated with decreased PO intake, generalized weakness, and fall today x 1 due to LE weakness. Patient denies fever, chills, cp, dizziness, sob, abd pain, vomiting, diarrhea. Patient was seen here in ED  for epigastric pain (since resolved) and discharged with outpatient follow up with Dr. Terrell. Patient has seen Dr. Terrell twice since then and epigastric pain resolved s/p PO zofran and carafate with planned esophagram. Daughter states epigastric pain resolved, but patient has had constant nausea x 4 days that has not improved despite increasing PO zofran from 4mg to 8mg q6h. Patient was unable to obtain appointment with Dr. Terrell today and after falling at home due to leg weakness, daughter brought her to the ED.     ED Course  Vitals: 99.2F, 85bpm, 143/66, 95% RA  Labs: Hgb 10.2, Hct 32.5, Na 132, K 5.5, BUN 30, Cr 2, GFR 23, lipase wnl  EKG:    CT Chest and abd/pel: Moderate-sized bilateral pleural effusion with partial bilateral lower lobe atelectasis. Cholelithiasis with distended gallbladder and right upper quadrant inflammatory changes, findings suspicious for acute cholecystitis.    RUQ US: Cholelithiasis with nonspecific pericholecystic fluid. Negative sonographic Scott sign. Dilated common bowel duct. Recommend correlating with LFTs. Cannot exclude distal choledocholithiasis.    HEAD CT: Mild volume loss, microvascular disease, no acute hemorrhage or midline shift.    Patient Received: Zosyn x1, NS 500cc x1, Lasix 40mg IV x1, IV dilaudid .5mg x1           (2022 23:51)    INTERVAL HPI:  - pt seen and examined at bedside. Complaining of increased frequency of urination and suprapubic pain s/p 40mg IV lasix x1 in the ED. Otherwise, notes worsening of chronic back pain and general feeling of malaise. No sob, chest pain, nausea. Fidelina, Anemia.HIDA scan today.  - pt seen and examined at bedside. Feeling confused. A&Ox1 (person). Has a hx of hospital delirium and has been able to be reoriented when her daughter is present. Notes pain over b/l LE but denies sob, chest pain, n/v/d. Waiting for gastric emptying study per GI.  - pt seen and examined at bedside. Feeling confused again this AM possibly chronic hospital AM delirium. A&Ox1 (person). Notes constipation and decrease in b/l LE edema but has mild pain. Gastric emptying study cancelled due to pt inability to tolerate being off pain medication. Denies SOB, chest pain, n/v/d. Received dose 1/3 of remdesivir for COVID-19.   : Patient seen and examined at bedside. Patient states she did not sleep much overnight, but otherwise feeling okay. Denies any CP, SOB, abd pain, N/V/D. Patient still c/o mild leg pain. Cr stable. Loose BM Low stable H/H   7/10: Patient seen and examined at bedside. Complaining of increased frequency of urination that kept her up all night. States she needs her Dilaudid for back pain and b/l leg pain. Otherwise feeling alright Denies any CP, SOB, abd pain, N/V/D. Cr. stable.  Low stable H/H   : Patient seen and examined at bedside. Feeling very weak and tired. Complaining of pain wrapping around her right flank. Notes pain and increased swelling in her right LE. Decreased urinary frequency and urgency. Also feeling confused this AM but has a hx of hospital delirium in the AM. Denies chest pain, sob, nausea, vomiting, diarrhea. Low stable H/H, CR stable On IV Rocephin daily  : Patient seen and examined. Was having a bowel movement this AM which showed black stool 2/2 IV iron. Continues to feel weak and complaining of pain and edema in B/L lower extremities, especially her RLE. Feeling confused this AM (hx of hospital delirium). Denies chest pain, sob, nausea, vomiting, diarrhea. Low stable H/H, Cr 1.8 s/p lasix. On IV Rocephin daily.STOP Abx   : Patient seen and examined. Is experiencing a lot of pain in her back 2/2 spinal stenosis and RLE. Unable to sit up and eat. Is confused and wants to talk to her daughter. Denies chest pain, shortness of breath, nausea, vomiting, diarrhea. Decrease in H/H 7.4/23.3. Cr elevated 1.9 s/p lasix. Repeat doppler U/S neg.        OVERNIGHT EVENTS: Nurse noticed bruise over right knee after patient came back from esophagram. No pain was elicited with flexion and extension with knee. No tenderness with direct palpation of patella. Mild tenderness with palpation of quadriceps above the knee. Held off on imaging given patient had no pain.    Home Medications:  amitriptyline 10 mg oral tablet: 3 tab(s) orally once a day (at bedtime) (2022 23:44)  aspirin 81 mg oral tablet: 1 tab(s) orally once a day (:44)  atorvastatin 40 mg oral tablet: 1 tab(s) orally once a day (:44)  Dilaudid 4 mg oral tablet: 1 tab(s) orally 3 times (:44)  ferrous sulfate 324 mg (65 mg elemental iron) oral delayed release tablet: 1 tab(s) orally once a day (:44)  ferrous sulfate 325 mg (65 mg elemental iron) oral tablet: 1 tablet oral daily (:44)  Metoprolol Succinate ER 25 mg oral tablet, extended release: 1 tab(s) orally once a day (:44)  MiraLax oral powder for reconstitution: ng/kg orally (:44)  PreserVision AREDS oral capsule: 1 tab(s) orally 2 times a day (:44)  Senna 8.6 mg oral tablet: 2 tab(s) orally once a day (at bedtime) (:44)  Toprol-XL 25 mg oral tablet, extended release: 1 tab(s) orally once a day (2022 23:44)  Vitamin D3 1250 mcg (50,000 intl units) oral capsule: 1 cap(s) orally once a week (2022 23:44)  Zofran 4 mg oral tablet: 1 tab(s) orally every 6 hours (2022 23:44)      MEDICATIONS  (STANDING):  amitriptyline 30 milliGRAM(s) Oral at bedtime  aspirin  chewable 81 milliGRAM(s) Oral daily  atorvastatin 40 milliGRAM(s) Oral at bedtime  cyanocobalamin 1000 MICROGram(s) Oral daily  dextrose 5%. 1000 milliLiter(s) (100 mL/Hr) IV Continuous <Continuous>  dextrose 5%. 1000 milliLiter(s) (50 mL/Hr) IV Continuous <Continuous>  dextrose 50% Injectable 25 Gram(s) IV Push once  dextrose 50% Injectable 25 Gram(s) IV Push once  dextrose 50% Injectable 12.5 Gram(s) IV Push once  ferrous    sulfate 325 milliGRAM(s) Oral daily  furosemide    Tablet 20 milliGRAM(s) Oral <User Schedule>  glucagon  Injectable 1 milliGRAM(s) IntraMuscular once  HYDROmorphone   Tablet 4 milliGRAM(s) Oral three times a day  insulin lispro (ADMELOG) corrective regimen sliding scale   SubCutaneous three times a day before meals  insulin lispro (ADMELOG) corrective regimen sliding scale   SubCutaneous at bedtime  iron sucrose Injectable 100 milliGRAM(s) IV Push every 24 hours  lidocaine   4% Patch 1 Patch Transdermal daily  lidocaine   4% Patch 1 Patch Transdermal daily  lidocaine   4% Patch 1 Patch Transdermal once  metoclopramide 5 milliGRAM(s) Oral every 6 hours  metoprolol succinate ER 25 milliGRAM(s) Oral daily  pantoprazole    Tablet 40 milliGRAM(s) Oral two times a day  polyethylene glycol 3350 17 Gram(s) Oral daily  senna 2 Tablet(s) Oral at bedtime  sucralfate suspension 1 Gram(s) Oral two times a day    MEDICATIONS  (PRN):  acetaminophen     Tablet .. 650 milliGRAM(s) Oral every 6 hours PRN Temp greater or equal to 38C (100.4F), Mild Pain (1 - 3)  aluminum hydroxide/magnesium hydroxide/simethicone Suspension 30 milliLiter(s) Oral every 4 hours PRN Dyspepsia  dextrose Oral Gel 15 Gram(s) Oral once PRN Blood Glucose LESS THAN 70 milliGRAM(s)/deciliter  melatonin 3 milliGRAM(s) Oral at bedtime PRN Insomnia  ondansetron Injectable 4 milliGRAM(s) IV Push every 8 hours PRN Nausea and/or Vomiting      Allergies    morphine (Other)    Intolerances        Social History:  Former cigarette smoker, smoked <1/2 ppd for less than 15 years, quit more than 50 years ago   Denies ETOH use   Denies drug use   Ambulates with a walker. Lives at home with daughter. ADLs with assistance. (2022 23:51)      REVIEW OF SYSTEMS:  CONSTITUTIONAL: No fever, No chills, No fatigue, No myalgia, + Body ache, No Weakness  EYES: No eye pain,  No visual disturbances, No discharge, NO Redness  ENMT:  No ear pain, No nose bleed, No vertigo; No sinus pain, NO throat pain, No Congestion  NECK: No pain, No stiffness  RESPIRATORY: No cough, NO wheezing, No  hemoptysis, NO  shortness of breath  CARDIOVASCULAR: No chest pain, palpitations  GASTROINTESTINAL: No abdominal pain, NO epigastric pain. No nausea, No vomiting; No diarrhea, No constipation. [  ] BM  GENITOURINARY:  No dysuria, No frequency, No urgency, No hematuria, NO incontinence  NEUROLOGICAL: No headaches, No dizziness, No numbness, No tingling, No tremors, No weakness  EXT: No Swelling, + Pain RLE, +edema RLE  SKIN:  [ x ] No itching, burning, rashes, or lesions   MUSCULOSKELETAL: No joint pain ,No Jt swelling; No muscle pain, No back pain, No extremity pain  PSYCHIATRIC: +AM hospital delirium No depression,  No anxiety,  No mood swings ,No difficulty sleeping at night  PAIN SCALE: [  ] None  [ x ] Other- Dilaudid 4mg 9am, 4pm, 10pm  ROS Unable to obtain due to - [  ] Dementia  [  ] Lethargy [  ] Drowsy [  ] Sedated [  ] non verbal  REST OF REVIEW Of SYSTEM - [ x ] Normal       Vital Signs Last 24 Hrs  T(C): 36.9 (2022 05:22), Max: 36.9 (2022 22:04)  T(F): 98.4 (2022 05:22), Max: 98.5 (2022 22:04)  HR: 85 (2022 05:22) (79 - 90)  BP: 132/65 (2022 05:22) (125/63 - 132/65)  BP(mean): --  RR: 18 (2022 05:22) (18 - 18)  SpO2: 91% (2022 05:22) (91% - 92%)    Parameters below as of 2022 05:22  Patient On (Oxygen Delivery Method): room air      Finger Stick          PHYSICAL EXAM:  GENERAL:  [  ] NAD , [x ] well appearing, [  ] Agitated, [  ] Drowsy,  [ ] Lethargy, [x  ] confused   HEAD:  [  x] Normal, [  ] Other  EYES:  [ x ] EOMI, [ x ] PERRLA, [  x] conjunctiva and sclera clear normal, [  ] Other,  [  ] Pallor,[  ] Discharge  ENMT:  [x  ] Normal, [ x ] Moist mucous membranes, [  x] Good dentition, [ x ] No Thrush  NECK:  [ x ] Supple, [ x ] No JVD, [ x ] Normal thyroid, [  ] Lymphadenopathy [  ] Other  CHEST/LUNG:  [x  ] Clear to auscultation bilaterally, [ x ] Breath Sounds equal B/L  [ x ] poor effort  [ x ] No rales, [ x ] No rhonchi  [ x ]  No wheezing,   HEART:  [x  ] Regular rate and rhythm, [  ] tachycardia, [  ] Bradycardia,  [  ] irregular  [ x ] No murmurs, No rubs, No gallops, [  ] PPM in place (Mfr:  )  ABDOMEN:  [ x ] Soft, [ x ] Nontender, [  x] Nondistended, [ x ] No mass, [ x ] Bowel sounds present, [  ] obese  NERVOUS SYSTEM:  [ x ] Alert & Oriented X1, [x  ] Nonfocal  [ x ] Confusion  [  ] Encephalopathic [  ] Sedated [  ] Unable to assess, [x  ] Dementia [  ] Other-  EXTREMITIES: [ x ] 2+ Peripheral Pulses, No clubbing, No cyanosis,  [x  ] 2+ edema R lower EXT rt lower ext > left lower ext [ x ] PVD stasis skin changes B/L Lower EXT, [  ] wound  LYMPH: No lymphadenopathy noted  SKIN:  [ x ] No rashes or lesions, [  ] Pressure Ulcers, [ x ] ecchymosis, [  ] Skin Tears, [  ] Other      DIET:     LABS:                        7.4    5.04  )-----------( 189      ( 2022 06:55 )             23.3     2022 06:55    136    |  102    |  40     ----------------------------<  124    4.1     |  28     |  1.90     Ca    8.6        2022 06:55    TPro  5.4    /  Alb  2.4    /  TBili  0.4    /  DBili  x      /  AST  57     /  ALT  31     /  AlkPhos  56     2022 06:55      Urinalysis Basic - ( 2022 15:35 )    Color: Yellow / Appearance: Slightly Turbid / S.005 / pH: x  Gluc: x / Ketone: Negative  / Bili: Negative / Urobili: Negative   Blood: x / Protein: 30 mg/dL / Nitrite: Negative   Leuk Esterase: Negative / RBC: 11-25 /HPF / WBC 0-2   Sq Epi: x / Non Sq Epi: Occasional / Bacteria: Moderate        Culture Results:   No growth ( @ 15:35)  Culture Results:   >=3 organisms. Probable collection contamination. (07-10 @ 13:20)      culture blood  -- Catheterized Catheterized  @ 15:35    culture urine  --   @ 15:35              Culture - Urine (collected 2022 15:35)  Source: Catheterized Catheterized  Final Report (2022 00:22):    No growth    Culture - Urine (collected 10 Jul 2022 13:20)  Source: Clean Catch Clean Catch (Midstream)  Final Report (2022 08:35):    >=3 organisms. Probable collection contamination.         Anemia Panel:      Thyroid Panel:                RADIOLOGY & ADDITIONAL TESTS:      HEALTH ISSUES - PROBLEM Dx:  2019 novel coronavirus disease (COVID-19)    Hematuria    Anemia    FIDELINA (acute kidney injury)    Cholelithiasis    Pleural effusion    Fall    CAD (coronary artery disease)    HTN (hypertension)    Type 2 diabetes mellitus    Chronic back pain    Anxiety and depression    Sundowning    DVT prophylaxis    Spinal stenosis            Consultant(s) Notes Reviewed:  [x  ] YES     Care Discussed with [X] Consultants  [  x] Patient  [ x ] Family [  ] HCP [x  ]   [  ] Social Service  [ x] RN, [  ] Physical Therapy,[  ] Palliative care team  DVT PPX: [  ] Lovenox, [  ] S C Heparin, [  ] Coumadin, [  ] Xarelto, [  ] Eliquis, [  ] Pradaxa, [  ] IV Heparin drip, [x  ] SCD [  ] Contraindication 2 to GI Bleed,[  ] Ambulation [  ] Contraindicated 2 to  bleed [  ] Contraindicated 2 to Brain Bleed  Advanced directive: [  ] None, [ x ] DNR/DNI   Patient is a 89y old  Female who presents with a chief complaint of gallstones, covid, fidelina (2022 07:57)    HPI:  89y F pmhx CAD x 2 stents (), T2DM, HTN, HLD, GERD, mild AS, chronic venous insufficiency, chronic neuropathy, macular degeneration, chronic constipation, depression, OA, and recent admission to [facility] - 3/2 for gallstone pancreatitis with non-operative treatment presented to the ED with constant nausea x 4 days associated with decreased PO intake, generalized weakness, and fall today x 1 due to LE weakness. Patient denies fever, chills, cp, dizziness, sob, abd pain, vomiting, diarrhea. Patient was seen here in ED  for epigastric pain (since resolved) and discharged with outpatient follow up with Dr. Terrell. Patient has seen Dr. Terrell twice since then and epigastric pain resolved s/p PO zofran and carafate with planned esophagram. Daughter states epigastric pain resolved, but patient has had constant nausea x 4 days that has not improved despite increasing PO zofran from 4mg to 8mg q6h. Patient was unable to obtain appointment with Dr. Terrell today and after falling at home due to leg weakness, daughter brought her to the ED.     ED Course  Vitals: 99.2F, 85bpm, 143/66, 95% RA  Labs: Hgb 10.2, Hct 32.5, Na 132, K 5.5, BUN 30, Cr 2, GFR 23, lipase wnl  EKG:    CT Chest and abd/pel: Moderate-sized bilateral pleural effusion with partial bilateral lower lobe atelectasis. Cholelithiasis with distended gallbladder and right upper quadrant inflammatory changes, findings suspicious for acute cholecystitis.    RUQ US: Cholelithiasis with nonspecific pericholecystic fluid. Negative sonographic Scott sign. Dilated common bowel duct. Recommend correlating with LFTs. Cannot exclude distal choledocholithiasis.    HEAD CT: Mild volume loss, microvascular disease, no acute hemorrhage or midline shift.    Patient Received: Zosyn x1, NS 500cc x1, Lasix 40mg IV x1, IV dilaudid .5mg x1           (2022 23:51)    INTERVAL HPI:  - pt seen and examined at bedside. Complaining of increased frequency of urination and suprapubic pain s/p 40mg IV lasix x1 in the ED. Otherwise, notes worsening of chronic back pain and general feeling of malaise. No sob, chest pain, nausea. Fidelina, Anemia.HIDA scan today.  - pt seen and examined at bedside. Feeling confused. A&Ox1 (person). Has a hx of hospital delirium and has been able to be reoriented when her daughter is present. Notes pain over b/l LE but denies sob, chest pain, n/v/d. Waiting for gastric emptying study per GI.  - pt seen and examined at bedside. Feeling confused again this AM possibly chronic hospital AM delirium. A&Ox1 (person). Notes constipation and decrease in b/l LE edema but has mild pain. Gastric emptying study cancelled due to pt inability to tolerate being off pain medication. Denies SOB, chest pain, n/v/d. Received dose 1/3 of remdesivir for COVID-19.   : Patient seen and examined at bedside. Patient states she did not sleep much overnight, but otherwise feeling okay. Denies any CP, SOB, abd pain, N/V/D. Patient still c/o mild leg pain. Cr stable. Loose BM Low stable H/H   7/10: Patient seen and examined at bedside. Complaining of increased frequency of urination that kept her up all night. States she needs her Dilaudid for back pain and b/l leg pain. Otherwise feeling alright Denies any CP, SOB, abd pain, N/V/D. Cr. stable.  Low stable H/H   : Patient seen and examined at bedside. Feeling very weak and tired. Complaining of pain wrapping around her right flank. Notes pain and increased swelling in her right LE. Decreased urinary frequency and urgency. Also feeling confused this AM but has a hx of hospital delirium in the AM. Denies chest pain, sob, nausea, vomiting, diarrhea. Low stable H/H, CR stable On IV Rocephin daily  : Patient seen and examined. Was having a bowel movement this AM which showed black stool 2/2 IV iron. Continues to feel weak and complaining of pain and edema in B/L lower extremities, especially her RLE. Feeling confused this AM (hx of hospital delirium). Denies chest pain, sob, nausea, vomiting, diarrhea. Low stable H/H, Cr 1.8 s/p lasix. On IV Rocephin daily.STOP Abx   : Patient seen and examined. Is experiencing a lot of pain in her back 2/2 spinal stenosis and RLE. Unable to sit up and eat. Is confused and wants to talk to her daughter. Denies chest pain, shortness of breath, nausea, vomiting, diarrhea. Decrease in H/H 7.4/23.3. Cr elevated 1.9 s/p lasix. Repeat doppler U/S neg.        OVERNIGHT EVENTS: Nurse noticed bruise over right knee after patient came back from esophagram. No pain was elicited with flexion and extension with knee. No tenderness with direct palpation of patella. Mild tenderness with palpation of quadriceps above the knee. Held off on imaging given patient had no pain.    Home Medications:  amitriptyline 10 mg oral tablet: 3 tab(s) orally once a day (at bedtime) (2022 23:44)  aspirin 81 mg oral tablet: 1 tab(s) orally once a day (:44)  atorvastatin 40 mg oral tablet: 1 tab(s) orally once a day (:44)  Dilaudid 4 mg oral tablet: 1 tab(s) orally 3 times (:44)  ferrous sulfate 324 mg (65 mg elemental iron) oral delayed release tablet: 1 tab(s) orally once a day (:44)  ferrous sulfate 325 mg (65 mg elemental iron) oral tablet: 1 tablet oral daily (:44)  Metoprolol Succinate ER 25 mg oral tablet, extended release: 1 tab(s) orally once a day (:44)  MiraLax oral powder for reconstitution: ng/kg orally (:44)  PreserVision AREDS oral capsule: 1 tab(s) orally 2 times a day (:44)  Senna 8.6 mg oral tablet: 2 tab(s) orally once a day (at bedtime) (:44)  Toprol-XL 25 mg oral tablet, extended release: 1 tab(s) orally once a day (2022 23:44)  Vitamin D3 1250 mcg (50,000 intl units) oral capsule: 1 cap(s) orally once a week (2022 23:44)  Zofran 4 mg oral tablet: 1 tab(s) orally every 6 hours (2022 23:44)      MEDICATIONS  (STANDING):  amitriptyline 30 milliGRAM(s) Oral at bedtime  aspirin  chewable 81 milliGRAM(s) Oral daily  atorvastatin 40 milliGRAM(s) Oral at bedtime  cyanocobalamin 1000 MICROGram(s) Oral daily  dextrose 5%. 1000 milliLiter(s) (100 mL/Hr) IV Continuous <Continuous>  dextrose 5%. 1000 milliLiter(s) (50 mL/Hr) IV Continuous <Continuous>  dextrose 50% Injectable 25 Gram(s) IV Push once  dextrose 50% Injectable 25 Gram(s) IV Push once  dextrose 50% Injectable 12.5 Gram(s) IV Push once  ferrous    sulfate 325 milliGRAM(s) Oral daily  furosemide    Tablet 20 milliGRAM(s) Oral <User Schedule>  glucagon  Injectable 1 milliGRAM(s) IntraMuscular once  HYDROmorphone   Tablet 4 milliGRAM(s) Oral three times a day  insulin lispro (ADMELOG) corrective regimen sliding scale   SubCutaneous three times a day before meals  insulin lispro (ADMELOG) corrective regimen sliding scale   SubCutaneous at bedtime  iron sucrose Injectable 100 milliGRAM(s) IV Push every 24 hours  lidocaine   4% Patch 1 Patch Transdermal daily  lidocaine   4% Patch 1 Patch Transdermal daily  lidocaine   4% Patch 1 Patch Transdermal once  metoclopramide 5 milliGRAM(s) Oral every 6 hours  metoprolol succinate ER 25 milliGRAM(s) Oral daily  pantoprazole    Tablet 40 milliGRAM(s) Oral two times a day  polyethylene glycol 3350 17 Gram(s) Oral daily  senna 2 Tablet(s) Oral at bedtime  sucralfate suspension 1 Gram(s) Oral two times a day    MEDICATIONS  (PRN):  acetaminophen     Tablet .. 650 milliGRAM(s) Oral every 6 hours PRN Temp greater or equal to 38C (100.4F), Mild Pain (1 - 3)  aluminum hydroxide/magnesium hydroxide/simethicone Suspension 30 milliLiter(s) Oral every 4 hours PRN Dyspepsia  dextrose Oral Gel 15 Gram(s) Oral once PRN Blood Glucose LESS THAN 70 milliGRAM(s)/deciliter  melatonin 3 milliGRAM(s) Oral at bedtime PRN Insomnia  ondansetron Injectable 4 milliGRAM(s) IV Push every 8 hours PRN Nausea and/or Vomiting      Allergies    morphine (Other)    Intolerances        Social History:  Former cigarette smoker, smoked <1/2 ppd for less than 15 years, quit more than 50 years ago   Denies ETOH use   Denies drug use   Ambulates with a walker. Lives at home with daughter. ADLs with assistance. (2022 23:51)      REVIEW OF SYSTEMS:  CONSTITUTIONAL: No fever, No chills, No fatigue, No myalgia, + Body ache, No Weakness  EYES: No eye pain,  No visual disturbances, No discharge, NO Redness  ENMT:  No ear pain, No nose bleed, No vertigo; No sinus pain, NO throat pain, No Congestion  NECK: No pain, No stiffness  RESPIRATORY: No cough, NO wheezing, No  hemoptysis, NO  shortness of breath  CARDIOVASCULAR: No chest pain, palpitations  GASTROINTESTINAL: No abdominal pain, NO epigastric pain. No nausea, No vomiting; No diarrhea, No constipation. [  ] BM  GENITOURINARY:  No dysuria, No frequency, No urgency, No hematuria, NO incontinence  NEUROLOGICAL: No headaches, No dizziness, No numbness, No tingling, No tremors, No weakness  EXT: No Swelling, + Pain RLE, +edema RLE  SKIN:  [ x ] No itching, burning, rashes, or lesions   MUSCULOSKELETAL: No joint pain ,No Jt swelling; No muscle pain, No back pain, No extremity pain  PSYCHIATRIC: +AM hospital delirium No depression,  No anxiety,  No mood swings ,No difficulty sleeping at night  PAIN SCALE: [  ] None  [ x ] Other- Dilaudid 4mg 9am, 4pm, 10pm  ROS Unable to obtain due to - [  ] Dementia  [  ] Lethargy [  ] Drowsy [  ] Sedated [  ] non verbal  REST OF REVIEW Of SYSTEM - [ x ] Normal       Vital Signs Last 24 Hrs  T(C): 36.9 (2022 05:22), Max: 36.9 (2022 22:04)  T(F): 98.4 (2022 05:22), Max: 98.5 (2022 22:04)  HR: 85 (2022 05:22) (79 - 90)  BP: 132/65 (2022 05:22) (125/63 - 132/65)  BP(mean): --  RR: 18 (2022 05:22) (18 - 18)  SpO2: 91% (2022 05:22) (91% - 92%)    Parameters below as of 2022 05:22  Patient On (Oxygen Delivery Method): room air      Finger Stick    PHYSICAL EXAM:  GENERAL:  [ x ] NAD , [x ] well appearing, [  ] Agitated, [  ] Drowsy,  [ ] Lethargy, [x  ] confused   HEAD:  [  x] Normal, [  ] Other  EYES:  [ x ] EOMI, [ x ] PERRLA, [  x] conjunctiva and sclera clear normal, [  ] Other,  [  ] Pallor,[  ] Discharge  ENMT:  [x  ] Normal, [ x ] Moist mucous membranes, [  x] Good dentition, [ x ] No Thrush  NECK:  [ x ] Supple, [ x ] No JVD, [ x ] Normal thyroid, [  ] Lymphadenopathy [  ] Other  CHEST/LUNG:  [x  ] Clear to auscultation bilaterally, [ x ] Breath Sounds equal B/L  [ x ] poor effort  [ x ] No rales, [ x ] No rhonchi  [ x ]  No wheezing,   HEART:  [x  ] Regular rate and rhythm, [  ] tachycardia, [  ] Bradycardia,  [  ] irregular  [ x ] No murmurs, No rubs, No gallops, [  ] PPM in place (Mfr:  )  ABDOMEN:  [ x ] Soft, [ x ] Nontender, [  x] Nondistended, [ x ] No mass, [ x ] Bowel sounds present, [  ] obese  NERVOUS SYSTEM:  [ x ] Alert & Oriented X1, [x  ] Nonfocal  [ x ] Confusion  [  ] Encephalopathic [  ] Sedated [  ] Unable to assess, [x  ] Dementia [  ] Other-  EXTREMITIES: [ x ] 2+ Peripheral Pulses, No clubbing, No cyanosis,  [x  ] 2+ edema R lower EXT rt lower ext > left lower ext [ x ] PVD stasis skin changes B/L Lower EXT, [  ] wound  LYMPH: No lymphadenopathy noted  SKIN:  [ x ] No rashes or lesions, [  ] Pressure Ulcers, [ x ] ecchymosis, [  ] Skin Tears, [  ] Other      DIET: soft with Ensure    LABS:                        7.4    5.04  )-----------( 189      ( 2022 06:55 )             23.3     2022 06:55    136    |  102    |  40     ----------------------------<  124    4.1     |  28     |  1.90     Ca    8.6        2022 06:55    TPro  5.4    /  Alb  2.4    /  TBili  0.4    /  DBili  x      /  AST  57     /  ALT  31     /  AlkPhos  56     2022 06:55      Urinalysis Basic - ( 2022 15:35 )    Color: Yellow / Appearance: Slightly Turbid / S.005 / pH: x  Gluc: x / Ketone: Negative  / Bili: Negative / Urobili: Negative   Blood: x / Protein: 30 mg/dL / Nitrite: Negative   Leuk Esterase: Negative / RBC: 11-25 /HPF / WBC 0-2   Sq Epi: x / Non Sq Epi: Occasional / Bacteria: Moderate        Culture Results:   No growth ( @ 15:35)  Culture Results:   >=3 organisms. Probable collection contamination. (07-10 @ 13:20)      culture blood  -- Catheterized Catheterized  @ 15:35    culture urine  --   @ 15:35        Culture - Urine (collected 2022 15:35)  Source: Catheterized Catheterized  Final Report (2022 00:22):    No growth    Culture - Urine (collected 10 Jul 2022 13:20)  Source: Clean Catch Clean Catch (Midstream)  Final Report (2022 08:35):    >=3 organisms. Probable collection contamination.    RADIOLOGY & ADDITIONAL TESTS:      HEALTH ISSUES - PROBLEM Dx:  2019 novel coronavirus disease (COVID-19)    Hematuria    Anemia    FIDELINA (acute kidney injury)    Cholelithiasis    Pleural effusion    Fall    CAD (coronary artery disease)    HTN (hypertension)    Type 2 diabetes mellitus    Chronic back pain    Anxiety and depression    Sundowning    DVT prophylaxis    Spinal stenosis            Consultant(s) Notes Reviewed:  [x  ] YES     Care Discussed with [X] Consultants  [  x] Patient  [ x ] Family [  ] HCP [x  ]   [  ] Social Service  [ x] RN, [  ] Physical Therapy,[  ] Palliative care team  DVT PPX: [  ] Lovenox, [  ] S C Heparin, [  ] Coumadin, [  ] Xarelto, [  ] Eliquis, [  ] Pradaxa, [  ] IV Heparin drip, [x  ] SCD [  ] Contraindication 2 to GI Bleed,[  ] Ambulation [  ] Contraindicated 2 to  bleed [  ] Contraindicated 2 to Brain Bleed  Advanced directive: [  ] None, [ x ] DNR/DNI

## 2022-07-13 NOTE — PROGRESS NOTE ADULT - SUBJECTIVE AND OBJECTIVE BOX
[facility]  Shar Hopkins PA-C  [address] [place] [address]  [phone]      INTERVAL HPI/OVERNIGHT EVENTS:  Pt s/e  Pt confused, but reported no GI complaints  Eating little  Esophagram noted    MEDICATIONS  (STANDING):  amitriptyline 30 milliGRAM(s) Oral at bedtime  aspirin  chewable 81 milliGRAM(s) Oral daily  atorvastatin 40 milliGRAM(s) Oral at bedtime  cyanocobalamin 1000 MICROGram(s) Oral daily  dextrose 5%. 1000 milliLiter(s) (100 mL/Hr) IV Continuous <Continuous>  dextrose 5%. 1000 milliLiter(s) (50 mL/Hr) IV Continuous <Continuous>  dextrose 50% Injectable 25 Gram(s) IV Push once  dextrose 50% Injectable 25 Gram(s) IV Push once  dextrose 50% Injectable 12.5 Gram(s) IV Push once  ferrous    sulfate 325 milliGRAM(s) Oral daily  furosemide    Tablet 20 milliGRAM(s) Oral <User Schedule>  glucagon  Injectable 1 milliGRAM(s) IntraMuscular once  HYDROmorphone   Tablet 4 milliGRAM(s) Oral three times a day  insulin lispro (ADMELOG) corrective regimen sliding scale   SubCutaneous three times a day before meals  insulin lispro (ADMELOG) corrective regimen sliding scale   SubCutaneous at bedtime  iron sucrose Injectable 100 milliGRAM(s) IV Push every 24 hours  lidocaine   4% Patch 1 Patch Transdermal daily  lidocaine   4% Patch 1 Patch Transdermal daily  lidocaine   4% Patch 1 Patch Transdermal once  metoclopramide 5 milliGRAM(s) Oral every 6 hours  metoprolol succinate ER 25 milliGRAM(s) Oral daily  pantoprazole    Tablet 40 milliGRAM(s) Oral two times a day  polyethylene glycol 3350 17 Gram(s) Oral daily  senna 2 Tablet(s) Oral at bedtime  sucralfate suspension 1 Gram(s) Oral two times a day    MEDICATIONS  (PRN):  acetaminophen     Tablet .. 650 milliGRAM(s) Oral every 6 hours PRN Temp greater or equal to 38C (100.4F), Mild Pain (1 - 3)  aluminum hydroxide/magnesium hydroxide/simethicone Suspension 30 milliLiter(s) Oral every 4 hours PRN Dyspepsia  dextrose Oral Gel 15 Gram(s) Oral once PRN Blood Glucose LESS THAN 70 milliGRAM(s)/deciliter  melatonin 3 milliGRAM(s) Oral at bedtime PRN Insomnia  ondansetron Injectable 4 milliGRAM(s) IV Push every 8 hours PRN Nausea and/or Vomiting      Allergies  morphine (Other)    PHYSICAL EXAM:   Vital Signs:  Vital Signs Last 24 Hrs  T(C): 36.9 (2022 05:22), Max: 36.9 (2022 22:04)  T(F): 98.4 (2022 05:22), Max: 98.5 (2022 22:04)  HR: 85 (2022 05:22) (79 - 90)  BP: 132/65 (2022 05:22) (125/63 - 132/65)  BP(mean): --  RR: 18 (2022 05:22) (18 - 18)  SpO2: 91% (2022 05:22) (91% - 92%)    Parameters below as of 2022 05:22  Patient On (Oxygen Delivery Method): room air      GENERAL:  Appears stated age  ABDOMEN:  Soft, non-tender, non-distended  NEURO:  Alert      LABS:                        7.4    5.04  )-----------( 189      ( 2022 06:55 )             23.3     07-13    136  |  102  |  40<H>  ----------------------------<  124<H>  4.1   |  28  |  1.90<H>    Ca    8.6      2022 06:55    TPro  5.4<L>  /  Alb  2.4<L>  /  TBili  0.4  /  DBili  x   /  AST  57<H>  /  ALT  31  /  AlkPhos  56  07-13      Urinalysis Basic - ( 2022 15:35 )    Color: Yellow / Appearance: Slightly Turbid / S.005 / pH: x  Gluc: x / Ketone: Negative  / Bili: Negative / Urobili: Negative   Blood: x / Protein: 30 mg/dL / Nitrite: Negative   Leuk Esterase: Negative / RBC: 11-25 /HPF / WBC 0-2   Sq Epi: x / Non Sq Epi: Occasional / Bacteria: Moderate        RADIOLOGY & ADDITIONAL TESTS:  < from: Xray Esophagram Single Contrast (22 @ 12:11) >    ACC: [id number] EXAM:  XR ESOPH SNGL CON STUDY                          PROCEDURE DATE:  2022          INTERPRETATION:  CLINICAL INFORMATION: Chronic nausea and vomiting.    TECHNIQUE: An esophagram was performed utilizing thin barium as the   contrast agent.    FLUORO TIME: 3.1 minutes.    FINDINGS:    Preliminary  radiograph demonstrates small pleural effusions and   findings of pulmonary edema, suboptimally assessed due to patient   rotation. Vertebral body compression deformities with kyphoplasty cement   noted. Left humeral head enchondroma demonstrated.    Patient swallowed thin barium without difficulty. The upper/cervical   esophagus is limited evaluation due to severe patient kyphosis and   limitations of patient positioning. Limited images of the upper esophagus   demonstrate tortuosity (series 31). The mid to distal esophagus   demonstrates prominent tertiary contractions, areas of mucosal   irregularity and possible small traction diverticula. Significant   intraesophageal reflux and stasis were noted. Distal stricture cannot be   excluded. Barium tablet and gastroesophageal reflux maneuvers were not   performed due to limited patient positioning and prominent esophageal   stasis of contrast.    IMPRESSION:    Upper/cervical esophagus is limited evaluation due to severe patient   kyphosis and limitations of patient positioning. Limited images of the   upper esophagus demonstrate tortuosity (series 31).    Mid/distal esophagus demonstrates findings of presbyesophagus, including   prominent tertiary contractions, areas of mucosal irregularity and   possible small traction diverticula. Significant intraesophageal reflux   and stasis were noted. Distal stricture cannot be excluded.    Small pleural effusionsand findings of pulmonary edema.    --- End of Report ---            MICHEL HERNANDEZ M.D., ATTENDING RADIOLOGIST  This document has been electronically signed. 2022  2:22PM    < end of copied text >

## 2022-07-13 NOTE — PROGRESS NOTE ADULT - PROBLEM SELECTOR PLAN 3
Pt requires a hospital bed due to spinal stenosis with limit mobility and cognitive impairment. Pt requires frequent and immediate position changes that are not feasible in a regular bed with pillows. Patient needs HOB elevated more than 30 degrees most of the time duet to spinal stenosis, cognitive impairment, multiple falls, CKD.   The patient requires a gel  bed overlay due to patient has limit mobility and CAD requires a gel overlay. Donor Site Anesthesia Type: same as repair anesthesia

## 2022-07-13 NOTE — PROGRESS NOTE ADULT - PROBLEM SELECTOR PLAN 9
S/p 2 stents 2012   - continue home aspirin, Toprol XL  and statin  - Cardiology [facility] following

## 2022-07-13 NOTE — PROGRESS NOTE ADULT - SUBJECTIVE AND OBJECTIVE BOX
[facility] DIVISION of INFECTIOUS DISEASE  Bry Nash MD PhD, Mariel Gtz MD, Candida Izquierdo MD, Marti Del Valle MD, Alonso Doll MD  and providing coverage with Isreal Woodward MD  Providing Infectious Disease Consultations at [facility], [facility], [facility]'s      Office# [phone] to schedule follow up appointments  Answering Service for urgent calls or New Consults [phone]  Cell# to text for urgent issues Bry Nash [phone]     Infectious diseases progress note:    DEVON NOLAND is a 89y y. o. Female patient    COVID Patient    Allergies    morphine (Other)    Intolerances        ANTIBIOTICS/RELEVANT:  antimicrobials    immunologic:    OTHER:  acetaminophen     Tablet .. 650 milliGRAM(s) Oral every 6 hours PRN  aluminum hydroxide/magnesium hydroxide/simethicone Suspension 30 milliLiter(s) Oral every 4 hours PRN  amitriptyline 30 milliGRAM(s) Oral at bedtime  aspirin  chewable 81 milliGRAM(s) Oral daily  atorvastatin 40 milliGRAM(s) Oral at bedtime  cyanocobalamin 1000 MICROGram(s) Oral daily  dextrose 5%. 1000 milliLiter(s) IV Continuous <Continuous>  dextrose 5%. 1000 milliLiter(s) IV Continuous <Continuous>  dextrose 50% Injectable 25 Gram(s) IV Push once  dextrose 50% Injectable 12.5 Gram(s) IV Push once  dextrose 50% Injectable 25 Gram(s) IV Push once  dextrose Oral Gel 15 Gram(s) Oral once PRN  ferrous    sulfate 325 milliGRAM(s) Oral daily  furosemide    Tablet 20 milliGRAM(s) Oral <User Schedule>  glucagon  Injectable 1 milliGRAM(s) IntraMuscular once  HYDROmorphone   Tablet 4 milliGRAM(s) Oral three times a day  insulin lispro (ADMELOG) corrective regimen sliding scale   SubCutaneous at bedtime  insulin lispro (ADMELOG) corrective regimen sliding scale   SubCutaneous three times a day before meals  iron sucrose Injectable 100 milliGRAM(s) IV Push every 24 hours  lidocaine   4% Patch 1 Patch Transdermal daily  lidocaine   4% Patch 1 Patch Transdermal daily  melatonin 3 milliGRAM(s) Oral at bedtime PRN  metoclopramide 5 milliGRAM(s) Oral every 6 hours  metoprolol succinate ER 25 milliGRAM(s) Oral daily  ondansetron Injectable 4 milliGRAM(s) IV Push every 8 hours PRN  pantoprazole    Tablet 40 milliGRAM(s) Oral two times a day  polyethylene glycol 3350 17 Gram(s) Oral daily  senna 2 Tablet(s) Oral at bedtime  sucralfate suspension 1 Gram(s) Oral two times a day      Objective:  Vital Signs Last 24 Hrs  T(C): 36.7 (2022 12:23), Max: 36.9 (2022 22:04)  T(F): 98.1 (2022 12:23), Max: 98.5 (2022 22:04)  HR: 91 (:23) (85 - 91)  BP: 127/72 (:23) (127/72 - 132/65)  BP(mean): --  RR: 18 (2022 12:23) (18 - 18)  SpO2: 94% (:23) (91% - 94%)    Parameters below as of 2022 12:23  Patient On (Oxygen Delivery Method): room air        T(C): 36.7 (22 @ 12:23), Max: 36.9 (22 @ 22:04)  T(C): 36.7 (22 @ 12:23), Max: 36.9 (22 @ 12:19)  T(C): 36.7 (22 @ 12:23), Max: 37.4 (22 @ 21:00)    PHYSICAL EXAM:  HEENT: NC atraumatic  Neck: supple  Respiratory: no accessory muscle use, breathing comfortably  Cardiovascular: distant  Gastrointestinal: normal appearing, nondistended  Extremities: no clubbing, no cyanosis,        LABS:                          7.4    5.04  )-----------( 189      ( 2022 06:55 )             23.3       WBC  5.04  @ 06:55  5.38 -12 @ 09:00  4.78 - @ 06:42  5.55 07-10 @ 07:44  3.92 - @ 07:45  4.29 - @ 06:45  4.83  @ 09:    136  |  102  |  40<H>  ----------------------------<  124<H>  4.1   |  28  |  1.90<H>    Ca    8.6      2022 06:55    TPro  5.4<L>  /  Alb  2.4<L>  /  TBili  0.4  /  DBili  x   /  AST  57<H>  /  ALT  31  /  AlkPhos  56        Creatinine, Serum: 1.90 mg/dL (22 @ 06:55)  Creatinine, Serum: 1.80 mg/dL (22 @ 09:00)  Creatinine, Serum: 1.70 mg/dL (22 @ 06:42)  Creatinine, Serum: 1.80 mg/dL (07-10-22 @ 07:44)  Creatinine, Serum: 1.80 mg/dL (22 @ 07:45)  Creatinine, Serum: 2.10 mg/dL (22 @ 06:45)  Creatinine, Serum: 2.30 mg/dL (22 @ 09:04)        Urinalysis Basic - ( 2022 12:01 )    Color: Yellow / Appearance: Slightly Turbid / S.010 / pH: x  Gluc: x / Ketone: Negative  / Bili: Negative / Urobili: Negative   Blood: x / Protein: 30 mg/dL / Nitrite: Negative   Leuk Esterase: Moderate / RBC: 11-25 /HPF / WBC 11-25   Sq Epi: x / Non Sq Epi: Moderate / Bacteria: Moderate            COVID RISK SCORE  Auto Neutrophil #: 3.85 K/uL (22 @ 06:55)  Auto Lymphocyte #: 0.57 K/uL (22 @ 06:55)  Auto Neutrophil #: 4.19 K/uL (22 @ 09:00)  Auto Lymphocyte #: 0.66 K/uL (22 @ 09:00)  Auto Neutrophil #: 3.48 K/uL (22 @ 06:42)  Auto Lymphocyte #: 0.76 K/uL (22 @ 06:42)  Auto Neutrophil #: 2.58 K/uL (22 @ 07:45)  Auto Lymphocyte #: 0.79 K/uL (22 @ 07:45)  Auto Neutrophil #: 3.22 K/uL (22 @ 06:45)  Auto Lymphocyte #: 0.59 K/uL (22 @ 06:45)  Auto Neutrophil #: 3.47 K/uL (22 @ 09:04)  Auto Lymphocyte #: 0.67 K/uL (22 @ 09:04)  Auto Neutrophil #: 3.85 K/uL (22 @ 07:14)  Auto Lymphocyte #: 0.33 K/uL (22 @ 07:14)  Auto Neutrophil #: 5.15 K/uL (22 @ 19:05)  Auto Lymphocyte #: 0.46 K/uL (22 @ 19:05)    Lactate, Blood: 1.2 mmol/L (22 @ 21:45)    Auto Eosinophil #: 0.09 K/uL (22 @ 06:55)  Auto Eosinophil #: 0.07 K/uL (22 @ 09:00)  Auto Eosinophil #: 0.03 K/uL (22 @ 06:42)  Auto Eosinophil #: 0.01 K/uL (22 @ 07:45)  Auto Eosinophil #: 0.02 K/uL (22 @ 06:45)  Auto Eosinophil #: 0.02 K/uL (22 @ 09:04)  Auto Eosinophil #: 0.07 K/uL (22 @ 07:14)  Auto Eosinophil #: 0.15 K/uL (22 @ 19:05)                  Ferritin, Serum: 67 ng/mL (22 @ 07:14)        INR: 1.04 ratio (22 @ 19:05)  Activated Partial Thromboplastin Time: 28.6 sec (22 @ 19:05)          MICROBIOLOGY:              SARS-CoV-2: Detected (2022 19:05)  COVID-19 PCR: NotDetec (2022 15:46)  COVID-19 PCR: NotDetec (2022 03:28)      RADIOLOGY & ADDITIONAL STUDIES:

## 2022-07-13 NOTE — PROGRESS NOTE ADULT - SUBJECTIVE AND OBJECTIVE BOX
[facility] -- Casper Mcgee, Briana Valles, Eligio Ryan, Thalia Jose: Office # [phone]    Follow Up: COVID, CAD      Subjective/Observations: Patient seen and examined. Patient awake, alert, OOB to chair. No complaints of chest pain, dyspnea, palpitations or dizziness. No signs of orthopnea or PND. Tolerating room air.     REVIEW OF SYSTEMS: All other review of systems are negative unless indicated above    PAST MEDICAL & SURGICAL HISTORY:  H/O vertebral fracture repair      History of vertebral fracture      Dyslipidemia      DM type 2 with diabetic dyslipidemia      H/O gastroesophageal reflux (GERD)      Costochondritis      Coronary arteriosclerosis in native artery  s/p 2 stents. last placed 2 years ago      Gastroparesis      History of laminectomy      S/P hip hemiarthroplasty      S/P appendectomy    MEDICATIONS  (STANDING):  amitriptyline 30 milliGRAM(s) Oral at bedtime  aspirin  chewable 81 milliGRAM(s) Oral daily  atorvastatin 40 milliGRAM(s) Oral at bedtime  cyanocobalamin 1000 MICROGram(s) Oral daily  dextrose 5%. 1000 milliLiter(s) (50 mL/Hr) IV Continuous <Continuous>  dextrose 5%. 1000 milliLiter(s) (100 mL/Hr) IV Continuous <Continuous>  dextrose 50% Injectable 25 Gram(s) IV Push once  dextrose 50% Injectable 12.5 Gram(s) IV Push once  dextrose 50% Injectable 25 Gram(s) IV Push once  ferrous    sulfate 325 milliGRAM(s) Oral daily  furosemide    Tablet 20 milliGRAM(s) Oral <User Schedule>  glucagon  Injectable 1 milliGRAM(s) IntraMuscular once  HYDROmorphone   Tablet 4 milliGRAM(s) Oral three times a day  insulin lispro (ADMELOG) corrective regimen sliding scale   SubCutaneous three times a day before meals  insulin lispro (ADMELOG) corrective regimen sliding scale   SubCutaneous at bedtime  iron sucrose Injectable 100 milliGRAM(s) IV Push every 24 hours  lidocaine   4% Patch 1 Patch Transdermal daily  lidocaine   4% Patch 1 Patch Transdermal daily  lidocaine   4% Patch 1 Patch Transdermal once  metoclopramide 5 milliGRAM(s) Oral every 6 hours  metoprolol succinate ER 25 milliGRAM(s) Oral daily  pantoprazole    Tablet 40 milliGRAM(s) Oral two times a day  polyethylene glycol 3350 17 Gram(s) Oral daily  senna 2 Tablet(s) Oral at bedtime  sucralfate suspension 1 Gram(s) Oral two times a day    MEDICATIONS  (PRN):  acetaminophen     Tablet .. 650 milliGRAM(s) Oral every 6 hours PRN Temp greater or equal to 38C (100.4F), Mild Pain (1 - 3)  aluminum hydroxide/magnesium hydroxide/simethicone Suspension 30 milliLiter(s) Oral every 4 hours PRN Dyspepsia  dextrose Oral Gel 15 Gram(s) Oral once PRN Blood Glucose LESS THAN 70 milliGRAM(s)/deciliter  melatonin 3 milliGRAM(s) Oral at bedtime PRN Insomnia  ondansetron Injectable 4 milliGRAM(s) IV Push every 8 hours PRN Nausea and/or Vomiting    Allergies  morphine (Other)    Vital Signs Last 24 Hrs  T(C): 36.9 (13 Jul 2022 05:22), Max: 36.9 (12 Jul 2022 22:04)  T(F): 98.4 (13 Jul 2022 05:22), Max: 98.5 (12 Jul 2022 22:04)  HR: 85 (13 Jul 2022 05:22) (79 - 90)  BP: 132/65 (13 Jul 2022 05:22) (125/63 - 132/65)  BP(mean): --  RR: 18 (13 Jul 2022 05:22) (18 - 18)  SpO2: 91% (13 Jul 2022 05:22) (91% - 92%)    Parameters below as of 13 Jul 2022 05:22  Patient On (Oxygen Delivery Method): room air      I&O's Summary        TELE: Not on telemetry   PHYSICAL EXAM:  Constitutional: NAD, awake and alert, Frail   HEENT: Moist Mucous Membranes, Anicteric  Pulmonary: Non-labored, breath sounds are clear bilaterally, No wheezing, rales or rhonchi  Cardiovascular: Regular, S1 and S2, No murmurs, No rubs, gallops or clicks  Gastrointestinal:  soft, nontender, nondistended   Lymph: + Rt> Lt peripheral edema. No lymphadenopathy.   Skin: No visible rashes or ulcers.  Psych:  Mood & affect appropriate      LABS: All Labs Reviewed:                        7.4    5.04  )-----------( 189      ( 13 Jul 2022 06:55 )             23.3                         8.1    5.38  )-----------( 194      ( 12 Jul 2022 09:00 )             25.5                         8.0    x     )-----------( x        ( 11 Jul 2022 14:00 )             25.0     13 Jul 2022 06:55    136    |  102    |  40     ----------------------------<  124    4.1     |  28     |  1.90   12 Jul 2022 09:00    136    |  102    |  46     ----------------------------<  136    4.3     |  26     |  1.80   11 Jul 2022 06:42    138    |  104    |  41     ----------------------------<  120    4.5     |  29     |  1.70     Ca    8.6        13 Jul 2022 06:55  Ca    8.7        12 Jul 2022 09:00  Ca    8.3        11 Jul 2022 06:42    TPro  5.4    /  Alb  2.4    /  TBili  0.4    /  DBili  x      /  AST  57     /  ALT  31     /  AlkPhos  56     13 Jul 2022 06:55  TPro  5.8    /  Alb  2.6    /  TBili  0.3    /  DBili  x      /  AST  82     /  ALT  37     /  AlkPhos  63     12 Jul 2022 09:00  TPro  5.1    /  Alb  2.3    /  TBili  0.2    /  DBili  x      /  AST  80     /  ALT  33     /  AlkPhos  56     11 Jul 2022 06:42   LIVER FUNCTIONS - ( 13 Jul 2022 06:55 )  Alb: 2.4 g/dL / Pro: 5.4 g/dL / ALK PHOS: 56 U/L / ALT: 31 U/L / AST: 57 U/L / GGT: x             12 Lead ECG:   Ventricular Rate 71 BPM    Atrial Rate 71 BPM    P-R Interval 142 ms    QRS Duration 84 ms    Q-T Interval 392 ms    QTC Calculation(Bazett) 425 ms    P Axis 64 degrees    R Axis 18 degrees    T Axis 7 degrees    Diagnosis Line Normal sinus rhythm  Normal ECG  When compared with ECG of 05-JUL-2022 19:57, (Unconfirmed)  No significant change was found  Confirmed by Bry Warren MD ([id number]) on 7/6/2022 1:56:48 PM (07-06-22 @ 13:32)      ACC: [id number] EXAM:  ECHO TTE WO CON COMP W DOPP                        PROCEDURE DATE:  07/07/2022    INTERPRETATION:  INDICATION: dyspnea  Sonographer LK    Blood Pressure 143/70    Height 157 cm     Weight 52 kg       BSA 1.5 sq m    Dimensions:  LA unavailable       Normal Values: 2.0 - 4.0 cm  Ao unavailable        Normal Values: 2.0 - 3.8 cm  SEPTUM unavailable       Normal Values: 0.6 - 1.2 cm  PWT unavailable       Normal Values: 0.6 - 1.1 cm  LVIDd unavailable         Normal Values: 3.0 - 5.6 cm  LVIDs unavailable         Normal Values: 1.8 - 4.0 cm      OBSERVATIONS:  Technically difficult study  Mitral Valve: Mitral annular calcification with thickened leaflets, mild   to moderate MR.  Aortic Valve/Aorta: Sclerotic trileaflet aortic valve with grossly normal   opening by visual estimation.  Tricuspid Valve: normal with trace TR.  Pulmonic Valve: Not well-visualized  Left Atrium: Enlarged  Right Atrium: Not well-visualized  Left Ventricle: normal LV size and systolic function, estimated LVEF of   65%.  Right Ventricle: Grossly normal size and systolic function.  Pericardium: no significant pericardial effusion.  Pulmonary/RV Pressure: estimated PA systolic pressure of 20mmHg  IVC measures 1.85 cm    IMPRESSION:  Technically difficult study  Normal left ventricular internal dimensions and systolic function,   estimated LVEF of 65%.  Grossly normal RV size and systolic function.  Sclerotic trileaflet aortic valve with grossly normal opening by visual   estimation, without AI.  Mild to moderate MR  No significant pericardial effusion.  --- End of Report ---  BAM STEVENS MD; Attending Cardiologist  This document has been electronically signed. Jul 8 2022 10:54AM

## 2022-07-13 NOTE — PROGRESS NOTE ADULT - ASSESSMENT
89y F pmhx CAD x 2 stents (2012), T2DM, HTN, HLD, GERD, mild AS, chronic venous insufficiency, chronic neuropathy, macular degeneration, chronic constipation, depression, OA, and recent admission to [facility] 2/25- 3/2 for gallstone pancreatitis with non-operative treatment presented to the ED with constant nausea x 4 days associated with decreased PO intake, generalized weakness.    #Acute on CKD stage III  CT revealed right sided hydronephrosis and pleural effusion  UA consistent with UTI    Baseline creatinine ~1.3? Stable currently ~1.8, 1.9 today continue to trend  Bladder scan for urine retention.  Possible contributions from covid-19 or hydronephrosis  No plans for thoracentesis per Pulm  Urine studies reviewed; may have been skewed by Lasix due to very high urine sodium  COVID management per primary team  Consider repeating lung imaging to assess effusions  Avoid nephrotoxic agents  Monitor serum electrolytes  Restart PO lasix 20mg every other day.  Recommend LE venous duplex     89y F pmhx CAD x 2 stents (2012), T2DM, HTN, HLD, GERD, mild AS, chronic venous insufficiency, chronic neuropathy, macular degeneration, chronic constipation, depression, OA, and recent admission to [facility] 2/25- 3/2 for gallstone pancreatitis with non-operative treatment presented to the ED with constant nausea x 4 days associated with decreased PO intake, generalized weakness.    #Acute on CKD stage III  CT revealed right sided hydronephrosis and pleural effusion  UA consistent with UTI    Baseline creatinine ~1.3? Stable currently ~1.8, 1.9 today continue to trend  Bladder scan for urine retention negative  Repeat UA.  Possible contributions from covid-19 or hydronephrosis  No plans for thoracentesis per Pulm  Urine studies reviewed; may have been skewed by Lasix due to very high urine sodium  COVID management per primary team  Consider repeating lung imaging to assess effusions  Avoid nephrotoxic agents  Monitor serum electrolytes  Restart PO lasix 20mg every other day.  Recommend LE venous duplex     89y F pmhx CAD x 2 stents (2012), T2DM, HTN, HLD, GERD, mild AS, chronic venous insufficiency, chronic neuropathy, macular degeneration, chronic constipation, depression, OA, and recent admission to [facility] 2/25- 3/2 for gallstone pancreatitis with non-operative treatment presented to the ED with constant nausea x 4 days associated with decreased PO intake, generalized weakness.    #Acute on CKD stage III  CT revealed right sided hydronephrosis and pleural effusion    Baseline creatinine ~1.3? Stable currently ~1.9 today continue to trend  Bladder scan for urine retention negative  Repeat UA/Culture  Possible contributions from covid-19 or hydronephrosis  No plans for thoracentesis per Pulm  Urine studies reviewed; may have been skewed by Lasix due to very high urine sodium  COVID management per primary team  Consider repeating lung imaging to assess effusions  Avoid nephrotoxic agents  Monitor serum electrolytes  Restart PO lasix 20mg every other day.  Recommend LE venous duplex     89y F pmhx CAD x 2 stents (2012), T2DM, HTN, HLD, GERD, mild AS, chronic venous insufficiency, chronic neuropathy, macular degeneration, chronic constipation, depression, OA, and recent admission to [facility] 2/25- 3/2 for gallstone pancreatitis with non-operative treatment presented to the ED with constant nausea x 4 days associated with decreased PO intake, generalized weakness.    #Acute on CKD stage III  CT revealed right sided hydronephrosis and pleural effusion    Baseline creatinine ~1.3? Stable currently ~1.9 today continue to trend  Bladder scan for urine retention negative  Repeat UA/Culture; consider Abx  Possible contributions from covid-19 or hydronephrosis  No plans for thoracentesis per Pulm  Urine studies reviewed; may have been skewed by Lasix due to very high urine sodium  COVID management per primary team  Consider repeating lung imaging to assess effusions  Avoid nephrotoxic agents  Monitor serum electrolytes  Restart PO lasix 20mg every other day.  Recommend LE venous duplex due to unilateral leg swelling; rule out DVT

## 2022-07-13 NOTE — PROGRESS NOTE ADULT - SUBJECTIVE AND OBJECTIVE BOX
Progress Note:    Clinically unchanged.      PAST MEDICAL & SURGICAL HISTORY:  H/O vertebral fracture repair      History of vertebral fracture      Dyslipidemia      DM type 2 with diabetic dyslipidemia      H/O gastroesophageal reflux (GERD)      Costochondritis      Coronary arteriosclerosis in native artery  s/p 2 stents. last placed 2 years ago      Gastroparesis      History of laminectomy      S/P hip hemiarthroplasty      S/P appendectomy          MEDICATIONS  (STANDING):  amitriptyline 30 milliGRAM(s) Oral at bedtime  aspirin  chewable 81 milliGRAM(s) Oral daily  atorvastatin 40 milliGRAM(s) Oral at bedtime  cefTRIAXone   IVPB 1000 milliGRAM(s) IV Intermittent every 24 hours  cyanocobalamin 1000 MICROGram(s) Oral daily  dextrose 5%. 1000 milliLiter(s) (50 mL/Hr) IV Continuous <Continuous>  dextrose 5%. 1000 milliLiter(s) (100 mL/Hr) IV Continuous <Continuous>  dextrose 50% Injectable 25 Gram(s) IV Push once  dextrose 50% Injectable 12.5 Gram(s) IV Push once  dextrose 50% Injectable 25 Gram(s) IV Push once  ferrous    sulfate 325 milliGRAM(s) Oral daily  furosemide    Tablet 20 milliGRAM(s) Oral <User Schedule>  glucagon  Injectable 1 milliGRAM(s) IntraMuscular once  HYDROmorphone   Tablet 4 milliGRAM(s) Oral three times a day  insulin lispro (ADMELOG) corrective regimen sliding scale   SubCutaneous at bedtime  insulin lispro (ADMELOG) corrective regimen sliding scale   SubCutaneous three times a day before meals  iron sucrose Injectable 100 milliGRAM(s) IV Push every 24 hours  lidocaine   4% Patch 1 Patch Transdermal daily  lidocaine   4% Patch 1 Patch Transdermal daily  metoclopramide 5 milliGRAM(s) Oral every 6 hours  metoprolol succinate ER 25 milliGRAM(s) Oral daily  pantoprazole    Tablet 40 milliGRAM(s) Oral two times a day  polyethylene glycol 3350 17 Gram(s) Oral daily  senna 2 Tablet(s) Oral at bedtime  sucralfate suspension 1 Gram(s) Oral two times a day    MEDICATIONS  (PRN):  acetaminophen     Tablet .. 650 milliGRAM(s) Oral every 6 hours PRN Temp greater or equal to 38C (100.4F), Mild Pain (1 - 3)  aluminum hydroxide/magnesium hydroxide/simethicone Suspension 30 milliLiter(s) Oral every 4 hours PRN Dyspepsia  dextrose Oral Gel 15 Gram(s) Oral once PRN Blood Glucose LESS THAN 70 milliGRAM(s)/deciliter  melatonin 3 milliGRAM(s) Oral at bedtime PRN Insomnia  ondansetron Injectable 4 milliGRAM(s) IV Push every 8 hours PRN Nausea and/or Vomiting      Allergies    morphine (Other)    Intolerances            FAMILY HISTORY:  FHx: stroke (Mother)        Vital Signs Last 24 Hrs  T(C): 36.7 (2022 12:23), Max: 36.9 (2022 22:04)  T(F): 98.1 (2022 12:23), Max: 98.5 (2022 22:04)  HR: 91 (:) (85 - 91)  BP: 127/72 (2022 12:) (127/72 - 132/65)  BP(mean): --  RR: 18 (2022 12:) (18 - 18)  SpO2: 94% (2022 12:23) (91% - 94%)    Parameters below as of 2022 12:23  Patient On (Oxygen Delivery Method): room air        PHYSICAL EXAM:    Confused  Abd: BS+, soft, NT/ND, No CVAT  Extremities: edema      LABS:                        7.4    5.04  )-----------( 189      ( 2022 06:55 )             23.3     07-13    136  |  102  |  40<H>  ----------------------------<  124<H>  4.1   |  28  |  1.90<H>    Ca    8.6      2022 06:55    TPro  5.4<L>  /  Alb  2.4<L>  /  TBili  0.4  /  DBili  x   /  AST  57<H>  /  ALT  31  /  AlkPhos  56  07-13      Urinalysis Basic - ( 2022 12:01 )    Color: Yellow / Appearance: Slightly Turbid / S.010 / pH: x  Gluc: x / Ketone: Negative  / Bili: Negative / Urobili: Negative   Blood: x / Protein: 30 mg/dL / Nitrite: Negative   Leuk Esterase: Moderate / RBC: 11-25 /HPF / WBC 11-25   Sq Epi: x / Non Sq Epi: Moderate / Bacteria: Moderate      Urine Culture:  @ 15:35  Urine Culure Resuls   No growth  Organism --    Hemoglobin: 7.4 g/dL ( @ 06:55)  Hematocrit: 23.3 % ( @ 06:55)  Hemoglobin: 8.1 g/dL ( @ 09:00)  Hematocrit: 25.5 % ( @ 09:00)      RADIOLOGY & ADDITIONAL STUDIES:

## 2022-07-13 NOTE — PROGRESS NOTE ADULT - ASSESSMENT
Mild asx right n hydro with KERRY and no obvious etiology, one  episode gross hematuria U/A c/w uti, repeat  Ur C&S pending (cath), gall stones -sub -clinical cholecystitis?, with COVID, bilateral pleural effusions, LE Edema (no DVT on Doppler) on strictured esophagus  requiring liquid feeding and at abi ks fro aspiration    D/W DTR Mary in detail and when patient improved consider Cystoscopy and retrograde study -possible stent.  Patient is DNR which DTR> understands has to be with drawn if Cystoscopy undertaken

## 2022-07-13 NOTE — PROGRESS NOTE ADULT - SUBJECTIVE AND OBJECTIVE BOX
Date/Time Patient Seen:  		  Referring MD:   Data Reviewed	       Patient is a 89y old  Female who presents with a chief complaint of gallstones, julisa, kiah (12 Jul 2022 12:31)      Subjective/HPI     PAST MEDICAL & SURGICAL HISTORY:  H/O vertebral fracture repair    History of vertebral fracture    Dyslipidemia    DM type 2 with diabetic dyslipidemia    H/O gastroesophageal reflux (GERD)    Costochondritis    Coronary arteriosclerosis in native artery  s/p 2 stents. last placed 2 years ago    Gastroparesis    History of laminectomy    S/P hip hemiarthroplasty    S/P appendectomy          Medication list         MEDICATIONS  (STANDING):  amitriptyline 30 milliGRAM(s) Oral at bedtime  aspirin  chewable 81 milliGRAM(s) Oral daily  atorvastatin 40 milliGRAM(s) Oral at bedtime  cyanocobalamin 1000 MICROGram(s) Oral daily  dextrose 5%. 1000 milliLiter(s) (50 mL/Hr) IV Continuous <Continuous>  dextrose 5%. 1000 milliLiter(s) (100 mL/Hr) IV Continuous <Continuous>  dextrose 50% Injectable 25 Gram(s) IV Push once  dextrose 50% Injectable 12.5 Gram(s) IV Push once  dextrose 50% Injectable 25 Gram(s) IV Push once  ferrous    sulfate 325 milliGRAM(s) Oral daily  glucagon  Injectable 1 milliGRAM(s) IntraMuscular once  HYDROmorphone   Tablet 4 milliGRAM(s) Oral three times a day  insulin lispro (ADMELOG) corrective regimen sliding scale   SubCutaneous three times a day before meals  insulin lispro (ADMELOG) corrective regimen sliding scale   SubCutaneous at bedtime  iron sucrose Injectable 100 milliGRAM(s) IV Push every 24 hours  lidocaine   4% Patch 1 Patch Transdermal daily  lidocaine   4% Patch 1 Patch Transdermal daily  metoclopramide 5 milliGRAM(s) Oral every 6 hours  metoprolol succinate ER 25 milliGRAM(s) Oral daily  pantoprazole    Tablet 40 milliGRAM(s) Oral two times a day  polyethylene glycol 3350 17 Gram(s) Oral daily  senna 2 Tablet(s) Oral at bedtime  sucralfate suspension 1 Gram(s) Oral two times a day    MEDICATIONS  (PRN):  acetaminophen     Tablet .. 650 milliGRAM(s) Oral every 6 hours PRN Temp greater or equal to 38C (100.4F), Mild Pain (1 - 3)  aluminum hydroxide/magnesium hydroxide/simethicone Suspension 30 milliLiter(s) Oral every 4 hours PRN Dyspepsia  dextrose Oral Gel 15 Gram(s) Oral once PRN Blood Glucose LESS THAN 70 milliGRAM(s)/deciliter  melatonin 3 milliGRAM(s) Oral at bedtime PRN Insomnia  ondansetron Injectable 4 milliGRAM(s) IV Push every 8 hours PRN Nausea and/or Vomiting         Vitals log        ICU Vital Signs Last 24 Hrs  T(C): 36.9 (12 Jul 2022 22:04), Max: 36.9 (12 Jul 2022 22:04)  T(F): 98.5 (12 Jul 2022 22:04), Max: 98.5 (12 Jul 2022 22:04)  HR: 90 (12 Jul 2022 22:04) (79 - 95)  BP: 132/63 (12 Jul 2022 22:04) (125/63 - 132/63)  BP(mean): --  ABP: --  ABP(mean): --  RR: 18 (12 Jul 2022 22:04) (17 - 18)  SpO2: 91% (12 Jul 2022 22:04) (91% - 96%)    O2 Parameters below as of 12 Jul 2022 22:04  Patient On (Oxygen Delivery Method): room air                 Input and Output:  I&O's Detail      Lab Data                        8.1    5.38  )-----------( 194      ( 12 Jul 2022 09:00 )             25.5     07-12    136  |  102  |  46<H>  ----------------------------<  136<H>  4.3   |  26  |  1.80<H>    Ca    8.7      12 Jul 2022 09:00    TPro  5.8<L>  /  Alb  2.6<L>  /  TBili  0.3  /  DBili  x   /  AST  82<H>  /  ALT  37  /  AlkPhos  63  07-12            Review of Systems	      Objective     Physical Examination    heart s1s2  lung dec BS  abd soft      Pertinent Lab findings & Imaging      Olvin:  NO   Adequate UO     I&O's Detail           Discussed with:     Cultures:	        Radiology

## 2022-07-13 NOTE — PROGRESS NOTE ADULT - PROBLEM SELECTOR PLAN 4
Patient covid positive on admission, s/p moderna 3/3  - started on 3 days of remdesivir, last dose completed 7/9  - B/L venous doppler - negative for dvts  - saturating well on room air and no sx  - contact precautions  - Daughter Mary [phone] states she does not want monoclonal antibodies for patient  - ID consulted, Dr. WESTLEY Izquierdo following- S/p IV Remdesivir

## 2022-07-13 NOTE — PROGRESS NOTE ADULT - PROBLEM SELECTOR PLAN 1
Pt presented with hematuria since admission. Unclear etiology   - UA (7/11) demonstrated large blood  - Hb low but stable (7.4 today)  - Ct Abd/Pelvis w contrast (6/22)- Right sided hydronephrosis with bladder wall thickening  - F/u urine culture (07/11/22) -contamination  - On Rocephin 1 gm IVPB Daily - d/c 07/12 per ID  - Urology following Dr. Hill- no further work up, will discuss with daughter Pt presented with hematuria since admission. Unclear etiology   - UA (7/11) demonstrated large blood  - Hb low but stable (7.4 today)  - Ct Abd/Pelvis w contrast (6/22)- Right sided hydronephrosis with bladder wall thickening  - F/u urine culture (07/11/22) -contamination  - On Rocephin 1 gm IVPB Daily - d/c 07/12 per ID  - Urology following Dr. Hill- no further work up at this time, will discuss with daughter Pt presented with hematuria since admission. Unclear etiology   - UA (7/11) demonstrated large blood  - Hb low but stable (7.4 today)  - Ct Abd/Pelvis w contrast (6/22)- Right sided hydronephrosis with bladder wall thickening  - F/u urine culture (07/11/22) -contamination  - On Rocephin 1 gm IVPB Daily - d/c 07/12 per ID  - Urology following Dr. Hill- no further work up at this time, will discuss with daughter  repeat UA, Urine c/s -start IV Rocehin 1 gm daily   No urological work up as d/w Dr Hill

## 2022-07-13 NOTE — PROGRESS NOTE ADULT - SUBJECTIVE AND OBJECTIVE BOX
Patient is a 89y old  Female who presents with a chief complaint of gallstones, covid, kiah (2022 05:16)       HPI:  89y F pmhx CAD x 2 stents (), T2DM, HTN, HLD, GERD, mild AS, chronic venous insufficiency, chronic neuropathy, macular degeneration, chronic constipation, depression, OA, and recent admission to [facility] - 3/2 for gallstone pancreatitis with non-operative treatment presented to the ED with constant nausea x 4 days associated with decreased PO intake, generalized weakness, and fall today x 1 due to LE weakness. Patient denies fever, chills, cp, dizziness, sob, abd pain, vomiting, diarrhea. Patient was seen here in ED  for epigastric pain (since resolved) and discharged with outpatient follow up with Dr. Terrell. Patient has seen Dr. Terrell twice since then and epigastric pain resolved s/p PO zofran and carafate with planned esophagram. Daughter states epigastric pain resolved, but patient has had constant nausea x 4 days that has not improved despite increasing PO zofran from 4mg to 8mg q6h. Patient was unable to obtain appointment with Dr. Terrell today and after falling at home due to leg weakness, daughter brought her to the ED.     ED Course  Vitals: 99.2F, 85bpm, 143/66, 95% RA  Labs: Hgb 10.2, Hct 32.5, Na 132, K 5.5, BUN 30, Cr 2, GFR 23, lipase wnl  EKG:    CT Chest and abd/pel: Moderate-sized bilateral pleural effusion with partial bilateral lower lobe atelectasis. Cholelithiasis with distended gallbladder and right upper quadrant inflammatory changes, findings suspicious for acute cholecystitis.    RUQ US: Cholelithiasis with nonspecific pericholecystic fluid. Negative sonographic Scott sign. Dilated common bowel duct. Recommend correlating with LFTs. Cannot exclude distal choledocholithiasis.    HEAD CT: Mild volume loss, microvascular disease, no acute hemorrhage or midline shift.    Patient Received: Zosyn x1, NS 500cc x1, Lasix 40mg IV x1, IV dilaudid .5mg x1           (2022 23:51)       PAST MEDICAL & SURGICAL HISTORY:  H/O vertebral fracture repair      History of vertebral fracture      Dyslipidemia      DM type 2 with diabetic dyslipidemia      H/O gastroesophageal reflux (GERD)      Costochondritis      Coronary arteriosclerosis in native artery  s/p 2 stents. last placed 2 years ago      Gastroparesis      History of laminectomy      S/P hip hemiarthroplasty      S/P appendectomy           FAMILY HISTORY:  FHx: stroke (Mother)    NC    Social History:Non smoker    MEDICATIONS  (STANDING):  amitriptyline 30 milliGRAM(s) Oral at bedtime  aspirin  chewable 81 milliGRAM(s) Oral daily  atorvastatin 40 milliGRAM(s) Oral at bedtime  cyanocobalamin 1000 MICROGram(s) Oral daily  dextrose 5%. 1000 milliLiter(s) (100 mL/Hr) IV Continuous <Continuous>  dextrose 5%. 1000 milliLiter(s) (50 mL/Hr) IV Continuous <Continuous>  dextrose 50% Injectable 25 Gram(s) IV Push once  dextrose 50% Injectable 25 Gram(s) IV Push once  dextrose 50% Injectable 12.5 Gram(s) IV Push once  ferrous    sulfate 325 milliGRAM(s) Oral daily  furosemide    Tablet 20 milliGRAM(s) Oral <User Schedule>  glucagon  Injectable 1 milliGRAM(s) IntraMuscular once  HYDROmorphone   Tablet 4 milliGRAM(s) Oral three times a day  insulin lispro (ADMELOG) corrective regimen sliding scale   SubCutaneous three times a day before meals  insulin lispro (ADMELOG) corrective regimen sliding scale   SubCutaneous at bedtime  iron sucrose Injectable 100 milliGRAM(s) IV Push every 24 hours  lidocaine   4% Patch 1 Patch Transdermal daily  lidocaine   4% Patch 1 Patch Transdermal daily  metoclopramide 5 milliGRAM(s) Oral every 6 hours  metoprolol succinate ER 25 milliGRAM(s) Oral daily  pantoprazole    Tablet 40 milliGRAM(s) Oral two times a day  polyethylene glycol 3350 17 Gram(s) Oral daily  senna 2 Tablet(s) Oral at bedtime  sucralfate suspension 1 Gram(s) Oral two times a day    MEDICATIONS  (PRN):  acetaminophen     Tablet .. 650 milliGRAM(s) Oral every 6 hours PRN Temp greater or equal to 38C (100.4F), Mild Pain (1 - 3)  aluminum hydroxide/magnesium hydroxide/simethicone Suspension 30 milliLiter(s) Oral every 4 hours PRN Dyspepsia  dextrose Oral Gel 15 Gram(s) Oral once PRN Blood Glucose LESS THAN 70 milliGRAM(s)/deciliter  melatonin 3 milliGRAM(s) Oral at bedtime PRN Insomnia  ondansetron Injectable 4 milliGRAM(s) IV Push every 8 hours PRN Nausea and/or Vomiting   Meds reviewed    Allergies    morphine (Other)    Intolerances         REVIEW OF SYSTEMS:    Review of Systems:   Constitutional: Denies fatigue  HEENT: Denies headaches and dizziness  Respiratory: denies SOB, cough, or wheezing  Cardiovascular: denies CP, palpitations  Gastrointestinal: Denies nausea, denies vomiting, diarrhea, constipation, abdominal pain, or bloody stools  Genitourinary: denies painful urination, increased frequency, urgency, or bloody urine  Skin: denies rashes or itching  Musculoskeletal: denies muscle aches, joint swelling  Neurologic: Denies generalized weakness, denies loss of sensation, numbness, or tingling      Vital Signs Last 24 Hrs  T(C): 36.9 (2022 05:22), Max: 36.9 (2022 22:04)  T(F): 98.4 (2022 05:22), Max: 98.5 (2022 22:04)  HR: 85 (2022 05:22) (79 - 90)  BP: 132/65 (2022 05:22) (125/63 - 132/65)  BP(mean): --  RR: 18 (2022 05:22) (18 - 18)  SpO2: 91% (2022 05:22) (91% - 92%)    Parameters below as of 2022 05:22  Patient On (Oxygen Delivery Method): room air      Daily     Daily     PHYSICAL EXAM:    GENERAL: NAD  HEAD:  Atraumatic, Normocephalic  EYES: EOMI, conjunctiva and sclera clear  ENMT: No Drainage from nares, No drainage from ears  NECK: Supple, neck  veins full  NERVOUS SYSTEM:  Awake and Alert  CHEST/LUNG: Clear to percussion bilaterally; No rales, rhonchi, wheezing, or rubs  HEART: Regular rate and rhythm; No murmurs, rubs, or gallops  ABDOMEN: Soft, Nontender, Nondistended; Bowel sounds present  EXTREMITIES:  +LE edema R>L  SKIN: No rashes No obvious ecchymosis      LABS:                        7.4    5.04  )-----------( 189      ( 2022 06:55 )             23.3     07-12    136  |  102  |  46<H>  ----------------------------<  136<H>  4.3   |  26  |  1.80<H>    Ca    8.7      2022 09:00    TPro  5.8<L>  /  Alb  2.6<L>  /  TBili  0.3  /  DBili  x   /  AST  82<H>  /  ALT  37  /  AlkPhos  63        Urinalysis Basic - ( 2022 15:35 )    Color: Yellow / Appearance: Slightly Turbid / S.005 / pH: x  Gluc: x / Ketone: Negative  / Bili: Negative / Urobili: Negative   Blood: x / Protein: 30 mg/dL / Nitrite: Negative   Leuk Esterase: Negative / RBC: 11-25 /HPF / WBC 0-2   Sq Epi: x / Non Sq Epi: Occasional / Bacteria: Moderate              RADIOLOGY & ADDITIONAL TESTS:   Patient is a 89y old  Female who presents with a chief complaint of gallstones, covid, kiah (2022 05:16)       HPI:  89y F pmhx CAD x 2 stents (), T2DM, HTN, HLD, GERD, mild AS, chronic venous insufficiency, chronic neuropathy, macular degeneration, chronic constipation, depression, OA, and recent [facility] - [facility]2 for gallstone pancreatitis with non-operative treatment presented to the ED with constant nausea x 4 days associated with decreased PO intake, generalized weakness, and fall today x 1 due to LE weakness. Patient denies fever, chills, cp, dizziness, sob, abd pain, vomiting, diarrhea. Patient was seen here in ED  for epigastric pain (since resolved) and discharged with outpatient follow up with Dr. Terrell. Patient has seen Dr. Terrell twice since then and epigastric pain resolved s/p PO zofran and carafate with planned esophagram. Daughter states epigastric pain resolved, but patient has had constant nausea x 4 days that has not improved despite increasing PO zofran from 4mg to 8mg q6h. Patient was unable to obtain appointment with Dr. Terrell today and after falling at home due to leg weakness, daughter brought her to the ED.     ED Course  Vitals: 99.2F, 85bpm, 143/66, 95% RA  Labs: Hgb 10.2, Hct 32.5, Na 132, K 5.5, BUN 30, Cr 2, GFR 23, lipase wnl  EKG:    CT Chest and abd/pel: Moderate-sized bilateral pleural effusion with partial bilateral lower lobe atelectasis. Cholelithiasis with distended gallbladder and right upper quadrant inflammatory changes, findings suspicious for acute cholecystitis.    RUQ US: Cholelithiasis with nonspecific pericholecystic fluid. Negative sonographic Scott sign. Dilated common bowel duct. Recommend correlating with LFTs. Cannot exclude distal choledocholithiasis.    HEAD CT: Mild volume loss, microvascular disease, no acute hemorrhage or midline shift.    Patient Received: Zosyn x1, NS 500cc x1, Lasix 40mg IV x1, IV dilaudid .5mg x1           (2022 23:51)       PAST MEDICAL & SURGICAL HISTORY:  H/O vertebral fracture repair      History of vertebral fracture      Dyslipidemia      DM type 2 with diabetic dyslipidemia      H/O gastroesophageal reflux (GERD)      Costochondritis      Coronary arteriosclerosis in native artery  s/p 2 stents. last placed 2 years ago      Gastroparesis      History of laminectomy      S/P hip hemiarthroplasty      S/P appendectomy           FAMILY HISTORY:  FHx: stroke (Mother)    NC    Social History:Non smoker    MEDICATIONS  (STANDING):  amitriptyline 30 milliGRAM(s) Oral at bedtime  aspirin  chewable 81 milliGRAM(s) Oral daily  atorvastatin 40 milliGRAM(s) Oral at bedtime  cyanocobalamin 1000 MICROGram(s) Oral daily  dextrose 5%. 1000 milliLiter(s) (100 mL/Hr) IV Continuous <Continuous>  dextrose 5%. 1000 milliLiter(s) (50 mL/Hr) IV Continuous <Continuous>  dextrose 50% Injectable 25 Gram(s) IV Push once  dextrose 50% Injectable 25 Gram(s) IV Push once  dextrose 50% Injectable 12.5 Gram(s) IV Push once  ferrous    sulfate 325 milliGRAM(s) Oral daily  furosemide    Tablet 20 milliGRAM(s) Oral <User Schedule>  glucagon  Injectable 1 milliGRAM(s) IntraMuscular once  HYDROmorphone   Tablet 4 milliGRAM(s) Oral three times a day  insulin lispro (ADMELOG) corrective regimen sliding scale   SubCutaneous three times a day before meals  insulin lispro (ADMELOG) corrective regimen sliding scale   SubCutaneous at bedtime  iron sucrose Injectable 100 milliGRAM(s) IV Push every 24 hours  lidocaine   4% Patch 1 Patch Transdermal daily  lidocaine   4% Patch 1 Patch Transdermal daily  metoclopramide 5 milliGRAM(s) Oral every 6 hours  metoprolol succinate ER 25 milliGRAM(s) Oral daily  pantoprazole    Tablet 40 milliGRAM(s) Oral two times a day  polyethylene glycol 3350 17 Gram(s) Oral daily  senna 2 Tablet(s) Oral at bedtime  sucralfate suspension 1 Gram(s) Oral two times a day    MEDICATIONS  (PRN):  acetaminophen     Tablet .. 650 milliGRAM(s) Oral every 6 hours PRN Temp greater or equal to 38C (100.4F), Mild Pain (1 - 3)  aluminum hydroxide/magnesium hydroxide/simethicone Suspension 30 milliLiter(s) Oral every 4 hours PRN Dyspepsia  dextrose Oral Gel 15 Gram(s) Oral once PRN Blood Glucose LESS THAN 70 milliGRAM(s)/deciliter  melatonin 3 milliGRAM(s) Oral at bedtime PRN Insomnia  ondansetron Injectable 4 milliGRAM(s) IV Push every 8 hours PRN Nausea and/or Vomiting   Meds reviewed    Allergies    morphine (Other)    Intolerances         REVIEW OF SYSTEMS:    Review of Systems:   Constitutional: Denies fatigue  HEENT: Denies headaches and dizziness  Respiratory: denies SOB, cough, or wheezing  Cardiovascular: denies CP, palpitations  Gastrointestinal: Denies nausea, denies vomiting, diarrhea, constipation, abdominal pain, or bloody stools  Genitourinary: denies painful urination, increased frequency, urgency, or bloody urine  Skin: denies rashes or itching  Musculoskeletal: denies muscle aches, joint swelling  Neurologic: Denies generalized weakness, denies loss of sensation, numbness, or tingling      Vital Signs Last 24 Hrs  T(C): 36.9 (2022 05:22), Max: 36.9 (2022 22:04)  T(F): 98.4 (2022 05:22), Max: 98.5 (2022 22:04)  HR: 85 (2022 05:22) (79 - 90)  BP: 132/65 (2022 05:22) (125/63 - 132/65)  BP(mean): --  RR: 18 (2022 05:22) (18 - 18)  SpO2: 91% (2022 05:22) (91% - 92%)    Parameters below as of 2022 05:22  Patient On (Oxygen Delivery Method): room air      Daily     Daily     PHYSICAL EXAM:    GENERAL: NAD  HEAD:  Atraumatic, Normocephalic  EYES: EOMI, conjunctiva and sclera clear  ENMT: No Drainage from nares, No drainage from ears  NECK: Supple, neck  veins full  NERVOUS SYSTEM:  Awake and Alert  CHEST/LUNG: Clear to percussion bilaterally; No rales, rhonchi, wheezing, or rubs  HEART: Regular rate and rhythm; No murmurs, rubs, or gallops  ABDOMEN: +Discomfort to palpation of the lower quadrants Soft, Nontender otherwise, Nondistended; Bowel sounds present  EXTREMITIES:  +LE edema R>L  SKIN: No rashes No obvious ecchymosis      LABS:                        7.4    5.04  )-----------( 189      ( 2022 06:55 )             23.3         136  |  102  |  46<H>  ----------------------------<  136<H>  4.3   |  26  |  1.80<H>    Ca    8.7      2022 09:00    TPro  5.8<L>  /  Alb  2.6<L>  /  TBili  0.3  /  DBili  x   /  AST  82<H>  /  ALT  37  /  AlkPhos  63  07-12      Urinalysis Basic - ( 2022 15:35 )    Color: Yellow / Appearance: Slightly Turbid / S.005 / pH: x  Gluc: x / Ketone: Negative  / Bili: Negative / Urobili: Negative   Blood: x / Protein: 30 mg/dL / Nitrite: Negative   Leuk Esterase: Negative / RBC: 11-25 /HPF / WBC 0-2   Sq Epi: x / Non Sq Epi: Occasional / Bacteria: Moderate        RADIOLOGY & ADDITIONAL TESTS:   Patient is a 89y old  Female who presents with a chief complaint of gallstones, covid, kiah (2022 05:16)       HPI:  89y F pmhx CAD x 2 stents (), T2DM, HTN, HLD, GERD, mild AS, chronic venous insufficiency, chronic neuropathy, macular degeneration, chronic constipation, depression, OA, and recent admission to [facility] - 3/2 for gallstone pancreatitis with non-operative treatment presented to the ED with constant nausea x 4 days associated with decreased PO intake, generalized weakness, and fall today x 1 due to LE weakness. Patient denies fever, chills, cp, dizziness, sob, abd pain, vomiting, diarrhea. Patient was seen here in ED  for epigastric pain (since resolved) and discharged with outpatient follow up with Dr. Terrell. Patient has seen Dr. Terrell twice since then and epigastric pain resolved s/p PO zofran and carafate with planned esophagram. Daughter states epigastric pain resolved, but patient has had constant nausea x 4 days that has not improved despite increasing PO zofran from 4mg to 8mg q6h. Patient was unable to obtain appointment with Dr. Terrell today and after falling at home due to leg weakness, daughter brought her to the ED.     ED Course  Vitals: 99.2F, 85bpm, 143/66, 95% RA  Labs: Hgb 10.2, Hct 32.5, Na 132, K 5.5, BUN 30, Cr 2, GFR 23, lipase wnl  EKG:    CT Chest and abd/pel: Moderate-sized bilateral pleural effusion with partial bilateral lower lobe atelectasis. Cholelithiasis with distended gallbladder and right upper quadrant inflammatory changes, findings suspicious for acute cholecystitis.    RUQ US: Cholelithiasis with nonspecific pericholecystic fluid. Negative sonographic Scott sign. Dilated common bowel duct. Recommend correlating with LFTs. Cannot exclude distal choledocholithiasis.    HEAD CT: Mild volume loss, microvascular disease, no acute hemorrhage or midline shift.    Patient Received: Zosyn x1, NS 500cc x1, Lasix 40mg IV x1, IV dilaudid .5mg x1           (2022 23:51)       PAST MEDICAL & SURGICAL HISTORY:  H/O vertebral fracture repair      History of vertebral fracture      Dyslipidemia      DM type 2 with diabetic dyslipidemia      H/O gastroesophageal reflux (GERD)      Costochondritis      Coronary arteriosclerosis in native artery  s/p 2 stents. last placed 2 years ago      Gastroparesis      History of laminectomy      S/P hip hemiarthroplasty      S/P appendectomy           FAMILY HISTORY:  FHx: stroke (Mother)    NC    Social History:Non smoker    MEDICATIONS  (STANDING):  amitriptyline 30 milliGRAM(s) Oral at bedtime  aspirin  chewable 81 milliGRAM(s) Oral daily  atorvastatin 40 milliGRAM(s) Oral at bedtime  cyanocobalamin 1000 MICROGram(s) Oral daily  dextrose 5%. 1000 milliLiter(s) (100 mL/Hr) IV Continuous <Continuous>  dextrose 5%. 1000 milliLiter(s) (50 mL/Hr) IV Continuous <Continuous>  dextrose 50% Injectable 25 Gram(s) IV Push once  dextrose 50% Injectable 25 Gram(s) IV Push once  dextrose 50% Injectable 12.5 Gram(s) IV Push once  ferrous    sulfate 325 milliGRAM(s) Oral daily  furosemide    Tablet 20 milliGRAM(s) Oral <User Schedule>  glucagon  Injectable 1 milliGRAM(s) IntraMuscular once  HYDROmorphone   Tablet 4 milliGRAM(s) Oral three times a day  insulin lispro (ADMELOG) corrective regimen sliding scale   SubCutaneous three times a day before meals  insulin lispro (ADMELOG) corrective regimen sliding scale   SubCutaneous at bedtime  iron sucrose Injectable 100 milliGRAM(s) IV Push every 24 hours  lidocaine   4% Patch 1 Patch Transdermal daily  lidocaine   4% Patch 1 Patch Transdermal daily  metoclopramide 5 milliGRAM(s) Oral every 6 hours  metoprolol succinate ER 25 milliGRAM(s) Oral daily  pantoprazole    Tablet 40 milliGRAM(s) Oral two times a day  polyethylene glycol 3350 17 Gram(s) Oral daily  senna 2 Tablet(s) Oral at bedtime  sucralfate suspension 1 Gram(s) Oral two times a day    MEDICATIONS  (PRN):  acetaminophen     Tablet .. 650 milliGRAM(s) Oral every 6 hours PRN Temp greater or equal to 38C (100.4F), Mild Pain (1 - 3)  aluminum hydroxide/magnesium hydroxide/simethicone Suspension 30 milliLiter(s) Oral every 4 hours PRN Dyspepsia  dextrose Oral Gel 15 Gram(s) Oral once PRN Blood Glucose LESS THAN 70 milliGRAM(s)/deciliter  melatonin 3 milliGRAM(s) Oral at bedtime PRN Insomnia  ondansetron Injectable 4 milliGRAM(s) IV Push every 8 hours PRN Nausea and/or Vomiting   Meds reviewed    Allergies    morphine (Other)    Intolerances         REVIEW OF SYSTEMS:    Review of Systems:   Constitutional: Denies fatigue  HEENT: Denies headaches and dizziness  Respiratory: denies SOB, cough, or wheezing  Cardiovascular: denies CP, palpitations  Gastrointestinal: Denies nausea, denies vomiting, diarrhea, constipation, abdominal pain, or bloody stools  Genitourinary: +Increased frequency/urgency. denies painful urination or bloody urine  Skin: denies rashes or itching  Musculoskeletal: +Chronic back pain.  Neurologic: Denies generalized weakness, denies loss of sensation, numbness, or tingling      Vital Signs Last 24 Hrs  T(C): 36.9 (2022 05:22), Max: 36.9 (2022 22:04)  T(F): 98.4 (2022 05:22), Max: 98.5 (2022 22:04)  HR: 85 (2022 05:22) (79 - 90)  BP: 132/65 (2022 05:22) (125/63 - 132/65)  BP(mean): --  RR: 18 (2022 05:22) (18 - 18)  SpO2: 91% (2022 05:22) (91% - 92%)    Parameters below as of 2022 05:22  Patient On (Oxygen Delivery Method): room air      Daily     Daily     PHYSICAL EXAM:    GENERAL: NAD  HEAD:  Atraumatic, Normocephalic  EYES: EOMI, conjunctiva and sclera clear  ENMT: No Drainage from nares, No drainage from ears  NECK: Supple, neck  veins full  NERVOUS SYSTEM:  Awake and Alert  CHEST/LUNG: Clear to percussion bilaterally; No rales, rhonchi, wheezing, or rubs  HEART: Regular rate and rhythm; No murmurs, rubs, or gallops  ABDOMEN: +Discomfort to palpation of the lower quadrants Soft, Nontender otherwise, Nondistended; Bowel sounds present  EXTREMITIES:  +LE edema R>L  SKIN: No rashes No obvious ecchymosis      LABS:                        7.4    5.04  )-----------( 189      ( 2022 06:55 )             23.3     07-12    136  |  102  |  46<H>  ----------------------------<  136<H>  4.3   |  26  |  1.80<H>    Ca    8.7      2022 09:00    TPro  5.8<L>  /  Alb  2.6<L>  /  TBili  0.3  /  DBili  x   /  AST  82<H>  /  ALT  37  /  AlkPhos  63        Urinalysis Basic - ( 2022 15:35 )    Color: Yellow / Appearance: Slightly Turbid / S.005 / pH: x  Gluc: x / Ketone: Negative  / Bili: Negative / Urobili: Negative   Blood: x / Protein: 30 mg/dL / Nitrite: Negative   Leuk Esterase: Negative / RBC: 11-25 /HPF / WBC 0-2   Sq Epi: x / Non Sq Epi: Occasional / Bacteria: Moderate        RADIOLOGY & ADDITIONAL TESTS:

## 2022-07-13 NOTE — PROGRESS NOTE ADULT - PROBLEM SELECTOR PLAN 2
Probably multifactorial in the setting of active hematuria/ COVID-19 infection/ KERRY   - Hb stable 7.4 this AM  - Iron studies show low iron and iron saturation  - Continue PO iron daily  -Type and screen completed (7/11) for possible transfusion  -Consulted heme/onc Dr Calero- -On IV iron- last day (07/13), continue po b12, transfuse as needed to maintain hg>7  -Follow up Dr. Nicholas upon discharge. Not applicable

## 2022-07-13 NOTE — PROGRESS NOTE ADULT - PROBLEM SELECTOR PLAN 5
Acute KERRY on CKD III.   - Creatinine up to 1.9 from 1.8 this AM    - CT abd/p shows R sided hydronephrosis  - Lasix was on hold given KERRY, urine studies may have been skewed by lasix due to very high urine sodium per nephro  - Restart Lasix 20mg dose every other day OK per nephro  - Use caution with IVF given know pleural effusion  - Hold nephrotoxic meds   - nephrology Dr. Wu,/DR PATEL rec -> repeat UA Acute KERRY on CKD III.   - Creatinine up to 1.9 from 1.8 this AM    - CT abd/p shows R sided hydronephrosis  - Lasix was on hold given KERRY, urine studies may have been skewed by lasix due to very high urine sodium per nephro  - Restart Lasix 20mg dose every 2 days OK per nephro  - Use caution with IVF given know pleural effusion  - Hold nephrotoxic meds   - nephrology Dr. Wu,/DR PATEL rec -> repeat UA

## 2022-07-13 NOTE — PROGRESS NOTE ADULT - PROBLEM SELECTOR PLAN 6
Abdominal pain improved.   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 Significant intraesophageal reflux and stasis were noted. Small pleural effusions and findings of pulmonary edema.  -Gastric emptying study cancelled due to patient's inability to go without pain meds  -anti-emetics PRN  -Currently on soft, bite sized diet   -surgery consulted (Dr. Rodrigues), f/u outpatient  -GI Dr Montilla consulted, recs appreciated

## 2022-07-13 NOTE — PROGRESS NOTE ADULT - ASSESSMENT
89y F pmhx CAD x 2 stents (2012), T2DM, HTN, HLD, GERD, mild AS, chronic venous insufficiency, chronic neuropathy, macular degeneration, chronic constipation, depression, OA, lumbar degenerative disc disease, and spinal stenosis admitted for cholelithiasis, KERRY, and found to be COVID positive on admission.     COVID-19/Pleural Effusions  - Vaccinated x3 per patient.   sp remdesivir x3 day course w/ last dose 7/9   isolation per infection control policy    Cholelithiasis  - pt p/w nausea/decreased PO intake x 4 days  - CT abd/pel: Cholelithiasis with distended gallbladder and right upper quadrant inflammatory changes, findings suspicious for acute cholecystitis.  - RUQ US: Cholelithiasis with nonspecific pericholecystic fluid. Negative sonographic Scott sign. Dilated common bile duct.  - HIDA negative for acute cholecystitis  - Cx negative  Overall low suspicion for acute infection, S/p zosyn    KERRY  - likely 2/2 dec PO intake, Pre Renal , trend renal fxn, avoid nephrotoxins, renally dose medications    Hematuria  7/11- urology consulted for hematuria, UA w/o pyuria and mixed growth on urine culture  -dc ceftriaxone and follow off abx    We will follow along in the care of this patient. Please call us at [phone] or text me directly on my cell# at [phone] with any concerns.   89y F pmhx CAD x 2 stents (2012), T2DM, HTN, HLD, GERD, mild AS, chronic venous insufficiency, chronic neuropathy, macular degeneration, chronic constipation, depression, OA, lumbar degenerative disc disease, and spinal stenosis admitted for cholelithiasis, KERRY, and found to be COVID positive on admission.     COVID-19/Pleural Effusions  - Vaccinated x3 per patient.   sp remdesivir x3 day course w/ last dose 7/9   isolation per infection control policy    Cholelithiasis  - pt p/w nausea/decreased PO intake x 4 days  - CT abd/pel: Cholelithiasis with distended gallbladder and right upper quadrant inflammatory changes, findings suspicious for acute cholecystitis.  - RUQ US: Cholelithiasis with nonspecific pericholecystic fluid. Negative sonographic Scott sign. Dilated common bile duct.  - HIDA negative for acute cholecystitis  - Cx negative  Overall low suspicion for acute infection, S/p zosyn    KERRY  - likely 2/2 dec PO intake, Pre Renal , trend renal fxn, avoid nephrotoxins, renally dose medications    Hematuria  7/11- urology consulted for hematuria, UA w/o pyuria and mixed growth on urine culture  -discussed with med team, have ordered stat urine for culture and urinalysis and will restart abx, follow response    We will follow along in the care of this patient. Please call us at [phone] or text me directly on my cell# at [phone] with any concerns.

## 2022-07-13 NOTE — PROGRESS NOTE ADULT - PROBLEM SELECTOR PLAN 7
-CT Chest: Moderate-sized bilateral pleural effusion with partial bilateral lower lobe atelectasis  - s/p IV Lasix 40mg x 1 in ED, restarting lasix 20mg every other day (ok per nephro)  - patient saturating well on room air and denies sob  -TTE shows normal LV systolic function, LVEF 65%, mild/mod MR  - pulmonology (Dr. Mleo) consulted- no need for thoracentesis in the immediate future, incentive devonte as tolerated -CT Chest: Moderate-sized bilateral pleural effusion with partial bilateral lower lobe atelectasis  - s/p IV Lasix 40mg x 1 in ED, restarting lasix 20mg every 2 days (ok per nephro)  - patient saturating well on room air and denies sob  -TTE shows normal LV systolic function, LVEF 65%, mild/mod MR  - pulmonology (Dr. Melo) consulted- no need for thoracentesis in the immediate future, incentive devonte as tolerated

## 2022-07-13 NOTE — PROGRESS NOTE ADULT - ASSESSMENT
89y F pmhx CAD x 2 stents (2012), T2DM, HTN, HLD, GERD, mild AS, chronic venous insufficiency, chronic neuropathy, macular degeneration, chronic constipation, depression, OA, lumbar degenerative disc disease, and spinal stenosis admitted for cholelithiasis, KERRY, and found to be COVID positive on admission    covid  OP  OA  Pleural eff  Atelectasis  KERRY  CAD  Fall  Ataxic gait  HLD  GERD  valv heart disease    ct renal stone hunt noted - nephrolithiasis - effusion - atelectasis -   MBS noted - diet on order  LE doppler NEG for DVT  VS noted  labs reviewed  renal follow up    cvs rx regimen  TTE -   covid management - sx management - monitor VS and Sat - isolation precs  fall prec - PT assessment - gait training - CM follow up  pleural eff - no need for thoracentesis in the immediate future - medical rx regimen and cvs rx regimen  I devonte as able to tolerate  Surgery eval in progress - Acute Angelica eval noted  GOC discussion  assist with needs  adelaida almanzar

## 2022-07-13 NOTE — PROGRESS NOTE ADULT - PROBLEM SELECTOR PROBLEM 5
Acute renal failure superimposed on stage 3 chronic kidney disease, unspecified acute renal failure type, unspecified whether stage 3a or 3b CKD

## 2022-07-14 ENCOUNTER — APPOINTMENT (OUTPATIENT)
Dept: CARDIOLOGY | Facility: CLINIC | Age: 87
End: 2022-07-14

## 2022-07-14 LAB
ALBUMIN SERPL ELPH-MCNC: 2.8 G/DL — LOW (ref 3.3–5)
ALP SERPL-CCNC: 60 U/L — SIGNIFICANT CHANGE UP (ref 40–120)
ALT FLD-CCNC: 25 U/L — SIGNIFICANT CHANGE UP (ref 12–78)
ANION GAP SERPL CALC-SCNC: 8 MMOL/L — SIGNIFICANT CHANGE UP (ref 5–17)
APPEARANCE UR: ABNORMAL
AST SERPL-CCNC: 38 U/L — HIGH (ref 15–37)
BACTERIA # UR AUTO: ABNORMAL
BASOPHILS # BLD AUTO: 0.01 K/UL — SIGNIFICANT CHANGE UP (ref 0–0.2)
BASOPHILS NFR BLD AUTO: 0.1 % — SIGNIFICANT CHANGE UP (ref 0–2)
BILIRUB SERPL-MCNC: 0.5 MG/DL — SIGNIFICANT CHANGE UP (ref 0.2–1.2)
BILIRUB UR-MCNC: NEGATIVE — SIGNIFICANT CHANGE UP
BUN SERPL-MCNC: 40 MG/DL — HIGH (ref 7–23)
CALCIUM SERPL-MCNC: 9.1 MG/DL — SIGNIFICANT CHANGE UP (ref 8.5–10.1)
CHLORIDE SERPL-SCNC: 101 MMOL/L — SIGNIFICANT CHANGE UP (ref 96–108)
CO2 SERPL-SCNC: 27 MMOL/L — SIGNIFICANT CHANGE UP (ref 22–31)
COLOR SPEC: YELLOW — SIGNIFICANT CHANGE UP
CREAT SERPL-MCNC: 1.9 MG/DL — HIGH (ref 0.5–1.3)
CULTURE RESULTS: NO GROWTH — SIGNIFICANT CHANGE UP
DIFF PNL FLD: ABNORMAL
EGFR: 25 ML/MIN/1.73M2 — LOW
EOSINOPHIL # BLD AUTO: 0.11 K/UL — SIGNIFICANT CHANGE UP (ref 0–0.5)
EOSINOPHIL NFR BLD AUTO: 1.6 % — SIGNIFICANT CHANGE UP (ref 0–6)
EPI CELLS # UR: SIGNIFICANT CHANGE UP
GLUCOSE SERPL-MCNC: 99 MG/DL — SIGNIFICANT CHANGE UP (ref 70–99)
GLUCOSE UR QL: NEGATIVE — SIGNIFICANT CHANGE UP
HCT VFR BLD CALC: 23.7 % — LOW (ref 34.5–45)
HGB BLD-MCNC: 7.6 G/DL — LOW (ref 11.5–15.5)
IMM GRANULOCYTES NFR BLD AUTO: 0.1 % — SIGNIFICANT CHANGE UP (ref 0–1.5)
KETONES UR-MCNC: NEGATIVE — SIGNIFICANT CHANGE UP
LEUKOCYTE ESTERASE UR-ACNC: ABNORMAL
LYMPHOCYTES # BLD AUTO: 0.63 K/UL — LOW (ref 1–3.3)
LYMPHOCYTES # BLD AUTO: 9.3 % — LOW (ref 13–44)
MCHC RBC-ENTMCNC: 28.3 PG — SIGNIFICANT CHANGE UP (ref 27–34)
MCHC RBC-ENTMCNC: 32.1 GM/DL — SIGNIFICANT CHANGE UP (ref 32–36)
MCV RBC AUTO: 88.1 FL — SIGNIFICANT CHANGE UP (ref 80–100)
MONOCYTES # BLD AUTO: 0.63 K/UL — SIGNIFICANT CHANGE UP (ref 0–0.9)
MONOCYTES NFR BLD AUTO: 9.3 % — SIGNIFICANT CHANGE UP (ref 2–14)
NEUTROPHILS # BLD AUTO: 5.4 K/UL — SIGNIFICANT CHANGE UP (ref 1.8–7.4)
NEUTROPHILS NFR BLD AUTO: 79.6 % — HIGH (ref 43–77)
NITRITE UR-MCNC: NEGATIVE — SIGNIFICANT CHANGE UP
NRBC # BLD: 0 /100 WBCS — SIGNIFICANT CHANGE UP (ref 0–0)
PH UR: 8 — SIGNIFICANT CHANGE UP (ref 5–8)
PLATELET # BLD AUTO: 293 K/UL — SIGNIFICANT CHANGE UP (ref 150–400)
POTASSIUM SERPL-MCNC: 4.1 MMOL/L — SIGNIFICANT CHANGE UP (ref 3.5–5.3)
POTASSIUM SERPL-SCNC: 4.1 MMOL/L — SIGNIFICANT CHANGE UP (ref 3.5–5.3)
PROT SERPL-MCNC: 6 G/DL — SIGNIFICANT CHANGE UP (ref 6–8.3)
PROT UR-MCNC: 15
RBC # BLD: 2.69 M/UL — LOW (ref 3.8–5.2)
RBC # FLD: 13.3 % — SIGNIFICANT CHANGE UP (ref 10.3–14.5)
RBC CASTS # UR COMP ASSIST: ABNORMAL /HPF (ref 0–4)
SODIUM SERPL-SCNC: 136 MMOL/L — SIGNIFICANT CHANGE UP (ref 135–145)
SP GR SPEC: 1.01 — SIGNIFICANT CHANGE UP (ref 1.01–1.02)
SPECIMEN SOURCE: SIGNIFICANT CHANGE UP
UROBILINOGEN FLD QL: NEGATIVE — SIGNIFICANT CHANGE UP
WBC # BLD: 6.79 K/UL — SIGNIFICANT CHANGE UP (ref 3.8–10.5)
WBC # FLD AUTO: 6.79 K/UL — SIGNIFICANT CHANGE UP (ref 3.8–10.5)
WBC UR QL: SIGNIFICANT CHANGE UP

## 2022-07-14 PROCEDURE — 99232 SBSQ HOSP IP/OBS MODERATE 35: CPT

## 2022-07-14 PROCEDURE — 99497 ADVNCD CARE PLAN 30 MIN: CPT | Mod: 25

## 2022-07-14 PROCEDURE — 99223 1ST HOSP IP/OBS HIGH 75: CPT

## 2022-07-14 RX ADMIN — SENNA PLUS 2 TABLET(S): 8.6 TABLET ORAL at 22:32

## 2022-07-14 RX ADMIN — Medication 1: at 08:13

## 2022-07-14 RX ADMIN — Medication 20 MILLIGRAM(S): at 05:19

## 2022-07-14 RX ADMIN — HYDROMORPHONE HYDROCHLORIDE 4 MILLIGRAM(S): 2 INJECTION INTRAMUSCULAR; INTRAVENOUS; SUBCUTANEOUS at 08:16

## 2022-07-14 RX ADMIN — HYDROMORPHONE HYDROCHLORIDE 4 MILLIGRAM(S): 2 INJECTION INTRAMUSCULAR; INTRAVENOUS; SUBCUTANEOUS at 22:32

## 2022-07-14 RX ADMIN — ATORVASTATIN CALCIUM 40 MILLIGRAM(S): 80 TABLET, FILM COATED ORAL at 22:32

## 2022-07-14 RX ADMIN — LIDOCAINE 1 PATCH: 4 CREAM TOPICAL at 11:01

## 2022-07-14 RX ADMIN — PANTOPRAZOLE SODIUM 40 MILLIGRAM(S): 20 TABLET, DELAYED RELEASE ORAL at 05:19

## 2022-07-14 RX ADMIN — Medication 1: at 17:08

## 2022-07-14 RX ADMIN — Medication 30 MILLIGRAM(S): at 22:31

## 2022-07-14 RX ADMIN — Medication 1 GRAM(S): at 05:20

## 2022-07-14 RX ADMIN — Medication 5 MILLIGRAM(S): at 17:09

## 2022-07-14 RX ADMIN — LIDOCAINE 1 PATCH: 4 CREAM TOPICAL at 11:03

## 2022-07-14 RX ADMIN — PANTOPRAZOLE SODIUM 40 MILLIGRAM(S): 20 TABLET, DELAYED RELEASE ORAL at 17:09

## 2022-07-14 RX ADMIN — HYDROMORPHONE HYDROCHLORIDE 4 MILLIGRAM(S): 2 INJECTION INTRAMUSCULAR; INTRAVENOUS; SUBCUTANEOUS at 09:10

## 2022-07-14 RX ADMIN — LIDOCAINE 1 PATCH: 4 CREAM TOPICAL at 23:07

## 2022-07-14 RX ADMIN — Medication 5 MILLIGRAM(S): at 23:38

## 2022-07-14 RX ADMIN — POLYETHYLENE GLYCOL 3350 17 GRAM(S): 17 POWDER, FOR SOLUTION ORAL at 11:00

## 2022-07-14 RX ADMIN — Medication 1 GRAM(S): at 17:09

## 2022-07-14 RX ADMIN — Medication 81 MILLIGRAM(S): at 11:00

## 2022-07-14 RX ADMIN — LIDOCAINE 1 PATCH: 4 CREAM TOPICAL at 19:18

## 2022-07-14 RX ADMIN — HYDROMORPHONE HYDROCHLORIDE 4 MILLIGRAM(S): 2 INJECTION INTRAMUSCULAR; INTRAVENOUS; SUBCUTANEOUS at 23:02

## 2022-07-14 RX ADMIN — Medication 5 MILLIGRAM(S): at 00:32

## 2022-07-14 RX ADMIN — Medication 5 MILLIGRAM(S): at 05:19

## 2022-07-14 RX ADMIN — Medication 325 MILLIGRAM(S): at 11:00

## 2022-07-14 RX ADMIN — HYDROMORPHONE HYDROCHLORIDE 4 MILLIGRAM(S): 2 INJECTION INTRAMUSCULAR; INTRAVENOUS; SUBCUTANEOUS at 16:55

## 2022-07-14 RX ADMIN — HYDROMORPHONE HYDROCHLORIDE 4 MILLIGRAM(S): 2 INJECTION INTRAMUSCULAR; INTRAVENOUS; SUBCUTANEOUS at 16:01

## 2022-07-14 RX ADMIN — Medication 25 MILLIGRAM(S): at 05:19

## 2022-07-14 RX ADMIN — CEFTRIAXONE 100 MILLIGRAM(S): 500 INJECTION, POWDER, FOR SOLUTION INTRAMUSCULAR; INTRAVENOUS at 14:07

## 2022-07-14 RX ADMIN — Medication 5 MILLIGRAM(S): at 11:00

## 2022-07-14 RX ADMIN — PREGABALIN 1000 MICROGRAM(S): 225 CAPSULE ORAL at 11:00

## 2022-07-14 NOTE — CONSULT NOTE ADULT - CONSULT REASON
GOC
covid  KERRY  OP  OA
cholelithiasis
Anemia - D64.89
COVID
Hematuria, right hydronephrosiis
chest pain
nausea
KERRY

## 2022-07-14 NOTE — PROGRESS NOTE ADULT - ASSESSMENT
89y F PMH CAD x 2 stents (2012), T2DM, HTN, HLD, GERD, mild AS, chronic venous insufficiency, chronic neuropathy, macular degeneration, chronic constipation, depression, OA, lumbar degenerative disc disease, and spinal stenosis admitted for cholelithiasis, KERRY, and found to be COVID19.     Chest pain, Edema, HTN, HLD, CAD s/p stents   - hx CAD s/p 2 stents in 2012  - EKG NSR 80, no acute ischemic changes  - Troponin WNL on admission, no need to further trend, doubt ACS as EKG WNL  - Continue asa, statin    - LE edema likely 2/2 chronic venous insufficiency versus component of HF  - Continue Lasix 20 every 2 days   - Does not appear volume overloaded on exam  - 7/7/22  EF 65% with norm LV/RV systolic function, with mild/mod MR.  - CT c/a/p with b/l mod effusions, partial LLL atelectasis  - Pulm following, no plan for thoracentesis for b/l effusions, plan for medical management    - BP and HR stable and controlled  - Continue BB.    - Urology following for hematuria   - COVID per primary   - Monitor and replete lytes, keep K>4, Mg>2.  - Will continue to follow.    Abdulkadir Giraldo, MS FNP, [facility]  Nurse Practitioner- Cardiology   Spectra #[phone]

## 2022-07-14 NOTE — CONSULT NOTE ADULT - ASSESSMENT
89 yo F with PMHx of CAD x 2 stents (2012), T2DM, HTN, HLD, GERD, mild AS, chronic venous insufficiency, chronic neuropathy, macular degeneration, chronic constipation, depression, OA, lumbar degenerative disc disease, and spinal stenosis admitted for cholelithiasis, KERRY, and found to be COVID positive on admission.     Nutritional Assessment:  · Nutritional Assessment  This patient has been assessed with a concern for Malnutrition and has been determined to have a diagnosis/diagnoses of Severe protein-calorie malnutrition.    This patient is being managed with:   Diet Soft and Bite Sized-  Low Fat (LOWFAT)  Supplement Feeding Modality:  Oral  Ensure Enlive Cans or Servings Per Day:  3       Frequency:  Three Times a day  Entered: Jul 12 2022 12:52PM      #Hematuria.   - Pt presented with hematuria since admission. Unclear etiology   - CT Abd/Pelvis w contrast (6/22)- Right sided hydronephrosis with bladder wall thickening  - on Rocehin  - Urology following Dr. Hill- no further work up at this time  - when patient improves dtr to consider Cystoscopy and retrograde study -possible stent.  #Anemia - multifactorial in the setting of active hematuria/ COVID-19 infection/ KERRY   -Heme/onc - completed IV iron (7/13), continue po b12, transfuse as needed to maintain hg>7    #COVID-19   - s/p remdesivir  - B/L venous doppler - negative for dvts  - saturating well on room air and no sx  - Daughter declined monoclonal antibodies for patient    #Acute renal failure superimposed on stage 3 chronic kidney disease, ? WALI, Rt Hydro   - Creatinine elevated up to 1.9 from 1.8 this AM , baseline  1.2-1.3  - CT abd/p shows R sided hydronephrosis  - Now continuing Lasix 20mg dose every 3 days OK per nephro  - Hold nephrotoxic meds   - nephrology recs noted  #Cholelithiasis.   - Abdominal pain improved.   - HIDA normal (7/6) and clear liquid diet with carb restrictions started  - Esophagram (7/12) - limited study due to kyphosis, showed Mid/distal esophagus demonstrates findings of presbyesophagus, including prominent tertiary contractions, areas of mucosal irregularity and possible small traction diverticula. Significant intraesophageal reflux and stasis were noted. Small pleural effusions and findings of pulmonary edema.  - Gastric emptying study cancelled due to patient's inability to go without pain meds  - anti-emetics PRN  - Currently on soft, bite sized diet     #Pleural effusion.   - CT Chest: Moderate-sized bilateral pleural effusion with partial bilateral lower lobe atelectasis  - on lasix   - TTE shows normal LV systolic function, LVEF 65%, mild/mod MR  - pulmonology - no need for thoracentesis in the immediate future, incentive devonte as tolerated.    #Fall.   - h/o fall at home x 1 due to LE weakness  - likely 2/2 dec PO intake  - CT head negative  - fall precautions and out of bed to chair  - PT consulted, rec PT at home.    #ACP/GOC  - DNR/DNI  - dtr Mary HCP  - Pt currently not meeting hospice criteria. SW to follow on dispo planning.    Appreciate consult. Discussed with the primary team.    Carlos Bee MD, [facility]

## 2022-07-14 NOTE — CONSULT NOTE ADULT - CONSULT REQUESTED DATE/TIME
05-Jul-2022 21:59
06-Jul-2022 13:33
06-Jul-2022 14:15
06-Jul-2022 16:30
11-Jul-2022
11-Jul-2022 18:09
06-Jul-2022 08:21
13-Jul-2022 18:27
06-Jul-2022 13:59

## 2022-07-14 NOTE — PROGRESS NOTE ADULT - SUBJECTIVE AND OBJECTIVE BOX
[facility] DIVISION of INFECTIOUS DISEASE  Bry Nash MD PhD, Mariel Gtz MD, Candida Izquierdo MD, Marti Del Valle MD, Alonso Doll MD  and providing coverage with Isreal Woodward MD  Providing Infectious Disease Consultations at [facility], [facility], [facility]'s      Office# [phone] to schedule follow up appointments  Answering Service for urgent calls or New Consults [phone]  Cell# to text for urgent issues Bry Nash [phone]     Infectious diseases progress note:    DEVON NOLAND is a 89y y. o. Female patient    COVID Patient    Allergies    morphine (Other)    Intolerances        ANTIBIOTICS/RELEVANT:  antimicrobials  cefTRIAXone   IVPB 1000 milliGRAM(s) IV Intermittent every 24 hours    immunologic:    OTHER:  acetaminophen     Tablet .. 650 milliGRAM(s) Oral every 6 hours PRN  aluminum hydroxide/magnesium hydroxide/simethicone Suspension 30 milliLiter(s) Oral every 4 hours PRN  amitriptyline 30 milliGRAM(s) Oral at bedtime  aspirin  chewable 81 milliGRAM(s) Oral daily  atorvastatin 40 milliGRAM(s) Oral at bedtime  cyanocobalamin 1000 MICROGram(s) Oral daily  dextrose 5%. 1000 milliLiter(s) IV Continuous <Continuous>  dextrose 5%. 1000 milliLiter(s) IV Continuous <Continuous>  dextrose 50% Injectable 25 Gram(s) IV Push once  dextrose 50% Injectable 12.5 Gram(s) IV Push once  dextrose 50% Injectable 25 Gram(s) IV Push once  dextrose Oral Gel 15 Gram(s) Oral once PRN  ferrous    sulfate 325 milliGRAM(s) Oral daily  furosemide    Tablet 20 milliGRAM(s) Oral <User Schedule>  glucagon  Injectable 1 milliGRAM(s) IntraMuscular once  HYDROmorphone   Tablet 4 milliGRAM(s) Oral three times a day  insulin lispro (ADMELOG) corrective regimen sliding scale   SubCutaneous three times a day before meals  insulin lispro (ADMELOG) corrective regimen sliding scale   SubCutaneous at bedtime  lidocaine   4% Patch 1 Patch Transdermal daily  lidocaine   4% Patch 1 Patch Transdermal daily  melatonin 3 milliGRAM(s) Oral at bedtime PRN  metoclopramide 5 milliGRAM(s) Oral every 6 hours  metoprolol succinate ER 25 milliGRAM(s) Oral daily  ondansetron Injectable 4 milliGRAM(s) IV Push every 8 hours PRN  pantoprazole    Tablet 40 milliGRAM(s) Oral two times a day  polyethylene glycol 3350 17 Gram(s) Oral daily  senna 2 Tablet(s) Oral at bedtime  sucralfate suspension 1 Gram(s) Oral two times a day      Objective:  Vital Signs Last 24 Hrs  T(C): 36.8 (2022 12:05), Max: 36.8 (2022 12:05)  T(F): 98.2 (2022 12:05), Max: 98.2 (2022 12:05)  HR: 79 (2022 12:05) (79 - 90)  BP: 117/66 (2022 12:05) (116/61 - 124/66)  BP(mean): --  RR: 17 (2022 12:05) (17 - 18)  SpO2: 95% (2022 12:05) (90% - 95%)    Parameters below as of 2022 12:05  Patient On (Oxygen Delivery Method): room air        T(C): 36.8 (22 @ 12:05), Max: 36.9 (22 @ 22:04)  T(C): 36.8 (22 @ 12:05), Max: 36.9 (22 @ 22:04)  T(C): 36.8 (22 @ 12:05), Max: 36.9 (22 @ 12:19)    PHYSICAL EXAM:  HEENT: NC atraumatic  Neck: supple  Respiratory: no accessory muscle use, breathing comfortably  Cardiovascular: distant  Gastrointestinal: normal appearing, nondistended  Extremities: no clubbing, no cyanosis,        LABS:                          7.6    6.79  )-----------( 293      ( 2022 11:00 )             23.7       WBC  6.79 - @ 11:00  5.04 07-13 @ 06:55  5.38 - @ 09:00  4.78 07- @ 06:42  5.55 07-10 @ 07:44  3.92 - @ 07:45  4.29 - @ 06:45      07-14    136  |  101  |  40<H>  ----------------------------<  99  4.1   |  27  |  1.90<H>    Ca    9.1      2022 11:00    TPro  6.0  /  Alb  2.8<L>  /  TBili  0.5  /  DBili  x   /  AST  38<H>  /  ALT  25  /  AlkPhos  60        Creatinine, Serum: 1.90 mg/dL (22 @ 11:00)  Creatinine, Serum: 1.90 mg/dL (22 @ 06:55)  Creatinine, Serum: 1.80 mg/dL (22 @ 09:00)  Creatinine, Serum: 1.70 mg/dL (22 @ 06:42)  Creatinine, Serum: 1.80 mg/dL (07-10-22 @ 07:44)  Creatinine, Serum: 1.80 mg/dL (22 @ 07:45)  Creatinine, Serum: 2.10 mg/dL (22 @ 06:45)        Urinalysis Basic - ( 2022 06:44 )    Color: Yellow / Appearance: Slightly Turbid / S.010 / pH: x  Gluc: x / Ketone: Negative  / Bili: Negative / Urobili: Negative   Blood: x / Protein: 15 / Nitrite: Negative   Leuk Esterase: Trace / RBC: 11-25 /HPF / WBC 3-5   Sq Epi: x / Non Sq Epi: Few / Bacteria: Occasional            COVID RISK SCORE  Auto Neutrophil #: 5.40 K/uL (22 @ 11:00)  Auto Lymphocyte #: 0.63 K/uL (22 @ 11:00)  Auto Neutrophil #: 3.85 K/uL (22 @ 06:55)  Auto Lymphocyte #: 0.57 K/uL (22 @ 06:55)  Auto Neutrophil #: 4.19 K/uL (22 @ 09:00)  Auto Lymphocyte #: 0.66 K/uL (22 @ 09:00)  Auto Neutrophil #: 3.48 K/uL (22 @ 06:42)  Auto Lymphocyte #: 0.76 K/uL (22 @ 06:42)  Auto Neutrophil #: 2.58 K/uL (22 @ 07:45)  Auto Lymphocyte #: 0.79 K/uL (22 @ 07:45)  Auto Neutrophil #: 3.22 K/uL (22 @ 06:45)  Auto Lymphocyte #: 0.59 K/uL (22 @ 06:45)  Auto Neutrophil #: 3.47 K/uL (22 @ 09:04)  Auto Lymphocyte #: 0.67 K/uL (22 @ 09:04)  Auto Neutrophil #: 3.85 K/uL (22 @ 07:14)  Auto Lymphocyte #: 0.33 K/uL (22 @ 07:14)  Auto Neutrophil #: 5.15 K/uL (22 @ 19:05)  Auto Lymphocyte #: 0.46 K/uL (22 @ 19:05)    Lactate, Blood: 1.2 mmol/L (22 @ 21:45)    Auto Eosinophil #: 0.11 K/uL (22 @ 11:00)  Auto Eosinophil #: 0.09 K/uL (22 @ 06:55)  Auto Eosinophil #: 0.07 K/uL (22 @ 09:00)  Auto Eosinophil #: 0.03 K/uL (22 @ 06:42)  Auto Eosinophil #: 0.01 K/uL (22 @ 07:45)  Auto Eosinophil #: 0.02 K/uL (22 @ 06:45)  Auto Eosinophil #: 0.02 K/uL (22 @ 09:04)  Auto Eosinophil #: 0.07 K/uL (22 @ 07:14)  Auto Eosinophil #: 0.15 K/uL (22 @ 19:05)                  Ferritin, Serum: 67 ng/mL (22 @ 07:14)        INR: 1.04 ratio (22 @ 19:05)  Activated Partial Thromboplastin Time: 28.6 sec (22 @ 19:05)          MICROBIOLOGY:      SARS-CoV-2: Detected (2022 19:05)  COVID-19 PCR: NotDetec (2022 15:46)  COVID-19 PCR: NotDetec (2022 03:28)      RADIOLOGY & ADDITIONAL STUDIES:

## 2022-07-14 NOTE — CONSULT NOTE ADULT - PROVIDER SPECIALTY LIST ADULT
Nephrology
Pulmonology
Surgery
Gastroenterology
Heme/Onc
Infectious Disease
Urology
Cardiology
Palliative Care

## 2022-07-14 NOTE — PROGRESS NOTE ADULT - ASSESSMENT
nausea  abodminal pain    hida negative for acute cholecystitis  suspect dysmotility  iv fluid  unable to obtain nm ges 2/2 pt's dilaudid  esophagram noted  will d/w pt's daughter regarding nutrition    I reviewed the overnight course of events on the unit, re-confirming the patient history. I discussed the care with the patient and their family  Differential diagnosis and plan of care discussed with patient after the evaluation  35 minutes spent on total encounter of which more than fifty percent of the encounter was spent counseling and/or coordinating care by the attending physician.  Advanced care planning was discussed with patient and family.  Advanced care planning forms were reviewed and discussed.  Risks, benefits and alternatives of gastroenterologic procedures were discussed in detail and all questions were answered.

## 2022-07-14 NOTE — PROGRESS NOTE ADULT - SUBJECTIVE AND OBJECTIVE BOX
[facility]  Shar Hopkins PA-C  [address] [place] [address]  [phone]      INTERVAL HPI/OVERNIGHT EVENTS:  Pt s/e  Pt confused, still eating little  Denies GI complaints    MEDICATIONS  (STANDING):  amitriptyline 30 milliGRAM(s) Oral at bedtime  aspirin  chewable 81 milliGRAM(s) Oral daily  atorvastatin 40 milliGRAM(s) Oral at bedtime  cefTRIAXone   IVPB 1000 milliGRAM(s) IV Intermittent every 24 hours  cyanocobalamin 1000 MICROGram(s) Oral daily  dextrose 5%. 1000 milliLiter(s) (50 mL/Hr) IV Continuous <Continuous>  dextrose 5%. 1000 milliLiter(s) (100 mL/Hr) IV Continuous <Continuous>  dextrose 50% Injectable 25 Gram(s) IV Push once  dextrose 50% Injectable 12.5 Gram(s) IV Push once  dextrose 50% Injectable 25 Gram(s) IV Push once  ferrous    sulfate 325 milliGRAM(s) Oral daily  furosemide    Tablet 20 milliGRAM(s) Oral <User Schedule>  glucagon  Injectable 1 milliGRAM(s) IntraMuscular once  HYDROmorphone   Tablet 4 milliGRAM(s) Oral three times a day  insulin lispro (ADMELOG) corrective regimen sliding scale   SubCutaneous three times a day before meals  insulin lispro (ADMELOG) corrective regimen sliding scale   SubCutaneous at bedtime  lidocaine   4% Patch 1 Patch Transdermal daily  lidocaine   4% Patch 1 Patch Transdermal daily  metoclopramide 5 milliGRAM(s) Oral every 6 hours  metoprolol succinate ER 25 milliGRAM(s) Oral daily  pantoprazole    Tablet 40 milliGRAM(s) Oral two times a day  polyethylene glycol 3350 17 Gram(s) Oral daily  senna 2 Tablet(s) Oral at bedtime  sucralfate suspension 1 Gram(s) Oral two times a day    MEDICATIONS  (PRN):  acetaminophen     Tablet .. 650 milliGRAM(s) Oral every 6 hours PRN Temp greater or equal to 38C (100.4F), Mild Pain (1 - 3)  aluminum hydroxide/magnesium hydroxide/simethicone Suspension 30 milliLiter(s) Oral every 4 hours PRN Dyspepsia  dextrose Oral Gel 15 Gram(s) Oral once PRN Blood Glucose LESS THAN 70 milliGRAM(s)/deciliter  melatonin 3 milliGRAM(s) Oral at bedtime PRN Insomnia  ondansetron Injectable 4 milliGRAM(s) IV Push every 8 hours PRN Nausea and/or Vomiting      Allergies  morphine (Other)      PHYSICAL EXAM:   Vital Signs:  Vital Signs Last 24 Hrs  T(C): 36.7 (2022 05:11), Max: 36.7 (2022 12:23)  T(F): 98 (2022 05:11), Max: 98.1 (2022 12:23)  HR: 90 (2022 05:11) (90 - 91)  BP: 116/61 (2022 05:11) (116/61 - 127/72)  BP(mean): --  RR: 17 (2022 05:11) (17 - 18)  SpO2: 94% (2022 05:11) (90% - 94%)    Parameters below as of 2022 05:11  Patient On (Oxygen Delivery Method): room air    GENERAL:  Appears stated age  ABDOMEN:  Soft, non-tender, non-distended  NEURO:  Confused    LABS:                        7.4    5.04  )-----------( 189      ( 2022 06:55 )             23.3     07-13    136  |  102  |  40<H>  ----------------------------<  124<H>  4.1   |  28  |  1.90<H>    Ca    8.6      2022 06:55    TPro  5.4<L>  /  Alb  2.4<L>  /  TBili  0.4  /  DBili  x   /  AST  57<H>  /  ALT  31  /  AlkPhos  56  07-13      Urinalysis Basic - ( 2022 06:44 )    Color: Yellow / Appearance: Slightly Turbid / S.010 / pH: x  Gluc: x / Ketone: Negative  / Bili: Negative / Urobili: Negative   Blood: x / Protein: 15 / Nitrite: Negative   Leuk Esterase: Trace / RBC: 11-25 /HPF / WBC 3-5   Sq Epi: x / Non Sq Epi: Few / Bacteria: Occasional

## 2022-07-14 NOTE — PROGRESS NOTE ADULT - SUBJECTIVE AND OBJECTIVE BOX
[facility] -- Casper Mcgee Grossman, [place], Eligio Ryan, Thalia Jose: Office # [phone]    Follow Up: COVID, CAD      Subjective/Observations: Patient seen and examined. Patient awake, alert, OOB to chair. No complaints of chest pain, dyspnea, palpitations or dizziness. No signs of orthopnea or PND. Tolerating room air.     REVIEW OF SYSTEMS: All other review of systems are negative unless indicated above    PAST MEDICAL & SURGICAL HISTORY:  H/O vertebral fracture repair      History of vertebral fracture      Dyslipidemia      DM type 2 with diabetic dyslipidemia      H/O gastroesophageal reflux (GERD)      Costochondritis      Coronary arteriosclerosis in native artery  s/p 2 stents. last placed 2 years ago      Gastroparesis      History of laminectomy      S/P hip hemiarthroplasty      S/P appendectomy    MEDICATIONS  (STANDING):  amitriptyline 30 milliGRAM(s) Oral at bedtime  aspirin  chewable 81 milliGRAM(s) Oral daily  atorvastatin 40 milliGRAM(s) Oral at bedtime  cefTRIAXone   IVPB 1000 milliGRAM(s) IV Intermittent every 24 hours  cyanocobalamin 1000 MICROGram(s) Oral daily  dextrose 5%. 1000 milliLiter(s) (50 mL/Hr) IV Continuous <Continuous>  dextrose 5%. 1000 milliLiter(s) (100 mL/Hr) IV Continuous <Continuous>  dextrose 50% Injectable 25 Gram(s) IV Push once  dextrose 50% Injectable 12.5 Gram(s) IV Push once  dextrose 50% Injectable 25 Gram(s) IV Push once  ferrous    sulfate 325 milliGRAM(s) Oral daily  furosemide    Tablet 20 milliGRAM(s) Oral <User Schedule>  glucagon  Injectable 1 milliGRAM(s) IntraMuscular once  HYDROmorphone   Tablet 4 milliGRAM(s) Oral three times a day  insulin lispro (ADMELOG) corrective regimen sliding scale   SubCutaneous three times a day before meals  insulin lispro (ADMELOG) corrective regimen sliding scale   SubCutaneous at bedtime  lidocaine   4% Patch 1 Patch Transdermal daily  lidocaine   4% Patch 1 Patch Transdermal daily  metoclopramide 5 milliGRAM(s) Oral every 6 hours  metoprolol succinate ER 25 milliGRAM(s) Oral daily  pantoprazole    Tablet 40 milliGRAM(s) Oral two times a day  polyethylene glycol 3350 17 Gram(s) Oral daily  senna 2 Tablet(s) Oral at bedtime  sucralfate suspension 1 Gram(s) Oral two times a day    MEDICATIONS  (PRN):  acetaminophen     Tablet .. 650 milliGRAM(s) Oral every 6 hours PRN Temp greater or equal to 38C (100.4F), Mild Pain (1 - 3)  aluminum hydroxide/magnesium hydroxide/simethicone Suspension 30 milliLiter(s) Oral every 4 hours PRN Dyspepsia  dextrose Oral Gel 15 Gram(s) Oral once PRN Blood Glucose LESS THAN 70 milliGRAM(s)/deciliter  melatonin 3 milliGRAM(s) Oral at bedtime PRN Insomnia  ondansetron Injectable 4 milliGRAM(s) IV Push every 8 hours PRN Nausea and/or Vomiting    Allergies  morphine (Other)    Vital Signs Last 24 Hrs  T(C): 36.7 (14 Jul 2022 05:11), Max: 36.7 (13 Jul 2022 12:23)  T(F): 98 (14 Jul 2022 05:11), Max: 98.1 (13 Jul 2022 12:23)  HR: 90 (14 Jul 2022 05:11) (90 - 91)  BP: 116/61 (14 Jul 2022 05:11) (116/61 - 127/72)  BP(mean): --  RR: 17 (14 Jul 2022 05:11) (17 - 18)  SpO2: 94% (14 Jul 2022 05:11) (90% - 94%)    Parameters below as of 14 Jul 2022 05:11  Patient On (Oxygen Delivery Method): room air      I&O's Summary        TELE: Not on telemetry   PHYSICAL EXAM:  Constitutional: NAD, awake and alert, Frail   HEENT: Moist Mucous Membranes, Anicteric  Pulmonary: Non-labored, breath sounds are clear bilaterally, No wheezing, rales or rhonchi  Cardiovascular: Regular, S1 and S2, No murmurs, No rubs, gallops or clicks  Gastrointestinal:  soft, nontender, nondistended   Lymph: + Rt> Lt peripheral edema. No lymphadenopathy.   Skin: No visible rashes or ulcers.  Psych:  Mood & affect appropriate    LABS: All Labs Reviewed:                        7.4    5.04  )-----------( 189      ( 13 Jul 2022 06:55 )             23.3                         8.1    5.38  )-----------( 194      ( 12 Jul 2022 09:00 )             25.5                         8.0    x     )-----------( x        ( 11 Jul 2022 14:00 )             25.0     13 Jul 2022 06:55    136    |  102    |  40     ----------------------------<  124    4.1     |  28     |  1.90   12 Jul 2022 09:00    136    |  102    |  46     ----------------------------<  136    4.3     |  26     |  1.80     Ca    8.6        13 Jul 2022 06:55  Ca    8.7        12 Jul 2022 09:00    TPro  5.4    /  Alb  2.4    /  TBili  0.4    /  DBili  x      /  AST  57     /  ALT  31     /  AlkPhos  56     13 Jul 2022 06:55  TPro  5.8    /  Alb  2.6    /  TBili  0.3    /  DBili  x      /  AST  82     /  ALT  37     /  AlkPhos  63     12 Jul 2022 09:00   LIVER FUNCTIONS - ( 13 Jul 2022 06:55 )  Alb: 2.4 g/dL / Pro: 5.4 g/dL / ALK PHOS: 56 U/L / ALT: 31 U/L / AST: 57 U/L / GGT: x             12 Lead ECG:   Ventricular Rate 71 BPM    Atrial Rate 71 BPM    P-R Interval 142 ms    QRS Duration 84 ms    Q-T Interval 392 ms    QTC Calculation(Bazett) 425 ms    P Axis 64 degrees    R Axis 18 degrees    T Axis 7 degrees    Diagnosis Line Normal sinus rhythm  Normal ECG  When compared with ECG of 05-JUL-2022 19:57, (Unconfirmed)  No significant change was found  Confirmed by Bry Warren MD ([id number]) on 7/6/2022 1:56:48 PM (07-06-22 @ 13:32)      ACC: [id number] EXAM:  ECHO TTE WO CON COMP W DOPP                        PROCEDURE DATE:  07/07/2022    INTERPRETATION:  INDICATION: dyspnea  Sonographer LK    Blood Pressure 143/70    Height 157 cm     Weight 52 kg       BSA 1.5 sq m    Dimensions:  LA unavailable       Normal Values: 2.0 - 4.0 cm  Ao unavailable        Normal Values: 2.0 - 3.8 cm  SEPTUM unavailable       Normal Values: 0.6 - 1.2 cm  PWT unavailable       Normal Values: 0.6 - 1.1 cm  LVIDd unavailable         Normal Values: 3.0 - 5.6 cm  LVIDs unavailable         Normal Values: 1.8 - 4.0 cm      OBSERVATIONS:  Technically difficult study  Mitral Valve: Mitral annular calcification with thickened leaflets, mild   to moderate MR.  Aortic Valve/Aorta: Sclerotic trileaflet aortic valve with grossly normal   opening by visual estimation.  Tricuspid Valve: normal with trace TR.  Pulmonic Valve: Not well-visualized  Left Atrium: Enlarged  Right Atrium: Not well-visualized  Left Ventricle: normal LV size and systolic function, estimated LVEF of   65%.  Right Ventricle: Grossly normal size and systolic function.  Pericardium: no significant pericardial effusion.  Pulmonary/RV Pressure: estimated PA systolic pressure of 20mmHg  IVC measures 1.85 cm    IMPRESSION:  Technically difficult study  Normal left ventricular internal dimensions and systolic function,   estimated LVEF of 65%.  Grossly normal RV size and systolic function.  Sclerotic trileaflet aortic valve with grossly normal opening by visual   estimation, without AI.  Mild to moderate MR  No significant pericardial effusion.    --- End of Report ---  BAM STEVENS MD; Attending Cardiologist  This document has been electronically signed. Jul 8 2022 10:54AM

## 2022-07-14 NOTE — PROGRESS NOTE ADULT - PROBLEM SELECTOR PLAN 2
Probably multifactorial in the setting of active hematuria/ COVID-19 infection/ KERRY   - Hb stable   - Iron studies show low iron and iron saturation  - Continue PO iron daily  -Consulted heme/onc Dr Calero- -Completed IV iron (7/13), continue po b12, transfuse as needed to maintain hg>7  -Follow up Dr. Nicholas upon discharge.

## 2022-07-14 NOTE — PROGRESS NOTE ADULT - PROBLEM SELECTOR PLAN 7
-CT Chest: Moderate-sized bilateral pleural effusion with partial bilateral lower lobe atelectasis  - s/p IV Lasix 40mg x 1 in ED, restarting lasix 20mg every 2 days (ok per nephro)  - patient saturating well on room air and denies sob  -TTE shows normal LV systolic function, LVEF 65%, mild/mod MR  - pulmonology (Dr. Melo) consulted- no need for thoracentesis in the immediate future, incentive devonte as tolerated

## 2022-07-14 NOTE — PROGRESS NOTE ADULT - ASSESSMENT
89y F pmhx CAD x 2 stents (2012), T2DM, HTN, HLD, GERD, mild AS, chronic venous insufficiency, chronic neuropathy, macular degeneration, chronic constipation, depression, OA, lumbar degenerative disc disease, and spinal stenosis admitted for cholelithiasis, KERRY, and found to be COVID positive on admission.     COVID-19/Pleural Effusions  - Vaccinated x3 per patient.   sp remdesivir x3 day course w/ last dose 7/9   isolation per infection control policy    Cholelithiasis  - pt p/w nausea/decreased PO intake x 4 days  - CT abd/pel: Cholelithiasis with distended gallbladder and right upper quadrant inflammatory changes, findings suspicious for acute cholecystitis.  - RUQ US: Cholelithiasis with nonspecific pericholecystic fluid. Negative sonographic Scott sign. Dilated common bile duct.  - HIDA negative for acute cholecystitis  - Cx negative  Overall low suspicion for acute infection, S/p zosyn    KERRY  - likely 2/2 dec PO intake, Pre Renal , trend renal fxn, avoid nephrotoxins, renally dose medications    Hematuria  7/11- urology consulted for hematuria, UA w/o pyuria and mixed growth on urine culture  -discussed with med team, ordered stat urine 7/13-collected for culture and urinalysis and restarted abx  7/14-noted rather bland UA so not compelling for need to continue abx    We will follow along in the care of this patient. Please call us at [phone] or text me directly on my cell# at [phone] with any concerns.

## 2022-07-14 NOTE — CONSULT NOTE ADULT - REASON FOR ADMISSION
gallstones, covid, kiah

## 2022-07-14 NOTE — PROGRESS NOTE ADULT - PROBLEM SELECTOR PLAN 5
Acute KERRY on CKD III.   - Creatinine _________ up to 1.9 from 1.8 this AM , baseline  1.2-1.3  - CT abd/p shows R sided hydronephrosis  - Lasix was on hold given KERRY, urine studies may have been skewed by lasix due to very high urine sodium per nephro  - Now continuing Lasix 20mg dose every 2 days OK per nephro  - Use caution with IVF given know pleural effusion  - Hold nephrotoxic meds   - nephrology Dr. Wu,/DR PATEL following Acute KERRY on CKD III. ? WALI . Rt Hydro   - Creatinine elevated up to 1.9 from 1.8 this AM , baseline  1.2-1.3  - CT abd/p shows R sided hydronephrosis  - Now continuing Lasix 20mg dose every 3 days OK per nephro  - Hold nephrotoxic meds   - nephrology Dr. Wu,/DR PATEL following- BMP in AM

## 2022-07-14 NOTE — PROGRESS NOTE ADULT - SUBJECTIVE AND OBJECTIVE BOX
waiting for labs    7/14: Patient seen and assessed at bedside. No complaints at this time, would like to rest and sleep. Notes decreased pain in her legs. Denies shortness of breath, chest pain, nausea, vomiting, diarrhea. Patient is a 89y old  Female who presents with a chief complaint of gallstones, covid, fidelina (2022 11:47)    HPI:  89y F pmhx CAD x 2 stents (), T2DM, HTN, HLD, GERD, mild AS, chronic venous insufficiency, chronic neuropathy, macular degeneration, chronic constipation, depression, OA, and recent admission to [facility] - 3/2 for gallstone pancreatitis with non-operative treatment presented to the ED with constant nausea x 4 days associated with decreased PO intake, generalized weakness, and fall today x 1 due to LE weakness. Patient denies fever, chills, cp, dizziness, sob, abd pain, vomiting, diarrhea. Patient was seen here in ED  for epigastric pain (since resolved) and discharged with outpatient follow up with Dr. Terrell. Patient has seen Dr. Terrell twice since then and epigastric pain resolved s/p PO zofran and carafate with planned esophagram. Daughter states epigastric pain resolved, but patient has had constant nausea x 4 days that has not improved despite increasing PO zofran from 4mg to 8mg q6h. Patient was unable to obtain appointment with Dr. Terrell today and after falling at home due to leg weakness, daughter brought her to the ED.     ED Course  Vitals: 99.2F, 85bpm, 143/66, 95% RA  Labs: Hgb 10.2, Hct 32.5, Na 132, K 5.5, BUN 30, Cr 2, GFR 23, lipase wnl  EKG:    CT Chest and abd/pel: Moderate-sized bilateral pleural effusion with partial bilateral lower lobe atelectasis. Cholelithiasis with distended gallbladder and right upper quadrant inflammatory changes, findings suspicious for acute cholecystitis.    RUQ US: Cholelithiasis with nonspecific pericholecystic fluid. Negative sonographic Scott sign. Dilated common bowel duct. Recommend correlating with LFTs. Cannot exclude distal choledocholithiasis.    HEAD CT: Mild volume loss, microvascular disease, no acute hemorrhage or midline shift.    Patient Received: Zosyn x1, NS 500cc x1, Lasix 40mg IV x1, IV dilaudid .5mg x1           (2022 23:51)    INTERVAL HPI:  - pt seen and examined at bedside. Complaining of increased frequency of urination and suprapubic pain s/p 40mg IV lasix x1 in the ED. Otherwise, notes worsening of chronic back pain and general feeling of malaise. No sob, chest pain, nausea. Fidelina, Anemia.HIDA scan today.  - pt seen and examined at bedside. Feeling confused. A&Ox1 (person). Has a hx of hospital delirium and has been able to be reoriented when her daughter is present. Notes pain over b/l LE but denies sob, chest pain, n/v/d. Waiting for gastric emptying study per GI.  - pt seen and examined at bedside. Feeling confused again this AM possibly chronic hospital AM delirium. A&Ox1 (person). Notes constipation and decrease in b/l LE edema but has mild pain. Gastric emptying study cancelled due to pt inability to tolerate being off pain medication. Denies SOB, chest pain, n/v/d. Received dose 1/3 of remdesivir for COVID-19.   : Patient seen and examined at bedside. Patient states she did not sleep much overnight, but otherwise feeling okay. Denies any CP, SOB, abd pain, N/V/D. Patient still c/o mild leg pain. Cr stable. Loose BM Low stable H/H   7/10: Patient seen and examined at bedside. Complaining of increased frequency of urination that kept her up all night. States she needs her Dilaudid for back pain and b/l leg pain. Otherwise feeling alright Denies any CP, SOB, abd pain, N/V/D. Cr. stable.  Low stable H/H   : Patient seen and examined at bedside. Feeling very weak and tired. Complaining of pain wrapping around her right flank. Notes pain and increased swelling in her right LE. Decreased urinary frequency and urgency. Also feeling confused this AM but has a hx of hospital delirium in the AM. Denies chest pain, sob, nausea, vomiting, diarrhea. Low stable H/H, CR stable On IV Rocephin daily  : Patient seen and examined. Was having a bowel movement this AM which showed black stool 2/2 IV iron. Continues to feel weak and complaining of pain and edema in B/L lower extremities, especially her RLE. Feeling confused this AM (hx of hospital delirium). Denies chest pain, sob, nausea, vomiting, diarrhea. Low stable H/H, Cr 1.8 s/p lasix. On IV Rocephin daily.STOP Abx   : Patient seen and examined. Is experiencing a lot of pain in her back 2/2 spinal stenosis and RLE. Unable to sit up and eat. Is confused and wants to talk to her daughter. Denies chest pain, shortness of breath, nausea, vomiting, diarrhea. Decrease in H/H 7.4/23.3. Cr elevated 1.9 s/p lasix. Repeat doppler U/S neg.  : Patient seen and assessed at bedside. No complaints at this time, would like to rest and sleep. Notes decreased pain in her legs. Denies shortness of breath, chest pain, nausea, vomiting, diarrhea. Stable H/H 7.6/23.7. Cr remains 1.9.    OVERNIGHT EVENTS: none     Home Medications:  amitriptyline 10 mg oral tablet: 3 tab(s) orally once a day (at bedtime) (2022 23:44)  aspirin 81 mg oral tablet: 1 tab(s) orally once a day (:44)  atorvastatin 40 mg oral tablet: 1 tab(s) orally once a day (:44)  Dilaudid 4 mg oral tablet: 1 tab(s) orally 3 times (:44)  ferrous sulfate 324 mg (65 mg elemental iron) oral delayed release tablet: 1 tab(s) orally once a day (:44)  ferrous sulfate 325 mg (65 mg elemental iron) oral tablet: 1 tablet oral daily (:44)  Metoprolol Succinate ER 25 mg oral tablet, extended release: 1 tab(s) orally once a day (:44)  MiraLax oral powder for reconstitution: ng/kg orally (2022 23:44)  PreserVision AREDS oral capsule: 1 tab(s) orally 2 times a day (:44)  Senna 8.6 mg oral tablet: 2 tab(s) orally once a day (at bedtime) (2022 23:44)  Toprol-XL 25 mg oral tablet, extended release: 1 tab(s) orally once a day (2022 23:44)  Vitamin D3 1250 mcg (50,000 intl units) oral capsule: 1 cap(s) orally once a week (2022 23:44)  Zofran 4 mg oral tablet: 1 tab(s) orally every 6 hours (2022 23:44)      MEDICATIONS  (STANDING):  amitriptyline 30 milliGRAM(s) Oral at bedtime  aspirin  chewable 81 milliGRAM(s) Oral daily  atorvastatin 40 milliGRAM(s) Oral at bedtime  cefTRIAXone   IVPB 1000 milliGRAM(s) IV Intermittent every 24 hours  cyanocobalamin 1000 MICROGram(s) Oral daily  dextrose 5%. 1000 milliLiter(s) (100 mL/Hr) IV Continuous <Continuous>  dextrose 5%. 1000 milliLiter(s) (50 mL/Hr) IV Continuous <Continuous>  dextrose 50% Injectable 25 Gram(s) IV Push once  dextrose 50% Injectable 12.5 Gram(s) IV Push once  dextrose 50% Injectable 25 Gram(s) IV Push once  ferrous    sulfate 325 milliGRAM(s) Oral daily  furosemide    Tablet 20 milliGRAM(s) Oral <User Schedule>  glucagon  Injectable 1 milliGRAM(s) IntraMuscular once  HYDROmorphone   Tablet 4 milliGRAM(s) Oral three times a day  insulin lispro (ADMELOG) corrective regimen sliding scale   SubCutaneous three times a day before meals  insulin lispro (ADMELOG) corrective regimen sliding scale   SubCutaneous at bedtime  lidocaine   4% Patch 1 Patch Transdermal daily  lidocaine   4% Patch 1 Patch Transdermal daily  metoclopramide 5 milliGRAM(s) Oral every 6 hours  metoprolol succinate ER 25 milliGRAM(s) Oral daily  pantoprazole    Tablet 40 milliGRAM(s) Oral two times a day  polyethylene glycol 3350 17 Gram(s) Oral daily  senna 2 Tablet(s) Oral at bedtime  sucralfate suspension 1 Gram(s) Oral two times a day    MEDICATIONS  (PRN):  acetaminophen     Tablet .. 650 milliGRAM(s) Oral every 6 hours PRN Temp greater or equal to 38C (100.4F), Mild Pain (1 - 3)  aluminum hydroxide/magnesium hydroxide/simethicone Suspension 30 milliLiter(s) Oral every 4 hours PRN Dyspepsia  dextrose Oral Gel 15 Gram(s) Oral once PRN Blood Glucose LESS THAN 70 milliGRAM(s)/deciliter  melatonin 3 milliGRAM(s) Oral at bedtime PRN Insomnia  ondansetron Injectable 4 milliGRAM(s) IV Push every 8 hours PRN Nausea and/or Vomiting      Allergies    morphine (Other)    Intolerances        Social History:  Former cigarette smoker, smoked <1/2 ppd for less than 15 years, quit more than 50 years ago   Denies ETOH use   Denies drug use   Ambulates with a walker. Lives at home with daughter. ADLs with assistance. (2022 23:51)      REVIEW OF SYSTEMS:     CONSTITUTIONAL: No fever, No chills, No fatigue, No myalgia, No Body ache, No Weakness  EYES: No eye pain,  No visual disturbances, No discharge, NO Redness  ENMT:  No ear pain, No nose bleed, No vertigo; No sinus pain, NO throat pain, No Congestion  NECK: No pain, No stiffness  RESPIRATORY: No cough, NO wheezing, No  hemoptysis, NO  shortness of breath  CARDIOVASCULAR: No chest pain, palpitations  GASTROINTESTINAL: No abdominal pain, NO epigastric pain. No nausea, No vomiting; No diarrhea, No constipation. [  ] BM  GENITOURINARY:  No dysuria, No frequency, No urgency, No hematuria, NO incontinence  NEUROLOGICAL: No headaches, No dizziness, No numbness, No tingling, No tremors, No weakness  EXT: No Swelling, +edema RLE  SKIN:  [ x ] No itching, burning, rashes, or lesions   MUSCULOSKELETAL: No joint pain ,No Jt swelling; No muscle pain, No back pain, No extremity pain  PSYCHIATRIC: No depression,  No anxiety,  No mood swings ,No difficulty sleeping at night  PAIN SCALE: [  ] None  [ x ] Other- Dilaudid 4mg 9am, 4pm, 10pm  ROS Unable to obtain due to - [  ] Dementia  [  ] Lethargy [  ] Drowsy [  ] Sedated [  ] non verbal  REST OF REVIEW Of SYSTEM - [ x ] Normal       Vital Signs Last 24 Hrs  T(C): 36.8 (2022 12:05), Max: 36.8 (2022 12:05)  T(F): 98.2 (2022 12:05), Max: 98.2 (2022 12:05)  HR: 79 (2022 12:05) (79 - 91)  BP: 117/66 (2022 12:05) (116/61 - 127/72)  BP(mean): --  RR: 17 (2022 12:05) (17 - 18)  SpO2: 95% (2022 12:05) (90% - 95%)    Parameters below as of 2022 12:05  Patient On (Oxygen Delivery Method): room air      Finger Stick          PHYSICAL EXAM:  GENERAL:  [ x ] NAD , [x ] well appearing, [  ] Agitated, [  ] Drowsy,  [ ] Lethargy, [x  ] confused   HEAD:  [  x] Normal, [  ] Other  EYES:  [ x ] EOMI, [ x ] PERRLA, [  x] conjunctiva and sclera clear normal, [  ] Other,  [  ] Pallor,[  ] Discharge  ENMT:  [x  ] Normal, [ x ] Moist mucous membranes, [  x] Good dentition, [ x ] No Thrush  NECK:  [ x ] Supple, [ x ] No JVD, [ x ] Normal thyroid, [  ] Lymphadenopathy [  ] Other  CHEST/LUNG:  [x  ] Clear to auscultation bilaterally, [ x ] Breath Sounds equal B/L  [ x ] poor effort  [ x ] No rales, [ x ] No rhonchi  [ x ]  No wheezing,   HEART:  [x  ] Regular rate and rhythm, [  ] tachycardia, [  ] Bradycardia,  [  ] irregular  [ x ] No murmurs, No rubs, No gallops, [  ] PPM in place (Mfr:  )  ABDOMEN:  [ x ] Soft, [ x ] Nontender, [  x] Nondistended, [ x ] No mass, [ x ] Bowel sounds present, [  ] obese  NERVOUS SYSTEM:  [ x ] Alert & Oriented X1, [x  ] Nonfocal  [ x ] Confusion  [  ] Encephalopathic [  ] Sedated [  ] Unable to assess, [x  ] Dementia [  ] Other-  EXTREMITIES: [ x ] 2+ Peripheral Pulses, No clubbing, No cyanosis,  [x  ] 2+ edema R lower EXT rt lower ext > left lower ext [ x ] PVD stasis skin changes B/L Lower EXT, [  ] wound  LYMPH: No lymphadenopathy noted  SKIN:  [ x ] No rashes or lesions, [  ] Pressure Ulcers, [ x ] ecchymosis, [  ] Skin Tears, [  ] Other  DIET:     LABS:                        7.6    6.79  )-----------( 293      ( 2022 11:00 )             23.7     2022 11:00    136    |  101    |  40     ----------------------------<  99     4.1     |  27     |  1.90     Ca    9.1        2022 11:00    TPro  6.0    /  Alb  2.8    /  TBili  0.5    /  DBili  x      /  AST  38     /  ALT  25     /  AlkPhos  60     2022 11:00      Urinalysis Basic - ( 2022 06:44 )    Color: Yellow / Appearance: Slightly Turbid / S.010 / pH: x  Gluc: x / Ketone: Negative  / Bili: Negative / Urobili: Negative   Blood: x / Protein: 15 / Nitrite: Negative   Leuk Esterase: Trace / RBC: 11-25 /HPF / WBC 3-5   Sq Epi: x / Non Sq Epi: Few / Bacteria: Occasional        Culture Results:   No growth ( @ 15:35)  Culture Results:   >=3 organisms. Probable collection contamination. (07-10 @ 13:20)                  Culture - Urine (collected 2022 15:35)  Source: Catheterized Catheterized  Final Report (2022 00:22):    No growth    Culture - Urine (collected 10 Jul 2022 13:20)  Source: Clean Catch Clean Catch (Midstream)  Final Report (2022 08:35):    >=3 organisms. Probable collection contamination.         Anemia Panel:      Thyroid Panel:                RADIOLOGY & ADDITIONAL TESTS:      HEALTH ISSUES - PROBLEM Dx:  Hematuria    Anemia    Spinal stenosis    2019 novel coronavirus disease (COVID-19)    Acute renal failure superimposed on stage 3 chronic kidney disease, unspecified acute renal failure type, unspecified whether stage 3a or 3b CKD    Cholelithiasis    Pleural effusion    Fall    CAD (coronary artery disease)    HTN (hypertension)    Type 2 diabetes mellitus    Chronic back pain    Anxiety and depression    owning    DVT prophylaxis    Need for prophylactic measure            Consultant(s) Notes Reviewed:  [x  ] YES     Care Discussed with [X] Consultants  [  x] Patient  [ x ] Family [  ] HCP [x  ]   [  ] Social Service  [ x] RN, [  ] Physical Therapy,[  ] Palliative care team  DVT PPX: [  ] Lovenox, [  ] S C Heparin, [  ] Coumadin, [  ] Xarelto, [  ] Eliquis, [  ] Pradaxa, [  ] IV Heparin drip, [x  ] SCD [  ] Contraindication 2 to GI Bleed,[  ] Ambulation [  ] Contraindicated 2 to  bleed [  ] Contraindicated 2 to Brain Bleed  Advanced directive: [  ] None, [ x ] DNR/DNI   Patient is a 89y old  Female who presents with a chief complaint of gallstones, covid, fidelina (2022 11:47)    HPI:  89y F pmhx CAD x 2 stents (), T2DM, HTN, HLD, GERD, mild AS, chronic venous insufficiency, chronic neuropathy, macular degeneration, chronic constipation, depression, OA, and [facility] [place] - [facility]2 for gallstone pancreatitis with non-operative treatment presented to the ED with constant nausea x 4 days associated with decreased PO intake, generalized weakness, and fall today x 1 due to LE weakness. Patient denies fever, chills, cp, dizziness, sob, abd pain, vomiting, diarrhea. Patient was seen here in ED  for epigastric pain (since resolved) and discharged with outpatient follow up with Dr. Terrell. Patient has seen Dr. Terrell twice since then and epigastric pain resolved s/p PO zofran and carafate with planned esophagram. Daughter states epigastric pain resolved, but patient has had constant nausea x 4 days that has not improved despite increasing PO zofran from 4mg to 8mg q6h. Patient was unable to obtain appointment with Dr. Terrell today and after falling at home due to leg weakness, daughter brought her to the ED.     ED Course  Vitals: 99.2F, 85bpm, 143/66, 95% RA  Labs: Hgb 10.2, Hct 32.5, Na 132, K 5.5, BUN 30, Cr 2, GFR 23, lipase wnl  EKG:    CT Chest and abd/pel: Moderate-sized bilateral pleural effusion with partial bilateral lower lobe atelectasis. Cholelithiasis with distended gallbladder and right upper quadrant inflammatory changes, findings suspicious for acute cholecystitis.    RUQ US: Cholelithiasis with nonspecific pericholecystic fluid. Negative sonographic Scott sign. Dilated common bowel duct. Recommend correlating with LFTs. Cannot exclude distal choledocholithiasis.    HEAD CT: Mild volume loss, microvascular disease, no acute hemorrhage or midline shift.    Patient Received: Zosyn x1, NS 500cc x1, Lasix 40mg IV x1, IV dilaudid .5mg x     (2022 23:51)    INTERVAL HPI:  - pt seen and examined at bedside. Complaining of increased frequency of urination and suprapubic pain s/p 40mg IV lasix x1 in the ED. Otherwise, notes worsening of chronic back pain and general feeling of malaise. No sob, chest pain, nausea. Fidelina, Anemia.HIDA scan today.  - pt seen and examined at bedside. Feeling confused. A&Ox1 (person). Has a hx of hospital delirium and has been able to be reoriented when her daughter is present. Notes pain over b/l LE but denies sob, chest pain, n/v/d. Waiting for gastric emptying study per GI.  - pt seen and examined at bedside. Feeling confused again this AM possibly chronic hospital AM delirium. A&Ox1 (person). Notes constipation and decrease in b/l LE edema but has mild pain. Gastric emptying study cancelled due to pt inability to tolerate being off pain medication. Denies SOB, chest pain, n/v/d. Received dose 1/3 of remdesivir for COVID-19.   : Patient seen and examined at bedside. Patient states she did not sleep much overnight, but otherwise feeling okay. Denies any CP, SOB, abd pain, N/V/D. Patient still c/o mild leg pain. Cr stable. Loose BM Low stable H/H   7/10: Patient seen and examined at bedside. Complaining of increased frequency of urination that kept her up all night. States she needs her Dilaudid for back pain and b/l leg pain. Otherwise feeling alright Denies any CP, SOB, abd pain, N/V/D. Cr. stable.  Low stable H/H   : Patient seen and examined at bedside. Feeling very weak and tired. Complaining of pain wrapping around her right flank. Notes pain and increased swelling in her right LE. Decreased urinary frequency and urgency. Also feeling confused this AM but has a hx of hospital delirium in the AM. Denies chest pain, sob, nausea, vomiting, diarrhea. Low stable H/H, CR stable On IV Rocephin daily  : Patient seen and examined. Was having a bowel movement this AM which showed black stool 2/2 IV iron. Continues to feel weak and complaining of pain and edema in B/L lower extremities, especially her RLE. Feeling confused this AM (hx of hospital delirium). Denies chest pain, sob, nausea, vomiting, diarrhea. Low stable H/H, Cr 1.8 s/p lasix. On IV Rocephin daily.STOP Abx   : Patient seen and examined. Is experiencing a lot of pain in her back 2/2 spinal stenosis and RLE. Unable to sit up and eat. Is confused and wants to talk to her daughter. Denies chest pain, shortness of breath, nausea, vomiting, diarrhea. Decrease in H/H 7.4/23.3. Cr elevated 1.9 s/p lasix. Repeat doppler U/S neg.  : Patient seen and assessed at bedside. No complaints at this time, would like to rest and sleep. Notes decreased pain in her legs. Denies shortness of breath, chest pain, nausea, vomiting, diarrhea. Stable H/H 7.6/23.7. Cr remains 1.9. On IV Rocephin,     OVERNIGHT EVENTS: none     Home Medications:  amitriptyline 10 mg oral tablet: 3 tab(s) orally once a day (at bedtime) (2022 23:44)  aspirin 81 mg oral tablet: 1 tab(s) orally once a day (:44)  atorvastatin 40 mg oral tablet: 1 tab(s) orally once a day (2022 23:44)  Dilaudid 4 mg oral tablet: 1 tab(s) orally 3 times (:44)  ferrous sulfate 324 mg (65 mg elemental iron) oral delayed release tablet: 1 tab(s) orally once a day (2022 23:44)  ferrous sulfate 325 mg (65 mg elemental iron) oral tablet: 1 tablet oral daily (2022 23:44)  Metoprolol Succinate ER 25 mg oral tablet, extended release: 1 tab(s) orally once a day (:44)  MiraLax oral powder for reconstitution: ng/kg orally (2022 23:44)  PreserVision AREDS oral capsule: 1 tab(s) orally 2 times a day (2022 23:44)  Senna 8.6 mg oral tablet: 2 tab(s) orally once a day (at bedtime) (2022 23:44)  Toprol-XL 25 mg oral tablet, extended release: 1 tab(s) orally once a day (2022 23:44)  Vitamin D3 1250 mcg (50,000 intl units) oral capsule: 1 cap(s) orally once a week (2022 23:44)  Zofran 4 mg oral tablet: 1 tab(s) orally every 6 hours (2022 23:44)      MEDICATIONS  (STANDING):  amitriptyline 30 milliGRAM(s) Oral at bedtime  aspirin  chewable 81 milliGRAM(s) Oral daily  atorvastatin 40 milliGRAM(s) Oral at bedtime  cefTRIAXone   IVPB 1000 milliGRAM(s) IV Intermittent every 24 hours  cyanocobalamin 1000 MICROGram(s) Oral daily  dextrose 5%. 1000 milliLiter(s) (100 mL/Hr) IV Continuous <Continuous>  dextrose 5%. 1000 milliLiter(s) (50 mL/Hr) IV Continuous <Continuous>  dextrose 50% Injectable 25 Gram(s) IV Push once  dextrose 50% Injectable 12.5 Gram(s) IV Push once  dextrose 50% Injectable 25 Gram(s) IV Push once  ferrous    sulfate 325 milliGRAM(s) Oral daily  furosemide    Tablet 20 milliGRAM(s) Oral <User Schedule>  glucagon  Injectable 1 milliGRAM(s) IntraMuscular once  HYDROmorphone   Tablet 4 milliGRAM(s) Oral three times a day  insulin lispro (ADMELOG) corrective regimen sliding scale   SubCutaneous three times a day before meals  insulin lispro (ADMELOG) corrective regimen sliding scale   SubCutaneous at bedtime  lidocaine   4% Patch 1 Patch Transdermal daily  lidocaine   4% Patch 1 Patch Transdermal daily  metoclopramide 5 milliGRAM(s) Oral every 6 hours  metoprolol succinate ER 25 milliGRAM(s) Oral daily  pantoprazole    Tablet 40 milliGRAM(s) Oral two times a day  polyethylene glycol 3350 17 Gram(s) Oral daily  senna 2 Tablet(s) Oral at bedtime  sucralfate suspension 1 Gram(s) Oral two times a day    MEDICATIONS  (PRN):  acetaminophen     Tablet .. 650 milliGRAM(s) Oral every 6 hours PRN Temp greater or equal to 38C (100.4F), Mild Pain (1 - 3)  aluminum hydroxide/magnesium hydroxide/simethicone Suspension 30 milliLiter(s) Oral every 4 hours PRN Dyspepsia  dextrose Oral Gel 15 Gram(s) Oral once PRN Blood Glucose LESS THAN 70 milliGRAM(s)/deciliter  melatonin 3 milliGRAM(s) Oral at bedtime PRN Insomnia  ondansetron Injectable 4 milliGRAM(s) IV Push every 8 hours PRN Nausea and/or Vomiting      Allergies    morphine (Other)    Intolerances        Social History:  Former cigarette smoker, smoked <1/2 ppd for less than 15 years, quit more than 50 years ago   Denies ETOH use   Denies drug use   Ambulates with a walker. Lives at home with daughter. ADLs with assistance. (2022 23:51)      REVIEW OF SYSTEMS:   i am OK  CONSTITUTIONAL: No fever, No chills, No fatigue, No myalgia, No Body ache, No Weakness  EYES: No eye pain,  No visual disturbances, No discharge, NO Redness  ENMT:  No ear pain, No nose bleed, No vertigo; No sinus pain, NO throat pain, No Congestion  NECK: No pain, No stiffness  RESPIRATORY: No cough, NO wheezing, No  hemoptysis, NO  shortness of breath  CARDIOVASCULAR: No chest pain, palpitations  GASTROINTESTINAL: No abdominal pain, NO epigastric pain. No nausea, No vomiting; No diarrhea, No constipation. [  ] BM  GENITOURINARY:  No dysuria, No frequency, No urgency, No hematuria, NO incontinence  NEUROLOGICAL: No headaches, No dizziness, No numbness, No tingling, No tremors, No weakness  EXT: No Swelling, +edema RLE  SKIN:  [ x ] No itching, burning, rashes, or lesions   MUSCULOSKELETAL: No joint pain ,No Jt swelling; No muscle pain, No back pain, No extremity pain  PSYCHIATRIC: No depression,  No anxiety,  No mood swings ,No difficulty sleeping at night  PAIN SCALE: [  ] None  [ x ] Other- Dilaudid 4mg 9am, 4pm, 10pm  ROS Unable to obtain due to - [  ] Dementia  [  ] Lethargy [  ] Drowsy [  ] Sedated [  ] non verbal  REST OF REVIEW Of SYSTEM - [ x ] Normal       Vital Signs Last 24 Hrs  T(C): 36.8 (2022 12:05), Max: 36.8 (2022 12:05)  T(F): 98.2 (2022 12:05), Max: 98.2 (2022 12:05)  HR: 79 (2022 12:05) (79 - 91)  BP: 117/66 (2022 12:05) (116/61 - 127/72)  BP(mean): --  RR: 17 (2022 12:05) (17 - 18)  SpO2: 95% (2022 12:05) (90% - 95%)    Parameters below as of 2022 12:05  Patient On (Oxygen Delivery Method): room air      Finger Stick          PHYSICAL EXAM:  GENERAL:  [ x ] NAD , [x ] well appearing, [  ] Agitated, [  ] Drowsy,  [ ] Lethargy, [x  ] confused   HEAD:  [  x] Normal, [  ] Other  EYES:  [ x ] EOMI, [ x ] PERRLA, [  x] conjunctiva and sclera clear normal, [  ] Other,  [  ] Pallor,[  ] Discharge  ENMT:  [x  ] Normal, [ x ] Moist mucous membranes, [  x] Good dentition, [ x ] No Thrush  NECK:  [ x ] Supple, [ x ] No JVD, [ x ] Normal thyroid, [  ] Lymphadenopathy [  ] Other  CHEST/LUNG:  [x  ] Clear to auscultation bilaterally, [ x ] Breath Sounds equal B/L  [ x ] poor effort  [ x ] No rales, [ x ] No rhonchi  [ x ]  No wheezing,   HEART:  [x  ] Regular rate and rhythm, [  ] tachycardia, [  ] Bradycardia,  [  ] irregular  [ x ] No murmurs, No rubs, No gallops, [  ] PPM in place (Mfr:  )  ABDOMEN:  [ x ] Soft, [ x ] Nontender, [  x] Nondistended, [ x ] No mass, [ x ] Bowel sounds present, [  ] obese  NERVOUS SYSTEM:  [ x ] Alert & Oriented X1, [x  ] Nonfocal  [ x ] Confusion  [  ] Encephalopathic [  ] Sedated [  ] Unable to assess, [x  ] Dementia [  ] Other-  EXTREMITIES: [ x ] 2+ Peripheral Pulses, No clubbing, No cyanosis,  [x  ] 2+ edema R lower EXT rt lower ext > left lower ext [ x ] PVD stasis skin changes B/L Lower EXT, [  ] wound  LYMPH: No lymphadenopathy noted  SKIN:  [ x ] No rashes or lesions, [  ] Pressure Ulcers, [ x ] ecchymosis, [  ] Skin Tears, [  ] Other    DIET: Soft Bite Size     LABS:                        7.6    6.79  )-----------( 293      ( 2022 11:00 )             23.7     2022 11:00    136    |  101    |  40     ----------------------------<  99     4.1     |  27     |  1.90     Ca    9.1        2022 11:00    TPro  6.0    /  Alb  2.8    /  TBili  0.5    /  DBili  x      /  AST  38     /  ALT  25     /  AlkPhos  60     2022 11:00      Urinalysis Basic - ( 2022 06:44 )    Color: Yellow / Appearance: Slightly Turbid / S.010 / pH: x  Gluc: x / Ketone: Negative  / Bili: Negative / Urobili: Negative   Blood: x / Protein: 15 / Nitrite: Negative   Leuk Esterase: Trace / RBC: 11-25 /HPF / WBC 3-5   Sq Epi: x / Non Sq Epi: Few / Bacteria: Occasional        Culture Results:   No growth ( @ 15:35)  Culture Results:   >=3 organisms. Probable collection contamination. (07-10 @ 13:20)      Culture - Urine (collected 2022 15:35)  Source: Catheterized Catheterized  Final Report (2022 00:22):    No growth    Culture - Urine (collected 10 Jul 2022 13:20)  Source: Clean Catch Clean Catch (Midstream)  Final Report (2022 08:35):    >=3 organisms. Probable collection contamination.        RADIOLOGY & ADDITIONAL TESTS:  NONE    HEALTH ISSUES - PROBLEM Dx:  Hematuria    Anemia    Spinal stenosis    2019 novel coronavirus disease (COVID-19)    Acute renal failure superimposed on stage 3 chronic kidney disease, unspecified acute renal failure type, unspecified whether stage 3a or 3b CKD    Cholelithiasis    Pleural effusion    Fall    CAD (coronary artery disease)    HTN (hypertension)    Type 2 diabetes mellitus    Chronic back pain    Anxiety and depression    owning    DVT prophylaxis    Need for prophylactic measure            Consultant(s) Notes Reviewed:  [x  ] YES     Care Discussed with [X] Consultants  [  x] Patient  [ x ] Family -dtr  [  ] HCP [x  ]   [x  ] Social Service  [ x] RN, [  ] Physical Therapy,[x  ] Palliative care team  DVT PPX: [  ] Lovenox, [  ] S C Heparin, [  ] Coumadin, [  ] Xarelto, [  ] Eliquis, [  ] Pradaxa, [  ] IV Heparin drip, [x  ] SCD [  ] Contraindication 2 to GI Bleed,[  ] Ambulation [  ] Contraindicated 2 to  bleed [  ] Contraindicated 2 to Brain Bleed  Advanced directive: [  ] None, [ x ] DNR/DNI

## 2022-07-14 NOTE — CONSULT NOTE ADULT - CONVERSATION DETAILS
Writer met with pt daughter who is requesting home hospice. Writer explained medicare hospice criteria and hospice care would entail. Currently pt not meeting medicare hospice criteria as illnesses are not advanced. Recommended home with home care vs home with PT as per PT recs. All questions answered. SW to follow on discussion regarding disposition options. Psychosocial support provided.

## 2022-07-14 NOTE — PROGRESS NOTE ADULT - ASSESSMENT
89y F pmhx CAD x 2 stents (2012), T2DM, HTN, HLD, GERD, mild AS, chronic venous insufficiency, chronic neuropathy, macular degeneration, chronic constipation, depression, OA, and recent admission to [facility]"" 2/25- 3/2 for gallstone pancreatitis with non-operative treatment presented to the ED with constant nausea x 4 days associated with decreased PO intake, generalized weakness.    #Acute on CKD stage III  UTI  CT revealed right sided hydronephrosis and pleural effusion  Baseline creatinine ~1.3? F/u creatinine today  Possible contributions from covid-19 or hydronephrosis  Repeat UA consistent with UTI, restarted on ceftriaxone  No plans for thoracentesis per Pulm  Urine studies reviewed; may have been skewed by Lasix due to very high urine sodium  COVID management per primary team  Consider repeating lung imaging to assess effusions  Avoid nephrotoxic agents  Monitor serum electrolytes  Continue PO lasix 20mg every other day.     89y F pmhx CAD x 2 stents (2012), T2DM, HTN, HLD, GERD, mild AS, chronic venous insufficiency, chronic neuropathy, macular degeneration, chronic constipation, depression, OA, and [facility] for gallstone pancreatitis with non-operative treatment presented to the ED with constant nausea x 4 days associated with decreased PO intake, generalized weakness.    #Acute on CKD stage III  UTI  CT revealed right sided hydronephrosis and pleural effusion on admission  Baseline creatinine ~1.3? Stable 1.9 creatinine today  Possible contributions from covid-19 or hydronephrosis  Repeat UA consistent with UTI, restarted on ceftriaxone  No plans for thoracentesis per Pulm  Urine studies reviewed; may have been skewed by Lasix due to very high urine sodium  COVID management per primary team  Consider repeating lung imaging to assess effusions  Avoid nephrotoxic agents  Monitor serum electrolytes  Continue PO lasix 20mg every other day.

## 2022-07-14 NOTE — PROGRESS NOTE ADULT - ASSESSMENT
89y F pmhx CAD x 2 stents (2012), T2DM, HTN, HLD, GERD, mild AS, chronic venous insufficiency, chronic neuropathy, macular degeneration, chronic constipation, depression, OA, lumbar degenerative disc disease, and spinal stenosis admitted for cholelithiasis, KERRY, and found to be COVID positive on admission    covid  OP  OA  Pleural eff  Atelectasis  KERRY  CAD  Fall  Ataxic gait  HLD  GERD  valv heart disease    ct renal stone hunt noted - nephrolithiasis - effusion - atelectasis -   MBS noted - diet on order  LE doppler NEG for DVT  VS noted  labs reviewed  renal follow up  on DIlaudid for PAIN    [facility] rx regimen  TTE -   covid management - sx management - monitor VS and Sat - isolation precs  fall prec - PT assessment - gait training - CM follow up  pleural eff - no need for thoracentesis in the immediate future - medical rx regimen and [facility] rx regimen  I devonte as able to tolerate  Surgery eval in progress - Acute Angelica eval noted  GOC discussion  assist with needs  repllisandro almanzar

## 2022-07-14 NOTE — CONSULT NOTE ADULT - CONSULT REQUESTED BY NAME
MD
AMBROCIO Kraus
Dr Izquierdo
Dr. Izquierdo
Dr. Izquierdo
Dr. Khoi Izquierdo
renny sánchez
Dr. Izquierdo
Dr. Khoi Izquierdo

## 2022-07-14 NOTE — PROGRESS NOTE ADULT - PROBLEM SELECTOR PLAN 1
Pt presented with hematuria since admission. Unclear etiology   - UA (7/11) demonstrated large blood, repeat UA (7/14) shows 11-25 rbcs , large blood, trace leukocyte esterase  - Hb low but stable   - Ct Abd/Pelvis w contrast (6/22)- Right sided hydronephrosis with bladder wall thickening  - F/u urine culture (07/11/22) -contamination  - Was on Rocephin 1 gm IVPB Daily which was d/c 07/12 per ID  - Restarted IV Rocehin 1 gm daily  (07/13) per ID for UA w/o pyuria and mixed growth on urine culture- follow response  - Urology following Dr. Hill- no further work up at this time, Discussed with daughter Mary in detail and when patient improved consider Cystoscopy and retrograde study -possible stent. Pt presented with hematuria since admission. Unclear etiology   - UA (7/11) demonstrated large blood, repeat UA (7/14) shows 11-25 rbcs , large blood, trace leukocyte esterase  - Hb low but stable   - Ct Abd/Pelvis w contrast (6/22)- Right sided hydronephrosis with bladder wall thickening  - F/u urine culture (07/11/22) -contamination  - Was on Rocephin 1 gm IVPB Daily which was d/c 07/12 per ID  - Restarted IV Rocehin 1 gm daily  (07/13) per ID for UA w/o pyuria and mixed growth on urine culture- follow response- NEG -STOP Abx as d/w ID  - Urology following Dr. Hill- no further work up at this time, Discussed with daughter Mary in detail and when patient improved consider Cystoscopy and retrograde study -possible stent.

## 2022-07-14 NOTE — PROGRESS NOTE ADULT - PROBLEM SELECTOR PLAN 6
Abdominal pain improved.   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 Significant intraesophageal reflux and stasis were noted. Small pleural effusions and findings of pulmonary edema.  -Gastric emptying study cancelled due to patient's inability to go without pain meds  -anti-emetics PRN  -Currently on soft, bite sized diet   -surgery consulted (Dr. Rodrigues), f/u outpatient  -GI Dr Montilla consulted, diet recs appreciated

## 2022-07-14 NOTE — PROGRESS NOTE ADULT - SUBJECTIVE AND OBJECTIVE BOX
Patient is a 89y old  Female who presents with a chief complaint of gallstones, covid, kiah (2022 11:05)       HPI:  89y F pmhx CAD x 2 stents (), T2DM, HTN, HLD, GERD, mild AS, chronic venous insufficiency, chronic neuropathy, macular degeneration, chronic constipation, depression, OA, and recent admission to [facility] - 3/2 for gallstone pancreatitis with non-operative treatment presented to the ED with constant nausea x 4 days associated with decreased PO intake, generalized weakness, and fall today x 1 due to LE weakness. Patient denies fever, chills, cp, dizziness, sob, abd pain, vomiting, diarrhea. Patient was seen here in ED  for epigastric pain (since resolved) and discharged with outpatient follow up with Dr. Terrell. Patient has seen Dr. Terrell twice since then and epigastric pain resolved s/p PO zofran and carafate with planned esophagram. Daughter states epigastric pain resolved, but patient has had constant nausea x 4 days that has not improved despite increasing PO zofran from 4mg to 8mg q6h. Patient was unable to obtain appointment with Dr. Terrell today and after falling at home due to leg weakness, daughter brought her to the ED.     ED Course  Vitals: 99.2F, 85bpm, 143/66, 95% RA  Labs: Hgb 10.2, Hct 32.5, Na 132, K 5.5, BUN 30, Cr 2, GFR 23, lipase wnl  EKG:    CT Chest and abd/pel: Moderate-sized bilateral pleural effusion with partial bilateral lower lobe atelectasis. Cholelithiasis with distended gallbladder and right upper quadrant inflammatory changes, findings suspicious for acute cholecystitis.    RUQ US: Cholelithiasis with nonspecific pericholecystic fluid. Negative sonographic Scott sign. Dilated common bowel duct. Recommend correlating with LFTs. Cannot exclude distal choledocholithiasis.    HEAD CT: Mild volume loss, microvascular disease, no acute hemorrhage or midline shift.    Patient Received: Zosyn x1, NS 500cc x1, Lasix 40mg IV x1, IV dilaudid .5mg x1           (2022 23:51)       PAST MEDICAL & SURGICAL HISTORY:  H/O vertebral fracture repair      History of vertebral fracture      Dyslipidemia      DM type 2 with diabetic dyslipidemia      H/O gastroesophageal reflux (GERD)      Costochondritis      Coronary arteriosclerosis in native artery  s/p 2 stents. last placed 2 years ago      Gastroparesis      History of laminectomy      S/P hip hemiarthroplasty      S/P appendectomy           FAMILY HISTORY:  FHx: stroke (Mother)    NC    Social History:Non smoker    MEDICATIONS  (STANDING):  amitriptyline 30 milliGRAM(s) Oral at bedtime  aspirin  chewable 81 milliGRAM(s) Oral daily  atorvastatin 40 milliGRAM(s) Oral at bedtime  cefTRIAXone   IVPB 1000 milliGRAM(s) IV Intermittent every 24 hours  cyanocobalamin 1000 MICROGram(s) Oral daily  dextrose 5%. 1000 milliLiter(s) (100 mL/Hr) IV Continuous <Continuous>  dextrose 5%. 1000 milliLiter(s) (50 mL/Hr) IV Continuous <Continuous>  dextrose 50% Injectable 25 Gram(s) IV Push once  dextrose 50% Injectable 12.5 Gram(s) IV Push once  dextrose 50% Injectable 25 Gram(s) IV Push once  ferrous    sulfate 325 milliGRAM(s) Oral daily  furosemide    Tablet 20 milliGRAM(s) Oral <User Schedule>  glucagon  Injectable 1 milliGRAM(s) IntraMuscular once  HYDROmorphone   Tablet 4 milliGRAM(s) Oral three times a day  insulin lispro (ADMELOG) corrective regimen sliding scale   SubCutaneous three times a day before meals  insulin lispro (ADMELOG) corrective regimen sliding scale   SubCutaneous at bedtime  lidocaine   4% Patch 1 Patch Transdermal daily  lidocaine   4% Patch 1 Patch Transdermal daily  metoclopramide 5 milliGRAM(s) Oral every 6 hours  metoprolol succinate ER 25 milliGRAM(s) Oral daily  pantoprazole    Tablet 40 milliGRAM(s) Oral two times a day  polyethylene glycol 3350 17 Gram(s) Oral daily  senna 2 Tablet(s) Oral at bedtime  sucralfate suspension 1 Gram(s) Oral two times a day    MEDICATIONS  (PRN):  acetaminophen     Tablet .. 650 milliGRAM(s) Oral every 6 hours PRN Temp greater or equal to 38C (100.4F), Mild Pain (1 - 3)  aluminum hydroxide/magnesium hydroxide/simethicone Suspension 30 milliLiter(s) Oral every 4 hours PRN Dyspepsia  dextrose Oral Gel 15 Gram(s) Oral once PRN Blood Glucose LESS THAN 70 milliGRAM(s)/deciliter  melatonin 3 milliGRAM(s) Oral at bedtime PRN Insomnia  ondansetron Injectable 4 milliGRAM(s) IV Push every 8 hours PRN Nausea and/or Vomiting   Meds reviewed    Allergies    morphine (Other)    Intolerances         REVIEW OF SYSTEMS:    Review of Systems:   Constitutional: Denies fatigue  HEENT: Denies headaches and dizziness  Respiratory: denies SOB, cough, or wheezing  Cardiovascular: denies CP, palpitations  Gastrointestinal: Denies nausea, denies vomiting, diarrhea, constipation, abdominal pain, or bloody stools  Genitourinary: +Increased frequency/urgency, dysuria. denies bloody urine  Skin: denies rashes or itching  Musculoskeletal: +Chronic back pain.  Neurologic: Denies generalized weakness, denies loss of sensation, numbness, or tingling      Vital Signs Last 24 Hrs  T(C): 36.7 (2022 05:11), Max: 36.7 (2022 12:23)  T(F): 98 (2022 05:11), Max: 98.1 (2022 12:23)  HR: 90 (2022 05:11) (90 - 91)  BP: 116/61 (2022 05:11) (116/61 - 127/72)  BP(mean): --  RR: 17 (2022 05:11) (17 - 18)  SpO2: 94% (2022 05:11) (90% - 94%)    Parameters below as of 2022 05:11  Patient On (Oxygen Delivery Method): room air      Daily     Daily     PHYSICAL EXAM:    GENERAL: NAD  HEAD:  Atraumatic, Normocephalic  EYES: EOMI, conjunctiva and sclera clear  ENMT: No Drainage from nares, No drainage from ears  NECK: Supple, neck  veins full  NERVOUS SYSTEM:  Awake and Alert  CHEST/LUNG: Clear to percussion bilaterally; No rales, rhonchi, wheezing, or rubs  HEART: Regular rate and rhythm; No murmurs, rubs, or gallops  ABDOMEN: +Discomfort to palpation of the lower quadrants Soft, Nontender otherwise, Nondistended; Bowel sounds present  EXTREMITIES:  +LE edema R>L  SKIN: No rashes No obvious ecchymosis      LABS:                        7.6    6.79  )-----------( x        ( 2022 11:00 )             23.7     07-13    136  |  102  |  40<H>  ----------------------------<  124<H>  4.1   |  28  |  1.90<H>    Ca    8.6      2022 06:55    TPro  5.4<L>  /  Alb  2.4<L>  /  TBili  0.4  /  DBili  x   /  AST  57<H>  /  ALT  31  /  AlkPhos  56  07-13      Urinalysis Basic - ( 2022 06:44 )    Color: Yellow / Appearance: Slightly Turbid / S.010 / pH: x  Gluc: x / Ketone: Negative  / Bili: Negative / Urobili: Negative   Blood: x / Protein: 15 / Nitrite: Negative   Leuk Esterase: Trace / RBC: 11-25 /HPF / WBC 3-5   Sq Epi: x / Non Sq Epi: Few / Bacteria: Occasional        RADIOLOGY & ADDITIONAL TESTS:

## 2022-07-14 NOTE — PROGRESS NOTE ADULT - SUBJECTIVE AND OBJECTIVE BOX
Date/Time Patient Seen:  		  Referring MD:   Data Reviewed	       Patient is a 89y old  Female who presents with a chief complaint of gallstones, julisa, kiah (13 Jul 2022 15:24)      Subjective/HPI     PAST MEDICAL & SURGICAL HISTORY:  H/O vertebral fracture repair    History of vertebral fracture    Dyslipidemia    DM type 2 with diabetic dyslipidemia    H/O gastroesophageal reflux (GERD)    Costochondritis    Coronary arteriosclerosis in native artery  s/p 2 stents. last placed 2 years ago    Gastroparesis    History of laminectomy    S/P hip hemiarthroplasty    S/P appendectomy          Medication list         MEDICATIONS  (STANDING):  amitriptyline 30 milliGRAM(s) Oral at bedtime  aspirin  chewable 81 milliGRAM(s) Oral daily  atorvastatin 40 milliGRAM(s) Oral at bedtime  cefTRIAXone   IVPB 1000 milliGRAM(s) IV Intermittent every 24 hours  cyanocobalamin 1000 MICROGram(s) Oral daily  dextrose 5%. 1000 milliLiter(s) (50 mL/Hr) IV Continuous <Continuous>  dextrose 5%. 1000 milliLiter(s) (100 mL/Hr) IV Continuous <Continuous>  dextrose 50% Injectable 25 Gram(s) IV Push once  dextrose 50% Injectable 12.5 Gram(s) IV Push once  dextrose 50% Injectable 25 Gram(s) IV Push once  ferrous    sulfate 325 milliGRAM(s) Oral daily  furosemide    Tablet 20 milliGRAM(s) Oral <User Schedule>  glucagon  Injectable 1 milliGRAM(s) IntraMuscular once  HYDROmorphone   Tablet 4 milliGRAM(s) Oral three times a day  insulin lispro (ADMELOG) corrective regimen sliding scale   SubCutaneous three times a day before meals  insulin lispro (ADMELOG) corrective regimen sliding scale   SubCutaneous at bedtime  lidocaine   4% Patch 1 Patch Transdermal daily  lidocaine   4% Patch 1 Patch Transdermal daily  metoclopramide 5 milliGRAM(s) Oral every 6 hours  metoprolol succinate ER 25 milliGRAM(s) Oral daily  pantoprazole    Tablet 40 milliGRAM(s) Oral two times a day  polyethylene glycol 3350 17 Gram(s) Oral daily  senna 2 Tablet(s) Oral at bedtime  sucralfate suspension 1 Gram(s) Oral two times a day    MEDICATIONS  (PRN):  acetaminophen     Tablet .. 650 milliGRAM(s) Oral every 6 hours PRN Temp greater or equal to 38C (100.4F), Mild Pain (1 - 3)  aluminum hydroxide/magnesium hydroxide/simethicone Suspension 30 milliLiter(s) Oral every 4 hours PRN Dyspepsia  dextrose Oral Gel 15 Gram(s) Oral once PRN Blood Glucose LESS THAN 70 milliGRAM(s)/deciliter  melatonin 3 milliGRAM(s) Oral at bedtime PRN Insomnia  ondansetron Injectable 4 milliGRAM(s) IV Push every 8 hours PRN Nausea and/or Vomiting         Vitals log        ICU Vital Signs Last 24 Hrs  T(C): 36.7 (14 Jul 2022 05:11), Max: 36.7 (13 Jul 2022 12:23)  T(F): 98 (14 Jul 2022 05:11), Max: 98.1 (13 Jul 2022 12:23)  HR: 90 (14 Jul 2022 05:11) (90 - 91)  BP: 116/61 (14 Jul 2022 05:11) (116/61 - 127/72)  BP(mean): --  ABP: --  ABP(mean): --  RR: 17 (14 Jul 2022 05:11) (17 - 18)  SpO2: 94% (14 Jul 2022 05:11) (90% - 94%)    O2 Parameters below as of 14 Jul 2022 05:11  Patient On (Oxygen Delivery Method): room air                 Input and Output:  I&O's Detail      Lab Data                        7.4    5.04  )-----------( 189      ( 13 Jul 2022 06:55 )             23.3     07-13    136  |  102  |  40<H>  ----------------------------<  124<H>  4.1   |  28  |  1.90<H>    Ca    8.6      13 Jul 2022 06:55    TPro  5.4<L>  /  Alb  2.4<L>  /  TBili  0.4  /  DBili  x   /  AST  57<H>  /  ALT  31  /  AlkPhos  56  07-13            Review of Systems	      Objective     Physical Examination    heart s1s2  lung dec BS  abd soft      Pertinent Lab findings & Imaging      Bah:  NO   Adequate UO     I&O's Detail           Discussed with:     Cultures:	        Radiology

## 2022-07-14 NOTE — PROGRESS NOTE ADULT - PROBLEM SELECTOR PLAN 4
Patient covid positive on admission, s/p moderna 3/3  - started on 3 days of remdesivir, last dose completed 7/9  - B/L venous doppler - negative for dvts  - saturating well on room air and no sx  - contact precautions  - Daughter Mary [phone] states she does not want monoclonal antibodies for patient  - ID consulted, Dr. WESTLEY Izquierdo following- S/p IV Remdesivir

## 2022-07-14 NOTE — CONSULT NOTE ADULT - SUBJECTIVE AND OBJECTIVE BOX
HPI:  89y F pmhx CAD x 2 stents (2012), T2DM, HTN, HLD, GERD, mild AS, chronic venous insufficiency, chronic neuropathy, macular degeneration, chronic constipation, depression, OA, and recent admission to [facility] [place] 2/25- 3/2 for gallstone pancreatitis with non-operative treatment presented to the ED with constant nausea x 4 days associated with decreased PO intake, generalized weakness, and fall today x 1 due to LE weakness. Patient denies fever, chills, cp, dizziness, sob, abd pain, vomiting, diarrhea. Patient was seen here in ED 6/20 for epigastric pain (since resolved) and discharged with outpatient follow up with Dr. Terrell. Patient has seen Dr. Terrell twice since then and epigastric pain resolved s/p PO zofran and carafate with planned esophagram. Daughter states epigastric pain resolved, but patient has had constant nausea x 4 days that has not improved despite increasing PO zofran from 4mg to 8mg q6h. Patient was unable to obtain appointment with Dr. Terrell today and after falling at home due to leg weakness, daughter brought her to the ED.     ED Course  Vitals: 99.2F, 85bpm, 143/66, 95% RA  Labs: Hgb 10.2, Hct 32.5, Na 132, K 5.5, BUN 30, Cr 2, GFR 23, lipase wnl  EKG:    CT Chest and abd/pel: Moderate-sized bilateral pleural effusion with partial bilateral lower lobe atelectasis. Cholelithiasis with distended gallbladder and right upper quadrant inflammatory changes, findings suspicious for acute cholecystitis.    RUQ US: Cholelithiasis with nonspecific pericholecystic fluid. Negative sonographic Scott sign. Dilated common bowel duct. Recommend correlating with LFTs. Cannot exclude distal choledocholithiasis.    HEAD CT: Mild volume loss, microvascular disease, no acute hemorrhage or midline shift.    Patient Received: Zosyn x1, NS 500cc x1, Lasix 40mg IV x1, IV dilaudid .5mg x1           (05 Jul 2022 23:51)    PERTINENT PM/SXH:   H/O vertebral fracture repair    History of vertebral fracture    Dyslipidemia    DM type 2 with diabetic dyslipidemia    H/O gastroesophageal reflux (GERD)    Costochondritis    Coronary arteriosclerosis in native artery    Gastroparesis      History of laminectomy    S/P hip hemiarthroplasty    S/P appendectomy      FAMILY HISTORY:  FHx: stroke (Mother)      ITEMS NOT CHECKED ARE NOT PRESENT    SOCIAL HISTORY:   Significant other/partner[ ]  Children[x ]  Muslim/Spirituality:  Substance hx:  [ ]   Tobacco hx:  [ ]   Alcohol hx: [ ]    (x) none  Home Opioid hx:  [ ] I-Stop Reference No:  Living Situation: [x ]Home  [ ]Long term care  [ ]Rehab [ ]Other    ADVANCE DIRECTIVES:    DNR  MOLST  [x ]  Living Will  [ ]   DECISION MAKER(s):  [ ] Health Care Proxy(s)  [ ] Surrogate(s)  [ ] Guardian           Name(s): Phone Number(s):    BASELINE (I)ADL(s) (prior to admission):  [place]: [ ]Total  [ ] Moderate [ ]Dependent    Allergies    morphine (Other)    Intolerances    MEDICATIONS  (STANDING):    MEDICATIONS  (PRN):    PRESENT SYMPTOMS: [ x]Unable to obtain due to poor mentation   Source if other than patient:  [ ]Family   [ ]Team     Pain: [ ]yes [ ]no  QOL impact -   Location -                    Aggravating factors -  Quality -  Radiation -  Timing-  Severity (0-10 scale):  Minimal acceptable level (0-10 scale):     CPOT:    https://www.[facility].org/getattachment/[email or web address]/Critical-Care-Pain-Observation-Tool-(CPOT)      PAIN AD Score:     http://geriatrictoolkit.[facility]/cog/painad.pdf (press ctrl +  left click to view)    Dyspnea:                           [ ]Mild [ ]Moderate [ ]Severe  Anxiety:                             [ ]Mild [ ]Moderate [ ]Severe  Fatigue:                             [ ]Mild [ ]Moderate [ ]Severe  Nausea:                             [ ]Mild [ ]Moderate [ ]Severe  Loss of appetite:              [ ]Mild [ ]Moderate [ ]Severe  Constipation:                    [ ]Mild [ ]Moderate [ ]Severe    Other Symptoms:  [ x]All other review of systems negative     Palliative Performance Status Version 2:         %    http://npcrc.org/files/news/palliative_performance_scale_ppsv2.pdf  PHYSICAL EXAM:  Vital Signs Last 24 Hrs    T(C): 36.8 (14 Jul 2022 12:05), Max: 36.8 (14 Jul 2022 12:05)  T(F): 98.2 (14 Jul 2022 12:05), Max: 98.2 (14 Jul 2022 12:05)  HR: 79 (14 Jul 2022 12:05) (79 - 91)  BP: 117/66 (14 Jul 2022 12:05) (116/61 - 127/72)  BP(mean): --  RR: 17 (14 Jul 2022 12:05) (17 - 18)  SpO2: 95% (14 Jul 2022 12:05) (90% - 95%)    GENERAL:  [ x ] NAD , [x ] well appearing, [  ] Agitated, [  ] Drowsy,  [ ] Lethargy, [x  ] confused   HEAD:  [  x] Normal, [  ] Other  EYES:  [ x ] EOMI, [ x ] PERRLA, [  x] conjunctiva and sclera clear normal, [  ] Other,  [  ] Pallor,[  ] Discharge  ENMT:  [x  ] Normal, [ x ] Moist mucous membranes, [  x] Good dentition, [ x ] No Thrush  NECK:  [ x ] Supple, [ x ] No JVD, [ x ] Normal thyroid, [  ] Lymphadenopathy [  ] Other  CHEST/LUNG:  [x  ] Clear to auscultation bilaterally, [ x ] Breath Sounds equal B/L  [ x ] poor effort  [ x ] No rales, [ x ] No rhonchi  [ x ]  No wheezing,   HEART:  [x  ] Regular rate and rhythm, [  ] tachycardia, [  ] Bradycardia,  [  ] irregular  [ x ] No murmurs, No rubs, No gallops, [  ] PPM in place (Mfr:  )  ABDOMEN:  [ x ] Soft, [ x ] Nontender, [  x] Nondistended, [ x ] No mass, [ x ] Bowel sounds present, [  ] obese  NERVOUS SYSTEM:  [ x ] Alert & Oriented X1, [x  ] Nonfocal  [ x ] Confusion  [  ] Encephalopathic [  ] Sedated [  ] Unable to assess, [x  ] Dementia [  ] Other-  EXTREMITIES: [ x ] 2+ Peripheral Pulses, No clubbing, No cyanosis,  [x  ] 2+ edema R lower EXT rt lower ext > left lower ext [ x ] PVD stasis skin changes B/L Lower EXT, [  ] wound  LYMPH: No lymphadenopathy noted  SKIN:  [ x ] No rashes or lesions, [  ] Pressure Ulcers, [ x ] ecchymosis, [  ] Skin Tears, [  ] Other      LABS:                        7.6    6.79  )-----------( 293      ( 14 Jul 2022 11:00 )             23.7     14 Jul 2022 11:00    136    |  101    |  40     ----------------------------<  99     4.1     |  27     |  1.90     Ca    9.1        14 Jul 2022 11:00    TPro  6.0    /  Alb  2.8    /  TBili  0.5    /  DBili  x      /  AST  38     /  ALT  25     /  AlkPhos  60     14 Jul 2022 11:00      RADIOLOGY & ADDITIONAL STUDIES: reviewed      PROTEIN CALORIE MALNUTRITION PRESENT: [ ]mild [ ]moderate [ ]severe [ ]underweight [ ]morbid obesity  https://www.andeal.org/vault/2440/web/files/ONC/Table_Clinical%20Characteristics%20to%20Document%20Malnutrition-White%20JV%20et%20al%202012.pdf    Height (cm): 157.5 (07-05-22 @ 18:15), 157.5 (06-20-22 @ 13:56), 157.5 (02-25-22 @ 00:07)  Weight (kg): 52.6 (07-05-22 @ 18:15)  BMI (kg/m2): 21.2 (07-05-22 @ 18:15)    [ ]PPSV2 < or = to 30% [ ]significant weight loss  [ ]poor nutritional intake  [ ]anasarca      [ ]Artificial Nutrition      REFERRALS:   [ ]Chaplaincy  [ ]Hospice  [ ]Child Life  [ ]Social Work  [ ]Case management [ ]Holistic Therapy     Goals of Care Document:

## 2022-07-15 ENCOUNTER — TRANSCRIPTION ENCOUNTER (OUTPATIENT)
Age: 87
End: 2022-07-15

## 2022-07-15 VITALS
TEMPERATURE: 98 F | DIASTOLIC BLOOD PRESSURE: 65 MMHG | HEART RATE: 98 BPM | RESPIRATION RATE: 18 BRPM | OXYGEN SATURATION: 96 % | SYSTOLIC BLOOD PRESSURE: 110 MMHG

## 2022-07-15 DIAGNOSIS — K22.4 DYSKINESIA OF ESOPHAGUS: ICD-10-CM

## 2022-07-15 DIAGNOSIS — J90 PLEURAL EFFUSION, NOT ELSEWHERE CLASSIFIED: ICD-10-CM

## 2022-07-15 LAB
ALBUMIN SERPL ELPH-MCNC: 2.7 G/DL — LOW (ref 3.3–5)
ALP SERPL-CCNC: 66 U/L — SIGNIFICANT CHANGE UP (ref 40–120)
ALT FLD-CCNC: 22 U/L — SIGNIFICANT CHANGE UP (ref 12–78)
ANION GAP SERPL CALC-SCNC: 6 MMOL/L — SIGNIFICANT CHANGE UP (ref 5–17)
AST SERPL-CCNC: 27 U/L — SIGNIFICANT CHANGE UP (ref 15–37)
BASOPHILS # BLD AUTO: 0.02 K/UL — SIGNIFICANT CHANGE UP (ref 0–0.2)
BASOPHILS NFR BLD AUTO: 0.3 % — SIGNIFICANT CHANGE UP (ref 0–2)
BILIRUB SERPL-MCNC: 0.5 MG/DL — SIGNIFICANT CHANGE UP (ref 0.2–1.2)
BUN SERPL-MCNC: 36 MG/DL — HIGH (ref 7–23)
CALCIUM SERPL-MCNC: 9 MG/DL — SIGNIFICANT CHANGE UP (ref 8.5–10.1)
CHLORIDE SERPL-SCNC: 101 MMOL/L — SIGNIFICANT CHANGE UP (ref 96–108)
CO2 SERPL-SCNC: 30 MMOL/L — SIGNIFICANT CHANGE UP (ref 22–31)
CREAT SERPL-MCNC: 2.1 MG/DL — HIGH (ref 0.5–1.3)
EGFR: 22 ML/MIN/1.73M2 — LOW
EOSINOPHIL # BLD AUTO: 0.25 K/UL — SIGNIFICANT CHANGE UP (ref 0–0.5)
EOSINOPHIL NFR BLD AUTO: 3.1 % — SIGNIFICANT CHANGE UP (ref 0–6)
GLUCOSE SERPL-MCNC: 130 MG/DL — HIGH (ref 70–99)
HCT VFR BLD CALC: 25.9 % — LOW (ref 34.5–45)
HGB BLD-MCNC: 8.2 G/DL — LOW (ref 11.5–15.5)
IMM GRANULOCYTES NFR BLD AUTO: 0.5 % — SIGNIFICANT CHANGE UP (ref 0–1.5)
LYMPHOCYTES # BLD AUTO: 0.94 K/UL — LOW (ref 1–3.3)
LYMPHOCYTES # BLD AUTO: 11.8 % — LOW (ref 13–44)
MCHC RBC-ENTMCNC: 28.3 PG — SIGNIFICANT CHANGE UP (ref 27–34)
MCHC RBC-ENTMCNC: 31.7 GM/DL — LOW (ref 32–36)
MCV RBC AUTO: 89.3 FL — SIGNIFICANT CHANGE UP (ref 80–100)
MONOCYTES # BLD AUTO: 0.79 K/UL — SIGNIFICANT CHANGE UP (ref 0–0.9)
MONOCYTES NFR BLD AUTO: 9.9 % — SIGNIFICANT CHANGE UP (ref 2–14)
NEUTROPHILS # BLD AUTO: 5.91 K/UL — SIGNIFICANT CHANGE UP (ref 1.8–7.4)
NEUTROPHILS NFR BLD AUTO: 74.4 % — SIGNIFICANT CHANGE UP (ref 43–77)
NRBC # BLD: 0 /100 WBCS — SIGNIFICANT CHANGE UP (ref 0–0)
PLATELET # BLD AUTO: 341 K/UL — SIGNIFICANT CHANGE UP (ref 150–400)
POTASSIUM SERPL-MCNC: 4.1 MMOL/L — SIGNIFICANT CHANGE UP (ref 3.5–5.3)
POTASSIUM SERPL-SCNC: 4.1 MMOL/L — SIGNIFICANT CHANGE UP (ref 3.5–5.3)
PROT SERPL-MCNC: 6.2 G/DL — SIGNIFICANT CHANGE UP (ref 6–8.3)
RBC # BLD: 2.9 M/UL — LOW (ref 3.8–5.2)
RBC # FLD: 13.4 % — SIGNIFICANT CHANGE UP (ref 10.3–14.5)
SODIUM SERPL-SCNC: 137 MMOL/L — SIGNIFICANT CHANGE UP (ref 135–145)
WBC # BLD: 7.95 K/UL — SIGNIFICANT CHANGE UP (ref 3.8–10.5)
WBC # FLD AUTO: 7.95 K/UL — SIGNIFICANT CHANGE UP (ref 3.8–10.5)

## 2022-07-15 PROCEDURE — 86900 BLOOD TYPING SEROLOGIC ABO: CPT

## 2022-07-15 PROCEDURE — 86901 BLOOD TYPING SEROLOGIC RH(D): CPT

## 2022-07-15 PROCEDURE — 82746 ASSAY OF FOLIC ACID SERUM: CPT

## 2022-07-15 PROCEDURE — 86850 RBC ANTIBODY SCREEN: CPT

## 2022-07-15 PROCEDURE — 83690 ASSAY OF LIPASE: CPT

## 2022-07-15 PROCEDURE — 87040 BLOOD CULTURE FOR BACTERIA: CPT

## 2022-07-15 PROCEDURE — 93306 TTE W/DOPPLER COMPLETE: CPT

## 2022-07-15 PROCEDURE — 85018 HEMOGLOBIN: CPT

## 2022-07-15 PROCEDURE — 71250 CT THORAX DX C-: CPT | Mod: MA

## 2022-07-15 PROCEDURE — 83605 ASSAY OF LACTIC ACID: CPT

## 2022-07-15 PROCEDURE — 82962 GLUCOSE BLOOD TEST: CPT

## 2022-07-15 PROCEDURE — 83550 IRON BINDING TEST: CPT

## 2022-07-15 PROCEDURE — 82728 ASSAY OF FERRITIN: CPT

## 2022-07-15 PROCEDURE — 70450 CT HEAD/BRAIN W/O DYE: CPT | Mod: MA

## 2022-07-15 PROCEDURE — 97530 THERAPEUTIC ACTIVITIES: CPT

## 2022-07-15 PROCEDURE — 85025 COMPLETE CBC W/AUTO DIFF WBC: CPT

## 2022-07-15 PROCEDURE — 84100 ASSAY OF PHOSPHORUS: CPT

## 2022-07-15 PROCEDURE — 85014 HEMATOCRIT: CPT

## 2022-07-15 PROCEDURE — 0225U NFCT DS DNA&RNA 21 SARSCOV2: CPT

## 2022-07-15 PROCEDURE — 36415 COLL VENOUS BLD VENIPUNCTURE: CPT

## 2022-07-15 PROCEDURE — 83735 ASSAY OF MAGNESIUM: CPT

## 2022-07-15 PROCEDURE — 99232 SBSQ HOSP IP/OBS MODERATE 35: CPT

## 2022-07-15 PROCEDURE — 71045 X-RAY EXAM CHEST 1 VIEW: CPT

## 2022-07-15 PROCEDURE — 83540 ASSAY OF IRON: CPT

## 2022-07-15 PROCEDURE — 80053 COMPREHEN METABOLIC PANEL: CPT

## 2022-07-15 PROCEDURE — 78226 HEPATOBILIARY SYSTEM IMAGING: CPT

## 2022-07-15 PROCEDURE — 99285 EMERGENCY DEPT VISIT HI MDM: CPT

## 2022-07-15 PROCEDURE — 83935 ASSAY OF URINE OSMOLALITY: CPT

## 2022-07-15 PROCEDURE — 83036 HEMOGLOBIN GLYCOSYLATED A1C: CPT

## 2022-07-15 PROCEDURE — A9537: CPT

## 2022-07-15 PROCEDURE — 85730 THROMBOPLASTIN TIME PARTIAL: CPT

## 2022-07-15 PROCEDURE — 81001 URINALYSIS AUTO W/SCOPE: CPT

## 2022-07-15 PROCEDURE — 97116 GAIT TRAINING THERAPY: CPT

## 2022-07-15 PROCEDURE — 93005 ELECTROCARDIOGRAM TRACING: CPT

## 2022-07-15 PROCEDURE — 84300 ASSAY OF URINE SODIUM: CPT

## 2022-07-15 PROCEDURE — 76705 ECHO EXAM OF ABDOMEN: CPT

## 2022-07-15 PROCEDURE — 85027 COMPLETE CBC AUTOMATED: CPT

## 2022-07-15 PROCEDURE — 96374 THER/PROPH/DIAG INJ IV PUSH: CPT

## 2022-07-15 PROCEDURE — 82607 VITAMIN B-12: CPT

## 2022-07-15 PROCEDURE — 83930 ASSAY OF BLOOD OSMOLALITY: CPT

## 2022-07-15 PROCEDURE — 97162 PT EVAL MOD COMPLEX 30 MIN: CPT

## 2022-07-15 PROCEDURE — 96375 TX/PRO/DX INJ NEW DRUG ADDON: CPT

## 2022-07-15 PROCEDURE — 87186 SC STD MICRODIL/AGAR DIL: CPT

## 2022-07-15 PROCEDURE — 82570 ASSAY OF URINE CREATININE: CPT

## 2022-07-15 PROCEDURE — 85610 PROTHROMBIN TIME: CPT

## 2022-07-15 PROCEDURE — 87086 URINE CULTURE/COLONY COUNT: CPT

## 2022-07-15 PROCEDURE — 80048 BASIC METABOLIC PNL TOTAL CA: CPT

## 2022-07-15 PROCEDURE — 93970 EXTREMITY STUDY: CPT

## 2022-07-15 PROCEDURE — 74220 X-RAY XM ESOPHAGUS 1CNTRST: CPT

## 2022-07-15 PROCEDURE — 74176 CT ABD & PELVIS W/O CONTRAST: CPT | Mod: MA

## 2022-07-15 RX ORDER — LIDOCAINE 4 G/100G
1 CREAM TOPICAL
Qty: 30 | Refills: 0
Start: 2022-07-15 | End: 2022-08-13

## 2022-07-15 RX ORDER — POLYETHYLENE GLYCOL 3350 17 G/17G
0 POWDER, FOR SOLUTION ORAL
Qty: 0 | Refills: 0 | DISCHARGE

## 2022-07-15 RX ORDER — HYDROMORPHONE HYDROCHLORIDE 2 MG/ML
1 INJECTION INTRAMUSCULAR; INTRAVENOUS; SUBCUTANEOUS
Qty: 0 | Refills: 0 | DISCHARGE

## 2022-07-15 RX ORDER — HYDROMORPHONE HYDROCHLORIDE 2 MG/ML
1 INJECTION INTRAMUSCULAR; INTRAVENOUS; SUBCUTANEOUS
Qty: 0 | Refills: 0 | DISCHARGE
Start: 2022-07-15

## 2022-07-15 RX ORDER — PANTOPRAZOLE SODIUM 20 MG/1
1 TABLET, DELAYED RELEASE ORAL
Qty: 60 | Refills: 0
Start: 2022-07-15 | End: 2022-08-13

## 2022-07-15 RX ORDER — FERROUS SULFATE 325(65) MG
1 TABLET ORAL
Qty: 0 | Refills: 0 | DISCHARGE

## 2022-07-15 RX ORDER — FERROUS SULFATE 325(65) MG
0 TABLET ORAL
Qty: 0 | Refills: 0 | DISCHARGE

## 2022-07-15 RX ORDER — METOPROLOL TARTRATE 50 MG
1 TABLET ORAL
Qty: 0 | Refills: 0 | DISCHARGE

## 2022-07-15 RX ORDER — PREGABALIN 225 MG/1
1 CAPSULE ORAL
Qty: 0 | Refills: 0 | DISCHARGE
Start: 2022-07-15

## 2022-07-15 RX ADMIN — HYDROMORPHONE HYDROCHLORIDE 4 MILLIGRAM(S): 2 INJECTION INTRAMUSCULAR; INTRAVENOUS; SUBCUTANEOUS at 16:20

## 2022-07-15 RX ADMIN — Medication 325 MILLIGRAM(S): at 12:35

## 2022-07-15 RX ADMIN — HYDROMORPHONE HYDROCHLORIDE 4 MILLIGRAM(S): 2 INJECTION INTRAMUSCULAR; INTRAVENOUS; SUBCUTANEOUS at 09:15

## 2022-07-15 RX ADMIN — Medication 2: at 12:34

## 2022-07-15 RX ADMIN — PANTOPRAZOLE SODIUM 40 MILLIGRAM(S): 20 TABLET, DELAYED RELEASE ORAL at 05:59

## 2022-07-15 RX ADMIN — HYDROMORPHONE HYDROCHLORIDE 4 MILLIGRAM(S): 2 INJECTION INTRAMUSCULAR; INTRAVENOUS; SUBCUTANEOUS at 10:00

## 2022-07-15 RX ADMIN — Medication 81 MILLIGRAM(S): at 12:34

## 2022-07-15 RX ADMIN — HYDROMORPHONE HYDROCHLORIDE 4 MILLIGRAM(S): 2 INJECTION INTRAMUSCULAR; INTRAVENOUS; SUBCUTANEOUS at 16:49

## 2022-07-15 RX ADMIN — Medication 1 GRAM(S): at 05:59

## 2022-07-15 RX ADMIN — PREGABALIN 1000 MICROGRAM(S): 225 CAPSULE ORAL at 12:34

## 2022-07-15 RX ADMIN — PANTOPRAZOLE SODIUM 40 MILLIGRAM(S): 20 TABLET, DELAYED RELEASE ORAL at 17:36

## 2022-07-15 RX ADMIN — Medication 5 MILLIGRAM(S): at 17:38

## 2022-07-15 RX ADMIN — Medication 5 MILLIGRAM(S): at 12:34

## 2022-07-15 RX ADMIN — Medication 1 GRAM(S): at 17:37

## 2022-07-15 RX ADMIN — LIDOCAINE 1 PATCH: 4 CREAM TOPICAL at 12:35

## 2022-07-15 RX ADMIN — Medication 3: at 17:36

## 2022-07-15 RX ADMIN — LIDOCAINE 1 PATCH: 4 CREAM TOPICAL at 12:36

## 2022-07-15 RX ADMIN — Medication 5 MILLIGRAM(S): at 05:59

## 2022-07-15 NOTE — PROGRESS NOTE ADULT - ATTENDING COMMENTS
Patient seen and examined.  Discussed assessment and plan with resident.  Agree with above except where changed by me
D/W Dr. Diaz. Agree with plan.
89y F pmhx CAD x 2 stents (2012), T2DM, HTN, HLD, GERD, mild AS, chronic venous insufficiency, chronic neuropathy, macular degeneration, chronic constipation, depression, OA, lumbar degenerative disc disease, and spinal stenosis admitted for cholelithiasis, KERRY, and found to be COVID positive on admission.   Pt seen, Examined, case & care plan d/w pt, residents at [facility].  -GI-DR Montilla- OK for D/C  -Renal-follow up-D/W Dr Wu- stable for -D/C with Lasix 20 mg Q 48 hrs  ID-DR Nash - D/W -UA, Urine c/s -Neg-STOP Abx   Cardiology- follow up- no further Work Up  PT Eval---> HCPT  -Palliative care Eval. DNR/DNI, pt is NOT a Hospice candidate at this time.  -Fall Precaution  - D/W Dtr Mary at [facility] D/C Home with HCPT   -D/W Case management -Hospital bed delivered today.   Total d/c  care time is 60 minutes.
89y F pmhx CAD x 2 stents (2012), T2DM, HTN, HLD, GERD, mild AS, chronic venous insufficiency, chronic neuropathy, macular degeneration, chronic constipation, depression, OA, lumbar degenerative disc disease, and spinal stenosis admitted for cholelithiasis, KERRY, and found to be COVID positive on admission.   Pt seen, Examined, case & care plan d/w pt, residents at detail.  Case & Care plan d/w Surgery Dr Rodrigues  -No surgical intervention   -GI-DR Montilla- advance PO diet, Esophogram on Monday   -Renal-DR JL HEART -Hold  Lasix   --Pulmonary-DR Melo- Pleural effusion-B/L  ID-DR MATT Izquierdo - D/W -dtr wants IV  Remdesivir- started -Total 3 doses -Last dose today  Cardiology-Dr Valles   follow up  Urology -Dr Hill called for hematuria- CT stone [place] , Urine c/s, IV Rocephin,   -GI-DR Montilla- PPI 2x day  PT Eval  Nutrition eval  -Palliative care Eval. DNR/DNI  Fall Precaution  AM labs D/W Dtr Mary at detail. encourage PO diet   Total care time is 40 minutes.
89y F pmhx CAD x 2 stents (2012), T2DM, HTN, HLD, GERD, mild AS, chronic venous insufficiency, chronic neuropathy, macular degeneration, chronic constipation, depression, OA, lumbar degenerative disc disease, and spinal stenosis admitted for cholelithiasis, KERRY, and found to be COVID positive on admission.   Pt seen, Examined, case & care plan d/w pt, residents at detail.  Case & Care plan d/w Surgery Dr Rodrigues  -No surgical intervention   -GI-DR Montilla- advance PO diet, Esophogram on Monday   -Renal-DR JL HEART -Hold  Lasix   --Pulmonary-DR Melo- Pleural effusion-B/L  ID-DR MATT Izquierdo - D/W -dtr wants IV  Remdesivir- started -Total 3 doses -Last dose today  Cardiology-Dr Vásquez  follow up  Palliative care Eval. DNR/DNI  PT Eval  Nutrition eval  Fall Precaution  AM labs D/W Dtr Mary at detail. encourage PO diet   Total care time is 40 minutes.
89y F pmhx CAD x 2 stents (2012), T2DM, HTN, HLD, GERD, mild AS, chronic venous insufficiency, chronic neuropathy, macular degeneration, chronic constipation, depression, OA, lumbar degenerative disc disease, and spinal stenosis admitted for cholelithiasis, KERRY, and found to be COVID positive on admission.   Pt seen, Examined, case & care plan d/w pt, residents at detail.  Case & Care plan d/w  Surgery Dr Rodrigues  & Dtr -Mary on Phone   Consults:  -GI-DR Montilla- HIDA scan-Neg-OK  -Renal-DR Wu -Hold  Lasix   Pulmonary-DR Melo-  ID-DR MATT Izquierdo - D/W -dtr does NOT want Remdesivir  Cardiology-Dr Valles follow up  Palliative care Eval. DNR/DNI  PT Eval  Nutrition eval  Fall Precaution  AM labs  Total care time is 45 minutes.
89y F pmhx CAD x 2 stents (2012), T2DM, HTN, HLD, GERD, mild AS, chronic venous insufficiency, chronic neuropathy, macular degeneration, chronic constipation, depression, OA, lumbar degenerative disc disease, and spinal stenosis admitted for cholelithiasis, KERRY, and found to be COVID positive on admission.   Pt seen, Examined, case & care plan d/w pt, residents at detail.  Case & Care plan d/w Surgery Dr Rodrigues  -No surgical intervention   -GI-DR Montilla- advance PO diet, Esophogram done -motility disorder   -Renal-follow up-D/W Dr Wu- start Lasix 20 mg q 3 days  --Pulmonary-DR Melo- Pleural effusion-B/L  ID-DR Nash - D/W -UA, Urine c/s today  Cardiology- follow up  Urology -Dr Hill D/W - concern for UTI  PT Eval---> HCPT  Nutrition eval for severe malnutrition   -Palliative care Eval. DNR/DNI,   Fall Precaution  AM [facility]W Dtr to think about Home Hospice   - pt to take po Ensure 3x day  Total care time is 40 minutes.
89y F pmhx CAD x 2 stents (2012), T2DM, HTN, HLD, GERD, mild AS, chronic venous insufficiency, chronic neuropathy, macular degeneration, chronic constipation, depression, OA, lumbar degenerative disc disease, and spinal stenosis admitted for cholelithiasis, KERRY, and found to be COVID positive on admission.   Pt seen, Examined, case & care plan d/w pt, residents at detail.  -GI-DR Montilla- advance PO diet, Esophogram done -motility disorder ,- pt to take po Ensure 3x day  -Renal-follow up-D/W Dr JL HEART follow up  ID-DR Nash - D/W -UA, Urine c/s -Neg-STOP Abx   Cardiology- follow up  Urology -Dr Hill D/W - No plan for  urology intervention  PT Eval---> HCPT  -Palliative care Eval. DNR/DNI, pt is NOT a Hospice candidate   Fall Precaution  - D/W Arina Hernandez at detail D/W pt is NOT a Hospice Candidate   Total care time is 40 minutes.  D/C Home with HCPT  D/W Social service, Home care follow up.
89y F pmhx CAD x 2 stents (2012), T2DM, HTN, HLD, GERD, mild AS, chronic venous insufficiency, chronic neuropathy, macular degeneration, chronic constipation, depression, OA, lumbar degenerative disc disease, and spinal stenosis admitted for cholelithiasis, KERRY, and found to be COVID positive on admission.   Pt seen, Examined, case & care plan d/w pt, residents at detail.  Case & Care plan d/w Surgery Dr Rodrigues  -No surgical intervention   -GI-DR Montilla- advance PO diet,   -Renal-DR Wu -Hold  Lasix   --Pulmonary-DR Melo- Pleural effusion-B/L  ID-DR MATT Izquierdo - D/W -dtr wants IV  Remdesivir- started -Total 3 doses   Cardiology-Dr Jose - follow up  Palliative care Eval. DNR/DNI  PT Eval  Nutrition eval  Fall Precaution  AM labs D/W Dtr Mary at detail.  Total care time is 40 minutes.
89y F pmhx CAD x 2 stents (2012), T2DM, HTN, HLD, GERD, mild AS, chronic venous insufficiency, chronic neuropathy, macular degeneration, chronic constipation, depression, OA, lumbar degenerative disc disease, and spinal stenosis admitted for cholelithiasis, KERRY, and found to be COVID positive on admission.   Pt seen, Examined, case & care plan d/w pt, residents at detail.  Case & Care plan d/w Surgery Dr Rodrigues  -No surgical intervention   -GI-DR Montilla- advance PO diet, Esophogram in AM ---> NPO past midnight   -Renal-DR JL HEART - 1 dose of PO Lasix is OK  --Pulmonary-DR Melo- Pleural effusion-B/L  ID-DR MATT Izquierdo - D/W -dtr wants IV  Remdesivir- started -Total 3 doses -completed   Cardiology- follow up  Urology -Dr Hill called for hematuria- CT stone [place] ,done Repeat UA &  Urine c/s, IV Rocephin,   PT Eval---> No Need   Nutrition eval for severe malnutrition   -Palliative care Eval. DNR/DNI  Fall Precaution  AM labs D/W Dtr aMry at detail. encourage PO diet   Total care time is 40 minutes.
89y F pmhx CAD x 2 stents (2012), T2DM, HTN, HLD, GERD, mild AS, chronic venous insufficiency, chronic neuropathy, macular degeneration, chronic constipation, depression, OA, lumbar degenerative disc disease, and spinal stenosis admitted for cholelithiasis, KERRY, and found to be COVID positive on admission.     Pt seen, Examined, case & care plan d/w pt, residents at detail.  Case & Care plan d/w  Surgery Dr Rodrigues  & Dtr Gardenia on Phone   Consults:  -GI-DR Montilla- HIDA scan-Neg-OK for clear liquid , Gastric emptying test on Friday as d/w Nuclear medicine Dept, NO Pain meds from 7/7/AM   -Renal-DR Wu -MICHAEL Lasix   Pulmonary-DR Melo- IV Lasix  ID-DR MATT Izquierdo - D/W   Cardiology-Dr Warren -MICHAEL Lasix 40 mg daily  Palliative care Eval. DNR/DNI  PT Eval  Nutrition eval  Fall Precaution  AM labs  Total care time is 50 minutes.
89y F pmhx CAD x 2 stents (2012), T2DM, HTN, HLD, GERD, mild AS, chronic venous insufficiency, chronic neuropathy, macular degeneration, chronic constipation, depression, OA, lumbar degenerative disc disease, and spinal stenosis admitted for cholelithiasis, KERRY, and found to be COVID positive on admission.   Pt seen, Examined, case & care plan d/w pt, residents at detail.  Case & Care plan d/w Surgery Dr Rodrigues  -No surgical intervention   -GI-DR Montilla- advance PO diet, Esophogram in AM ---> NPO past midnight   -Renal-follow up  --Pulmonary-DR Melo- Pleural effusion-B/L  ID-DR MATT Izquierdo - D/W -dtr wants IV  Remdesivir- started -Total 3 doses -completed   Cardiology- follow up  Urology -Dr Hill follow up-, urine culture -contamintion  PT Eval---> No Need   Nutrition eval for severe malnutrition   -Palliative care Eval. DNR/DNI  Fall Precaution  AM labs D/W Dtr Mary at detail. encourage PO diet   Total care time is 40 minutes.

## 2022-07-15 NOTE — PROGRESS NOTE ADULT - PROBLEM SELECTOR PLAN 2
Probably multifactorial in the setting of active hematuria/ COVID-19 infection/ KERRY   - Hb stable   - Continue PO iron daily  -Consulted heme/onc Dr Calero- -Completed IV iron (7/13), continue po b12, transfuse as needed to maintain hg>7  -Follow up Dr. Nicholas upon discharge. Acute on chronic Anemia-   -Probably multifactorial in the setting of active hematuria/ COVID-19 infection/ KERRY   - Hb stable   - Continue PO iron daily  -Consulted heme/onc Dr Calero- -Completed IV iron (7/13), continue po b12, transfuse as needed to maintain hg>7  -Follow up Dr. Nicholas upon discharge.

## 2022-07-15 NOTE — PROGRESS NOTE ADULT - ASSESSMENT
nausea  abodminal pain    hida negative for acute cholecystitis  suspect dysmotility  iv fluid  unable to obtain nm ges 2/2 pt's dilaudid  esophagram noted  d/w daughter, cont diet with ensure x3 per day, no plan for peg    I reviewed the overnight course of events on the unit, re-confirming the patient history. I discussed the care with the patient and their family  Differential diagnosis and plan of care discussed with patient after the evaluation  35 minutes spent on total encounter of which more than fifty percent of the encounter was spent counseling and/or coordinating care by the attending physician.  Advanced care planning was discussed with patient and family.  Advanced care planning forms were reviewed and discussed.  Risks, benefits and alternatives of gastroenterologic procedures were discussed in detail and all questions were answered.

## 2022-07-15 NOTE — PROGRESS NOTE ADULT - ASSESSMENT
89y F PMH CAD x 2 stents (2012), T2DM, HTN, HLD, GERD, mild AS, chronic venous insufficiency, chronic neuropathy, macular degeneration, chronic constipation, depression, OA, lumbar degenerative disc disease, and spinal stenosis admitted for cholelithiasis, KERRY, and found to be COVID19.     Chest pain, Edema, HTN, HLD, CAD s/p stents   - hx CAD s/p 2 stents in 2012  - EKG NSR 80, no acute ischemic changes, trops neg, doubt ACS   - Continue asa, statin    - LE edema likely 2/2 chronic venous insufficiency versus component of HF  - Continue Lasix 20 every 2 days   - no signs of volume overload on exam   - TTE: 7/7/22  EF 65% with norm LV/RV systolic function, with mild/mod MR.  - CT c/a/p with b/l mod effusions, partial LLL atelectasis  - Pulm following, no plan for thoracentesis for b/l effusions, plan for medical management    - BP and HR stable and controlled  - Continue BB.    - Urology following for hematuria     - COVID management/ isolation per primary     - Monitor and replete lytes, keep K>4, Mg>2.    - Will continue to follow.    YENNY Herrera  Nurse Practitioner - Cardiology   Spectra #[id number] 89y F PMH CAD x 2 stents (2012), T2DM, HTN, HLD, GERD, mild AS, chronic venous insufficiency, chronic neuropathy, macular degeneration, chronic constipation, depression, OA, lumbar degenerative disc disease, and spinal stenosis admitted for cholelithiasis, KERRY, and found to be COVID19.     Chest pain, Edema, HTN, HLD, CAD s/p stents   - hx CAD s/p 2 stents in 2012  - c/o reproducible chest discomfort on palpation, pt said it is an ongoing issue   - EKG NSR 80, no acute ischemic changes, trops neg, doubt ACS   - Continue asa, statin    - LE edema likely 2/2 chronic venous insufficiency versus component of HF  - Continue Lasix 20 every 2 days   - no signs of volume overload on exam   - TTE: 7/7/22  EF 65% with norm LV/RV systolic function, with mild/mod MR.  - CT c/a/p with b/l mod effusions, partial LLL atelectasis  - Pulm following, no plan for thoracentesis for b/l effusions, plan for medical management    - BP and HR stable and controlled  - Continue BB.    - Urology following for hematuria     - COVID management/ isolation per primary     - Monitor and replete lytes, keep K>4, Mg>2.    - Will continue to follow.    YENNY Herrera  Nurse Practitioner - Cardiology   Spectra #[phone]

## 2022-07-15 NOTE — PROGRESS NOTE ADULT - PROBLEM SELECTOR PLAN 12
-On SCDs  -heparin d/c 2/2 hematuria D/W Dr Montilla-   - Esophogram done -motility disorder ,- pt to take po Ensure 3x day  -Stop Reglan/ Carafate  -PPI 2x day   -Ensure 3x day with pureed /soft food  Follow up with Dr Terrell.

## 2022-07-15 NOTE — PROGRESS NOTE ADULT - REASON FOR ADMISSION
gallstones, covid, kaih
gallstones, covid, kiah

## 2022-07-15 NOTE — PROGRESS NOTE ADULT - ASSESSMENT
89y F pmhx CAD x 2 stents (2012), T2DM, HTN, HLD, GERD, mild AS, chronic venous insufficiency, chronic neuropathy, macular degeneration, chronic constipation, depression, OA, and recent admission to [facility] 2/25- 3/2 for gallstone pancreatitis with non-operative treatment presented to the ED with constant nausea x 4 days associated with decreased PO intake, generalized weakness.    #Acute on CKD stage III  UTI  COVID 19  CT revealed right sided hydronephrosis and pleural effusion on admission  Baseline creatinine ~1.3?  Renal indices are rising again  Possible contributions from covid-19 or hydronephrosis  s/p Ceftriaxone for UTI  No plans for thoracentesis per Pulm  Urine studies reviewed; may have been skewed by Lasix due to very high urine sodium  COVID management per primary team  Consider repeating lung imaging to assess effusions  Avoid nephrotoxic agents  Monitor serum electrolytes  Continue PO lasix 20mg every other day.

## 2022-07-15 NOTE — PROGRESS NOTE ADULT - PROBLEM SELECTOR PLAN 5
Acute KERYR on CKD III. ? WALI . Rt Hydro   - Creatinine elevated up to 2.1  from 1.9 this AM , baseline  1.2-1.3  - Lasix 20mg dose every 2 days OK per nephro  - Hold nephrotoxic meds   - nephrology Dr. Wu,/DR PATEL following- BMP in AM Acute KERRY on CKD III. ? WALI . Rt Hydro   - Creatinine elevated up to 2.1  from 1.9 this AM , baseline  1.2-1.3  - Lasix 20mg dose every 2 days OK per nephro  - Hold nephrotoxic meds   - nephrology Dr. Wu,/DR PATEL following- on Lasix 20 mg every 48 hrs

## 2022-07-15 NOTE — PROGRESS NOTE ADULT - SUBJECTIVE AND OBJECTIVE BOX
[facility] DIVISION of INFECTIOUS DISEASE  Bry Nash MD PhD, Mariel Gtz MD, Candida Izquierdo MD, Marti Del Valle MD, Alonso Doll MD  and providing coverage with Isreal Woodward MD  Providing Infectious Disease Consultations at [facility], [facility], [facility], [facility]      Office# [phone] to schedule follow up appointments  Answering Service for urgent calls or New Consults [phone]  Cell# to text for urgent issues Bry Nash [phone]     Infectious diseases progress note:    DEVON NOLAND is a 89y y. o. Female patient    COVID Patient, now off isolation and reporting left buttock pain and no urinary symptoms    Allergies    morphine (Other)    Intolerances        ANTIBIOTICS/RELEVANT:  antimicrobials    immunologic:    OTHER:  acetaminophen     Tablet .. 650 milliGRAM(s) Oral every 6 hours PRN  aluminum hydroxide/magnesium hydroxide/simethicone Suspension 30 milliLiter(s) Oral every 4 hours PRN  amitriptyline 30 milliGRAM(s) Oral at bedtime  aspirin  chewable 81 milliGRAM(s) Oral daily  atorvastatin 40 milliGRAM(s) Oral at bedtime  cyanocobalamin 1000 MICROGram(s) Oral daily  dextrose 5%. 1000 milliLiter(s) IV Continuous <Continuous>  dextrose 5%. 1000 milliLiter(s) IV Continuous <Continuous>  dextrose 50% Injectable 25 Gram(s) IV Push once  dextrose 50% Injectable 12.5 Gram(s) IV Push once  dextrose 50% Injectable 25 Gram(s) IV Push once  dextrose Oral Gel 15 Gram(s) Oral once PRN  ferrous    sulfate 325 milliGRAM(s) Oral daily  furosemide    Tablet 20 milliGRAM(s) Oral <User Schedule>  glucagon  Injectable 1 milliGRAM(s) IntraMuscular once  HYDROmorphone   Tablet 4 milliGRAM(s) Oral <User Schedule>  insulin lispro (ADMELOG) corrective regimen sliding scale   SubCutaneous three times a day before meals  insulin lispro (ADMELOG) corrective regimen sliding scale   SubCutaneous at bedtime  lidocaine   4% Patch 1 Patch Transdermal daily  lidocaine   4% Patch 1 Patch Transdermal daily  melatonin 3 milliGRAM(s) Oral at bedtime PRN  metoclopramide 5 milliGRAM(s) Oral every 6 hours  metoprolol succinate ER 25 milliGRAM(s) Oral daily  ondansetron Injectable 4 milliGRAM(s) IV Push every 8 hours PRN  pantoprazole    Tablet 40 milliGRAM(s) Oral two times a day  polyethylene glycol 3350 17 Gram(s) Oral daily  senna 2 Tablet(s) Oral at bedtime  sucralfate suspension 1 Gram(s) Oral two times a day      Objective:  Vital Signs Last 24 Hrs  T(C): 36.7 (15 Jul 2022 12:00), Max: 36.8 (2022 21:04)  T(F): 98 (15 Jul 2022 12:00), Max: 98.3 (2022 21:04)  HR: 98 (15 Jul 2022 12:00) (75 - 98)  BP: 110/65 (15 Jul 2022 12:00) (105/57 - 130/72)  BP(mean): --  RR: 18 (15 Jul 2022 12:00) (17 - 18)  SpO2: 96% (15 Jul 2022 12:00) (92% - 96%)    Parameters below as of 15 Jul 2022 12:00  Patient On (Oxygen Delivery Method): room air        T(C): 36.7 (07-15-22 @ 12:00), Max: 36.8 (22 @ 12:05)  T(C): 36.7 (07-15-22 @ 12:00), Max: 36.9 (22 @ 22:04)  T(C): 36.7 (07-15-22 @ 12:00), Max: 36.9 (22 @ 12:19)    PHYSICAL EXAM:  HEENT: NC atraumatic  Neck: supple  Respiratory: no accessory muscle use, breathing comfortably  Cardiovascular: distant  Gastrointestinal: normal appearing, nondistended  Extremities: no clubbing, no cyanosis,        LABS:                          8.2    7.95  )-----------( 341      ( 15 Jul 2022 06:30 )             25.9       WBC  7.95 07-15 @ 06:30  6.79 07-14 @ 11:00  5.04 - @ 06:55  5.38 07-12 @ 09:00  4.78 - @ 06:42  5.55 07-10 @ 07:44  3.92 - @ 07:45      07-15    137  |  101  |  36<H>  ----------------------------<  130<H>  4.1   |  30  |  2.10<H>    Ca    9.0      15 Jul 2022 06:30    TPro  6.2  /  Alb  2.7<L>  /  TBili  0.5  /  DBili  x   /  AST  27  /  ALT  22  /  AlkPhos  66  07-15      Creatinine, Serum: 2.10 mg/dL (07-15-22 @ 06:30)  Creatinine, Serum: 1.90 mg/dL (22 @ 11:00)  Creatinine, Serum: 1.90 mg/dL (22 @ 06:55)  Creatinine, Serum: 1.80 mg/dL (22 @ 09:00)  Creatinine, Serum: 1.70 mg/dL (22 @ 06:42)  Creatinine, Serum: 1.80 mg/dL (07-10-22 @ 07:44)  Creatinine, Serum: 1.80 mg/dL (22 @ 07:45)        Urinalysis Basic - ( 2022 06:44 )    Color: Yellow / Appearance: Slightly Turbid / S.010 / pH: x  Gluc: x / Ketone: Negative  / Bili: Negative / Urobili: Negative   Blood: x / Protein: 15 / Nitrite: Negative   Leuk Esterase: Trace / RBC: 11-25 /HPF / WBC 3-5   Sq Epi: x / Non Sq Epi: Few / Bacteria: Occasional            COVID RISK SCORE  Auto Neutrophil #: 5.91 K/uL (07-15-22 @ 06:30)  Auto Lymphocyte #: 0.94 K/uL (07-15-22 @ 06:30)  Auto Neutrophil #: 5.40 K/uL (22 @ 11:00)  Auto Lymphocyte #: 0.63 K/uL (22 @ 11:00)  Auto Neutrophil #: 3.85 K/uL (22 @ 06:55)  Auto Lymphocyte #: 0.57 K/uL (22 @ 06:55)  Auto Neutrophil #: 4.19 K/uL (22 @ 09:00)  Auto Lymphocyte #: 0.66 K/uL (22 @ 09:00)  Auto Neutrophil #: 3.48 K/uL (22 @ 06:42)  Auto Lymphocyte #: 0.76 K/uL (22 @ 06:42)  Auto Neutrophil #: 2.58 K/uL (22 @ 07:45)  Auto Lymphocyte #: 0.79 K/uL (22 @ 07:45)  Auto Neutrophil #: 3.22 K/uL (22 @ 06:45)  Auto Lymphocyte #: 0.59 K/uL (22 @ 06:45)  Auto Neutrophil #: 3.47 K/uL (22 @ 09:04)  Auto Lymphocyte #: 0.67 K/uL (22 @ 09:04)  Auto Neutrophil #: 3.85 K/uL (22 @ 07:14)  Auto Lymphocyte #: 0.33 K/uL (22 @ 07:14)  Auto Neutrophil #: 5.15 K/uL (22 @ 19:05)  Auto Lymphocyte #: 0.46 K/uL (22 @ 19:05)    Lactate, Blood: 1.2 mmol/L (22 @ 21:45)    Auto Eosinophil #: 0.25 K/uL (07-15-22 @ 06:30)  Auto Eosinophil #: 0.11 K/uL (22 @ 11:00)  Auto Eosinophil #: 0.09 K/uL (22 @ 06:55)  Auto Eosinophil #: 0.07 K/uL (22 @ 09:00)  Auto Eosinophil #: 0.03 K/uL (22 @ 06:42)  Auto Eosinophil #: 0.01 K/uL (22 @ 07:45)  Auto Eosinophil #: 0.02 K/uL (22 @ 06:45)  Auto Eosinophil #: 0.02 K/uL (22 @ 09:04)  Auto Eosinophil #: 0.07 K/uL (22 @ 07:14)  Auto Eosinophil #: 0.15 K/uL (22 @ 19:05)    Ferritin, Serum: 67 ng/mL (22 @ 07:14)        INR: 1.04 ratio (22 @ 19:05)  Activated Partial Thromboplastin Time: 28.6 sec (22 @ 19:05)          MICROBIOLOGY:    SARS-CoV-2: Detected (2022 19:05)  COVID-19 PCR: NotDetec (2022 15:46)  COVID-19 PCR: NotDetec (2022 03:28)    RADIOLOGY & ADDITIONAL STUDIES:

## 2022-07-15 NOTE — PROGRESS NOTE ADULT - SUBJECTIVE AND OBJECTIVE BOX
Patient is a 89y old  Female who presents with a chief complaint of gallstones, covid, fidelina (15 Jul 2022 12:13)        HPI:  89y F pmhx CAD x 2 stents (), T2DM, HTN, HLD, GERD, mild AS, chronic venous insufficiency, chronic neuropathy, macular degeneration, chronic constipation, depression, OA, and recent admission to [facility] - 3/2 for gallstone pancreatitis with non-operative treatment presented to the ED with constant nausea x 4 days associated with decreased PO intake, generalized weakness, and fall today x 1 due to LE weakness. Patient denies fever, chills, cp, dizziness, sob, abd pain, vomiting, diarrhea. Patient was seen here in ED  for epigastric pain (since resolved) and discharged with outpatient follow up with Dr. Terrell. Patient has seen Dr. Terrell twice since then and epigastric pain resolved s/p PO zofran and carafate with planned esophagram. Daughter states epigastric pain resolved, but patient has had constant nausea x 4 days that has not improved despite increasing PO zofran from 4mg to 8mg q6h. Patient was unable to obtain appointment with Dr. Terrell today and after falling at home due to leg weakness, daughter brought her to the ED.     ED Course  Vitals: 99.2F, 85bpm, 143/66, 95% RA  Labs: Hgb 10.2, Hct 32.5, Na 132, K 5.5, BUN 30, Cr 2, GFR 23, lipase wnl  EKG:    CT Chest and abd/pel: Moderate-sized bilateral pleural effusion with partial bilateral lower lobe atelectasis. Cholelithiasis with distended gallbladder and right upper quadrant inflammatory changes, findings suspicious for acute cholecystitis.    RUQ US: Cholelithiasis with nonspecific pericholecystic fluid. Negative sonographic Scott sign. Dilated common bowel duct. Recommend correlating with LFTs. Cannot exclude distal choledocholithiasis.    HEAD CT: Mild volume loss, microvascular disease, no acute hemorrhage or midline shift.    Patient Received: Zosyn x1, NS 500cc x1, Lasix 40mg IV x1, IV dilaudid .5mg x     (2022 23:51)    INTERVAL HPI:  - pt seen and examined at bedside. Complaining of increased frequency of urination and suprapubic pain s/p 40mg IV lasix x1 in the ED. Otherwise, notes worsening of chronic back pain and general feeling of malaise. No sob, chest pain, nausea. Fidelina, Anemia.HIDA scan today.  - pt seen and examined at bedside. Feeling confused. A&Ox1 (person). Has a hx of hospital delirium and has been able to be reoriented when her daughter is present. Notes pain over b/l LE but denies sob, chest pain, n/v/d. Waiting for gastric emptying study per GI.  - pt seen and examined at bedside. Feeling confused again this AM possibly chronic hospital AM delirium. A&Ox1 (person). Notes constipation and decrease in b/l LE edema but has mild pain. Gastric emptying study cancelled due to pt inability to tolerate being off pain medication. Denies SOB, chest pain, n/v/d. Received dose 1/3 of remdesivir for COVID-19.   : Patient seen and examined at bedside. Patient states she did not sleep much overnight, but otherwise feeling okay. Denies any CP, SOB, abd pain, N/V/D. Patient still c/o mild leg pain. Cr stable. Loose BM Low stable H/H   7/10: Patient seen and examined at bedside. Complaining of increased frequency of urination that kept her up all night. States she needs her Dilaudid for back pain and b/l leg pain. Otherwise feeling alright Denies any CP, SOB, abd pain, N/V/D. Cr. stable.  Low stable H/H   : Patient seen and examined at bedside. Feeling very weak and tired. Complaining of pain wrapping around her right flank. Notes pain and increased swelling in her right LE. Decreased urinary frequency and urgency. Also feeling confused this AM but has a hx of hospital delirium in the AM. Denies chest pain, sob, nausea, vomiting, diarrhea. Low stable H/H, CR stable On IV Rocephin daily  : Patient seen and examined. Was having a bowel movement this AM which showed black stool 2/2 IV iron. Continues to feel weak and complaining of pain and edema in B/L lower extremities, especially her RLE. Feeling confused this AM (hx of hospital delirium). Denies chest pain, sob, nausea, vomiting, diarrhea. Low stable H/H, Cr 1.8 s/p lasix. On IV Rocephin daily.STOP Abx   : Patient seen and examined. Is experiencing a lot of pain in her back 2/2 spinal stenosis and RLE. Unable to sit up and eat. Is confused and wants to talk to her daughter. Denies chest pain, shortness of breath, nausea, vomiting, diarrhea. Decrease in H/H 7.4/23.3. Cr elevated 1.9 s/p lasix. Repeat doppler U/S neg.  : Patient seen and assessed at bedside. No complaints at this time, would like to rest and sleep. Notes decreased pain in her legs. Denies shortness of breath, chest pain, nausea, vomiting, diarrhea. Stable H/H 7.6/23.7. Cr remains 1.9. On IV Rocephin,   07/15 Patient seen and examined at bedside. Off of antibiotics. On Lasix every other day. Plan to dc     OVERNIGHT EVENTS:    Home Medications:  amitriptyline 10 mg oral tablet: 3 tab(s) orally once a day (at bedtime) (2022 23:44)  aspirin 81 mg oral tablet: 1 tab(s) orally once a day (:44)  atorvastatin 40 mg oral tablet: 1 tab(s) orally once a day (:44)  Dilaudid 4 mg oral tablet: 1 tab(s) orally 3 times (:44)  ferrous sulfate 324 mg (65 mg elemental iron) oral delayed release tablet: 1 tab(s) orally once a day (:44)  ferrous sulfate 325 mg (65 mg elemental iron) oral tablet: 1 tablet oral daily (:44)  Metoprolol Succinate ER 25 mg oral tablet, extended release: 1 tab(s) orally once a day (:44)  MiraLax oral powder for reconstitution: ng/kg orally (:44)  PreserVision AREDS oral capsule: 1 tab(s) orally 2 times a day (:44)  Senna 8.6 mg oral tablet: 2 tab(s) orally once a day (at bedtime) (2022 23:44)  Toprol-XL 25 mg oral tablet, extended release: 1 tab(s) orally once a day (2022 23:44)  Vitamin D3 1250 mcg (50,000 intl units) oral capsule: 1 cap(s) orally once a week (2022 23:44)  Zofran 4 mg oral tablet: 1 tab(s) orally every 6 hours (2022 23:44)      MEDICATIONS  (STANDING):  amitriptyline 30 milliGRAM(s) Oral at bedtime  aspirin  chewable 81 milliGRAM(s) Oral daily  atorvastatin 40 milliGRAM(s) Oral at bedtime  cyanocobalamin 1000 MICROGram(s) Oral daily  dextrose 5%. 1000 milliLiter(s) (50 mL/Hr) IV Continuous <Continuous>  dextrose 5%. 1000 milliLiter(s) (100 mL/Hr) IV Continuous <Continuous>  dextrose 50% Injectable 25 Gram(s) IV Push once  dextrose 50% Injectable 12.5 Gram(s) IV Push once  dextrose 50% Injectable 25 Gram(s) IV Push once  ferrous    sulfate 325 milliGRAM(s) Oral daily  furosemide    Tablet 20 milliGRAM(s) Oral <User Schedule>  glucagon  Injectable 1 milliGRAM(s) IntraMuscular once  HYDROmorphone   Tablet 4 milliGRAM(s) Oral <User Schedule>  insulin lispro (ADMELOG) corrective regimen sliding scale   SubCutaneous three times a day before meals  insulin lispro (ADMELOG) corrective regimen sliding scale   SubCutaneous at bedtime  lidocaine   4% Patch 1 Patch Transdermal daily  lidocaine   4% Patch 1 Patch Transdermal daily  metoclopramide 5 milliGRAM(s) Oral every 6 hours  metoprolol succinate ER 25 milliGRAM(s) Oral daily  pantoprazole    Tablet 40 milliGRAM(s) Oral two times a day  polyethylene glycol 3350 17 Gram(s) Oral daily  senna 2 Tablet(s) Oral at bedtime  sucralfate suspension 1 Gram(s) Oral two times a day    MEDICATIONS  (PRN):  acetaminophen     Tablet .. 650 milliGRAM(s) Oral every 6 hours PRN Temp greater or equal to 38C (100.4F), Mild Pain (1 - 3)  aluminum hydroxide/magnesium hydroxide/simethicone Suspension 30 milliLiter(s) Oral every 4 hours PRN Dyspepsia  dextrose Oral Gel 15 Gram(s) Oral once PRN Blood Glucose LESS THAN 70 milliGRAM(s)/deciliter  melatonin 3 milliGRAM(s) Oral at bedtime PRN Insomnia  ondansetron Injectable 4 milliGRAM(s) IV Push every 8 hours PRN Nausea and/or Vomiting      morphine (Other)      Social History:  Former cigarette smoker, smoked <1/2 ppd for less than 15 years, quit more than 50 years ago   Denies ETOH use   Denies drug use   Ambulates with a walker. Lives at home with daughter. ADLs with assistance. (2022 23:51)      REVIEW OF SYSTEMS:  CONSTITUTIONAL: No fever, No chills, No fatigue, No myalgia, No Body ache, No Weakness  EYES: No eye pain,  No visual disturbances, No discharge, No Redness  ENMT: No ear pain, No nose bleed, No vertigo; No sinus pain, No throat pain, No Congestion  NECK: No pain, No stiffness  RESPIRATORY: No cough, No wheezing, No hemoptysis, No shortness of breath  CARDIOVASCULAR: No chest pain, No palpitations  GASTROINTESTINAL: No abdominal pain, No epigastric pain. No nausea, No vomiting, No diarrhea, No constipation; [  ] BM  GENITOURINARY: No dysuria, No frequency, No urgency, No hematuria, No incontinence  NEUROLOGICAL: No headaches, No dizziness, No numbness, No tingling, No tremors, No weakness  EXTREMITIES: No Swelling, No Pain, No Edema  SKIN: [  ] No itching, burning, rashes, or lesions   MUSCULOSKELETAL: No joint pain, No joint swelling; No muscle pain, No back pain, No extremity pain  PSYCHIATRIC: No depression, No anxiety, No mood swings, No difficulty sleeping at night  PAIN SCALE: [  ] None  [  ] Other-  ROS Unable to obtain due to: [  ] Dementia  [  ] Lethargy  [  ] Sedated  [  ] Non verbal  REST OF REVIEW OF SYSTEMS: [  ] Normal     Vital Signs Last 24 Hrs  T(C): 36.7 (15 Jul 2022 12:00), Max: 36.8 (2022 21:04)  T(F): 98 (15 Jul 2022 12:00), Max: 98.3 (2022 21:04)  HR: 98 (15 Jul 2022 12:00) (75 - 98)  BP: 110/65 (15 Jul 2022 12:00) (105/57 - 130/72)  BP(mean): --  RR: 18 (15 Jul 2022 12:00) (17 - 18)  SpO2: 96% (15 Jul 2022 12:00) (92% - 96%)    Parameters below as of 15 Jul 2022 12:00  Patient On (Oxygen Delivery Method): room air        CAPILLARY BLOOD GLUCOSE      POCT Blood Glucose.: 245 mg/dL (15 Jul 2022 11:51)  POCT Blood Glucose.: 149 mg/dL (15 Jul 2022 07:56)  POCT Blood Glucose.: 153 mg/dL (2022 21:01)  POCT Blood Glucose.: 180 mg/dL (2022 16:48)      I&O's Summary    PHYSICAL EXAM:  GENERAL:  [ x ] NAD , [x ] well appearing, [  ] Agitated, [  ] Drowsy,  [ ] Lethargy, [x  ] confused   HEAD:  [  x] Normal, [  ] Other  EYES:  [ x ] EOMI, [ x ] PERRLA, [  x] conjunctiva and sclera clear normal, [  ] Other,  [  ] Pallor,[  ] Discharge  ENMT:  [x  ] Normal, [ x ] Moist mucous membranes, [  x] Good dentition, [ x ] No Thrush  NECK:  [ x ] Supple, [ x ] No JVD, [ x ] Normal thyroid, [  ] Lymphadenopathy [  ] Other  CHEST/LUNG:  [x  ] Clear to auscultation bilaterally, [ x ] Breath Sounds equal B/L  [ x ] poor effort  [ x ] No rales, [ x ] No rhonchi  [ x ]  No wheezing,   HEART:  [x  ] Regular rate and rhythm, [  ] tachycardia, [  ] Bradycardia,  [  ] irregular  [ x ] No murmurs, No rubs, No gallops, [  ] PPM in place (Mfr:  )  ABDOMEN:  [ x ] Soft, [ x ] Nontender, [  x] Nondistended, [ x ] No mass, [ x ] Bowel sounds present, [  ] obese  NERVOUS SYSTEM:  [ x ] Alert & Oriented X1, [x  ] Nonfocal  [ x ] Confusion  [  ] Encephalopathic [  ] Sedated [  ] Unable to assess, [x  ] Dementia [  ] Other-  EXTREMITIES: [ x ] 2+ Peripheral Pulses, No clubbing, No cyanosis,  [x  ] 2+ edema R lower EXT rt lower ext > left lower ext [ x ] PVD stasis skin changes B/L Lower EXT, [  ] wound  LYMPH: No lymphadenopathy noted  SKIN:  [ x ] No rashes or lesions, [  ] Pressure Ulcers, [ x ] ecchymosis, [  ] Skin Tears, [  ] Other              DIET:     LABS:                        8.2    7.95  )-----------( 341      ( 15 Jul 2022 06:30 )             25.9     15 Jul 2022 06:30    137    |  101    |  36     ----------------------------<  130    4.1     |  30     |  2.10     Ca    9.0        15 Jul 2022 06:30    TPro  6.2    /  Alb  2.7    /  TBili  0.5    /  DBili  x      /  AST  27     /  ALT  22     /  AlkPhos  66     15 Jul 2022 06:30      Urinalysis Basic - ( 2022 06:44 )    Color: Yellow / Appearance: Slightly Turbid / S.010 / pH: x  Gluc: x / Ketone: Negative  / Bili: Negative / Urobili: Negative   Blood: x / Protein: 15 / Nitrite: Negative   Leuk Esterase: Trace / RBC: 11-25 /HPF / WBC 3-5   Sq Epi: x / Non Sq Epi: Few / Bacteria: Occasional      Culture Results:   No growth ( @ 15:45)  Culture Results:   50,000 - 99,000 CFU/mL Enterococcus species ( @ 12:01)  Culture Results:   No growth ( @ 15:35)  Culture Results:   >=3 organisms. Probable collection contamination. (07-10 @ 13:20)    culture blood  -- Catheterized Catheterized  @ 15:45    culture urine  --   @ 15:45          Culture - Urine (collected 2022 15:45)  Source: Catheterized Catheterized  Final Report (2022 18:41):    No growth    Culture - Urine (collected 2022 12:01)  Source: Clean Catch Clean Catch (Midstream)  Preliminary Report (15 Jul 2022 03:53):    50,000 - 99,000 CFU/mL Enterococcus species    Culture - Urine (collected 2022 15:35)  Source: Catheterized Catheterized  Final Report (2022 00:22):    No growth    Culture - Urine (collected 10 Jul 2022 13:20)  Source: Clean Catch Clean Catch (Midstream)  Final Report (2022 08:35):    >=3 organisms. Probable collection contamination.       Anemia Panel:      Thyroid Panel:                RADIOLOGY & ADDITIONAL TESTS:      HEALTH ISSUES - PROBLEM Dx:  Hematuria    Anemia    Spinal stenosis    2019 novel coronavirus disease (COVID-19)    Cholelithiasis    Pleural effusion    Fall    CAD (coronary artery disease)    HTN (hypertension)    Type 2 diabetes mellitus    Chronic back pain    Anxiety and depression    owning    DVT prophylaxis    Acute renal failure superimposed on stage 3 chronic kidney disease, unspecified acute renal failure type, unspecified whether stage 3a or 3b CKD    Need for prophylactic measure          Consultant(s) Notes Reviewed:  [  ] YES     Care Discussed with [ x ] Consultants, [  ] Patient, [  ] Family, [  ] HCP, [  ] , [  ] Social Service, [  ] RN, [  ] Physical Therapy, [  ] Palliative Care Team  DVT PPX: [  ] Lovenox, [  ] SC Heparin, [  ] Coumadin, [  ] Xarelto, [  ] Eliquis, [  ] Pradaxa, [  ] IV Heparin drip, [  ] SCD, [  ] Ambulation, [  ] Contraindicated 2/2 GI Bleed, [  ] Contraindicated 2/2  Bleed, [  ] Contraindicated 2/2 Brain Bleed  Advanced Directive: [  ] None, [  ] DNR/DNI Patient is a 89y old  Female who presents with a chief complaint of gallstones, covid, fidelina (15 Jul 2022 12:13)        HPI:  89y F pmhx CAD x 2 stents (), T2DM, HTN, HLD, GERD, mild AS, chronic venous insufficiency, chronic neuropathy, macular degeneration, chronic constipation, depression, OA, and recent admission to [facility] - 3/2 for gallstone pancreatitis with non-operative treatment presented to the ED with constant nausea x 4 days associated with decreased PO intake, generalized weakness, and fall today x 1 due to LE weakness. Patient denies fever, chills, cp, dizziness, sob, abd pain, vomiting, diarrhea. Patient was seen here in ED  for epigastric pain (since resolved) and discharged with outpatient follow up with Dr. Terrell. Patient has seen Dr. Terrell twice since then and epigastric pain resolved s/p PO zofran and carafate with planned esophagram. Daughter states epigastric pain resolved, but patient has had constant nausea x 4 days that has not improved despite increasing PO zofran from 4mg to 8mg q6h. Patient was unable to obtain appointment with Dr. Terrell today and after falling at home due to leg weakness, daughter brought her to the ED.     ED Course  Vitals: 99.2F, 85bpm, 143/66, 95% RA  Labs: Hgb 10.2, Hct 32.5, Na 132, K 5.5, BUN 30, Cr 2, GFR 23, lipase wnl  EKG:    CT Chest and abd/pel: Moderate-sized bilateral pleural effusion with partial bilateral lower lobe atelectasis. Cholelithiasis with distended gallbladder and right upper quadrant inflammatory changes, findings suspicious for acute cholecystitis.    RUQ US: Cholelithiasis with nonspecific pericholecystic fluid. Negative sonographic Scott sign. Dilated common bowel duct. Recommend correlating with LFTs. Cannot exclude distal choledocholithiasis.    HEAD CT: Mild volume loss, microvascular disease, no acute hemorrhage or midline shift.    Patient Received: Zosyn x1, NS 500cc x1, Lasix 40mg IV x1, IV dilaudid .5mg x     (2022 23:51)    INTERVAL HPI:  - pt seen and examined at bedside. Complaining of increased frequency of urination and suprapubic pain s/p 40mg IV lasix x1 in the ED. Otherwise, notes worsening of chronic back pain and general feeling of malaise. No sob, chest pain, nausea. Fidelina, Anemia.HIDA scan today.  - pt seen and examined at bedside. Feeling confused. A&Ox1 (person). Has a hx of hospital delirium and has been able to be reoriented when her daughter is present. Notes pain over b/l LE but denies sob, chest pain, n/v/d. Waiting for gastric emptying study per GI.  - pt seen and examined at bedside. Feeling confused again this AM possibly chronic hospital AM delirium. A&Ox1 (person). Notes constipation and decrease in b/l LE edema but has mild pain. Gastric emptying study cancelled due to pt inability to tolerate being off pain medication. Denies SOB, chest pain, n/v/d. Received dose 1/3 of remdesivir for COVID-19.   : Patient seen and examined at bedside. Patient states she did not sleep much overnight, but otherwise feeling okay. Denies any CP, SOB, abd pain, N/V/D. Patient still c/o mild leg pain. Cr stable. Loose BM Low stable H/H   7/10: Patient seen and examined at bedside. Complaining of increased frequency of urination that kept her up all night. States she needs her Dilaudid for back pain and b/l leg pain. Otherwise feeling alright Denies any CP, SOB, abd pain, N/V/D. Cr. stable.  Low stable H/H   : Patient seen and examined at bedside. Feeling very weak and tired. Complaining of pain wrapping around her right flank. Notes pain and increased swelling in her right LE. Decreased urinary frequency and urgency. Also feeling confused this AM but has a hx of hospital delirium in the AM. Denies chest pain, sob, nausea, vomiting, diarrhea. Low stable H/H, CR stable On IV Rocephin daily  : Patient seen and examined. Was having a bowel movement this AM which showed black stool 2/2 IV iron. Continues to feel weak and complaining of pain and edema in B/L lower extremities, especially her RLE. Feeling confused this AM (hx of hospital delirium). Denies chest pain, sob, nausea, vomiting, diarrhea. Low stable H/H, Cr 1.8 s/p lasix. On IV Rocephin daily.STOP Abx   : Patient seen and examined. Is experiencing a lot of pain in her back 2/2 spinal stenosis and RLE. Unable to sit up and eat. Is confused and wants to talk to her daughter. Denies chest pain, shortness of breath, nausea, vomiting, diarrhea. Decrease in H/H 7.4/23.3. Cr elevated 1.9 s/p lasix. Repeat doppler U/S neg.  : Patient seen and assessed at bedside. No complaints at this time, would like to rest and sleep. Notes decreased pain in her legs. Denies shortness of breath, chest pain, nausea, vomiting, diarrhea. Stable H/H 7.6/23.7. Cr remains 1.9. On IV Rocephin,   07/15 Patient seen and examined at bedside. Off of antibiotics. On Lasix every other day. Plan to d/c HCPT.    OVERNIGHT EVENTS: none    Home Medications:  amitriptyline 10 mg oral tablet: 3 tab(s) orally once a day (at bedtime) (2022 23:44)  aspirin 81 mg oral tablet: 1 tab(s) orally once a day (:44)  atorvastatin 40 mg oral tablet: 1 tab(s) orally once a day (:44)  Dilaudid 4 mg oral tablet: 1 tab(s) orally 3 times (2022 23:44)  ferrous sulfate 324 mg (65 mg elemental iron) oral delayed release tablet: 1 tab(s) orally once a day (:44)  ferrous sulfate 325 mg (65 mg elemental iron) oral tablet: 1 tablet oral daily (2022 23:44)  Metoprolol Succinate ER 25 mg oral tablet, extended release: 1 tab(s) orally once a day (2022 23:44)  MiraLax oral powder for reconstitution: ng/kg orally (2022 23:44)  PreserVision AREDS oral capsule: 1 tab(s) orally 2 times a day (2022 23:44)  Senna 8.6 mg oral tablet: 2 tab(s) orally once a day (at bedtime) (2022 23:44)  Toprol-XL 25 mg oral tablet, extended release: 1 tab(s) orally once a day (2022 23:44)  Vitamin D3 1250 mcg (50,000 intl units) oral capsule: 1 cap(s) orally once a week (2022 23:44)  Zofran 4 mg oral tablet: 1 tab(s) orally every 6 hours (2022 23:44)      MEDICATIONS  (STANDING):  amitriptyline 30 milliGRAM(s) Oral at bedtime  aspirin  chewable 81 milliGRAM(s) Oral daily  atorvastatin 40 milliGRAM(s) Oral at bedtime  cyanocobalamin 1000 MICROGram(s) Oral daily  dextrose 5%. 1000 milliLiter(s) (50 mL/Hr) IV Continuous <Continuous>  dextrose 5%. 1000 milliLiter(s) (100 mL/Hr) IV Continuous <Continuous>  dextrose 50% Injectable 25 Gram(s) IV Push once  dextrose 50% Injectable 12.5 Gram(s) IV Push once  dextrose 50% Injectable 25 Gram(s) IV Push once  ferrous    sulfate 325 milliGRAM(s) Oral daily  furosemide    Tablet 20 milliGRAM(s) Oral <User Schedule>  glucagon  Injectable 1 milliGRAM(s) IntraMuscular once  HYDROmorphone   Tablet 4 milliGRAM(s) Oral <User Schedule>  insulin lispro (ADMELOG) corrective regimen sliding scale   SubCutaneous three times a day before meals  insulin lispro (ADMELOG) corrective regimen sliding scale   SubCutaneous at bedtime  lidocaine   4% Patch 1 Patch Transdermal daily  lidocaine   4% Patch 1 Patch Transdermal daily  metoclopramide 5 milliGRAM(s) Oral every 6 hours  metoprolol succinate ER 25 milliGRAM(s) Oral daily  pantoprazole    Tablet 40 milliGRAM(s) Oral two times a day  polyethylene glycol 3350 17 Gram(s) Oral daily  senna 2 Tablet(s) Oral at bedtime  sucralfate suspension 1 Gram(s) Oral two times a day    MEDICATIONS  (PRN):  acetaminophen     Tablet .. 650 milliGRAM(s) Oral every 6 hours PRN Temp greater or equal to 38C (100.4F), Mild Pain (1 - 3)  aluminum hydroxide/magnesium hydroxide/simethicone Suspension 30 milliLiter(s) Oral every 4 hours PRN Dyspepsia  dextrose Oral Gel 15 Gram(s) Oral once PRN Blood Glucose LESS THAN 70 milliGRAM(s)/deciliter  melatonin 3 milliGRAM(s) Oral at bedtime PRN Insomnia  ondansetron Injectable 4 milliGRAM(s) IV Push every 8 hours PRN Nausea and/or Vomiting      morphine (Other)      Social History:  Former cigarette smoker, smoked <1/2 ppd for less than 15 years, quit more than 50 years ago   Denies ETOH use   Denies drug use   Ambulates with a walker. Lives at home with daughter. ADLs with assistance. (2022 23:51)      REVIEW OF SYSTEMS: i am OK  CONSTITUTIONAL: No fever, No chills, No fatigue, No myalgia, No Body ache, No Weakness  EYES: No eye pain,  No visual disturbances, No discharge, No Redness  ENMT: No ear pain, No nose bleed, No vertigo; No sinus pain, No throat pain, No Congestion  NECK: No pain, No stiffness  RESPIRATORY: No cough, No wheezing, No hemoptysis, No shortness of breath  CARDIOVASCULAR: No chest pain, No palpitations  GASTROINTESTINAL: No abdominal pain, No epigastric pain. No nausea, No vomiting, No diarrhea, No constipation; [  ] BM  GENITOURINARY: No dysuria, No frequency, No urgency, No hematuria, No incontinence  NEUROLOGICAL: No headaches, No dizziness, No numbness, No tingling, No tremors, No weakness  EXTREMITIES: No Swelling, No Pain, No Edema  SKIN: [ x ] No itching, burning, rashes, or lesions   MUSCULOSKELETAL: No joint pain, No joint swelling; No muscle pain, No back pain, No extremity pain  PSYCHIATRIC: No depression, No anxiety, No mood swings, No difficulty sleeping at night  PAIN SCALE: [ x ] None  [  ] Other-  ROS Unable to obtain due to: [  ] Dementia  [  ] Lethargy  [  ] Sedated  [  ] Non verbal  REST OF REVIEW OF SYSTEMS: [ x ] Normal     Vital Signs Last 24 Hrs  T(C): 36.7 (15 Jul 2022 12:00), Max: 36.8 (2022 21:04)  T(F): 98 (15 Jul 2022 12:00), Max: 98.3 (2022 21:04)  HR: 98 (15 Jul 2022 12:00) (75 - 98)  BP: 110/65 (15 Jul 2022 12:00) (105/57 - 130/72)  BP(mean): --  RR: 18 (15 Jul 2022 12:00) (17 - 18)  SpO2: 96% (15 Jul 2022 12:00) (92% - 96%)    Parameters below as of 15 Jul 2022 12:00  Patient On (Oxygen Delivery Method): room air        CAPILLARY BLOOD GLUCOSE      POCT Blood Glucose.: 245 mg/dL (15 Jul 2022 11:51)  POCT Blood Glucose.: 149 mg/dL (15 Jul 2022 07:56)  POCT Blood Glucose.: 153 mg/dL (2022 21:01)  POCT Blood Glucose.: 180 mg/dL (2022 16:48)      I&O's Summary    PHYSICAL EXAM:  GENERAL:  [ x ] NAD , [x ] well appearing, [  ] Agitated, [  ] Drowsy,  [ ] Lethargy, [x  ] confused   HEAD:  [  x] Normal, [  ] Other  EYES:  [ x ] EOMI, [ x ] PERRLA, [  x] conjunctiva and sclera clear normal, [  ] Other,  [  ] Pallor,[  ] Discharge  ENMT:  [x  ] Normal, [ x ] Moist mucous membranes, [  x] Good dentition, [ x ] No Thrush  NECK:  [ x ] Supple, [ x ] No JVD, [ x ] Normal thyroid, [  ] Lymphadenopathy [  ] Other  CHEST/LUNG:  [x  ] Clear to auscultation bilaterally, [ x ] Breath Sounds equal B/L  [ x ] poor effort  [ x ] No rales, [ x ] No rhonchi  [ x ]  No wheezing,   HEART:  [x  ] Regular rate and rhythm, [  ] tachycardia, [  ] Bradycardia,  [  ] irregular  [ x ] No murmurs, No rubs, No gallops, [  ] PPM in place (Mfr:  )  ABDOMEN:  [ x ] Soft, [ x ] Nontender, [  x] Nondistended, [ x ] No mass, [ x ] Bowel sounds present, [  ] obese  NERVOUS SYSTEM:  [ x ] Alert & Oriented X1, [x  ] Nonfocal  [ x ] Confusion  [  ] Encephalopathic [  ] Sedated [  ] Unable to assess, [x  ] Dementia [  ] Other-  EXTREMITIES: [ x ] 2+ Peripheral Pulses, No clubbing, No cyanosis,  [x  ] 2+ edema R lower EXT rt lower ext > left lower ext [ x ] PVD stasis skin changes B/L Lower EXT, [  ] wound  LYMPH: No lymphadenopathy noted  SKIN:  [ x ] No rashes or lesions, [  ] Pressure Ulcers, [ x ] ecchymosis, [  ] Skin Tears, [  ] Other    DIET: Soft, bite     LABS:                        8.2    7.95  )-----------( 341      ( 15 Jul 2022 06:30 )             25.9     15 Jul 2022 06:30    137    |  101    |  36     ----------------------------<  130    4.1     |  30     |  2.10     Ca    9.0        15 Jul 2022 06:30    TPro  6.2    /  Alb  2.7    /  TBili  0.5    /  DBili  x      /  AST  27     /  ALT  22     /  AlkPhos  66     15 Jul 2022 06:30      Urinalysis Basic - ( 2022 06:44 )    Color: Yellow / Appearance: Slightly Turbid / S.010 / pH: x  Gluc: x / Ketone: Negative  / Bili: Negative / Urobili: Negative   Blood: x / Protein: 15 / Nitrite: Negative   Leuk Esterase: Trace / RBC: 11-25 /HPF / WBC 3-5   Sq Epi: x / Non Sq Epi: Few / Bacteria: Occasional      Culture Results:   No growth ( @ 15:45)  Culture Results:   50,000 - 99,000 CFU/mL Enterococcus species ( @ 12:01)  Culture Results:   No growth ( @ 15:35)  Culture Results:   >=3 organisms. Probable collection contamination. (07-10 @ 13:20)    culture blood  -- Catheterized Catheterized  @ 15:45    culture urine  --   @ 15:45    Culture - Urine (collected 2022 15:45)  Source: Catheterized Catheterized  Final Report (2022 18:41):    No growth    Culture - Urine (collected 2022 12:01)  Source: Clean Catch Clean Catch (Midstream)  Preliminary Report (15 Jul 2022 03:53):    50,000 - 99,000 CFU/mL Enterococcus species    Culture - Urine (collected 2022 15:35)  Source: Catheterized Catheterized  Final Report (2022 00:22):    No growth    Culture - Urine (collected 10 Jul 2022 13:20)  Source: Clean Catch Clean Catch (Midstream)  Final Report (2022 08:35):    >=3 organisms. Probable collection contamination.    RADIOLOGY & ADDITIONAL TESTS: none      HEALTH ISSUES - PROBLEM Dx:  Hematuria    Anemia    Spinal stenosis    2019 novel coronavirus disease (COVID-19)    Cholelithiasis    Pleural effusion    Fall    CAD (coronary artery disease)    HTN (hypertension)    Type 2 diabetes mellitus    Chronic back pain    Anxiety and depression        DVT prophylaxis    Acute renal failure superimposed on stage 3 chronic kidney disease, unspecified acute renal failure type, unspecified whether stage 3a or 3b CKD    Need for prophylactic measure      Consultant(s) Notes Reviewed:  [ x ] YES     Care Discussed with [ x ] Consultants, [ x ] Patient, [  ] Family, [  ] HCP, [  ] , [  ] Social Service, [ x ] RN, [  ] Physical Therapy, [  ] Palliative Care Team  DVT PPX: [  ] Lovenox, [  ] SC Heparin, [  ] Coumadin, [  ] Xarelto, [  ] Eliquis, [  ] Pradaxa, [  ] IV Heparin drip, [ x ] SCD, [  ] Ambulation, [  ] Contraindicated 2/2 GI Bleed, [  ] Contraindicated 2/2  Bleed, [  ] Contraindicated 2/2 Brain Bleed  Advanced Directive: [  ] None, [x  ] DNR/DNI

## 2022-07-15 NOTE — PROGRESS NOTE ADULT - NS ATTEND AMEND GEN_ALL_CORE FT
-there is no evidence of acute ischemia.  -there is no evidence of significant arrhythmia.  -edema likely on basis of venous insuff, with pl effs on ct, to be managed conservatively without thoracentesis  -try to avoid use of diuretics given creat issues
Chart reviewed    Patient seen and examined    Review of plan as outlined above    89y F PMH CAD x 2 stents (2012), T2DM, HTN, HLD, GERD, mild AS, chronic venous insufficiency, chronic neuropathy, macular degeneration, chronic constipation, depression, OA, lumbar degenerative disc disease, and spinal stenosis admitted for cholelithiasis, KERRY, and found to be COVID19.     Chest pain, Edema, HTN, HLD, CAD s/p stents   - hx CAD s/p 2 stents in 2012  - EKG NSR 80, no acute ischemic changes  - Troponin WNL on admission, no need to further trend, doubt ACS as EKG WNL  - Continue asa, statin    - LE edema likely 2/2 chronic venous insufficiency versus component of HF  - Continue Lasix 20 every 2 days   - Does not appear volume overloaded on exam  - 7/7/22  EF 65% with norm LV/RV systolic function, with mild/mod MR.  - CT c/a/p with b/l mod effusions, partial LLL atelectasis  - Pulm following, no plan for thoracentesis for b/l effusions, plan for medical management
No evidence of active ischemia or vol OL.  To follow closely while admitted.  Supportive care for COVID per primary team.
Pt seen and examined, agree with above
confused this am  volume status seems to be improving, and would cont iv lasix  pulm follow up noted
volume status and creatinine gradually improving  ct still with dudley effusions, though cautious with diuresis  urology evaluating for hydronephrosis/hematuria
Chart reviewed    Patient seen and examined    Agree with plan as outlined    89y F pmhx CAD x 2 stents (2012), T2DM, HTN, HLD, GERD, mild AS, chronic venous insufficiency, chronic neuropathy, macular degeneration, chronic constipation, depression, OA, lumbar degenerative disc disease, and spinal stenosis admitted for cholelithiasis, KERRY, and found to be COVID19.     Chest pain, Edema, HTN, HLD  -hx CAD s/p 2 stents in 2012  -pt c/o intermittent chest pain,   -EKG NSR 80, no acute ischemic changes  -trop WNL on admission, no need to further trend, doubt ACS as ekg trops WNL  -continue asa, statin    - LE edema likely 2/2 chronic venous insufficiency versus component of HF  - Last TTE 6/21 with EF 60%, mod TR, mild pHTN  - f/u repeat TTE to eval EF and mild AS  - CT c/a/p with b/l mod effusions, partial LLL atelectasis  - Pt is s/p Lasix 40mg IV in ED, would continue diuresis at this time with close monitoring of renal function and volume status  - may consider thoracentesis for b/l effusions but would trial diuresis first
Chart reviewed    Patient seen and examined    Agree with plan as outlined above    89y F PMH CAD x 2 stents (2012), T2DM, HTN, HLD, GERD, mild AS, chronic venous insufficiency, chronic neuropathy, macular degeneration, chronic constipation, depression, OA, lumbar degenerative disc disease, and spinal stenosis admitted for cholelithiasis, KERRY, and found to be COVID19.     Chest pain, Edema, HTN, HLD, CAD s/p stents   - hx CAD s/p 2 stents in 2012  - EKG NSR 80, no acute ischemic changes  - Troponin WNL on admission, no need to further trend, doubt ACS as ekg trops WNL  - Continue asa, statin    - LE edema likely 2/2 chronic venous insufficiency versus component of HF  - 7/7/22 TTE unchanged from previous;   EF 65% with norm LV/RV systolic function, with mild/mod MR.  - CT c/a/p with b/l mod effusions, partial LLL atelectasis  - Lasix on hold 2/2 KERRY. Creatinine improving   - Pulm following, no plan for thoracentesis for b/l effusions, plan for medical management
no plans for thora. cont lasix. Please continue to maintain strict I/Os, monitor daily weights, Cr, and K. Further cardiac workup will depend on clinical course.

## 2022-07-15 NOTE — DISCHARGE NOTE NURSING/CASE MANAGEMENT/SOCIAL WORK - NSDCFUADDAPPT_GEN_ALL_CORE_FT
Info for House call Program   [facility] House Call [phone]   [facility] NP call [phone]  [facility] (formally [facility])-[phone]  [facility] call [phone]  [facility] call [phone]  [facility] Calls call [phone] Dr Jonn Rogers

## 2022-07-15 NOTE — DISCHARGE NOTE NURSING/CASE MANAGEMENT/SOCIAL WORK - PATIENT PORTAL LINK FT
You can access the FollowMyHealth Patient Portal offered by [facility] by registering at the following website: [email or web address]. By joining [facility]’s FollowMyHealth portal, you will also be able to view your health information using other applications (apps) compatible with our system.

## 2022-07-15 NOTE — PROGRESS NOTE ADULT - NUTRITIONAL ASSESSMENT
This patient has been assessed with a concern for Malnutrition and has been determined to have a diagnosis/diagnoses of Severe protein-calorie malnutrition.    This patient is being managed with:   Diet Soft and Bite Sized-  Low Fat (LOWFAT)  Supplement Feeding Modality:  Oral  Ensure Enlive Cans or Servings Per Day:  3       Frequency:  Three Times a day  Entered: Jul 12 2022 12:52PM    
This patient has been assessed with a concern for Malnutrition and has been determined to have a diagnosis/diagnoses of Severe protein-calorie malnutrition.    This patient is being managed with:   Diet Soft and Bite Sized-  Low Fat (LOWFAT)  Supplement Feeding Modality:  Oral  Ensure Enlive Cans or Servings Per Day:  3       Frequency:  Three Times a day  Entered: Jul 12 2022 12:52PM

## 2022-07-15 NOTE — PROGRESS NOTE ADULT - SUBJECTIVE AND OBJECTIVE BOX
[facility] -- Casper Mcgee, Hai, Briana, Eligio Ryan, Thalia Jose: Office # [phone]    Follow Up:  COVID, CAD     Subjective/Observations: Patient seen and examined, awake, alert, resting comfortably in bed, denies chest pain, dyspnea, palpitations or dizziness, orthopnea and PND. Tolerating room air.     REVIEW OF SYSTEMS: All review of systems is negative for eye, ENT, GI, , allergic, dermatologic, musculoskeletal and neurologic except as described above    PAST MEDICAL & SURGICAL HISTORY:  H/O vertebral fracture repair      History of vertebral fracture      Dyslipidemia      DM type 2 with diabetic dyslipidemia      H/O gastroesophageal reflux (GERD)      Costochondritis      Coronary arteriosclerosis in native artery  s/p 2 stents. last placed 2 years ago      Gastroparesis      History of laminectomy      S/P hip hemiarthroplasty      S/P appendectomy          MEDICATIONS  (STANDING):  amitriptyline 30 milliGRAM(s) Oral at bedtime  aspirin  chewable 81 milliGRAM(s) Oral daily  atorvastatin 40 milliGRAM(s) Oral at bedtime  cyanocobalamin 1000 MICROGram(s) Oral daily  dextrose 5%. 1000 milliLiter(s) (50 mL/Hr) IV Continuous <Continuous>  dextrose 5%. 1000 milliLiter(s) (100 mL/Hr) IV Continuous <Continuous>  dextrose 50% Injectable 25 Gram(s) IV Push once  dextrose 50% Injectable 12.5 Gram(s) IV Push once  dextrose 50% Injectable 25 Gram(s) IV Push once  ferrous    sulfate 325 milliGRAM(s) Oral daily  furosemide    Tablet 20 milliGRAM(s) Oral <User Schedule>  glucagon  Injectable 1 milliGRAM(s) IntraMuscular once  HYDROmorphone   Tablet 4 milliGRAM(s) Oral <User Schedule>  insulin lispro (ADMELOG) corrective regimen sliding scale   SubCutaneous three times a day before meals  insulin lispro (ADMELOG) corrective regimen sliding scale   SubCutaneous at bedtime  lidocaine   4% Patch 1 Patch Transdermal daily  lidocaine   4% Patch 1 Patch Transdermal daily  metoclopramide 5 milliGRAM(s) Oral every 6 hours  metoprolol succinate ER 25 milliGRAM(s) Oral daily  pantoprazole    Tablet 40 milliGRAM(s) Oral two times a day  polyethylene glycol 3350 17 Gram(s) Oral daily  senna 2 Tablet(s) Oral at bedtime  sucralfate suspension 1 Gram(s) Oral two times a day    MEDICATIONS  (PRN):  acetaminophen     Tablet .. 650 milliGRAM(s) Oral every 6 hours PRN Temp greater or equal to 38C (100.4F), Mild Pain (1 - 3)  aluminum hydroxide/magnesium hydroxide/simethicone Suspension 30 milliLiter(s) Oral every 4 hours PRN Dyspepsia  dextrose Oral Gel 15 Gram(s) Oral once PRN Blood Glucose LESS THAN 70 milliGRAM(s)/deciliter  melatonin 3 milliGRAM(s) Oral at bedtime PRN Insomnia  ondansetron Injectable 4 milliGRAM(s) IV Push every 8 hours PRN Nausea and/or Vomiting    Allergies    morphine (Other)    Intolerances      Vital Signs Last 24 Hrs  T(C): 36.7 (15 Jul 2022 12:00), Max: 36.8 (14 Jul 2022 21:04)  T(F): 98 (15 Jul 2022 12:00), Max: 98.3 (14 Jul 2022 21:04)  HR: 98 (15 Jul 2022 12:00) (75 - 98)  BP: 110/65 (15 Jul 2022 12:00) (105/57 - 130/72)  BP(mean): --  RR: 18 (15 Jul 2022 12:00) (17 - 18)  SpO2: 96% (15 Jul 2022 12:00) (92% - 96%)    Parameters below as of 15 Jul 2022 12:00  Patient On (Oxygen Delivery Method): room air      I&O's Summary        TELE: Not on telemetry   PHYSICAL EXAM:  Constitutional: NAD, awake and alert, frail  HEENT: Moist Mucous Membranes, Anicteric  Pulmonary: Non-labored, breath sounds are clear bilaterally, No wheezing, rales or rhonchi  Cardiovascular: Regular, S1 and S2, No murmurs, rubs, gallops or clicks  Gastrointestinal: Bowel Sounds present, soft, nontender.   Lymph: + R>L  peripheral edema. No lymphadenopathy.  Skin: No visible rashes or ulcers.  Psych:  Mood & affect appropriate for situation    LABS: All Labs Reviewed:                        8.2    7.95  )-----------( 341      ( 15 Jul 2022 06:30 )             25.9                         7.6    6.79  )-----------( 293      ( 14 Jul 2022 11:00 )             23.7                         7.4    5.04  )-----------( 189      ( 13 Jul 2022 06:55 )             23.3     15 Jul 2022 06:30    137    |  101    |  36     ----------------------------<  130    4.1     |  30     |  2.10   14 Jul 2022 11:00    136    |  101    |  40     ----------------------------<  99     4.1     |  27     |  1.90   13 Jul 2022 06:55    136    |  102    |  40     ----------------------------<  124    4.1     |  28     |  1.90     Ca    9.0        15 Jul 2022 06:30  Ca    9.1        14 Jul 2022 11:00  Ca    8.6        13 Jul 2022 06:55    TPro  6.2    /  Alb  2.7    /  TBili  0.5    /  DBili  x      /  AST  27     /  ALT  22     /  AlkPhos  66     15 Jul 2022 06:30  TPro  6.0    /  Alb  2.8    /  TBili  0.5    /  DBili  x      /  AST  38     /  ALT  25     /  AlkPhos  60     14 Jul 2022 11:00  TPro  5.4    /  Alb  2.4    /  TBili  0.4    /  DBili  x      /  AST  57     /  ALT  31     /  AlkPhos  56     13 Jul 2022 06:55                    12 Lead ECG:   Ventricular Rate 71 BPM    Atrial Rate 71 BPM    P-R Interval 142 ms    QRS Duration 84 ms    Q-T Interval 392 ms    QTC Calculation(Bazett) 425 ms    P Axis 64 degrees    R Axis 18 degrees    T Axis 7 degrees    Diagnosis Line Normal sinus rhythm  Normal ECG  When compared with ECG of 05-JUL-2022 19:57, (Unconfirmed)  No significant change was found  Confirmed by rBy Warren MD ([id number]) on 7/6/2022 1:56:48 PM (07-06-22 @ 13:32)    < from: CT Chest No Cont (07.05.22 @ 19:46) >    ACC: [id number] EXAM:  CT ABDOMEN AND PELVIS                        ACC: [id number] EXAM:  CT CHEST                          PROCEDURE DATE:  07/05/2022          INTERPRETATION:  CLINICAL INFORMATION: Epigastric abdominal pain and   chest pain.    COMPARISON: CT angiogram chest abdomen pelvis 6/20/2022.    CONTRAST/COMPLICATIONS:  IV Contrast: NONE  0 cc administered   0 cc discarded  Oral Contrast: NONE  Complications: None reported at time of study completion    PROCEDURE:  CT of the Chest, Abdomen and Pelvis was performed.  Sagittal and coronal reformats were performed.    FINDINGS:    CHEST:    LUNGS AND LARGE AIRWAYS:  PLEURA:  There are moderate-sized bilateral pleural effusions with partial   atelectasis bilateral lower lobes.    The central airways are patent.    VESSELS: Atherosclerotic changes thoracic aorta with coronary artery   calcification.    HEART: Heart size is normal. Dense mitral calcification.  No pericardial effusion.    MEDIASTINUM AND AVL:  No enlarged lymphadenopathy.    CHEST WALL AND LOWER NECK: Within normal limits.    ABDOMEN AND PELVIS:    Streak artifact degrades image quality.    The evaluation of the solid organ parenchyma is limited without   intravenous contrast.    LIVER: Within normal limits.  BILE DUCTS: Normal caliber.  GALLBLADDER: Mild gallbladder distention with cholelithiasis.  Right upper quadrant stranding.  SPLEEN: Within normal limits.  PANCREAS: Atrophic.  ADRENALS: Within normal limits.  KIDNEYS/URETERS:  Punctate nonobstructing intrarenal calcification upper pole left kidney.  Right-sided hydronephrosis.    BLADDER: Bladder wall thickening.  REPRODUCTIVE ORGANS: No pelvic mass.    BOWEL:  Evaluation of the stomach is limited without distention.  Prominent colonic fecal retentionwith mild distention of the colon; no   bowel obstruction.  Mild perirectal and presacral stranding.  Sigmoid diverticulosis, without CT evidence of diverticulitis.  PERITONEUM:  Small perihepatic ascites.    VESSELS: Within normal limits.  RETROPERITONEUM/LYMPH NODES: Atherosclerotic changes.    ABDOMINAL WALL:  Small fat-containing umbilical hernia.  Subcutaneous edema.    BONES:  Degenerative changes.  Vertebroplasty with compression deformities T11 and T12 vertebral bodies.  Chronic compression deformity L5 vertebral body.  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  Chronic deformity of the sternum.    IMPRESSION:    Moderate-sized bilateral pleural effusion with partial bilateral lower   lobe atelectasis.      Cholelithiasis with distended gallbladder and right upper quadrant   inflammatory changes, findings suspicious for acute cholecystitis.  Recommend further evaluation with right upper quadrant ultrasonography.    Other findings as discussed above.    --- End of Report ---            LYUDMILA DOMINGUEZ MD; Attending Radiologist  This document has been electronically signed. Jul  5 2022  8:32PM    < end of copied text >  < from: TTE Echo Complete w/o Contrast w/ Doppler (07.07.22 @ 13:45) >    ACC: [id number] EXAM:  ECHO TTE WO CON COMP W DOPP                          PROCEDURE DATE:  07/07/2022          INTERPRETATION:  INDICATION: dyspnea  Sonographer LK    Blood Pressure 143/70    Height 157 cm     Weight 52 kg       BSA 1.5 sq m    Dimensions:  LA unavailable       Normal Values: 2.0 - 4.0 cm  Ao unavailable        Normal Values: 2.0 - 3.8 cm  SEPTUM unavailable       Normal Values: 0.6 - 1.2 cm  PWT unavailable       Normal Values: 0.6 - 1.1 cm  LVIDd unavailable         Normal Values: 3.0 - 5.6 cm  LVIDs unavailable         Normal Values: 1.8 - 4.0 cm      OBSERVATIONS:  Technically difficult study  Mitral Valve: Mitral annular calcification with thickened leaflets, mild   to moderate MR.  Aortic Valve/Aorta: Sclerotic trileaflet aortic valve with grossly normal   opening by visual estimation.  Tricuspid Valve: normal with trace TR.  Pulmonic Valve: Not well-visualized  Left Atrium: Enlarged  Right Atrium: Not well-visualized  Left Ventricle: normal LV size and systolic function, estimated LVEF of   65%.  Right Ventricle: Grossly normal size and systolic function.  Pericardium: no significant pericardial effusion.  Pulmonary/RV Pressure: estimated PA systolic pressure of 20mmHg  IVC measures 1.85 cm          IMPRESSION:  Technically difficult study  Normal left ventricular internal dimensions and systolic function,   estimated LVEF of 65%.  Grossly normal RV size and systolic function.  Sclerotic trileaflet aortic valve with grossly normal opening by visual   estimation, without AI.  Mild to moderate MR  No significant pericardial effusion.    --- End of Report ---            BAM STEVENS MD; Attending Cardiologist  This document has been electronically signed. Jul 8 2022 10:54AM    < end of copied text >   [facility] -- Casper Mcgee, Briana Valles, Eligio Ryan, Thalia Jose: Office # [phone]    Follow Up:  COVID, CAD     Subjective/Observations: Patient seen and examined, awake, alert, resting comfortably in bed, denies chest pain, dyspnea, palpitations or dizziness, c/o back pain.  Tolerating room air.     REVIEW OF SYSTEMS: All review of systems is negative for eye, ENT, GI, , allergic, dermatologic, musculoskeletal and neurologic except as described above    PAST MEDICAL & SURGICAL HISTORY:  H/O vertebral fracture repair      History of vertebral fracture      Dyslipidemia      DM type 2 with diabetic dyslipidemia      H/O gastroesophageal reflux (GERD)      Costochondritis      Coronary arteriosclerosis in native artery  s/p 2 stents. last placed 2 years ago      Gastroparesis      History of laminectomy      S/P hip hemiarthroplasty      S/P appendectomy          MEDICATIONS  (STANDING):  amitriptyline 30 milliGRAM(s) Oral at bedtime  aspirin  chewable 81 milliGRAM(s) Oral daily  atorvastatin 40 milliGRAM(s) Oral at bedtime  cyanocobalamin 1000 MICROGram(s) Oral daily  dextrose 5%. 1000 milliLiter(s) (50 mL/Hr) IV Continuous <Continuous>  dextrose 5%. 1000 milliLiter(s) (100 mL/Hr) IV Continuous <Continuous>  dextrose 50% Injectable 25 Gram(s) IV Push once  dextrose 50% Injectable 12.5 Gram(s) IV Push once  dextrose 50% Injectable 25 Gram(s) IV Push once  ferrous    sulfate 325 milliGRAM(s) Oral daily  furosemide    Tablet 20 milliGRAM(s) Oral <User Schedule>  glucagon  Injectable 1 milliGRAM(s) IntraMuscular once  HYDROmorphone   Tablet 4 milliGRAM(s) Oral <User Schedule>  insulin lispro (ADMELOG) corrective regimen sliding scale   SubCutaneous three times a day before meals  insulin lispro (ADMELOG) corrective regimen sliding scale   SubCutaneous at bedtime  lidocaine   4% Patch 1 Patch Transdermal daily  lidocaine   4% Patch 1 Patch Transdermal daily  metoclopramide 5 milliGRAM(s) Oral every 6 hours  metoprolol succinate ER 25 milliGRAM(s) Oral daily  pantoprazole    Tablet 40 milliGRAM(s) Oral two times a day  polyethylene glycol 3350 17 Gram(s) Oral daily  senna 2 Tablet(s) Oral at bedtime  sucralfate suspension 1 Gram(s) Oral two times a day    MEDICATIONS  (PRN):  acetaminophen     Tablet .. 650 milliGRAM(s) Oral every 6 hours PRN Temp greater or equal to 38C (100.4F), Mild Pain (1 - 3)  aluminum hydroxide/magnesium hydroxide/simethicone Suspension 30 milliLiter(s) Oral every 4 hours PRN Dyspepsia  dextrose Oral Gel 15 Gram(s) Oral once PRN Blood Glucose LESS THAN 70 milliGRAM(s)/deciliter  melatonin 3 milliGRAM(s) Oral at bedtime PRN Insomnia  ondansetron Injectable 4 milliGRAM(s) IV Push every 8 hours PRN Nausea and/or Vomiting    Allergies    morphine (Other)    Intolerances      Vital Signs Last 24 Hrs  T(C): 36.7 (15 Jul 2022 12:00), Max: 36.8 (14 Jul 2022 21:04)  T(F): 98 (15 Jul 2022 12:00), Max: 98.3 (14 Jul 2022 21:04)  HR: 98 (15 Jul 2022 12:00) (75 - 98)  BP: 110/65 (15 Jul 2022 12:00) (105/57 - 130/72)  BP(mean): --  RR: 18 (15 Jul 2022 12:00) (17 - 18)  SpO2: 96% (15 Jul 2022 12:00) (92% - 96%)    Parameters below as of 15 Jul 2022 12:00  Patient On (Oxygen Delivery Method): room air      I&O's Summary        TELE: Not on telemetry   PHYSICAL EXAM:  Constitutional: NAD, awake and alert, frail  HEENT: Moist Mucous Membranes, Anicteric  Pulmonary: Non-labored, breath sounds are clear, diminished at bases bilaterally, No wheezing, rales or rhonchi  Cardiovascular: Regular, S1 and S2, No murmurs, rubs, gallops or clicks  Gastrointestinal: Bowel Sounds present, soft, nontender.   Lymph: + R>L  peripheral edema. No lymphadenopathy.  Skin: No visible rashes or ulcers.  Psych:  Mood & affect appropriate for situation    LABS: All Labs Reviewed:                        8.2    7.95  )-----------( 341      ( 15 Jul 2022 06:30 )             25.9                         7.6    6.79  )-----------( 293      ( 14 Jul 2022 11:00 )             23.7                         7.4    5.04  )-----------( 189      ( 13 Jul 2022 06:55 )             23.3     15 Jul 2022 06:30    137    |  101    |  36     ----------------------------<  130    4.1     |  30     |  2.10   14 Jul 2022 11:00    136    |  101    |  40     ----------------------------<  99     4.1     |  27     |  1.90   13 Jul 2022 06:55    136    |  102    |  40     ----------------------------<  124    4.1     |  28     |  1.90     Ca    9.0        15 Jul 2022 06:30  Ca    9.1        14 Jul 2022 11:00  Ca    8.6        13 Jul 2022 06:55    TPro  6.2    /  Alb  2.7    /  TBili  0.5    /  DBili  x      /  AST  27     /  ALT  22     /  AlkPhos  66     15 Jul 2022 06:30  TPro  6.0    /  Alb  2.8    /  TBili  0.5    /  DBili  x      /  AST  38     /  ALT  25     /  AlkPhos  60     14 Jul 2022 11:00  TPro  5.4    /  Alb  2.4    /  TBili  0.4    /  DBili  x      /  AST  57     /  ALT  31     /  AlkPhos  56     13 Jul 2022 06:55                    12 Lead ECG:   Ventricular Rate 71 BPM    Atrial Rate 71 BPM    P-R Interval 142 ms    QRS Duration 84 ms    Q-T Interval 392 ms    QTC Calculation(Bazett) 425 ms    P Axis 64 degrees    R Axis 18 degrees    T Axis 7 degrees    Diagnosis Line Normal sinus rhythm  Normal ECG  When compared with ECG of 05-JUL-2022 19:57, (Unconfirmed)  No significant change was found  Confirmed by Bry Warren MD ([id number]) on 7/6/2022 1:56:48 PM (07-06-22 @ 13:32)    < from: CT Chest No Cont (07.05.22 @ 19:46) >    ACC: [id number] EXAM:  CT ABDOMEN AND PELVIS                        ACC: [id number] EXAM:  CT CHEST                          PROCEDURE DATE:  07/05/2022          INTERPRETATION:  CLINICAL INFORMATION: Epigastric abdominal pain and   chest pain.    COMPARISON: CT angiogram chest abdomen pelvis 6/20/2022.    CONTRAST/COMPLICATIONS:  IV Contrast: NONE  0 cc administered   0 cc discarded  Oral Contrast: NONE  Complications: None reported at time of study completion    PROCEDURE:  CT of the Chest, Abdomen and Pelvis was performed.  Sagittal and coronal reformats were performed.    FINDINGS:    CHEST:    LUNGS AND LARGE AIRWAYS:  PLEURA:  There are moderate-sized bilateral pleural effusions with partial   atelectasis bilateral lower lobes.    The central airways are patent.    VESSELS: Atherosclerotic changes thoracic aorta with coronary artery   calcification.    HEART: Heart size is normal. Dense mitral calcification.  No pericardial effusion.    MEDIASTINUM AND VAL:  No enlarged lymphadenopathy.    CHEST WALL AND LOWER NECK: Within normal limits.    ABDOMEN AND PELVIS:    Streak artifact degrades image quality.    The evaluation of the solid organ parenchyma is limited without   intravenous contrast.    LIVER: Within normal limits.  BILE DUCTS: Normal caliber.  GALLBLADDER: Mild gallbladder distention with cholelithiasis.  Right upper quadrant stranding.  SPLEEN: Within normal limits.  PANCREAS: Atrophic.  ADRENALS: Within normal limits.  KIDNEYS/URETERS:  Punctate nonobstructing intrarenal calcification upper pole left kidney.  Right-sided hydronephrosis.    BLADDER: Bladder wall thickening.  REPRODUCTIVE ORGANS: No pelvic mass.    BOWEL:  Evaluation of the stomach is limited without distention.  Prominent colonic fecal retentionwith mild distention of the colon; no   bowel obstruction.  Mild perirectal and presacral stranding.  Sigmoid diverticulosis, without CT evidence of diverticulitis.  PERITONEUM:  Small perihepatic ascites.    VESSELS: Within normal limits.  RETROPERITONEUM/LYMPH NODES: Atherosclerotic changes.    ABDOMINAL WALL:  Small fat-containing umbilical hernia.  Subcutaneous edema.    BONES:  Degenerative changes.  Vertebroplasty with compression deformities T11 and T12 vertebral bodies.  Chronic compression deformity L5 vertebral body.  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  Chronic deformity of the sternum.    IMPRESSION:    Moderate-sized bilateral pleural effusion with partial bilateral lower   lobe atelectasis.      Cholelithiasis with distended gallbladder and right upper quadrant   inflammatory changes, findings suspicious for acute cholecystitis.  Recommend further evaluation with right upper quadrant ultrasonography.    Other findings as discussed above.    --- End of Report ---            LYUDMILA DOMINGUEZ MD; Attending Radiologist  This document has been electronically signed. Jul 5 2022  8:32PM    < end of copied text >  < from: TTE Echo Complete w/o Contrast w/ Doppler (07.07.22 @ 13:45) >    ACC: [id number] EXAM:  ECHO TTE WO CON COMP W DOPP                          PROCEDURE DATE:  07/07/2022          INTERPRETATION:  INDICATION: dyspnea  Sonographer LK    Blood Pressure 143/70    Height 157 cm     Weight 52 kg       BSA 1.5 sq m    Dimensions:  LA unavailable       Normal Values: 2.0 - 4.0 cm  Ao unavailable        Normal Values: 2.0 - 3.8 cm  SEPTUM unavailable       Normal Values: 0.6 - 1.2 cm  PWT unavailable       Normal Values: 0.6 - 1.1 cm  LVIDd unavailable         Normal Values: 3.0 - 5.6 cm  LVIDs unavailable         Normal Values: 1.8 - 4.0 cm      OBSERVATIONS:  Technically difficult study  Mitral Valve: Mitral annular calcification with thickened leaflets, mild   to moderate MR.  Aortic Valve/Aorta: Sclerotic trileaflet aortic valve with grossly normal   opening by visual estimation.  Tricuspid Valve: normal with trace TR.  Pulmonic Valve: Not well-visualized  Left Atrium: Enlarged  Right Atrium: Not well-visualized  Left Ventricle: normal LV size and systolic function, estimated LVEF of   65%.  Right Ventricle: Grossly normal size and systolic function.  Pericardium: no significant pericardial effusion.  Pulmonary/RV Pressure: estimated PA systolic pressure of 20mmHg  IVC measures 1.85 cm          IMPRESSION:  Technically difficult study  Normal left ventricular internal dimensions and systolic function,   estimated LVEF of 65%.  Grossly normal RV size and systolic function.  Sclerotic trileaflet aortic valve with grossly normal opening by visual   estimation, without AI.  Mild to moderate MR  No significant pericardial effusion.    --- End of Report ---            BAM STEVENS MD; Attending Cardiologist  This document has been electronically signed. Jul 8 2022 10:54AM    < end of copied text >

## 2022-07-15 NOTE — PROGRESS NOTE ADULT - PROBLEM SELECTOR PLAN 4
-s/p remdesivir, last dose completed 7/9  - saturating well on room air and no sx  - ID following Dr. Nash -s/p remdesivir, last dose completed 7/9, off isolation   - saturating well on room air and no sx  - ID following Dr. Nash.

## 2022-07-15 NOTE — PROGRESS NOTE ADULT - ASSESSMENT
89y F pmhx CAD x 2 stents (2012), T2DM, HTN, HLD, GERD, mild AS, chronic venous insufficiency, chronic neuropathy, macular degeneration, chronic constipation, depression, OA, lumbar degenerative disc disease, and spinal stenosis admitted for cholelithiasis, KERRY, and found to be COVID positive on admission.     COVID-19/Pleural Effusions  - Vaccinated x3 per patient.   sp remdesivir x3 day course w/ last dose 7/9    Cholelithiasis  - pt p/w nausea/decreased PO intake x 4 days  - CT abd/pel: Cholelithiasis with distended gallbladder and right upper quadrant inflammatory changes, findings suspicious for acute cholecystitis.  - RUQ US: Cholelithiasis with nonspecific pericholecystic fluid. Negative sonographic Scott sign. Dilated common bile duct.  - HIDA negative for acute cholecystitis  - Cx negative  Overall low suspicion for acute infection, S/p zosyn    KERRY  - likely 2/2 dec PO intake, Pre Renal , trend renal fxn, avoid nephrotoxins, renally dose medications    Hematuria  7/11- urology consulted for hematuria, UA w/o pyuria and mixed growth on urine culture  -discussed with med team, ordered stat urine 7/13-collected for culture and urinalysis and restarted abx  7/14-noted rather bland UA, noted urine culture results  -rec ob off abx    We will follow along in the care of this patient. Please call us at [phone] or text me directly on my cell# at [phone] with any concerns.

## 2022-07-15 NOTE — PROGRESS NOTE ADULT - PROBLEM SELECTOR PLAN 3
Pt requires a hospital bed due to spinal stenosis with limit mobility and cognitive impairment. Pt requires frequent and immediate position changes that are not feasible in a regular bed with pillows. Patient needs HOB elevated more than 30 degrees most of the time duet to spinal stenosis, cognitive impairment, multiple falls, CKD.   The patient requires a gel  bed overlay due to patient has limit mobility and CAD requires a gel overlay. Pt requires a hospital bed due to spinal stenosis with limit mobility and cognitive impairment. Pt requires frequent and immediate position changes that are not feasible in a regular bed with pillows. Patient needs HOB elevated more than 30 degrees most of the time duet to spinal stenosis, cognitive impairment, multiple falls, CKD.   The patient requires a gel  bed overlay due to patient has limit mobility and CAD requires a gel overlay.  -Compression fractures  -on Dilaudid 4 mg 3x day  -Lido patches on back

## 2022-07-15 NOTE — PROGRESS NOTE ADULT - SUBJECTIVE AND OBJECTIVE BOX
Date/Time Patient Seen:  		  Referring MD:   Data Reviewed	       Patient is a 89y old  Female who presents with a chief complaint of gallstones, julisa, kiah (14 Jul 2022 14:29)      Subjective/HPI     PAST MEDICAL & SURGICAL HISTORY:  H/O vertebral fracture repair    History of vertebral fracture    Dyslipidemia    DM type 2 with diabetic dyslipidemia    H/O gastroesophageal reflux (GERD)    Costochondritis    Coronary arteriosclerosis in native artery  s/p 2 stents. last placed 2 years ago    Gastroparesis    History of laminectomy    S/P hip hemiarthroplasty    S/P appendectomy          Medication list         MEDICATIONS  (STANDING):  amitriptyline 30 milliGRAM(s) Oral at bedtime  aspirin  chewable 81 milliGRAM(s) Oral daily  atorvastatin 40 milliGRAM(s) Oral at bedtime  cyanocobalamin 1000 MICROGram(s) Oral daily  dextrose 5%. 1000 milliLiter(s) (50 mL/Hr) IV Continuous <Continuous>  dextrose 5%. 1000 milliLiter(s) (100 mL/Hr) IV Continuous <Continuous>  dextrose 50% Injectable 25 Gram(s) IV Push once  dextrose 50% Injectable 12.5 Gram(s) IV Push once  dextrose 50% Injectable 25 Gram(s) IV Push once  ferrous    sulfate 325 milliGRAM(s) Oral daily  furosemide    Tablet 20 milliGRAM(s) Oral <User Schedule>  glucagon  Injectable 1 milliGRAM(s) IntraMuscular once  HYDROmorphone   Tablet 4 milliGRAM(s) Oral <User Schedule>  insulin lispro (ADMELOG) corrective regimen sliding scale   SubCutaneous three times a day before meals  insulin lispro (ADMELOG) corrective regimen sliding scale   SubCutaneous at bedtime  lidocaine   4% Patch 1 Patch Transdermal daily  lidocaine   4% Patch 1 Patch Transdermal daily  metoclopramide 5 milliGRAM(s) Oral every 6 hours  metoprolol succinate ER 25 milliGRAM(s) Oral daily  pantoprazole    Tablet 40 milliGRAM(s) Oral two times a day  polyethylene glycol 3350 17 Gram(s) Oral daily  senna 2 Tablet(s) Oral at bedtime  sucralfate suspension 1 Gram(s) Oral two times a day    MEDICATIONS  (PRN):  acetaminophen     Tablet .. 650 milliGRAM(s) Oral every 6 hours PRN Temp greater or equal to 38C (100.4F), Mild Pain (1 - 3)  aluminum hydroxide/magnesium hydroxide/simethicone Suspension 30 milliLiter(s) Oral every 4 hours PRN Dyspepsia  dextrose Oral Gel 15 Gram(s) Oral once PRN Blood Glucose LESS THAN 70 milliGRAM(s)/deciliter  melatonin 3 milliGRAM(s) Oral at bedtime PRN Insomnia  ondansetron Injectable 4 milliGRAM(s) IV Push every 8 hours PRN Nausea and/or Vomiting         Vitals log        ICU Vital Signs Last 24 Hrs  T(C): 36.7 (15 Jul 2022 05:23), Max: 36.8 (14 Jul 2022 12:05)  T(F): 98 (15 Jul 2022 05:23), Max: 98.3 (14 Jul 2022 21:04)  HR: 85 (15 Jul 2022 05:23) (75 - 85)  BP: 105/57 (15 Jul 2022 05:23) (105/57 - 130/72)  BP(mean): --  ABP: --  ABP(mean): --  RR: 17 (15 Jul 2022 05:23) (17 - 17)  SpO2: 92% (15 Jul 2022 05:23) (92% - 95%)    O2 Parameters below as of 15 Jul 2022 05:23  Patient On (Oxygen Delivery Method): room air                 Input and Output:  I&O's Detail      Lab Data                        7.6    6.79  )-----------( 293      ( 14 Jul 2022 11:00 )             23.7     07-14    136  |  101  |  40<H>  ----------------------------<  99  4.1   |  27  |  1.90<H>    Ca    9.1      14 Jul 2022 11:00    TPro  6.0  /  Alb  2.8<L>  /  TBili  0.5  /  DBili  x   /  AST  38<H>  /  ALT  25  /  AlkPhos  60  07-14            Review of Systems	      Objective     Physical Examination    heart s1s2  lung dec BS  abd soft      Pertinent Lab findings & Imaging      Olvin:  NO   Adequate UO     I&O's Detail           Discussed with:     Cultures:	        Radiology

## 2022-07-15 NOTE — PROGRESS NOTE ADULT - ASSESSMENT
89y F pmhx CAD x 2 stents (2012), T2DM, HTN, HLD, GERD, mild AS, chronic venous insufficiency, chronic neuropathy, macular degeneration, chronic constipation, depression, OA, lumbar degenerative disc disease, and spinal stenosis admitted for cholelithiasis, KERRY, and found to be COVID positive on admission    covid  OP  OA  Pleural eff  Atelectasis  KERRY  CAD  Fall  Ataxic gait  HLD  GERD  valv heart disease    ct renal stone hunt noted - nephrolithiasis - effusion - atelectasis -   MBS noted - diet on order  LE doppler NEG for DVT  on DIlaudid for PAIN  on PO LASIX    cvs rx regimen  TTE -   covid management - sx management - monitor VS and Sat - isolation precs  fall prec - PT assessment - gait training - CM follow up  pleural eff - no need for thoracentesis in the immediate future - medical rx regimen and cvs rx regimen  I devonte as able to tolerate  Surgery eval in progress - Acute Angelica eval noted  GOC discussion  assist with needs  repllisandro almanzar

## 2022-07-15 NOTE — PROGRESS NOTE ADULT - PROBLEM SELECTOR PLAN 7
-CT Chest: Moderate-sized bilateral pleural effusion with partial bilateral lower lobe atelectasis  - patient saturating well on room air and denies sob  -TTE shows normal LV systolic function, LVEF 65%, mild/mod MR  - pulmonology (Dr. Melo) consulted- no need for thoracentesis in the immediate future, incentive devonte as tolerated

## 2022-07-15 NOTE — PROGRESS NOTE ADULT - PROBLEM SELECTOR PLAN 1
Possible secondary to COVID-19/ UTI   - Hb low stable   - Urology following Dr. Hill- no further work up at this time, Discussed with daughter Mary in detail and when patient improved consider Cystoscopy and retrograde study -possible stent. unclear Etiology, No gross hematuria   - ? cystitis/ UTI-s/p IV Rocephin -Stopped as urine culture-NEG -NO UTI as per ID-Dr Nash  - Hb low stable   - Urology following Dr. Hill- no further work up at this time, Discussed with daughter Mary in detail and , as d/w Dr hill -pt is NOT a good candidate for Cysto/stent placement.  -

## 2022-07-15 NOTE — PROGRESS NOTE ADULT - SUBJECTIVE AND OBJECTIVE BOX
Patient is a 89y old  Female who presents with a chief complaint of gallstones, covid, kiah (2022 11:05)       HPI:  89y F pmhx CAD x 2 stents (), T2DM, HTN, HLD, GERD, mild AS, chronic venous insufficiency, chronic neuropathy, macular degeneration, chronic constipation, depression, OA, and recent admission to [facility] - 3/2 for gallstone pancreatitis with non-operative treatment presented to the ED with constant nausea x 4 days associated with decreased PO intake, generalized weakness, and fall today x 1 due to LE weakness. Patient denies fever, chills, cp, dizziness, sob, abd pain, vomiting, diarrhea. Patient was seen here in ED  for epigastric pain (since resolved) and discharged with outpatient follow up with Dr. Terrell. Patient has seen Dr. Terrell twice since then and epigastric pain resolved s/p PO zofran and carafate with planned esophagram. Daughter states epigastric pain resolved, but patient has had constant nausea x 4 days that has not improved despite increasing PO zofran from 4mg to 8mg q6h. Patient was unable to obtain appointment with Dr. Terrell today and after falling at home due to leg weakness, daughter brought her to the ED.     ED Course  Vitals: 99.2F, 85bpm, 143/66, 95% RA  Labs: Hgb 10.2, Hct 32.5, Na 132, K 5.5, BUN 30, Cr 2, GFR 23, lipase wnl  EKG:    CT Chest and abd/pel: Moderate-sized bilateral pleural effusion with partial bilateral lower lobe atelectasis. Cholelithiasis with distended gallbladder and right upper quadrant inflammatory changes, findings suspicious for acute cholecystitis.    RUQ US: Cholelithiasis with nonspecific pericholecystic fluid. Negative sonographic Scott sign. Dilated common bowel duct. Recommend correlating with LFTs. Cannot exclude distal choledocholithiasis.    HEAD CT: Mild volume loss, microvascular disease, no acute hemorrhage or midline shift.    Patient Received: Zosyn x1, NS 500cc x1, Lasix 40mg IV x1, IV dilaudid .5mg x1           (2022 23:51)       PAST MEDICAL & SURGICAL HISTORY:  H/O vertebral fracture repair      History of vertebral fracture      Dyslipidemia      DM type 2 with diabetic dyslipidemia      H/O gastroesophageal reflux (GERD)      Costochondritis      Coronary arteriosclerosis in native artery  s/p 2 stents. last placed 2 years ago      Gastroparesis      History of laminectomy      S/P hip hemiarthroplasty      S/P appendectomy           FAMILY HISTORY:  FHx: stroke (Mother)    NC    Social History:Non smoker    MEDICATIONS  (STANDING):  amitriptyline 30 milliGRAM(s) Oral at bedtime  aspirin  chewable 81 milliGRAM(s) Oral daily  atorvastatin 40 milliGRAM(s) Oral at bedtime  cefTRIAXone   IVPB 1000 milliGRAM(s) IV Intermittent every 24 hours  cyanocobalamin 1000 MICROGram(s) Oral daily  dextrose 5%. 1000 milliLiter(s) (100 mL/Hr) IV Continuous <Continuous>  dextrose 5%. 1000 milliLiter(s) (50 mL/Hr) IV Continuous <Continuous>  dextrose 50% Injectable 25 Gram(s) IV Push once  dextrose 50% Injectable 12.5 Gram(s) IV Push once  dextrose 50% Injectable 25 Gram(s) IV Push once  ferrous    sulfate 325 milliGRAM(s) Oral daily  furosemide    Tablet 20 milliGRAM(s) Oral <User Schedule>  glucagon  Injectable 1 milliGRAM(s) IntraMuscular once  HYDROmorphone   Tablet 4 milliGRAM(s) Oral three times a day  insulin lispro (ADMELOG) corrective regimen sliding scale   SubCutaneous three times a day before meals  insulin lispro (ADMELOG) corrective regimen sliding scale   SubCutaneous at bedtime  lidocaine   4% Patch 1 Patch Transdermal daily  lidocaine   4% Patch 1 Patch Transdermal daily  metoclopramide 5 milliGRAM(s) Oral every 6 hours  metoprolol succinate ER 25 milliGRAM(s) Oral daily  pantoprazole    Tablet 40 milliGRAM(s) Oral two times a day  polyethylene glycol 3350 17 Gram(s) Oral daily  senna 2 Tablet(s) Oral at bedtime  sucralfate suspension 1 Gram(s) Oral two times a day    MEDICATIONS  (PRN):  acetaminophen     Tablet .. 650 milliGRAM(s) Oral every 6 hours PRN Temp greater or equal to 38C (100.4F), Mild Pain (1 - 3)  aluminum hydroxide/magnesium hydroxide/simethicone Suspension 30 milliLiter(s) Oral every 4 hours PRN Dyspepsia  dextrose Oral Gel 15 Gram(s) Oral once PRN Blood Glucose LESS THAN 70 milliGRAM(s)/deciliter  melatonin 3 milliGRAM(s) Oral at bedtime PRN Insomnia  ondansetron Injectable 4 milliGRAM(s) IV Push every 8 hours PRN Nausea and/or Vomiting   Meds reviewed    Allergies    morphine (Other)    Intolerances         REVIEW OF SYSTEMS:    Review of Systems:   Constitutional: Denies fatigue  HEENT: Denies headaches and dizziness  Respiratory: denies SOB, cough, or wheezing  Cardiovascular: denies CP, palpitations  Gastrointestinal: Denies nausea, denies vomiting, diarrhea, constipation, abdominal pain, or bloody stools  Genitourinary: No dysuria denies bloody urine  Skin: denies rashes or itching  Musculoskeletal: +Chronic back pain.  Neurologic: Denies generalized weakness, denies loss of sensation, numbness, or tingling      Vital Signs Last 24 Hrs  T(C): 36.7 (15 Jul 2022 05:23), Max: 36.8 (2022 12:05)  T(F): 98 (15 Jul 2022 05:23), Max: 98.3 (2022 21:04)  HR: 85 (15 Jul 2022 05:23) (75 - 85)  BP: 105/57 (15 Jul 2022 05:23) (105/57 - 130/72)  BP(mean): --  RR: 17 (15 Jul 2022 05:23) (17 - 17)  SpO2: 92% (15 Jul 2022 05:23) (92% - 95%)    Parameters below as of 15 Jul 2022 05:23  Patient On (Oxygen Delivery Method): room air          Daily     Daily     PHYSICAL EXAM:    GENERAL: NAD  HEAD:  Atraumatic, Normocephalic  EYES: EOMI, conjunctiva and sclera clear  ENMT: No Drainage from nares, No drainage from ears  NECK: Supple, neck  veins full  NERVOUS SYSTEM:  Awake and Alert  CHEST/LUNG: Clear to percussion bilaterally; No rales, rhonchi, wheezing, or rubs  HEART: Regular rate and rhythm; No murmurs, rubs, or gallops  ABDOMEN: nontender; Nondistended; Bowel sounds present  EXTREMITIES:  +LE edema R>L  SKIN: No rashes No obvious ecchymosis      LABS:                                           8.2    7.95  )-----------( 341      ( 15 Jul 2022 06:30 )             25.9     07-15    137  |  101  |  36<H>  ----------------------------<  130<H>  4.1   |  30  |  2.10<H>    Ca    9.0      15 Jul 2022 06:30    TPro  6.2  /  Alb  2.7<L>  /  TBili  0.5  /  DBili  x   /  AST  27  /  ALT  22  /  AlkPhos  66  07-15      Urinalysis Basic - ( 2022 06:44 )    Color: Yellow / Appearance: Slightly Turbid / S.010 / pH: x  Gluc: x / Ketone: Negative  / Bili: Negative / Urobili: Negative   Blood: x / Protein: 15 / Nitrite: Negative   Leuk Esterase: Trace / RBC: 11-25 /HPF / WBC 3-5   Sq Epi: x / Non Sq Epi: Few / Bacteria: Occasional

## 2022-07-15 NOTE — PROGRESS NOTE ADULT - SUBJECTIVE AND OBJECTIVE BOX
[facility]  Shar Hopkins PA-C  [address] [place] [address]  [phone]      INTERVAL HPI/OVERNIGHT EVENTS:  Pt s/e  Pt confused, eating little  Denies GI complaints    MEDICATIONS  (STANDING):  amitriptyline 30 milliGRAM(s) Oral at bedtime  aspirin  chewable 81 milliGRAM(s) Oral daily  atorvastatin 40 milliGRAM(s) Oral at bedtime  cefTRIAXone   IVPB 1000 milliGRAM(s) IV Intermittent every 24 hours  cyanocobalamin 1000 MICROGram(s) Oral daily  dextrose 5%. 1000 milliLiter(s) (50 mL/Hr) IV Continuous <Continuous>  dextrose 5%. 1000 milliLiter(s) (100 mL/Hr) IV Continuous <Continuous>  dextrose 50% Injectable 25 Gram(s) IV Push once  dextrose 50% Injectable 12.5 Gram(s) IV Push once  dextrose 50% Injectable 25 Gram(s) IV Push once  ferrous    sulfate 325 milliGRAM(s) Oral daily  furosemide    Tablet 20 milliGRAM(s) Oral <User Schedule>  glucagon  Injectable 1 milliGRAM(s) IntraMuscular once  HYDROmorphone   Tablet 4 milliGRAM(s) Oral three times a day  insulin lispro (ADMELOG) corrective regimen sliding scale   SubCutaneous three times a day before meals  insulin lispro (ADMELOG) corrective regimen sliding scale   SubCutaneous at bedtime  lidocaine   4% Patch 1 Patch Transdermal daily  lidocaine   4% Patch 1 Patch Transdermal daily  metoclopramide 5 milliGRAM(s) Oral every 6 hours  metoprolol succinate ER 25 milliGRAM(s) Oral daily  pantoprazole    Tablet 40 milliGRAM(s) Oral two times a day  polyethylene glycol 3350 17 Gram(s) Oral daily  senna 2 Tablet(s) Oral at bedtime  sucralfate suspension 1 Gram(s) Oral two times a day    MEDICATIONS  (PRN):  acetaminophen     Tablet .. 650 milliGRAM(s) Oral every 6 hours PRN Temp greater or equal to 38C (100.4F), Mild Pain (1 - 3)  aluminum hydroxide/magnesium hydroxide/simethicone Suspension 30 milliLiter(s) Oral every 4 hours PRN Dyspepsia  dextrose Oral Gel 15 Gram(s) Oral once PRN Blood Glucose LESS THAN 70 milliGRAM(s)/deciliter  melatonin 3 milliGRAM(s) Oral at bedtime PRN Insomnia  ondansetron Injectable 4 milliGRAM(s) IV Push every 8 hours PRN Nausea and/or Vomiting      Allergies  morphine (Other)      PHYSICAL EXAM:   Vital Signs Last 24 Hrs  T(C): 36.7 (15 Jul 2022 05:23), Max: 36.8 (2022 12:05)  T(F): 98 (15 Jul 2022 05:23), Max: 98.3 (2022 21:04)  HR: 85 (15 Jul 2022 05:23) (75 - 85)  BP: 105/57 (15 Jul 2022 05:23) (105/57 - 130/72)  BP(mean): --  RR: 17 (15 Jul 2022 05:23) (17 - 17)  SpO2: 92% (15 Jul 2022 05:23) (92% - 95%)    Parameters below as of 15 Jul 2022 05:23  Patient On (Oxygen Delivery Method): room air      GENERAL:  Appears stated age  ABDOMEN:  Soft, non-tender, non-distended  NEURO:  Confused    LABS:                        8.2    7.95  )-----------( 341      ( 15 Jul 2022 06:30 )             25.9     07-15    137  |  101  |  36<H>  ----------------------------<  130<H>  4.1   |  30  |  2.10<H>    Ca    9.0      15 Jul 2022 06:30    TPro  6.2  /  Alb  2.7<L>  /  TBili  0.5  /  DBili  x   /  AST  27  /  ALT  22  /  AlkPhos  66  07-15        Urinalysis Basic - ( 2022 06:44 )    Color: Yellow / Appearance: Slightly Turbid / S.010 / pH: x  Gluc: x / Ketone: Negative  / Bili: Negative / Urobili: Negative   Blood: x / Protein: 15 / Nitrite: Negative   Leuk Esterase: Trace / RBC: 11-25 /HPF / WBC 3-5   Sq Epi: x / Non Sq Epi: Few / Bacteria: Occasional

## 2022-07-17 RX ORDER — FUROSEMIDE 40 MG
1 TABLET ORAL
Qty: 15 | Refills: 0
Start: 2022-07-17 | End: 2022-08-15

## 2022-07-29 ENCOUNTER — INPATIENT (INPATIENT)
Facility: HOSPITAL | Age: 87
LOS: 4 days | Discharge: HOME | DRG: 682 | End: 2022-08-03
Attending: INTERNAL MEDICINE | Admitting: INTERNAL MEDICINE
Payer: MEDICARE

## 2022-07-29 VITALS
HEART RATE: 83 BPM | DIASTOLIC BLOOD PRESSURE: 63 MMHG | TEMPERATURE: 98 F | OXYGEN SATURATION: 98 % | HEIGHT: 62 IN | WEIGHT: 110.01 LBS | SYSTOLIC BLOOD PRESSURE: 121 MMHG | RESPIRATION RATE: 18 BRPM

## 2022-07-29 DIAGNOSIS — K21.9 GASTRO-ESOPHAGEAL REFLUX DISEASE WITHOUT ESOPHAGITIS: ICD-10-CM

## 2022-07-29 DIAGNOSIS — E11.9 TYPE 2 DIABETES MELLITUS WITHOUT COMPLICATIONS: ICD-10-CM

## 2022-07-29 DIAGNOSIS — Z98.89 OTHER SPECIFIED POSTPROCEDURAL STATES: Chronic | ICD-10-CM

## 2022-07-29 DIAGNOSIS — K59.00 CONSTIPATION, UNSPECIFIED: ICD-10-CM

## 2022-07-29 DIAGNOSIS — Z98.890 OTHER SPECIFIED POSTPROCEDURAL STATES: ICD-10-CM

## 2022-07-29 DIAGNOSIS — N17.9 ACUTE KIDNEY FAILURE, UNSPECIFIED: ICD-10-CM

## 2022-07-29 DIAGNOSIS — I25.10 ATHEROSCLEROTIC HEART DISEASE OF NATIVE CORONARY ARTERY WITHOUT ANGINA PECTORIS: ICD-10-CM

## 2022-07-29 DIAGNOSIS — E86.0 DEHYDRATION: ICD-10-CM

## 2022-07-29 DIAGNOSIS — Z29.9 ENCOUNTER FOR PROPHYLACTIC MEASURES, UNSPECIFIED: ICD-10-CM

## 2022-07-29 DIAGNOSIS — Z96.60 PRESENCE OF UNSPECIFIED ORTHOPEDIC JOINT IMPLANT: Chronic | ICD-10-CM

## 2022-07-29 DIAGNOSIS — E78.5 HYPERLIPIDEMIA, UNSPECIFIED: ICD-10-CM

## 2022-07-29 DIAGNOSIS — R53.1 WEAKNESS: ICD-10-CM

## 2022-07-29 DIAGNOSIS — N39.0 URINARY TRACT INFECTION, SITE NOT SPECIFIED: ICD-10-CM

## 2022-07-29 DIAGNOSIS — R62.7 ADULT FAILURE TO THRIVE: ICD-10-CM

## 2022-07-29 LAB
ALBUMIN SERPL ELPH-MCNC: 3 G/DL — LOW (ref 3.3–5)
ALP SERPL-CCNC: 227 U/L — HIGH (ref 40–120)
ALT FLD-CCNC: 18 U/L — SIGNIFICANT CHANGE UP (ref 12–78)
ANION GAP SERPL CALC-SCNC: 9 MMOL/L — SIGNIFICANT CHANGE UP (ref 5–17)
APPEARANCE UR: ABNORMAL
AST SERPL-CCNC: 32 U/L — SIGNIFICANT CHANGE UP (ref 15–37)
BASOPHILS # BLD AUTO: 0.02 K/UL — SIGNIFICANT CHANGE UP (ref 0–0.2)
BASOPHILS NFR BLD AUTO: 0.3 % — SIGNIFICANT CHANGE UP (ref 0–2)
BILIRUB SERPL-MCNC: 0.5 MG/DL — SIGNIFICANT CHANGE UP (ref 0.2–1.2)
BILIRUB UR-MCNC: NEGATIVE — SIGNIFICANT CHANGE UP
BUN SERPL-MCNC: 88 MG/DL — HIGH (ref 7–23)
CALCIUM SERPL-MCNC: 9.2 MG/DL — SIGNIFICANT CHANGE UP (ref 8.5–10.1)
CHLORIDE SERPL-SCNC: 96 MMOL/L — SIGNIFICANT CHANGE UP (ref 96–108)
CO2 SERPL-SCNC: 28 MMOL/L — SIGNIFICANT CHANGE UP (ref 22–31)
COLOR SPEC: YELLOW — SIGNIFICANT CHANGE UP
CREAT SERPL-MCNC: 3.9 MG/DL — HIGH (ref 0.5–1.3)
DIFF PNL FLD: ABNORMAL
EGFR: 10 ML/MIN/1.73M2 — LOW
EOSINOPHIL # BLD AUTO: 0.04 K/UL — SIGNIFICANT CHANGE UP (ref 0–0.5)
EOSINOPHIL NFR BLD AUTO: 0.5 % — SIGNIFICANT CHANGE UP (ref 0–6)
GLUCOSE SERPL-MCNC: 159 MG/DL — HIGH (ref 70–99)
GLUCOSE UR QL: NEGATIVE — SIGNIFICANT CHANGE UP
HCT VFR BLD CALC: 33.6 % — LOW (ref 34.5–45)
HGB BLD-MCNC: 10.3 G/DL — LOW (ref 11.5–15.5)
IMM GRANULOCYTES NFR BLD AUTO: 0.4 % — SIGNIFICANT CHANGE UP (ref 0–1.5)
KETONES UR-MCNC: ABNORMAL
LEUKOCYTE ESTERASE UR-ACNC: ABNORMAL
LYMPHOCYTES # BLD AUTO: 0.37 K/UL — LOW (ref 1–3.3)
LYMPHOCYTES # BLD AUTO: 4.6 % — LOW (ref 13–44)
MAGNESIUM SERPL-MCNC: 3.2 MG/DL — HIGH (ref 1.6–2.6)
MCHC RBC-ENTMCNC: 28.9 PG — SIGNIFICANT CHANGE UP (ref 27–34)
MCHC RBC-ENTMCNC: 30.7 GM/DL — LOW (ref 32–36)
MCV RBC AUTO: 94.4 FL — SIGNIFICANT CHANGE UP (ref 80–100)
MONOCYTES # BLD AUTO: 0.7 K/UL — SIGNIFICANT CHANGE UP (ref 0–0.9)
MONOCYTES NFR BLD AUTO: 8.8 % — SIGNIFICANT CHANGE UP (ref 2–14)
NEUTROPHILS # BLD AUTO: 6.8 K/UL — SIGNIFICANT CHANGE UP (ref 1.8–7.4)
NEUTROPHILS NFR BLD AUTO: 85.4 % — HIGH (ref 43–77)
NITRITE UR-MCNC: NEGATIVE — SIGNIFICANT CHANGE UP
NRBC # BLD: 0 /100 WBCS — SIGNIFICANT CHANGE UP (ref 0–0)
PH UR: 6.5 — SIGNIFICANT CHANGE UP (ref 5–8)
PLATELET # BLD AUTO: 295 K/UL — SIGNIFICANT CHANGE UP (ref 150–400)
POTASSIUM SERPL-MCNC: 5.3 MMOL/L — SIGNIFICANT CHANGE UP (ref 3.5–5.3)
POTASSIUM SERPL-SCNC: 5.3 MMOL/L — SIGNIFICANT CHANGE UP (ref 3.5–5.3)
PROT SERPL-MCNC: 6.9 G/DL — SIGNIFICANT CHANGE UP (ref 6–8.3)
PROT UR-MCNC: 100
RBC # BLD: 3.56 M/UL — LOW (ref 3.8–5.2)
RBC # FLD: 19.2 % — HIGH (ref 10.3–14.5)
SARS-COV-2 RNA SPEC QL NAA+PROBE: DETECTED
SODIUM SERPL-SCNC: 133 MMOL/L — LOW (ref 135–145)
SP GR SPEC: 1.01 — SIGNIFICANT CHANGE UP (ref 1.01–1.02)
UROBILINOGEN FLD QL: NEGATIVE — SIGNIFICANT CHANGE UP
WBC # BLD: 7.96 K/UL — SIGNIFICANT CHANGE UP (ref 3.8–10.5)
WBC # FLD AUTO: 7.96 K/UL — SIGNIFICANT CHANGE UP (ref 3.8–10.5)

## 2022-07-29 PROCEDURE — G1004: CPT

## 2022-07-29 PROCEDURE — 99285 EMERGENCY DEPT VISIT HI MDM: CPT | Mod: FS,CS

## 2022-07-29 PROCEDURE — 93010 ELECTROCARDIOGRAM REPORT: CPT

## 2022-07-29 PROCEDURE — 71045 X-RAY EXAM CHEST 1 VIEW: CPT | Mod: 26

## 2022-07-29 PROCEDURE — 71250 CT THORAX DX C-: CPT | Mod: 26,MG

## 2022-07-29 PROCEDURE — 73502 X-RAY EXAM HIP UNI 2-3 VIEWS: CPT | Mod: 26,RT

## 2022-07-29 PROCEDURE — 74176 CT ABD & PELVIS W/O CONTRAST: CPT | Mod: 26,ME

## 2022-07-29 PROCEDURE — 73562 X-RAY EXAM OF KNEE 3: CPT | Mod: 26,RT

## 2022-07-29 PROCEDURE — 93971 EXTREMITY STUDY: CPT | Mod: 26,RT

## 2022-07-29 RX ORDER — AMITRIPTYLINE HCL 25 MG
0 TABLET ORAL
Qty: 0 | Refills: 0 | DISCHARGE

## 2022-07-29 RX ORDER — LANOLIN ALCOHOL/MO/W.PET/CERES
3 CREAM (GRAM) TOPICAL AT BEDTIME
Refills: 0 | Status: DISCONTINUED | OUTPATIENT
Start: 2022-07-29 | End: 2022-07-31

## 2022-07-29 RX ORDER — HEPARIN SODIUM 5000 [USP'U]/ML
5000 INJECTION INTRAVENOUS; SUBCUTANEOUS EVERY 8 HOURS
Refills: 0 | Status: DISCONTINUED | OUTPATIENT
Start: 2022-07-29 | End: 2022-07-31

## 2022-07-29 RX ORDER — HYDROMORPHONE HYDROCHLORIDE 2 MG/ML
0.5 INJECTION INTRAMUSCULAR; INTRAVENOUS; SUBCUTANEOUS ONCE
Refills: 0 | Status: DISCONTINUED | OUTPATIENT
Start: 2022-07-29 | End: 2022-07-29

## 2022-07-29 RX ORDER — SODIUM CHLORIDE 9 MG/ML
500 INJECTION INTRAMUSCULAR; INTRAVENOUS; SUBCUTANEOUS ONCE
Refills: 0 | Status: COMPLETED | OUTPATIENT
Start: 2022-07-29 | End: 2022-07-29

## 2022-07-29 RX ORDER — HYDROMORPHONE HYDROCHLORIDE 2 MG/ML
4 INJECTION INTRAMUSCULAR; INTRAVENOUS; SUBCUTANEOUS ONCE
Refills: 0 | Status: DISCONTINUED | OUTPATIENT
Start: 2022-07-29 | End: 2022-07-29

## 2022-07-29 RX ORDER — FERROUS SULFATE 325(65) MG
0 TABLET ORAL
Qty: 0 | Refills: 0 | DISCHARGE

## 2022-07-29 RX ORDER — SODIUM CHLORIDE 9 MG/ML
1000 INJECTION INTRAMUSCULAR; INTRAVENOUS; SUBCUTANEOUS ONCE
Refills: 0 | Status: COMPLETED | OUTPATIENT
Start: 2022-07-29 | End: 2022-07-29

## 2022-07-29 RX ADMIN — HYDROMORPHONE HYDROCHLORIDE 0.5 MILLIGRAM(S): 2 INJECTION INTRAMUSCULAR; INTRAVENOUS; SUBCUTANEOUS at 23:45

## 2022-07-29 RX ADMIN — SODIUM CHLORIDE 1000 MILLILITER(S): 9 INJECTION INTRAMUSCULAR; INTRAVENOUS; SUBCUTANEOUS at 17:14

## 2022-07-29 RX ADMIN — HYDROMORPHONE HYDROCHLORIDE 4 MILLIGRAM(S): 2 INJECTION INTRAMUSCULAR; INTRAVENOUS; SUBCUTANEOUS at 19:15

## 2022-07-29 RX ADMIN — HYDROMORPHONE HYDROCHLORIDE 0.5 MILLIGRAM(S): 2 INJECTION INTRAMUSCULAR; INTRAVENOUS; SUBCUTANEOUS at 23:30

## 2022-07-29 RX ADMIN — HYDROMORPHONE HYDROCHLORIDE 4 MILLIGRAM(S): 2 INJECTION INTRAMUSCULAR; INTRAVENOUS; SUBCUTANEOUS at 18:57

## 2022-07-29 NOTE — H&P ADULT - PROBLEM SELECTOR PROBLEM 6
CAD (coronary artery disease) H/O vertebral fracture repair Type 2 diabetes mellitus 2019 novel coronavirus disease (COVID-19)

## 2022-07-29 NOTE — ED PROVIDER NOTE - OBJECTIVE STATEMENT
89 yo female with h/o DM, HLD, GERD presents to the ED for pain/discomfort with urinating since patient was discharged from hospital. Patient was recently discharged from [facility] - patient was covid +, found to have cholelithiasis, KERRY and UTI/hematuria. Patient was discharged home, lives by herself with 24/7 aide and visiting nurse. Visiting nurse came to evaluate patient today and she recommended coming to ED for further evaluation and treatment. Patient takes chronic pain meds - Dilaudid 4 mg prn. Daughter states patient has worsening weakness, decreased PO intact. Patient currently on macrodantin for UTI. Visting nurse was suppose to sent new urine/culture today but instead was sent to ED. Daughter also concerned for ecchymosis to right knee since patient was discharged and right hip pain. no recent fall or injury.     pmd: Dr. Long Alvarado.

## 2022-07-29 NOTE — H&P ADULT - ATTENDING COMMENTS
89yo F with PMH DM, dyslipidemia, gastroparesis, CAD s/p 2 stents, hx of vertebral fx with repair presents to the ED with back and R leg pain, as well as recent decreased urinary frequency, admitted for KERRY/CKD 3  and dehydration.  Pt seen, Examined, case & care plan d/w pt, residents at detail.  D/W Dtr at Detail.  Pulmonary-Dr Melo  Renal-DR JL Christopher  DNR/DNI  PO diet  AM labs   IV Fluids, I/O'

## 2022-07-29 NOTE — ED PROVIDER NOTE - CLINICAL SUMMARY MEDICAL DECISION MAKING FREE TEXT BOX
89 y/o F from home brought in by daughter for decreased urination 89 y/o F from home brought in by daughter for decreased urination, abd pain after attempting to have BM, weakness, decreased PO intake.  pt with recent hospitalization with multiple labs, CTs to further evaluate pt's worsening chronic complaints.  will repeat labs, UA r/o UTI.  pt currently on macrodantin a per recent urine cultures.

## 2022-07-29 NOTE — H&P ADULT - HISTORY OF PRESENT ILLNESS
in ED:  VS: BP , HR , F, RR , SpO2 %  Labs significant for: WBC , H/H ,   ua:   CXR:  CT head abd/pelvis:   EKG: NSR, no ST wave abnormalities  Given:  Additional history obtained from daughter (Mary) via telephone conversation.    89yo F with PMH DM, dyslipidemia, gastroparesis, CAD s/p 2 stents, hx of vertebral fx with repair presents to the ED with back and R leg pain, which have been ongoing "for quite some time." Patient was previously at [facility] 7/5/22-7/15/22 for COVID+, cholelithiasis, KERRY, and UTI/hematuria. Patient lives at home by herself with home health aide and daughter who take turns caring for her. Daughter commented that patient has worsening weakness and PO intake, increased irritability, and recent difficulty with urination. Daughter states that during her previous admission and right after discharge, patient would report urinary frequency that occurred every 20-30 minutes; Nephrology had seen her previously and prescribed Lasix q48h. While patient's appetite and thirst are decreased at baseline, daughter commented on significantly decreased appetite and thirst since yesterday. Patient says that sometimes "she forgets to drink", and daughter states that COVID infection has changed her sense of taste as well as overall appetite. Daughter also concerned about ecchymosis in the R hip and knee region which had occurred during the last hospital stay; however patient denies any recent fall or injury.    in ED:  Vital Signs: T 97.9F, HR 83, 121/63, RR 18, 98% on RA  Labs: Hgb 10.3 (baseline 10-11), Na 133, BUN 88, Cr 3.90, Gluc 159, Alk Phos 227, Mg 3.2  COVID+  CXR: Large bilateral pleural effusions layering posteriorly. Cannot exclude   underlying infiltrate or atelectasis.  CT chest, abd/pelvis: Moderate B/L hydronephrosis, moderate B/L pleural effusions, mild ascites  RLE doppler: No evidence of RLE DVT  Given: NaCl bolus x1, Dilaudid Additional history obtained from daughter (Mary) via telephone conversation.    89yo F with PMH DM, dyslipidemia, gastroparesis, CAD s/p 2 stents, hx of vertebral fx with repair presents to the ED with back and R leg pain, as well as recent decreased urinary frequency. Patient was previously at [facility] 7/5/22-7/15/22 for COVID+, cholelithiasis, KERRY, and UTI/hematuria. Patient lives at home by herself with home health aide and daughter who take turns caring for her. Daughter commented that patient has worsening weakness and PO intake, increased irritability, and recent difficulty with urination. Daughter states that during her previous admission and right after discharge, patient would report urinary frequency that occurred every 20-30 minutes; Nephrology had seen her previously and prescribed Lasix q48h. While patient's appetite and thirst are decreased at baseline, daughter commented on significantly decreased appetite and thirst since yesterday. Patient says that sometimes "she forgets to drink", and daughter states that COVID infection has changed her sense of taste as well as overall appetite. Daughter also concerned about ecchymosis in the R hip and knee region which had occurred during the last hospital stay; however patient denies any recent fall or injury.     in ED:  Vital Signs: T 97.9F, HR 83, 121/63, RR 18, 98% on RA  Labs: Hgb 10.3 (baseline 10-11), Na 133, BUN 88, Cr 3.90, Gluc 159, Alk Phos 227, Mg 3.2  COVID+  CXR: Large bilateral pleural effusions layering posteriorly. Cannot exclude   underlying infiltrate or atelectasis.  CT chest, abd/pelvis: Moderate B/L hydronephrosis, moderate B/L pleural effusions, mild ascites  RLE doppler: No evidence of RLE DVT  Given: NaCl bolus x1, Dilaudid Additional history obtained from daughter (Mary) via telephone conversation.    89yo F with PMH DM, dyslipidemia, gastroparesis, CAD s/p 2 stents, hx of vertebral fx with repair presents to the ED with back and R leg pain, as well as recent decreased urinary frequency. Patient was previously at [facility] 7/5/22-7/15/22 for COVID+, cholelithiasis, KERRY, and UTI/hematuria. Patient lives at home by herself with home health aide and daughter who take turns caring for her. Daughter commented that patient has worsening weakness and PO intake, increased irritability, and recent difficulty with urination. Daughter states that during her previous admission and right after discharge, patient would report urinary frequency that occurred every 20-30 minutes; Nephrology had seen her previously and prescribed Lasix q48h. While patient's appetite and thirst are decreased at baseline, daughter commented on significantly decreased appetite and thirst since yesterday. Patient says that sometimes "she forgets to drink", and daughter states that COVID infection has changed her sense of taste as well as overall appetite. Daughter also concerned about ecchymosis in the R hip and knee region which had occurred during the last hospital stay; however patient denies any recent fall or injury.     in ED:  Vital Signs: T 97.9F, HR 83, 121/63, RR 18, 98% on RA  Labs: Hgb 10.3 (baseline 10-11), Na 133, BUN 88, Cr 3.90, Gluc 159, Alk Phos 227, Mg 3.2  COVID+  CXR: Large bilateral pleural effusions layering posteriorly. Cannot exclude   underlying infiltrate or atelectasis.  CT chest, abd/pelvis: Moderate B/L hydronephrosis, moderate B/L pleural effusions, mild ascites  RLE doppler: No evidence of RLE DVT  UA: Proteinuria, trace ketones, small leukocyte esterase, large blood, few bacteria  Given: NaCl bolus x1, Dilaudid Additional history obtained from daughter (Mary) via telephone conversation.    89yo F with PMH DM, dyslipidemia, gastroparesis, CAD s/p 2 stents, hx of vertebral fx with repair presents to the ED with back and R leg pain, as well as recent decreased urinary frequency. Patient was previously at [facility] 7/5/22-7/15/22 for COVID+, cholelithiasis, KERRY, and UTI/hematuria. Patient lives at home by herself with home health aide and daughter who take turns caring for her. Daughter commented that patient has worsening weakness and PO intake, increased irritability, and recent difficulty with urination. Daughter states that during her previous admission and right after discharge, patient would report urinary frequency that occurred every 20-30 minutes; Nephrology had seen her previously and prescribed Lasix q48h. While patient's appetite and thirst are decreased at baseline, daughter commented on significantly decreased appetite and thirst since yesterday. Patient says that sometimes "she forgets to drink", and daughter states that COVID infection has changed her sense of taste as well as overall appetite. Daughter also concerned about ecchymosis in the R hip and knee region which had occurred during the last hospital stay; however patient denies any recent fall or injury.     in ED:  Vital Signs: T 97.9F, HR 83, 121/63, RR 18, 98% on RA  Labs: Hgb 10.3 (baseline 10-11), Na 133, BUN 88, Cr 3.90, Gluc 159, Alk Phos 227, Mg 3.2  COVID+  CXR: Large bilateral pleural effusions layering posteriorly. Cannot exclude   underlying infiltrate or atelectasis.  CT chest, abd/pelvis: Moderate B/L hydronephrosis, moderate B/L pleural effusions, mild ascites  RLE doppler: No evidence of RLE DVT  UA: Proteinuria, trace ketones, small leukocyte esterase, large blood, few bacteria  Given: NaCl bolus x1, PO Dilaudid, IV Dilaudid Additional history obtained from daughter (Mary) via telephone conversation.    89yo F with PMH DM, dyslipidemia, gastroparesis, CAD s/p 2 stents, hx of vertebral fx with repair presents to the ED with back and R leg pain, as well as recent decreased urinary frequency. Patient was previously at [facility] 7/5/22-7/15/22 for COVID+, cholelithiasis, KERRY, and UTI/hematuria. Patient lives at home by herself with home health aide and daughter who take turns caring for her. Daughter commented that patient has worsening weakness and PO intake, increased irritability, and recent difficulty with urination. Daughter states that during her previous admission, patient would report urinary frequency that occurred every 20-30 minutes; Nephrology had seen her previously and prescribed Lasix q48h. Patient says that sometimes "she forgets to drink", and daughter states that COVID infection has changed her sense of taste as well as overall appetite. Daughter also concerned about ecchymosis in the R hip and knee region which had occurred during the last hospital stay; however patient denies any recent fall or injury.     in ED:  Vital Signs: T 97.9F, HR 83, 121/63, RR 18, 98% on RA  Labs: Hgb 10.3 (baseline 10-11), Na 133, BUN 88, Cr 3.90, Gluc 159, Alk Phos 227, Mg 3.2  COVID+  CXR: Large bilateral pleural effusions layering posteriorly. Cannot exclude   underlying infiltrate or atelectasis.  CT chest, abd/pelvis: Moderate B/L hydronephrosis, moderate B/L pleural effusions, mild ascites  RLE doppler: No evidence of RLE DVT  UA: Proteinuria, trace ketones, small leukocyte esterase, large blood, few bacteria  Given: NaCl bolus x1, PO Dilaudid, IV Dilaudid Additional history obtained from daughter (Mary) via telephone conversation.    89yo F with PMH DM, dyslipidemia, gastroparesis, CAD s/p 2 stents, hx of vertebral fx with repair presents to the ED with back and R leg pain, as well as recent decreased urinary frequency. Patient was previously at [facility] 7/5/22-7/15/22 for COVID+, cholelithiasis, KERRY, and UTI/hematuria. Patient lives at home by herself with home health aide and daughter who take turns caring for her. Daughter commented that patient has worsening weakness and PO intake, increased irritability, and recent difficulty with urination. Daughter states that during her previous admission, patient would report urinary frequency that occurred every 20-30 minutes; Nephrology had seen her previously and prescribed Lasix q48h. Patient says that sometimes "she forgets to drink", and daughter states that COVID infection has changed her sense of taste as well as overall appetite. Daughter also concerned about ecchymosis in the R hip and knee region which had occurred during the last hospital stay; however patient denies any recent fall or injury.     in ED:  Vital Signs: T 97.9F, HR 83, 121/63, RR 18, 98% on RA  Labs: Hgb 10.3, Na 133, BUN 88, Cr 3.90, Gluc 159, Alk Phos 227, Mg 3.2  COVID+  CXR: Large bilateral pleural effusions layering posteriorly. Cannot exclude   underlying infiltrate or atelectasis.  CT chest, abd/pelvis: Moderate B/L hydronephrosis, moderate B/L pleural effusions, mild ascites  RLE doppler: No evidence of RLE DVT  UA: Proteinuria, trace ketones, small leukocyte esterase, large blood, few bacteria  Given: NaCl bolus x1, PO Dilaudid, IV Dilaudid

## 2022-07-29 NOTE — H&P ADULT - PROBLEM SELECTOR PROBLEM 7
Constipation Anemia CAD (coronary artery disease) H/O vertebral fracture repair Esophageal dysmotility

## 2022-07-29 NOTE — H&P ADULT - CARDIOVASCULAR
details… regular rate and rhythm/S1 S2 present/no gallops/no rub/no murmur regular rate and rhythm/S1 S2 present/no gallops/no rub/no murmur/no JVD

## 2022-07-29 NOTE — H&P ADULT - PROBLEM SELECTOR PROBLEM 10
Need for prophylactic measure GERD (gastroesophageal reflux disease) Dyslipidemia Constipation H/O vertebral fracture repair

## 2022-07-29 NOTE — ED ADULT NURSE REASSESSMENT NOTE - NS ED NURSE REASSESS COMMENT FT1
1858:  patient complaining of pain.  daughter states that she missed her 4pm dialudid 4mg dose.  medicated as per order.  awaiting urine sample.  daughter states she will be able to go.  commode placed, but patient had a bm in with urine and was unable to collect urine.  za don.

## 2022-07-29 NOTE — H&P ADULT - PROBLEM SELECTOR PLAN 2
Patient reported to have decreased thirst and appetite, with elevated BUN/Cr on admission  - Pureed diet with aspiration precautions  - Encourage PO and fluid intake Patient reported to have decreased thirst and appetite, with elevated BUN/Cr on admission  - Pureed diet with aspiration precautions  - Encourage PO and fluid intake  - Pureed diet (consistent carb) with glucerna 3x/day, aspiration precautions Hgb 10.3 on admission. Baseline Hgb 10-11 from previous admissions, also confirmed by daughter.   - f/u AM CBC, trend H/H  - Continue home ferrous sulfate  - Transfuse if Hgb <7 or clinically symptomatic

## 2022-07-29 NOTE — H&P ADULT - PROBLEM SELECTOR PLAN 10
- Heparin subQ for DVT ppx - Continue home Protonix - Continue home Atorvastatin Patient reports constipation at baseline, takes Miralax and Senna  - Continue home bowel regimen Hx of vertebral fracture with repair, patient presents with back and R leg pain. Takes PO dilaudid at home  - Continue home PO dilaudid  - Fall risk protocol

## 2022-07-29 NOTE — H&P ADULT - PROBLEM SELECTOR PLAN 1
Patient presents with decreased urinary frequency on admission, catheter present in ED. UA with trace ketones, small leukocyte esterase, large blood, and few bacteria. BUN 88, Cr 3.90  - CT abd/pelvis: Moderate B/L hydronephrosis, moderate B/L pleural effusions, mild ascites  - Avoid nephrotoxic meds  - Trend renal function and lytes, adjust as necessary  - Monitor I&Os  - Nephro consult for f/u recs (Dr. Wu) Patient presents with decreased urinary frequency on admission, catheter present in ED. UA with trace ketones, small leukocyte esterase, large blood, and few bacteria. BUN 88, Cr 3.90  - CT abd/pelvis: Moderate B/L hydronephrosis, moderate B/L pleural effusions, mild ascites  - Avoid nephrotoxic meds  - Trend renal function and lytes, adjust as necessary  - Monitor I&Os  - Hold home Lasix due to KERRY, IV fluids  - Nephro consult for f/u recs (seen Dr. Wu previously) KERRY/CKD 3 -BUN 88, Cr 3.90  - Bah catheter present in ED.  - UA with trace ketones, small leukocyte esterase, large blood, and few bacteria.  - CT abd/pelvis: Moderate B/L hydronephrosis, moderate B/L pleural effusions, mild ascites  -IV Fluids 70cc/hr ,    - Avoid nephrotoxic meds  - BMP in AM   - Monitor I&Os  - Hold home Lasix due to KERRY, IV fluids  - Nephro consult for f/u recs (seen Dr. Wu previously)

## 2022-07-29 NOTE — H&P ADULT - PROBLEM SELECTOR PLAN 5
Hx of vertebral fracture with repair, patient presents with back and R leg pain. Takes PO dilaudid at home  - Continue home PO dilaudid  - Fall risk protocol Patient previously admitted to [facility] for UTI, recently prescribed Macrodantin by PCP. UA with trace ketones, small leukocyte esterase, large blood, and few bacteria.  - Hold home Macrodantin  - f/u urine cultures Patient presented with back and R leg pain, daughter also commented on more noticeable generalized weakness  - PT consult for weakness, deconditioning  - Palliative consult for GOC discussion  -  consult for dispo, d/c Patient reports constipation at baseline, takes Miralax and Senna  - Continue home bowel regimen

## 2022-07-29 NOTE — H&P ADULT - RESPIRATORY
clear to auscultation bilaterally/no wheezes/no rales/no rhonchi/no respiratory distress/no use of accessory muscles clear to auscultation bilaterally/no wheezes/no rales/no rhonchi/no respiratory distress/no use of accessory muscles/breath sounds equal/respirations non-labored

## 2022-07-29 NOTE — ED ADULT NURSE REASSESSMENT NOTE - NS ED NURSE REASSESS COMMENT FT1
1720:  iv access obtained, labs collected and sent, including covid swab.  500ml bolus infusing.  patient taken to sonogram.  bladder scan performed revealed only 40-50ml.  MD aware, patient has fluids infusing.  daughter at bedside.  informed we can not straight cath at this time due to limited  urine.  will continue to monitor.  left for sono in stable condition.  za don.

## 2022-07-29 NOTE — H&P ADULT - GASTROINTESTINAL
soft/nondistended/bowel sounds hypoactive details… soft/nondistended/normal active bowel sounds/no guarding/no rigidity/no organomegaly/no palpable kelechi/no masses palpable/bowel sounds hypoactive

## 2022-07-29 NOTE — H&P ADULT - PROBLEM SELECTOR PLAN 9
- Continue home Protonix - Continue home Atorvastatin Patient reports constipation at baseline, takes Miralax and Senna  - Continue home bowel regimen Hgb 10.3 on admission. Baseline Hgb 10-11 from previous admissions, also confirmed by daughter.   - f/u AM CBC, trend H/H  - Continue home ferrous sulfate  - Transfuse if Hgb <7 or clinically symptomatic Gluc 159 on admission  - Insulin Corrective Scale  - Finger sticks per routine  - Hypoglycemia protocol  - Continue home Atorvastatin

## 2022-07-29 NOTE — H&P ADULT - NSHPSOCIALHISTORY_GEN_ALL_CORE
Tobacco: Former smoker  EtOH: Denies   Recreational drug use: Denies  Lives with: Self, HHA and daughter work around the clock for additional assistance  Ambulates: Cane, walker  ADLs: Requires assistance with all ADLs

## 2022-07-29 NOTE — H&P ADULT - PROBLEM SELECTOR PLAN 8
- Continue home Atorvastatin Patient reports constipation at baseline, takes Miralax and Senna  - Continue home bowel regimen Hgb 10.3 on admission. Baseline Hgb 10-11 from previous admissions, also confirmed by daughter.   - f/u AM CBC, trend H/H  - Continue home ferrous sulfate  - Transfuse if Hgb <7 or clinically symptomatic Patient has history of CAD s/p 2 stents  - Continue home Aspirin Patient presented with back and R leg pain, daughter also commented on more noticeable generalized weakness  - PT consult for weakness, deconditioning  - Palliative consult for GOC discussion  -  consult for dispo, d/c

## 2022-07-29 NOTE — H&P ADULT - NEUROLOGICAL
details… sensation intact/responds to verbal commands Forgrtful/normal/sensation intact/responds to verbal commands/cranial nerves intact

## 2022-07-29 NOTE — ED ADULT NURSE NOTE - HISTORY OF COVID-19 VACCINATION
Problem: Patient Care Overview  Goal: Plan of Care/Patient Progress Review  Outcome: Improving  S-(situation): End of shift note    B-(background): post vaginal delivery    A-(assessment): doing well, feeding better.    R-(recommendations): Report to the next shift.         Yes

## 2022-07-29 NOTE — H&P ADULT - PROBLEM SELECTOR PROBLEM 4
Urinary tract infection Type 2 diabetes mellitus Bilateral pleural effusion CAD (coronary artery disease)

## 2022-07-29 NOTE — ED PROVIDER NOTE - WR ORDER STATUS 3
After Visit Summary - Transition      Demographics     Address: Home Phone: Work Phone: Mobile Phone:    [address] [facility] [address]    [phone] -- [phone]    SSN: Insurance: Marital Status: [employer]:     [employer] MEDICARE  [employer] ([facility] [address] [facility])      Additional Demographic Information     Date Of Birth Sex Race Ethnicity Preferred Language    11/19/1926 Female White Not of  or  Origin English      Family Information     None           Patient Discharge Summary   1/22/2017    Kathleen Galeano            Please bring this medication reconciliation form to your next doctor’s appointment(s). Please update the form if you stop taking any of these medications or you start taking any new medications including over the counter medications. Also please carry a copy of this form with you at all times in the event of an emergency.    A copy of these discharge instructions was reviewed with and given to the patient/patient representative/Guardian/Caregiver at discharge.          The doctor who took care of you in the hospital     Provider Specialty    Trevor Fuchs DO Emergency Medicine    Valentino Elmore MD Internal Medicine    Humberto Kenny MD Internal Medicine      Admission Dx      Fall, initial encounter     Fracture, intertrochanteric, right femur, closed, initial encounter    FRACTURE, INTERTROCHANTERIC, RIGHT FEMUR, CLOSED, INITIAL ENCOUNTER    FALL, INITIAL ENCOUNTER    right hip fracture      Problem List as of 2/2/2017  Date Reviewed: 1/23/2017             ICD-10-CM Priority Class Noted - Resolved    Essential hypertension, benign I10   6/30/2009 - Present    Other and unspecified hyperlipidemia E78.5   6/30/2009 - Present    Rheumatoid arthritis M06.9   Unknown - Present    ASHD I25.10   Unknown - Present    Type II or unspecified type diabetes mellitus without mention of complication, not stated as  uncontrolled E11.9   Unknown - Present    Fall W19.XXXA   4/13/2012 - Present    Hip fracture, left S72.002A   4/13/2012 - Present    Alzheimer's dementia G30.9   2/25/2013 - Present    Arterial leg ulcer L97.909   11/18/2014 - Present    Dermatophytosis of nail B35.1   1/13/2015 - Present    RESOLVED: Pain in limb M79.609   1/13/2015 - 5/28/2015    Other hammer toe (acquired) M20.40   1/13/2015 - Present    Subarachnoid hemorrhage I60.9   5/1/2015 - Present    Laceration of left eyebrow S01.112A   5/1/2015 - Present    Open wound of leg S81.809A   6/15/2015 - Present    Pain in both feet M79.671, M79.672   10/24/2015 - Present    Onychomycosis B35.1   10/24/2015 - Present    Moderate persistent asthma with acute exacerbation J45.41   8/17/2016 - Present      Allergies as of 2/2/2017        Reactions    Penicillins RASH    Tetracycline     Lisinopril Cough           Discharge Medications              What to Do with Your Medications      START taking these medications today unless otherwise stated        Details    Morning Noon Evening Bedtime As needed    HYDROcodone-acetaminophen 5-325 MG per tablet   Commonly known as:  NORCO        Take 1 tablet by mouth every 4 hours as needed for Pain.   Authorizing Provider:  Humberto Kenny   Last Dose:  1 tablet on 1/31/2017  2:25 AM                                                        CONTINUE taking these medications which have CHANGED        Details    Morning Noon Evening Bedtime As needed    NYSTOP 342518 UNIT/GM powder   What changed:  Another medication with the same name was removed. Continue taking this medication, and follow the directions you see here.   Next Dose Due:  Apply to affected areas three times daily        APPLY TOPICALLY TO AFFECTED AREAS THREE TIMES A DAY   Authorizing Provider:  Kapil Dailey   Last Dose:  2/2/2017  9:03 AM                                                              CONTINUE taking these medications which have NOT CHANGED         Details    Morning Noon Evening Bedtime As needed    acetaminophen 325 MG tablet   Commonly known as:  TYLENOL   Next Dose Due:  Take 2 tablets every 4 hours as needed for pain        Take 2 tablets by mouth every 4 hours as needed for Pain or Fever. Dose:  2 tablets (=650 mg)   Authorizing Provider:  Kasey Mack   Last Dose:  650 mg on 2/1/2017  7:36 AM                               albuterol-ipratropium 2.5 mg/0.5 mg 0.5-2.5 (3) MG/3ML nebulizer solution   Commonly known as:  DUONEB        INHALE 3 ML (=2.5 MG ALBUT/0.5 MG IPRAT) VIA HHN EVERY 6 HOURS AS NEEDED   Authorizing Provider:  Ijeoma Hale   Last Dose:  3 mLs on 2/1/2017 12:06 PM                               ASPIR-LOW 81 MG EC tablet        Generic drug:  aspirin   GIVE 1 TABLET BY MOUTH ONCE DAILY   Authorizing Provider:  Kapil Dailey   Last Dose:  81 mg on 1/31/2017 10:41 AM                               donepezil 10 MG tablet   Commonly known as:  ARICEPT        GIVE 1 TABLET BY MOUTH AT BEDTIME FOR MEMORY   Authorizing Provider:  Kasey Mack   Last Dose:  10 mg on 1/31/2017 10:11 PM                               escitalopram 10 MG tablet   Commonly known as:  LEXAPRO        GIVE 1 TABLET BY MOUTH ONCE DAILY   Authorizing Provider:  Kapil Dailey   Last Dose:  10 mg on 2/1/2017  7:36 AM                               FERREX 150 PLUS 150-50-50 MG Cap        GIVE 1 CAPSULE BY MOUTH EVERY EVENING W/FOOD FOR SUPPLEMENT   Authorizing Provider:  Kapil Dailey                               fluticasone 110 MCG/ACT inhaler   Commonly known as:  FLOVENT HFA        Inhale 2 puffs into the lungs 2 times daily.                                  omeprazole 20 MG capsule   Commonly known as:  PRILOSEC        GIVE 1 CAPSULE BY MOUTH ONCE DAILY FOR ACID REFLUX   Authorizing Provider:  Kapil Dailey                               QC TUSSIN COLD/COUGH PO        Take 10 mLs by mouth as needed. Indications: cough                               Senna 8.6  MG Tab        GIVE 1 TABLET BY MOUTH EVERY MORNING W/BREAKFAST FOR STOOL SOFTNER   Authorizing Provider:  Kapil Dailey                                                        STOP taking these medications, discuss with your Pharmacist        Reason for stopping Comments    ALL DAY RELIEF 220 MG tablet   Generic drug:  naproxen sodium             triamterene-hydrochlorothiazide 37.5-25 MG per capsule   Commonly known as:  DYAZIDE                                    Where to Get Your Medications      You are receiving a paper prescription for the following medications, you can take these to any pharmacy.     Bring a paper prescription for each of these medications     HYDROcodone-acetaminophen 5-325 MG per tablet               Contact your doctor for follow-up appointment if not already scheduled.     Follow-up information has not been specified.      Pending Labs/Results     Any lab or diagnostic test results that are \"pending\" at time of discharge are listed below. If no results display then none were \"pending\" at time of discharge. Depending on when the test was completed results may be available within 3-5 days. Results can be reviewed with your provider at your next visits or through [employer]. If needed contact the provider office for additional information.         Height and Weight     Date and Time Height Weight Birthweight BMI (Calculated) Who    02/01/17 0539 -- 70.9 kg -- 26.05 SRL    01/22/17 0956 5' 5\" (1.651 m) 69.6 kg -- 25.58 JV    01/22/17 0815 5' 5\" (1.651 m) 68 kg -- 25.01 TMW      Smoking Status     Tobacco Use     Former Smoker;  Quit 1/1/1982; Smoked: Cigarettes.    Smokeless Tobacco:  Former user of smokeless tobacco;  Quit 4/12/1985.    Comments:  History of 40 pack years            Code Status Information     Code Status    No Resuscitation with Comfort Care      Diet     Start       Ordered    01/31/17 1324  Regular Diet  DIET EFFECTIVE NOW     Question:  Diet Modifiers  Answer:  Regular     01/31/17 1327    01/26/17 1400  DIET SNACKS Daily - PM Snack Please send cottage cheese and peaches at PM snack.  Thank you!  DAILY - PM SNACK     Question:  Desired snack  Answer:  Please send cottage cheese and peaches at PM snack.  Thank you!    01/26/17 1342    01/26/17 1338  2 Times/Day w Breakfast & Dinner; Standard Oral Supplement, Vanilla Oral Nutrition Supplement  As Directed     Question Answer Comment   Frequency 2 Times/Day w Breakfast & Dinner    Oral Supplement Standard Oral Supplement, Vanilla        01/26/17 1338    01/26/17 1338  Daily w Lunch; Hi Pro Pudding, Vanilla Oral Nutrition Supplement  As Directed     Question Answer Comment   Frequency Daily w Lunch    Oral Supplement Hi Pro Pudding, Vanilla        01/26/17 1338      History of Falls       Most Recent Value    History of Falling 25 Filed at: 01/30/2017 0900      Functional & Cognitive Status       Most Recent Value    Are you deaf or do you have serious difficulty  hearing?  N    Are you blind or do you have serious difficulty seeing, even when wearing glasses? N    Because of a physical, mental, or emotional condition, do you have serious difficulty concentrating, remembering or making decisions?  Y    Do you have serious difficulty walking or climbing stairs? Y    Do you have difficulty dressing or bathing? Y    Because of a physical, mental, or emotional condition, do you have difficulty doing errands alone? Y      Isolation     No Isolation      MRSA Status       Most Recent Value    Risk Factors for MRSA No Filed at: 01/28/2017 1600    History of MRSA No Filed at: 01/28/2017 1600      Vital Signs and Pain Assessment       Most Recent Value    Temperature 97 °F (36.1 °C) Filed at: 02/02/2017 0900    Temperature Source Axillary Filed at: 02/02/2017 0900    Heart Rate 75 Filed at: 02/02/2017 0900    Heart Rate Source Monitor Filed at: 02/02/2017 0900    /60 Filed at: 02/02/2017 0900    BP Method Automatic Filed at: 02/02/2017 0900     Resp Rate 22 Filed at: 02/02/2017 0900    Pulse Ox 98 % Filed at: 02/02/2017 0900    Pain Type Acute pain Filed at: 02/01/2017 1500    Pain at Discharge 3 Filed at: 01/31/2017 0325      Patient Lines/Drains/Airways Status    Active LDA's (Lines/Drains/Airways/Wounds)        Skin Abnormality Leg Left Lower Abrasion/Scratches    Date First Assessed 01/22/17        Time First Assessed 1000   Days: 11    Laterality: Left   Abnormality Type: Abrasion/Scratches     Present at Time of Hospital Admission: Yes   Modifier: Lower     Location: Leg           Assessments         02/02/17 0000 02/01/17 2000 02/01/17 1500 02/01/17 0916 02/01/17 0156   Dressing Activity                  Color                  Skin Abnormality Status                  Skin Abnormality Details scabbed   scabbed   scabbed   scabbed   scabbed     Dressing type None   None   None   None                01/31/17 1724 01/31/17 1013 01/30/17 0842 01/29/17 2300 01/29/17 1755   Dressing Activity       Monitored   Monitored   Monitored     Color                  Skin Abnormality Status       Improving      Improving     Skin Abnormality Details scabbed   scabbed              Dressing type None   None   None      None             01/29/17 0921 01/29/17 0300 01/28/17 1930 01/28/17 1600 01/28/17 1019   Dressing Activity Monitored   Monitored   Monitored   Monitored   Monitored     Color                  Skin Abnormality Status Improving      Improving   Improving   Improving     Skin Abnormality Details       scabbed           Dressing type None      None   None   None             01/27/17 2300 01/27/17 0900 01/26/17 2347 01/26/17 0900 01/26/17 0400   Dressing Activity Monitored      Monitored           Color       Red   Red   Red     Skin Abnormality Status    Improving      Improving   Improving     Skin Abnormality Details scabbed   scabbed              Dressing type    None      None   None             01/26/17 0000 01/25/17 2000 01/25/17 1645 01/25/17 1246  01/25/17 0854   Dressing Activity                  Color Red   Red   Red   Red   Red     Skin Abnormality Status Improving   Improving   Improving   Improving   Improving     Skin Abnormality Details                  Dressing type None   None                      01/25/17 0400 01/25/17 0000 01/24/17 2055 01/24/17 1615 01/24/17 1208   Dressing Activity             Monitored     Color Red   Red   Red   Red        Skin Abnormality Status Improving   Improving   Improving   Improving        Skin Abnormality Details                  Dressing type                          01/23/17 2015 01/23/17 1945 01/23/17 1915 01/23/17 1854      Dressing Activity Monitored   Monitored   Monitored   Monitored      Color                Skin Abnormality Status                Skin Abnormality Details                Dressing type                             Wound Hip/trochanter Right Lateral Surgical incision    Date First Assessed         Time First Assessed    Days:     Modifier: Lateral   Laterality: Right     Wound Type: Surgical incision   Location: Hip/trochanter         Assessments         02/02/17 0000 02/01/17 2000 02/01/17 1500 02/01/17 0916 02/01/17 0156   Dressing Assessment Drainage present   Drainage present   Drainage present   Clean;Intact;Dry   Clean;Intact;Dry     Dressing Activity       Removed   Monitored   Monitored     Dressing Changed On                    Wound Exudate                  Wound Bed/Tissue Type                  Periwound Condition                  Wound Edge                  Wound Status                  Dressing Type None   None      ABD dressing   ABD dressing     Cover Dressing          Tape-paper (Micropore)   Tape-paper (Micropore)     Pressure Injury Stage          Not a pressure injury                01/31/17 1724 01/31/17 1013 01/30/17 1659 01/30/17 0842 01/30/17 0349   Dressing Assessment Clean;Intact;Dry   Loose;Drainage present   Loose;Saturated   Loose;Saturated        Dressing Activity  Monitored   Changed   Changed   Changed   Changed     Dressing Changed On   01/31/17 01/31/17 01/30/17 01/30/17 01/30/17     Wound Exudate Serous;Moderate   Serous;Moderate   Moderate;Serosanguineous   Small;Serous   Heavy;Serosanguineous;Moderate odor;Purulent     Wound Bed/Tissue Type Pink   Pink         Pink     Periwound Condition Erythema;Other (comment) bruising Erythema              Wound Edge Stapled   Stapled         Approximated     Wound Status             Unchanged     Dressing Type ABD dressing   ABD dressing      ABD dressing   ABD dressing     Cover Dressing Tape-paper (Micropore)   Tape-paper (Micropore)      Tape-paper (Micropore)   Tape-fabric (Medipore)     Pressure Injury Stage Not a pressure injury   Not a pressure injury         Not a pressure injury             01/29/17 2300 01/29/17 1755 01/29/17 0921 01/29/17 0300 01/28/17 1930   Dressing Assessment    Loose;Saturated   Loose;Saturated           Dressing Activity Monitored   Changed   Changed   Monitored        Dressing Changed On      01/29/17 01/29/17           Wound Exudate    Small;Serous   Small;Serous      None     Wound Bed/Tissue Type                  Periwound Condition                  Wound Edge                  Wound Status                  Dressing Type    ABD dressing   ABD dressing      ABD dressing     Cover Dressing    Tape-fabric (Medipore)   Tape-fabric (Medipore)      Tape-fabric (Medipore)     Pressure Injury Stage                          01/28/17 1607 01/28/17 1019 01/27/17 2300 01/27/17 1245 01/27/17 0900   Dressing Assessment Loose;Saturated   Clean;Dry;Intact   Clean;Dry;Intact      Shadowing noted     Dressing Activity Changed   Changed   Changed   Changed   Monitored     Dressing Changed On   01/28/17 01/28/17 01/28/17 01/27/17        Wound Exudate Moderate;Serosanguineous      Moderate;Serosanguineous   Moderate;Serosanguineous        Wound Bed/Tissue Type                  Periwound Condition                   Wound Edge                  Wound Status                  Dressing Type ABD dressing   ABD dressing   ABD dressing   ABD dressing   ABD dressing     Cover Dressing Tape-fabric (Medipore)   Tape-fabric (Medipore)   Tape-fabric (Medipore)           Pressure Injury Stage    Not a pressure injury   Not a pressure injury                   01/26/17 2347 01/26/17 1546 01/26/17 1300 01/26/17 0900 01/26/17 0400   Dressing Assessment Shadowing noted   Shadowing noted & outlined;Intact   Clean;Dry;Intact   Shadowing noted;Dry;Intact   Shadowing noted;Dry;Intact     Dressing Activity Monitored   Monitored   Monitored   Monitored   Monitored     Dressing Changed On                    Wound Exudate             Serosanguineous;Small     Wound Bed/Tissue Type                  Periwound Condition                  Wound Edge                  Wound Status                  Dressing Type ABD dressing;Gauze;Gauze (multiple)   ABD dressing;Gauze;Gauze (multiple)   ABD dressing;Gauze;Gauze (multiple)   ABD dressing;Gauze;Gauze (multiple)   ABD dressing;Gauze;Gauze (multiple)     Cover Dressing Tape-fabric (Medipore)   Tape-fabric (Medipore)   Tape-fabric (Medipore)   Tape-fabric (Medipore)   Tape-fabric (Medipore)     Pressure Injury Stage          Not a pressure ulcer   Not a pressure ulcer             01/26/17 0000 01/25/17 2000 01/25/17 1645 01/25/17 1246 01/25/17 0854   Dressing Assessment Shadowing noted;Dry;Intact   Shadowing noted;Dry;Intact   Clean;Dry;Intact   Clean;Dry;Intact   Clean;Dry;Intact     Dressing Activity Monitored   Monitored   Monitored   Monitored   Monitored     Dressing Changed On                    Wound Exudate Serosanguineous;Small   Serosanguineous;Small              Wound Bed/Tissue Type                  Periwound Condition                  Wound Edge                  Wound Status                  Dressing Type ABD dressing;Gauze;Gauze (multiple)   ABD dressing;Gauze;Gauze (multiple)   ABD  dressing;Gauze;Gauze (multiple)   ABD dressing;Gauze;Gauze (multiple)   ABD dressing;Gauze;Gauze (multiple)     Cover Dressing Tape-fabric (Medipore)   Tape-fabric (Medipore)   Tape-fabric (Medipore)   Tape-fabric (Medipore)   Tape-fabric (Medipore)     Pressure Injury Stage Not a pressure ulcer   Not a pressure ulcer                      01/25/17 0400 01/25/17 0000 01/24/17 2055 01/24/17 1615 01/24/17 1208   Dressing Assessment Clean;Dry;Intact   Clean;Dry;Intact   Clean;Dry;Intact   Clean;Dry;Intact   Clean;Dry;Intact     Dressing Activity Monitored   Monitored   Monitored   Monitored   Monitored     Dressing Changed On                    Wound Exudate                  Wound Bed/Tissue Type                  Periwound Condition                  Wound Edge                  Wound Status                  Dressing Type ABD dressing;Gauze;Gauze (multiple)   ABD dressing;Gauze;Gauze (multiple)   ABD dressing;Gauze;Gauze (multiple)   ABD dressing;Gauze;Gauze (multiple)   ABD dressing;Gauze;Gauze (multiple)     Cover Dressing Tape-fabric (Medipore)   Tape-fabric (Medipore)   Tape-fabric (Medipore)   Tape-fabric (Medipore)   Tape-fabric (Medipore)     Pressure Injury Stage                          01/24/17 0857 01/24/17 0000 01/23/17 2045 01/23/17 2015 01/23/17 1945   Dressing Assessment Clean;Dry;Intact   Clean;Dry;Intact   Clean;Dry;Intact   Clean;Dry;Intact   Clean;Dry;Intact     Dressing Activity Monitored   Monitored   Monitored   Monitored   Monitored     Dressing Changed On                    Wound Exudate                  Wound Bed/Tissue Type                  Periwound Condition                  Wound Edge                  Wound Status                  Dressing Type ABD dressing;Gauze;Gauze (multiple)   ABD dressing;Gauze;Gauze (multiple)   ABD dressing;Gauze;Gauze (multiple)   ABD dressing;Gauze;Gauze (multiple)   ABD dressing;Gauze;Gauze (multiple)     Cover Dressing Tape-fabric (Medipore)   Tape-fabric  (Medipore)   Tape-fabric (Medipore)   Tape-fabric (Medipore)   Tape-fabric (Medipore)     Pressure Injury Stage                          01/23/17 1915 01/23/17 1854 01/23/17 1740         Dressing Assessment Clean;Dry;Intact   Clean;Dry;Intact          Dressing Activity Monitored   Monitored   Applied xeroform gauze, 4 x 4's, ABD's, medipore tape     Dressing Changed On                Wound Exudate              Wound Bed/Tissue Type              Periwound Condition              Wound Edge              Wound Status              Dressing Type ABD dressing;Gauze;Gauze (multiple)   ABD dressing;Gauze;Gauze (multiple)          Cover Dressing Tape-fabric (Medipore)   Tape-fabric (Medipore)          Pressure Injury Stage                                Assessment       Most Recent Value    Sensory Support Devices Dentures Filed at: 01/22/2017 1000    Speech Deficits (if any identified) Weak voice Filed at: 02/02/2017 0000    Mobility: Level of Assistance Moderate assist Filed at: 02/02/2017 0113    Mobility Assist Device Transfer/Friction reduction device - flat Filed at: 02/02/2017 0113    Elimination Risk Toilet at regular intervals (unable to identify needs) Filed at: 02/01/2017 0944    Fall Prevention/Safety Mobility/Gait Collaborate with PT Filed at: 02/01/2017 0944    Orientation Disoriented to place, Disoriented to time Filed at: 02/02/2017 0000    Symptoms of Delirium No Filed at: 02/02/2017 0000      Learning Assessment Questions     Does the primary learner have any barriers to learning?:  Cognitive ELLA Mendoza RN (Nurse) - 01/22/17 1119    What is the primary language of the primary learner?:  English ELLA Mendoza RN (Nurse) - 01/22/17 1119    Is an  required?:  No ELLA Mendoza RN (Nurse) - 01/22/17 1119    How does the primary learner prefer to learn new concepts?:  Listening ELLA Mendoza RN (Nurse) - 01/22/17 1119      Disposition       Most Recent Value    Receiving facility name  -- [[facility]] Filed at: 01/23/2017 1200        Discharge Instructions       MEDICATIONS:  · See Medication List (bring to your doctor appointments).    VACCINES:  Most Recent Immunizations   Administered Date(s) Administered   • INFLUENZA QUADRIVALENT 09/19/2015   • Influenza 10/02/2014   • Influenza High Dose Pres Free 10/07/2016   • PPD 02/25/2013   • Pneumococcal Conjugate 13 Valent 01/14/2016   • Pneumococcal Polysaccharide Adult 11/09/2012       ACTIVITY:  · Weight yourself daily (first thing in the morning, with same amount of clothes on) unless told otherwise by your doctor.  · Continue activity as your were in the hospital, slowly increase to what you were doing previously.  · Up as tolerated, check with PT/OT    SMOKING:  · Avoid all tobacco products and second hand smoke.  · Smoking Cessation Counseling offered.  · [place] Toll Free Quit Line: [phone]    DIET:  · Limit salt and salty foods unless told otherwise by your doctor.  · No Restrictions    · Trouble breathing or chest pain - CALL 911    CONTACT YOUR DOCTOR IF:  · You have symptoms that are not \"normal\" for you.  · You have new or worse symptoms or pain, not relieved by medicine or rest.  · Temperature greater than 101 degrees F, chills or flu like symptoms.  · You gain more than 3 pounds in 2 days.  · Increased swelling, redness or drainage.          Discharge References/Attachments     HIP PRECAUTIONS (ENGLISH)    HIP FRACTURE SURGERY, DISCHARGE INSTRUCTIONS FOR (ENGLISH)    HIP SURGERY, EXERCISES AFTER: ANKLE PUMP, QUAD SETS, GLUTEAL SETS (ENGLISH)    HIP SURGERY, EXERCISES AFTER: HEEL SLIDES, ABDUCTION, ADDUCTION (ENGLISH)      Recommendations for Discharge        Recommendations for Discharge Planning    Recommendations for Discharge: Nursing - Medication Strategies    Recommendations for Discharge: SW/CM    Recommendations for Discharge: RT    Recommendation for Discharge: PT    Recommendations for Discharge: OT     Recommendations for Discharge: SLP    Recommendations for Discharge: Nutrition             24 Hour assist; Post acute therapy  24 Hour assist; Post acute therapy          Patient Care Team        Relationship Specialty Notifications Start End    Kapil Dailey MD PCP - General   8/9/08       Lab Results     Procedure Component Value Units Date/Time    CBC & Auto Differential [[id number]]  (Abnormal) Collected:  02/02/17 0530    Order Status:  Completed Specimen:  Blood+grp Updated:  02/02/17 0643     WBC 19.9 (H) K/mcL      RBC 3.83 (L) mil/mcL      HGB 11.2 (L) g/dL      HCT 36.3 %      MCV 94.8 fl      MCH 29.2 pg      MCHC 30.9 (L) g/dL      RDW-CV 19.9 (H) %       K/mcL      DIFF TYPE AUTO DIFF verified by manual smear review.      Neutrophil 95 %      LYMPH 3 %      MONO 2 %      EOSIN 0 %      BASO 0 %      Absolute Neutrophil 18.8 (H) K/mcL      Absolute Lymph 0.7 (L) K/mcL      Absolute Mono 0.3 K/mcL      Absolute Eos 0.0 (L) K/mcL      Absolute Baso 0.0 K/mcL     CBC & Auto Differential [[id number]]  (Abnormal) Collected:  02/01/17 0558    Order Status:  Completed Specimen:  Blood+grp Updated:  02/01/17 0615     WBC 12.0 (H) K/mcL      RBC 4.21 mil/mcL      HGB 12.4 g/dL      HCT 39.8 %      MCV 94.5 fl      MCH 29.5 pg      MCHC 31.2 (L) g/dL      RDW-CV 19.8 (H) %       K/mcL      DIFF TYPE AUTOMATED DIFFERENTIAL      Neutrophil 89 %      LYMPH 7 %      MONO 2 %      EOSIN 2 %      BASO 0 %      Absolute Neutrophil 10.6 (H) K/mcL      Absolute Lymph 0.9 (L) K/mcL      Absolute Mono 0.3 K/mcL      Absolute Eos 0.2 K/mcL      Absolute Baso 0.0 K/mcL     CBC & Auto Differential [[id number]]  (Abnormal) Collected:  01/31/17 0600    Order Status:  Completed Specimen:  Blood+grp Updated:  01/31/17 0643     WBC 8.4 K/mcL      RBC 3.52 (L) mil/mcL      HGB 10.3 (L) g/dL      HCT 33.0 (L) %      MCV 93.8 fl      MCH 29.3 pg      MCHC 31.2 (L) g/dL      RDW-CV 19.2 (H) %       K/mcL      DIFF  TYPE AUTOMATED DIFFERENTIAL      Neutrophil 66 %      LYMPH 15 %      MONO 12 %      EOSIN 6 %      BASO 1 %      Absolute Neutrophil 5.6 K/mcL      Absolute Lymph 1.2 K/mcL      Absolute Mono 1.0 (H) K/mcL      Absolute Eos 0.5 K/mcL      Absolute Baso 0.0 K/mcL     CBC & Auto Differential [[id number]]  (Abnormal) Collected:  01/30/17 0600    Order Status:  Completed Specimen:  Blood+grp Updated:  01/30/17 0632     WBC 11.0 K/mcL      RBC 3.53 (L) mil/mcL      HGB 10.1 (L) g/dL      HCT 32.5 (L) %      MCV 92.1 fl      MCH 28.6 pg      MCHC 31.1 (L) g/dL      RDW-CV 18.5 (H) %       K/mcL      DIFF TYPE AUTOMATED DIFFERENTIAL      Neutrophil 77 %      LYMPH 7 %      MONO 13 %      EOSIN 3 %      BASO 0 %      Absolute Neutrophil 8.4 (H) K/mcL      Absolute Lymph 0.8 (L) K/mcL      Absolute Mono 1.5 (H) K/mcL      Absolute Eos 0.3 K/mcL      Absolute Baso 0.0 K/mcL     CBC & Auto Differential [[id number]]  (Abnormal) Collected:  01/29/17 0720    Order Status:  Completed Specimen:  Blood+grp Updated:  01/29/17 0733     WBC 9.1 K/mcL      RBC 3.92 (L) mil/mcL      HGB 11.2 (L) g/dL      HCT 35.7 (L) %      MCV 91.1 fl      MCH 28.6 pg      MCHC 31.4 (L) g/dL      RDW-CV 18.3 (H) %       K/mcL      DIFF TYPE AUTOMATED DIFFERENTIAL      Neutrophil 75 %      LYMPH 10 %      MONO 13 %      EOSIN 2 %      BASO 0 %      Absolute Neutrophil 6.7 K/mcL      Absolute Lymph 0.9 (L) K/mcL      Absolute Mono 1.2 (H) K/mcL      Absolute Eos 0.2 K/mcL      Absolute Baso 0.0 K/mcL     Urinalysis with Micro [[id number]]  (Abnormal) Collected:  01/28/17 1605    Order Status:  Completed Specimen:  Random Urine + grp Updated:  01/28/17 1621     COLOR YELLOW      APPEARANCE CLEAR      GLUCOSE(URINE) NEGATIVE mg/dL      BILIRUBIN NEGATIVE      KETONES NEGATIVE mg/dL      SPECIFIC GRAVITY 1.010      BLOOD SMALL (A)      pH 6.5 Units      PROTEIN(URINE) NEGATIVE mg/dL      UROBILINOGEN 0.2 mg/dL      NITRITE NEGATIVE       LEUKOCYTE ESTERASE NEGATIVE      Squamous EPI'S 26 to 100 /hpf      RBC 1 to 3 /hpf      WBC 1 to 5 /hpf      BACTERIA FEW (A) /hpf      Hyaline Casts NONE SEEN /lpf      SPECIMEN TYPE URINE, CATHETERIZED, STRAIGHT      MUCOUS PRESENT     CBC & Auto Differential [[id number]]  (Abnormal) Collected:  01/28/17 0610    Order Status:  Completed Specimen:  Blood+grp Updated:  01/28/17 0700     WBC 7.3 K/mcL      RBC 3.34 (L) mil/mcL      HGB 9.8 (L) g/dL      HCT 30.6 (L) %      MCV 91.6 fl      MCH 29.3 pg      MCHC 32.0 g/dL      RDW-CV 17.2 (H) %       K/mcL      DIFF TYPE AUTOMATED DIFFERENTIAL      Neutrophil 70 %      LYMPH 11 %      MONO 16 %      EOSIN 3 %      BASO 0 %      Absolute Neutrophil 5.2 K/mcL      Absolute Lymph 0.8 (L) K/mcL      Absolute Mono 1.2 (H) K/mcL      Absolute Eos 0.2 K/mcL      Absolute Baso 0.0 K/mcL     CBC & Auto Differential [[id number]]  (Abnormal) Collected:  01/27/17 0700    Order Status:  Completed Specimen:  Blood+grp Updated:  01/27/17 0804     WBC 6.1 K/mcL      RBC 3.21 (L) mil/mcL      HGB 9.2 (L) g/dL      HCT 29.6 (L) %      MCV 92.2 fl      MCH 28.7 pg      MCHC 31.1 (L) g/dL      RDW-CV 16.7 (H) %       K/mcL      DIFF TYPE AUTOMATED DIFFERENTIAL      Neutrophil 67 %      LYMPH 15 %      MONO 14 %      EOSIN 4 %      BASO 0 %      Absolute Neutrophil 4.1 K/mcL      Absolute Lymph 0.9 (L) K/mcL      Absolute Mono 0.9 K/mcL      Absolute Eos 0.3 K/mcL      Absolute Baso 0.0 K/mcL     Hemoglobin and Hematocrit [[id number]]  (Abnormal) Collected:  01/27/17 0000    Order Status:  Completed Specimen:  Blood+grp Updated:  01/27/17 0041     HGB 8.6 (L) g/dL      HCT 27.4 (L) %     Hemoglobin and Hematocrit [[id number]]  (Abnormal) Collected:  01/26/17 1820    Order Status:  Completed Specimen:  Blood+grp Updated:  01/26/17 1840     HGB 8.3 (L) g/dL      HCT 26.8 (L) %     Hemoglobin and Hematocrit [[id number]]  (Abnormal) Collected:  01/26/17 1200    Order Status:   Completed Specimen:  Blood+grp Updated:  17 1229     HGB 9.4 (L) g/dL      HCT 29.8 (L) %     Type/Screen [[id number]] Collected:  17 0045    Order Status:  Completed Specimen:  Blood+grp Updated:  17 0914     UNITS ORDERED 3      ABO/RH(D) O NEGATIVE      ANTIBODY SCREEN NEGATIVE      CROSSMATCH  2017      UNIT NUMBER [id number]      BLOOD COMPONENT TYPE RED CELLS LEUKO REDUCED      UNIT DIVISION 0      STATUS OF UNIT ISSUED, FINAL      TRANSFUSION STATUS OK TO TRANSFUSE      CROSSMATCH RESULT E COMPATIBLE      UNIT NUMBER [id number]      BLOOD COMPONENT TYPE RED CELLS LEUKO REDUCED      UNIT DIVISION 0      STATUS OF UNIT ISSUED, FINAL      TRANSFUSION STATUS OK TO TRANSFUSE      CROSSMATCH RESULT E COMPATIBLE      UNIT NUMBER [id number]      BLOOD COMPONENT TYPE RED CELLS LEUKO REDUCED      UNIT DIVISION 0      STATUS OF UNIT ISSUED, FINAL      TRANSFUSION STATUS OK TO TRANSFUSE      CROSSMATCH RESULT E COMPATIBLE     Comprehensive Metabolic Panel [[id number]]  (Abnormal) Collected:  17 0616    Order Status:  Completed Specimen:  Blood+grp Updated:  17 0712     Sodium 140 mmol/L      Potassium 3.9 mmol/L      Chloride 107 mmol/L      Carbon Dioxide 30 mmol/L      Anion Gap 7 (L) mmol/L      Glucose 78 mg/dL      BUN 21 (H) mg/dL      Creatinine 0.92 mg/dL      GFR Estimate,  64       In patients with known chronic kidney disease, the stage of disease based on eGFR is interpreted as follows:  eGFR results 60-89 mL/min/1.73m2 = Stage II CKD (chronic kidney disease), or mild kidney disease.          GFR Estimate, Non  55       In patients with known chronic kidney disease, the stage of disease based on eGFR is interpreted as follows:  eGFR results 30 - 59 mL/min/1.73m2 = Stage III CKD (chronic kidney disease), or moderate kidney disease.          BUN/Creatinine Ratio 23      CALCIUM 7.4 (L) mg/dL      TOTAL BILIRUBIN 1.0 mg/dL       AST/SGOT 22 Units/L      ALT/SGPT 11 Units/L      ALK PHOSPHATASE 36 (L) Units/L      TOTAL PROTEIN 5.3 (L) g/dL      Albumin 2.7 (L) g/dL      GLOBULIN 2.6 g/dL      A/G Ratio, Serum 1.0     CBC & Auto Differential [[id number]]  (Abnormal) Collected:  01/26/17 0616    Order Status:  Completed Specimen:  Blood+grp Updated:  01/26/17 0654     WBC 5.3 K/mcL      RBC 3.07 (L) mil/mcL      HGB 8.8 (L) g/dL      HCT 27.9 (L) %      MCV 90.9 fl      MCH 28.7 pg      MCHC 31.5 (L) g/dL      RDW-CV 16.5 (H) %       K/mcL      DIFF TYPE AUTOMATED DIFFERENTIAL      Neutrophil 66 %      LYMPH 13 %      MONO 15 %      EOSIN 5 %      BASO 1 %      Absolute Neutrophil 3.6 K/mcL      Absolute Lymph 0.7 (L) K/mcL      Absolute Mono 0.8 K/mcL      Absolute Eos 0.3 K/mcL      Absolute Baso 0.0 K/mcL     Hemoglobin and Hematocrit [[id number]]  (Abnormal) Collected:  01/26/17 0108    Order Status:  Completed Specimen:  Blood+grp Updated:  01/26/17 0128     HGB 9.1 (L) g/dL      HCT 28.5 (L) %     Hemoglobin and Hematocrit [[id number]]  (Abnormal) Collected:  01/25/17 2018    Order Status:  Completed Specimen:  Blood+grp Updated:  01/25/17 2049     HGB 8.5 (L) g/dL      HCT 26.8 (L) %     CBC & Auto Differential [[id number]]  (Abnormal) Collected:  01/25/17 1405    Order Status:  Completed Specimen:  Blood+grp Updated:  01/25/17 1413     WBC 4.7 K/mcL      RBC 2.60 (L) mil/mcL      HGB 7.4 (L) g/dL      HCT 23.5 (L) %      MCV 90.4 fl      MCH 28.5 pg      MCHC 31.5 (L) g/dL      RDW-CV 15.6 (H) %       (L) K/mcL      DIFF TYPE AUTOMATED DIFFERENTIAL      Neutrophil 62 %      LYMPH 16 %      MONO 19 %      EOSIN 2 %      BASO 1 %      Absolute Neutrophil 2.9 K/mcL      Absolute Lymph 0.7 (L) K/mcL      Absolute Mono 0.9 K/mcL      Absolute Eos 0.1 K/mcL      Absolute Baso 0.0 K/mcL     Hemoglobin and Hematocrit [[id number]]  (Abnormal) Collected:  01/25/17 0720    Order Status:  Completed Specimen:  Blood+grp Updated:  01/25/17  0739     HGB 6.6 (LL) g/dL       This result has been called to RENÉE TINSLEY by [id number] on 01 25 2017 at   0737, and has been read back.            HCT 21.0 (L) %     Lactic Acid Venous [[id number]] Collected:  01/25/17 0518    Order Status:  Completed Specimen:  Blood+grp Updated:  01/25/17 0611     Lactic Acid Venous 0.7 mmol/L     Hemoglobin [[id number]]  (Abnormal) Collected:  01/25/17 0518    Order Status:  Completed Specimen:  Blood+grp Updated:  01/25/17 0546     HGB 6.7 (LL) g/dL       This result has been called to MENDOZA MOHR by [id number] on 01 25 2017 at 0545,   and has been read back.           Hemoglobin and Hematocrit [[id number]]  (Abnormal) Collected:  01/24/17 2317    Order Status:  Completed Specimen:  Blood+grp Updated:  01/25/17 0003     HGB 6.3 (LL) g/dL       This result has been called to KESHAV MOTA by [id number] on 01 25 2017 at   0002, and has been read back.            HCT 20.4 (L) %     Lactic Acid Venous [[id number]] Collected:  01/24/17 2318    Order Status:  Completed Specimen:  Blood+grp Updated:  01/24/17 2351     Lactic Acid Venous 1.6 mmol/L     Hemoglobin and Hematocrit [[id number]]  (Abnormal) Collected:  01/24/17 1742    Order Status:  Completed Specimen:  Blood+grp Updated:  01/24/17 1807     HGB 6.6 (LL) g/dL       This result has been called to OBI TRAN by [id number] on 01 24 2017 at   1806, and has been read back.            HCT 20.9 (L) %     Lactic Acid Venous [[id number]]  (Abnormal) Collected:  01/24/17 1538    Order Status:  Completed Specimen:  Blood+grp Updated:  01/24/17 1632     Lactic Acid Venous 2.4 (HH) mmol/L       CALLED TO, READ BACK AND CONFIRMED  GRUPO 1632 ON 01/24/2017 BY KEVIN         Troponin I Ultra Sensitive [[id number]]  (Abnormal) Collected:  01/24/17 1538    Order Status:  Completed Specimen:  Blood+grp Updated:  01/24/17 1620     TROPONIN I 0.07 (HH) ng/mL       Consistent with myocardial injury.  Mild elevations of  troponin may indicate noninfarction cardiac  injury or early myocardial infarction.  Serial studies may be helpful.  CALLED TO, READ BACK AND CONFIRMED  GRUPO 01/24/2017 AT 1619 BY KEVIN         Basic Metabolic Panel [[id number]]  (Abnormal) Collected:  01/24/17 1150    Order Status:  Completed Specimen:  Blood+grp Updated:  01/24/17 1224     Sodium 136 mmol/L      Potassium 4.4 mmol/L      Chloride 102 mmol/L      Carbon Dioxide 30 mmol/L      Anion Gap 8 (L) mmol/L      Glucose 140 (H) mg/dL      BUN 34 (H) mg/dL      Creatinine 1.31 (H) mg/dL      GFR Estimate,  41       In patients with known chronic kidney disease, the stage of disease based on eGFR is interpreted as follows:  eGFR results 30 - 59 mL/min/1.73m2 = Stage III CKD (chronic kidney disease), or moderate kidney disease.          GFR Estimate, Non  36       In patients with known chronic kidney disease, the stage of disease based on eGFR is interpreted as follows:  eGFR results 30 - 59 mL/min/1.73m2 = Stage III CKD (chronic kidney disease), or moderate kidney disease.          BUN/Creatinine Ratio 26 (H)      CALCIUM 7.6 (L) mg/dL     CBC & Auto Differential [[id number]]  (Abnormal) Collected:  01/24/17 1150    Order Status:  Completed Specimen:  Blood+grp Updated:  01/24/17 1219     WBC 7.4 K/mcL      RBC 3.00 (L) mil/mcL      HGB 8.4 (L) g/dL      HCT 26.8 (L) %      MCV 89.3 fl      MCH 28.0 pg      MCHC 31.3 (L) g/dL      RDW-CV 15.8 (H) %       K/mcL      DIFF TYPE AUTOMATED DIFFERENTIAL      Neutrophil 62 %      LYMPH 13 %      MONO 25 %      EOSIN 0 %      BASO 0 %      Absolute Neutrophil 4.6 K/mcL      Absolute Lymph 0.9 (L) K/mcL      Absolute Mono 1.8 (H) K/mcL      Absolute Eos 0.0 (L) K/mcL      Absolute Baso 0.0 K/mcL     Glycohemoglobin [[id number]]  (Abnormal) Collected:  01/22/17 0823    Order Status:  Completed Updated:  01/23/17 0752     Hemoglobin A1C 5.9 (H) %          ----DIABETIC SCREENING---  NON DIABETIC                 <5.7%  INCREASED  RISK                5.7-6.4%  DIAGNOSTIC FOR DIABETES      >6.4%     ----DIABETIC CONTROL---     A1C%           eAG mg/dL  6.0            126  6.5            140  7.0            154  7.5            169  8.0            183  8.5            197  9.0            212  9.5            226  10.0           240         Basic Metabolic Panel [[id number]]  (Abnormal) Collected:  01/23/17 0530    Order Status:  Completed Specimen:  Blood+grp Updated:  01/23/17 0637     Sodium 139 mmol/L      Potassium 4.0 mmol/L      Chloride 102 mmol/L      Carbon Dioxide 30 mmol/L      Anion Gap 11 mmol/L      Glucose 143 (H) mg/dL      BUN 28 (H) mg/dL      Creatinine 1.04 (H) mg/dL      GFR Estimate,  55       In patients with known chronic kidney disease, the stage of disease based on eGFR is interpreted as follows:  eGFR results 30 - 59 mL/min/1.73m2 = Stage III CKD (chronic kidney disease), or moderate kidney disease.          GFR Estimate, Non  47       In patients with known chronic kidney disease, the stage of disease based on eGFR is interpreted as follows:  eGFR results 30 - 59 mL/min/1.73m2 = Stage III CKD (chronic kidney disease), or moderate kidney disease.          BUN/Creatinine Ratio 27 (H)      CALCIUM 8.7 mg/dL     Lipid Panel with Reflex [[id number]]  (Abnormal) Collected:  01/23/17 0530    Order Status:  Completed Specimen:  Blood+grp Updated:  01/23/17 0637     CHOLESTEROL 111 mg/dL          Desirable            <200  Borderline High      200 to 239  High                 >=240             CALCULATED LDL 51 mg/dL       OPTIMAL               <100  NEAR OPTIMAL          100-129  BORDERLINE HIGH       130-159  HIGH                  160-189  VERY HIGH             >=190          HDL 39 (L) mg/dL       Low            <40  Borderline Low 40 to 49  Near Optimal   50 to 59  Optimal        >=60          TRIGLYCERIDE 103 mg/dL       Normal                   <150  Borderline High          150 to 199  High                      200 to 499  Very High                >=500          CALCULATED NON HDL 72 mg/dL          Therapeutic Target:  CHD and risk equivalents <130  Multiple risk factors    <160  0 to 1 risk factors      <190             CHOL/HDL 2.8     CBC & Auto Differential [[id number]]  (Abnormal) Collected:  01/23/17 0530    Order Status:  Completed Specimen:  Blood+grp Updated:  01/23/17 0611     WBC 6.3 K/mcL      RBC 4.35 mil/mcL      HGB 12.0 g/dL      HCT 38.1 %      MCV 87.6 fl      MCH 27.6 pg      MCHC 31.5 (L) g/dL      RDW-CV 16.4 (H) %       K/mcL      DIFF TYPE AUTOMATED DIFFERENTIAL      Neutrophil 58 %      LYMPH 15 %      MONO 25 %      EOSIN 1 %      BASO 1 %      Absolute Neutrophil 3.7 K/mcL      Absolute Lymph 1.0 K/mcL      Absolute Mono 1.6 (H) K/mcL      Absolute Eos 0.0 (L) K/mcL      Absolute Baso 0.0 K/mcL     Urinalysis Microscopic [[id number]]  (Abnormal) Collected:  01/22/17 0918    Order Status:  Completed Updated:  01/22/17 0938     Squamous EPI'S 11 to 25 /hpf      RBC 1 to 3 /hpf      WBC 1 to 5 /hpf      BACTERIA FEW (A) /hpf      Hyaline Casts NONE SEEN /lpf      MUCOUS PRESENT     Urinalysis & Reflex Micro with Culture if Indicated [[id number]]  (Abnormal) Collected:  01/22/17 0918    Order Status:  Completed Specimen:  Random Urine + grp Updated:  01/22/17 0938     COLOR YELLOW      APPEARANCE CLEAR      GLUCOSE(URINE) NEGATIVE mg/dL      BILIRUBIN NEGATIVE      KETONES NEGATIVE mg/dL      SPECIFIC GRAVITY >1.030 (H)      BLOOD NEGATIVE      pH 6.0 Units      PROTEIN(URINE) 30 (A) mg/dL      UROBILINOGEN 0.2 mg/dL      NITRITE NEGATIVE      LEUKOCYTE ESTERASE NEGATIVE      SPECIMEN TYPE URINE, CATHETERIZED, CARTWRIGHT       CORRECTED ON 01/22 AT 0922: PREVIOUSLY REPORTED AS URINE, CLEAN CATCH/MIDSTREAM       Basic Metabolic Panel [[id number]]  (Abnormal) Collected:  01/22/17 0823    Order Status:  Completed Specimen:  Blood+grp Updated:  01/22/17 0854     Sodium 141 mmol/L       Potassium 4.1 mmol/L      Chloride 104 mmol/L      Carbon Dioxide 30 mmol/L      Anion Gap 11 mmol/L      Glucose 192 (H) mg/dL      BUN 26 (H) mg/dL      Creatinine 1.05 (H) mg/dL      GFR Estimate,  54       In patients with known chronic kidney disease, the stage of disease based on eGFR is interpreted as follows:  eGFR results 30 - 59 mL/min/1.73m2 = Stage III CKD (chronic kidney disease), or moderate kidney disease.          GFR Estimate, Non  47       In patients with known chronic kidney disease, the stage of disease based on eGFR is interpreted as follows:  eGFR results 30 - 59 mL/min/1.73m2 = Stage III CKD (chronic kidney disease), or moderate kidney disease.          BUN/Creatinine Ratio 25      CALCIUM 8.7 mg/dL     Troponin I - Point of Care [[id number]] Collected:  01/22/17 0827    Order Status:  Completed Updated:  01/22/17 0839     Troponin I POC <0.10 ng/mL     CBC & Auto Differential [[id number]]  (Abnormal) Collected:  01/22/17 0823    Order Status:  Completed Specimen:  Blood+grp Updated:  01/22/17 0835     WBC 8.0 K/mcL      RBC 4.91 mil/mcL      HGB 13.7 g/dL      HCT 42.7 %      MCV 87.0 fl      MCH 27.9 pg      MCHC 32.1 g/dL      RDW-CV 16.3 (H) %       K/mcL      DIFF TYPE AUTOMATED DIFFERENTIAL      Neutrophil 81 %      LYMPH 7 %      MONO 12 %      EOSIN 0 %      BASO 0 %      Absolute Neutrophil 6.5 K/mcL      Absolute Lymph 0.6 (L) K/mcL      Absolute Mono 1.0 (H) K/mcL      Absolute Eos 0.0 (L) K/mcL      Absolute Baso 0.0 K/mcL       Microbiology Results     None      Imaging Orders     Completed     XR Hip Right AP and Lateral Ordered On:  01/22/2017    XR Pelvis 1 View Ordered On:  01/22/2017    XR CHEST AP OR PA - PORTABLE Ordered On:  01/22/2017    XR Hip Intraoperative Right Ordered On:  01/23/2017    XR CHEST AP OR PA Ordered On:  01/24/2017    IR PICC Ordered On:  01/24/2017    XR CHEST AP OR PA Ordered On:  01/24/2017    XR CHEST AP OR PA  Ordered On:  01/28/2017    XR CHEST AP OR PA Ordered On:  01/29/2017    XR Abdomen 1 vw KUB Supine Ordered On:  01/30/2017    XR Chest PA and Lateral Ordered On:  01/30/2017             Resulted

## 2022-07-29 NOTE — H&P ADULT - PROBLEM SELECTOR PLAN 7
Patient reports constipation at baseline, takes Miralax and Senna  - Continue home bowel regimen Hgb 10.3 on admission. Baseline Hgb 10-11 from previous admissions, also confirmed by daughter  - f/u AM CBC, trend H/H  - Transfuse if Hgb <7 or clinically symptomatic Hgb 10.3 on admission. Baseline Hgb 10-11 from previous admissions, also confirmed by daughter.   - f/u AM CBC, trend H/H  - Continue home ferrous sulfate  - Transfuse if Hgb <7 or clinically symptomatic Patient has history of CAD s/p 2 stents  - Continue home Aspirin Hx of vertebral fracture with repair, patient presents with back and R leg pain. Takes PO dilaudid at home  - Continue home PO dilaudid  - Fall risk protocol -GI-Dr Montilla, pt has Had Esophogram   - Pureed diet with aspiration precautions  - Encourage PO and fluid intake  - Pureed diet (consistent carb) with glucerna 3x/day, aspiration precautions.

## 2022-07-29 NOTE — H&P ADULT - PROBLEM SELECTOR PLAN 4
Patient previously admitted to [facility] for UTI, recently prescribed Macrodantin by PCP. UA with trace ketones, small leukocyte esterase, large blood, and few bacteria.  - Hold home Macrodantin  - f/u urine cultures Gluc 159 on admission  - Insulin Corrective Scale  - Finger sticks per routine  - Consistent Carb Diet (pureed) with aspiration precautions  - Hemoglobin A1c in AM  - Hypoglycemia protocol CT abd/pelvis showed moderate B/L pleural effusions on admission  - Pulm consult, f/u recs Patient has history of CAD s/p 2 stents  - Continue home Aspirin

## 2022-07-29 NOTE — ED ADULT NURSE REASSESSMENT NOTE - NS ED NURSE REASSESS COMMENT FT1
1530:  patient presents to the er.  Per daughter, she was seen here on 7/5/22 for poor intake, chronic pain and chronic nauseas.  she was diagnosed with some gallstones and  and a uti, which was reportedly resistent to everything but Macrodantin.  she is still taking this medication.  Daughter states that she was supposed to have a repeat urine test today to see the proress, but the nurse never showed up at the house, and the daughter decided to just take her here instead.  patient alert / oriented x4.  complaining of pain mostly in right leg and lower back (patient has a stage 1 pressure ulcer to lower spine which is reportedly being treated by a wound care nurse who comes to the house.  the patient has been on pain management for years, and is currently on dilaudid 4mg at 9am, 4pm and 9pm.  awaiting further orders.  za don.

## 2022-07-29 NOTE — H&P ADULT - ASSESSMENT
89yo F with PMH DM, dyslipidemia, gastroparesis, CAD s/p 2 stents, hx of vertebral fx with repair presents to the ED with back and R leg pain, as well as recent decreased urinary frequency, admitted for KERRY and dehydration. 89yo F with PMH DM, dyslipidemia, gastroparesis, CAD s/p 2 stents, hx of vertebral fx with repair presents to the ED with back and R leg pain, as well as recent decreased urinary frequency, admitted for KERRY/CKD 3  and dehydration.

## 2022-07-29 NOTE — H&P ADULT - PROBLEM SELECTOR PLAN 6
Patient has history of CAD s/p 2 stents  - Continue home Aspirin Hx of vertebral fracture with repair, patient presents with back and R leg pain. Takes PO dilaudid at home  - Continue home PO dilaudid  - Fall risk protocol Gluc 159 on admission  - Insulin Corrective Scale  - Finger sticks per routine  - Hypoglycemia protocol  - Continue home Atorvastatin COVID-19 detected in PCR on this admission. Likely viral shedding from prior COVID infection, last detected on 7/5  - Isolation precautions as per hospital protocol  - Monitor SpO2 >90%, provide oxygen support if patient desaturates

## 2022-07-29 NOTE — H&P ADULT - NSICDXPASTMEDICALHX_GEN_ALL_CORE_FT
PAST MEDICAL HISTORY:  Coronary arteriosclerosis in native artery s/p 2 stents. last placed 2 years ago    Costochondritis     DM type 2 with diabetic dyslipidemia     Dyslipidemia     Gastroparesis     H/O gastroesophageal reflux (GERD)     H/O vertebral fracture repair     History of vertebral fracture      PAST MEDICAL HISTORY:  Anemia of chronic disease     Coronary arteriosclerosis in native artery s/p 2 stents. last placed 2 years ago    Costochondritis     DM type 2 with diabetic dyslipidemia     Dyslipidemia     Esophageal dysmotility     Gastroparesis     H/O gastroesophageal reflux (GERD)     H/O vertebral fracture repair     Hematuria     History of vertebral fracture     Hydronephrosis

## 2022-07-29 NOTE — H&P ADULT - PROBLEM SELECTOR PLAN 3
- Hold oral hypoglycemic meds  - Insulin Corrective Scale  - Finger sticks per routine  - Consistent Carb Diet (pureed) with aspiration precautions  - Hemoglobin A1c in AM  - Hypoglycemia protocol Gluc 159 on admission  - Insulin Corrective Scale  - Finger sticks per routine  - Consistent Carb Diet (pureed) with aspiration precautions  - Hemoglobin A1c in AM  - Hypoglycemia protocol COVID-19 detected in PCR on this admission. Likely viral shedding from prior COVID infection, last detected on 7/5  - Isolation precautions as per hospital protocol  - Monitor SpO2 >90%, provide oxygen support if patient desaturates CT abd/pelvis showed moderate B/L pleural effusions on admission  - Pulm consult, f/u recs Dr Melo

## 2022-07-30 ENCOUNTER — TRANSCRIPTION ENCOUNTER (OUTPATIENT)
Age: 87
End: 2022-07-30

## 2022-07-30 DIAGNOSIS — R35.0 FREQUENCY OF MICTURITION: ICD-10-CM

## 2022-07-30 DIAGNOSIS — N13.30 UNSPECIFIED HYDRONEPHROSIS: ICD-10-CM

## 2022-07-30 DIAGNOSIS — K22.4 DYSKINESIA OF ESOPHAGUS: ICD-10-CM

## 2022-07-30 DIAGNOSIS — J90 PLEURAL EFFUSION, NOT ELSEWHERE CLASSIFIED: ICD-10-CM

## 2022-07-30 DIAGNOSIS — R53.1 WEAKNESS: ICD-10-CM

## 2022-07-30 DIAGNOSIS — U07.1 COVID-19: ICD-10-CM

## 2022-07-30 DIAGNOSIS — D64.9 ANEMIA, UNSPECIFIED: ICD-10-CM

## 2022-07-30 LAB
ALBUMIN SERPL ELPH-MCNC: 2.6 G/DL — LOW (ref 3.3–5)
ALP SERPL-CCNC: 241 U/L — HIGH (ref 40–120)
ALT FLD-CCNC: 16 U/L — SIGNIFICANT CHANGE UP (ref 12–78)
ANION GAP SERPL CALC-SCNC: 9 MMOL/L — SIGNIFICANT CHANGE UP (ref 5–17)
AST SERPL-CCNC: 33 U/L — SIGNIFICANT CHANGE UP (ref 15–37)
BILIRUB SERPL-MCNC: 0.5 MG/DL — SIGNIFICANT CHANGE UP (ref 0.2–1.2)
BUN SERPL-MCNC: 86 MG/DL — HIGH (ref 7–23)
CALCIUM SERPL-MCNC: 8.8 MG/DL — SIGNIFICANT CHANGE UP (ref 8.5–10.1)
CHLORIDE SERPL-SCNC: 100 MMOL/L — SIGNIFICANT CHANGE UP (ref 96–108)
CO2 SERPL-SCNC: 25 MMOL/L — SIGNIFICANT CHANGE UP (ref 22–31)
CREAT SERPL-MCNC: 4.2 MG/DL — HIGH (ref 0.5–1.3)
EGFR: 10 ML/MIN/1.73M2 — LOW
GLUCOSE SERPL-MCNC: 129 MG/DL — HIGH (ref 70–99)
HCT VFR BLD CALC: 31.4 % — LOW (ref 34.5–45)
HGB BLD-MCNC: 9.7 G/DL — LOW (ref 11.5–15.5)
MCHC RBC-ENTMCNC: 29.3 PG — SIGNIFICANT CHANGE UP (ref 27–34)
MCHC RBC-ENTMCNC: 30.9 GM/DL — LOW (ref 32–36)
MCV RBC AUTO: 94.9 FL — SIGNIFICANT CHANGE UP (ref 80–100)
NRBC # BLD: 0 /100 WBCS — SIGNIFICANT CHANGE UP (ref 0–0)
PLATELET # BLD AUTO: 289 K/UL — SIGNIFICANT CHANGE UP (ref 150–400)
POTASSIUM SERPL-MCNC: 5.5 MMOL/L — HIGH (ref 3.5–5.3)
POTASSIUM SERPL-SCNC: 5.5 MMOL/L — HIGH (ref 3.5–5.3)
PROT SERPL-MCNC: 6.1 G/DL — SIGNIFICANT CHANGE UP (ref 6–8.3)
RBC # BLD: 3.31 M/UL — LOW (ref 3.8–5.2)
RBC # FLD: 19.2 % — HIGH (ref 10.3–14.5)
SODIUM SERPL-SCNC: 134 MMOL/L — LOW (ref 135–145)
UUN UR-MCNC: 267 MG/DL — SIGNIFICANT CHANGE UP
WBC # BLD: 7.4 K/UL — SIGNIFICANT CHANGE UP (ref 3.8–10.5)
WBC # FLD AUTO: 7.4 K/UL — SIGNIFICANT CHANGE UP (ref 3.8–10.5)

## 2022-07-30 RX ORDER — METOPROLOL TARTRATE 50 MG
25 TABLET ORAL DAILY
Refills: 0 | Status: DISCONTINUED | OUTPATIENT
Start: 2022-07-30 | End: 2022-07-31

## 2022-07-30 RX ORDER — DEXTROSE 50 % IN WATER 50 %
25 SYRINGE (ML) INTRAVENOUS ONCE
Refills: 0 | Status: DISCONTINUED | OUTPATIENT
Start: 2022-07-30 | End: 2022-07-31

## 2022-07-30 RX ORDER — ATORVASTATIN CALCIUM 80 MG/1
40 TABLET, FILM COATED ORAL AT BEDTIME
Refills: 0 | Status: DISCONTINUED | OUTPATIENT
Start: 2022-07-30 | End: 2022-07-31

## 2022-07-30 RX ORDER — SODIUM CHLORIDE 9 MG/ML
1000 INJECTION, SOLUTION INTRAVENOUS
Refills: 0 | Status: DISCONTINUED | OUTPATIENT
Start: 2022-07-30 | End: 2022-07-31

## 2022-07-30 RX ORDER — ERGOCALCIFEROL 1.25 MG/1
50000 CAPSULE ORAL
Refills: 0 | Status: DISCONTINUED | OUTPATIENT
Start: 2022-07-30 | End: 2022-07-31

## 2022-07-30 RX ORDER — DEXTROSE 50 % IN WATER 50 %
15 SYRINGE (ML) INTRAVENOUS ONCE
Refills: 0 | Status: DISCONTINUED | OUTPATIENT
Start: 2022-07-30 | End: 2022-07-31

## 2022-07-30 RX ORDER — FERROUS SULFATE 325(65) MG
325 TABLET ORAL DAILY
Refills: 0 | Status: DISCONTINUED | OUTPATIENT
Start: 2022-07-30 | End: 2022-07-31

## 2022-07-30 RX ORDER — ASPIRIN/CALCIUM CARB/MAGNESIUM 324 MG
81 TABLET ORAL DAILY
Refills: 0 | Status: DISCONTINUED | OUTPATIENT
Start: 2022-07-30 | End: 2022-07-31

## 2022-07-30 RX ORDER — SODIUM ZIRCONIUM CYCLOSILICATE 10 G/10G
10 POWDER, FOR SUSPENSION ORAL ONCE
Refills: 0 | Status: COMPLETED | OUTPATIENT
Start: 2022-07-30 | End: 2022-07-30

## 2022-07-30 RX ORDER — INSULIN LISPRO 100/ML
VIAL (ML) SUBCUTANEOUS
Refills: 0 | Status: DISCONTINUED | OUTPATIENT
Start: 2022-07-29 | End: 2022-07-31

## 2022-07-30 RX ORDER — SENNA PLUS 8.6 MG/1
2 TABLET ORAL AT BEDTIME
Refills: 0 | Status: DISCONTINUED | OUTPATIENT
Start: 2022-07-30 | End: 2022-07-31

## 2022-07-30 RX ORDER — ONDANSETRON 8 MG/1
1 TABLET, FILM COATED ORAL
Qty: 0 | Refills: 0 | DISCHARGE

## 2022-07-30 RX ORDER — ONDANSETRON 8 MG/1
8 TABLET, FILM COATED ORAL THREE TIMES A DAY
Refills: 0 | Status: DISCONTINUED | OUTPATIENT
Start: 2022-07-30 | End: 2022-07-31

## 2022-07-30 RX ORDER — AMITRIPTYLINE HCL 25 MG
10 TABLET ORAL AT BEDTIME
Refills: 0 | Status: DISCONTINUED | OUTPATIENT
Start: 2022-07-30 | End: 2022-07-31

## 2022-07-30 RX ORDER — BUPRENORPHINE 10 UG/H
1 PATCH, EXTENDED RELEASE TRANSDERMAL
Refills: 0 | Status: DISCONTINUED | OUTPATIENT
Start: 2022-07-28 | End: 2022-07-31

## 2022-07-30 RX ORDER — PANTOPRAZOLE SODIUM 20 MG/1
40 TABLET, DELAYED RELEASE ORAL
Refills: 0 | Status: DISCONTINUED | OUTPATIENT
Start: 2022-07-30 | End: 2022-07-31

## 2022-07-30 RX ORDER — DEXTROSE 50 % IN WATER 50 %
12.5 SYRINGE (ML) INTRAVENOUS ONCE
Refills: 0 | Status: DISCONTINUED | OUTPATIENT
Start: 2022-07-30 | End: 2022-07-31

## 2022-07-30 RX ORDER — SERTRALINE 25 MG/1
25 TABLET, FILM COATED ORAL
Refills: 0 | Status: DISCONTINUED | OUTPATIENT
Start: 2022-07-30 | End: 2022-07-31

## 2022-07-30 RX ORDER — POLYETHYLENE GLYCOL 3350 17 G/17G
17 POWDER, FOR SOLUTION ORAL DAILY
Refills: 0 | Status: DISCONTINUED | OUTPATIENT
Start: 2022-07-30 | End: 2022-07-30

## 2022-07-30 RX ORDER — METOCLOPRAMIDE HCL 10 MG
5 TABLET ORAL
Refills: 0 | Status: DISCONTINUED | OUTPATIENT
Start: 2022-07-30 | End: 2022-07-31

## 2022-07-30 RX ORDER — POLYETHYLENE GLYCOL 3350 17 G/17G
17 POWDER, FOR SOLUTION ORAL
Refills: 0 | Status: DISCONTINUED | OUTPATIENT
Start: 2022-07-30 | End: 2022-07-31

## 2022-07-30 RX ORDER — SODIUM ZIRCONIUM CYCLOSILICATE 10 G/10G
10 POWDER, FOR SUSPENSION ORAL
Refills: 0 | Status: COMPLETED | OUTPATIENT
Start: 2022-07-30 | End: 2022-07-31

## 2022-07-30 RX ORDER — SODIUM CHLORIDE 9 MG/ML
1000 INJECTION INTRAMUSCULAR; INTRAVENOUS; SUBCUTANEOUS
Refills: 0 | Status: DISCONTINUED | OUTPATIENT
Start: 2022-07-30 | End: 2022-07-31

## 2022-07-30 RX ORDER — GLUCAGON INJECTION, SOLUTION 0.5 MG/.1ML
1 INJECTION, SOLUTION SUBCUTANEOUS ONCE
Refills: 0 | Status: DISCONTINUED | OUTPATIENT
Start: 2022-07-30 | End: 2022-07-31

## 2022-07-30 RX ORDER — HYDROMORPHONE HYDROCHLORIDE 2 MG/ML
4 INJECTION INTRAMUSCULAR; INTRAVENOUS; SUBCUTANEOUS THREE TIMES A DAY
Refills: 0 | Status: DISCONTINUED | OUTPATIENT
Start: 2022-07-30 | End: 2022-07-31

## 2022-07-30 RX ORDER — INSULIN LISPRO 100/ML
VIAL (ML) SUBCUTANEOUS AT BEDTIME
Refills: 0 | Status: DISCONTINUED | OUTPATIENT
Start: 2022-07-29 | End: 2022-07-31

## 2022-07-30 RX ADMIN — HYDROMORPHONE HYDROCHLORIDE 4 MILLIGRAM(S): 2 INJECTION INTRAMUSCULAR; INTRAVENOUS; SUBCUTANEOUS at 13:47

## 2022-07-30 RX ADMIN — Medication 81 MILLIGRAM(S): at 11:44

## 2022-07-30 RX ADMIN — POLYETHYLENE GLYCOL 3350 17 GRAM(S): 17 POWDER, FOR SOLUTION ORAL at 18:45

## 2022-07-30 RX ADMIN — SODIUM CHLORIDE 70 MILLILITER(S): 9 INJECTION INTRAMUSCULAR; INTRAVENOUS; SUBCUTANEOUS at 02:15

## 2022-07-30 RX ADMIN — SODIUM ZIRCONIUM CYCLOSILICATE 10 GRAM(S): 10 POWDER, FOR SUSPENSION ORAL at 18:45

## 2022-07-30 RX ADMIN — SERTRALINE 25 MILLIGRAM(S): 25 TABLET, FILM COATED ORAL at 06:18

## 2022-07-30 RX ADMIN — Medication 10 MILLIGRAM(S): at 21:56

## 2022-07-30 RX ADMIN — ONDANSETRON 8 MILLIGRAM(S): 8 TABLET, FILM COATED ORAL at 11:48

## 2022-07-30 RX ADMIN — Medication 3 MILLIGRAM(S): at 21:56

## 2022-07-30 RX ADMIN — Medication 1: at 18:45

## 2022-07-30 RX ADMIN — BUPRENORPHINE 1 PATCH: 10 PATCH, EXTENDED RELEASE TRANSDERMAL at 20:15

## 2022-07-30 RX ADMIN — SENNA PLUS 2 TABLET(S): 8.6 TABLET ORAL at 21:56

## 2022-07-30 RX ADMIN — HEPARIN SODIUM 5000 UNIT(S): 5000 INJECTION INTRAVENOUS; SUBCUTANEOUS at 13:47

## 2022-07-30 RX ADMIN — HYDROMORPHONE HYDROCHLORIDE 4 MILLIGRAM(S): 2 INJECTION INTRAMUSCULAR; INTRAVENOUS; SUBCUTANEOUS at 06:41

## 2022-07-30 RX ADMIN — HEPARIN SODIUM 5000 UNIT(S): 5000 INJECTION INTRAVENOUS; SUBCUTANEOUS at 06:17

## 2022-07-30 RX ADMIN — HYDROMORPHONE HYDROCHLORIDE 4 MILLIGRAM(S): 2 INJECTION INTRAMUSCULAR; INTRAVENOUS; SUBCUTANEOUS at 21:56

## 2022-07-30 RX ADMIN — SERTRALINE 25 MILLIGRAM(S): 25 TABLET, FILM COATED ORAL at 18:45

## 2022-07-30 RX ADMIN — PANTOPRAZOLE SODIUM 40 MILLIGRAM(S): 20 TABLET, DELAYED RELEASE ORAL at 18:45

## 2022-07-30 RX ADMIN — Medication 25 MILLIGRAM(S): at 06:24

## 2022-07-30 RX ADMIN — SODIUM CHLORIDE 70 MILLILITER(S): 9 INJECTION INTRAMUSCULAR; INTRAVENOUS; SUBCUTANEOUS at 20:53

## 2022-07-30 RX ADMIN — HYDROMORPHONE HYDROCHLORIDE 4 MILLIGRAM(S): 2 INJECTION INTRAMUSCULAR; INTRAVENOUS; SUBCUTANEOUS at 06:17

## 2022-07-30 RX ADMIN — ATORVASTATIN CALCIUM 40 MILLIGRAM(S): 80 TABLET, FILM COATED ORAL at 21:56

## 2022-07-30 RX ADMIN — HEPARIN SODIUM 5000 UNIT(S): 5000 INJECTION INTRAVENOUS; SUBCUTANEOUS at 22:01

## 2022-07-30 RX ADMIN — PANTOPRAZOLE SODIUM 40 MILLIGRAM(S): 20 TABLET, DELAYED RELEASE ORAL at 06:18

## 2022-07-30 NOTE — PROGRESS NOTE ADULT - PROBLEM SELECTOR PLAN 7
Hx of vertebral fracture with repair, patient presents with back and R leg pain. Takes PO dilaudid at home  - Continue home PO dilaudid  - Fall risk protocol Hx of vertebral fracture with repair, patient presents with back and R leg pain. Takes PO dilaudid at home  - Continue home Patch -placed on Friday -Buprenorphine 5 mg to skin q 7 days as d/w dtr   -i-STOP   -Pt is OFf PO Dilaudid   - Fall risk protocol

## 2022-07-30 NOTE — PROGRESS NOTE ADULT - PROBLEM SELECTOR PLAN 1
Patient presents with decreased urinary frequency on admission, catheter present in ED. UA with trace ketones, small leukocyte esterase, large blood, and few bacteria. BUN 88, Cr 3.90  - CT abd/pelvis: Moderate B/L hydronephrosis, moderate B/L pleural effusions, mild ascites  - Avoid nephrotoxic meds  - Trend renal function and lytes, adjust as necessary  - Monitor I&Os  - Hold home Lasix due to KERRY, IV fluids  - Nephro consult for f/u recs (seen Dr. Wu previously)  -Cr worsening (4.2) on 7/30 Patient presents with decreased urinary frequency on admission, catheter present in ED. UA with trace ketones, small leukocyte esterase, large blood, and few bacteria. BUN 88, Cr 3.90  - CT abd/pelvis: Moderate B/L hydronephrosis, moderate B/L pleural effusions, mild ascites  - Avoid nephrotoxic meds  - Trend renal function and lytes, adjust as necessary  - Monitor I&Os  - Hold home Lasix due to KERRY, IV fluids  - Nephro consult for f/u recs (seen Dr. Wu previously)  -Cr worsening (4.2) on 7/30  -Hyperkalemia -Lokelma x 2 doses

## 2022-07-30 NOTE — PROGRESS NOTE ADULT - PROBLEM SELECTOR PLAN 5
Patient presented with back and R leg pain, daughter also commented on more noticeable generalized weakness  - PT consult for weakness, deconditioning  - Palliative consult for GOC discussion  -  consult for dispo, d/c

## 2022-07-30 NOTE — PHYSICAL THERAPY INITIAL EVALUATION ADULT - GAIT DEVIATIONS NOTED, PT EVAL
decreased alejandrina/decreased step length/decreased stride length/decreased weight-shifting ability

## 2022-07-30 NOTE — PROGRESS NOTE ADULT - ATTENDING COMMENTS
89yo F with PMH DM, dyslipidemia, gastroparesis, CAD s/p 2 stents, hx of vertebral fx with repair presents to the ED with back and R leg pain, as well as recent decreased urinary frequency, admitted for KERRY/CKD 3  and dehydration.  Pt seen, Examined, case & care plan d/w pt, residents at detail.  D/W Dtr at Detail.  Pulmonary-Dr Melo  Renal-DR JL Christopher-KERRY/Urology eval   -Dr haddad -Urology d/w at detail.  DNR/DNI  PO diet  AM labs   IV Fluids, I/O'  Total care time is 40 minutes  D/C Isolation as d/w RN

## 2022-07-30 NOTE — PROGRESS NOTE ADULT - PROBLEM SELECTOR PLAN 3
COVID-19 detected in PCR on this admission. Likely viral shedding from prior COVID infection, last detected on 7/5  - Isolation precautions as per hospital protocol  - Monitor SpO2 >90%, provide oxygen support if patient desaturates

## 2022-07-30 NOTE — PROGRESS NOTE ADULT - PROBLEM SELECTOR PLAN 4
CT abd/pelvis showed moderate B/L pleural effusions on admission  - Pulm consult Dr. orona, recs appreciated  -Will need IR guided thoracentesis  -Incentive spirometry

## 2022-07-30 NOTE — PHYSICAL THERAPY INITIAL EVALUATION ADULT - ADDITIONAL COMMENTS
Pt lives with daughter and HHA who take turns taking care of pt, pt uses SAC and RW for ambulation and requires assist with ADL's.

## 2022-07-30 NOTE — PROGRESS NOTE ADULT - ASSESSMENT
89yo F with PMH DM, dyslipidemia, gastroparesis, CAD s/p 2 stents, hx of vertebral fx with repair presents to the ED with back and R leg pain, as well as recent decreased urinary frequency, admitted for KERRY and dehydration.

## 2022-07-30 NOTE — PROGRESS NOTE ADULT - SUBJECTIVE AND OBJECTIVE BOX
Patient is a 89y old  Female who presents with a chief complaint of back and R leg pain, inability to urinate (2022 05:51)    HPI:  Additional history obtained from daughter (Mary) via telephone conversation.    89yo F with PMH DM, dyslipidemia, gastroparesis, CAD s/p 2 stents, hx of vertebral fx with repair presents to the ED with back and R leg pain, as well as recent decreased urinary frequency. Patient was previously at [facility] 22-7/15/22 for COVID+, cholelithiasis, KERRY, and UTI/hematuria. Patient lives at home by herself with home health aide and daughter who take turns caring for her. Daughter commented that patient has worsening weakness and PO intake, increased irritability, and recent difficulty with urination. Daughter states that during her previous admission, patient would report urinary frequency that occurred every 20-30 minutes; Nephrology had seen her previously and prescribed Lasix q48h. Patient says that sometimes "she forgets to drink", and daughter states that COVID infection has changed her sense of taste as well as overall appetite. Daughter also concerned about ecchymosis in the R hip and knee region which had occurred during the last hospital stay; however patient denies any recent fall or injury.     in ED:  Vital Signs: T 97.9F, HR 83, 121/63, RR 18, 98% on RA  Labs: Hgb 10.3, Na 133, BUN 88, Cr 3.90, Gluc 159, Alk Phos 227, Mg 3.2  COVID+  CXR: Large bilateral pleural effusions layering posteriorly. Cannot exclude   underlying infiltrate or atelectasis.  CT chest, abd/pelvis: Moderate B/L hydronephrosis, moderate B/L pleural effusions, mild ascites  RLE doppler: No evidence of RLE DVT  UA: Proteinuria, trace ketones, small leukocyte esterase, large blood, few bacteria  Given: NaCl bolus x1, PO Dilaudid, IV Dilaudid (2022 23:08)    INTERVAL HPI:   22: Pt seen and examined at bedside. Pt only complaint is pain from chronic sacral wound (being followed by nursing). Pt also has newly developed (from recent admission in July) right leg 3+ pitting edema from ankle to hip with bruising noted over right knee. Pt endorses decreased PO intake over last few months 2/2 not being hungry. Daughter at bedside to supplement history. Pt denies chest pain, SOB, abdominal pain, cough, light-headedness. Worsening KERRY (cr 4.2), gross hematuria noted in bedside commode (Pt was straight cath'd twice on last admission, black stool noted in bedside commode (patient takes iron supplement).    OVERNIGHT EVENTS: None    Home Medications:  amitriptyline 10 mg oral tablet: 3 tab(s) orally once a day (at bedtime) (:08)  aspirin 81 mg oral tablet: 1 tab(s) orally once a day (:08)  atorvastatin 40 mg oral tablet: 1 tab(s) orally once a day (:08)  buprenorphine:  ()  ferrous sulfate 200 mg (65 mg elemental iron) oral tablet: 1 tab(s) orally once a day (:08)  HYDROmorphone 4 mg oral tablet: 1 tab(s) orally 3 times a day (08)  Macrodantin 100 mg oral capsule: 1 cap(s) orally 4 times a day (:08)  Metoprolol Succinate ER 25 mg oral tablet, extended release: 1 tab(s) orally once a day (:08)  MiraLax oral powder for reconstitution: orally once a day, As Needed - for constipation (:08)  PreserVision AREDS oral capsule: 1 tab(s) orally 2 times a day (:08)  Reglan 5 mg oral tablet: orally 2 times a day (:08)  Senna 8.6 mg oral tablet: 2 tab(s) orally once a day (at bedtime) (:08)  sertraline 25 mg oral tablet: two tablets daily  (:08)  Vitamin D3 1250 mcg (50,000 intl units) oral capsule: 1 cap(s) orally once a week (:08)  Zofran 8 mg oral tablet: 1 tab(s) orally every 6 hours (:08)    MEDICATIONS  (STANDING):  amitriptyline 10 milliGRAM(s) Oral at bedtime  aspirin enteric coated 81 milliGRAM(s) Oral daily  atorvastatin 40 milliGRAM(s) Oral at bedtime  dextrose 5%. 1000 milliLiter(s) (50 mL/Hr) IV Continuous <Continuous>  dextrose 5%. 1000 milliLiter(s) (100 mL/Hr) IV Continuous <Continuous>  dextrose 50% Injectable 25 Gram(s) IV Push once  dextrose 50% Injectable 12.5 Gram(s) IV Push once  dextrose 50% Injectable 25 Gram(s) IV Push once  ergocalciferol 34247 Unit(s) Oral <User Schedule>  ferrous    sulfate 325 milliGRAM(s) Oral daily  glucagon  Injectable 1 milliGRAM(s) IntraMuscular once  heparin   Injectable 5000 Unit(s) SubCutaneous every 8 hours  HYDROmorphone   Tablet 4 milliGRAM(s) Oral three times a day  insulin lispro (ADMELOG) corrective regimen sliding scale   SubCutaneous three times a day before meals  insulin lispro (ADMELOG) corrective regimen sliding scale   SubCutaneous at bedtime  metoprolol succinate ER 25 milliGRAM(s) Oral daily  pantoprazole    Tablet 40 milliGRAM(s) Oral two times a day  senna 2 Tablet(s) Oral at bedtime  sertraline 25 milliGRAM(s) Oral two times a day  sodium chloride 0.9%. 1000 milliLiter(s) (70 mL/Hr) IV Continuous <Continuous>    MEDICATIONS  (PRN):  dextrose Oral Gel 15 Gram(s) Oral once PRN Blood Glucose LESS THAN 70 milliGRAM(s)/deciliter  melatonin 3 milliGRAM(s) Oral at bedtime PRN Insomnia  metoclopramide 5 milliGRAM(s) Oral two times a day PRN Nausea/vomiting  ondansetron    Tablet 8 milliGRAM(s) Oral three times a day PRN Nausea and/or Vomiting  polyethylene glycol 3350 17 Gram(s) Oral daily PRN for constipation    Allergies  morphine (Other)    Intolerances  Social History:  Tobacco: Former smoker  EtOH: Denies   Recreational drug use: Denies  Lives with: Self, HHA and daughter work around the clock for additional assistance  Ambulates: Cane, walker  ADLs: Requires assistance with all ADLs (2022 23:08)      REVIEW OF SYSTEMS: ROS supplemented by daughter at bedside  CONSTITUTIONAL: No fever, No chills, No fatigue, No myalgia, No Body ache, No Weakness  EYES: Floaters causing decreased vision per patient, No eye pain,  No visual disturbances, No discharge, NO Redness  ENMT:  No ear pain, No nose bleed, No vertigo; No sinus pain, NO throat pain, No Congestion  NECK: No pain, No stiffness  RESPIRATORY: No cough, NO wheezing, No  hemoptysis, NO  shortness of breath  CARDIOVASCULAR: No chest pain, palpitations  GASTROINTESTINAL: No abdominal pain, NO epigastric pain. Occasional nausea at baseline, No vomiting; No diarrhea, No constipation. [X] BM  GENITOURINARY: dysuria, urinary frequency and urgency at home, less since coming to ER, Gross hematuria, NO incontinence  NEUROLOGICAL: No headaches, No dizziness, No numbness, No tingling, No tremors, No weakness  EXT: No Swelling, No Pain, occasional Bilateral LE edema at baseline, currently right leg 3+ pitting edema from ankle to hip  SKIN:  [ ] No itching, burning, rashes, or lesions, Skin breakdown and scars from previous breakdown noted all extremities. Sacral ulcer   MUSCULOSKELETAL: No joint pain ,No Jt swelling; No muscle pain, No back pain, No extremity pain  PSYCHIATRIC: No depression,  No anxiety,  No mood swings ,No difficulty sleeping at night  PAIN SCALE: [  ] None  [X] Other- mild pain 2/2 sacral ulcer  ROS Unable to obtain due to - [  ] Dementia  [  ] Lethargy [  ] Drowsy [  ] Sedated [  ] non verbal  REST OF REVIEW Of SYSTEM - [X] Normal     Vital Signs Last 24 Hrs  T(C): 37.1 (2022 06:15), Max: 37.1 (2022 06:15)  T(F): 98.7 (2022 06:15), Max: 98.7 (2022 06:15)  HR: 88 (2022 06:15) (83 - 88)  BP: 128/62 (2022 06:15) (121/63 - 128/62)  BP(mean): --  RR: 18 (2022 06:15) (18 - 18)  SpO2: 98% (2022 15:16) (98% - 98%)    Parameters below as of 2022 15:16  Patient On (Oxygen Delivery Method): room air      Finger Stick    - @ 07:01  -  - @ 07:00  --------------------------------------------------------  IN: 350 mL / OUT: 0 mL / NET: 350 mL    PHYSICAL EXAM:  GENERAL:  [X] NAD , [X] well appearing, [  ] Agitated, [  ] Drowsy,  [  ] Lethargy, [  ] confused   HEAD:  [X] Normal, [  ] Other  EYES:  [X] EOMI, [X] PERRLA, [X] conjunctiva and sclera clear normal, [  ] Other,  [  ] Pallor,[  ] Discharge  ENMT:  [X] Normal, [X] Moist mucous membranes, [X] Moderate dentition [  ] Good dentition, [  ] No Thrush  NECK:  [X] Supple, [X] No JVD, [  ] Normal thyroid, [  ] Lymphadenopathy [  ] Other  CHEST/LUNG:  [X] Clear to auscultation bilaterally, [X] Breath Sounds equal B/L / Decrease, [  ] poor effort  [X] No rales, [X] No rhonchi  [X]  No wheezing,   HEART:  [X] Regular rate and rhythm, [  ] tachycardia, [  ] Bradycardia,  [  ] irregular  [X] No murmurs, No rubs, No gallops, [  ] PPM in place (Mfr:  )  ABDOMEN: [X] Reducible umbilical hernia [X] Soft, [X] Nontender, [X] Nondistended, [  ] No mass, [X] Bowel sounds present, [  ] obese  NERVOUS SYSTEM:  [X] Alert & Oriented X2 (person and place), [  ] Nonfocal  [  ] Confusion  [  ] Encephalopathic [  ] Sedated [  ] Unable to assess, [  ] Dementia [  ] Other-  EXTREMITIES: [X] 2+ Peripheral Pulses, No clubbing, No cyanosis, [X] right leg 3+ pitting edema from ankle to hip [ ] edema B/L lower EXT. [X] PVD stasis skin changes B/L Lower EXT, [  ] wound  LYMPH: No lymphadenopathy noted  SKIN:  [  ] No rashes or lesions, [X] Pressure Ulcers (sacral), [  ] ecchymosis, [X] Skin Tears, [  ] Other    DIET: Diet, Pureed:   Consistent Carbohydrate Evening Snack  Supplement Feeding Modality:  Oral  Glucerna Shake Cans or Servings Per Day:  1       Frequency:  Three Times a day (22 @ 01:52)    LABS:                        10.3   7.96  )-----------( 295      ( 2022 17:10 )             33.6     2022 06:05    134    |  100    |  86     ----------------------------<  129    5.5     |  25     |  4.20     Ca    8.8        2022 06:05  Mg     3.2       2022 17:10    TPro  6.1    /  Alb  2.6    /  TBili  0.5    /  DBili  x      /  AST  33     /  ALT  16     /  AlkPhos  241    2022 06:05    Urinalysis Basic - ( 2022 23:07 )    Color: Yellow / Appearance: Slightly Turbid / S.010 / pH: x  Gluc: x / Ketone: Trace  / Bili: Negative / Urobili: Negative   Blood: x / Protein: 100 / Nitrite: Negative   Leuk Esterase: Small / RBC: >50 /HPF / WBC 6-10   Sq Epi: x / Non Sq Epi: Occasional / Bacteria: Few      Anemia Panel:    Thyroid Panel:    RADIOLOGY & ADDITIONAL TESTS:    HEALTH ISSUES - PROBLEM Dx:  KERRY (acute kidney injury)  Type 2 diabetes mellitus  GERD (gastroesophageal reflux disease)  CAD (coronary artery disease)  Need for prophylactic measure  Constipation  H/O vertebral fracture repair  Dehydration  Anemia  2019 novel coronavirus disease (COVID-19)  Bilateral pleural effusion  General weakness      Consultant(s) Notes Reviewed:  [X] YES     Care Discussed with [X] Consultants  [X] Patient  [X] Family [  ] HCP [  ]   [  ] Social Service  [  ] RN, [  ] Physical Therapy,[  ] Palliative care team  DVT PPX: [  ] Lovenox, [X] S C Heparin, [  ] Coumadin, [  ] Xarelto, [  ] Eliquis, [  ] Pradaxa, [  ] IV Heparin drip, [  ] SCD [  ] Contraindication 2 to GI Bleed,[  ] Ambulation [  ] Contraindicated 2 to  bleed [  ] Contraindicated 2 to Brain Bleed  Advanced directive: [  ] None, [  ] DNR/DNI Patient is a 89y old  Female who presents with a chief complaint of back and R leg pain, inability to urinate (2022 05:51)    HPI:  Additional history obtained from daughter (Mary) via telephone conversation.    89yo F with PMH DM, dyslipidemia, gastroparesis, CAD s/p 2 stents, hx of vertebral fx with repair presents to the ED with back and R leg pain, as well as recent decreased urinary frequency. Patient was previously at [facility] 22-7/15/22 for COVID+, cholelithiasis, KERYR, and UTI/hematuria. Patient lives at home by herself with home health aide and daughter who take turns caring for her. Daughter commented that patient has worsening weakness and PO intake, increased irritability, and recent difficulty with urination. Daughter states that during her previous admission, patient would report urinary frequency that occurred every 20-30 minutes; Nephrology had seen her previously and prescribed Lasix q48h. Patient says that sometimes "she forgets to drink", and daughter states that COVID infection has changed her sense of taste as well as overall appetite. Daughter also concerned about ecchymosis in the R hip and knee region which had occurred during the last hospital stay; however patient denies any recent fall or injury.     in ED:  Vital Signs: T 97.9F, HR 83, 121/63, RR 18, 98% on RA  Labs: Hgb 10.3, Na 133, BUN 88, Cr 3.90, Gluc 159, Alk Phos 227, Mg 3.2  COVID+  CXR: Large bilateral pleural effusions layering posteriorly. Cannot exclude   underlying infiltrate or atelectasis.  CT chest, abd/pelvis: Moderate B/L hydronephrosis, moderate B/L pleural effusions, mild ascites  RLE doppler: No evidence of RLE DVT  UA: Proteinuria, trace ketones, small leukocyte esterase, large blood, few bacteria  Given: NaCl bolus x1, PO Dilaudid, IV Dilaudid (2022 23:08)    INTERVAL HPI:   22: Pt seen and examined at bedside. Pt only complaint is pain from chronic sacral wound (being followed by nursing). Pt also has newly developed (from recent admission in July) right leg 3+ pitting edema from ankle to hip with bruising noted over right knee (DCT ruled out via u/s). Pt endorses decreased PO intake over last few months 2/2 not being hungry. Daughter at bedside to supplement history. Pt denies chest pain, SOB, abdominal pain, cough, light-headedness. Worsening KERRY (cr 4.2), gross hematuria noted in bedside commode (Pt was straight cath'd twice on last admission, black stool noted in bedside commode (patient takes iron supplement). No DVT, +bilateral pleural effusions, bilateral hydro, mild ascites noted CT scan    OVERNIGHT EVENTS: None    Home Medications:  amitriptyline 10 mg oral tablet: 3 tab(s) orally once a day (at bedtime) (:08)  aspirin 81 mg oral tablet: 1 tab(s) orally once a day (:08)  atorvastatin 40 mg oral tablet: 1 tab(s) orally once a day (:08)  buprenorphine:  (:08)  ferrous sulfate 200 mg (65 mg elemental iron) oral tablet: 1 tab(s) orally once a day (:08)  HYDROmorphone 4 mg oral tablet: 1 tab(s) orally 3 times a day (:08)  Macrodantin 100 mg oral capsule: 1 cap(s) orally 4 times a day (:08)  Metoprolol Succinate ER 25 mg oral tablet, extended release: 1 tab(s) orally once a day (:08)  MiraLax oral powder for reconstitution: orally once a day, As Needed - for constipation (:08)  PreserVision AREDS oral capsule: 1 tab(s) orally 2 times a day (:08)  Reglan 5 mg oral tablet: orally 2 times a day (:08)  Senna 8.6 mg oral tablet: 2 tab(s) orally once a day (at bedtime) (:08)  sertraline 25 mg oral tablet: two tablets daily  (:08)  Vitamin D3 1250 mcg (50,000 intl units) oral capsule: 1 cap(s) orally once a week (:08)  Zofran 8 mg oral tablet: 1 tab(s) orally every 6 hours (2022 00:08)    MEDICATIONS  (STANDING):  amitriptyline 10 milliGRAM(s) Oral at bedtime  aspirin enteric coated 81 milliGRAM(s) Oral daily  atorvastatin 40 milliGRAM(s) Oral at bedtime  dextrose 5%. 1000 milliLiter(s) (50 mL/Hr) IV Continuous <Continuous>  dextrose 5%. 1000 milliLiter(s) (100 mL/Hr) IV Continuous <Continuous>  dextrose 50% Injectable 25 Gram(s) IV Push once  dextrose 50% Injectable 12.5 Gram(s) IV Push once  dextrose 50% Injectable 25 Gram(s) IV Push once  ergocalciferol 51029 Unit(s) Oral <User Schedule>  ferrous    sulfate 325 milliGRAM(s) Oral daily  glucagon  Injectable 1 milliGRAM(s) IntraMuscular once  heparin   Injectable 5000 Unit(s) SubCutaneous every 8 hours  HYDROmorphone   Tablet 4 milliGRAM(s) Oral three times a day  insulin lispro (ADMELOG) corrective regimen sliding scale   SubCutaneous three times a day before meals  insulin lispro (ADMELOG) corrective regimen sliding scale   SubCutaneous at bedtime  metoprolol succinate ER 25 milliGRAM(s) Oral daily  pantoprazole    Tablet 40 milliGRAM(s) Oral two times a day  senna 2 Tablet(s) Oral at bedtime  sertraline 25 milliGRAM(s) Oral two times a day  sodium chloride 0.9%. 1000 milliLiter(s) (70 mL/Hr) IV Continuous <Continuous>    MEDICATIONS  (PRN):  dextrose Oral Gel 15 Gram(s) Oral once PRN Blood Glucose LESS THAN 70 milliGRAM(s)/deciliter  melatonin 3 milliGRAM(s) Oral at bedtime PRN Insomnia  metoclopramide 5 milliGRAM(s) Oral two times a day PRN Nausea/vomiting  ondansetron    Tablet 8 milliGRAM(s) Oral three times a day PRN Nausea and/or Vomiting  polyethylene glycol 3350 17 Gram(s) Oral daily PRN for constipation    Allergies  morphine (Other)    Intolerances  Social History:  Tobacco: Former smoker  EtOH: Denies   Recreational drug use: Denies  Lives with: Self, HHA and daughter work around the clock for additional assistance  Ambulates: Cane, walker  ADLs: Requires assistance with all ADLs (2022 23:08)      REVIEW OF SYSTEMS: ROS supplemented by daughter at bedside  CONSTITUTIONAL: No fever, No chills, No fatigue, No myalgia, No Body ache, No Weakness  EYES: Floaters causing decreased vision per patient, No eye pain,  No visual disturbances, No discharge, NO Redness  ENMT:  No ear pain, No nose bleed, No vertigo; No sinus pain, NO throat pain, No Congestion  NECK: No pain, No stiffness  RESPIRATORY: No cough, NO wheezing, No  hemoptysis, NO  shortness of breath  CARDIOVASCULAR: No chest pain, palpitations  GASTROINTESTINAL: No abdominal pain, NO epigastric pain. Occasional nausea at baseline, No vomiting; No diarrhea, No constipation. [X] BM  GENITOURINARY: dysuria, urinary frequency and urgency at home, less since coming to ER, Gross hematuria, NO incontinence  NEUROLOGICAL: No headaches, No dizziness, No numbness, No tingling, No tremors, No weakness  EXT: No Swelling, No Pain, occasional Bilateral LE edema at baseline, currently right leg 3+ pitting edema from ankle to hip  SKIN:  [ ] No itching, burning, rashes, or lesions, Skin breakdown and scars from previous breakdown noted all extremities. Sacral ulcer   MUSCULOSKELETAL: No joint pain ,No Jt swelling; No muscle pain, No back pain, No extremity pain  PSYCHIATRIC: No depression,  No anxiety,  No mood swings ,No difficulty sleeping at night  PAIN SCALE: [  ] None  [X] Other- mild pain 2/2 sacral ulcer  ROS Unable to obtain due to - [  ] Dementia  [  ] Lethargy [  ] Drowsy [  ] Sedated [  ] non verbal  REST OF REVIEW Of SYSTEM - [X] Normal     Vital Signs Last 24 Hrs  T(C): 37.1 (2022 06:15), Max: 37.1 (2022 06:15)  T(F): 98.7 (2022 06:15), Max: 98.7 (2022 06:15)  HR: 88 (2022 06:15) (83 - 88)  BP: 128/62 (2022 06:15) (121/63 - 128/62)  BP(mean): --  RR: 18 (2022 06:15) (18 - 18)  SpO2: 98% (2022 15:16) (98% - 98%)    Parameters below as of 2022 15:16  Patient On (Oxygen Delivery Method): room air      Finger Stick     @ 07:01  -   @ 07:00  --------------------------------------------------------  IN: 350 mL / OUT: 0 mL / NET: 350 mL    PHYSICAL EXAM:  GENERAL:  [X] NAD , [X] well appearing, [  ] Agitated, [  ] Drowsy,  [  ] Lethargy, [  ] confused   HEAD:  [X] Normal, [  ] Other  EYES:  [X] EOMI, [X] PERRLA, [X] conjunctiva and sclera clear normal, [  ] Other,  [  ] Pallor,[  ] Discharge  ENMT:  [X] Normal, [X] Moist mucous membranes, [X] Moderate dentition [  ] Good dentition, [  ] No Thrush  NECK:  [X] Supple, [X] No JVD, [  ] Normal thyroid, [  ] Lymphadenopathy [  ] Other  CHEST/LUNG:  [X] Clear to auscultation bilaterally, [X] Breath Sounds equal B/L / Decrease, [  ] poor effort  [X] No rales, [X] No rhonchi  [X]  No wheezing,   HEART:  [X] Regular rate and rhythm, [  ] tachycardia, [  ] Bradycardia,  [  ] irregular  [X] No murmurs, No rubs, No gallops, [  ] PPM in place (Mfr:  )  ABDOMEN: [X] Reducible umbilical hernia [X] Soft, [X] Nontender, [X] Nondistended, [  ] No mass, [X] Bowel sounds present, [  ] obese  NERVOUS SYSTEM:  [X] Alert & Oriented X2 (person and place), [  ] Nonfocal  [  ] Confusion  [  ] Encephalopathic [  ] Sedated [  ] Unable to assess, [  ] Dementia [  ] Other-  EXTREMITIES: [X] 2+ Peripheral Pulses, No clubbing, No cyanosis, [X] right leg 3+ pitting edema from ankle to hip [ ] edema B/L lower EXT. [X] PVD stasis skin changes B/L Lower EXT, [  ] wound  LYMPH: No lymphadenopathy noted  SKIN:  [  ] No rashes or lesions, [X] Pressure Ulcers (sacral), [  ] ecchymosis, [X] Skin Tears, [  ] Other    DIET: Diet, Pureed:   Consistent Carbohydrate Evening Snack  Supplement Feeding Modality:  Oral  Glucerna Shake Cans or Servings Per Day:  1       Frequency:  Three Times a day (22 @ 01:52)    LABS:                        10.3   7.96  )-----------( 295      ( 2022 17:10 )             33.6     2022 06:05    134    |  100    |  86     ----------------------------<  129    5.5     |  25     |  4.20     Ca    8.8        2022 06:05  Mg     3.2       2022 17:10    TPro  6.1    /  Alb  2.6    /  TBili  0.5    /  DBili  x      /  AST  33     /  ALT  16     /  AlkPhos  241    2022 06:05    Urinalysis Basic - ( 2022 23:07 )    Color: Yellow / Appearance: Slightly Turbid / S.010 / pH: x  Gluc: x / Ketone: Trace  / Bili: Negative / Urobili: Negative   Blood: x / Protein: 100 / Nitrite: Negative   Leuk Esterase: Small / RBC: >50 /HPF / WBC 6-10   Sq Epi: x / Non Sq Epi: Occasional / Bacteria: Few      Anemia Panel:    Thyroid Panel:    RADIOLOGY & ADDITIONAL TESTS:    HEALTH ISSUES - PROBLEM Dx:  KERRY (acute kidney injury)  Type 2 diabetes mellitus  GERD (gastroesophageal reflux disease)  CAD (coronary artery disease)  Need for prophylactic measure  Constipation  H/O vertebral fracture repair  Dehydration  Anemia  2019 novel coronavirus disease (COVID-19)  Bilateral pleural effusion  General weakness      Consultant(s) Notes Reviewed:  [X] YES     Care Discussed with [X] Consultants  [X] Patient  [X] Family [  ] HCP [  ]   [  ] Social Service  [  ] RN, [  ] Physical Therapy,[  ] Palliative care team  DVT PPX: [  ] Lovenox, [X] S C Heparin, [  ] Coumadin, [  ] Xarelto, [  ] Eliquis, [  ] Pradaxa, [  ] IV Heparin drip, [  ] SCD [  ] Contraindication 2 to GI Bleed,[  ] Ambulation [  ] Contraindicated 2 to  bleed [  ] Contraindicated 2 to Brain Bleed  Advanced directive: [  ] None, [  ] DNR/DNI Patient is a 89y old  Female who presents with a chief complaint of back and R leg pain, inability to urinate (2022 05:51)    HPI:  Additional history obtained from daughter (Mary) via telephone conversation.    89yo F with PMH DM, dyslipidemia, gastroparesis, CAD s/p 2 stents, hx of vertebral fx with repair presents to the ED with back and R leg pain, as well as recent decreased urinary frequency. Patient was previously at [facility] 22-7/15/22 for COVID+, cholelithiasis, KERRY, and UTI/hematuria. Patient lives at home by herself with home health aide and daughter who take turns caring for her. Daughter commented that patient has worsening weakness and PO intake, increased irritability, and recent difficulty with urination. Daughter states that during her previous admission, patient would report urinary frequency that occurred every 20-30 minutes; Nephrology had seen her previously and prescribed Lasix q48h. Patient says that sometimes "she forgets to drink", and daughter states that COVID infection has changed her sense of taste as well as overall appetite. Daughter also concerned about ecchymosis in the R hip and knee region which had occurred during the last hospital stay; however patient denies any recent fall or injury.     in ED:  Vital Signs: T 97.9F, HR 83, 121/63, RR 18, 98% on RA  Labs: Hgb 10.3, Na 133, BUN 88, Cr 3.90, Gluc 159, Alk Phos 227, Mg 3.2  COVID+  CXR: Large bilateral pleural effusions layering posteriorly. Cannot exclude   underlying infiltrate or atelectasis.  CT chest, abd/pelvis: Moderate B/L hydronephrosis, moderate B/L pleural effusions, mild ascites  RLE doppler: No evidence of RLE DVT  UA: Proteinuria, trace ketones, small leukocyte esterase, large blood, few bacteria  Given: NaCl bolus x1, PO Dilaudid, IV Dilaudid (2022 23:08)    INTERVAL HPI:   22: Pt seen and examined at bedside. Pt only complaint is pain from chronic sacral wound (being followed by nursing). Pt also has newly developed (from recent admission in July) right leg 3+ pitting edema from ankle to hip with bruising noted over right knee (DCT ruled out via u/s). Pt endorses decreased PO intake over last few months 2/2 not being hungry. Daughter at bedside to supplement history. Pt denies chest pain, SOB, abdominal pain, cough, light-headedness. Worsening KERRY (cr 4.2), gross hematuria noted in bedside commode (Pt was straight cath'd twice on last admission, black stool noted in bedside commode (patient takes iron supplement). No DVT, +bilateral pleural effusions, bilateral hydro, mild ascites noted CT scan    OVERNIGHT EVENTS: None    Home Medications:  amitriptyline 10 mg oral tablet: 3 tab(s) orally once a day (at bedtime) (:08)  aspirin 81 mg oral tablet: 1 tab(s) orally once a day (:08)  atorvastatin 40 mg oral tablet: 1 tab(s) orally once a day (:08)  buprenorphine:  (:08)  ferrous sulfate 200 mg (65 mg elemental iron) oral tablet: 1 tab(s) orally once a day (:08)  HYDROmorphone 4 mg oral tablet: 1 tab(s) orally 3 times a day (:08)  Macrodantin 100 mg oral capsule: 1 cap(s) orally 4 times a day (:08)  Metoprolol Succinate ER 25 mg oral tablet, extended release: 1 tab(s) orally once a day (:08)  MiraLax oral powder for reconstitution: orally once a day, As Needed - for constipation (:08)  PreserVision AREDS oral capsule: 1 tab(s) orally 2 times a day (:08)  Reglan 5 mg oral tablet: orally 2 times a day (:08)  Senna 8.6 mg oral tablet: 2 tab(s) orally once a day (at bedtime) (:08)  sertraline 25 mg oral tablet: two tablets daily  (:08)  Vitamin D3 1250 mcg (50,000 intl units) oral capsule: 1 cap(s) orally once a week (:08)  Zofran 8 mg oral tablet: 1 tab(s) orally every 6 hours (2022 00:08)    MEDICATIONS  (STANDING):  amitriptyline 10 milliGRAM(s) Oral at bedtime  aspirin enteric coated 81 milliGRAM(s) Oral daily  atorvastatin 40 milliGRAM(s) Oral at bedtime  dextrose 5%. 1000 milliLiter(s) (50 mL/Hr) IV Continuous <Continuous>  dextrose 5%. 1000 milliLiter(s) (100 mL/Hr) IV Continuous <Continuous>  dextrose 50% Injectable 25 Gram(s) IV Push once  dextrose 50% Injectable 12.5 Gram(s) IV Push once  dextrose 50% Injectable 25 Gram(s) IV Push once  ergocalciferol 68023 Unit(s) Oral <User Schedule>  ferrous    sulfate 325 milliGRAM(s) Oral daily  glucagon  Injectable 1 milliGRAM(s) IntraMuscular once  heparin   Injectable 5000 Unit(s) SubCutaneous every 8 hours  HYDROmorphone   Tablet 4 milliGRAM(s) Oral three times a day  insulin lispro (ADMELOG) corrective regimen sliding scale   SubCutaneous three times a day before meals  insulin lispro (ADMELOG) corrective regimen sliding scale   SubCutaneous at bedtime  metoprolol succinate ER 25 milliGRAM(s) Oral daily  pantoprazole    Tablet 40 milliGRAM(s) Oral two times a day  senna 2 Tablet(s) Oral at bedtime  sertraline 25 milliGRAM(s) Oral two times a day  sodium chloride 0.9%. 1000 milliLiter(s) (70 mL/Hr) IV Continuous <Continuous>    MEDICATIONS  (PRN):  dextrose Oral Gel 15 Gram(s) Oral once PRN Blood Glucose LESS THAN 70 milliGRAM(s)/deciliter  melatonin 3 milliGRAM(s) Oral at bedtime PRN Insomnia  metoclopramide 5 milliGRAM(s) Oral two times a day PRN Nausea/vomiting  ondansetron    Tablet 8 milliGRAM(s) Oral three times a day PRN Nausea and/or Vomiting  polyethylene glycol 3350 17 Gram(s) Oral daily PRN for constipation    Allergies  morphine (Other)    Intolerances  Social History:  Tobacco: Former smoker  EtOH: Denies   Recreational drug use: Denies  Lives with: Self, HHA and daughter work around the clock for additional assistance  Ambulates: Cane, walker  ADLs: Requires assistance with all ADLs (2022 23:08)      REVIEW OF SYSTEMS: ROS supplemented by daughter at bedside  CONSTITUTIONAL: No fever, No chills, No fatigue, No myalgia, No Body ache, No Weakness  EYES: Floaters causing decreased vision per patient, No eye pain,  No visual disturbances, No discharge, NO Redness  ENMT:  No ear pain, No nose bleed, No vertigo; No sinus pain, NO throat pain, No Congestion  NECK: No pain, No stiffness  RESPIRATORY: No cough, NO wheezing, No  hemoptysis, NO  shortness of breath  CARDIOVASCULAR: No chest pain, palpitations  GASTROINTESTINAL: No abdominal pain, NO epigastric pain. Occasional nausea at baseline, No vomiting; No diarrhea, No constipation. [X] BM  GENITOURINARY: dysuria, urinary frequency and urgency at home, less since coming to ER, Gross hematuria, NO incontinence  NEUROLOGICAL: No headaches, No dizziness, No numbness, No tingling, No tremors, No weakness  EXT: No Swelling, No Pain, occasional Bilateral LE edema at baseline, currently right leg 3+ pitting edema from ankle to hip  SKIN:  [ ] No itching, burning, rashes, or lesions, Skin breakdown and scars from previous breakdown noted all extremities. Sacral ulcer   MUSCULOSKELETAL: No joint pain ,No Jt swelling; No muscle pain, No back pain, No extremity pain  PSYCHIATRIC: No depression,  No anxiety,  No mood swings ,No difficulty sleeping at night  PAIN SCALE: [  ] None  [X] Other- mild pain 2/2 sacral ulcer  ROS Unable to obtain due to - [  ] Dementia  [  ] Lethargy [  ] Drowsy [  ] Sedated [  ] non verbal  REST OF REVIEW Of SYSTEM - [X] Normal     Vital Signs Last 24 Hrs  T(C): 37.1 (2022 06:15), Max: 37.1 (2022 06:15)  T(F): 98.7 (2022 06:15), Max: 98.7 (2022 06:15)  HR: 88 (2022 06:15) (83 - 88)  BP: 128/62 (2022 06:15) (121/63 - 128/62)  BP(mean): --  RR: 18 (2022 06:15) (18 - 18)  SpO2: 98% (2022 15:16) (98% - 98%)    Parameters below as of 2022 15:16  Patient On (Oxygen Delivery Method): room air      Finger Stick     @ 07:01  -   @ 07:00  --------------------------------------------------------  IN: 350 mL / OUT: 0 mL / NET: 350 mL    PHYSICAL EXAM:  GENERAL:  [X] NAD , [X] well appearing, [  ] Agitated, [  ] Drowsy,  [  ] Lethargy, [  ] confused   HEAD:  [X] Normal, [  ] Other  EYES:  [X] EOMI, [X] PERRLA, [X] conjunctiva and sclera clear normal, [  ] Other,  [  ] Pallor,[  ] Discharge  ENMT:  [X] Normal, [X] Moist mucous membranes, [X] Moderate dentition [  ] Good dentition, [  ] No Thrush  NECK:  [X] Supple, [X] No JVD, [  ] Normal thyroid, [  ] Lymphadenopathy [  ] Other  CHEST/LUNG:  [X] Clear to auscultation bilaterally, [X] Breath Sounds equal B/L / Decrease, [  ] poor effort  [X] No rales, [X] No rhonchi  [X]  No wheezing,   HEART:  [X] Regular rate and rhythm, [  ] tachycardia, [  ] Bradycardia,  [  ] irregular  [X] No murmurs, No rubs, No gallops, [  ] PPM in place (Mfr:  )  ABDOMEN: [X] Reducible umbilical hernia [X] Soft, [X] Nontender, [X] Nondistended, [  ] No mass, [X] Bowel sounds present, [  ] obese  NERVOUS SYSTEM:  [X] Alert & Oriented X2 (person and place), [  ] Nonfocal  [  ] Confusion  [  ] Encephalopathic [  ] Sedated [  ] Unable to assess, [  ] Dementia [  ] Other-  EXTREMITIES: [X] 2+ Peripheral Pulses, No clubbing, No cyanosis, [X] right leg 3+ pitting edema from ankle to hip [ ] edema B/L lower EXT. [X] PVD stasis skin changes B/L Lower EXT, [  ] wound  LYMPH: No lymphadenopathy noted  SKIN:  [  ] No rashes or lesions, [X] Pressure Ulcers (sacral), [  ] ecchymosis, [X] Skin Tears, [  ] Other    DIET: Diet, Pureed:   Consistent Carbohydrate Evening Snack  Supplement Feeding Modality:  Oral  Glucerna Shake Cans or Servings Per Day:  1       Frequency:  Three Times a day (22 @ 01:52)    LABS:                        10.3   7.96  )-----------( 295      ( 2022 17:10 )             33.6     2022 06:05    134    |  100    |  86     ----------------------------<  129    5.5     |  25     |  4.20     Ca    8.8        2022 06:05  Mg     3.2       2022 17:10    TPro  6.1    /  Alb  2.6    /  TBili  0.5    /  DBili  x      /  AST  33     /  ALT  16     /  AlkPhos  241    2022 06:05    Urinalysis Basic - ( 2022 23:07 )    Color: Yellow / Appearance: Slightly Turbid / S.010 / pH: x  Gluc: x / Ketone: Trace  / Bili: Negative / Urobili: Negative   Blood: x / Protein: 100 / Nitrite: Negative   Leuk Esterase: Small / RBC: >50 /HPF / WBC 6-10   Sq Epi: x / Non Sq Epi: Occasional / Bacteria: Few      Anemia Panel:    Thyroid Panel:    RADIOLOGY & ADDITIONAL TESTS:NONE    HEALTH ISSUES - PROBLEM Dx:  KERRY (acute kidney injury)  Type 2 diabetes mellitus  GERD (gastroesophageal reflux disease)  CAD (coronary artery disease)  Need for prophylactic measure  Constipation  H/O vertebral fracture repair  Dehydration  Anemia  2019 novel coronavirus disease (COVID-19)  Bilateral pleural effusion  General weakness      Consultant(s) Notes Reviewed:  [X] YES     Care Discussed with [X] Consultants  [X] Patient  [X] Family [  ] HCP [  ]   [  ] Social Service  [ x ] RN, [  ] Physical Therapy,[  ] Palliative care team  DVT PPX: [  ] Lovenox, [X] S C Heparin, [  ] Coumadin, [  ] Xarelto, [  ] Eliquis, [  ] Pradaxa, [  ] IV Heparin drip, [  ] SCD [  ] Contraindication 2 to GI Bleed,[  ] Ambulation [  ] Contraindicated 2 to  bleed [  ] Contraindicated 2 to Brain Bleed  Advanced directive: [  ] None, [x  ] DNR/DNI

## 2022-07-30 NOTE — PHYSICAL THERAPY INITIAL EVALUATION ADULT - PERTINENT HX OF CURRENT PROBLEM, REHAB EVAL
Pt is 89 y.o. F who presented with RLE pain and decreased urinary frequency, recent admit for COVID+, colelithiasis, KERRY, UTI/hematuria, admitted for KERRY and dehydration.

## 2022-07-30 NOTE — CHART NOTE - NSCHARTNOTEFT_GEN_A_CORE
Called by RN. Patient found to have buprenorphine patch on right shoulder. Daughter Mary at bedside. Per daughter this is a home medication and part of patients home pain regimen. Buprenorphine 5mcg/hr patch was placed on Friday 7/29/22 and changed every 7 days (due 8/4/22). Called aj Whatley to continue patch while inpatient. I stop in chart.

## 2022-07-30 NOTE — CONSULT NOTE ADULT - SUBJECTIVE AND OBJECTIVE BOX
Date/Time Patient Seen:  		  Referring MD:   Data Reviewed	       Patient is a 89y old  Female who presents with a chief complaint of back and R leg pain, inability to urinate (29 Jul 2022 23:08)      Subjective/HPI  vs noted  labs reviewed  imaging reviewed  covid pos  ct imaging reviewed  known to me from prior admissions  h and p reviewed  er provider note reviewed      Patient Identity:  · Birth Sex	Female  · Patient's Preferred Pronoun	Her/She     History of Present Illness:  Reason for Admission: back and R leg pain, inability to urinate  History of Present Illness:   Additional history obtained from daughter (Mary) via telephone conversation.    89yo F with PMH DM, dyslipidemia, gastroparesis, CAD s/p 2 stents, hx of vertebral fx with repair presents to the ED with back and R leg pain, as well as recent decreased urinary frequency. Patient was previously at [facility] 7/5/22-7/15/22 for COVID+, cholelithiasis, KERRY, and UTI/hematuria. Patient lives at home by herself with home health aide and daughter who take turns caring for her. Daughter commented that patient has worsening weakness and PO intake, increased irritability, and recent difficulty with urination. Daughter states that during her previous admission, patient would report urinary frequency that occurred every 20-30 minutes; Nephrology had seen her previously and prescribed Lasix q48h. Patient says that sometimes "she forgets to drink", and daughter states that COVID infection has changed her sense of taste as well as overall appetite. Daughter also concerned about ecchymosis in the R hip and knee region which had occurred during the last hospital stay; however patient denies any recent fall or injury.     PAST MEDICAL & SURGICAL HISTORY:  H/O vertebral fracture repair    History of vertebral fracture    Dyslipidemia    DM type 2 with diabetic dyslipidemia    H/O gastroesophageal reflux (GERD)    Costochondritis    Coronary arteriosclerosis in native artery  s/p 2 stents. last placed 2 years ago    Gastroparesis    History of laminectomy    S/P hip hemiarthroplasty    S/P appendectomy          Medication list         MEDICATIONS  (STANDING):  amitriptyline 10 milliGRAM(s) Oral at bedtime  aspirin enteric coated 81 milliGRAM(s) Oral daily  atorvastatin 40 milliGRAM(s) Oral at bedtime  dextrose 5%. 1000 milliLiter(s) (50 mL/Hr) IV Continuous <Continuous>  dextrose 5%. 1000 milliLiter(s) (100 mL/Hr) IV Continuous <Continuous>  dextrose 50% Injectable 25 Gram(s) IV Push once  dextrose 50% Injectable 12.5 Gram(s) IV Push once  dextrose 50% Injectable 25 Gram(s) IV Push once  ergocalciferol 07553 Unit(s) Oral <User Schedule>  ferrous    sulfate 325 milliGRAM(s) Oral daily  glucagon  Injectable 1 milliGRAM(s) IntraMuscular once  heparin   Injectable 5000 Unit(s) SubCutaneous every 8 hours  HYDROmorphone   Tablet 4 milliGRAM(s) Oral three times a day  insulin lispro (ADMELOG) corrective regimen sliding scale   SubCutaneous three times a day before meals  insulin lispro (ADMELOG) corrective regimen sliding scale   SubCutaneous at bedtime  metoprolol succinate ER 25 milliGRAM(s) Oral daily  pantoprazole    Tablet 40 milliGRAM(s) Oral two times a day  senna 2 Tablet(s) Oral at bedtime  sertraline 25 milliGRAM(s) Oral two times a day  sodium chloride 0.9%. 1000 milliLiter(s) (70 mL/Hr) IV Continuous <Continuous>    MEDICATIONS  (PRN):  dextrose Oral Gel 15 Gram(s) Oral once PRN Blood Glucose LESS THAN 70 milliGRAM(s)/deciliter  melatonin 3 milliGRAM(s) Oral at bedtime PRN Insomnia  metoclopramide 5 milliGRAM(s) Oral two times a day PRN Nausea/vomiting  ondansetron    Tablet 8 milliGRAM(s) Oral three times a day PRN Nausea and/or Vomiting  polyethylene glycol 3350 17 Gram(s) Oral daily PRN for constipation         Vitals log        ICU Vital Signs Last 24 Hrs  T(C): 36.6 (29 Jul 2022 15:16), Max: 36.6 (29 Jul 2022 15:16)  T(F): 97.9 (29 Jul 2022 15:16), Max: 97.9 (29 Jul 2022 15:16)  HR: 83 (29 Jul 2022 15:16) (83 - 83)  BP: 121/63 (29 Jul 2022 15:16) (121/63 - 121/63)  BP(mean): --  ABP: --  ABP(mean): --  RR: 18 (29 Jul 2022 15:16) (18 - 18)  SpO2: 98% (29 Jul 2022 15:16) (98% - 98%)    O2 Parameters below as of 29 Jul 2022 15:16  Patient On (Oxygen Delivery Method): room air      FAMILY HISTORY:  Mother  Still living? Unknown  FHx: stroke, Age at diagnosis: Age Unknown.     Social History:  Social History (marital status, living situation, occupation, and sexual history): Tobacco: Former smoker  EtOH: Denies   Recreational drug use: Denies  Lives with: Self, HHA and daughter work around the clock for additional assistance  Ambulates: Cane, walker  ADLs: Requires assistance with all ADLs     Tobacco Screening:  · Core Measure Site	Yes  · Has the patient used tobacco in the past 30 days?	No    Risk Assessment:    Present on Admission:  Deep Venous Thrombosis	no  Pulmonary Embolus	no     HIV Screening:  · In accordance with [place] State law, we offer every patient who comes to our ED an HIV test. Would you like to be tested today?	Opt out             Input and Output:  I&O's Detail      Lab Data                        10.3   7.96  )-----------( 295      ( 29 Jul 2022 17:10 )             33.6     07-29    133<L>  |  96  |  88<H>  ----------------------------<  159<H>  5.3   |  28  |  3.90<H>    Ca    9.2      29 Jul 2022 17:10  Mg     3.2     07-29    TPro  6.9  /  Alb  3.0<L>  /  TBili  0.5  /  DBili  x   /  AST  32  /  ALT  18  /  AlkPhos  227<H>  07-29            Review of Systems	    oliva  weakness    Objective     Physical Examination  heart s1s2  lung dc BS        Pertinent Lab findings & Imaging      Bah:  NO   Adequate UO     I&O's Detail           Discussed with:     Cultures:	        Radiology    ACC: [id number] EXAM:  CT CHEST                        ACC: [id number] EXAM:  CT ABDOMEN AND PELVIS                          PROCEDURE DATE:  07/29/2022          INTERPRETATION:  CLINICAL INFORMATION: Abdominal pain, acute,   nonlocalized PCT    COMPARISON: 7.5.22.    CONTRAST/COMPLICATIONS:  IV Contrast: NONE  Oral Contrast: NONE  Complications: None reported at time of study completion    PROCEDURE:  CT of the Chest, Abdomen and Pelvis was performed.  Sagittal and coronal reformats were performed.    FINDINGS:  CHEST:  LUNGS AND LARGE AIRWAYS: Patent central airways. No pulmonary nodules.  PLEURA: Moderate bilateral pleural effusions.  VESSELS: Within normal limits.  HEART: Heart size is normal.  No pericardial effusion.  MEDIASTINUM AND VAL: No lymphadenopathy.  CHEST WALL AND LOWER NECK: Within normal limits.    ABDOMEN AND PELVIS:  LIVER: Within normal limits.  BILE DUCTS: Normal caliber.  GALLBLADDER: Cholelithiasis.  SPLEEN: Within normal limits.  PANCREAS: Atrophic.  ADRENALS: Within normal limits.  KIDNEYS/URETERS: Mild renal atrophy. Moderate bilateral hydronephrosis is   noted.    BLADDER: Within normal limits.  REPRODUCTIVE ORGANS: Within normal limits.    BOWEL: No bowel obstruction.  PERITONEUM: Mild ascites.  VESSELS:  Within normal limits.  RETROPERITONEUM/LYMPH NODES: No lymphadenopathy.  ABDOMINAL WALL: Small umbilical hernia containing fluid. There is a   fat-containing left-sided lumbar hernia.  BONES: T11-T12 compression deformities with vertebroplasty changes.    IMPRESSION: Moderate bilateral hydronephrosis.    Moderate bilateral pleural effusions.    Mild ascites.      --- End of Report ---            NADER TO MD; Attending Radiologist  This document has been electronically signed. Jul 29 2022  8:08PM

## 2022-07-30 NOTE — CONSULT NOTE ADULT - ASSESSMENT
89yo F with PMH DM, dyslipidemia, gastroparesis, CAD s/p 2 stents, hx of vertebral fx with repair presents to the ED with back and R leg pain, as well as recent decreased urinary frequency. Patient was previously at [facility] 7/5/22-7/15/22 for COVID+, cholelithiasis, KERRY, and UTI/hematuria.    remains covid pos  hydro  effusions  atelectasis  cad  VCF  OP  OA  frail  weak  elderly  DM  HLD    ct chest reviewed  effusions - bilaterally  will need thoracentesis - with IR -   I devonte if able to cooperate  I and O  cvs rx regimen and BP control  monitor for prolonged covid sx  assist with needs  GOC discussion  DM care  caution with IVF - monitor for vol overload

## 2022-07-30 NOTE — PROGRESS NOTE ADULT - PROBLEM SELECTOR PLAN 2
Patient reported to have decreased thirst and appetite, with elevated BUN/Cr on admission  - Pureed diet with aspiration precautions  - Encourage PO and fluid intake  - Pureed diet (consistent carb) with glucerna 3x/day, aspiration precautions

## 2022-07-30 NOTE — PROGRESS NOTE ADULT - PROBLEM SELECTOR PLAN 9
Hgb 10.3 on admission. Baseline Hgb 10-11 from previous admissions, also confirmed by daughter.   - f/u AM CBC, trend H/H  - Continue home ferrous sulfate  - Transfuse if Hgb <7 or clinically symptomatic

## 2022-07-30 NOTE — CONSULT NOTE ADULT - SUBJECTIVE AND OBJECTIVE BOX
Patient is a 89y old  Female who presents with a chief complaint of back and R leg pain, inability to urinate (2022 08:35)       HPI:  Additional history obtained from daughter (Mary) via telephone conversation.    89yo F with PMH DM, dyslipidemia, gastroparesis, CAD s/p 2 stents, hx of vertebral fx with repair presents to the ED with back and R leg pain, as well as recent decreased urinary frequency. Patient was previously at [facility] 22-7/15/22 for COVID+, cholelithiasis, KERRY, and UTI/hematuria. Patient lives at home by herself with home health aide and daughter who take turns caring for her. Daughter commented that patient has worsening weakness and PO intake, increased irritability, and recent difficulty with urination. Daughter states that during her previous admission, patient would report urinary frequency that occurred every 20-30 minutes; Nephrology had seen her previously and prescribed Lasix q48h. Patient says that sometimes "she forgets to drink", and daughter states that COVID infection has changed her sense of taste as well as overall appetite. Daughter also concerned about ecchymosis in the R hip and knee region which had occurred during the last hospital stay; however patient denies any recent fall or injury.   Patient with decreased PO intake and N/V.  Has had trouble urinating and is having hematuria.  No dysgeusia or asterixis present. Known to use from prior.    PAST MEDICAL & SURGICAL HISTORY:  H/O vertebral fracture repair      History of vertebral fracture      Dyslipidemia      DM type 2 with diabetic dyslipidemia      H/O gastroesophageal reflux (GERD)      Costochondritis      Coronary arteriosclerosis in native artery  s/p 2 stents. last placed 2 years ago      Gastroparesis      History of laminectomy      S/P hip hemiarthroplasty      S/P appendectomy           FAMILY HISTORY:  FHx: stroke (Mother)    NC    Social History:Non smoker    MEDICATIONS  (STANDING):  amitriptyline 10 milliGRAM(s) Oral at bedtime  aspirin enteric coated 81 milliGRAM(s) Oral daily  atorvastatin 40 milliGRAM(s) Oral at bedtime  dextrose 5%. 1000 milliLiter(s) (50 mL/Hr) IV Continuous <Continuous>  dextrose 5%. 1000 milliLiter(s) (100 mL/Hr) IV Continuous <Continuous>  dextrose 50% Injectable 25 Gram(s) IV Push once  dextrose 50% Injectable 12.5 Gram(s) IV Push once  dextrose 50% Injectable 25 Gram(s) IV Push once  ergocalciferol 16594 Unit(s) Oral <User Schedule>  ferrous    sulfate 325 milliGRAM(s) Oral daily  glucagon  Injectable 1 milliGRAM(s) IntraMuscular once  heparin   Injectable 5000 Unit(s) SubCutaneous every 8 hours  HYDROmorphone   Tablet 4 milliGRAM(s) Oral three times a day  insulin lispro (ADMELOG) corrective regimen sliding scale   SubCutaneous three times a day before meals  insulin lispro (ADMELOG) corrective regimen sliding scale   SubCutaneous at bedtime  metoprolol succinate ER 25 milliGRAM(s) Oral daily  pantoprazole    Tablet 40 milliGRAM(s) Oral two times a day  senna 2 Tablet(s) Oral at bedtime  sertraline 25 milliGRAM(s) Oral two times a day  sodium chloride 0.9%. 1000 milliLiter(s) (70 mL/Hr) IV Continuous <Continuous>  sodium zirconium cyclosilicate 10 Gram(s) Oral two times a day    MEDICATIONS  (PRN):  dextrose Oral Gel 15 Gram(s) Oral once PRN Blood Glucose LESS THAN 70 milliGRAM(s)/deciliter  melatonin 3 milliGRAM(s) Oral at bedtime PRN Insomnia  metoclopramide 5 milliGRAM(s) Oral two times a day PRN Nausea/vomiting  ondansetron    Tablet 8 milliGRAM(s) Oral three times a day PRN Nausea and/or Vomiting  polyethylene glycol 3350 17 Gram(s) Oral daily PRN for constipation   Meds reviewed    Allergies    morphine (Other)    Intolerances         REVIEW OF SYSTEMS:    Review of Systems:   Constitutional: Denies fatigue  HEENT: Denies headaches and dizziness  Respiratory: denies SOB, cough, or wheezing  Cardiovascular: denies CP, palpitations  Gastrointestinal: N/V  Genitourinary: hematuria  Skin: denies rashes or itching  Musculoskeletal: denies muscle aches, joint swelling  Neurologic: Denies generalized weakness, denies loss of sensation, numbness, or tingling      Vital Signs Last 24 Hrs  T(C): 36.7 (2022 10:00), Max: 37.1 (2022 06:15)  T(F): 98 (2022 10:00), Max: 98.7 (2022 06:15)  HR: 72 (2022 10:00) (72 - 88)  BP: 126/74 (2022 10:00) (121/63 - 128/62)  BP(mean): --  RR: 18 (2022 10:00) (18 - 18)  SpO2: 98% (2022 15:16) (98% - 98%)    Parameters below as of 2022 10:00      O2 Concentration (%): 98  Daily Height in cm: 157.48 (2022 15:16)    Daily     PHYSICAL EXAM:    GENERAL: NAD, frail appearing  HEAD:  Atraumatic, Normocephalic  EYES: EOMI, conjunctiva and sclera clear  ENMT: No Drainage from nares, No drainage from ears  NECK: Supple, neck  veins full  NERVOUS SYSTEM:  Awake and Alert  CHEST/LUNG: Clear to percussion bilaterally; No rales, rhonchi, wheezing, or rubs  HEART: Regular rate and rhythm; No murmurs, rubs, or gallops  ABDOMEN: Soft, Nontender, mildly distended; Bowel sounds present  EXTREMITIES:  No Edema  SKIN: No rashes No obvious ecchymosis      LABS:                        9.7    7.40  )-----------( 289      ( 2022 09:00 )             31.4     07-30    134<L>  |  100  |  86<H>  ----------------------------<  129<H>  5.5<H>   |  25  |  4.20<H>    Ca    8.8      2022 06:05  Mg     3.2         TPro  6.1  /  Alb  2.6<L>  /  TBili  0.5  /  DBili  x   /  AST  33  /  ALT  16  /  AlkPhos  241<H>  07-30      Urinalysis Basic - ( 2022 23:07 )    Color: Yellow / Appearance: Slightly Turbid / S.010 / pH: x  Gluc: x / Ketone: Trace  / Bili: Negative / Urobili: Negative   Blood: x / Protein: 100 / Nitrite: Negative   Leuk Esterase: Small / RBC: >50 /HPF / WBC 6-10   Sq Epi: x / Non Sq Epi: Occasional / Bacteria: Few      Magnesium, Serum: 3.2 mg/dL ( @ 17:10)          RADIOLOGY & ADDITIONAL TESTS:

## 2022-07-30 NOTE — CONSULT NOTE ADULT - PROBLEM SELECTOR PROBLEM 3
ED Time Seen By Provider Entered On:  4/7/2018 8:35     Performed On:  4/7/2018 8:35  by Yee Crawford NP               Time Seen By Provider   Time Seen by Provider :   4/7/2018 8:35    Yee Crawford NP - 4/7/2018 8:35            Urinary frequency

## 2022-07-30 NOTE — CHART NOTE - NSCHARTNOTEFT_GEN_A_CORE
Search Terms: Merissa Guerin, 12/15/1931Search Date: 07/30/2022 20:59:27 PM  Searching on behalf of: [facility]  The Drug Utilization Report below displays all of the controlled substance prescriptions, if any, that your patient has filled in the last twelve months. The information displayed on this report is compiled from pharmacy submissions to the Department, and accurately reflects the information as submitted by the pharmacies.    This report was requested by: Goyo Chang | Reference #: [id number]    Others' Prescriptions  Patient Name: Merissa KasperkBirth Date: 12/15/1931  Address: [address] [place] [id number]: Female  Rx Written	Rx Dispensed	Drug	Quantity	Days Supply	Prescriber Name  07/21/2022	07/22/2022	buprenorphine 5 mcg/hr patch	4	30	Ruben Samaniego MD  07/06/2022	07/10/2022	hydromorphone 4 mg tablet	90	30	CidraLeonel PA-C  06/08/2022	06/09/2022	hydromorphone 4 mg tablet	90	30	FemiLeonel PA-C  05/12/2022	05/13/2022	hydromorphone 4 mg tablet	90	30	Portal, Satish  04/14/2022	04/15/2022	hydromorphone 4 mg tablet	90	30	Portal, Satish  03/09/2022	03/09/2022	hydromorphone 4 mg tablet	90	30	Femi, Leonel REDMAN PA-C  02/09/2022	02/10/2022	hydromorphone 4 mg tablet	90	30	FemiLeonelC  01/11/2022	01/11/2022	hydromorphone 4 mg tablet	90	30	FemiLeonel PA-C  11/09/2021	11/09/2021	hydromorphone 4 mg tablet	90	30	FemiLeonelC  10/05/2021	10/12/2021	hydromorphone 4 mg tablet	90	30	FemiLeonel PA-C  08/26/2021	09/14/2021	hydromorphone 4 mg tablet	90	30	Portal, Satish  08/11/2021	08/31/2021	hydromorphone 4 mg tablet	45	15	Alfredo Bahena MD  * - Drugs marked with an asterisk are compound drugs. If the compound drug is made up of more than one controlled substance, then each controlled substance will be a separate row in the table.

## 2022-07-30 NOTE — CONSULT NOTE ADULT - PROBLEM SELECTOR RECOMMENDATION 2
There is no obvious etiology of obstruction on CT. The bladder is not distendedm on CT. Her recent severe urgency and frequency may reflect poor bladder compliance. Moss has been placed, will observe labs with moss drainage.

## 2022-07-30 NOTE — PATIENT PROFILE ADULT - FALL HARM RISK - HARM RISK INTERVENTIONS

## 2022-07-30 NOTE — CONSULT NOTE ADULT - ASSESSMENT
KERRY on CKD 4  Hyperkalemia  Hematuria  N/V  COVID 19+  B/L Mod Hydronephrosis    -BLCr 1.8  -KERRY possibly from obstruction  -Check Urine lytes  -UA >50 RBCs  -Place moss  -Give 2 doses loklema  -Low K diet  -Urology consult, may need CBI  -Family will discuss about dialysis, no emergent indication but if continues on this trend will be necessary   KERRY on CKD 4  Hyperkalemia  Hematuria  N/V  COVID 19+  B/L Mod Hydronephrosis    -BLCr 1.8  -KERRY possibly from obstruction  -Check Urine lytes  -UA >50 RBCs  -Place moss  -Give 2 doses loklema  -Low K diet  -Urology consult-Dr. Hill, may need CBI  -Family will discuss about dialysis, no emergent indication but if continues on this trend will be necessary

## 2022-07-30 NOTE — PROGRESS NOTE ADULT - PROBLEM SELECTOR PLAN 6
Gluc 159 on admission  - Insulin Corrective Scale  - Finger sticks per routine  - Hypoglycemia protocol  - Continue home Atorvastatin

## 2022-07-31 ENCOUNTER — TRANSCRIPTION ENCOUNTER (OUTPATIENT)
Age: 87
End: 2022-07-31

## 2022-07-31 LAB
ALBUMIN SERPL ELPH-MCNC: 2.8 G/DL — LOW (ref 3.3–5)
ALP SERPL-CCNC: 270 U/L — HIGH (ref 40–120)
ALT FLD-CCNC: 17 U/L — SIGNIFICANT CHANGE UP (ref 12–78)
ANION GAP SERPL CALC-SCNC: 11 MMOL/L — SIGNIFICANT CHANGE UP (ref 5–17)
AST SERPL-CCNC: 27 U/L — SIGNIFICANT CHANGE UP (ref 15–37)
BASOPHILS # BLD AUTO: 0.04 K/UL — SIGNIFICANT CHANGE UP (ref 0–0.2)
BASOPHILS NFR BLD AUTO: 0.4 % — SIGNIFICANT CHANGE UP (ref 0–2)
BILIRUB SERPL-MCNC: 0.5 MG/DL — SIGNIFICANT CHANGE UP (ref 0.2–1.2)
BUN SERPL-MCNC: 96 MG/DL — HIGH (ref 7–23)
CALCIUM SERPL-MCNC: 9.3 MG/DL — SIGNIFICANT CHANGE UP (ref 8.5–10.1)
CHLORIDE SERPL-SCNC: 99 MMOL/L — SIGNIFICANT CHANGE UP (ref 96–108)
CO2 SERPL-SCNC: 26 MMOL/L — SIGNIFICANT CHANGE UP (ref 22–31)
CREAT SERPL-MCNC: 5.1 MG/DL — HIGH (ref 0.5–1.3)
EGFR: 8 ML/MIN/1.73M2 — LOW
EOSINOPHIL # BLD AUTO: 0.03 K/UL — SIGNIFICANT CHANGE UP (ref 0–0.5)
EOSINOPHIL NFR BLD AUTO: 0.3 % — SIGNIFICANT CHANGE UP (ref 0–6)
GLUCOSE SERPL-MCNC: 188 MG/DL — HIGH (ref 70–99)
HCT VFR BLD CALC: 33.9 % — LOW (ref 34.5–45)
HGB BLD-MCNC: 10.5 G/DL — LOW (ref 11.5–15.5)
IMM GRANULOCYTES NFR BLD AUTO: 0.4 % — SIGNIFICANT CHANGE UP (ref 0–1.5)
LDH SERPL L TO P-CCNC: 391 U/L — HIGH (ref 50–242)
LYMPHOCYTES # BLD AUTO: 0.3 K/UL — LOW (ref 1–3.3)
LYMPHOCYTES # BLD AUTO: 3.1 % — LOW (ref 13–44)
MAGNESIUM SERPL-MCNC: 3 MG/DL — HIGH (ref 1.6–2.6)
MCHC RBC-ENTMCNC: 29.3 PG — SIGNIFICANT CHANGE UP (ref 27–34)
MCHC RBC-ENTMCNC: 31 GM/DL — LOW (ref 32–36)
MCV RBC AUTO: 94.7 FL — SIGNIFICANT CHANGE UP (ref 80–100)
MONOCYTES # BLD AUTO: 0.71 K/UL — SIGNIFICANT CHANGE UP (ref 0–0.9)
MONOCYTES NFR BLD AUTO: 7.3 % — SIGNIFICANT CHANGE UP (ref 2–14)
NEUTROPHILS # BLD AUTO: 8.65 K/UL — HIGH (ref 1.8–7.4)
NEUTROPHILS NFR BLD AUTO: 88.5 % — HIGH (ref 43–77)
NRBC # BLD: 0 /100 WBCS — SIGNIFICANT CHANGE UP (ref 0–0)
PHOSPHATE SERPL-MCNC: 5.2 MG/DL — HIGH (ref 2.5–4.5)
PLATELET # BLD AUTO: 349 K/UL — SIGNIFICANT CHANGE UP (ref 150–400)
POTASSIUM SERPL-MCNC: 5.3 MMOL/L — SIGNIFICANT CHANGE UP (ref 3.5–5.3)
POTASSIUM SERPL-SCNC: 5.3 MMOL/L — SIGNIFICANT CHANGE UP (ref 3.5–5.3)
PROT SERPL-MCNC: 6.8 G/DL — SIGNIFICANT CHANGE UP (ref 6–8.3)
RBC # BLD: 3.58 M/UL — LOW (ref 3.8–5.2)
RBC # FLD: 19.2 % — HIGH (ref 10.3–14.5)
SODIUM SERPL-SCNC: 136 MMOL/L — SIGNIFICANT CHANGE UP (ref 135–145)
WBC # BLD: 9.77 K/UL — SIGNIFICANT CHANGE UP (ref 3.8–10.5)
WBC # FLD AUTO: 9.77 K/UL — SIGNIFICANT CHANGE UP (ref 3.8–10.5)

## 2022-07-31 DEVICE — URETERAL CATH OPEN END FLEXI-TIP 5FR .038" X 70CM: Type: IMPLANTABLE DEVICE | Site: LEFT, RIGHT | Status: FUNCTIONAL

## 2022-07-31 DEVICE — GUIDEWIRE SENSOR DUAL-FLEX NITINOL STRAIGHT .035" X 150CM: Type: IMPLANTABLE DEVICE | Site: LEFT, RIGHT | Status: FUNCTIONAL

## 2022-07-31 DEVICE — URETERAL STENT CONTOUR VL 4.8FR 22-30CM: Type: IMPLANTABLE DEVICE | Site: LEFT, RIGHT | Status: FUNCTIONAL

## 2022-07-31 RX ORDER — SODIUM CHLORIDE 9 MG/ML
1000 INJECTION, SOLUTION INTRAVENOUS
Refills: 0 | Status: DISCONTINUED | OUTPATIENT
Start: 2022-07-31 | End: 2022-08-02

## 2022-07-31 RX ORDER — BUPRENORPHINE 10 UG/H
1 PATCH, EXTENDED RELEASE TRANSDERMAL
Refills: 0 | Status: DISCONTINUED | OUTPATIENT
Start: 2022-07-31 | End: 2022-08-01

## 2022-07-31 RX ORDER — HYDROMORPHONE HYDROCHLORIDE 2 MG/ML
4 INJECTION INTRAMUSCULAR; INTRAVENOUS; SUBCUTANEOUS THREE TIMES A DAY
Refills: 0 | Status: DISCONTINUED | OUTPATIENT
Start: 2022-07-31 | End: 2022-08-02

## 2022-07-31 RX ORDER — ONDANSETRON 8 MG/1
8 TABLET, FILM COATED ORAL THREE TIMES A DAY
Refills: 0 | Status: DISCONTINUED | OUTPATIENT
Start: 2022-07-31 | End: 2022-08-02

## 2022-07-31 RX ORDER — DEXTROSE 50 % IN WATER 50 %
15 SYRINGE (ML) INTRAVENOUS ONCE
Refills: 0 | Status: DISCONTINUED | OUTPATIENT
Start: 2022-07-31 | End: 2022-08-02

## 2022-07-31 RX ORDER — ONDANSETRON 8 MG/1
4 TABLET, FILM COATED ORAL ONCE
Refills: 0 | Status: DISCONTINUED | OUTPATIENT
Start: 2022-07-31 | End: 2022-07-31

## 2022-07-31 RX ORDER — POLYETHYLENE GLYCOL 3350 17 G/17G
17 POWDER, FOR SOLUTION ORAL DAILY
Refills: 0 | Status: DISCONTINUED | OUTPATIENT
Start: 2022-07-31 | End: 2022-08-02

## 2022-07-31 RX ORDER — ASPIRIN/CALCIUM CARB/MAGNESIUM 324 MG
81 TABLET ORAL DAILY
Refills: 0 | Status: DISCONTINUED | OUTPATIENT
Start: 2022-07-31 | End: 2022-08-02

## 2022-07-31 RX ORDER — PANTOPRAZOLE SODIUM 20 MG/1
40 TABLET, DELAYED RELEASE ORAL
Refills: 0 | Status: DISCONTINUED | OUTPATIENT
Start: 2022-07-31 | End: 2022-08-02

## 2022-07-31 RX ORDER — HYDROMORPHONE HYDROCHLORIDE 2 MG/ML
0.2 INJECTION INTRAMUSCULAR; INTRAVENOUS; SUBCUTANEOUS ONCE
Refills: 0 | Status: DISCONTINUED | OUTPATIENT
Start: 2022-07-31 | End: 2022-07-31

## 2022-07-31 RX ORDER — FERROUS SULFATE 325(65) MG
325 TABLET ORAL DAILY
Refills: 0 | Status: DISCONTINUED | OUTPATIENT
Start: 2022-07-31 | End: 2022-08-02

## 2022-07-31 RX ORDER — METOPROLOL TARTRATE 50 MG
25 TABLET ORAL DAILY
Refills: 0 | Status: DISCONTINUED | OUTPATIENT
Start: 2022-07-31 | End: 2022-08-02

## 2022-07-31 RX ORDER — ATORVASTATIN CALCIUM 80 MG/1
40 TABLET, FILM COATED ORAL AT BEDTIME
Refills: 0 | Status: DISCONTINUED | OUTPATIENT
Start: 2022-07-31 | End: 2022-08-02

## 2022-07-31 RX ORDER — SODIUM CHLORIDE 9 MG/ML
1000 INJECTION INTRAMUSCULAR; INTRAVENOUS; SUBCUTANEOUS
Refills: 0 | Status: DISCONTINUED | OUTPATIENT
Start: 2022-07-31 | End: 2022-07-31

## 2022-07-31 RX ORDER — AMITRIPTYLINE HCL 25 MG
10 TABLET ORAL AT BEDTIME
Refills: 0 | Status: DISCONTINUED | OUTPATIENT
Start: 2022-07-31 | End: 2022-08-02

## 2022-07-31 RX ORDER — METOCLOPRAMIDE HCL 10 MG
5 TABLET ORAL
Refills: 0 | Status: DISCONTINUED | OUTPATIENT
Start: 2022-07-31 | End: 2022-08-02

## 2022-07-31 RX ORDER — DEXTROSE 50 % IN WATER 50 %
25 SYRINGE (ML) INTRAVENOUS ONCE
Refills: 0 | Status: DISCONTINUED | OUTPATIENT
Start: 2022-07-31 | End: 2022-08-02

## 2022-07-31 RX ORDER — GLUCAGON INJECTION, SOLUTION 0.5 MG/.1ML
1 INJECTION, SOLUTION SUBCUTANEOUS ONCE
Refills: 0 | Status: DISCONTINUED | OUTPATIENT
Start: 2022-07-31 | End: 2022-08-02

## 2022-07-31 RX ORDER — LANOLIN ALCOHOL/MO/W.PET/CERES
3 CREAM (GRAM) TOPICAL AT BEDTIME
Refills: 0 | Status: DISCONTINUED | OUTPATIENT
Start: 2022-07-31 | End: 2022-08-03

## 2022-07-31 RX ORDER — INSULIN LISPRO 100/ML
VIAL (ML) SUBCUTANEOUS
Refills: 0 | Status: DISCONTINUED | OUTPATIENT
Start: 2022-07-31 | End: 2022-08-02

## 2022-07-31 RX ORDER — DEXTROSE 50 % IN WATER 50 %
12.5 SYRINGE (ML) INTRAVENOUS ONCE
Refills: 0 | Status: DISCONTINUED | OUTPATIENT
Start: 2022-07-31 | End: 2022-08-02

## 2022-07-31 RX ORDER — CEFAZOLIN SODIUM 1 G
1000 VIAL (EA) INJECTION EVERY 24 HOURS
Refills: 0 | Status: DISCONTINUED | OUTPATIENT
Start: 2022-07-31 | End: 2022-08-03

## 2022-07-31 RX ORDER — CEFAZOLIN SODIUM 1 G
2000 VIAL (EA) INJECTION ONCE
Refills: 0 | Status: COMPLETED | OUTPATIENT
Start: 2022-07-31 | End: 2022-07-31

## 2022-07-31 RX ADMIN — Medication 25 MILLIGRAM(S): at 05:19

## 2022-07-31 RX ADMIN — PANTOPRAZOLE SODIUM 40 MILLIGRAM(S): 20 TABLET, DELAYED RELEASE ORAL at 05:19

## 2022-07-31 RX ADMIN — SERTRALINE 25 MILLIGRAM(S): 25 TABLET, FILM COATED ORAL at 05:19

## 2022-07-31 RX ADMIN — HYDROMORPHONE HYDROCHLORIDE 4 MILLIGRAM(S): 2 INJECTION INTRAMUSCULAR; INTRAVENOUS; SUBCUTANEOUS at 23:35

## 2022-07-31 RX ADMIN — HEPARIN SODIUM 5000 UNIT(S): 5000 INJECTION INTRAVENOUS; SUBCUTANEOUS at 05:19

## 2022-07-31 RX ADMIN — HYDROMORPHONE HYDROCHLORIDE 4 MILLIGRAM(S): 2 INJECTION INTRAMUSCULAR; INTRAVENOUS; SUBCUTANEOUS at 14:24

## 2022-07-31 RX ADMIN — Medication 1: at 13:19

## 2022-07-31 RX ADMIN — ATORVASTATIN CALCIUM 40 MILLIGRAM(S): 80 TABLET, FILM COATED ORAL at 21:19

## 2022-07-31 RX ADMIN — HYDROMORPHONE HYDROCHLORIDE 4 MILLIGRAM(S): 2 INJECTION INTRAMUSCULAR; INTRAVENOUS; SUBCUTANEOUS at 13:24

## 2022-07-31 RX ADMIN — Medication 10 MILLIGRAM(S): at 13:21

## 2022-07-31 RX ADMIN — SODIUM ZIRCONIUM CYCLOSILICATE 10 GRAM(S): 10 POWDER, FOR SUSPENSION ORAL at 05:20

## 2022-07-31 RX ADMIN — HYDROMORPHONE HYDROCHLORIDE 4 MILLIGRAM(S): 2 INJECTION INTRAMUSCULAR; INTRAVENOUS; SUBCUTANEOUS at 05:19

## 2022-07-31 RX ADMIN — Medication 81 MILLIGRAM(S): at 13:26

## 2022-07-31 RX ADMIN — SODIUM CHLORIDE 25 MILLILITER(S): 9 INJECTION INTRAMUSCULAR; INTRAVENOUS; SUBCUTANEOUS at 20:20

## 2022-07-31 RX ADMIN — HYDROMORPHONE HYDROCHLORIDE 4 MILLIGRAM(S): 2 INJECTION INTRAMUSCULAR; INTRAVENOUS; SUBCUTANEOUS at 06:34

## 2022-07-31 RX ADMIN — Medication 325 MILLIGRAM(S): at 13:24

## 2022-07-31 RX ADMIN — HYDROMORPHONE HYDROCHLORIDE 4 MILLIGRAM(S): 2 INJECTION INTRAMUSCULAR; INTRAVENOUS; SUBCUTANEOUS at 21:19

## 2022-07-31 RX ADMIN — Medication 10 MILLIGRAM(S): at 21:19

## 2022-07-31 RX ADMIN — HYDROMORPHONE HYDROCHLORIDE 4 MILLIGRAM(S): 2 INJECTION INTRAMUSCULAR; INTRAVENOUS; SUBCUTANEOUS at 00:14

## 2022-07-31 RX ADMIN — POLYETHYLENE GLYCOL 3350 17 GRAM(S): 17 POWDER, FOR SOLUTION ORAL at 05:20

## 2022-07-31 RX ADMIN — SODIUM CHLORIDE 25 MILLILITER(S): 9 INJECTION INTRAMUSCULAR; INTRAVENOUS; SUBCUTANEOUS at 21:22

## 2022-07-31 NOTE — PROGRESS NOTE ADULT - PROBLEM SELECTOR PLAN 8
Patient has history of CAD s/p 2 stents  - Continue home Aspirin GI-Dr Montilla, pt has Had Esophogram   - Pureed diet with aspiration precautions  - Encourage PO and fluid intake  - Pureed diet (consistent carb) with Glucerna 3x/day, aspiration precautions.

## 2022-07-31 NOTE — PROGRESS NOTE ADULT - ATTENDING COMMENTS
89yo F with PMH DM, dyslipidemia, gastroparesis, CAD s/p 2 stents, hx of vertebral fx with repair presents to the ED with back and R leg pain, as well as recent decreased urinary frequency, admitted for KERRY/CKD 3  and dehydration.  Pt seen, Examined, case & care plan d/w pt, residents at detail.  D/W Dtr at Detail.  Pulmonary-Dr Melo d/w ? Possible B/L Pleural effusion- -Thoracentesis  Renal-DR JL Christopher-KERRY- worsening   -Dr Gr -Urology d/w at detail.-Plan for OR Cystoscopy & B/L Ureteral stent as worsening Cr   -Pt is medically optimized for schedule Cysto & B/L Ureteral stent placement  -NPO  DNR/DNI  PO diet  AM labs   Total care time is 40 minutes  D/W Dtr at bed side. 89yo F with PMH DM, dyslipidemia, gastroparesis, CAD s/p 2 stents, hx of vertebral fx with repair presents to the ED with back and R leg pain, as well as recent decreased urinary frequency, admitted for KERRY/CKD 3  and dehydration.  Pt seen, Examined, case & care plan d/w pt, residents at detail.  D/W Dtr at Detail.  Pulmonary-Dr Melo d/w ? Possible B/L Pleural effusion- -Thoracentesis in  AM   Renal-DR JL Christopher-KERRY- worsening CKD 4  -Dr Gr -Urology d/w at detail.-Plan for OR Cystoscopy & B/L Ureteral stent as worsening Cr   -Pt is medically optimized for schedule Cysto & B/L Ureteral stent placement  -NPO for Cysto  DNR/DNI  PO diet  AM labs   Total care time is 40 minutes  D/W Dtr at bed side.

## 2022-07-31 NOTE — PROGRESS NOTE ADULT - PROBLEM SELECTOR PLAN 2
Patient reported to have decreased thirst and appetite, with elevated BUN/Cr on admission  - Pureed diet with aspiration precautions  - Encourage PO and fluid intake. currently NPO for Uro procedure  - Pureed diet (consistent carb) with glucerna 3x/day, aspiration precautions COVID-19 detected in PCR on this admission. Likely viral shedding from prior COVID infection, last detected on 7/5  - Isolation precautions as per hospital protocol  - Monitor SpO2 >90%, provide oxygen support if patient desaturates

## 2022-07-31 NOTE — PROGRESS NOTE ADULT - SUBJECTIVE AND OBJECTIVE BOX
Date/Time Patient Seen:  		  Referring MD:   Data Reviewed	       Patient is a 89y old  Female who presents with a chief complaint of back and R leg pain, inability to urinate (30 Jul 2022 15:28)      Subjective/HPI     PAST MEDICAL & SURGICAL HISTORY:  H/O vertebral fracture repair    History of vertebral fracture    Dyslipidemia    DM type 2 with diabetic dyslipidemia    H/O gastroesophageal reflux (GERD)    Costochondritis    Coronary arteriosclerosis in native artery  s/p 2 stents. last placed 2 years ago    Gastroparesis    Hematuria    Hydronephrosis    Esophageal dysmotility    Anemia of chronic disease    History of laminectomy    S/P hip hemiarthroplasty    S/P appendectomy          Medication list         MEDICATIONS  (STANDING):  amitriptyline 10 milliGRAM(s) Oral at bedtime  aspirin enteric coated 81 milliGRAM(s) Oral daily  atorvastatin 40 milliGRAM(s) Oral at bedtime  bisacodyl Suppository 10 milliGRAM(s) Rectal daily  buprenorphine Patch 5 MICROgram(s)/Hr 1 Patch Transdermal every 7 days  dextrose 5%. 1000 milliLiter(s) (50 mL/Hr) IV Continuous <Continuous>  dextrose 5%. 1000 milliLiter(s) (100 mL/Hr) IV Continuous <Continuous>  dextrose 50% Injectable 25 Gram(s) IV Push once  dextrose 50% Injectable 12.5 Gram(s) IV Push once  dextrose 50% Injectable 25 Gram(s) IV Push once  ergocalciferol 99382 Unit(s) Oral <User Schedule>  ferrous    sulfate 325 milliGRAM(s) Oral daily  glucagon  Injectable 1 milliGRAM(s) IntraMuscular once  heparin   Injectable 5000 Unit(s) SubCutaneous every 8 hours  HYDROmorphone   Tablet 4 milliGRAM(s) Oral three times a day  insulin lispro (ADMELOG) corrective regimen sliding scale   SubCutaneous three times a day before meals  insulin lispro (ADMELOG) corrective regimen sliding scale   SubCutaneous at bedtime  metoprolol succinate ER 25 milliGRAM(s) Oral daily  pantoprazole    Tablet 40 milliGRAM(s) Oral two times a day  polyethylene glycol 3350 17 Gram(s) Oral two times a day  senna 2 Tablet(s) Oral at bedtime  sertraline 25 milliGRAM(s) Oral two times a day  sodium chloride 0.9%. 1000 milliLiter(s) (70 mL/Hr) IV Continuous <Continuous>  sodium zirconium cyclosilicate 10 Gram(s) Oral two times a day    MEDICATIONS  (PRN):  dextrose Oral Gel 15 Gram(s) Oral once PRN Blood Glucose LESS THAN 70 milliGRAM(s)/deciliter  melatonin 3 milliGRAM(s) Oral at bedtime PRN Insomnia  metoclopramide 5 milliGRAM(s) Oral two times a day PRN Nausea/vomiting  ondansetron    Tablet 8 milliGRAM(s) Oral three times a day PRN Nausea and/or Vomiting         Vitals log        ICU Vital Signs Last 24 Hrs  T(C): 36.9 (31 Jul 2022 04:38), Max: 37.1 (30 Jul 2022 06:15)  T(F): 98.4 (31 Jul 2022 04:38), Max: 98.7 (30 Jul 2022 06:15)  HR: 92 (31 Jul 2022 04:38) (72 - 96)  BP: 119/64 (31 Jul 2022 04:38) (119/64 - 128/66)  BP(mean): --  ABP: --  ABP(mean): --  RR: 18 (31 Jul 2022 04:38) (18 - 18)  SpO2: 91% (31 Jul 2022 04:38) (90% - 91%)    O2 Parameters below as of 31 Jul 2022 04:38  Patient On (Oxygen Delivery Method): room air                 Input and Output:  I&O's Detail    29 Jul 2022 07:01  -  30 Jul 2022 07:00  --------------------------------------------------------  IN:    sodium chloride 0.9%: 350 mL  Total IN: 350 mL    OUT:  Total OUT: 0 mL    Total NET: 350 mL          Lab Data                        9.7    7.40  )-----------( 289      ( 30 Jul 2022 09:00 )             31.4     07-30    134<L>  |  100  |  86<H>  ----------------------------<  129<H>  5.5<H>   |  25  |  4.20<H>    Ca    8.8      30 Jul 2022 06:05  Mg     3.2     07-29    TPro  6.1  /  Alb  2.6<L>  /  TBili  0.5  /  DBili  x   /  AST  33  /  ALT  16  /  AlkPhos  241<H>  07-30            Review of Systems	      Objective     Physical Examination  heart s1s2  lung dec BS  abd soft        Pertinent Lab findings & Imaging      Olvin:  NO   Adequate UO     I&O's Detail    29 Jul 2022 07:01  -  30 Jul 2022 07:00  --------------------------------------------------------  IN:    sodium chloride 0.9%: 350 mL  Total IN: 350 mL    OUT:  Total OUT: 0 mL    Total NET: 350 mL               Discussed with:     Cultures:	        Radiology

## 2022-07-31 NOTE — PROGRESS NOTE ADULT - PROBLEM SELECTOR PLAN 7
Hx of vertebral fracture with repair, patient presents with back and R leg pain. Takes PO dilaudid at home  - Continue home PO dilaudid  - Fall risk protocol Patient has history of CAD s/p 2 stents  - Continue home Aspirin  - Continue home Atorvastatin

## 2022-07-31 NOTE — DISCHARGE NOTE PROVIDER - DETAILS OF MALNUTRITION DIAGNOSIS/DIAGNOSES
This patient has been assessed with a concern for Malnutrition and was treated during this hospitalization for the following Nutrition diagnosis/diagnoses:     -  07/31/2022: Severe protein-calorie malnutrition

## 2022-07-31 NOTE — PROGRESS NOTE ADULT - PROBLEM SELECTOR PLAN 4
CT abd/pelvis showed moderate B/L pleural effusions on admission  - Pulm consult Dr. orona, recs appreciated  -Will possibly need IR guided thoracentesis  -Incentive spirometry Patient presented with back and R leg pain, daughter also commented on more noticeable generalized weakness  - PT consult for weakness, deconditioning  - Palliative consult for GOC discussion  -  consult for dispo, d/c

## 2022-07-31 NOTE — PROVIDER CONTACT NOTE (OTHER) - REASON
Patient pulled out moss twice last night and patient has had small bloody stools and blood around urethra noted
Pt pulled out Bah catheter
Patient pulled out Bah catheter again

## 2022-07-31 NOTE — DIETITIAN INITIAL EVALUATION ADULT - PERTINENT MEDS FT
MEDICATIONS  (STANDING):  amitriptyline 10 milliGRAM(s) Oral at bedtime  aspirin enteric coated 81 milliGRAM(s) Oral daily  atorvastatin 40 milliGRAM(s) Oral at bedtime  bisacodyl Suppository 10 milliGRAM(s) Rectal daily  buprenorphine Patch 5 MICROgram(s)/Hr 1 Patch Transdermal every 7 days  dextrose 5%. 1000 milliLiter(s) (50 mL/Hr) IV Continuous <Continuous>  dextrose 5%. 1000 milliLiter(s) (100 mL/Hr) IV Continuous <Continuous>  dextrose 50% Injectable 25 Gram(s) IV Push once  dextrose 50% Injectable 12.5 Gram(s) IV Push once  dextrose 50% Injectable 25 Gram(s) IV Push once  ergocalciferol 48445 Unit(s) Oral <User Schedule>  ferrous    sulfate 325 milliGRAM(s) Oral daily  glucagon  Injectable 1 milliGRAM(s) IntraMuscular once  HYDROmorphone   Tablet 4 milliGRAM(s) Oral three times a day  insulin lispro (ADMELOG) corrective regimen sliding scale   SubCutaneous three times a day before meals  insulin lispro (ADMELOG) corrective regimen sliding scale   SubCutaneous at bedtime  metoprolol succinate ER 25 milliGRAM(s) Oral daily  pantoprazole    Tablet 40 milliGRAM(s) Oral two times a day  polyethylene glycol 3350 17 Gram(s) Oral two times a day  senna 2 Tablet(s) Oral at bedtime  sertraline 25 milliGRAM(s) Oral two times a day  sodium chloride 0.9%. 1000 milliLiter(s) (70 mL/Hr) IV Continuous <Continuous>    MEDICATIONS  (PRN):  dextrose Oral Gel 15 Gram(s) Oral once PRN Blood Glucose LESS THAN 70 milliGRAM(s)/deciliter  melatonin 3 milliGRAM(s) Oral at bedtime PRN Insomnia  metoclopramide 5 milliGRAM(s) Oral two times a day PRN Nausea/vomiting  ondansetron    Tablet 8 milliGRAM(s) Oral three times a day PRN Nausea and/or Vomiting

## 2022-07-31 NOTE — PROGRESS NOTE ADULT - ASSESSMENT
89yo F with PMH DM, dyslipidemia, gastroparesis, CAD s/p 2 stents, hx of vertebral fx with repair presents to the ED with back and R leg pain, as well as recent decreased urinary frequency. Patient was previously at [facility] 7/5/22-7/15/22 for COVID+, cholelithiasis, KERRY, and UTI/hematuria.    remains covid pos  hydro  effusions  atelectasis  cad  VCF  OP  OA  frail  weak  elderly  DM  HLD    overnight issues noted - moss cath -  follow up    ct chest reviewed  effusions - bilaterally  will need thoracentesis - with IR - scheduled for Monday - will hold Hep am SQ  I devonte if able to cooperate  I and O  cvs rx regimen and BP control  monitor for prolonged covid sx  assist with needs  GOC discussion  DM care  caution with IVF - monitor for vol overload

## 2022-07-31 NOTE — DISCHARGE NOTE PROVIDER - CARE PROVIDER_API CALL
Gary Alvarado  INTERNAL MEDICINE  [address] [place] [address]  Phone: [phone]  Fax: [phone]  Follow Up Time: 2 weeks

## 2022-07-31 NOTE — PROGRESS NOTE ADULT - PROBLEM SELECTOR PLAN 3
COVID-19 detected in PCR on this admission. Likely viral shedding from prior COVID infection, last detected on 7/5  - Isolation precautions as per hospital protocol  - Monitor SpO2 >90%, provide oxygen support if patient desaturates CT abd/pelvis showed moderate B/L pleural effusions on admission  - Pulm consult Dr. orona, recs appreciated  -Will possibly need IR guided thoracentesis  -Incentive spirometry

## 2022-07-31 NOTE — PROGRESS NOTE ADULT - SUBJECTIVE AND OBJECTIVE BOX
Patient is a 89y old  Female who presents with a chief complaint of back and R leg pain, inability to urinate (2022 11:16)    HPI:  Additional history obtained from daughter (Mary) via telephone conversation.    89yo F with PMH DM, dyslipidemia, gastroparesis, CAD s/p 2 stents, hx of vertebral fx with repair presents to the ED with back and R leg pain, as well as recent decreased urinary frequency. Patient was previously at [facility] 22-7/15/22 for COVID+, cholelithiasis, KERRY, and UTI/hematuria. Patient lives at home by herself with home health aide and daughter who take turns caring for her. Daughter commented that patient has worsening weakness and PO intake, increased irritability, and recent difficulty with urination. Daughter states that during her previous admission, patient would report urinary frequency that occurred every 20-30 minutes; Nephrology had seen her previously and prescribed Lasix q48h. Patient says that sometimes "she forgets to drink", and daughter states that COVID infection has changed her sense of taste as well as overall appetite. Daughter also concerned about ecchymosis in the R hip and knee region which had occurred during the last hospital stay; however patient denies any recent fall or injury.     in ED:  Vital Signs: T 97.9F, HR 83, 121/63, RR 18, 98% on RA  Labs: Hgb 10.3, Na 133, BUN 88, Cr 3.90, Gluc 159, Alk Phos 227, Mg 3.2  COVID+  CXR: Large bilateral pleural effusions layering posteriorly. Cannot exclude   underlying infiltrate or atelectasis.  CT chest, abd/pelvis: Moderate B/L hydronephrosis, moderate B/L pleural effusions, mild ascites  RLE doppler: No evidence of RLE DVT  UA: Proteinuria, trace ketones, small leukocyte esterase, large blood, few bacteria  Given: NaCl bolus x1, PO Dilaudid, IV Dilaudid (2022 23:08)    INTERVAL HPI:    22: Pt seen and examined at bedside. Pt only complaint is pain from chronic sacral wound (being followed by nursing). Pt also has newly developed (from recent admission in July) right leg 3+ pitting edema from ankle to hip with bruising noted over right knee (DCT ruled out via u/s). Pt endorses decreased PO intake over last few months 2/2 not being hungry. Daughter at bedside to supplement history. Pt denies chest pain, SOB, abdominal pain, cough, light-headedness. Worsening KERRY (cr 4.2), gross hematuria noted in bedside commode (Pt was straight cath'd twice on last admission, black stool noted in bedside commode (patient takes iron supplement). No DVT, +bilateral pleural effusions, bilateral hydro, mild ascites noted CT scan  : Pt seen and examined at bedside. Pt waxing and waning with her dementia confusion and orientation. Pt intermittently complaining of lower abdominal pain vs no complaints at all. Pt pulled moss out 2 times overnight. Decision was made to bladder scan and leave moss out. Patient continues to have gross hematuria with minimal urine output with minimal bladder retention seen on bedside u/s. Per Urology, decision was made to place bilateral stents today due to worsening KERRY (Cr. 5.1)    OVERNIGHT EVENTS: Pt pulled moss out 2 times overnight. Decision was made to bladder scan and leave moss out. Patient continues to have gross hematuria with minimal urine output with minimal bladder retention seen on bedside u/s.    Home Medications:  amitriptyline 10 mg oral tablet: 3 tab(s) orally once a day (at bedtime) (2022 00:08)  aspirin 81 mg oral tablet: 1 tab(s) orally once a day (:08)  atorvastatin 40 mg oral tablet: 1 tab(s) orally once a day (2022 00:08)  buprenorphine:  (:08)  ferrous sulfate 200 mg (65 mg elemental iron) oral tablet: 1 tab(s) orally once a day (:08)  HYDROmorphone 4 mg oral tablet: 1 tab(s) orally 3 times a day (:08)  Macrodantin 100 mg oral capsule: 1 cap(s) orally 4 times a day (:08)  Metoprolol Succinate ER 25 mg oral tablet, extended release: 1 tab(s) orally once a day (2022 00:08)  MiraLax oral powder for reconstitution: orally once a day, As Needed - for constipation (:08)  PreserVision AREDS oral capsule: 1 tab(s) orally 2 times a day (2022 00:08)  Reglan 5 mg oral tablet: orally 2 times a day (2022 00:08)  Senna 8.6 mg oral tablet: 2 tab(s) orally once a day (at bedtime) (2022 00:08)  sertraline 25 mg oral tablet: two tablets daily  (2022 00:08)  Vitamin D3 1250 mcg (50,000 intl units) oral capsule: 1 cap(s) orally once a week (2022 00:08)  Zofran 8 mg oral tablet: 1 tab(s) orally every 6 hours (2022 00:08)    MEDICATIONS  (STANDING):  amitriptyline 10 milliGRAM(s) Oral at bedtime  aspirin enteric coated 81 milliGRAM(s) Oral daily  atorvastatin 40 milliGRAM(s) Oral at bedtime  bisacodyl Suppository 10 milliGRAM(s) Rectal daily  buprenorphine Patch 5 MICROgram(s)/Hr 1 Patch Transdermal every 7 days  dextrose 5%. 1000 milliLiter(s) (100 mL/Hr) IV Continuous <Continuous>  dextrose 5%. 1000 milliLiter(s) (50 mL/Hr) IV Continuous <Continuous>  dextrose 50% Injectable 12.5 Gram(s) IV Push once  dextrose 50% Injectable 25 Gram(s) IV Push once  dextrose 50% Injectable 25 Gram(s) IV Push once  ergocalciferol 35082 Unit(s) Oral <User Schedule>  ferrous    sulfate 325 milliGRAM(s) Oral daily  glucagon  Injectable 1 milliGRAM(s) IntraMuscular once  HYDROmorphone   Tablet 4 milliGRAM(s) Oral three times a day  insulin lispro (ADMELOG) corrective regimen sliding scale   SubCutaneous three times a day before meals  insulin lispro (ADMELOG) corrective regimen sliding scale   SubCutaneous at bedtime  metoprolol succinate ER 25 milliGRAM(s) Oral daily  pantoprazole    Tablet 40 milliGRAM(s) Oral two times a day  polyethylene glycol 3350 17 Gram(s) Oral two times a day  senna 2 Tablet(s) Oral at bedtime  sertraline 25 milliGRAM(s) Oral two times a day  sodium chloride 0.9%. 1000 milliLiter(s) (70 mL/Hr) IV Continuous <Continuous>    MEDICATIONS  (PRN):  dextrose Oral Gel 15 Gram(s) Oral once PRN Blood Glucose LESS THAN 70 milliGRAM(s)/deciliter  melatonin 3 milliGRAM(s) Oral at bedtime PRN Insomnia  metoclopramide 5 milliGRAM(s) Oral two times a day PRN Nausea/vomiting  ondansetron    Tablet 8 milliGRAM(s) Oral three times a day PRN Nausea and/or Vomiting      Allergies    morphine (Other)    Intolerances    Social History:  Tobacco: Former smoker  EtOH: Denies   Recreational drug use: Denies  Lives with: Self, HHA and daughter work around the clock for additional assistance  Ambulates: Cane, walker  ADLs: Requires assistance with all ADLs (2022 23:08)    REVIEW OF SYSTEMS:  ROS Unable to obtain due to - [X] Dementia  [  ] Lethargy [  ] Drowsy [  ] Sedated [  ] non verbal  REST OF REVIEW Of SYSTEM - [] Normal     Vital Signs Last 24 Hrs  T(C): 36.9 (2022 04:38), Max: 36.9 (2022 04:38)  T(F): 98.4 (2022 04:38), Max: 98.4 (2022 04:38)  HR: 92 (2022 04:38) (92 - 96)  BP: 119/64 (2022 04:38) (119/64 - 128/66)  BP(mean): --  RR: 18 (2022 04:38) (18 - 18)  SpO2: 91% (2022 04:38) (90% - 91%)    Parameters below as of 2022 04:38  Patient On (Oxygen Delivery Method): room air    Finger Stick    PHYSICAL EXAM:  GENERAL:  [X] NAD , [X] well appearing, [  ] Agitated, [  ] Drowsy,  [  ] Lethargy, [X] confused   HEAD:  [X] Normal, [  ] Other  EYES:  [X] EOMI, [X] PERRLA, [X] conjunctiva and sclera clear normal, [  ] Other,  [  ] Pallor,[  ] Discharge  ENMT:  [X] Normal, [X] Moist mucous membranes, [X] Moderate dentition [  ] Good dentition, [  ] No Thrush  NECK:  [X] Supple, [X] No JVD, [  ] Normal thyroid, [  ] Lymphadenopathy [  ] Other  CHEST/LUNG:  [X] Clear to auscultation bilaterally, [X] Breath Sounds equal B/L / Decrease, [  ] poor effort  [X] No rales, [X] No rhonchi  [X]  No wheezing,   HEART:  [X] Regular rate and rhythm, [  ] tachycardia, [  ] Bradycardia,  [  ] irregular  [X] No murmurs, No rubs, No gallops, [  ] PPM in place (Mfr:  )  ABDOMEN: [X] Reducible umbilical hernia [X] Moderately tense but no peritoneal signs [ ] Soft, [X] Nontender, [X] Nondistended, [  ] No mass, [X] Bowel sounds present, [  ] obese  NERVOUS SYSTEM:  [X] Alert & Oriented X1 (person), [  ] Nonfocal  [  ] Confusion  [  ] Encephalopathic [  ] Sedated [  ] Unable to assess, [  ] Dementia [  ] Other-  EXTREMITIES: [X] 2+ Peripheral Pulses, No clubbing, No cyanosis, [X] right leg 3+ pitting edema from ankle to hip [ ] edema B/L lower EXT. [X] PVD stasis skin changes B/L Lower EXT, [  ] wound  LYMPH: No lymphadenopathy noted  SKIN:  [  ] No rashes or lesions, [X] Pressure Ulcers (sacral), [  ] ecchymosis, [X] Skin Tears, [  ] Other      DIET: Diet, NPO (22 @ 09:23)    LABS:                        10.5   9.77  )-----------( 349      ( 2022 06:40 )             33.9     2022 06:40    136    |  99     |  96     ----------------------------<  188    5.3     |  26     |  5.10     Ca    9.3        2022 06:40  Phos  5.2       2022 06:40  Mg     3.0       2022 06:40    TPro  6.8    /  Alb  2.8    /  TBili  0.5    /  DBili  x      /  AST  27     /  ALT  17     /  AlkPhos  270    2022 06:40    Urinalysis Basic - ( 2022 23:07 )    Color: Yellow / Appearance: Slightly Turbid / S.010 / pH: x  Gluc: x / Ketone: Trace  / Bili: Negative / Urobili: Negative   Blood: x / Protein: 100 / Nitrite: Negative   Leuk Esterase: Small / RBC: >50 /HPF / WBC 6-10   Sq Epi: x / Non Sq Epi: Occasional / Bacteria: Few    Anemia Panel:    Thyroid Panel:    RADIOLOGY & ADDITIONAL TESTS:      HEALTH ISSUES - PROBLEM Dx:  KERRY (acute kidney injury)  Type 2 diabetes mellitus  GERD (gastroesophageal reflux disease)  CAD (coronary artery disease)  Need for prophylactic measure  Constipation  H/O vertebral fracture repair  Anemia  2019 novel coronavirus disease (COVID-19)  Bilateral pleural effusion  General weakness  Bilateral hydronephrosis  Urinary frequency  Esophageal dysmotility    Consultant(s) Notes Reviewed:  [X] YES     Care Discussed with [X] Consultants  [X] Patient  [X] Family [  ] HCP [  ]   [  ] Social Service  [ x ] RN, [  ] Physical Therapy,[  ] Palliative care team  DVT PPX: [  ] Lovenox, [X] S C Heparin, [  ] Coumadin, [  ] Xarelto, [  ] Eliquis, [  ] Pradaxa, [  ] IV Heparin drip, [  ] SCD [  ] Contraindication 2 to GI Bleed,[  ] Ambulation [  ] Contraindicated 2 to  bleed [  ] Contraindicated 2 to Brain Bleed  Advanced directive: [  ] None, [x  ] DNR/DNI Patient is a 89y old  Female who presents with a chief complaint of back and R leg pain, inability to urinate (2022 11:16)    HPI:  Additional history obtained from daughter (Mary) via telephone conversation.    89yo F with PMH DM, dyslipidemia, gastroparesis, CAD s/p 2 stents, hx of vertebral fx with repair presents to the ED with back and R leg pain, as well as recent decreased urinary frequency. Patient was previously at [facility] 22-7/15/22 for COVID+, cholelithiasis, KERRY, and UTI/hematuria. Patient lives at home by herself with home health aide and daughter who take turns caring for her. Daughter commented that patient has worsening weakness and PO intake, increased irritability, and recent difficulty with urination. Daughter states that during her previous admission, patient would report urinary frequency that occurred every 20-30 minutes; Nephrology had seen her previously and prescribed Lasix q48h. Patient says that sometimes "she forgets to drink", and daughter states that COVID infection has changed her sense of taste as well as overall appetite. Daughter also concerned about ecchymosis in the R hip and knee region which had occurred during the last hospital stay; however patient denies any recent fall or injury.     in ED:  Vital Signs: T 97.9F, HR 83, 121/63, RR 18, 98% on RA  Labs: Hgb 10.3, Na 133, BUN 88, Cr 3.90, Gluc 159, Alk Phos 227, Mg 3.2  COVID+  CXR: Large bilateral pleural effusions layering posteriorly. Cannot exclude   underlying infiltrate or atelectasis.  CT chest, abd/pelvis: Moderate B/L hydronephrosis, moderate B/L pleural effusions, mild ascites  RLE doppler: No evidence of RLE DVT  UA: Proteinuria, trace ketones, small leukocyte esterase, large blood, few bacteria  Given: NaCl bolus x1, PO Dilaudid, IV Dilaudid (2022 23:08)    INTERVAL HPI:    22: Pt seen and examined at bedside. Pt only complaint is pain from chronic sacral wound (being followed by nursing). Pt also has newly developed (from recent admission in July) right leg 3+ pitting edema from ankle to hip with bruising noted over right knee (DCT ruled out via u/s). Pt endorses decreased PO intake over last few months 2/2 not being hungry. Daughter at bedside to supplement history. Pt denies chest pain, SOB, abdominal pain, cough, light-headedness. Worsening KERRY (cr 4.2), gross hematuria noted in bedside commode (Pt was straight cath'd twice on last admission, black stool noted in bedside commode (patient takes iron supplement). No DVT, +bilateral pleural effusions, bilateral hydro, mild ascites noted CT scan  : Pt seen and examined at bedside. Pt waxing and waning with her dementia confusion and orientation. Pt intermittently complaining of lower abdominal pain vs no complaints at all. Pt pulled moss out 2 times overnight. Decision was made to bladder scan and leave moss out. Patient continues to have gross hematuria with minimal urine output with minimal bladder retention seen on bedside u/s. Per Urology, decision was made to place bilateral stents today due to worsening KERRY (Cr. 5.1),    OVERNIGHT EVENTS: Pt pulled moss out 2 times overnight. Decision was made to bladder scan and leave moss out. Patient continues to have gross hematuria with minimal urine output with minimal bladder retention seen on bedside u/s.    Home Medications:  amitriptyline 10 mg oral tablet: 3 tab(s) orally once a day (at bedtime) (2022 00:08)  aspirin 81 mg oral tablet: 1 tab(s) orally once a day (:08)  atorvastatin 40 mg oral tablet: 1 tab(s) orally once a day (2022 00:08)  buprenorphine:  (:08)  ferrous sulfate 200 mg (65 mg elemental iron) oral tablet: 1 tab(s) orally once a day (:08)  HYDROmorphone 4 mg oral tablet: 1 tab(s) orally 3 times a day (:08)  Macrodantin 100 mg oral capsule: 1 cap(s) orally 4 times a day (:08)  Metoprolol Succinate ER 25 mg oral tablet, extended release: 1 tab(s) orally once a day (2022 00:08)  MiraLax oral powder for reconstitution: orally once a day, As Needed - for constipation (:08)  PreserVision AREDS oral capsule: 1 tab(s) orally 2 times a day (2022 00:08)  Reglan 5 mg oral tablet: orally 2 times a day (2022 00:08)  Senna 8.6 mg oral tablet: 2 tab(s) orally once a day (at bedtime) (2022 00:08)  sertraline 25 mg oral tablet: two tablets daily  (2022 00:08)  Vitamin D3 1250 mcg (50,000 intl units) oral capsule: 1 cap(s) orally once a week (2022 00:08)  Zofran 8 mg oral tablet: 1 tab(s) orally every 6 hours (2022 00:08)    MEDICATIONS  (STANDING):  amitriptyline 10 milliGRAM(s) Oral at bedtime  aspirin enteric coated 81 milliGRAM(s) Oral daily  atorvastatin 40 milliGRAM(s) Oral at bedtime  bisacodyl Suppository 10 milliGRAM(s) Rectal daily  buprenorphine Patch 5 MICROgram(s)/Hr 1 Patch Transdermal every 7 days  dextrose 5%. 1000 milliLiter(s) (100 mL/Hr) IV Continuous <Continuous>  dextrose 5%. 1000 milliLiter(s) (50 mL/Hr) IV Continuous <Continuous>  dextrose 50% Injectable 12.5 Gram(s) IV Push once  dextrose 50% Injectable 25 Gram(s) IV Push once  dextrose 50% Injectable 25 Gram(s) IV Push once  ergocalciferol 25389 Unit(s) Oral <User Schedule>  ferrous    sulfate 325 milliGRAM(s) Oral daily  glucagon  Injectable 1 milliGRAM(s) IntraMuscular once  HYDROmorphone   Tablet 4 milliGRAM(s) Oral three times a day  insulin lispro (ADMELOG) corrective regimen sliding scale   SubCutaneous three times a day before meals  insulin lispro (ADMELOG) corrective regimen sliding scale   SubCutaneous at bedtime  metoprolol succinate ER 25 milliGRAM(s) Oral daily  pantoprazole    Tablet 40 milliGRAM(s) Oral two times a day  polyethylene glycol 3350 17 Gram(s) Oral two times a day  senna 2 Tablet(s) Oral at bedtime  sertraline 25 milliGRAM(s) Oral two times a day  sodium chloride 0.9%. 1000 milliLiter(s) (70 mL/Hr) IV Continuous <Continuous>    MEDICATIONS  (PRN):  dextrose Oral Gel 15 Gram(s) Oral once PRN Blood Glucose LESS THAN 70 milliGRAM(s)/deciliter  melatonin 3 milliGRAM(s) Oral at bedtime PRN Insomnia  metoclopramide 5 milliGRAM(s) Oral two times a day PRN Nausea/vomiting  ondansetron    Tablet 8 milliGRAM(s) Oral three times a day PRN Nausea and/or Vomiting      Allergies    morphine (Other)    Intolerances    Social History:  Tobacco: Former smoker  EtOH: Denies   Recreational drug use: Denies  Lives with: Self, HHA and daughter work around the clock for additional assistance  Ambulates: Cane, walker  ADLs: Requires assistance with all ADLs (2022 23:08)    REVIEW OF SYSTEMS:  ROS Unable to obtain due to - [X] Dementia  [  ] Lethargy [  ] Drowsy [  ] Sedated [  ] non verbal  REST OF REVIEW Of SYSTEM - [] Normal     Vital Signs Last 24 Hrs  T(C): 36.9 (2022 04:38), Max: 36.9 (2022 04:38)  T(F): 98.4 (2022 04:38), Max: 98.4 (2022 04:38)  HR: 92 (2022 04:38) (92 - 96)  BP: 119/64 (2022 04:38) (119/64 - 128/66)  BP(mean): --  RR: 18 (2022 04:38) (18 - 18)  SpO2: 91% (2022 04:38) (90% - 91%)    Parameters below as of 2022 04:38  Patient On (Oxygen Delivery Method): room air    Finger Stick    PHYSICAL EXAM:  GENERAL:  [X] NAD , [X] well appearing, [  ] Agitated, [  ] Drowsy,  [  ] Lethargy, [X] confused   HEAD:  [X] Normal, [  ] Other  EYES:  [X] EOMI, [X] PERRLA, [X] conjunctiva and sclera clear normal, [  ] Other,  [  ] Pallor,[  ] Discharge  ENMT:  [X] Normal, [X] Moist mucous membranes, [X] Moderate dentition [  ] Good dentition, [ x ] No Thrush  NECK:  [X] Supple, [X] No JVD, [ x ] Normal thyroid, [  ] Lymphadenopathy [  ] Other  CHEST/LUNG:  [X] Clear to auscultation bilaterally, [X] Breath Sounds equal B/L / Decrease, [  ] poor effort  [X] No rales, [X] No rhonchi  [X]  No wheezing,   HEART:  [X] Regular rate and rhythm, [  ] tachycardia, [  ] Bradycardia,  [  ] irregular  [X] No murmurs, No rubs, No gallops, [  ] PPM in place (Mfr:  )  ABDOMEN: [X] Reducible umbilical hernia [X] Moderately tense but no peritoneal signs [ ] Soft, [X] Nontender, [X] Nondistended, [  ] No mass, [X] Bowel sounds present, [  ] obese  NERVOUS SYSTEM:  [X] Alert & Oriented X1 (person), [  ] Nonfocal  [  ] Confusion  [  ] Encephalopathic [  ] Sedated [  ] Unable to assess, [  ] Dementia [  ] Other-  EXTREMITIES: [X] 2+ Peripheral Pulses, No clubbing, No cyanosis, [X] right leg 3+ pitting edema from ankle to hip [ ] edema B/L lower EXT. [X] PVD stasis skin changes B/L Lower EXT, [  ] wound  LYMPH: No lymphadenopathy noted  SKIN:  [  ] No rashes or lesions, [X] Pressure Ulcers (sacral), [  ] ecchymosis, [X] Skin Tears, [  ] Other      DIET: Diet, NPO (22 @ 09:23)    LABS:                        10.5   9.77  )-----------( 349      ( 2022 06:40 )             33.9     2022 06:40    136    |  99     |  96     ----------------------------<  188    5.3     |  26     |  5.10     Ca    9.3        2022 06:40  Phos  5.2       2022 06:40  Mg     3.0       2022 06:40    TPro  6.8    /  Alb  2.8    /  TBili  0.5    /  DBili  x      /  AST  27     /  ALT  17     /  AlkPhos  270    2022 06:40    Urinalysis Basic - ( 2022 23:07 )    Color: Yellow / Appearance: Slightly Turbid / S.010 / pH: x  Gluc: x / Ketone: Trace  / Bili: Negative / Urobili: Negative   Blood: x / Protein: 100 / Nitrite: Negative   Leuk Esterase: Small / RBC: >50 /HPF / WBC 6-10   Sq Epi: x / Non Sq Epi: Occasional / Bacteria: Few    Anemia Panel:    Thyroid Panel:    RADIOLOGY & ADDITIONAL TESTS: none      HEALTH ISSUES - PROBLEM Dx:  KERRY (acute kidney injury)  Type 2 diabetes mellitus  GERD (gastroesophageal reflux disease)  CAD (coronary artery disease)  Need for prophylactic measure  Constipation  H/O vertebral fracture repair  Anemia  2019 novel coronavirus disease (COVID-19)  Bilateral pleural effusion  General weakness  Bilateral hydronephrosis  Urinary frequency  Esophageal dysmotility    Consultant(s) Notes Reviewed:  [X] YES     Care Discussed with [X] Consultants  [X] Patient  [X] Family [  ] HCP [  ]   [  ] Social Service  [ x ] RN, [  ] Physical Therapy,[  ] Palliative care team  DVT PPX: [  ] Lovenox, [X] S C Heparin, [  ] Coumadin, [  ] Xarelto, [  ] Eliquis, [  ] Pradaxa, [  ] IV Heparin drip, [  ] SCD [  ] Contraindication 2 to GI Bleed,[  ] Ambulation [  ] Contraindicated 2 to  bleed [  ] Contraindicated 2 to Brain Bleed  Advanced directive: [  ] None, [x  ] DNR/DNI Patient is a 89y old  Female who presents with a chief complaint of back and R leg pain, inability to urinate (2022 11:16)    HPI:  Additional history obtained from daughter (Mary) via telephone conversation.    89yo F with PMH DM, dyslipidemia, gastroparesis, CAD s/p 2 stents, hx of vertebral fx with repair presents to the ED with back and R leg pain, as well as recent decreased urinary frequency. Patient was previously at [facility] 22-7/15/22 for COVID+, cholelithiasis, KERRY, and UTI/hematuria. Patient lives at home by herself with home health aide and daughter who take turns caring for her. Daughter commented that patient has worsening weakness and PO intake, increased irritability, and recent difficulty with urination. Daughter states that during her previous admission, patient would report urinary frequency that occurred every 20-30 minutes; Nephrology had seen her previously and prescribed Lasix q48h. Patient says that sometimes "she forgets to drink", and daughter states that COVID infection has changed her sense of taste as well as overall appetite. Daughter also concerned about ecchymosis in the R hip and knee region which had occurred during the last hospital stay; however patient denies any recent fall or injury.     in ED:  Vital Signs: T 97.9F, HR 83, 121/63, RR 18, 98% on RA  Labs: Hgb 10.3, Na 133, BUN 88, Cr 3.90, Gluc 159, Alk Phos 227, Mg 3.2  COVID+  CXR: Large bilateral pleural effusions layering posteriorly. Cannot exclude   underlying infiltrate or atelectasis.  CT chest, abd/pelvis: Moderate B/L hydronephrosis, moderate B/L pleural effusions, mild ascites  RLE doppler: No evidence of RLE DVT  UA: Proteinuria, trace ketones, small leukocyte esterase, large blood, few bacteria  Given: NaCl bolus x1, PO Dilaudid, IV Dilaudid (2022 23:08)    INTERVAL HPI:    22: Pt seen and examined at bedside. Pt only complaint is pain from chronic sacral wound (being followed by nursing). Pt also has newly developed (from recent admission in July) right leg 3+ pitting edema from ankle to hip with bruising noted over right knee (DCT ruled out via u/s). Pt endorses decreased PO intake over last few months 2/2 not being hungry. Daughter at bedside to supplement history. Pt denies chest pain, SOB, abdominal pain, cough, light-headedness. Worsening KERRY (cr 4.2), gross hematuria noted in bedside commode (Pt was straight cath'd twice on last admission, black stool noted in bedside commode (patient takes iron supplement). No DVT, +bilateral pleural effusions, bilateral hydro, mild ascites noted CT scan  : Pt seen and examined at bedside. Pt waxing and waning with her dementia confusion and orientation. Pt intermittently complaining of lower abdominal pain vs no complaints at all. Pt pulled moss out 2 times overnight. Decision was made to bladder scan and leave moss out. Patient continues to have gross hematuria with minimal urine output with minimal bladder retention seen on bedside u/s. Per Urology, decision was made to place bilateral stents today due to worsening KERRY (Cr. 5.1),    OVERNIGHT EVENTS: Pt pulled moss out 2 times overnight. Decision was made to bladder scan and leave moss out. Patient continues to have gross hematuria with minimal urine output with minimal bladder retention seen on bedside u/s.    Home Medications:  amitriptyline 10 mg oral tablet: 3 tab(s) orally once a day (at bedtime) (2022 00:08)  aspirin 81 mg oral tablet: 1 tab(s) orally once a day (:08)  atorvastatin 40 mg oral tablet: 1 tab(s) orally once a day (2022 00:08)  buprenorphine:  (:08)  ferrous sulfate 200 mg (65 mg elemental iron) oral tablet: 1 tab(s) orally once a day (:08)  HYDROmorphone 4 mg oral tablet: 1 tab(s) orally 3 times a day (:08)  Macrodantin 100 mg oral capsule: 1 cap(s) orally 4 times a day (:08)  Metoprolol Succinate ER 25 mg oral tablet, extended release: 1 tab(s) orally once a day (2022 00:08)  MiraLax oral powder for reconstitution: orally once a day, As Needed - for constipation (:08)  PreserVision AREDS oral capsule: 1 tab(s) orally 2 times a day (2022 00:08)  Reglan 5 mg oral tablet: orally 2 times a day (2022 00:08)  Senna 8.6 mg oral tablet: 2 tab(s) orally once a day (at bedtime) (2022 00:08)  sertraline 25 mg oral tablet: two tablets daily  (2022 00:08)  Vitamin D3 1250 mcg (50,000 intl units) oral capsule: 1 cap(s) orally once a week (2022 00:08)  Zofran 8 mg oral tablet: 1 tab(s) orally every 6 hours (2022 00:08)    MEDICATIONS  (STANDING):  amitriptyline 10 milliGRAM(s) Oral at bedtime  aspirin enteric coated 81 milliGRAM(s) Oral daily  atorvastatin 40 milliGRAM(s) Oral at bedtime  bisacodyl Suppository 10 milliGRAM(s) Rectal daily  buprenorphine Patch 5 MICROgram(s)/Hr 1 Patch Transdermal every 7 days  dextrose 5%. 1000 milliLiter(s) (100 mL/Hr) IV Continuous <Continuous>  dextrose 5%. 1000 milliLiter(s) (50 mL/Hr) IV Continuous <Continuous>  dextrose 50% Injectable 12.5 Gram(s) IV Push once  dextrose 50% Injectable 25 Gram(s) IV Push once  dextrose 50% Injectable 25 Gram(s) IV Push once  ergocalciferol 94590 Unit(s) Oral <User Schedule>  ferrous    sulfate 325 milliGRAM(s) Oral daily  glucagon  Injectable 1 milliGRAM(s) IntraMuscular once  HYDROmorphone   Tablet 4 milliGRAM(s) Oral three times a day  insulin lispro (ADMELOG) corrective regimen sliding scale   SubCutaneous three times a day before meals  insulin lispro (ADMELOG) corrective regimen sliding scale   SubCutaneous at bedtime  metoprolol succinate ER 25 milliGRAM(s) Oral daily  pantoprazole    Tablet 40 milliGRAM(s) Oral two times a day  polyethylene glycol 3350 17 Gram(s) Oral two times a day  senna 2 Tablet(s) Oral at bedtime  sertraline 25 milliGRAM(s) Oral two times a day  sodium chloride 0.9%. 1000 milliLiter(s) (70 mL/Hr) IV Continuous <Continuous>    MEDICATIONS  (PRN):  dextrose Oral Gel 15 Gram(s) Oral once PRN Blood Glucose LESS THAN 70 milliGRAM(s)/deciliter  melatonin 3 milliGRAM(s) Oral at bedtime PRN Insomnia  metoclopramide 5 milliGRAM(s) Oral two times a day PRN Nausea/vomiting  ondansetron    Tablet 8 milliGRAM(s) Oral three times a day PRN Nausea and/or Vomiting      Allergies    morphine (Other)    Intolerances    Social History:  Tobacco: Former smoker  EtOH: Denies   Recreational drug use: Denies  Lives with: Self, HHA and daughter work around the clock for additional assistance  Ambulates: Cane, walker  ADLs: Requires assistance with all ADLs (2022 23:08)    REVIEW OF SYSTEMS:  ROS Unable to obtain due to - [X] Dementia  [  ] Lethargy [  ] Drowsy [  ] Sedated [  ] non verbal  REST OF REVIEW Of SYSTEM - [] Normal     Vital Signs Last 24 Hrs  T(C): 36.9 (2022 04:38), Max: 36.9 (2022 04:38)  T(F): 98.4 (2022 04:38), Max: 98.4 (2022 04:38)  HR: 92 (2022 04:38) (92 - 96)  BP: 119/64 (2022 04:38) (119/64 - 128/66)  BP(mean): --  RR: 18 (2022 04:38) (18 - 18)  SpO2: 91% (2022 04:38) (90% - 91%)    Parameters below as of 2022 04:38  Patient On (Oxygen Delivery Method): room air    Finger Stick    PHYSICAL EXAM:  GENERAL:  [X] NAD , [X] well appearing, [  ] Agitated, [  ] Drowsy,  [  ] Lethargy, [X] confused   HEAD:  [X] Normal, [  ] Other  EYES:  [X] EOMI, [X] PERRLA, [X] conjunctiva and sclera clear normal, [  ] Other,  [  ] Pallor,[  ] Discharge  ENMT:  [X] Normal, [X] Moist mucous membranes, [X] Moderate dentition [  ] Good dentition, [ x ] No Thrush  NECK:  [X] Supple, [X] No JVD, [ x ] Normal thyroid, [  ] Lymphadenopathy [  ] Other  CHEST/LUNG:  [X] Clear to auscultation bilaterally, [X] Breath Sounds equal B/L / Decrease, [  ] poor effort  [X] No rales, [X] No rhonchi  [X]  No wheezing,   HEART:  [X] Regular rate and rhythm, [  ] tachycardia, [  ] Bradycardia,  [  ] irregular  [X] No murmurs, No rubs, No gallops, [  ] PPM in place (Mfr:  )  ABDOMEN: [X] Reducible umbilical hernia [X] Moderately tense but no peritoneal signs [ ] Soft, [X] Nontender, [X] Nondistended, [  ] No mass, [X] Bowel sounds present, [  ] obese  NERVOUS SYSTEM:  [X] Alert & Oriented X1 (person), [  ] Nonfocal  [  ] Confusion  [  ] Encephalopathic [  ] Sedated [  ] Unable to assess, [  ] Dementia [  ] Other-  EXTREMITIES: [X] 2+ Peripheral Pulses, No clubbing, No cyanosis, [X] right leg 3+ pitting edema from ankle to hip [ ] edema B/L lower EXT. [X] PVD stasis skin changes B/L Lower EXT with ecchymosis , [  ] wound  LYMPH: No lymphadenopathy noted  SKIN:  [  ] No rashes or lesions, [X] Pressure Ulcers (sacral), [ x ] ecchymosis- Large Ecchymosis Rt Knee & Thigh , [X] Skin Tears, [  ] Other      DIET: Diet, NPO (22 @ 09:23)    LABS:                        10.5   9.77  )-----------( 349      ( 2022 06:40 )             33.9     2022 06:40    136    |  99     |  96     ----------------------------<  188    5.3     |  26     |  5.10     Ca    9.3        2022 06:40  Phos  5.2       2022 06:40  Mg     3.0       2022 06:40    TPro  6.8    /  Alb  2.8    /  TBili  0.5    /  DBili  x      /  AST  27     /  ALT  17     /  AlkPhos  270    2022 06:40    Urinalysis Basic - ( 2022 23:07 )    Color: Yellow / Appearance: Slightly Turbid / S.010 / pH: x  Gluc: x / Ketone: Trace  / Bili: Negative / Urobili: Negative   Blood: x / Protein: 100 / Nitrite: Negative   Leuk Esterase: Small / RBC: >50 /HPF / WBC 6-10   Sq Epi: x / Non Sq Epi: Occasional / Bacteria: Few    Anemia Panel:    Thyroid Panel:    RADIOLOGY & ADDITIONAL TESTS: none      HEALTH ISSUES - PROBLEM Dx:  KERRY (acute kidney injury)  Type 2 diabetes mellitus  GERD (gastroesophageal reflux disease)  CAD (coronary artery disease)  Need for prophylactic measure  Constipation  H/O vertebral fracture repair  Anemia  2019 novel coronavirus disease (COVID-19)  Bilateral pleural effusion  General weakness  Bilateral hydronephrosis  Urinary frequency  Esophageal dysmotility    Consultant(s) Notes Reviewed:  [X] YES     Care Discussed with [X] Consultants  [X] Patient  [X] Family [  ] HCP [  ]   [  ] Social Service  [ x ] RN, [  ] Physical Therapy,[  ] Palliative care team  DVT PPX: [  ] Lovenox, [X] S C Heparin, [  ] Coumadin, [  ] Xarelto, [  ] Eliquis, [  ] Pradaxa, [  ] IV Heparin drip, [  ] SCD [  ] Contraindication 2 to GI Bleed,[  ] Ambulation [  ] Contraindicated 2 to  bleed [  ] Contraindicated 2 to Brain Bleed  Advanced directive: [  ] None, [x  ] DNR/DNI

## 2022-07-31 NOTE — DISCHARGE NOTE PROVIDER - NSDCMRMEDTOKEN_GEN_ALL_CORE_FT
amitriptyline 10 mg oral tablet: 3 tab(s) orally once a day (at bedtime)  aspirin 81 mg oral tablet: 1 tab(s) orally once a day  atorvastatin 40 mg oral tablet: 1 tab(s) orally once a day  buprenorphine:   ferrous sulfate 200 mg (65 mg elemental iron) oral tablet: 1 tab(s) orally once a day  HYDROmorphone 4 mg oral tablet: 1 tab(s) orally 3 times a day  Lasix 20 mg oral tablet: 1 tab(s) orally every 48 hours   Macrodantin 100 mg oral capsule: 1 cap(s) orally 4 times a day  Metoprolol Succinate ER 25 mg oral tablet, extended release: 1 tab(s) orally once a day  MiraLax oral powder for reconstitution: orally once a day, As Needed - for constipation  pantoprazole 40 mg oral delayed release tablet: 1 tab(s) orally 2 times a day  PreserVision AREDS oral capsule: 1 tab(s) orally 2 times a day  Reglan 5 mg oral tablet: orally 2 times a day  Senna 8.6 mg oral tablet: 2 tab(s) orally once a day (at bedtime)  sertraline 25 mg oral tablet: two tablets daily   Vitamin D3 1250 mcg (50,000 intl units) oral capsule: 1 cap(s) orally once a week  Zofran 8 mg oral tablet: 1 tab(s) orally every 6 hours   amitriptyline 10 mg oral tablet: 3 tab(s) orally once a day (at bedtime)  aspirin 81 mg oral tablet: 1 tab(s) orally once a day  atorvastatin 40 mg oral tablet: 1 tab(s) orally once a day  buprenorphine 5 mcg/hr transdermal film, extended release: 1 patch transdermal once a week  ferrous sulfate 200 mg (65 mg elemental iron) oral tablet: 1 tab(s) orally once a day  HYDROmorphone 4 mg oral tablet: 1 tab(s) orally 3 times a day  Lasix 20 mg oral tablet: 1 tab(s) orally every 48 hours   Macrodantin 100 mg oral capsule: 1 cap(s) orally 4 times a day  Metoprolol Succinate ER 25 mg oral tablet, extended release: 1 tab(s) orally once a day  MiraLax oral powder for reconstitution: orally once a day, As Needed - for constipation  pantoprazole 40 mg oral delayed release tablet: 1 tab(s) orally 2 times a day  PreserVision AREDS oral capsule: 1 tab(s) orally 2 times a day  Reglan 5 mg oral tablet: orally 2 times a day  Senna 8.6 mg oral tablet: 2 tab(s) orally once a day (at bedtime)  sertraline 25 mg oral tablet: two tablets daily   Vitamin D3 1250 mcg (50,000 intl units) oral capsule: 1 cap(s) orally once a week  Zofran 8 mg oral tablet: 1 tab(s) orally every 6 hours   HYDROmorphone 4 mg oral tablet: 1 tab(s) orally 3 times a day  Lasix 20 mg oral tablet: 1 tab(s) orally every 48 hours   Metoprolol Succinate ER 25 mg oral tablet, extended release: 1 tab(s) orally once a day  Reglan 5 mg oral tablet: orally 2 times a day  Zofran 8 mg oral tablet: 1 tab(s) orally every 6 hours   bisacodyl 10 mg rectal suppository: 1 suppository(ies) rectal once a day  HYDROmorphone: 0.5 milligram(s) intravenous every 4 hours, As Needed  HYDROmorphone: 0.5 milligram(s) intravenous every hour, As Needed  HYDROmorphone: 0.25 milligram(s) intravenous every hour, As Needed  HYDROmorphone: 0.25 milligram(s) intravenous every hour, As Needed  metoclopramide 5 mg oral tablet: 1 tab(s) orally every 6 hours, As needed, nausea  Metoprolol Succinate ER 25 mg oral tablet, extended release: 1 tab(s) orally once a day  ondansetron 4 mg oral tablet: 1 tab(s) orally every 6 hours, As needed, Nausea

## 2022-07-31 NOTE — PROGRESS NOTE ADULT - PROBLEM SELECTOR PLAN 1
Patient presents with decreased urinary frequency on admission, catheter present in ED. UA with trace ketones, small leukocyte esterase, large blood, and few bacteria. BUN 88, Cr 3.90  - CT abd/pelvis: Moderate B/L hydronephrosis, moderate B/L pleural effusions, mild ascites  - Avoid nephrotoxic meds  - Trend renal function and lytes, adjust as necessary  - Monitor I&Os  - Hold home Lasix due to KERRY, IV fluids  - Nephro consult for f/u recs (seen Dr. Wu previously)  -Cr uptrending and patient continues to pull out moss. Pt to have bilateral stent placement today per Urology  -Hyperkalemia -Lokelma x 2 doses Patient presents with decreased urinary frequency on admission, catheter present in ED. UA with trace ketones, small leukocyte esterase, large blood, and few bacteria. BUN 88, Cr 3.90  - CT abd/pelvis: Moderate B/L hydronephrosis, moderate B/L pleural effusions, mild ascites  - Avoid nephrotoxic meds  - Trend renal function and lytes, adjust as necessary  - Monitor I&Os  - Hold home Lasix due to KERRY, IV fluids  - Nephro consult for f/u recs (seen Dr. Wu previously)  -Cr uptrending and patient continues to pull out moss. Pt to have bilateral stent placement today per Urology  -Hyperkalemia -Lokelma x 2 doses done

## 2022-07-31 NOTE — PROVIDER CONTACT NOTE (OTHER) - ACTION/TREATMENT ORDERED:
Dr. Chang made aware pt pulled out Bah catheter. Dr. Chang to put in an order for a new Bah catheter for insertion.
Dr. Alcala aware. will discuss with Dr. Izquierdo if want moss replaced and whether to stop heparin
Dr. Chang did not order new Bah catheter. As per Dr. Chang, urology to come see patient in morning to re-assess.

## 2022-07-31 NOTE — PROGRESS NOTE ADULT - PROBLEM SELECTOR PLAN 6
Gluc 159 on admission  - Insulin Corrective Scale  - Finger sticks per routine  - Hypoglycemia protocol  - Continue home Atorvastatin Hx of vertebral fracture with repair, patient presents with back and R leg pain. Takes PO dilaudid at home  - Continue- Patch -Buprenorphine 5 mg q weekly -Placed Friday   - Fall risk protocol

## 2022-07-31 NOTE — DIETITIAN INITIAL EVALUATION ADULT - ORAL INTAKE PTA/DIET HISTORY
Pt's daughter (Mary) called to obtain nutrition related hx. Dtr states pt with overall decrease in appetite since having COVID infection earlier this month due to changes in taste. Diet mainly consists of ensure enlive and nutrigen shakes 3x/day. Pt also consumes soft foods (soup, yogurt, applesauce, pudding). Consumes soft consistency diet and thin liquids at home. No food allergies.

## 2022-07-31 NOTE — DISCHARGE NOTE PROVIDER - NSDCCPCAREPLAN_GEN_ALL_CORE_FT
PRINCIPAL DISCHARGE DIAGNOSIS  Diagnosis: KERRY (acute kidney injury)  Assessment and Plan of Treatment: You were admitted to the hospital for acute kidney injury.   A CT was performed while you were here which showed bilateral hydronephrosis which is a buildup of urine that backs up into your kidneys.   Stents were placed in both ureters to open up the area to allow urine to flow better. You tolerated the procedure well.   After the procedure, you were started on an abx called cefazolin.  Please continue ___ for ____ days. Stop ____  Please follow up with your PCP within 2 weeks.      SECONDARY DISCHARGE DIAGNOSES  Diagnosis: Bilateral pleural effusion  Assessment and Plan of Treatment: During, your hospital stay you were found to have pleural effusions in both of your lungs. This is a build up of fluid within the lining of your lungs.   An ultrasound was performed to determine the extent of the fluid. Interventional radiology then took you for a procedure called a thoracentesis to remove the fluid.   You tolerated the procedure well.   Please follow up with your PCP.    Diagnosis: Anemia  Assessment and Plan of Treatment: You have a history of chronic anemia which means your blood levels are on the lower side.   Please monitor levels with your PCP.   Continue home iron.    Diagnosis: CAD (coronary artery disease)  Assessment and Plan of Treatment: Continue home meds.    Diagnosis: Type 2 diabetes mellitus  Assessment and Plan of Treatment: Continue home meds.    Diagnosis: Constipation  Assessment and Plan of Treatment: Continue home meds.    Diagnosis: GERD (gastroesophageal reflux disease)  Assessment and Plan of Treatment: Continue home meds.     PRINCIPAL DISCHARGE DIAGNOSIS  Diagnosis: KERRY (acute kidney injury)  Assessment and Plan of Treatment: You were admitted to the hospital for acute kidney injury.   A CT was performed while you were here which showed bilateral hydronephrosis which is a buildup of urine that backs up into your kidneys.   Stents were placed in both ureters to open up the area to allow urine to flow better. You tolerated the procedure well.   After the procedure, you were started on an abx called cefazolin.  Please follow up with your PCP within 2 weeks.      SECONDARY DISCHARGE DIAGNOSES  Diagnosis: Bilateral pleural effusion  Assessment and Plan of Treatment: During, your hospital stay you were found to have pleural effusions in both of your lungs. This is a build up of fluid within the lining of your lungs.   An ultrasound was performed to determine the extent of the fluid. Interventional radiology then took you for a procedure called a thoracentesis to remove the fluid.   You tolerated the procedure well.   Please follow up with your PCP.    Diagnosis: Anemia  Assessment and Plan of Treatment: You have a history of chronic anemia which means your blood levels are on the lower side.   Please monitor levels with your PCP.     PRINCIPAL DISCHARGE DIAGNOSIS  Diagnosis: Comfort measures only status  Assessment and Plan of Treatment: You were evaluated by palliative care and upon discussion with your family regarding your health, you have been made comfort measures. [facility].      SECONDARY DISCHARGE DIAGNOSES  Diagnosis: KERRY (acute kidney injury)  Assessment and Plan of Treatment: You were admitted to the hospital with an acute kidne yinjury for this you had a CT scan which showed hydronephrosis of both your kidneys which is a buildup of urine that backs up into your kidneys.   You had stent placement in both of your ureters by urology to help with urine flow. You tolerated the procedure well however your kidney function did not improve much.  You were seen and evaulated by nephrology and palliative care.   You are not comfort measures and dialysis will not be done.    Diagnosis: Bilateral pleural effusion  Assessment and Plan of Treatment: During, your hospital stay you were found to have pleural effusions in both of your lungs. This is a build up of fluid within the lining of your lungs.   An ultrasound was performed to determine the extent of the fluid. Interventional radiology then took you for a procedure called a thoracentesis to remove the fluid to aid in your breathing.   You tolerated the procedure well.     PRINCIPAL DISCHARGE DIAGNOSIS  Diagnosis: Comfort measures only status  Assessment and Plan of Treatment: You were evaluated by palliative care and upon discussion with your family regarding your health, you have been made comfort measures. You will be [facility].      SECONDARY DISCHARGE DIAGNOSES  Diagnosis: KERRY (acute kidney injury)  Assessment and Plan of Treatment: You were admitted to the hospital with an acute kidne yinjury for this you had a CT scan which showed hydronephrosis of both your kidneys which is a buildup of urine that backs up into your kidneys.   You had stent placement in both of your ureters by urology to help with urine flow. You tolerated the procedure well however your kidney function did not improve much.  You were seen and evaulated by nephrology and palliative care.   You are now comfort measures and dialysis will not be done.    Diagnosis: Bilateral pleural effusion  Assessment and Plan of Treatment: During, your hospital stay you were found to have pleural effusions in both of your lungs. This is a build up of fluid within the lining of your lungs.   An ultrasound was performed to determine the extent of the fluid. Interventional radiology then took you for a procedure called a thoracentesis to remove the fluid to aid in your breathing.   You tolerated the procedure well.     PRINCIPAL DISCHARGE DIAGNOSIS  Diagnosis: FIDELINA (acute kidney injury)  Assessment and Plan of Treatment: Fidelina-CKD 4 You were admitted to the hospital with an acute kidney injury for this you had a CT scan which showed  B/L hydronephrosis of both your kidneys which is a buildup of urine that backs up into your kidneys.   You had Cystoscopy &  B/L Ureteral stent placement in both of your ureters by urology to help with urine flow. You tolerated the procedure well however your kidney function did not improve much.  You were seen and evaulated by nephrology and palliative care.   -Your Urine out put did Not improve , moss cath in place for i/o's  -STOP Lasix   You are now comfort measures and dialysis will not be done.      SECONDARY DISCHARGE DIAGNOSES  Diagnosis: Comfort measures only status  Assessment and Plan of Treatment: You were evaluated by palliative care and upon discussion with your family regarding your health, you have been made comfort measures. You will be going to the hospice [facility] for further care.    Diagnosis: Bilateral pleural effusion  Assessment and Plan of Treatment: During, your hospital stay you were found to have pleural effusions in both of your lungs. This is a build up of fluid within the lining of your lungs.   An ultrasound was performed to determine the extent of the fluid. Interventional radiology then took you for a procedure called a  Rt thoracentesis to remove the fluid to aid in your breathing. Drained ~1160 ml fluid for palliative Thora as per Pulmonary MD   You tolerated the procedure well.    Diagnosis: Esophageal dysmotility  Assessment and Plan of Treatment: on Pureed diet with Nepro , PPI    Diagnosis: Anemia  Assessment and Plan of Treatment: Chronic 2/2 CKD/FIDELINA , ACD   S/P Hematuria    Diagnosis: H/O vertebral fracture repair  Assessment and Plan of Treatment: Chronic pain   On Dilaudid IVP for pain control

## 2022-07-31 NOTE — DISCHARGE NOTE PROVIDER - HOSPITAL COURSE
FROM ADMISSION H+P:   HPI:  Additional history obtained from daughter (Mary) via telephone conversation.    89yo F with PMH DM, dyslipidemia, gastroparesis, CAD s/p 2 stents, hx of vertebral fx with repair presents to the ED with back and R leg pain, as well as recent decreased urinary frequency. Patient was previously at [facility] 7/5/22-7/15/22 for COVID+, cholelithiasis, KERRY, and UTI/hematuria. Patient lives at home by herself with home health aide and daughter who take turns caring for her. Daughter commented that patient has worsening weakness and PO intake, increased irritability, and recent difficulty with urination. Daughter states that during her previous admission, patient would report urinary frequency that occurred every 20-30 minutes; Nephrology had seen her previously and prescribed Lasix q48h. Patient says that sometimes "she forgets to drink", and daughter states that COVID infection has changed her sense of taste as well as overall appetite. Daughter also concerned about ecchymosis in the R hip and knee region which had occurred during the last hospital stay; however patient denies any recent fall or injury.     in ED:  Vital Signs: T 97.9F, HR 83, 121/63, RR 18, 98% on RA  Labs: Hgb 10.3, Na 133, BUN 88, Cr 3.90, Gluc 159, Alk Phos 227, Mg 3.2  COVID+  CXR: Large bilateral pleural effusions layering posteriorly. Cannot exclude   underlying infiltrate or atelectasis.  CT chest, abd/pelvis: Moderate B/L hydronephrosis, moderate B/L pleural effusions, mild ascites  RLE doppler: No evidence of RLE DVT  UA: Proteinuria, trace ketones, small leukocyte esterase, large blood, few bacteria  Given: NaCl bolus x1, PO Dilaudid, IV Dilaudid (29 Jul 2022 23:08)    ---  HOSPITAL COURSE:     89yo F with PMH DM, dyslipidemia, gastroparesis, CAD s/p 2 stents, hx of vertebral fx with repair presents to the ED with back and R leg pain, as well as recent decreased urinary frequency, admitted for KERRY/CKD 3  and dehydration.      ---  CONSULTANTS:     ---  TIME SPENT:  The total amount of time spent reviewing the hospital notes, laboratory values, imaging findings, assessing/counseling the patient, discussing with consultant physicians, social work, nursing staff was -- minutes    ---  Primary care provider was made aware of plan for discharge:      [  ] NO     [  ] YES   FROM ADMISSION H+P:   HPI:  Additional history obtained from daughter (Mary) via telephone conversation.    89yo F with PMH DM, dyslipidemia, gastroparesis, CAD s/p 2 stents, hx of vertebral fx with repair presents to the ED with back and R leg pain, as well as recent decreased urinary frequency. Patient was previously at [facility] 7/5/22-7/15/22 for COVID+, cholelithiasis, KERRY, and UTI/hematuria. Patient lives at home by herself with home health aide and daughter who take turns caring for her. Daughter commented that patient has worsening weakness and PO intake, increased irritability, and recent difficulty with urination. Daughter states that during her previous admission, patient would report urinary frequency that occurred every 20-30 minutes; Nephrology had seen her previously and prescribed Lasix q48h. Patient says that sometimes "she forgets to drink", and daughter states that COVID infection has changed her sense of taste as well as overall appetite. Daughter also concerned about ecchymosis in the R hip and knee region which had occurred during the last hospital stay; however patient denies any recent fall or injury.     in ED:  Vital Signs: T 97.9F, HR 83, 121/63, RR 18, 98% on RA  Labs: Hgb 10.3, Na 133, BUN 88, Cr 3.90, Gluc 159, Alk Phos 227, Mg 3.2  COVID+  CXR: Large bilateral pleural effusions layering posteriorly. Cannot exclude   underlying infiltrate or atelectasis.  CT chest, abd/pelvis: Moderate B/L hydronephrosis, moderate B/L pleural effusions, mild ascites  RLE doppler: No evidence of RLE DVT  UA: Proteinuria, trace ketones, small leukocyte esterase, large blood, few bacteria  Given: NaCl bolus x1, PO Dilaudid, IV Dilaudid (29 Jul 2022 23:08)    ---  HOSPITAL COURSE:     89yo F with PMH DM, dyslipidemia, gastroparesis, CAD s/p 2 stents, hx of vertebral fx with repair presents to the ED with back and R leg pain, as well as recent decreased urinary frequency, admitted for KERRY/CKD 3  and dehydration. CT showed bilateral hydro. Pt was seen by urology and placed moss cath with plan to monitor labs and symptoms. Overnight patient pulled cath out twice 2/2 sundowning and agitation. After 2nd pull, decision was made to do bladder scans and monitor urine output without moss in place. The following morning urology decided to take patient to OR for placement of bilateral stents.       ---  CONSULTANTS:     ---  TIME SPENT:  The total amount of time spent reviewing the hospital notes, laboratory values, imaging findings, assessing/counseling the patient, discussing with consultant physicians, social work, nursing staff was -- minutes    ---  Primary care provider was made aware of plan for discharge:      [  ] NO     [  ] YES   FROM ADMISSION H+P:   HPI:  Additional history obtained from daughter (Mary) via telephone conversation.    89yo F with PMH DM, dyslipidemia, gastroparesis, CAD s/p 2 stents, hx of vertebral fx with repair presents to the ED with back and R leg pain, as well as recent decreased urinary frequency. Patient was previously at [facility] 7/5/22-7/15/22 for COVID+, cholelithiasis, KERRY, and UTI/hematuria. Patient lives at home by herself with home health aide and daughter who take turns caring for her. Daughter commented that patient has worsening weakness and PO intake, increased irritability, and recent difficulty with urination. Daughter states that during her previous admission, patient would report urinary frequency that occurred every 20-30 minutes; Nephrology had seen her previously and prescribed Lasix q48h. Patient says that sometimes "she forgets to drink", and daughter states that COVID infection has changed her sense of taste as well as overall appetite. Daughter also concerned about ecchymosis in the R hip and knee region which had occurred during the last hospital stay; however patient denies any recent fall or injury.     in ED:  Vital Signs: T 97.9F, HR 83, 121/63, RR 18, 98% on RA  Labs: Hgb 10.3, Na 133, BUN 88, Cr 3.90, Gluc 159, Alk Phos 227, Mg 3.2  COVID+  CXR: Large bilateral pleural effusions layering posteriorly. Cannot exclude   underlying infiltrate or atelectasis.  CT chest, abd/pelvis: Moderate B/L hydronephrosis, moderate B/L pleural effusions, mild ascites  RLE doppler: No evidence of RLE DVT  UA: Proteinuria, trace ketones, small leukocyte esterase, large blood, few bacteria  Given: NaCl bolus x1, PO Dilaudid, IV Dilaudid (29 Jul 2022 23:08)    ---  HOSPITAL COURSE:  89yo F with PMH DM, dyslipidemia, gastroparesis, CAD s/p 2 stents, hx of vertebral fx with repair presents to the ED with back and R leg pain, as well as recent decreased urinary frequency, admitted for KERRY/CKD 3  and dehydration. CT showed bilateral hydro. Pt was seen by urology and placed moss cath with plan to monitor labs and symptoms. Overnight patient pulled cath out twice 2/2 sundowning and agitation. After 2nd pull, decision was made to do bladder scans and monitor urine output without moss in place. The following morning urology took patient to OR for placement of bilateral stents on 7/31.       ---  CONSULTANTS:   Urology - Dr. Gr  Nephrology- Dr. Sanchez  Pulmonology - Dr. Melo     ---  TIME SPENT:  The total amount of time spent reviewing the hospital notes, laboratory values, imaging findings, assessing/counseling the patient, discussing with consultant physicians, social work, nursing staff was -- minutes    ---  Primary care provider was made aware of plan for discharge:      [  ] NO     [  ] YES   FROM ADMISSION H+P:   HPI:  Additional history obtained from daughter (Mary) via telephone conversation.    89yo F with PMH DM, dyslipidemia, gastroparesis, CAD s/p 2 stents, hx of vertebral fx with repair presents to the ED with back and R leg pain, as well as recent decreased urinary frequency. Patient was previously at [facility] 7/5/22-7/15/22 for COVID+, cholelithiasis, KERRY, and UTI/hematuria. Patient lives at home by herself with home health aide and daughter who take turns caring for her. Daughter commented that patient has worsening weakness and PO intake, increased irritability, and recent difficulty with urination. Daughter states that during her previous admission, patient would report urinary frequency that occurred every 20-30 minutes; Nephrology had seen her previously and prescribed Lasix q48h. Patient says that sometimes "she forgets to drink", and daughter states that COVID infection has changed her sense of taste as well as overall appetite. Daughter also concerned about ecchymosis in the R hip and knee region which had occurred during the last hospital stay; however patient denies any recent fall or injury.     in ED:  Vital Signs: T 97.9F, HR 83, 121/63, RR 18, 98% on RA  Labs: Hgb 10.3, Na 133, BUN 88, Cr 3.90, Gluc 159, Alk Phos 227, Mg 3.2  COVID+  CXR: Large bilateral pleural effusions layering posteriorly. Cannot exclude   underlying infiltrate or atelectasis.  CT chest, abd/pelvis: Moderate B/L hydronephrosis, moderate B/L pleural effusions, mild ascites  RLE doppler: No evidence of RLE DVT  UA: Proteinuria, trace ketones, small leukocyte esterase, large blood, few bacteria  Given: NaCl bolus x1, PO Dilaudid, IV Dilaudid (29 Jul 2022 23:08)    ---  HOSPITAL COURSE:  89yo F with PMH DM, dyslipidemia, gastroparesis, CAD s/p 2 stents, hx of vertebral fx with repair presents to the ED with back and R leg pain, as well as recent decreased urinary frequency, admitted for KERRY/CKD 3  and dehydration. CT showed bilateral hydro. Pt was seen by urology and placed moss cath with plan to monitor labs and symptoms. Overnight patient pulled cath out twice 2/2 sundowning and agitation. After 2nd pull, decision was made to do bladder scans and monitor urine output without moss in place. The following morning urology took patient to OR for placement of bilateral stents on 7/31.  Pt sided thoracentesis done on 8/1. Fluid studies showed _______.       ---  CONSULTANTS:   Urology - Dr. Gr  Nephrology- Dr. Sanchez  Pulmonology - Dr. Melo     ---  TIME SPENT:  The total amount of time spent reviewing the hospital notes, laboratory values, imaging findings, assessing/counseling the patient, discussing with consultant physicians, social work, nursing staff was -- minutes    ---  Primary care provider was made aware of plan for discharge:      [  ] NO     [  ] YES   FROM ADMISSION H+P:   HPI:  Additional history obtained from daughter (Mary) via telephone conversation.    89yo F with PMH DM, dyslipidemia, gastroparesis, CAD s/p 2 stents, hx of vertebral fx with repair presents to the ED with back and R leg pain, as well as recent decreased urinary frequency. Patient was previously at [facility] 7/5/22-7/15/22 for COVID+, cholelithiasis, KERRY, and UTI/hematuria. Patient lives at home by herself with home health aide and daughter who take turns caring for her. Daughter commented that patient has worsening weakness and PO intake, increased irritability, and recent difficulty with urination. Daughter states that during her previous admission, patient would report urinary frequency that occurred every 20-30 minutes; Nephrology had seen her previously and prescribed Lasix q48h. Patient says that sometimes "she forgets to drink", and daughter states that COVID infection has changed her sense of taste as well as overall appetite. Daughter also concerned about ecchymosis in the R hip and knee region which had occurred during the last hospital stay; however patient denies any recent fall or injury.     in ED:  Vital Signs: T 97.9F, HR 83, 121/63, RR 18, 98% on RA  Labs: Hgb 10.3, Na 133, BUN 88, Cr 3.90, Gluc 159, Alk Phos 227, Mg 3.2  COVID+  CXR: Large bilateral pleural effusions layering posteriorly. Cannot exclude   underlying infiltrate or atelectasis.  CT chest, abd/pelvis: Moderate B/L hydronephrosis, moderate B/L pleural effusions, mild ascites  RLE doppler: No evidence of RLE DVT  UA: Proteinuria, trace ketones, small leukocyte esterase, large blood, few bacteria  Given: NaCl bolus x1, PO Dilaudid, IV Dilaudid (29 Jul 2022 23:08)    ---  HOSPITAL COURSE:  89yo F with PMH DM, dyslipidemia, gastroparesis, CAD s/p 2 stents, hx of vertebral fx with repair presents to the ED with back and R leg pain, as well as recent decreased urinary frequency, admitted for KERRY/CKD 3  and dehydration. CT showed bilateral hydro. Pt was seen by urology and placed moss cath with plan to monitor labs and symptoms. Overnight patient pulled cath out twice 2/2 sundowning and agitation. After 2nd pull, decision was made to do bladder scans and monitor urine output without moss in place. The following morning urology took patient to OR for placement of bilateral stents on 7/31.  Pt tolerated procedure well. Urology re-inserted moss during procedure. Urine grossly bloody. Pt sided thoracentesis done on 8/1. Fluid studies showed _______.       ---  CONSULTANTS:   Urology - Dr. Gr  Nephrology- Dr. Sanchez  Pulmonology - Dr. Melo     ---  TIME SPENT:  The total amount of time spent reviewing the hospital notes, laboratory values, imaging findings, assessing/counseling the patient, discussing with consultant physicians, social work, nursing staff was -- minutes    ---  Primary care provider was made aware of plan for discharge:      [  ] NO     [  ] YES   FROM ADMISSION H+P:   HPI:  Additional history obtained from daughter (Mary) via telephone conversation.    89yo F with PMH DM, dyslipidemia, gastroparesis, CAD s/p 2 stents, hx of vertebral fx with repair presents to the ED with back and R leg pain, as well as recent decreased urinary frequency. Patient was previously at [facility] 7/5/22-7/15/22 for COVID+, cholelithiasis, KERRY, and UTI/hematuria. Patient lives at home by herself with home health aide and daughter who take turns caring for her. Daughter commented that patient has worsening weakness and PO intake, increased irritability, and recent difficulty with urination. Daughter states that during her previous admission, patient would report urinary frequency that occurred every 20-30 minutes; Nephrology had seen her previously and prescribed Lasix q48h. Patient says that sometimes "she forgets to drink", and daughter states that COVID infection has changed her sense of taste as well as overall appetite. Daughter also concerned about ecchymosis in the R hip and knee region which had occurred during the last hospital stay; however patient denies any recent fall or injury.     in ED:  Vital Signs: T 97.9F, HR 83, 121/63, RR 18, 98% on RA  Labs: Hgb 10.3, Na 133, BUN 88, Cr 3.90, Gluc 159, Alk Phos 227, Mg 3.2  COVID+  CXR: Large bilateral pleural effusions layering posteriorly. Cannot exclude   underlying infiltrate or atelectasis.  CT chest, abd/pelvis: Moderate B/L hydronephrosis, moderate B/L pleural effusions, mild ascites  RLE doppler: No evidence of RLE DVT  UA: Proteinuria, trace ketones, small leukocyte esterase, large blood, few bacteria  Given: NaCl bolus x1, PO Dilaudid, IV Dilaudid (29 Jul 2022 23:08)    ---  HOSPITAL COURSE:  89yo F with PMH DM, dyslipidemia, gastroparesis, CAD s/p 2 stents, hx of vertebral fx with repair presents to the ED with back and R leg pain, as well as recent decreased urinary frequency, admitted for KERRY/CKD 3  and dehydration. CT showed bilateral hydro. Pt was seen by urology and placed moss cath with plan to monitor labs and symptoms. Overnight patient pulled cath out twice 2/2 sundowning and agitation. After 2nd pull, decision was made to do bladder scans and monitor urine output without moss in place. The following morning urology took patient to OR for placement of bilateral stents on 7/31.  Pt tolerated procedure well. Urology re-inserted moss during procedure. Urine grossly bloody. During hospital course, pt and family met with palliative care and pt was deemed comfort care. Underwent thoracentesis 8/3 to aid with work of breathing.       ---  CONSULTANTS:   Urology - Dr. Gr  Nephrology- Dr. Sanchez  Pulmonology - Dr. Melo     ---  TIME SPENT:  The total amount of time spent reviewing the hospital notes, laboratory values, imaging findings, assessing/counseling the patient, discussing with consultant physicians, social work, nursing staff was -- minutes    ---  Primary care provider was made aware of plan for discharge:      [  ] NO     [  ] YES   FROM ADMISSION H+P:   HPI:  Additional history obtained from daughter (Mary) via telephone conversation.    89yo F with PMH DM, dyslipidemia, gastroparesis, CAD s/p 2 stents, hx of vertebral fx with repair presents to the ED with back and R leg pain, as well as recent decreased urinary frequency. Patient was previously at [facility] 7/5/22-7/15/22 for COVID+, cholelithiasis, KERRY, and UTI/hematuria. Patient lives at home by herself with home health aide and daughter who take turns caring for her. Daughter commented that patient has worsening weakness and PO intake, increased irritability, and recent difficulty with urination. Daughter states that during her previous admission, patient would report urinary frequency that occurred every 20-30 minutes; Nephrology had seen her previously and prescribed Lasix q48h. Patient says that sometimes "she forgets to drink", and daughter states that COVID infection has changed her sense of taste as well as overall appetite. Daughter also concerned about ecchymosis in the R hip and knee region which had occurred during the last hospital stay; however patient denies any recent fall or injury.     in ED:  Vital Signs: T 97.9F, HR 83, 121/63, RR 18, 98% on RA  Labs: Hgb 10.3, Na 133, BUN 88, Cr 3.90, Gluc 159, Alk Phos 227, Mg 3.2  COVID+  CXR: Large bilateral pleural effusions layering posteriorly. Cannot exclude   underlying infiltrate or atelectasis.  CT chest, abd/pelvis: Moderate B/L hydronephrosis, moderate B/L pleural effusions, mild ascites  RLE doppler: No evidence of RLE DVT  UA: Proteinuria, trace ketones, small leukocyte esterase, large blood, few bacteria  Given: NaCl bolus x1, PO Dilaudid, IV Dilaudid (29 Jul 2022 23:08)    ---  HOSPITAL COURSE:  89yo F with PMH DM, dyslipidemia, gastroparesis, CAD s/p 2 stents, hx of vertebral fx with repair presents to the ED with back and R leg pain, as well as recent decreased urinary frequency, admitted for KERRY/CKD 3  and dehydration. CT showed bilateral hydro. Pt was seen by urology and placed moss cath with plan to monitor labs and symptoms. Overnight patient pulled cath out twice 2/2 sundowning and agitation. After 2nd pull, decision was made to do bladder scans and monitor urine output without moss in place. The following morning urology took patient to OR for placement of bilateral stents on 7/31.  Pt tolerated procedure well. Urology re-inserted moss during procedure. Urine grossly bloody. During hospital course, pt and family met with palliative care and pt was deemed comfort care. Underwent thoracentesis 8/3 to aid with work of breathing. Pt seen and examined at discharge. Pt medically optimized for discharge. Patient has no complaints.       ---  CONSULTANTS:   Urology - Dr. Gr  Nephrology- Dr. Sanchez  Pulmonology - Dr. Melo     ---  TIME SPENT:  The total amount of time spent reviewing the hospital notes, laboratory values, imaging findings, assessing/counseling the patient, discussing with consultant physicians, social work, nursing staff was -- minutes    ---  Primary care provider was made aware of plan for discharge:      [  ] NO     [  ] YES

## 2022-07-31 NOTE — PROGRESS NOTE ADULT - ASSESSMENT
89yo F with PMH DM, dyslipidemia, gastroparesis, CAD s/p 2 stents, hx of vertebral fx with repair presents to the ED with back and R leg pain, as well as recent decreased urinary frequency, admitted for KERRY and dehydration.

## 2022-07-31 NOTE — PROGRESS NOTE ADULT - SUBJECTIVE AND OBJECTIVE BOX
Patient is a 89y old  Female who presents with a chief complaint of back and R leg pain, inability to urinate (2022 08:35)       HPI:  Additional history obtained from daughter (Mary) via telephone conversation.    89yo F with PMH DM, dyslipidemia, gastroparesis, CAD s/p 2 stents, hx of vertebral fx with repair presents to the ED with back and R leg pain, as well as recent decreased urinary frequency. Patient was previously at [facility] 22-7/15/22 for COVID+, cholelithiasis, KERRY, and UTI/hematuria. Patient lives at home by herself with home health aide and daughter who take turns caring for her. Daughter commented that patient has worsening weakness and PO intake, increased irritability, and recent difficulty with urination. Daughter states that during her previous admission, patient would report urinary frequency that occurred every 20-30 minutes; Nephrology had seen her previously and prescribed Lasix q48h. Patient says that sometimes "she forgets to drink", and daughter states that COVID infection has changed her sense of taste as well as overall appetite. Daughter also concerned about ecchymosis in the R hip and knee region which had occurred during the last hospital stay; however patient denies any recent fall or injury.   Patient with decreased PO intake and N/V.  Has had trouble urinating and is having hematuria.  No dysgeusia or asterixis present. Known to use from prior.    PAST MEDICAL & SURGICAL HISTORY:  H/O vertebral fracture repair      History of vertebral fracture      Dyslipidemia      DM type 2 with diabetic dyslipidemia      H/O gastroesophageal reflux (GERD)      Costochondritis      Coronary arteriosclerosis in native artery  s/p 2 stents. last placed 2 years ago      Gastroparesis      History of laminectomy      S/P hip hemiarthroplasty      S/P appendectomy           FAMILY HISTORY:  FHx: stroke (Mother)    NC    Social History:Non smoker    MEDICATIONS  (STANDING):  amitriptyline 10 milliGRAM(s) Oral at bedtime  aspirin enteric coated 81 milliGRAM(s) Oral daily  atorvastatin 40 milliGRAM(s) Oral at bedtime  dextrose 5%. 1000 milliLiter(s) (50 mL/Hr) IV Continuous <Continuous>  dextrose 5%. 1000 milliLiter(s) (100 mL/Hr) IV Continuous <Continuous>  dextrose 50% Injectable 25 Gram(s) IV Push once  dextrose 50% Injectable 12.5 Gram(s) IV Push once  dextrose 50% Injectable 25 Gram(s) IV Push once  ergocalciferol 91880 Unit(s) Oral <User Schedule>  ferrous    sulfate 325 milliGRAM(s) Oral daily  glucagon  Injectable 1 milliGRAM(s) IntraMuscular once  heparin   Injectable 5000 Unit(s) SubCutaneous every 8 hours  HYDROmorphone   Tablet 4 milliGRAM(s) Oral three times a day  insulin lispro (ADMELOG) corrective regimen sliding scale   SubCutaneous three times a day before meals  insulin lispro (ADMELOG) corrective regimen sliding scale   SubCutaneous at bedtime  metoprolol succinate ER 25 milliGRAM(s) Oral daily  pantoprazole    Tablet 40 milliGRAM(s) Oral two times a day  senna 2 Tablet(s) Oral at bedtime  sertraline 25 milliGRAM(s) Oral two times a day  sodium chloride 0.9%. 1000 milliLiter(s) (70 mL/Hr) IV Continuous <Continuous>  sodium zirconium cyclosilicate 10 Gram(s) Oral two times a day    MEDICATIONS  (PRN):  dextrose Oral Gel 15 Gram(s) Oral once PRN Blood Glucose LESS THAN 70 milliGRAM(s)/deciliter  melatonin 3 milliGRAM(s) Oral at bedtime PRN Insomnia  metoclopramide 5 milliGRAM(s) Oral two times a day PRN Nausea/vomiting  ondansetron    Tablet 8 milliGRAM(s) Oral three times a day PRN Nausea and/or Vomiting  polyethylene glycol 3350 17 Gram(s) Oral daily PRN for constipation   Meds reviewed    Allergies    morphine (Other)    Intolerances         REVIEW OF SYSTEMS:    Limited due to mental status      ICU Vital Signs Last 24 Hrs  T(C): 36.9 (2022 04:38), Max: 36.9 (2022 04:38)  T(F): 98.4 (2022 04:38), Max: 98.4 (2022 04:38)  HR: 92 (2022 04:38) (92 - 96)  BP: 119/64 (2022 04:38) (119/64 - 128/66)  BP(mean): --  ABP: --  ABP(mean): --  RR: 18 (2022 04:38) (18 - 18)  SpO2: 91% (2022 04:38) (90% - 91%)    O2 Parameters below as of 2022 04:38  Patient On (Oxygen Delivery Method): room air                    PHYSICAL EXAM:    GENERAL: NAD, frail appearing  HEAD:  Atraumatic, Normocephalic  EYES: EOMI, conjunctiva and sclera clear                        10.5   9.77  )-----------( 349      ( 2022 06:40 )             33.9     07-31    136  |  99  |  96<H>  ----------------------------<  188<H>  5.3   |  26  |  5.10<H>    Ca    9.3      2022 06:40  Phos  5.2     07-  Mg     3.0     -    TPro  6.8  /  Alb  2.8<L>  /  TBili  0.5  /  DBili  x   /  AST  27  /  ALT  17  /  AlkPhos  270<H>  07-31      Urinalysis Basic - ( 2022 23:07 )    Color: Yellow / Appearance: Slightly Turbid / S.010 / pH: x  Gluc: x / Ketone: Trace  / Bili: Negative / Urobili: Negative   Blood: x / Protein: 100 / Nitrite: Negative   Leuk Esterase: Small / RBC: >50 /HPF / WBC 6-10   Sq Epi: x / Non Sq Epi: Occasional / Bacteria: Few        ENMT: No Drainage from nares, No drainage from ears  NECK: Supple, neck  veins full  NERVOUS SYSTEM:  Awake, confused  CHEST/LUNG: Clear to percussion bilaterally; No rales, rhonchi, wheezing, or rubs  HEART: Regular rate and rhythm; No murmurs, rubs, or gallops  ABDOMEN: Soft, Nontender, mildly distended; Bowel sounds present  EXTREMITIES:  No Edema  SKIN: No rashes No obvious ecchymosis      LABS:

## 2022-07-31 NOTE — DIETITIAN INITIAL EVALUATION ADULT - NSICDXPASTMEDICALHX_GEN_ALL_CORE_FT
PAST MEDICAL HISTORY:  Anemia of chronic disease     Coronary arteriosclerosis in native artery s/p 2 stents. last placed 2 years ago    Costochondritis     DM type 2 with diabetic dyslipidemia     Dyslipidemia     Esophageal dysmotility     Gastroparesis     H/O gastroesophageal reflux (GERD)     H/O vertebral fracture repair     Hematuria     History of vertebral fracture     Hydronephrosis

## 2022-07-31 NOTE — CHART NOTE - NSCHARTNOTEFT_GEN_A_CORE
Called by RN. Patient confused, pulled out moss. Patient pulled out moss cath earlier tonight as well. Patient seen and examined at bedside. Patient is AAOx1 (person). Blood visualized in moss line, likely traumatic. Bladder scan showing <8 cc urine. We will leave moss out for now. Check repeat bladder scan at 7am. Called by RN. Patient confused, pulled out moss earlier and was replaced. Patient now again pulled out moss second time. Patient seen and examined at bedside. Patient is AAOx1 (person). Blood visualized in moss line, likely traumatic. Bladder scan showing <8 cc urine. We will leave moss out for now. Check repeat bladder scan at 7am.

## 2022-07-31 NOTE — DIETITIAN INITIAL EVALUATION ADULT - SIGNS/SYMPTOMS
as evidenced by 5.2% wt loss x 1 month, mod-severe muscle depletion and fat loss. as evidenced by stage II pressure ulcer to coccyx.

## 2022-07-31 NOTE — DIETITIAN INITIAL EVALUATION ADULT - ETIOLOGY
related to decreased ability to consume sufficient energy related to increased demand for nutrient (kcal/protein)

## 2022-07-31 NOTE — DIETITIAN INITIAL EVALUATION ADULT - OTHER INFO
Patient is a 89 yo F with PMHx of DM, dyslipidemia, gastroparesis, CAD s/p 2 stents, hx of vertebral fx with repair presents to the ED with back and R leg pain, as well as recent decreased urinary frequency. Patient was previously at [facility] 7/5/22-7/15/22 for COVID+, cholelithiasis, KERRY, and UTI/hematuria. Patient lives at home by herself with home health aide and daughter who take turns caring for her. Daughter commented that patient has worsening weakness and PO intake, increased irritability, and recent difficulty with urination.  Visited pt at bedside this am. Pt confused during visit, unable to provide subjective hx. Pt with poor po intake; consumed 2 mouthfuls of yogurt and a sip of nepro supplement this am. Pt needs total assistance with feeding. CBW on admission 110#. Dtr reports wt of 116# on 7/4. This indicates 6#/5.2% wt loss x ~1 month. 2+ R foot/leg, 1+ L foot/leg edema per chart/ Skin: stage II to coccyx. Pt presently on puree consistency diet, no conc K, nepro TID. Recommend continued assistance/encouragement with meals and oral nutritional supplements.

## 2022-07-31 NOTE — PROGRESS NOTE ADULT - PROBLEM SELECTOR PLAN 1
Craetinine has worsened, but pt has not had moss indwelling Creatinine has worsened, but pt has not had moss indwelling. Given the rate of renal fntn deterioration, suggest bilateral stent placement. d/w daughter at length, who agrees.

## 2022-07-31 NOTE — PROVIDER CONTACT NOTE (OTHER) - SITUATION
Patient pulled out moss twice last night and patient has had small bloody stools and blood around urethra noted. patient on heparin and ASA. patient BUN and Cr very high. minimal urine output noted.
Pt has Bah catheter for retention. Pt pulled out her Bah catheter.
Pt pulled out Bah catheter again. Dr. Chang made aware.

## 2022-07-31 NOTE — PROGRESS NOTE ADULT - SUBJECTIVE AND OBJECTIVE BOX
INTERVAL HPI/OVERNIGHT EVENTS: afeb, pt pulled out moss x 2 last night, creatinine is pending    MEDICATIONS  (STANDING):  amitriptyline 10 milliGRAM(s) Oral at bedtime  aspirin enteric coated 81 milliGRAM(s) Oral daily  atorvastatin 40 milliGRAM(s) Oral at bedtime  bisacodyl Suppository 10 milliGRAM(s) Rectal daily  buprenorphine Patch 5 MICROgram(s)/Hr 1 Patch Transdermal every 7 days  dextrose 5%. 1000 milliLiter(s) (50 mL/Hr) IV Continuous <Continuous>  dextrose 5%. 1000 milliLiter(s) (100 mL/Hr) IV Continuous <Continuous>  dextrose 50% Injectable 25 Gram(s) IV Push once  dextrose 50% Injectable 12.5 Gram(s) IV Push once  dextrose 50% Injectable 25 Gram(s) IV Push once  ergocalciferol 00498 Unit(s) Oral <User Schedule>  ferrous    sulfate 325 milliGRAM(s) Oral daily  glucagon  Injectable 1 milliGRAM(s) IntraMuscular once  heparin   Injectable 5000 Unit(s) SubCutaneous every 8 hours  HYDROmorphone   Tablet 4 milliGRAM(s) Oral three times a day  insulin lispro (ADMELOG) corrective regimen sliding scale   SubCutaneous three times a day before meals  insulin lispro (ADMELOG) corrective regimen sliding scale   SubCutaneous at bedtime  metoprolol succinate ER 25 milliGRAM(s) Oral daily  pantoprazole    Tablet 40 milliGRAM(s) Oral two times a day  polyethylene glycol 3350 17 Gram(s) Oral two times a day  senna 2 Tablet(s) Oral at bedtime  sertraline 25 milliGRAM(s) Oral two times a day  sodium chloride 0.9%. 1000 milliLiter(s) (70 mL/Hr) IV Continuous <Continuous>    MEDICATIONS  (PRN):  dextrose Oral Gel 15 Gram(s) Oral once PRN Blood Glucose LESS THAN 70 milliGRAM(s)/deciliter  melatonin 3 milliGRAM(s) Oral at bedtime PRN Insomnia  metoclopramide 5 milliGRAM(s) Oral two times a day PRN Nausea/vomiting  ondansetron    Tablet 8 milliGRAM(s) Oral three times a day PRN Nausea and/or Vomiting        Vital Signs Last 24 Hrs  T(C): 36.9 (2022 04:38), Max: 36.9 (2022 04:38)  T(F): 98.4 (2022 04:38), Max: 98.4 (2022 04:38)  HR: 92 (2022 04:38) (72 - 96)  BP: 119/64 (2022 04:38) (119/64 - 128/66)  BP(mean): --  RR: 18 (2022 04:38) (18 - 18)  SpO2: 91% (2022 04:38) (90% - 91%)    Parameters below as of 2022 04:38  Patient On (Oxygen Delivery Method): room air        PHYSICAL EXAM:    ABDOMEN: soft, NT, no cvat      LABS:                        10.5   9.77  )-----------( 349      ( 2022 06:40 )             33.9     07-31    136  |  99  |  96<H>  ----------------------------<  188<H>  5.3   |  26  |  5.10<H>    Ca    9.3      2022 06:40  Phos  5.2     07-31  Mg     3.0     07-31    TPro  6.8  /  Alb  2.8<L>  /  TBili  0.5  /  DBili  x   /  AST  27  /  ALT  17  /  AlkPhos  270<H>  07-31      Urinalysis Basic - ( 2022 23:07 )    Color: Yellow / Appearance: Slightly Turbid / S.010 / pH: x  Gluc: x / Ketone: Trace  / Bili: Negative / Urobili: Negative   Blood: x / Protein: 100 / Nitrite: Negative   Leuk Esterase: Small / RBC: >50 /HPF / WBC 6-10   Sq Epi: x / Non Sq Epi: Occasional / Bacteria: Few          RADIOLOGY & ADDITIONAL TESTS:

## 2022-07-31 NOTE — PROGRESS NOTE ADULT - PROBLEM SELECTOR PLAN 5
Patient presented with back and R leg pain, daughter also commented on more noticeable generalized weakness  - PT consult for weakness, deconditioning  - Palliative consult for GOC discussion  -  consult for dispo, d/c Gluc 159 on admission  - Insulin Corrective Scale  - Finger sticks per routine  - Hypoglycemia protocol

## 2022-07-31 NOTE — DIETITIAN INITIAL EVALUATION ADULT - PERTINENT LABORATORY DATA
07-31    136  |  99  |  96<H>  ----------------------------<  188<H>  5.3   |  26  |  5.10<H>    Ca    9.3      31 Jul 2022 06:40  Phos  5.2     07-31  Mg     3.0     07-31    TPro  6.8  /  Alb  2.8<L>  /  TBili  0.5  /  DBili  x   /  AST  27  /  ALT  17  /  AlkPhos  270<H>  07-31  POCT Blood Glucose.: 131 mg/dL (07-30-22 @ 22:24)  A1C with Estimated Average Glucose Result: 6.9 % (07-06-22 @ 07:14)  A1C with Estimated Average Glucose Result: 6.0 % (02-26-22 @ 11:06)

## 2022-07-31 NOTE — PROGRESS NOTE ADULT - ASSESSMENT
KERRY on CKD 4  Hyperkalemia  Hematuria  N/V  COVID 19+  B/L Mod Hydronephrosis    -BLCr 1.8  -KERRY possibly from obstruction  -Check Urine lytes  -UA >50 RBCs  -Place moss  -Give 2 doses loklema  -Low K diet  -Urology consult-Dr. Hill, to take for Ureteral stent placement   -Family will discuss about dialysis, no emergent indication but if continues on this trend will be necessary  -Patient pulled out moss

## 2022-08-01 LAB
ALBUMIN SERPL ELPH-MCNC: 2.6 G/DL — LOW (ref 3.3–5)
ALP SERPL-CCNC: 214 U/L — HIGH (ref 40–120)
ALT FLD-CCNC: 11 U/L — LOW (ref 12–78)
ANION GAP SERPL CALC-SCNC: 11 MMOL/L — SIGNIFICANT CHANGE UP (ref 5–17)
ANION GAP SERPL CALC-SCNC: 14 MMOL/L — SIGNIFICANT CHANGE UP (ref 5–17)
AST SERPL-CCNC: 21 U/L — SIGNIFICANT CHANGE UP (ref 15–37)
BASOPHILS # BLD AUTO: 0.03 K/UL — SIGNIFICANT CHANGE UP (ref 0–0.2)
BASOPHILS NFR BLD AUTO: 0.3 % — SIGNIFICANT CHANGE UP (ref 0–2)
BILIRUB SERPL-MCNC: 0.4 MG/DL — SIGNIFICANT CHANGE UP (ref 0.2–1.2)
BUN SERPL-MCNC: 94 MG/DL — HIGH (ref 7–23)
BUN SERPL-MCNC: 95 MG/DL — HIGH (ref 7–23)
CALCIUM SERPL-MCNC: 8.9 MG/DL — SIGNIFICANT CHANGE UP (ref 8.5–10.1)
CALCIUM SERPL-MCNC: 9 MG/DL — SIGNIFICANT CHANGE UP (ref 8.5–10.1)
CHLORIDE SERPL-SCNC: 99 MMOL/L — SIGNIFICANT CHANGE UP (ref 96–108)
CHLORIDE SERPL-SCNC: 99 MMOL/L — SIGNIFICANT CHANGE UP (ref 96–108)
CO2 SERPL-SCNC: 23 MMOL/L — SIGNIFICANT CHANGE UP (ref 22–31)
CO2 SERPL-SCNC: 26 MMOL/L — SIGNIFICANT CHANGE UP (ref 22–31)
CREAT SERPL-MCNC: 5.2 MG/DL — HIGH (ref 0.5–1.3)
CREAT SERPL-MCNC: 5.3 MG/DL — HIGH (ref 0.5–1.3)
EGFR: 7 ML/MIN/1.73M2 — LOW
EGFR: 7 ML/MIN/1.73M2 — LOW
EOSINOPHIL # BLD AUTO: 0.03 K/UL — SIGNIFICANT CHANGE UP (ref 0–0.5)
EOSINOPHIL NFR BLD AUTO: 0.3 % — SIGNIFICANT CHANGE UP (ref 0–6)
GLUCOSE SERPL-MCNC: 139 MG/DL — HIGH (ref 70–99)
GLUCOSE SERPL-MCNC: 154 MG/DL — HIGH (ref 70–99)
HCT VFR BLD CALC: 31.9 % — LOW (ref 34.5–45)
HGB BLD-MCNC: 9.8 G/DL — LOW (ref 11.5–15.5)
IMM GRANULOCYTES NFR BLD AUTO: 0.3 % — SIGNIFICANT CHANGE UP (ref 0–1.5)
LYMPHOCYTES # BLD AUTO: 0.26 K/UL — LOW (ref 1–3.3)
LYMPHOCYTES # BLD AUTO: 2.4 % — LOW (ref 13–44)
MCHC RBC-ENTMCNC: 29.1 PG — SIGNIFICANT CHANGE UP (ref 27–34)
MCHC RBC-ENTMCNC: 30.7 GM/DL — LOW (ref 32–36)
MCV RBC AUTO: 94.7 FL — SIGNIFICANT CHANGE UP (ref 80–100)
MONOCYTES # BLD AUTO: 0.85 K/UL — SIGNIFICANT CHANGE UP (ref 0–0.9)
MONOCYTES NFR BLD AUTO: 7.9 % — SIGNIFICANT CHANGE UP (ref 2–14)
NEUTROPHILS # BLD AUTO: 9.57 K/UL — HIGH (ref 1.8–7.4)
NEUTROPHILS NFR BLD AUTO: 88.8 % — HIGH (ref 43–77)
NRBC # BLD: 0 /100 WBCS — SIGNIFICANT CHANGE UP (ref 0–0)
PLATELET # BLD AUTO: 302 K/UL — SIGNIFICANT CHANGE UP (ref 150–400)
POTASSIUM SERPL-MCNC: 4.8 MMOL/L — SIGNIFICANT CHANGE UP (ref 3.5–5.3)
POTASSIUM SERPL-MCNC: 5.5 MMOL/L — HIGH (ref 3.5–5.3)
POTASSIUM SERPL-SCNC: 4.8 MMOL/L — SIGNIFICANT CHANGE UP (ref 3.5–5.3)
POTASSIUM SERPL-SCNC: 5.5 MMOL/L — HIGH (ref 3.5–5.3)
PROT SERPL-MCNC: 6.2 G/DL — SIGNIFICANT CHANGE UP (ref 6–8.3)
RBC # BLD: 3.37 M/UL — LOW (ref 3.8–5.2)
RBC # FLD: 19.3 % — HIGH (ref 10.3–14.5)
SODIUM SERPL-SCNC: 136 MMOL/L — SIGNIFICANT CHANGE UP (ref 135–145)
SODIUM SERPL-SCNC: 136 MMOL/L — SIGNIFICANT CHANGE UP (ref 135–145)
WBC # BLD: 10.77 K/UL — HIGH (ref 3.8–10.5)
WBC # FLD AUTO: 10.77 K/UL — HIGH (ref 3.8–10.5)

## 2022-08-01 RX ORDER — SODIUM ZIRCONIUM CYCLOSILICATE 10 G/10G
10 POWDER, FOR SUSPENSION ORAL ONCE
Refills: 0 | Status: COMPLETED | OUTPATIENT
Start: 2022-08-01 | End: 2022-08-01

## 2022-08-01 RX ORDER — ACETAMINOPHEN 500 MG
650 TABLET ORAL EVERY 6 HOURS
Refills: 0 | Status: DISCONTINUED | OUTPATIENT
Start: 2022-08-01 | End: 2022-08-03

## 2022-08-01 RX ORDER — HYDROMORPHONE HYDROCHLORIDE 2 MG/ML
0.5 INJECTION INTRAMUSCULAR; INTRAVENOUS; SUBCUTANEOUS ONCE
Refills: 0 | Status: DISCONTINUED | OUTPATIENT
Start: 2022-08-01 | End: 2022-08-01

## 2022-08-01 RX ORDER — BUPRENORPHINE 10 UG/H
1 PATCH, EXTENDED RELEASE TRANSDERMAL
Refills: 0 | Status: DISCONTINUED | OUTPATIENT
Start: 2022-08-02 | End: 2022-08-02

## 2022-08-01 RX ORDER — SODIUM ZIRCONIUM CYCLOSILICATE 10 G/10G
10 POWDER, FOR SUSPENSION ORAL ONCE
Refills: 0 | Status: DISCONTINUED | OUTPATIENT
Start: 2022-08-01 | End: 2022-08-01

## 2022-08-01 RX ORDER — BUPRENORPHINE 10 UG/H
0 PATCH, EXTENDED RELEASE TRANSDERMAL
Qty: 0 | Refills: 0 | DISCHARGE

## 2022-08-01 RX ORDER — HEPARIN SODIUM 5000 [USP'U]/ML
5000 INJECTION INTRAVENOUS; SUBCUTANEOUS EVERY 12 HOURS
Refills: 0 | Status: DISCONTINUED | OUTPATIENT
Start: 2022-08-01 | End: 2022-08-02

## 2022-08-01 RX ORDER — METOPROLOL TARTRATE 50 MG
1 TABLET ORAL
Qty: 0 | Refills: 0 | DISCHARGE

## 2022-08-01 RX ORDER — ACETAMINOPHEN 500 MG
650 TABLET ORAL EVERY 6 HOURS
Refills: 0 | Status: DISCONTINUED | OUTPATIENT
Start: 2022-08-01 | End: 2022-08-01

## 2022-08-01 RX ADMIN — HYDROMORPHONE HYDROCHLORIDE 0.5 MILLIGRAM(S): 2 INJECTION INTRAMUSCULAR; INTRAVENOUS; SUBCUTANEOUS at 03:31

## 2022-08-01 RX ADMIN — HYDROMORPHONE HYDROCHLORIDE 4 MILLIGRAM(S): 2 INJECTION INTRAMUSCULAR; INTRAVENOUS; SUBCUTANEOUS at 14:39

## 2022-08-01 RX ADMIN — HYDROMORPHONE HYDROCHLORIDE 4 MILLIGRAM(S): 2 INJECTION INTRAMUSCULAR; INTRAVENOUS; SUBCUTANEOUS at 14:09

## 2022-08-01 RX ADMIN — HYDROMORPHONE HYDROCHLORIDE 0.5 MILLIGRAM(S): 2 INJECTION INTRAMUSCULAR; INTRAVENOUS; SUBCUTANEOUS at 02:35

## 2022-08-01 RX ADMIN — ATORVASTATIN CALCIUM 40 MILLIGRAM(S): 80 TABLET, FILM COATED ORAL at 22:21

## 2022-08-01 RX ADMIN — Medication 325 MILLIGRAM(S): at 11:48

## 2022-08-01 RX ADMIN — HYDROMORPHONE HYDROCHLORIDE 4 MILLIGRAM(S): 2 INJECTION INTRAMUSCULAR; INTRAVENOUS; SUBCUTANEOUS at 06:17

## 2022-08-01 RX ADMIN — HYDROMORPHONE HYDROCHLORIDE 4 MILLIGRAM(S): 2 INJECTION INTRAMUSCULAR; INTRAVENOUS; SUBCUTANEOUS at 23:15

## 2022-08-01 RX ADMIN — Medication 25 MILLIGRAM(S): at 05:28

## 2022-08-01 RX ADMIN — HYDROMORPHONE HYDROCHLORIDE 4 MILLIGRAM(S): 2 INJECTION INTRAMUSCULAR; INTRAVENOUS; SUBCUTANEOUS at 22:22

## 2022-08-01 RX ADMIN — PANTOPRAZOLE SODIUM 40 MILLIGRAM(S): 20 TABLET, DELAYED RELEASE ORAL at 05:27

## 2022-08-01 RX ADMIN — Medication 10 MILLIGRAM(S): at 11:48

## 2022-08-01 RX ADMIN — Medication 81 MILLIGRAM(S): at 11:48

## 2022-08-01 RX ADMIN — ONDANSETRON 8 MILLIGRAM(S): 8 TABLET, FILM COATED ORAL at 15:36

## 2022-08-01 RX ADMIN — HEPARIN SODIUM 5000 UNIT(S): 5000 INJECTION INTRAVENOUS; SUBCUTANEOUS at 19:46

## 2022-08-01 RX ADMIN — SODIUM ZIRCONIUM CYCLOSILICATE 10 GRAM(S): 10 POWDER, FOR SUSPENSION ORAL at 09:04

## 2022-08-01 RX ADMIN — Medication 10 MILLIGRAM(S): at 22:21

## 2022-08-01 RX ADMIN — PANTOPRAZOLE SODIUM 40 MILLIGRAM(S): 20 TABLET, DELAYED RELEASE ORAL at 17:08

## 2022-08-01 RX ADMIN — Medication 1: at 07:59

## 2022-08-01 RX ADMIN — Medication 100 MILLIGRAM(S): at 17:08

## 2022-08-01 RX ADMIN — HYDROMORPHONE HYDROCHLORIDE 4 MILLIGRAM(S): 2 INJECTION INTRAMUSCULAR; INTRAVENOUS; SUBCUTANEOUS at 05:28

## 2022-08-01 NOTE — PROGRESS NOTE ADULT - PROBLEM SELECTOR PLAN 6
Hx of vertebral fracture with repair, patient presents with back and R leg pain. Takes PO dilaudid at home  - Continue- Patch -Buprenorphine 5 mg q weekly -Placed Friday   - Fall risk protocol COVID-19 detected in PCR on this admission. Likely viral shedding from prior COVID infection, last detected on 7/5  - Isolation precautions as per hospital protocol  - Monitor SpO2 >90%, provide oxygen support if patient desaturates

## 2022-08-01 NOTE — PROGRESS NOTE ADULT - SUBJECTIVE AND OBJECTIVE BOX
Date/Time Patient Seen:  		  Referring MD:   Data Reviewed	       Patient is a 89y old  Female who presents with a chief complaint of back and R leg pain, inability to urinate (31 Jul 2022 14:37)      Subjective/HPI     PAST MEDICAL & SURGICAL HISTORY:  H/O vertebral fracture repair    History of vertebral fracture    Dyslipidemia    DM type 2 with diabetic dyslipidemia    H/O gastroesophageal reflux (GERD)    Costochondritis    Coronary arteriosclerosis in native artery  s/p 2 stents. last placed 2 years ago    Gastroparesis    Hematuria    Hydronephrosis    Esophageal dysmotility    Anemia of chronic disease    History of laminectomy    S/P hip hemiarthroplasty    S/P appendectomy          Medication list         MEDICATIONS  (STANDING):  amitriptyline 10 milliGRAM(s) Oral at bedtime  aspirin enteric coated 81 milliGRAM(s) Oral daily  atorvastatin 40 milliGRAM(s) Oral at bedtime  bisacodyl Suppository 10 milliGRAM(s) Rectal daily  buprenorphine Patch 5 MICROgram(s)/Hr 1 Patch Transdermal every 7 days  ceFAZolin   IVPB 1000 milliGRAM(s) IV Intermittent every 24 hours  dextrose 5%. 1000 milliLiter(s) (50 mL/Hr) IV Continuous <Continuous>  dextrose 50% Injectable 25 Gram(s) IV Push once  dextrose 50% Injectable 12.5 Gram(s) IV Push once  dextrose 50% Injectable 25 Gram(s) IV Push once  ferrous    sulfate 325 milliGRAM(s) Oral daily  glucagon  Injectable 1 milliGRAM(s) IntraMuscular once  HYDROmorphone   Tablet 4 milliGRAM(s) Oral three times a day  insulin lispro (ADMELOG) corrective regimen sliding scale   SubCutaneous three times a day before meals  metoprolol succinate ER 25 milliGRAM(s) Oral daily  pantoprazole    Tablet 40 milliGRAM(s) Oral two times a day    MEDICATIONS  (PRN):  dextrose Oral Gel 15 Gram(s) Oral once PRN Blood Glucose LESS THAN 70 milliGRAM(s)/deciliter  melatonin 3 milliGRAM(s) Oral at bedtime PRN Insomnia  metoclopramide 5 milliGRAM(s) Oral two times a day PRN Nausea/vomiting  ondansetron    Tablet 8 milliGRAM(s) Oral three times a day PRN Nausea and/or Vomiting  polyethylene glycol 3350 17 Gram(s) Oral daily PRN for constipation         Vitals log        ICU Vital Signs Last 24 Hrs  T(C): 36.3 (31 Jul 2022 20:54), Max: 36.9 (31 Jul 2022 12:49)  T(F): 97.4 (31 Jul 2022 20:54), Max: 98.4 (31 Jul 2022 12:49)  HR: 90 (31 Jul 2022 20:54) (88 - 104)  BP: 131/74 (31 Jul 2022 20:54) (131/74 - 154/85)  BP(mean): --  ABP: --  ABP(mean): --  RR: 17 (31 Jul 2022 20:54) (15 - 19)  SpO2: 92% (31 Jul 2022 20:54) (91% - 97%)    O2 Parameters below as of 31 Jul 2022 20:54  Patient On (Oxygen Delivery Method): nasal cannula                 Input and Output:  I&O's Detail    31 Jul 2022 07:01  -  01 Aug 2022 05:08  --------------------------------------------------------  IN:  Total IN: 0 mL    OUT:    Stool (mL): 1 mL  Total OUT: 1 mL    Total NET: -1 mL          Lab Data                        10.5   9.77  )-----------( 349      ( 31 Jul 2022 06:40 )             33.9     07-31    136  |  99  |  96<H>  ----------------------------<  188<H>  5.3   |  26  |  5.10<H>    Ca    9.3      31 Jul 2022 06:40  Phos  5.2     07-31  Mg     3.0     07-31    TPro  6.8  /  Alb  2.8<L>  /  TBili  0.5  /  DBili  x   /  AST  27  /  ALT  17  /  AlkPhos  270<H>  07-31            Review of Systems	      Objective     Physical Examination    heart s1s2  lung dc BS  abd soft      Pertinent Lab findings & Imaging      Olvin:  NO   Adequate UO     I&O's Detail    31 Jul 2022 07:01  -  01 Aug 2022 05:08  --------------------------------------------------------  IN:  Total IN: 0 mL    OUT:    Stool (mL): 1 mL  Total OUT: 1 mL    Total NET: -1 mL               Discussed with:     Cultures:	        Radiology

## 2022-08-01 NOTE — PROGRESS NOTE ADULT - SUBJECTIVE AND OBJECTIVE BOX
Patient is a 89y old  Female who presents with a chief complaint of back and R leg pain, inability to urinate (30 Jul 2022 08:35)       HPI:  Additional history obtained from daughter (Mary) via telephone conversation.    89yo F with PMH DM, dyslipidemia, gastroparesis, CAD s/p 2 stents, hx of vertebral fx with repair presents to the ED with back and R leg pain, as well as recent decreased urinary frequency. Patient was previously at [facility] 7/5/22-7/15/22 for COVID+, cholelithiasis, KERRY, and UTI/hematuria. Patient lives at home by herself with home health aide and daughter who take turns caring for her. Daughter commented that patient has worsening weakness and PO intake, increased irritability, and recent difficulty with urination. Daughter states that during her previous admission, patient would report urinary frequency that occurred every 20-30 minutes; Nephrology had seen her previously and prescribed Lasix q48h. Patient says that sometimes "she forgets to drink", and daughter states that COVID infection has changed her sense of taste as well as overall appetite. Daughter also concerned about ecchymosis in the R hip and knee region which had occurred during the last hospital stay; however patient denies any recent fall or injury.   Patient with decreased PO intake and N/V.  Has had trouble urinating and is having hematuria.  No dysgeusia or asterixis present. Known to use from prior.    No new complaints    PAST MEDICAL & SURGICAL HISTORY:  H/O vertebral fracture repair      History of vertebral fracture      Dyslipidemia      DM type 2 with diabetic dyslipidemia      H/O gastroesophageal reflux (GERD)      Costochondritis      Coronary arteriosclerosis in native artery  s/p 2 stents. last placed 2 years ago      Gastroparesis      History of laminectomy      S/P hip hemiarthroplasty      S/P appendectomy           FAMILY HISTORY:  FHx: stroke (Mother)    NC    Social History:Non smoker    MEDICATIONS  (STANDING):  amitriptyline 10 milliGRAM(s) Oral at bedtime  aspirin enteric coated 81 milliGRAM(s) Oral daily  atorvastatin 40 milliGRAM(s) Oral at bedtime  dextrose 5%. 1000 milliLiter(s) (50 mL/Hr) IV Continuous <Continuous>  dextrose 5%. 1000 milliLiter(s) (100 mL/Hr) IV Continuous <Continuous>  dextrose 50% Injectable 25 Gram(s) IV Push once  dextrose 50% Injectable 12.5 Gram(s) IV Push once  dextrose 50% Injectable 25 Gram(s) IV Push once  ergocalciferol 88720 Unit(s) Oral <User Schedule>  ferrous    sulfate 325 milliGRAM(s) Oral daily  glucagon  Injectable 1 milliGRAM(s) IntraMuscular once  heparin   Injectable 5000 Unit(s) SubCutaneous every 8 hours  HYDROmorphone   Tablet 4 milliGRAM(s) Oral three times a day  insulin lispro (ADMELOG) corrective regimen sliding scale   SubCutaneous three times a day before meals  insulin lispro (ADMELOG) corrective regimen sliding scale   SubCutaneous at bedtime  metoprolol succinate ER 25 milliGRAM(s) Oral daily  pantoprazole    Tablet 40 milliGRAM(s) Oral two times a day  senna 2 Tablet(s) Oral at bedtime  sertraline 25 milliGRAM(s) Oral two times a day  sodium chloride 0.9%. 1000 milliLiter(s) (70 mL/Hr) IV Continuous <Continuous>  sodium zirconium cyclosilicate 10 Gram(s) Oral two times a day    MEDICATIONS  (PRN):  dextrose Oral Gel 15 Gram(s) Oral once PRN Blood Glucose LESS THAN 70 milliGRAM(s)/deciliter  melatonin 3 milliGRAM(s) Oral at bedtime PRN Insomnia  metoclopramide 5 milliGRAM(s) Oral two times a day PRN Nausea/vomiting  ondansetron    Tablet 8 milliGRAM(s) Oral three times a day PRN Nausea and/or Vomiting  polyethylene glycol 3350 17 Gram(s) Oral daily PRN for constipation   Meds reviewed    Allergies    morphine (Other)    Intolerances         REVIEW OF SYSTEMS:    Limited due to mental status      Vital Signs Last 24 Hrs  T(C): 36.9 (01 Aug 2022 05:22), Max: 36.9 (31 Jul 2022 12:49)  T(F): 98.4 (01 Aug 2022 05:22), Max: 98.4 (31 Jul 2022 12:49)  HR: 98 (01 Aug 2022 05:22) (88 - 104)  BP: 123/67 (01 Aug 2022 05:22) (123/67 - 154/85)  BP(mean): --  RR: 18 (01 Aug 2022 05:22) (15 - 19)  SpO2: 93% (01 Aug 2022 05:22) (91% - 97%)    Parameters below as of 01 Aug 2022 05:22  Patient On (Oxygen Delivery Method): nasal cannula        PHYSICAL EXAM:    GENERAL: NAD, frail appearing  HEAD:  Atraumatic, Normocephalic  EYES: EOMI, conjunctiva and sclera clear  ENMT: No Drainage from nares, No drainage from ears  NECK: Supple, neck  veins full  NERVOUS SYSTEM:  Awake, confused  CHEST/LUNG: Clear to percussion bilaterally; No rales, rhonchi, wheezing, or rubs  HEART: Regular rate and rhythm; No murmurs, rubs, or gallops  ABDOMEN: Soft, Nontender, mildly distended; Bowel sounds present  EXTREMITIES:  No Edema  SKIN: No rashes No obvious ecchymosis      LABS:                                       9.8    10.77 )-----------( 302      ( 01 Aug 2022 06:35 )             31.9     08-01    136  |  99  |  95<H>  ----------------------------<  154<H>  5.5<H>   |  26  |  5.30<H>    Ca    9.0      01 Aug 2022 06:35  Phos  5.2     07-31  Mg     3.0     07-31    TPro  6.2  /  Alb  2.6<L>  /  TBili  0.4  /  DBili  x   /  AST  21  /  ALT  11<L>  /  AlkPhos  214<H>  08-01

## 2022-08-01 NOTE — PROGRESS NOTE ADULT - PROBLEM SELECTOR PLAN 7
Patient has history of CAD s/p 2 stents  - Continue home Aspirin  - Continue home Atorvastatin Hx of vertebral fracture with repair, patient presents with back and R leg pain. Takes PO dilaudid at home  - Continue- Patch -Buprenorphine 5 mg q weekly -Placed Friday   - Fall risk protocol

## 2022-08-01 NOTE — PROGRESS NOTE ADULT - PROBLEM SELECTOR PLAN 1
Patient presents with decreased urinary frequency on admission, catheter present in ED. UA with trace ketones, small leukocyte esterase, large blood, and few bacteria. BUN 88, Cr 3.90  - CT abd/pelvis: Moderate B/L hydronephrosis, moderate B/L pleural effusions, mild ascites  - c/w Cefazolin   - Avoid nephrotoxic meds  - Trend renal function and lytes, adjust as necessary  - Monitor I&Os  - Hold home Lasix due to KERRY, IV fluids  - Nephro consulted; recs appreciated   - Cr uptrending; s/p BL ureteral stent placement yesterday 7/31; moss placed by Dr. Gr during procedure; gross hematuria   - Hyperkalemia -Lokelma x 1 today Patient presents with decreased urinary frequency on admission, catheter present in ED. UA with trace ketones, small leukocyte esterase, large blood, and few bacteria. BUN 88, Cr 3.90  - CT abd/pelvis: Moderate B/L hydronephrosis, moderate B/L pleural effusions, mild ascites  - c/w Cefazolin   - Avoid nephrotoxic meds  - Trend renal function and lytes, adjust as necessary  - Monitor I&Os  - Hold home Lasix due to KERRY, IV fluids  - Nephro consulted; recs appreciated   - Cr up trending; s/p BL ureteral stent placement yesterday 7/31; moss placed by Dr. Gr during procedure; gross hematuria   - Hyperkalemia -Lokelma x 1 today

## 2022-08-01 NOTE — PROGRESS NOTE ADULT - SUBJECTIVE AND OBJECTIVE BOX
Patient is a 89y old  Female who presents with a chief complaint of back and R leg pain, inability to urinate (01 Aug 2022 05:08)    HPI:  Additional history obtained from daughter (Mary) via telephone conversation.    89yo F with PMH DM, dyslipidemia, gastroparesis, CAD s/p 2 stents, hx of vertebral fx with repair presents to the ED with back and R leg pain, as well as recent decreased urinary frequency. Patient was previously at [facility] 7/5/22-7/15/22 for COVID+, cholelithiasis, KERRY, and UTI/hematuria. Patient lives at home by herself with home health aide and daughter who take turns caring for her. Daughter commented that patient has worsening weakness and PO intake, increased irritability, and recent difficulty with urination. Daughter states that during her previous admission, patient would report urinary frequency that occurred every 20-30 minutes; Nephrology had seen her previously and prescribed Lasix q48h. Patient says that sometimes "she forgets to drink", and daughter states that COVID infection has changed her sense of taste as well as overall appetite. Daughter also concerned about ecchymosis in the R hip and knee region which had occurred during the last hospital stay; however patient denies any recent fall or injury.     in ED:  Vital Signs: T 97.9F, HR 83, 121/63, RR 18, 98% on RA  Labs: Hgb 10.3, Na 133, BUN 88, Cr 3.90, Gluc 159, Alk Phos 227, Mg 3.2  COVID+  CXR: Large bilateral pleural effusions layering posteriorly. Cannot exclude   underlying infiltrate or atelectasis.  CT chest, abd/pelvis: Moderate B/L hydronephrosis, moderate B/L pleural effusions, mild ascites  RLE doppler: No evidence of RLE DVT  UA: Proteinuria, trace ketones, small leukocyte esterase, large blood, few bacteria  Given: NaCl bolus x1, PO Dilaudid, IV Dilaudid (29 Jul 2022 23:08)    INTERVAL HPI:    7/30/22: Pt seen and examined at bedside. Pt only complaint is pain from chronic sacral wound (being followed by nursing). Pt also has newly developed (from recent admission in July) right leg 3+ pitting edema from ankle to hip with bruising noted over right knee (DCT ruled out via u/s). Pt endorses decreased PO intake over last few months 2/2 not being hungry. Daughter at bedside to supplement history. Pt denies chest pain, SOB, abdominal pain, cough, light-headedness. Worsening KERRY (cr 4.2), gross hematuria noted in bedside commode (Pt was straight cath'd twice on last admission, black stool noted in bedside commode (patient takes iron supplement). No DVT, +bilateral pleural effusions, bilateral hydro, mild ascites noted CT scan  7/31: Pt seen and examined at bedside. Pt waxing and waning with her dementia confusion and orientation. Pt intermittently complaining of lower abdominal pain vs no complaints at all. Pt pulled moss out 2 times overnight. Decision was made to bladder scan and leave moss out. Patient continues to have gross hematuria with minimal urine output with minimal bladder retention seen on bedside u/s. Per Urology, decision was made to place bilateral stents today due to worsening KERRY (Cr. 5.1),  8/1/22: Pt seen and examined at bedside. Underwent, BL ureteral stent placement yesterday. Tolerated procedure well. Moss reinserted by Dr. Gr during procedure. Pt states that currently she is in no pain; however, attests to mild discomfort from chronic sacral wound. Pt continues to have gross hematuria. Pt is due to have thoracentesis with IR this AM for BL pleural effusions.       OVERNIGHT EVENTS: Pt is s/p BL ureteral stent placement yesterday 7/31. Was complaining of abdominal pain and was given .5 Dilaudid IVP.    Home Medications:  amitriptyline 10 mg oral tablet: 3 tab(s) orally once a day (at bedtime) (30 Jul 2022 00:08)  aspirin 81 mg oral tablet: 1 tab(s) orally once a day (30 Jul 2022 00:08)  atorvastatin 40 mg oral tablet: 1 tab(s) orally once a day (30 Jul 2022 00:08)  buprenorphine:  (30 Jul 2022 00:08)  ferrous sulfate 200 mg (65 mg elemental iron) oral tablet: 1 tab(s) orally once a day (30 Jul 2022 00:08)  HYDROmorphone 4 mg oral tablet: 1 tab(s) orally 3 times a day (30 Jul 2022 00:08)  Macrodantin 100 mg oral capsule: 1 cap(s) orally 4 times a day (30 Jul 2022 00:08)  Metoprolol Succinate ER 25 mg oral tablet, extended release: 1 tab(s) orally once a day (30 Jul 2022 00:08)  MiraLax oral powder for reconstitution: orally once a day, As Needed - for constipation (30 Jul 2022 00:08)  PreserVision AREDS oral capsule: 1 tab(s) orally 2 times a day (30 Jul 2022 00:08)  Reglan 5 mg oral tablet: orally 2 times a day (30 Jul 2022 00:08)  Senna 8.6 mg oral tablet: 2 tab(s) orally once a day (at bedtime) (30 Jul 2022 00:08)  sertraline 25 mg oral tablet: two tablets daily  (30 Jul 2022 00:08)  Vitamin D3 1250 mcg (50,000 intl units) oral capsule: 1 cap(s) orally once a week (30 Jul 2022 00:08)  Zofran 8 mg oral tablet: 1 tab(s) orally every 6 hours (30 Jul 2022 00:08)      MEDICATIONS  (STANDING):  amitriptyline 10 milliGRAM(s) Oral at bedtime  aspirin enteric coated 81 milliGRAM(s) Oral daily  atorvastatin 40 milliGRAM(s) Oral at bedtime  bisacodyl Suppository 10 milliGRAM(s) Rectal daily  buprenorphine Patch 5 MICROgram(s)/Hr 1 Patch Transdermal every 7 days  ceFAZolin   IVPB 1000 milliGRAM(s) IV Intermittent every 24 hours  dextrose 5%. 1000 milliLiter(s) (50 mL/Hr) IV Continuous <Continuous>  dextrose 50% Injectable 25 Gram(s) IV Push once  dextrose 50% Injectable 12.5 Gram(s) IV Push once  dextrose 50% Injectable 25 Gram(s) IV Push once  ferrous    sulfate 325 milliGRAM(s) Oral daily  glucagon  Injectable 1 milliGRAM(s) IntraMuscular once  HYDROmorphone   Tablet 4 milliGRAM(s) Oral three times a day  insulin lispro (ADMELOG) corrective regimen sliding scale   SubCutaneous three times a day before meals  metoprolol succinate ER 25 milliGRAM(s) Oral daily  pantoprazole    Tablet 40 milliGRAM(s) Oral two times a day  sodium zirconium cyclosilicate 10 Gram(s) Oral once    MEDICATIONS  (PRN):  dextrose Oral Gel 15 Gram(s) Oral once PRN Blood Glucose LESS THAN 70 milliGRAM(s)/deciliter  melatonin 3 milliGRAM(s) Oral at bedtime PRN Insomnia  metoclopramide 5 milliGRAM(s) Oral two times a day PRN Nausea/vomiting  ondansetron    Tablet 8 milliGRAM(s) Oral three times a day PRN Nausea and/or Vomiting  polyethylene glycol 3350 17 Gram(s) Oral daily PRN for constipation      Allergies    morphine (Other)    Intolerances        Social History:  Tobacco: Former smoker  EtOH: Denies   Recreational drug use: Denies  Lives with: Self, HHA and daughter work around the clock for additional assistance  Ambulates: Cane, walker  ADLs: Requires assistance with all ADLs (29 Jul 2022 23:08)      REVIEW OF SYSTEMS:  CONSTITUTIONAL: No fever, No chills, No fatigue, No myalgia, No Body ache, No Weakness  EYES: No eye pain,  No visual disturbances, No discharge, NO Redness  ENMT:  No ear pain, No nose bleed, No vertigo; No sinus pain, NO throat pain, No Congestion  NECK: No pain, No stiffness  RESPIRATORY: No cough, NO wheezing, No  hemoptysis, ADMITS mild shortness of breath  CARDIOVASCULAR: No chest pain, palpitations  GASTROINTESTINAL: No abdominal pain, NO epigastric pain. No nausea, No vomiting; No diarrhea, No constipation. [ x ] BM  GENITOURINARY: ADMITS dysuria, No frequency, No urgency, ADMITS hematuria  NEUROLOGICAL: No headaches, No dizziness, No numbness, No tingling, No tremors, No weakness  EXT: No Swelling, No Pain, No Edema  SKIN:  [ x ] No itching, burning, rashes, or lesions   MUSCULOSKELETAL: No joint pain ,No Jt swelling; No muscle pain, No back pain, No extremity pain  PSYCHIATRIC: No depression,  No anxiety,  No mood swings ,No difficulty sleeping at night  PAIN SCALE: [ x ] None  [  ] Other-  ROS Unable to obtain due to - [  ] Dementia  [  ] Lethargy [  ] Drowsy [  ] Sedated [  ] non verbal  REST OF REVIEW Of SYSTEM - [ x ] Normal     Vital Signs Last 24 Hrs  T(C): 36.9 (01 Aug 2022 05:22), Max: 36.9 (31 Jul 2022 12:49)  T(F): 98.4 (01 Aug 2022 05:22), Max: 98.4 (31 Jul 2022 12:49)  HR: 98 (01 Aug 2022 05:22) (88 - 104)  BP: 123/67 (01 Aug 2022 05:22) (123/67 - 154/85)  BP(mean): --  RR: 18 (01 Aug 2022 05:22) (15 - 19)  SpO2: 93% (01 Aug 2022 05:22) (91% - 97%)    Parameters below as of 01 Aug 2022 05:22  Patient On (Oxygen Delivery Method): nasal cannula      Finger Stick        07-31 @ 07:01  -  08-01 @ 07:00  --------------------------------------------------------  IN: 0 mL / OUT: 1 mL / NET: -1 mL        PHYSICAL EXAM:  GENERAL:  [X] NAD , [X] well appearing, [  ] Agitated, [  ] Drowsy,  [  ] Lethargy, [X] confused   HEAD:  [X] Normal, [  ] Other  EYES:  [X] EOMI, [X] PERRLA, [X] conjunctiva and sclera clear normal, [  ] Other,  [  ] Pallor,[  ] Discharge  ENMT:  [X] Normal, [X] Moist mucous membranes, [X] Moderate dentition [  ] Good dentition, [ x ] No Thrush  NECK:  [X] Supple, [X] No JVD, [ x ] Normal thyroid, [  ] Lymphadenopathy [  ] Other  CHEST/LUNG:  [X] Clear to auscultation bilaterally, [X] Breath Sounds equal B/L / Decrease, [  ] poor effort  [X] No rales, [X] No rhonchi  [X]  No wheezing,   HEART:  [X] Regular rate and rhythm, [  ] tachycardia, [  ] Bradycardia,  [  ] irregular  [X] No murmurs, No rubs, No gallops, [  ] PPM in place (Mfr:  )  ABDOMEN: [X] Reducible umbilical hernia [X] Moderately tense but no peritoneal signs [ ] Soft, [X] Nontender, [X] Nondistended, [  ] No mass, [X] Bowel sounds present, [  ] obese  NERVOUS SYSTEM:  [X] Alert & Oriented X2 (person, place), [  ] Nonfocal  [  ] Confusion  [  ] Encephalopathic [  ] Sedated [  ] Unable to assess, [  ] Dementia [  ] Other-  EXTREMITIES: [X] 2+ Peripheral Pulses, No clubbing, No cyanosis, [X] right leg 3+ pitting edema from ankle to hip [ ] edema B/L lower EXT. [X] PVD stasis skin changes B/L Lower EXT with ecchymosis , [  ] wound  LYMPH: No lymphadenopathy noted  SKIN:  [  ] No rashes or lesions, [X] Pressure Ulcers (sacral), [ x ] ecchymosis- Large Ecchymosis Rt Knee & Thigh , [X] Skin Tears, [  ] Other    DIET: Diet, Pureed:   Consistent Carbohydrate Evening Snack (07-31-22 @ 19:55)      LABS:                        9.8    10.77 )-----------( 302      ( 01 Aug 2022 06:35 )             31.9     01 Aug 2022 06:35    136    |  99     |  95     ----------------------------<  154    5.5     |  26     |  5.30     Ca    9.0        01 Aug 2022 06:35    TPro  6.2    /  Alb  2.6    /  TBili  0.4    /  DBili  x      /  AST  21     /  ALT  11     /  AlkPhos  214    01 Aug 2022 06:35                             Anemia Panel:      Thyroid Panel:                RADIOLOGY & ADDITIONAL TESTS: none       HEALTH ISSUES - PROBLEM Dx:  KERRY (acute kidney injury)    Type 2 diabetes mellitus    GERD (gastroesophageal reflux disease)    CAD (coronary artery disease)    Need for prophylactic measure    Constipation    H/O vertebral fracture repair    Anemia    2019 novel coronavirus disease (COVID-19)    Bilateral pleural effusion    General weakness    Bilateral hydronephrosis    Urinary frequency    Esophageal dysmotility            Consultant(s) Notes Reviewed:  [ X ] YES     Care Discussed with [X] Consultants  [ X  ] Patient  [ X  ] Family [  ] HCP [  ]   [  ] Social Service  [  ] RN, [  ] Physical Therapy,[  ] Palliative care team  DVT PPX: [  ] Lovenox, [  ] S C Heparin, [  ] Coumadin, [  ] Xarelto, [  ] Eliquis, [  ] Pradaxa, [  ] IV Heparin drip, [ X ] SCD [  ] Contraindication 2 to GI Bleed,[  ] Ambulation [  ] Contraindicated 2 to  bleed [  ] Contraindicated 2 to Brain Bleed  Advanced directive: [  ] None, [X  ] DNR/DNI Patient is a 89y old  Female who presents with a chief complaint of back and R leg pain, inability to urinate (01 Aug 2022 05:08)    HPI:  Additional history obtained from daughter (Mary) via telephone conversation.    89yo F with PMH DM, dyslipidemia, gastroparesis, CAD s/p 2 stents, hx of vertebral fx with repair presents to the ED with back and R leg pain, as well as recent decreased urinary frequency. Patient was previously at [facility] 7/5/22-7/15/22 for COVID+, cholelithiasis, KERRY, and UTI/hematuria. Patient lives at home by herself with home health aide and daughter who take turns caring for her. Daughter commented that patient has worsening weakness and PO intake, increased irritability, and recent difficulty with urination. Daughter states that during her previous admission, patient would report urinary frequency that occurred every 20-30 minutes; Nephrology had seen her previously and prescribed Lasix q48h. Patient says that sometimes "she forgets to drink", and daughter states that COVID infection has changed her sense of taste as well as overall appetite. Daughter also concerned about ecchymosis in the R hip and knee region which had occurred during the last hospital stay; however patient denies any recent fall or injury.     in ED:  Vital Signs: T 97.9F, HR 83, 121/63, RR 18, 98% on RA  Labs: Hgb 10.3, Na 133, BUN 88, Cr 3.90, Gluc 159, Alk Phos 227, Mg 3.2  COVID+  CXR: Large bilateral pleural effusions layering posteriorly. Cannot exclude   underlying infiltrate or atelectasis.  CT chest, abd/pelvis: Moderate B/L hydronephrosis, moderate B/L pleural effusions, mild ascites  RLE doppler: No evidence of RLE DVT  UA: Proteinuria, trace ketones, small leukocyte esterase, large blood, few bacteria  Given: NaCl bolus x1, PO Dilaudid, IV Dilaudid (29 Jul 2022 23:08)    INTERVAL HPI:    7/30/22: Pt seen and examined at bedside. Pt only complaint is pain from chronic sacral wound (being followed by nursing). Pt also has newly developed (from recent admission in July) right leg 3+ pitting edema from ankle to hip with bruising noted over right knee (DCT ruled out via u/s). Pt endorses decreased PO intake over last few months 2/2 not being hungry. Daughter at bedside to supplement history. Pt denies chest pain, SOB, abdominal pain, cough, light-headedness. Worsening KERRY (cr 4.2), gross hematuria noted in bedside commode (Pt was straight cath'd twice on last admission, black stool noted in bedside commode (patient takes iron supplement). No DVT, +bilateral pleural effusions, bilateral hydro, mild ascites noted CT scan  7/31: Pt seen and examined at bedside. Pt waxing and waning with her dementia confusion and orientation. Pt intermittently complaining of lower abdominal pain vs no complaints at all. Pt pulled moss out 2 times overnight. Decision was made to bladder scan and leave moss out. Patient continues to have gross hematuria with minimal urine output with minimal bladder retention seen on bedside u/s. Per Urology, decision was made to place bilateral stents today due to worsening KERRY (Cr. 5.1),  8/1/22: Pt seen and examined at bedside. Underwent, BL ureteral stent placement yesterday. Tolerated procedure well. Moss reinserted by Dr. Gr during procedure. Pt states that currently she is in no pain; however, attests to mild discomfort from chronic sacral wound. Pt continues to have gross hematuria. Pt is due to have thoracentesis with IR this AM for BL pleural effusions.       OVERNIGHT EVENTS: Pt is s/p BL ureteral stent placement yesterday 7/31. Was complaining of abdominal pain and was given .5 Dilaudid IVP.    Home Medications:  amitriptyline 10 mg oral tablet: 3 tab(s) orally once a day (at bedtime) (30 Jul 2022 00:08)  aspirin 81 mg oral tablet: 1 tab(s) orally once a day (30 Jul 2022 00:08)  atorvastatin 40 mg oral tablet: 1 tab(s) orally once a day (30 Jul 2022 00:08)  buprenorphine:  (30 Jul 2022 00:08)  ferrous sulfate 200 mg (65 mg elemental iron) oral tablet: 1 tab(s) orally once a day (30 Jul 2022 00:08)  HYDROmorphone 4 mg oral tablet: 1 tab(s) orally 3 times a day (30 Jul 2022 00:08)  Macrodantin 100 mg oral capsule: 1 cap(s) orally 4 times a day (30 Jul 2022 00:08)  Metoprolol Succinate ER 25 mg oral tablet, extended release: 1 tab(s) orally once a day (30 Jul 2022 00:08)  MiraLax oral powder for reconstitution: orally once a day, As Needed - for constipation (30 Jul 2022 00:08)  PreserVision AREDS oral capsule: 1 tab(s) orally 2 times a day (30 Jul 2022 00:08)  Reglan 5 mg oral tablet: orally 2 times a day (30 Jul 2022 00:08)  Senna 8.6 mg oral tablet: 2 tab(s) orally once a day (at bedtime) (30 Jul 2022 00:08)  sertraline 25 mg oral tablet: two tablets daily  (30 Jul 2022 00:08)  Vitamin D3 1250 mcg (50,000 intl units) oral capsule: 1 cap(s) orally once a week (30 Jul 2022 00:08)  Zofran 8 mg oral tablet: 1 tab(s) orally every 6 hours (30 Jul 2022 00:08)      MEDICATIONS  (STANDING):  amitriptyline 10 milliGRAM(s) Oral at bedtime  aspirin enteric coated 81 milliGRAM(s) Oral daily  atorvastatin 40 milliGRAM(s) Oral at bedtime  bisacodyl Suppository 10 milliGRAM(s) Rectal daily  buprenorphine Patch 5 MICROgram(s)/Hr 1 Patch Transdermal every 7 days  ceFAZolin   IVPB 1000 milliGRAM(s) IV Intermittent every 24 hours  dextrose 5%. 1000 milliLiter(s) (50 mL/Hr) IV Continuous <Continuous>  dextrose 50% Injectable 25 Gram(s) IV Push once  dextrose 50% Injectable 12.5 Gram(s) IV Push once  dextrose 50% Injectable 25 Gram(s) IV Push once  ferrous    sulfate 325 milliGRAM(s) Oral daily  glucagon  Injectable 1 milliGRAM(s) IntraMuscular once  HYDROmorphone   Tablet 4 milliGRAM(s) Oral three times a day  insulin lispro (ADMELOG) corrective regimen sliding scale   SubCutaneous three times a day before meals  metoprolol succinate ER 25 milliGRAM(s) Oral daily  pantoprazole    Tablet 40 milliGRAM(s) Oral two times a day  sodium zirconium cyclosilicate 10 Gram(s) Oral once    MEDICATIONS  (PRN):  dextrose Oral Gel 15 Gram(s) Oral once PRN Blood Glucose LESS THAN 70 milliGRAM(s)/deciliter  melatonin 3 milliGRAM(s) Oral at bedtime PRN Insomnia  metoclopramide 5 milliGRAM(s) Oral two times a day PRN Nausea/vomiting  ondansetron    Tablet 8 milliGRAM(s) Oral three times a day PRN Nausea and/or Vomiting  polyethylene glycol 3350 17 Gram(s) Oral daily PRN for constipation      Allergies    morphine (Other)    Intolerances        Social History:  Tobacco: Former smoker  EtOH: Denies   Recreational drug use: Denies  Lives with: Self, HHA and daughter work around the clock for additional assistance  Ambulates: Cane, walker  ADLs: Requires assistance with all ADLs (29 Jul 2022 23:08)      REVIEW OF SYSTEMS:  CONSTITUTIONAL: No fever, No chills, No fatigue, No myalgia, No Body ache, No Weakness  EYES: No eye pain,  No visual disturbances, No discharge, NO Redness  ENMT:  No ear pain, No nose bleed, No vertigo; No sinus pain, NO throat pain, No Congestion  NECK: No pain, No stiffness  RESPIRATORY: No cough, NO wheezing, No  hemoptysis, ADMITS mild shortness of breath  CARDIOVASCULAR: No chest pain, palpitations  GASTROINTESTINAL: No abdominal pain, NO epigastric pain. No nausea, No vomiting; No diarrhea, No constipation. [ x ] BM  GENITOURINARY: ADMITS dysuria, No frequency, No urgency, ADMITS hematuria  NEUROLOGICAL: No headaches, No dizziness, No numbness, No tingling, No tremors, No weakness  EXT: No Swelling, No Pain, No Edema  SKIN:  [ x ] No itching, burning, rashes, or lesions   MUSCULOSKELETAL: No joint pain ,No Jt swelling; No muscle pain, No back pain, No extremity pain  PSYCHIATRIC: No depression,  No anxiety,  No mood swings ,No difficulty sleeping at night  PAIN SCALE: [  ] None  [ x  ] Other- Tylenol 650 prn for mild pain  ROS Unable to obtain due to - [  ] Dementia  [  ] Lethargy [  ] Drowsy [  ] Sedated [  ] non verbal  REST OF REVIEW Of SYSTEM - [ x ] Normal     Vital Signs Last 24 Hrs  T(C): 36.9 (01 Aug 2022 05:22), Max: 36.9 (31 Jul 2022 12:49)  T(F): 98.4 (01 Aug 2022 05:22), Max: 98.4 (31 Jul 2022 12:49)  HR: 98 (01 Aug 2022 05:22) (88 - 104)  BP: 123/67 (01 Aug 2022 05:22) (123/67 - 154/85)  BP(mean): --  RR: 18 (01 Aug 2022 05:22) (15 - 19)  SpO2: 93% (01 Aug 2022 05:22) (91% - 97%)    Parameters below as of 01 Aug 2022 05:22  Patient On (Oxygen Delivery Method): nasal cannula      Finger Stick        07-31 @ 07:01  -  08-01 @ 07:00  --------------------------------------------------------  IN: 0 mL / OUT: 1 mL / NET: -1 mL        PHYSICAL EXAM:  GENERAL:  [X] NAD , [X] well appearing, [  ] Agitated, [  ] Drowsy,  [  ] Lethargy, [X] confused   HEAD:  [X] Normal, [  ] Other  EYES:  [X] EOMI, [X] PERRLA, [X] conjunctiva and sclera clear normal, [  ] Other,  [  ] Pallor,[  ] Discharge  ENMT:  [X] Normal, [X] Moist mucous membranes, [X] Moderate dentition [  ] Good dentition, [ x ] No Thrush  NECK:  [X] Supple, [X] No JVD, [ x ] Normal thyroid, [  ] Lymphadenopathy [  ] Other  CHEST/LUNG:  [X] Clear to auscultation bilaterally, [X] Breath Sounds equal B/L / Decrease, [  ] poor effort  [X] No rales, [X] No rhonchi  [X]  No wheezing,   HEART:  [X] Regular rate and rhythm, [  ] tachycardia, [  ] Bradycardia,  [  ] irregular  [X] No murmurs, No rubs, No gallops, [  ] PPM in place (Mfr:  )  ABDOMEN: [X] Reducible umbilical hernia [X] Moderately tense but no peritoneal signs [ ] Soft, [X] Nontender, [X] Nondistended, [  ] No mass, [X] Bowel sounds present, [  ] obese  NERVOUS SYSTEM:  [X] Alert & Oriented X2 (person, place), [  ] Nonfocal  [  ] Confusion  [  ] Encephalopathic [  ] Sedated [  ] Unable to assess, [  ] Dementia [  ] Other-  EXTREMITIES: [X] 2+ Peripheral Pulses, No clubbing, No cyanosis, [X] right leg 3+ pitting edema from ankle to hip [ ] edema B/L lower EXT. [X] PVD stasis skin changes B/L Lower EXT with ecchymosis , [  ] wound  LYMPH: No lymphadenopathy noted  SKIN:  [  ] No rashes or lesions, [X] Pressure Ulcers (sacral), [ x ] ecchymosis- Large Ecchymosis Rt Knee & Thigh , [X] Skin Tears, [  ] Other    DIET: Diet, Pureed:   Consistent Carbohydrate Evening Snack (07-31-22 @ 19:55)      LABS:                        9.8    10.77 )-----------( 302      ( 01 Aug 2022 06:35 )             31.9     01 Aug 2022 06:35    136    |  99     |  95     ----------------------------<  154    5.5     |  26     |  5.30     Ca    9.0        01 Aug 2022 06:35    TPro  6.2    /  Alb  2.6    /  TBili  0.4    /  DBili  x      /  AST  21     /  ALT  11     /  AlkPhos  214    01 Aug 2022 06:35                             Anemia Panel:      Thyroid Panel:                RADIOLOGY & ADDITIONAL TESTS: none       HEALTH ISSUES - PROBLEM Dx:  KERRY (acute kidney injury)    Type 2 diabetes mellitus    GERD (gastroesophageal reflux disease)    CAD (coronary artery disease)    Need for prophylactic measure    Constipation    H/O vertebral fracture repair    Anemia    2019 novel coronavirus disease (COVID-19)    Bilateral pleural effusion    General weakness    Bilateral hydronephrosis    Urinary frequency    Esophageal dysmotility            Consultant(s) Notes Reviewed:  [ X ] YES     Care Discussed with [X] Consultants  [ X  ] Patient  [ X  ] Family [  ] HCP [  ]   [  ] Social Service  [  ] RN, [  ] Physical Therapy,[  ] Palliative care team  DVT PPX: [  ] Lovenox, [  ] S C Heparin, [  ] Coumadin, [  ] Xarelto, [  ] Eliquis, [  ] Pradaxa, [  ] IV Heparin drip, [ X ] SCD [  ] Contraindication 2 to GI Bleed,[  ] Ambulation [  ] Contraindicated 2 to  bleed [  ] Contraindicated 2 to Brain Bleed  Advanced directive: [  ] None, [X  ] DNR/DNI Patient is a 89y old  Female who presents with a chief complaint of back and R leg pain, inability to urinate (01 Aug 2022 05:08)    HPI:  Additional history obtained from daughter (Mary) via telephone conversation.    89yo F with PMH DM, dyslipidemia, gastroparesis, CAD s/p 2 stents, hx of vertebral fx with repair presents to the ED with back and R leg pain, as well as recent decreased urinary frequency. Patient was previously at [facility] 7/5/22-7/15/22 for COVID+, cholelithiasis, KERRY, and UTI/hematuria. Patient lives at home by herself with home health aide and daughter who take turns caring for her. Daughter commented that patient has worsening weakness and PO intake, increased irritability, and recent difficulty with urination. Daughter states that during her previous admission, patient would report urinary frequency that occurred every 20-30 minutes; Nephrology had seen her previously and prescribed Lasix q48h. Patient says that sometimes "she forgets to drink", and daughter states that COVID infection has changed her sense of taste as well as overall appetite. Daughter also concerned about ecchymosis in the R hip and knee region which had occurred during the last hospital stay; however patient denies any recent fall or injury.     in ED:  Vital Signs: T 97.9F, HR 83, 121/63, RR 18, 98% on RA  Labs: Hgb 10.3, Na 133, BUN 88, Cr 3.90, Gluc 159, Alk Phos 227, Mg 3.2  COVID+  CXR: Large bilateral pleural effusions layering posteriorly. Cannot exclude   underlying infiltrate or atelectasis.  CT chest, abd/pelvis: Moderate B/L hydronephrosis, moderate B/L pleural effusions, mild ascites  RLE doppler: No evidence of RLE DVT  UA: Proteinuria, trace ketones, small leukocyte esterase, large blood, few bacteria  Given: NaCl bolus x1, PO Dilaudid, IV Dilaudid (29 Jul 2022 23:08)    INTERVAL HPI:  7/30/22: Pt seen and examined at bedside. Pt only complaint is pain from chronic sacral wound (being followed by nursing). Pt also has newly developed (from recent admission in July) right leg 3+ pitting edema from ankle to hip with bruising noted over right knee (DCT ruled out via u/s). Pt endorses decreased PO intake over last few months 2/2 not being hungry. Daughter at bedside to supplement history. Pt denies chest pain, SOB, abdominal pain, cough, light-headedness. Worsening KERRY (cr 4.2), gross hematuria noted in bedside commode (Pt was straight cath'd twice on last admission, black stool noted in bedside commode (patient takes iron supplement). No DVT, +bilateral pleural effusions, bilateral hydro, mild ascites noted CT scan  7/31: Pt seen and examined at bedside. Pt waxing and waning with her dementia confusion and orientation. Pt intermittently complaining of lower abdominal pain vs no complaints at all. Pt pulled moss out 2 times overnight. Decision was made to bladder scan and leave moss out. Patient continues to have gross hematuria with minimal urine output with minimal bladder retention seen on bedside u/s. Per Urology, decision was made to place bilateral stents today due to worsening KERRY (Cr. 5.1),  8/1/22: Pt seen and examined at bedside. Underwent, BL ureteral stent placement yesterday. Tolerated procedure well. Msos reinserted by Dr. Gr during procedure. Pt states that currently she is in no pain; however, attests to mild discomfort from chronic sacral wound. Pt continues to have gross hematuria. Pt is due to have thoracentesis with IR this AM for BL pleural effusions.       OVERNIGHT EVENTS: Pt is s/p BL ureteral stent placement yesterday 7/31. Was complaining of abdominal pain and was given .5 Dilaudid IVP.    Home Medications:  amitriptyline 10 mg oral tablet: 3 tab(s) orally once a day (at bedtime) (30 Jul 2022 00:08)  aspirin 81 mg oral tablet: 1 tab(s) orally once a day (30 Jul 2022 00:08)  atorvastatin 40 mg oral tablet: 1 tab(s) orally once a day (30 Jul 2022 00:08)  buprenorphine:  (30 Jul 2022 00:08)  ferrous sulfate 200 mg (65 mg elemental iron) oral tablet: 1 tab(s) orally once a day (30 Jul 2022 00:08)  HYDROmorphone 4 mg oral tablet: 1 tab(s) orally 3 times a day (30 Jul 2022 00:08)  Macrodantin 100 mg oral capsule: 1 cap(s) orally 4 times a day (30 Jul 2022 00:08)  Metoprolol Succinate ER 25 mg oral tablet, extended release: 1 tab(s) orally once a day (30 Jul 2022 00:08)  MiraLax oral powder for reconstitution: orally once a day, As Needed - for constipation (30 Jul 2022 00:08)  PreserVision AREDS oral capsule: 1 tab(s) orally 2 times a day (30 Jul 2022 00:08)  Reglan 5 mg oral tablet: orally 2 times a day (30 Jul 2022 00:08)  Senna 8.6 mg oral tablet: 2 tab(s) orally once a day (at bedtime) (30 Jul 2022 00:08)  sertraline 25 mg oral tablet: two tablets daily  (30 Jul 2022 00:08)  Vitamin D3 1250 mcg (50,000 intl units) oral capsule: 1 cap(s) orally once a week (30 Jul 2022 00:08)  Zofran 8 mg oral tablet: 1 tab(s) orally every 6 hours (30 Jul 2022 00:08)      MEDICATIONS  (STANDING):  amitriptyline 10 milliGRAM(s) Oral at bedtime  aspirin enteric coated 81 milliGRAM(s) Oral daily  atorvastatin 40 milliGRAM(s) Oral at bedtime  bisacodyl Suppository 10 milliGRAM(s) Rectal daily  buprenorphine Patch 5 MICROgram(s)/Hr 1 Patch Transdermal every 7 days  ceFAZolin   IVPB 1000 milliGRAM(s) IV Intermittent every 24 hours  dextrose 5%. 1000 milliLiter(s) (50 mL/Hr) IV Continuous <Continuous>  dextrose 50% Injectable 25 Gram(s) IV Push once  dextrose 50% Injectable 12.5 Gram(s) IV Push once  dextrose 50% Injectable 25 Gram(s) IV Push once  ferrous    sulfate 325 milliGRAM(s) Oral daily  glucagon  Injectable 1 milliGRAM(s) IntraMuscular once  HYDROmorphone   Tablet 4 milliGRAM(s) Oral three times a day  insulin lispro (ADMELOG) corrective regimen sliding scale   SubCutaneous three times a day before meals  metoprolol succinate ER 25 milliGRAM(s) Oral daily  pantoprazole    Tablet 40 milliGRAM(s) Oral two times a day  sodium zirconium cyclosilicate 10 Gram(s) Oral once    MEDICATIONS  (PRN):  dextrose Oral Gel 15 Gram(s) Oral once PRN Blood Glucose LESS THAN 70 milliGRAM(s)/deciliter  melatonin 3 milliGRAM(s) Oral at bedtime PRN Insomnia  metoclopramide 5 milliGRAM(s) Oral two times a day PRN Nausea/vomiting  ondansetron    Tablet 8 milliGRAM(s) Oral three times a day PRN Nausea and/or Vomiting  polyethylene glycol 3350 17 Gram(s) Oral daily PRN for constipation      Allergies    morphine (Other)    Intolerances        Social History:  Tobacco: Former smoker  EtOH: Denies   Recreational drug use: Denies  Lives with: Self, HHA and daughter work around the clock for additional assistance  Ambulates: Cane, walker  ADLs: Requires assistance with all ADLs (29 Jul 2022 23:08)      REVIEW OF SYSTEMS: tired, poor po intake   CONSTITUTIONAL: No fever, No chills, No fatigue, No myalgia, No Body ache, No Weakness  EYES: No eye pain,  No visual disturbances, No discharge, NO Redness  ENMT:  No ear pain, No nose bleed, No vertigo; No sinus pain, NO throat pain, No Congestion  NECK: No pain, No stiffness  RESPIRATORY: No cough, NO wheezing, No  hemoptysis, ADMITS mild shortness of breath  CARDIOVASCULAR: No chest pain, palpitations  GASTROINTESTINAL: No abdominal pain, NO epigastric pain. No nausea, No vomiting; No diarrhea, No constipation. [ x ] BM  GENITOURINARY: ADMITS dysuria, No frequency, No urgency, ADMITS hematuria  NEUROLOGICAL: No headaches, No dizziness, No numbness, No tingling, No tremors, No weakness  EXT: No Swelling, No Pain, No Edema  SKIN:  [ x ] No itching, burning, rashes, or lesions   MUSCULOSKELETAL: No joint pain ,No Jt swelling; No muscle pain, No back pain, No extremity pain  PSYCHIATRIC: No depression,  No anxiety,  No mood swings ,No difficulty sleeping at night  PAIN SCALE: [  ] None  [ x  ] Other- Tylenol 650 prn for mild pain  ROS Unable to obtain due to - [  ] Dementia  [  ] Lethargy [  ] Drowsy [  ] Sedated [  ] non verbal  REST OF REVIEW Of SYSTEM - [ x ] Normal     Vital Signs Last 24 Hrs  T(C): 36.9 (01 Aug 2022 05:22), Max: 36.9 (31 Jul 2022 12:49)  T(F): 98.4 (01 Aug 2022 05:22), Max: 98.4 (31 Jul 2022 12:49)  HR: 98 (01 Aug 2022 05:22) (88 - 104)  BP: 123/67 (01 Aug 2022 05:22) (123/67 - 154/85)  BP(mean): --  RR: 18 (01 Aug 2022 05:22) (15 - 19)  SpO2: 93% (01 Aug 2022 05:22) (91% - 97%)    Parameters below as of 01 Aug 2022 05:22  Patient On (Oxygen Delivery Method): nasal cannula      Finger Stick        07-31 @ 07:01  -  08-01 @ 07:00  --------------------------------------------------------  IN: 0 mL / OUT: 1 mL / NET: -1 mL        PHYSICAL EXAM: cachexia   GENERAL:  [X] NAD , [X] well appearing, [  ] Agitated, [  ] Drowsy,  [  ] Lethargy, [X] confused   HEAD:  [X] Normal, [  ] Other  EYES:  [X] EOMI, [X] PERRLA, [X] conjunctiva and sclera clear normal, [  ] Other,  [  ] Pallor,[  ] Discharge  ENMT:  [X] Normal, [X] Dry  mucous membranes, [X] Moderate dentition [  ] Good dentition, [ x ] No Thrush  NECK:  [X] Supple, [X] No JVD, [ x ] Normal thyroid, [  ] Lymphadenopathy [  ] Other  CHEST/LUNG:  [X] Clear to auscultation bilaterally, [X] Breath Sounds equal B/L / Decrease, [x  ] poor effort  [X] No rales, [X] No rhonchi  [X]  No wheezing,   HEART:  [X] Regular rate and rhythm, [  ] tachycardia, [  ] Bradycardia,  [  ] irregular  [X] No murmurs, No rubs, No gallops, [  ] PPM in place (Mfr:  )  ABDOMEN: [X] Reducible umbilical hernia [X] Moderately tense but no peritoneal signs [ ] Soft, [X] Nontender, [X] Nondistended, [  ] No mass, [X] Bowel sounds present, [  ] obese  NERVOUS SYSTEM:  [X] Alert & Oriented X2 (person, place), [  ] Nonfocal  [  ] Confusion  [  ] Encephalopathic [  ] Sedated [  ] Unable to assess, [  ] Dementia [  ] Other-  EXTREMITIES: [X] 2+ Peripheral Pulses, No clubbing, No cyanosis, [X] right leg 3+ pitting edema from ankle to hip [ ] edema B/L lower EXT. [X] PVD stasis skin changes B/L Lower EXT with ecchymosis , [  ] wound  LYMPH: No lymphadenopathy noted  SKIN:  [  ] No rashes or lesions, [X] Pressure Ulcers (sacral), [ x ] ecchymosis- Large Ecchymosis Rt Knee & Thigh , [X] Skin Tears, [  ] Other    DIET: Diet, Pureed:   Consistent Carbohydrate Evening Snack (07-31-22 @ 19:55)      LABS:                        9.8    10.77 )-----------( 302      ( 01 Aug 2022 06:35 )             31.9     01 Aug 2022 06:35    136    |  99     |  95     ----------------------------<  154    5.5     |  26     |  5.30     Ca    9.0        01 Aug 2022 06:35    TPro  6.2    /  Alb  2.6    /  TBili  0.4    /  DBili  x      /  AST  21     /  ALT  11     /  AlkPhos  214    01 Aug 2022 06:35      RADIOLOGY & ADDITIONAL TESTS: none       HEALTH ISSUES - PROBLEM Dx:  KERRY (acute kidney injury)    Type 2 diabetes mellitus    GERD (gastroesophageal reflux disease)    CAD (coronary artery disease)    Need for prophylactic measure    Constipation    H/O vertebral fracture repair    Anemia    2019 novel coronavirus disease (COVID-19)    Bilateral pleural effusion    General weakness    Bilateral hydronephrosis    Urinary frequency    Esophageal dysmotility        Consultant(s) Notes Reviewed:  [ X ] YES     Care Discussed with [X] Consultants  [ X  ] Patient  [ X  ] Family [  ] HCP [  ]   [  ] Social Service  [ x ] RN, [  ] Physical Therapy,[  ] Palliative care team  DVT PPX: [  ] Lovenox, [  ] S C Heparin, [  ] Coumadin, [  ] Xarelto, [  ] Eliquis, [  ] Pradaxa, [  ] IV Heparin drip, [ X ] SCD [  ] Contraindication 2 to GI Bleed,[  ] Ambulation [  ] Contraindicated 2 to  bleed [  ] Contraindicated 2 to Brain Bleed  Advanced directive: [  ] None, [X  ] DNR/DNI Patient is a 89y old  Female who presents with a chief complaint of back and R leg pain, inability to urinate (01 Aug 2022 05:08)    HPI:  Additional history obtained from daughter (Mary) via telephone conversation.    89yo F with PMH DM, dyslipidemia, gastroparesis, CAD s/p 2 stents, hx of vertebral fx with repair presents to the ED with back and R leg pain, as well as recent decreased urinary frequency. Patient was previously at [facility] 7/5/22-7/15/22 for COVID+, cholelithiasis, KERRY, and UTI/hematuria. Patient lives at home by herself with home health aide and daughter who take turns caring for her. Daughter commented that patient has worsening weakness and PO intake, increased irritability, and recent difficulty with urination. Daughter states that during her previous admission, patient would report urinary frequency that occurred every 20-30 minutes; Nephrology had seen her previously and prescribed Lasix q48h. Patient says that sometimes "she forgets to drink", and daughter states that COVID infection has changed her sense of taste as well as overall appetite. Daughter also concerned about ecchymosis in the R hip and knee region which had occurred during the last hospital stay; however patient denies any recent fall or injury.     in ED:  Vital Signs: T 97.9F, HR 83, 121/63, RR 18, 98% on RA  Labs: Hgb 10.3, Na 133, BUN 88, Cr 3.90, Gluc 159, Alk Phos 227, Mg 3.2  COVID+  CXR: Large bilateral pleural effusions layering posteriorly. Cannot exclude   underlying infiltrate or atelectasis.  CT chest, abd/pelvis: Moderate B/L hydronephrosis, moderate B/L pleural effusions, mild ascites  RLE doppler: No evidence of RLE DVT  UA: Proteinuria, trace ketones, small leukocyte esterase, large blood, few bacteria  Given: NaCl bolus x1, PO Dilaudid, IV Dilaudid (29 Jul 2022 23:08)    INTERVAL HPI:  7/30/22: Pt seen and examined at bedside. Pt only complaint is pain from chronic sacral wound (being followed by nursing). Pt also has newly developed (from recent admission in July) right leg 3+ pitting edema from ankle to hip with bruising noted over right knee (DCT ruled out via u/s). Pt endorses decreased PO intake over last few months 2/2 not being hungry. Daughter at bedside to supplement history. Pt denies chest pain, SOB, abdominal pain, cough, light-headedness. Worsening KERRY (cr 4.2), gross hematuria noted in bedside commode (Pt was straight cath'd twice on last admission, black stool noted in bedside commode (patient takes iron supplement). No DVT, +bilateral pleural effusions, bilateral hydro, mild ascites noted CT scan  7/31: Pt seen and examined at bedside. Pt waxing and waning with her dementia confusion and orientation. Pt intermittently complaining of lower abdominal pain vs no complaints at all. Pt pulled moss out 2 times overnight. Decision was made to bladder scan and leave moss out. Patient continues to have gross hematuria with minimal urine output with minimal bladder retention seen on bedside u/s. Per Urology, decision was made to place bilateral stents today due to worsening KERRY (Cr. 5.1),  8/1/22: Pt seen and examined at bedside. Underwent, BL ureteral stent placement yesterday. Tolerated procedure well. Moss reinserted by Dr. Gr during procedure. Pt states that currently she is in no pain; however, attests to mild discomfort from chronic sacral wound. Pt continues to have gross hematuria. Will postpone thoracentesis as per IR to see if pleural effusions self resolve after stent placement. Will US Wednesday AM.       OVERNIGHT EVENTS: Pt is s/p BL ureteral stent placement yesterday 7/31. Was complaining of abdominal pain and was given .5 Dilaudid IVP.    Home Medications:  amitriptyline 10 mg oral tablet: 3 tab(s) orally once a day (at bedtime) (30 Jul 2022 00:08)  aspirin 81 mg oral tablet: 1 tab(s) orally once a day (30 Jul 2022 00:08)  atorvastatin 40 mg oral tablet: 1 tab(s) orally once a day (30 Jul 2022 00:08)  buprenorphine:  (30 Jul 2022 00:08)  ferrous sulfate 200 mg (65 mg elemental iron) oral tablet: 1 tab(s) orally once a day (30 Jul 2022 00:08)  HYDROmorphone 4 mg oral tablet: 1 tab(s) orally 3 times a day (30 Jul 2022 00:08)  Macrodantin 100 mg oral capsule: 1 cap(s) orally 4 times a day (30 Jul 2022 00:08)  Metoprolol Succinate ER 25 mg oral tablet, extended release: 1 tab(s) orally once a day (30 Jul 2022 00:08)  MiraLax oral powder for reconstitution: orally once a day, As Needed - for constipation (30 Jul 2022 00:08)  PreserVision AREDS oral capsule: 1 tab(s) orally 2 times a day (30 Jul 2022 00:08)  Reglan 5 mg oral tablet: orally 2 times a day (30 Jul 2022 00:08)  Senna 8.6 mg oral tablet: 2 tab(s) orally once a day (at bedtime) (30 Jul 2022 00:08)  sertraline 25 mg oral tablet: two tablets daily  (30 Jul 2022 00:08)  Vitamin D3 1250 mcg (50,000 intl units) oral capsule: 1 cap(s) orally once a week (30 Jul 2022 00:08)  Zofran 8 mg oral tablet: 1 tab(s) orally every 6 hours (30 Jul 2022 00:08)      MEDICATIONS  (STANDING):  amitriptyline 10 milliGRAM(s) Oral at bedtime  aspirin enteric coated 81 milliGRAM(s) Oral daily  atorvastatin 40 milliGRAM(s) Oral at bedtime  bisacodyl Suppository 10 milliGRAM(s) Rectal daily  buprenorphine Patch 5 MICROgram(s)/Hr 1 Patch Transdermal every 7 days  ceFAZolin   IVPB 1000 milliGRAM(s) IV Intermittent every 24 hours  dextrose 5%. 1000 milliLiter(s) (50 mL/Hr) IV Continuous <Continuous>  dextrose 50% Injectable 25 Gram(s) IV Push once  dextrose 50% Injectable 12.5 Gram(s) IV Push once  dextrose 50% Injectable 25 Gram(s) IV Push once  ferrous    sulfate 325 milliGRAM(s) Oral daily  glucagon  Injectable 1 milliGRAM(s) IntraMuscular once  HYDROmorphone   Tablet 4 milliGRAM(s) Oral three times a day  insulin lispro (ADMELOG) corrective regimen sliding scale   SubCutaneous three times a day before meals  metoprolol succinate ER 25 milliGRAM(s) Oral daily  pantoprazole    Tablet 40 milliGRAM(s) Oral two times a day  sodium zirconium cyclosilicate 10 Gram(s) Oral once    MEDICATIONS  (PRN):  dextrose Oral Gel 15 Gram(s) Oral once PRN Blood Glucose LESS THAN 70 milliGRAM(s)/deciliter  melatonin 3 milliGRAM(s) Oral at bedtime PRN Insomnia  metoclopramide 5 milliGRAM(s) Oral two times a day PRN Nausea/vomiting  ondansetron    Tablet 8 milliGRAM(s) Oral three times a day PRN Nausea and/or Vomiting  polyethylene glycol 3350 17 Gram(s) Oral daily PRN for constipation      Allergies    morphine (Other)    Intolerances        Social History:  Tobacco: Former smoker  EtOH: Denies   Recreational drug use: Denies  Lives with: Self, HHA and daughter work around the clock for additional assistance  Ambulates: Cane, walker  ADLs: Requires assistance with all ADLs (29 Jul 2022 23:08)      REVIEW OF SYSTEMS: tired, poor po intake   CONSTITUTIONAL: No fever, No chills, No fatigue, No myalgia, No Body ache, No Weakness  EYES: No eye pain,  No visual disturbances, No discharge, NO Redness  ENMT:  No ear pain, No nose bleed, No vertigo; No sinus pain, NO throat pain, No Congestion  NECK: No pain, No stiffness  RESPIRATORY: No cough, NO wheezing, No  hemoptysis, ADMITS mild shortness of breath  CARDIOVASCULAR: No chest pain, palpitations  GASTROINTESTINAL: No abdominal pain, NO epigastric pain. No nausea, No vomiting; No diarrhea, No constipation. [ x ] BM  GENITOURINARY: ADMITS dysuria, No frequency, No urgency, ADMITS hematuria  NEUROLOGICAL: No headaches, No dizziness, No numbness, No tingling, No tremors, No weakness  EXT: No Swelling, No Pain, No Edema  SKIN:  [ x ] No itching, burning, rashes, or lesions   MUSCULOSKELETAL: No joint pain ,No Jt swelling; No muscle pain, No back pain, No extremity pain  PSYCHIATRIC: No depression,  No anxiety,  No mood swings ,No difficulty sleeping at night  PAIN SCALE: [  ] None  [ x  ] Other- Tylenol 650 prn for mild pain  ROS Unable to obtain due to - [  ] Dementia  [  ] Lethargy [  ] Drowsy [  ] Sedated [  ] non verbal  REST OF REVIEW Of SYSTEM - [ x ] Normal     Vital Signs Last 24 Hrs  T(C): 36.9 (01 Aug 2022 05:22), Max: 36.9 (31 Jul 2022 12:49)  T(F): 98.4 (01 Aug 2022 05:22), Max: 98.4 (31 Jul 2022 12:49)  HR: 98 (01 Aug 2022 05:22) (88 - 104)  BP: 123/67 (01 Aug 2022 05:22) (123/67 - 154/85)  BP(mean): --  RR: 18 (01 Aug 2022 05:22) (15 - 19)  SpO2: 93% (01 Aug 2022 05:22) (91% - 97%)    Parameters below as of 01 Aug 2022 05:22  Patient On (Oxygen Delivery Method): nasal cannula      Finger Stick        07-31 @ 07:01  -  08-01 @ 07:00  --------------------------------------------------------  IN: 0 mL / OUT: 1 mL / NET: -1 mL        PHYSICAL EXAM: cachexia   GENERAL:  [X] NAD , [X] well appearing, [  ] Agitated, [  ] Drowsy,  [  ] Lethargy, [X] confused   HEAD:  [X] Normal, [  ] Other  EYES:  [X] EOMI, [X] PERRLA, [X] conjunctiva and sclera clear normal, [  ] Other,  [  ] Pallor,[  ] Discharge  ENMT:  [X] Normal, [X] Dry  mucous membranes, [X] Moderate dentition [  ] Good dentition, [ x ] No Thrush  NECK:  [X] Supple, [X] No JVD, [ x ] Normal thyroid, [  ] Lymphadenopathy [  ] Other  CHEST/LUNG:  [X] Clear to auscultation bilaterally, [X] Breath Sounds equal B/L / Decrease, [x  ] poor effort  [X] No rales, [X] No rhonchi  [X]  No wheezing,   HEART:  [X] Regular rate and rhythm, [  ] tachycardia, [  ] Bradycardia,  [  ] irregular  [X] No murmurs, No rubs, No gallops, [  ] PPM in place (Mfr:  )  ABDOMEN: [X] Reducible umbilical hernia [X] Moderately tense but no peritoneal signs [ ] Soft, [X] Nontender, [X] Nondistended, [  ] No mass, [X] Bowel sounds present, [  ] obese  NERVOUS SYSTEM:  [X] Alert & Oriented X2 (person, place), [  ] Nonfocal  [  ] Confusion  [  ] Encephalopathic [  ] Sedated [  ] Unable to assess, [  ] Dementia [  ] Other-  EXTREMITIES: [X] 2+ Peripheral Pulses, No clubbing, No cyanosis, [X] right leg 3+ pitting edema from ankle to hip [ ] edema B/L lower EXT. [X] PVD stasis skin changes B/L Lower EXT with ecchymosis , [  ] wound  LYMPH: No lymphadenopathy noted  SKIN:  [  ] No rashes or lesions, [X] Pressure Ulcers (sacral), [ x ] ecchymosis- Large Ecchymosis Rt Knee & Thigh , [X] Skin Tears, [  ] Other    DIET: Diet, Pureed:   Consistent Carbohydrate Evening Snack (07-31-22 @ 19:55)      LABS:                        9.8    10.77 )-----------( 302      ( 01 Aug 2022 06:35 )             31.9     01 Aug 2022 06:35    136    |  99     |  95     ----------------------------<  154    5.5     |  26     |  5.30     Ca    9.0        01 Aug 2022 06:35    TPro  6.2    /  Alb  2.6    /  TBili  0.4    /  DBili  x      /  AST  21     /  ALT  11     /  AlkPhos  214    01 Aug 2022 06:35      RADIOLOGY & ADDITIONAL TESTS: none       HEALTH ISSUES - PROBLEM Dx:  KERRY (acute kidney injury)    Type 2 diabetes mellitus    GERD (gastroesophageal reflux disease)    CAD (coronary artery disease)    Need for prophylactic measure    Constipation    H/O vertebral fracture repair    Anemia    2019 novel coronavirus disease (COVID-19)    Bilateral pleural effusion    General weakness    Bilateral hydronephrosis    Urinary frequency    Esophageal dysmotility        Consultant(s) Notes Reviewed:  [ X ] YES     Care Discussed with [X] Consultants  [ X  ] Patient  [ X  ] Family [  ] HCP [  ]   [  ] Social Service  [ x ] RN, [  ] Physical Therapy,[  ] Palliative care team  DVT PPX: [  ] Lovenox, [  ] S C Heparin, [  ] Coumadin, [  ] Xarelto, [  ] Eliquis, [  ] Pradaxa, [  ] IV Heparin drip, [ X ] SCD [  ] Contraindication 2 to GI Bleed,[  ] Ambulation [  ] Contraindicated 2 to  bleed [  ] Contraindicated 2 to Brain Bleed  Advanced directive: [  ] None, [X  ] DNR/DNI

## 2022-08-01 NOTE — PROGRESS NOTE ADULT - ASSESSMENT
89yo F with PMH DM, dyslipidemia, gastroparesis, CAD s/p 2 stents, hx of vertebral fx with repair presents to the ED with back and R leg pain, as well as recent decreased urinary frequency. Patient was previously at [facility] 7/5/22-7/15/22 for COVID+, cholelithiasis, KERRY, and UTI/hematuria.    remains covid pos  hydro  effusions  atelectasis  cad  VCF  OP  OA  frail  weak  elderly  DM  HLD     follow up  s/p bilateral  stenting  Buprenorphine added for pain  plan for IR thoracentesis this am with IR    ct chest reviewed  effusions - bilaterally  I devonte if able to cooperate  I and O  cvs rx regimen and BP control  monitor for prolonged covid sx  assist with needs  GOC discussion  DM care  caution with IVF - monitor for vol overload

## 2022-08-01 NOTE — PROGRESS NOTE ADULT - PROBLEM SELECTOR PLAN 9
Hgb 10.3 on admission. Baseline Hgb 10-11 from previous admissions, also confirmed by daughter.   - f/u AM CBC, trend H/H  - Continue home ferrous sulfate  - Transfuse if Hgb <7 or clinically symptomatic - Insulin Corrective Scale  - Finger sticks per routine  - Hypoglycemia protocol

## 2022-08-01 NOTE — PROGRESS NOTE ADULT - SUBJECTIVE AND OBJECTIVE BOX
Progress Note:    Up in chair, awake with Daughter at bedside, patine nolberto buenrostro intermittent bladder spasms and mahogony colored urine, s/p cysto and bilateral stents      PAST MEDICAL & SURGICAL HISTORY:  H/O vertebral fracture repair      History of vertebral fracture      Dyslipidemia      DM type 2 with diabetic dyslipidemia      H/O gastroesophageal reflux (GERD)      Costochondritis      Coronary arteriosclerosis in native artery  s/p 2 stents. last placed 2 years ago      Gastroparesis      Hematuria      Hydronephrosis      Esophageal dysmotility      Anemia of chronic disease      History of laminectomy      S/P hip hemiarthroplasty      S/P appendectomy          MEDICATIONS  (STANDING):  amitriptyline 10 milliGRAM(s) Oral at bedtime  aspirin enteric coated 81 milliGRAM(s) Oral daily  atorvastatin 40 milliGRAM(s) Oral at bedtime  bisacodyl Suppository 10 milliGRAM(s) Rectal daily  ceFAZolin   IVPB 1000 milliGRAM(s) IV Intermittent every 24 hours  dextrose 5%. 1000 milliLiter(s) (50 mL/Hr) IV Continuous <Continuous>  dextrose 50% Injectable 25 Gram(s) IV Push once  dextrose 50% Injectable 12.5 Gram(s) IV Push once  dextrose 50% Injectable 25 Gram(s) IV Push once  ferrous    sulfate 325 milliGRAM(s) Oral daily  glucagon  Injectable 1 milliGRAM(s) IntraMuscular once  HYDROmorphone   Tablet 4 milliGRAM(s) Oral three times a day  insulin lispro (ADMELOG) corrective regimen sliding scale   SubCutaneous three times a day before meals  metoprolol succinate ER 25 milliGRAM(s) Oral daily  pantoprazole    Tablet 40 milliGRAM(s) Oral two times a day    MEDICATIONS  (PRN):  acetaminophen     Tablet .. 650 milliGRAM(s) Oral every 6 hours PRN Temp greater or equal to 38C (100.4F), Mild Pain (1 - 3)  dextrose Oral Gel 15 Gram(s) Oral once PRN Blood Glucose LESS THAN 70 milliGRAM(s)/deciliter  melatonin 3 milliGRAM(s) Oral at bedtime PRN Insomnia  metoclopramide 5 milliGRAM(s) Oral two times a day PRN Nausea/vomiting  ondansetron    Tablet 8 milliGRAM(s) Oral three times a day PRN Nausea and/or Vomiting  polyethylene glycol 3350 17 Gram(s) Oral daily PRN for constipation      Allergies    morphine (Other)    Intolerances            FAMILY HISTORY:  FHx: stroke (Mother)        Vital Signs Last 24 Hrs  T(C): 36.6 (01 Aug 2022 14:13), Max: 36.9 (01 Aug 2022 05:22)  T(F): 97.9 (01 Aug 2022 14:13), Max: 98.4 (01 Aug 2022 05:22)  HR: 95 (01 Aug 2022 14:13) (90 - 104)  BP: 119/65 (01 Aug 2022 14:13) (119/65 - 154/85)  BP(mean): --  RR: 17 (01 Aug 2022 14:13) (15 - 19)  SpO2: 95% (01 Aug 2022 14:13) (91% - 97%)    Parameters below as of 01 Aug 2022 14:13  Patient On (Oxygen Delivery Method): nasal cannula        PHYSICAL EXAM:    Constitutional: NAD  Abd: BS+, soft, NT/ND, No CVAT  : Bah with sam colored urine - no clots  Extremities: No peripheral edema      LABS:                        9.8    10.77 )-----------( 302      ( 01 Aug 2022 06:35 )             31.9     08-01    136  |  99  |  95<H>  ----------------------------<  154<H>  5.5<H>   |  26  |  5.30<H>    Ca    9.0      01 Aug 2022 06:35  Phos  5.2     07-31  Mg     3.0     07-31    TPro  6.2  /  Alb  2.6<L>  /  TBili  0.4  /  DBili  x   /  AST  21  /  ALT  11<L>  /  AlkPhos  214<H>  08-01        Urine Culture:   Hemoglobin: 9.8 g/dL (08-01 @ 06:35)  Hematocrit: 31.9 % (08-01 @ 06:35)  Hemoglobin: 10.5 g/dL (07-31 @ 06:40)  Hematocrit: 33.9 % (07-31 @ 06:40)      RADIOLOGY & ADDITIONAL STUDIES:

## 2022-08-01 NOTE — PROGRESS NOTE ADULT - ASSESSMENT
KERRY - unchanged, renal atrophy and bilateral hydronephrosis    Hematuria - persistent - ?post Covid?    Continue Bah

## 2022-08-01 NOTE — PROGRESS NOTE ADULT - PROBLEM SELECTOR PLAN 3
CT abd/pelvis showed moderate B/L pleural effusions on admission  - Pulm consulted Dr. orona, recs appreciated  - Pt to go for thoracentesis this AM with IR   - Incentive spirometry CT abd/pelvis showed moderate B/L pleural effusions on admission  - Pulm consulted Dr. orona, recs appreciated  - Pt to go for thoracentesis possible on Wednesdays  with IR   - Incentive spirometry CT abd/pelvis showed moderate B/L pleural effusions on admission  - Pulm consulted Dr. orona, recs appreciated  - Pt to go for thoracentesis possible on Wednesdays with IR; will US Wednesday AM to see if effusions self resolved after stent placement   - Incentive spirometry

## 2022-08-01 NOTE — PROGRESS NOTE ADULT - ATTENDING COMMENTS
89yo F with PMH DM, dyslipidemia, gastroparesis, CAD s/p 2 stents, hx of vertebral fx with repair presents to the ED with back and R leg pain, as well as recent decreased urinary frequency, admitted for KERRY/CKD 3  and dehydration.  Pt seen, Examined, case & care plan d/w pt, residents at detail.  D/W Dtr at Detail.  Pulmonary-Dr Melo d/w  Possible B/L Pleural effusion- -Thoracentesis in  AM   Renal-DR JL Christopher -KERRY- worsening CKD 4  -Dr Almanza follow up- Cysto & B/L Ureteral stents placed- Bah cath for i/o's  -DNR/DNI  PO diet  AM labs   Total care time is 40 minutes  D/W Dtr at bed side.   -PT---> HCPT

## 2022-08-01 NOTE — PROGRESS NOTE ADULT - PROBLEM SELECTOR PLAN 4
Patient presented with back and R leg pain, daughter also commented on more noticeable generalized weakness  - PT consult for weakness, deconditioning  - Palliative consult for GOC discussion  - SW consult for dispo, d/c recs  - PT recommends home with home PT Patient has history of CAD s/p 2 stents  - Continue home Aspirin  - Continue home Atorvastatin

## 2022-08-01 NOTE — CHART NOTE - NSCHARTNOTEFT_GEN_A_CORE
Called by RN for patient complaining of new onset abdominal pain. Patient seen and examined at bedside. Patient says pain improves with urination but is constant. Received last dose dilaudid 21:19.       T(C): 36.3 (07-31-22 @ 20:54), Max: 36.9 (07-31-22 @ 04:38)  HR: 90 (07-31-22 @ 20:54) (88 - 104)  BP: 131/74 (07-31-22 @ 20:54) (119/64 - 154/85)  RR: 17 (07-31-22 @ 20:54) (15 - 19)  SpO2: 92% (07-31-22 @ 20:54) (91% - 97%)  Wt(kg): --    Physical Exam:  Gen: In acute distress, crying of pain  HEENT: NCAT, EOMI b/l, no conjunctival erythema  Cardio: regular rate and rhythm, +s1s2, no murmurs, rubs, or gallops  Pulm: CTA b/l, no wheezes, rales or rhonchi  Abdomen: tender to palpation, distended, +BS x4 quadrants, no guarding  Neuro: AAOx3, moving all 4 extremities, sensation intact  Skin: warm and dry    Assessment/Plan  89yo Female with PMH of DM, dyslipidemia, gastroparesis, CAD s/p 2 stents, hx of vertebral fx with repair presents to the ED with back and R leg pain, as well as recent decreased urinary frequency, admitted for KERRY and dehydration.    Abdominal Pain  -s/p Called by RN for patient complaining of new onset abdominal pain. Patient seen and examined at bedside. Patient says pain improves with urination but is constant. Received last dose dilaudid 21:19.       T(C): 36.3 (07-31-22 @ 20:54), Max: 36.9 (07-31-22 @ 04:38)  HR: 90 (07-31-22 @ 20:54) (88 - 104)  BP: 131/74 (07-31-22 @ 20:54) (119/64 - 154/85)  RR: 17 (07-31-22 @ 20:54) (15 - 19)  SpO2: 92% (07-31-22 @ 20:54) (91% - 97%)  Wt(kg): --    Physical Exam:  Gen: In acute distress, crying of pain  HEENT: NCAT, EOMI b/l, no conjunctival erythema  Cardio: regular rate and rhythm, +s1s2, no murmurs, rubs, or gallops  Pulm: CTA b/l, no wheezes, rales or rhonchi  Abdomen: tender to palpation, distended, +BS x4 quadrants, no guarding  Neuro: AAOx3, moving all 4 extremities, sensation intact  Skin: warm and dry    Assessment/Plan  89yo Female with PMH of DM, dyslipidemia, gastroparesis, CAD s/p 2 stents, hx of vertebral fx with repair presents to the ED with back and R leg pain, as well as recent decreased urinary frequency, admitted for KERRY and dehydration.    Abdominal Pain  -s/p b/l stent placement  - Give 1 time stat dose 0.5mg dilaudid IVP  - monitor pain response   - can consider another dose of dilaudid if indicated

## 2022-08-01 NOTE — PROGRESS NOTE ADULT - ASSESSMENT
KERRY on CKD 4  Hyperkalemia  Hematuria  N/V  COVID 19+  B/L Mod Hydronephrosis    -BLCr 1.8  -KERRY possibly from obstruction  -Urine lytes reviewed  -UA >50 RBCs  -Lokelma  -Low K diet  -Urology consulted; s/p stent placement  -Renal indices stable high; discussed with daughter and patient this morning at bedside. Would want to wait another 24 hrs to see how cr trends before deciding on dialysis. Would likely agree to proceed if needed based upon our conversation.

## 2022-08-01 NOTE — PROGRESS NOTE ADULT - PROBLEM SELECTOR PLAN 2
COVID-19 detected in PCR on this admission. Likely viral shedding from prior COVID infection, last detected on 7/5  - Isolation precautions as per hospital protocol  - Monitor SpO2 >90%, provide oxygen support if patient desaturates Hgb 10.3 on admission. Baseline Hgb 10-11 from previous admissions, also confirmed by daughter.   - f/u AM CBC, trend H/H, Hematuria mild   - Continue home ferrous sulfate  - Transfuse if Hgb <7 or clinically symptomatic

## 2022-08-01 NOTE — PROGRESS NOTE ADULT - PROBLEM SELECTOR PLAN 5
- Insulin Corrective Scale  - Finger sticks per routine  - Hypoglycemia protocol GI-Dr Montilla, pt has had Esophogram   - Pureed diet with aspiration precautions  - Encourage PO and fluid intake, PPI 2x day   - Pureed diet (consistent carb) with Glucerna 3x/day, aspiration precautions.

## 2022-08-01 NOTE — PROGRESS NOTE ADULT - PROBLEM SELECTOR PLAN 8
GI-Dr Montilla, pt has had Esophogram   - Pureed diet with aspiration precautions  - Encourage PO and fluid intake  - Pureed diet (consistent carb) with Glucerna 3x/day, aspiration precautions. Patient presented with back and R leg pain, daughter also commented on more noticeable generalized weakness  - PT consult for weakness, deconditioning  - Palliative consult for GOC discussion  - SW consult for dispo, d/c recs  - PT recommends home with home PT

## 2022-08-02 LAB
ALBUMIN SERPL ELPH-MCNC: 2.5 G/DL — LOW (ref 3.3–5)
ALP SERPL-CCNC: 186 U/L — HIGH (ref 40–120)
ALT FLD-CCNC: <6 U/L — LOW (ref 12–78)
ANION GAP SERPL CALC-SCNC: 14 MMOL/L — SIGNIFICANT CHANGE UP (ref 5–17)
APTT BLD: 26.7 SEC — LOW (ref 27.5–35.5)
AST SERPL-CCNC: 20 U/L — SIGNIFICANT CHANGE UP (ref 15–37)
BASOPHILS # BLD AUTO: 0 K/UL — SIGNIFICANT CHANGE UP (ref 0–0.2)
BASOPHILS NFR BLD AUTO: 0 % — SIGNIFICANT CHANGE UP (ref 0–2)
BILIRUB SERPL-MCNC: 0.4 MG/DL — SIGNIFICANT CHANGE UP (ref 0.2–1.2)
BUN SERPL-MCNC: 101 MG/DL — HIGH (ref 7–23)
CALCIUM SERPL-MCNC: 8.6 MG/DL — SIGNIFICANT CHANGE UP (ref 8.5–10.1)
CHLORIDE SERPL-SCNC: 101 MMOL/L — SIGNIFICANT CHANGE UP (ref 96–108)
CO2 SERPL-SCNC: 23 MMOL/L — SIGNIFICANT CHANGE UP (ref 22–31)
CREAT SERPL-MCNC: 5.2 MG/DL — HIGH (ref 0.5–1.3)
EGFR: 7 ML/MIN/1.73M2 — LOW
EOSINOPHIL # BLD AUTO: 0.11 K/UL — SIGNIFICANT CHANGE UP (ref 0–0.5)
EOSINOPHIL NFR BLD AUTO: 1 % — SIGNIFICANT CHANGE UP (ref 0–6)
GLUCOSE SERPL-MCNC: 166 MG/DL — HIGH (ref 70–99)
HCT VFR BLD CALC: 29.3 % — LOW (ref 34.5–45)
HGB BLD-MCNC: 9.4 G/DL — LOW (ref 11.5–15.5)
INR BLD: 1.1 RATIO — SIGNIFICANT CHANGE UP (ref 0.88–1.16)
LYMPHOCYTES # BLD AUTO: 0.32 K/UL — LOW (ref 1–3.3)
LYMPHOCYTES # BLD AUTO: 3 % — LOW (ref 13–44)
MAGNESIUM SERPL-MCNC: 2.9 MG/DL — HIGH (ref 1.6–2.6)
MCHC RBC-ENTMCNC: 30.1 PG — SIGNIFICANT CHANGE UP (ref 27–34)
MCHC RBC-ENTMCNC: 32.1 GM/DL — SIGNIFICANT CHANGE UP (ref 32–36)
MCV RBC AUTO: 93.9 FL — SIGNIFICANT CHANGE UP (ref 80–100)
MONOCYTES # BLD AUTO: 0.85 K/UL — SIGNIFICANT CHANGE UP (ref 0–0.9)
MONOCYTES NFR BLD AUTO: 8 % — SIGNIFICANT CHANGE UP (ref 2–14)
NEUTROPHILS # BLD AUTO: 9.37 K/UL — HIGH (ref 1.8–7.4)
NEUTROPHILS NFR BLD AUTO: 88 % — HIGH (ref 43–77)
NRBC # BLD: SIGNIFICANT CHANGE UP /100 WBCS (ref 0–0)
PHOSPHATE SERPL-MCNC: 5.6 MG/DL — HIGH (ref 2.5–4.5)
PLATELET # BLD AUTO: 283 K/UL — SIGNIFICANT CHANGE UP (ref 150–400)
POTASSIUM SERPL-MCNC: 4.5 MMOL/L — SIGNIFICANT CHANGE UP (ref 3.5–5.3)
POTASSIUM SERPL-SCNC: 4.5 MMOL/L — SIGNIFICANT CHANGE UP (ref 3.5–5.3)
PROT SERPL-MCNC: 6.1 G/DL — SIGNIFICANT CHANGE UP (ref 6–8.3)
PROTHROM AB SERPL-ACNC: 12.9 SEC — SIGNIFICANT CHANGE UP (ref 10.5–13.4)
RBC # BLD: 3.12 M/UL — LOW (ref 3.8–5.2)
RBC # FLD: 19.1 % — HIGH (ref 10.3–14.5)
SODIUM SERPL-SCNC: 138 MMOL/L — SIGNIFICANT CHANGE UP (ref 135–145)
WBC # BLD: 10.65 K/UL — HIGH (ref 3.8–10.5)
WBC # FLD AUTO: 10.65 K/UL — HIGH (ref 3.8–10.5)

## 2022-08-02 PROCEDURE — 99223 1ST HOSP IP/OBS HIGH 75: CPT

## 2022-08-02 PROCEDURE — 76604 US EXAM CHEST: CPT | Mod: 26

## 2022-08-02 PROCEDURE — 99497 ADVNCD CARE PLAN 30 MIN: CPT | Mod: 25

## 2022-08-02 RX ORDER — LIDOCAINE 4 G/100G
1 CREAM TOPICAL DAILY
Refills: 0 | Status: DISCONTINUED | OUTPATIENT
Start: 2022-08-02 | End: 2022-08-03

## 2022-08-02 RX ORDER — HYDROMORPHONE HYDROCHLORIDE 2 MG/ML
0.2 INJECTION INTRAMUSCULAR; INTRAVENOUS; SUBCUTANEOUS ONCE
Refills: 0 | Status: DISCONTINUED | OUTPATIENT
Start: 2022-08-02 | End: 2022-08-02

## 2022-08-02 RX ORDER — HYDROMORPHONE HYDROCHLORIDE 2 MG/ML
0.25 INJECTION INTRAMUSCULAR; INTRAVENOUS; SUBCUTANEOUS
Refills: 0 | Status: DISCONTINUED | OUTPATIENT
Start: 2022-08-02 | End: 2022-08-03

## 2022-08-02 RX ORDER — METOPROLOL TARTRATE 50 MG
2.5 TABLET ORAL EVERY 8 HOURS
Refills: 0 | Status: DISCONTINUED | OUTPATIENT
Start: 2022-08-02 | End: 2022-08-03

## 2022-08-02 RX ORDER — HYDROMORPHONE HYDROCHLORIDE 2 MG/ML
0.5 INJECTION INTRAMUSCULAR; INTRAVENOUS; SUBCUTANEOUS EVERY 4 HOURS
Refills: 0 | Status: DISCONTINUED | OUTPATIENT
Start: 2022-08-02 | End: 2022-08-03

## 2022-08-02 RX ORDER — METOCLOPRAMIDE HCL 10 MG
5 TABLET ORAL EVERY 6 HOURS
Refills: 0 | Status: DISCONTINUED | OUTPATIENT
Start: 2022-08-02 | End: 2022-08-03

## 2022-08-02 RX ORDER — ONDANSETRON 8 MG/1
4 TABLET, FILM COATED ORAL EVERY 6 HOURS
Refills: 0 | Status: DISCONTINUED | OUTPATIENT
Start: 2022-08-02 | End: 2022-08-03

## 2022-08-02 RX ORDER — HYDROMORPHONE HYDROCHLORIDE 2 MG/ML
0.5 INJECTION INTRAMUSCULAR; INTRAVENOUS; SUBCUTANEOUS
Refills: 0 | Status: DISCONTINUED | OUTPATIENT
Start: 2022-08-02 | End: 2022-08-03

## 2022-08-02 RX ADMIN — Medication 2.5 MILLIGRAM(S): at 22:20

## 2022-08-02 RX ADMIN — HYDROMORPHONE HYDROCHLORIDE 4 MILLIGRAM(S): 2 INJECTION INTRAMUSCULAR; INTRAVENOUS; SUBCUTANEOUS at 07:00

## 2022-08-02 RX ADMIN — HYDROMORPHONE HYDROCHLORIDE 0.2 MILLIGRAM(S): 2 INJECTION INTRAMUSCULAR; INTRAVENOUS; SUBCUTANEOUS at 09:38

## 2022-08-02 RX ADMIN — PANTOPRAZOLE SODIUM 40 MILLIGRAM(S): 20 TABLET, DELAYED RELEASE ORAL at 06:23

## 2022-08-02 RX ADMIN — HYDROMORPHONE HYDROCHLORIDE 0.2 MILLIGRAM(S): 2 INJECTION INTRAMUSCULAR; INTRAVENOUS; SUBCUTANEOUS at 10:00

## 2022-08-02 RX ADMIN — Medication 10 MILLIGRAM(S): at 11:53

## 2022-08-02 RX ADMIN — Medication 100 MILLIGRAM(S): at 17:31

## 2022-08-02 RX ADMIN — Medication 25 MILLIGRAM(S): at 06:23

## 2022-08-02 RX ADMIN — Medication 1: at 12:35

## 2022-08-02 RX ADMIN — HEPARIN SODIUM 5000 UNIT(S): 5000 INJECTION INTRAVENOUS; SUBCUTANEOUS at 06:24

## 2022-08-02 RX ADMIN — HYDROMORPHONE HYDROCHLORIDE 4 MILLIGRAM(S): 2 INJECTION INTRAMUSCULAR; INTRAVENOUS; SUBCUTANEOUS at 14:22

## 2022-08-02 RX ADMIN — LIDOCAINE 1 PATCH: 4 CREAM TOPICAL at 11:51

## 2022-08-02 RX ADMIN — LIDOCAINE 1 PATCH: 4 CREAM TOPICAL at 19:36

## 2022-08-02 RX ADMIN — HYDROMORPHONE HYDROCHLORIDE 0.5 MILLIGRAM(S): 2 INJECTION INTRAMUSCULAR; INTRAVENOUS; SUBCUTANEOUS at 23:00

## 2022-08-02 RX ADMIN — Medication 325 MILLIGRAM(S): at 12:34

## 2022-08-02 RX ADMIN — HYDROMORPHONE HYDROCHLORIDE 4 MILLIGRAM(S): 2 INJECTION INTRAMUSCULAR; INTRAVENOUS; SUBCUTANEOUS at 15:15

## 2022-08-02 RX ADMIN — Medication 81 MILLIGRAM(S): at 11:53

## 2022-08-02 RX ADMIN — HYDROMORPHONE HYDROCHLORIDE 4 MILLIGRAM(S): 2 INJECTION INTRAMUSCULAR; INTRAVENOUS; SUBCUTANEOUS at 06:23

## 2022-08-02 RX ADMIN — Medication 1: at 09:41

## 2022-08-02 RX ADMIN — HYDROMORPHONE HYDROCHLORIDE 0.5 MILLIGRAM(S): 2 INJECTION INTRAMUSCULAR; INTRAVENOUS; SUBCUTANEOUS at 17:35

## 2022-08-02 RX ADMIN — ONDANSETRON 8 MILLIGRAM(S): 8 TABLET, FILM COATED ORAL at 11:53

## 2022-08-02 RX ADMIN — HYDROMORPHONE HYDROCHLORIDE 0.5 MILLIGRAM(S): 2 INJECTION INTRAMUSCULAR; INTRAVENOUS; SUBCUTANEOUS at 22:20

## 2022-08-02 NOTE — CONSULT NOTE ADULT - ASSESSMENT
KERRY on CKD now ESRD with anuria   comfort care  d/c to inpatient hospice    Pain/SOB  dilaudid    Nausea  zofran     PPS 20%  hours-days     89yo F with PMH DM, dyslipidemia, gastroparesis, CAD s/p 2 stents, hx of vertebral fx with repair presents to the ED with back and R leg pain, as well as recent decreased urinary frequency, admitted for KERRY and dehydration.    Nutritional Assessment:  · Nutritional Assessment	This patient has been assessed with a concern for Malnutrition and has been determined to have a diagnosis/diagnoses of Severe protein-calorie malnutrition.    This patient is being managed with:   Diet Pureed-  Consistent Carbohydrate {Evening Snack}  No Concentrated Potassium  Supplement Feeding Modality:  Oral  Nepro Cans or Servings Per Day:  1       Frequency:  Three Times a day  Entered: Aug  1 2022  2:59PM      #KERRY on CKD now ESRD with anuria   - CT abd/pelvis: Moderate B/L hydronephrosis, moderate B/L pleural effusions, mild ascites  - c/w Cefazolin, will reach out to urology to determine length of tx   - Cr stable from yesterday (5.2) ; s/p BL ureteral stent placement yesterday 7/31; moss in place; gross hematuria   - Hyperkalemia resolved after [facility]  - nephrology recs noted; not offering HD given that harm outweighs benefit   - comfort care  - d/c to inpatient hospice    #Bilateral pleural effusion.   - CT abd/pelvis showed moderate B/L pleural effusions   - pulm recs noted    #Pain/SOB  - d/c buprenorphine patch  - dilaudid 0.5 mg IVP q4h RTC and prn    #GERD  #Nausea 2/2 uremia  - continue PPI  - ok with reglan prn     #ACP/GOC  - PPS 20%  - prognosis: hours-days  - comfort care. D/c to inpatient hospice.     Appreciate consult. Discussed with the primary team.    Carlos Bee MD, MIKE-C

## 2022-08-02 NOTE — PROGRESS NOTE ADULT - ATTENDING COMMENTS
89 yo F with PMH DM, dyslipidemia, gastroparesis, CAD s/p 2 stents, hx of vertebral fx with repair presents to the ED with back and R leg pain, as well as recent decreased urinary frequency, admitted for KERRY/CKD 3  and dehydration.  Pt seen, Examined, case & care plan d/w pt, residents at detail.  D/W Dtr at Detail.  Pulmonary-Dr Melo d/w  Possible B/L Pleural effusion- -Thoracentesis in AM  Renal-DR Wu  -KERRY/CKD 4- worsening Cr, Poor urine out put   -Dr Almanza follow up- Cysto & B/L Ureteral stents placed- Bah cath for i/o's  -DNR/DNI- Palliative care follow up.  -D/W Dtr at bed side. dtr does NOT want Dialysis treatment.  PO diet  AM labs   Total care time is 40 minutes  -D/W Dr Manrique- Plan for Hospice

## 2022-08-02 NOTE — PROGRESS NOTE ADULT - PROBLEM SELECTOR PLAN 8
Patient presented with back and R leg pain, daughter also commented on more noticeable generalized weakness  - PT consult for weakness, deconditioning  - Palliative consult for GOC discussion  - SW consult for dispo, d/c recs  - PT recommends home with home PT

## 2022-08-02 NOTE — PROGRESS NOTE ADULT - PROBLEM SELECTOR PLAN 1
renal fntn has not improved since stents placed, perhaps now she can tolerate more aggressive hydration, as aide reports PO intake is poor

## 2022-08-02 NOTE — CONSULT NOTE ADULT - CONVERSATION DETAILS
comfort care  d/c to inpatient hospice Reviewed patient's medical and social history as well as events leading to patient's hospitalization. Writer discussed patient's current diagnosis (ESRD not a candidate for HD, anuria, anemia, pleural effusion), medical condition and management,  poor prognosis, and short life expectancy. Patient asked, "Am I dying?" Writer inquired about what matters most. Family stated comfort and no suffering. Recommended inpatient hospice for symptom control anf if stable pt could go home with hospice care. Family in agreement. Dtr was grieving appropriately. Psychosocial support provided.

## 2022-08-02 NOTE — PROGRESS NOTE ADULT - PROBLEM SELECTOR PLAN 5
GI-Dr Montilla, pt has had Esophogram   - Pureed diet with aspiration precautions  - Encourage PO and fluid intake, PPI 2x day   - Pureed diet (consistent carb) with Glucerna 3x/day, aspiration precautions.

## 2022-08-02 NOTE — PROGRESS NOTE ADULT - SUBJECTIVE AND OBJECTIVE BOX
Date/Time Patient Seen:  		  Referring MD:   Data Reviewed	       Patient is a 89y old  Female who presents with a chief complaint of back and R leg pain, inability to urinate (01 Aug 2022 14:17)      Subjective/HPI     PAST MEDICAL & SURGICAL HISTORY:  H/O vertebral fracture repair    History of vertebral fracture    Dyslipidemia    DM type 2 with diabetic dyslipidemia    H/O gastroesophageal reflux (GERD)    Costochondritis    Coronary arteriosclerosis in native artery  s/p 2 stents. last placed 2 years ago    Gastroparesis    Hematuria    Hydronephrosis    Esophageal dysmotility    Anemia of chronic disease    History of laminectomy    S/P hip hemiarthroplasty    S/P appendectomy          Medication list         MEDICATIONS  (STANDING):  amitriptyline 10 milliGRAM(s) Oral at bedtime  aspirin enteric coated 81 milliGRAM(s) Oral daily  atorvastatin 40 milliGRAM(s) Oral at bedtime  bisacodyl Suppository 10 milliGRAM(s) Rectal daily  buprenorphine Patch 5 MICROgram(s)/Hr 1 Patch Transdermal every 7 days  ceFAZolin   IVPB 1000 milliGRAM(s) IV Intermittent every 24 hours  dextrose 5%. 1000 milliLiter(s) (50 mL/Hr) IV Continuous <Continuous>  dextrose 50% Injectable 25 Gram(s) IV Push once  dextrose 50% Injectable 12.5 Gram(s) IV Push once  dextrose 50% Injectable 25 Gram(s) IV Push once  ferrous    sulfate 325 milliGRAM(s) Oral daily  glucagon  Injectable 1 milliGRAM(s) IntraMuscular once  heparin   Injectable 5000 Unit(s) SubCutaneous every 12 hours  HYDROmorphone   Tablet 4 milliGRAM(s) Oral three times a day  insulin lispro (ADMELOG) corrective regimen sliding scale   SubCutaneous three times a day before meals  metoprolol succinate ER 25 milliGRAM(s) Oral daily  pantoprazole    Tablet 40 milliGRAM(s) Oral two times a day    MEDICATIONS  (PRN):  acetaminophen     Tablet .. 650 milliGRAM(s) Oral every 6 hours PRN Temp greater or equal to 38C (100.4F), Mild Pain (1 - 3)  dextrose Oral Gel 15 Gram(s) Oral once PRN Blood Glucose LESS THAN 70 milliGRAM(s)/deciliter  melatonin 3 milliGRAM(s) Oral at bedtime PRN Insomnia  metoclopramide 5 milliGRAM(s) Oral two times a day PRN Nausea/vomiting  ondansetron    Tablet 8 milliGRAM(s) Oral three times a day PRN Nausea and/or Vomiting  polyethylene glycol 3350 17 Gram(s) Oral daily PRN for constipation         Vitals log        ICU Vital Signs Last 24 Hrs  T(C): 36.5 (01 Aug 2022 19:59), Max: 36.9 (01 Aug 2022 05:22)  T(F): 97.7 (01 Aug 2022 19:59), Max: 98.4 (01 Aug 2022 05:22)  HR: 96 (01 Aug 2022 19:59) (95 - 98)  BP: 133/72 (01 Aug 2022 19:59) (119/65 - 133/72)  BP(mean): --  ABP: --  ABP(mean): --  RR: 18 (01 Aug 2022 19:59) (17 - 18)  SpO2: 95% (01 Aug 2022 19:59) (93% - 95%)    O2 Parameters below as of 01 Aug 2022 19:59  Patient On (Oxygen Delivery Method): nasal cannula                 Input and Output:  I&O's Detail    01 Aug 2022 07:01  -  02 Aug 2022 05:13  --------------------------------------------------------  IN:    IV PiggyBack: 50 mL    Oral Fluid: 250 mL  Total IN: 300 mL    OUT:    Indwelling Catheter - Urethral (mL): 400 mL  Total OUT: 400 mL    Total NET: -100 mL          Lab Data                        9.8    10.77 )-----------( 302      ( 01 Aug 2022 06:35 )             31.9     08-01    136  |  99  |  94<H>  ----------------------------<  139<H>  4.8   |  23  |  5.20<H>    Ca    8.9      01 Aug 2022 14:00  Phos  5.2     07-31  Mg     3.0     07-31    TPro  6.2  /  Alb  2.6<L>  /  TBili  0.4  /  DBili  x   /  AST  21  /  ALT  11<L>  /  AlkPhos  214<H>  08-01            Review of Systems	      Objective     Physical Examination    heart s1s2  lung dc BS  abd soft      Pertinent Lab findings & Imaging      Olvin:  NO   Adequate UO     I&O's Detail    01 Aug 2022 07:01  -  02 Aug 2022 05:13  --------------------------------------------------------  IN:    IV PiggyBack: 50 mL    Oral Fluid: 250 mL  Total IN: 300 mL    OUT:    Indwelling Catheter - Urethral (mL): 400 mL  Total OUT: 400 mL    Total NET: -100 mL               Discussed with:     Cultures:	        Radiology

## 2022-08-02 NOTE — PROGRESS NOTE ADULT - ASSESSMENT
89yo F with PMH DM, dyslipidemia, gastroparesis, CAD s/p 2 stents, hx of vertebral fx with repair presents to the ED with back and R leg pain, as well as recent decreased urinary frequency. Patient was previously at [facility] 7/5/22-7/15/22 for COVID+, cholelithiasis, KERRY, and UTI/hematuria.    remains covid pos  hydro  effusions  atelectasis  cad  VCF  OP  OA  frail  weak  elderly  DM  HLD    IR thoracentesis postponed   follow up noted  will check US chest today - poss IR thoracentesis larry - discussed with IR MD    ct chest reviewed  effusions - bilaterally  I devonte if able to cooperate  I and O  cvs rx regimen and BP control  monitor for prolonged covid sx  assist with needs  GOC discussion  DM care

## 2022-08-02 NOTE — PROGRESS NOTE ADULT - PROBLEM SELECTOR PLAN 2
Hgb 10.3 on admission. Baseline Hgb 10-11 from previous admissions, also confirmed by daughter.   - f/u AM CBC, trend H/H, Hematuria mild   - Continue home ferrous sulfate  - Transfuse if Hgb <7 or clinically symptomatic

## 2022-08-02 NOTE — PROGRESS NOTE ADULT - SUBJECTIVE AND OBJECTIVE BOX
Patient is a 89y old  Female who presents with a chief complaint of back and R leg pain, inability to urinate (30 Jul 2022 08:35)       HPI:  Additional history obtained from daughter (Mary) via telephone conversation.    89yo F with PMH DM, dyslipidemia, gastroparesis, CAD s/p 2 stents, hx of vertebral fx with repair presents to the ED with back and R leg pain, as well as recent decreased urinary frequency. Patient was previously at [facility] 7/5/22-7/15/22 for COVID+, cholelithiasis, KERRY, and UTI/hematuria. Patient lives at home by herself with home health aide and daughter who take turns caring for her. Daughter commented that patient has worsening weakness and PO intake, increased irritability, and recent difficulty with urination. Daughter states that during her previous admission, patient would report urinary frequency that occurred every 20-30 minutes; Nephrology had seen her previously and prescribed Lasix q48h. Patient says that sometimes "she forgets to drink", and daughter states that COVID infection has changed her sense of taste as well as overall appetite. Daughter also concerned about ecchymosis in the R hip and knee region which had occurred during the last hospital stay; however patient denies any recent fall or injury.   Patient with decreased PO intake and N/V.  Has had trouble urinating and is having hematuria.  No dysgeusia or asterixis present. Known to use from prior.    No new complaints    PAST MEDICAL & SURGICAL HISTORY:  H/O vertebral fracture repair      History of vertebral fracture      Dyslipidemia      DM type 2 with diabetic dyslipidemia      H/O gastroesophageal reflux (GERD)      Costochondritis      Coronary arteriosclerosis in native artery  s/p 2 stents. last placed 2 years ago      Gastroparesis      History of laminectomy      S/P hip hemiarthroplasty      S/P appendectomy           FAMILY HISTORY:  FHx: stroke (Mother)    NC    Social History:Non smoker    MEDICATIONS  (STANDING):  amitriptyline 10 milliGRAM(s) Oral at bedtime  aspirin enteric coated 81 milliGRAM(s) Oral daily  atorvastatin 40 milliGRAM(s) Oral at bedtime  dextrose 5%. 1000 milliLiter(s) (50 mL/Hr) IV Continuous <Continuous>  dextrose 5%. 1000 milliLiter(s) (100 mL/Hr) IV Continuous <Continuous>  dextrose 50% Injectable 25 Gram(s) IV Push once  dextrose 50% Injectable 12.5 Gram(s) IV Push once  dextrose 50% Injectable 25 Gram(s) IV Push once  ergocalciferol 52073 Unit(s) Oral <User Schedule>  ferrous    sulfate 325 milliGRAM(s) Oral daily  glucagon  Injectable 1 milliGRAM(s) IntraMuscular once  heparin   Injectable 5000 Unit(s) SubCutaneous every 8 hours  HYDROmorphone   Tablet 4 milliGRAM(s) Oral three times a day  insulin lispro (ADMELOG) corrective regimen sliding scale   SubCutaneous three times a day before meals  insulin lispro (ADMELOG) corrective regimen sliding scale   SubCutaneous at bedtime  metoprolol succinate ER 25 milliGRAM(s) Oral daily  pantoprazole    Tablet 40 milliGRAM(s) Oral two times a day  senna 2 Tablet(s) Oral at bedtime  sertraline 25 milliGRAM(s) Oral two times a day  sodium chloride 0.9%. 1000 milliLiter(s) (70 mL/Hr) IV Continuous <Continuous>  sodium zirconium cyclosilicate 10 Gram(s) Oral two times a day    MEDICATIONS  (PRN):  dextrose Oral Gel 15 Gram(s) Oral once PRN Blood Glucose LESS THAN 70 milliGRAM(s)/deciliter  melatonin 3 milliGRAM(s) Oral at bedtime PRN Insomnia  metoclopramide 5 milliGRAM(s) Oral two times a day PRN Nausea/vomiting  ondansetron    Tablet 8 milliGRAM(s) Oral three times a day PRN Nausea and/or Vomiting  polyethylene glycol 3350 17 Gram(s) Oral daily PRN for constipation   Meds reviewed    Allergies    morphine (Other)    Intolerances         REVIEW OF SYSTEMS:    Limited due to mental status      Vital Signs Last 24 Hrs  T(C): 37 (02 Aug 2022 05:23), Max: 37 (02 Aug 2022 05:23)  T(F): 98.6 (02 Aug 2022 05:23), Max: 98.6 (02 Aug 2022 05:23)  HR: 96 (02 Aug 2022 07:38) (95 - 97)  BP: 114/55 (02 Aug 2022 07:38) (114/55 - 133/72)  BP(mean): --  RR: 17 (02 Aug 2022 07:38) (17 - 18)  SpO2: 92% (02 Aug 2022 07:38) (90% - 95%)    Parameters below as of 02 Aug 2022 07:38  Patient On (Oxygen Delivery Method): nasal cannula  O2 Flow (L/min): 4        PHYSICAL EXAM:    GENERAL: NAD, frail appearing  HEAD:  Atraumatic, Normocephalic  EYES: Conjunctiva and sclera clear  ENMT: No Drainage from nares, No drainage from ears  NECK: Supple, neck  veins full  NERVOUS SYSTEM:  Awake, confused  CHEST/LUNG: reduced breath sounds  HEART: Regular rate and rhythm; No murmurs, rubs, or gallops  ABDOMEN: Soft, Nontender, mildly distended; Bowel sounds present  EXTREMITIES:  No Edema  SKIN: No rashes No obvious ecchymosis  +moss      LABS:                                            9.4    10.65 )-----------( 283      ( 02 Aug 2022 06:55 )             29.3     08-02    138  |  101  |  101<H>  ----------------------------<  166<H>  4.5   |  23  |  5.20<H>    Ca    8.6      02 Aug 2022 06:55  Phos  5.6     08-02  Mg     2.9     08-02    TPro  6.1  /  Alb  2.5<L>  /  TBili  0.4  /  DBili  x   /  AST  20  /  ALT  <6<L>  /  AlkPhos  186<H>  08-02

## 2022-08-02 NOTE — PROGRESS NOTE ADULT - PROBLEM SELECTOR PLAN 7
Hx of vertebral fracture with repair, patient presents with back and R leg pain. Takes PO dilaudid at home  - Continue- Patch -Buprenorphine 5 mg q weekly -Placed Friday   - Fall risk protocol

## 2022-08-02 NOTE — CONSULT NOTE ADULT - REASON FOR ADMISSION
back and R leg pain, inability to urinate

## 2022-08-02 NOTE — PROGRESS NOTE ADULT - ASSESSMENT
KERRY on CKD 4  Hyperkalemia  Hematuria  N/V  COVID 19+  B/L Mod Hydronephrosis    -BLCr 1.8  -KERRY possibly from obstruction  -Urine lytes reviewed  -UA >50 RBCs  -Lokelma  -Low K diet  -Urology consulted; s/p stent placement  -Renal indices are stable well above baseline; chance for recovery remains in question. Will monitor for now  -Had a lengthy discussion with the patient's Daughter at bedside. Discussed dialysis in great detail. Explained the risks and benefits of dialysis. Risks include but are not limited to hypotension, cardiac dysfunction, weakness, fatigue, reduced quality of life, and death. Also explained the risk of dialysis access (catheter in this case) - risk life threatening infection and patient pulling out catheter when confused leading to life threatening bleeding. Patient is also malnourished with little muscle mass. Given advanced age as well, she is a poor candidate for dialysis. I would not recommend dialysis in this case as the long term benefits are less than the risks associated with dialysis. The patients daughter was agreeing with this opinion but wants to talk to her mother some more today, though she is having bouts of confusion, before giving us that final decision. They will speak to Dr. Izquierdo when she is present.     Thank you    D/W Primary team    Spent > 45 mins including documentation

## 2022-08-02 NOTE — PROGRESS NOTE ADULT - SUBJECTIVE AND OBJECTIVE BOX
Patient is a 89y old  Female who presents with a chief complaint of back and R leg pain, inability to urinate (02 Aug 2022 08:53)    HPI:  Additional history obtained from daughter (Mary) via telephone conversation.    89yo F with PMH DM, dyslipidemia, gastroparesis, CAD s/p 2 stents, hx of vertebral fx with repair presents to the ED with back and R leg pain, as well as recent decreased urinary frequency. Patient was previously at [facility] 7/5/22-7/15/22 for COVID+, cholelithiasis, KERRY, and UTI/hematuria. Patient lives at home by herself with home health aide and daughter who take turns caring for her. Daughter commented that patient has worsening weakness and PO intake, increased irritability, and recent difficulty with urination. Daughter states that during her previous admission, patient would report urinary frequency that occurred every 20-30 minutes; Nephrology had seen her previously and prescribed Lasix q48h. Patient says that sometimes "she forgets to drink", and daughter states that COVID infection has changed her sense of taste as well as overall appetite. Daughter also concerned about ecchymosis in the R hip and knee region which had occurred during the last hospital stay; however patient denies any recent fall or injury.     in ED:  Vital Signs: T 97.9F, HR 83, 121/63, RR 18, 98% on RA  Labs: Hgb 10.3, Na 133, BUN 88, Cr 3.90, Gluc 159, Alk Phos 227, Mg 3.2  COVID+  CXR: Large bilateral pleural effusions layering posteriorly. Cannot exclude   underlying infiltrate or atelectasis.  CT chest, abd/pelvis: Moderate B/L hydronephrosis, moderate B/L pleural effusions, mild ascites  RLE doppler: No evidence of RLE DVT  UA: Proteinuria, trace ketones, small leukocyte esterase, large blood, few bacteria  Given: NaCl bolus x1, PO Dilaudid, IV Dilaudid (29 Jul 2022 23:08)    INTERVAL HPI:  7/30/22: Pt seen and examined at bedside. Pt only complaint is pain from chronic sacral wound (being followed by nursing). Pt also has newly developed (from recent admission in July) right leg 3+ pitting edema from ankle to hip with bruising noted over right knee (DCT ruled out via u/s). Pt endorses decreased PO intake over last few months 2/2 not being hungry. Daughter at bedside to supplement history. Pt denies chest pain, SOB, abdominal pain, cough, light-headedness. Worsening KERRY (cr 4.2), gross hematuria noted in bedside commode (Pt was straight cath'd twice on last admission, black stool noted in bedside commode (patient takes iron supplement). No DVT, +bilateral pleural effusions, bilateral hydro, mild ascites noted CT scan  7/31: Pt seen and examined at bedside. Pt waxing and waning with her dementia confusion and orientation. Pt intermittently complaining of lower abdominal pain vs no complaints at all. Pt pulled moss out 2 times overnight. Decision was made to bladder scan and leave moss out. Patient continues to have gross hematuria with minimal urine output with minimal bladder retention seen on bedside u/s. Per Urology, decision was made to place bilateral stents today due to worsening KERRY (Cr. 5.1),  8/1/22: Pt seen and examined at bedside. Underwent, BL ureteral stent placement yesterday. Tolerated procedure well. Moss reinserted by Dr. Gr during procedure. Pt states that currently she is in no pain; however, attests to mild discomfort from chronic sacral wound. Pt continues to have gross hematuria. Will postpone thoracentesis as per IR to see if pleural effusions self resolve after stent placement. Will US Wednesday AM.   8/2: Pt seen and examined at bedside. Went for US of pleural effusions this AM, will f/u read. Per Pulm, will evaluate need for thoracentesis and if needed will go tmmrw or Wednesday. Moss draining grossly bloody urine. Pt states that she is in intense pain from her chronic sacral wound. Patient is confused and asking for  and family. Continue with cefazolin, will f/u with urology for length of treatment.     OVERNIGHT EVENTS: None     Home Medications:  amitriptyline 10 mg oral tablet: 3 tab(s) orally once a day (at bedtime) (01 Aug 2022 11:17)  aspirin 81 mg oral tablet: 1 tab(s) orally once a day (01 Aug 2022 11:17)  atorvastatin 40 mg oral tablet: 1 tab(s) orally once a day (01 Aug 2022 11:17)  buprenorphine 5 mcg/hr transdermal film, extended release: 1 patch transdermal once a week (01 Aug 2022 11:17)  ferrous sulfate 200 mg (65 mg elemental iron) oral tablet: 1 tab(s) orally once a day (01 Aug 2022 11:17)  HYDROmorphone 4 mg oral tablet: 1 tab(s) orally 3 times a day (01 Aug 2022 11:17)  Macrodantin 100 mg oral capsule: 1 cap(s) orally 4 times a day (01 Aug 2022 11:17)  Metoprolol Succinate ER 25 mg oral tablet, extended release: 1 tab(s) orally once a day (01 Aug 2022 11:17)  MiraLax oral powder for reconstitution: orally once a day, As Needed - for constipation (01 Aug 2022 11:17)  PreserVision AREDS oral capsule: 1 tab(s) orally 2 times a day (01 Aug 2022 11:17)  Reglan 5 mg oral tablet: orally 2 times a day (01 Aug 2022 11:17)  Senna 8.6 mg oral tablet: 2 tab(s) orally once a day (at bedtime) (01 Aug 2022 11:17)  sertraline 25 mg oral tablet: two tablets daily  (01 Aug 2022 11:17)  Vitamin D3 1250 mcg (50,000 intl units) oral capsule: 1 cap(s) orally once a week (01 Aug 2022 11:17)  Zofran 8 mg oral tablet: 1 tab(s) orally every 6 hours (01 Aug 2022 11:17)      MEDICATIONS  (STANDING):  amitriptyline 10 milliGRAM(s) Oral at bedtime  aspirin enteric coated 81 milliGRAM(s) Oral daily  atorvastatin 40 milliGRAM(s) Oral at bedtime  bisacodyl Suppository 10 milliGRAM(s) Rectal daily  buprenorphine Patch 5 MICROgram(s)/Hr 1 Patch Transdermal every 7 days  ceFAZolin   IVPB 1000 milliGRAM(s) IV Intermittent every 24 hours  dextrose 5%. 1000 milliLiter(s) (50 mL/Hr) IV Continuous <Continuous>  dextrose 50% Injectable 25 Gram(s) IV Push once  dextrose 50% Injectable 12.5 Gram(s) IV Push once  dextrose 50% Injectable 25 Gram(s) IV Push once  ferrous    sulfate 325 milliGRAM(s) Oral daily  glucagon  Injectable 1 milliGRAM(s) IntraMuscular once  heparin   Injectable 5000 Unit(s) SubCutaneous every 12 hours  HYDROmorphone   Tablet 4 milliGRAM(s) Oral three times a day  HYDROmorphone  Injectable 0.2 milliGRAM(s) IV Push once  insulin lispro (ADMELOG) corrective regimen sliding scale   SubCutaneous three times a day before meals  metoprolol succinate ER 25 milliGRAM(s) Oral daily  pantoprazole    Tablet 40 milliGRAM(s) Oral two times a day    MEDICATIONS  (PRN):  acetaminophen     Tablet .. 650 milliGRAM(s) Oral every 6 hours PRN Temp greater or equal to 38C (100.4F), Mild Pain (1 - 3)  dextrose Oral Gel 15 Gram(s) Oral once PRN Blood Glucose LESS THAN 70 milliGRAM(s)/deciliter  melatonin 3 milliGRAM(s) Oral at bedtime PRN Insomnia  metoclopramide 5 milliGRAM(s) Oral two times a day PRN Nausea/vomiting  ondansetron    Tablet 8 milliGRAM(s) Oral three times a day PRN Nausea and/or Vomiting  polyethylene glycol 3350 17 Gram(s) Oral daily PRN for constipation      Allergies    morphine (Other)    Intolerances        Social History:  Tobacco: Former smoker  EtOH: Denies   Recreational drug use: Denies  Lives with: Self, HHA and daughter work around the clock for additional assistance  Ambulates: Cane, walker  ADLs: Requires assistance with all ADLs (29 Jul 2022 23:08)      REVIEW OF SYSTEMS:  PAIN SCALE: [ x ] None  [  ] Other-  ROS Unable to obtain due to - [ X  ] Dementia  [  ] Lethargy [  ] Drowsy [  ] Sedated [  ] non verbal  REST OF REVIEW Of SYSTEM - [ x ] Normal     Vital Signs Last 24 Hrs  T(C): 37 (02 Aug 2022 05:23), Max: 37 (02 Aug 2022 05:23)  T(F): 98.6 (02 Aug 2022 05:23), Max: 98.6 (02 Aug 2022 05:23)  HR: 96 (02 Aug 2022 07:38) (95 - 97)  BP: 114/55 (02 Aug 2022 07:38) (114/55 - 133/72)  BP(mean): --  RR: 17 (02 Aug 2022 07:38) (17 - 18)  SpO2: 92% (02 Aug 2022 07:38) (90% - 95%)    Parameters below as of 02 Aug 2022 07:38  Patient On (Oxygen Delivery Method): nasal cannula  O2 Flow (L/min): 4    Finger Stick        08-01 @ 07:01  -  08-02 @ 07:00  --------------------------------------------------------  IN: 300 mL / OUT: 400 mL / NET: -100 mL        PHYSICAL EXAM: Cachexia  GENERAL:  [X] NAD , [X] well appearing, [  ] Agitated, [  ] Drowsy,  [  ] Lethargy, [X] confused   HEAD:  [X] Normal, [  ] Other  EYES:  [X] EOMI, [X] PERRLA, [X] conjunctiva and sclera clear normal, [  ] Other,  [  ] Pallor,[  ] Discharge  ENMT:  [X] Normal, [X] Dry  mucous membranes, [X] Moderate dentition [  ] Good dentition, [ x ] No Thrush  NECK:  [X] Supple, [X] No JVD, [ x ] Normal thyroid, [  ] Lymphadenopathy [  ] Other  CHEST/LUNG:  [X] Clear to auscultation bilaterally, [X] Breath Sounds equal B/L / Decrease, [x  ] poor effort  [X] No rales, [X] No rhonchi  [X]  No wheezing,   HEART:  [X] Regular rate and rhythm, [  ] tachycardia, [  ] Bradycardia,  [  ] irregular  [X] No murmurs, No rubs, No gallops, [  ] PPM in place (Mfr:  )  ABDOMEN: [X] Reducible umbilical hernia [X] Moderately tense but no peritoneal signs [ ] Soft, [X] Nontender, [X] Nondistended, [  ] No mass, [X] Bowel sounds present, [  ] obese  NERVOUS SYSTEM:  [X] Alert & Oriented X1 (person), [  ] Nonfocal  [  ] Confusion  [  ] Encephalopathic [  ] Sedated [  ] Unable to assess, [  ] Dementia [  ] Other-  EXTREMITIES: [X] 2+ Peripheral Pulses, No clubbing, No cyanosis, [X] right leg 3+ pitting edema from ankle to hip [ ] edema B/L lower EXT. [X] PVD stasis skin changes B/L Lower EXT with ecchymosis , [  ] wound  LYMPH: No lymphadenopathy noted  SKIN:  [  ] No rashes or lesions, [X] Pressure Ulcers (sacral), [ x ] ecchymosis- Large Ecchymosis Rt Knee & Thigh , [X] Skin Tears, [  ] Other    DIET: Diet, Pureed:   Consistent Carbohydrate Evening Snack  No Concentrated Potassium  Supplement Feeding Modality:  Oral  Nepro Cans or Servings Per Day:  1       Frequency:  Three Times a day (08-01-22 @ 14:59)      LABS:                        9.4    10.65 )-----------( 283      ( 02 Aug 2022 06:55 )             29.3     02 Aug 2022 06:55    138    |  101    |  101    ----------------------------<  166    4.5     |  23     |  5.20     Ca    8.6        02 Aug 2022 06:55  Phos  5.6       02 Aug 2022 06:55  Mg     2.9       02 Aug 2022 06:55    TPro  6.1    /  Alb  2.5    /  TBili  0.4    /  DBili  x      /  AST  20     /  ALT  <6     /  AlkPhos  186    02 Aug 2022 06:55                             Anemia Panel:      Thyroid Panel:                RADIOLOGY & ADDITIONAL TESTS: US performed, f/u read       HEALTH ISSUES - PROBLEM Dx:  KERRY (acute kidney injury)    Type 2 diabetes mellitus    GERD (gastroesophageal reflux disease)    CAD (coronary artery disease)    Need for prophylactic measure    Constipation    H/O vertebral fracture repair    Anemia    2019 novel coronavirus disease (COVID-19)    Bilateral pleural effusion    General weakness    Bilateral hydronephrosis    Urinary frequency    Esophageal dysmotility            Consultant(s) Notes Reviewed:  [ X ] YES     Care Discussed with [X] Consultants  [ X ] Patient  [ X ] Family [  ] HCP [  ]   [  ] Social Service  [  ] RN, [  ] Physical Therapy,[  ] Palliative care team  DVT PPX: [  ] Lovenox, [ X ] S C Heparin, [  ] Coumadin, [  ] Xarelto, [  ] Eliquis, [  ] Pradaxa, [  ] IV Heparin drip, [  ] SCD [  ] Contraindication 2 to GI Bleed,[  ] Ambulation [  ] Contraindicated 2 to  bleed [  ] Contraindicated 2 to Brain Bleed  Advanced directive: [  ] None, [ X ] DNR/DNI Patient is a 89y old  Female who presents with a chief complaint of back and R leg pain, inability to urinate (02 Aug 2022 08:53)    HPI:  Additional history obtained from daughter (Mary) via telephone conversation.    89yo F with PMH DM, dyslipidemia, gastroparesis, CAD s/p 2 stents, hx of vertebral fx with repair presents to the ED with back and R leg pain, as well as recent decreased urinary frequency. Patient was previously at [facility] 7/5/22-7/15/22 for COVID+, cholelithiasis, KERRY, and UTI/hematuria. Patient lives at home by herself with home health aide and daughter who take turns caring for her. Daughter commented that patient has worsening weakness and PO intake, increased irritability, and recent difficulty with urination. Daughter states that during her previous admission, patient would report urinary frequency that occurred every 20-30 minutes; Nephrology had seen her previously and prescribed Lasix q48h. Patient says that sometimes "she forgets to drink", and daughter states that COVID infection has changed her sense of taste as well as overall appetite. Daughter also concerned about ecchymosis in the R hip and knee region which had occurred during the last hospital stay; however patient denies any recent fall or injury.     in ED:  Vital Signs: T 97.9F, HR 83, 121/63, RR 18, 98% on RA  Labs: Hgb 10.3, Na 133, BUN 88, Cr 3.90, Gluc 159, Alk Phos 227, Mg 3.2  COVID+  CXR: Large bilateral pleural effusions layering posteriorly. Cannot exclude   underlying infiltrate or atelectasis.  CT chest, abd/pelvis: Moderate B/L hydronephrosis, moderate B/L pleural effusions, mild ascites  RLE doppler: No evidence of RLE DVT  UA: Proteinuria, trace ketones, small leukocyte esterase, large blood, few bacteria  Given: NaCl bolus x1, PO Dilaudid, IV Dilaudid (29 Jul 2022 23:08)    INTERVAL HPI:  7/30/22: Pt seen and examined at bedside. Pt only complaint is pain from chronic sacral wound (being followed by nursing). Pt also has newly developed (from recent admission in July) right leg 3+ pitting edema from ankle to hip with bruising noted over right knee (DCT ruled out via u/s). Pt endorses decreased PO intake over last few months 2/2 not being hungry. Daughter at bedside to supplement history. Pt denies chest pain, SOB, abdominal pain, cough, light-headedness. Worsening KERRY (cr 4.2), gross hematuria noted in bedside commode (Pt was straight cath'd twice on last admission, black stool noted in bedside commode (patient takes iron supplement). No DVT, +bilateral pleural effusions, bilateral hydro, mild ascites noted CT scan  7/31: Pt seen and examined at bedside. Pt waxing and waning with her dementia confusion and orientation. Pt intermittently complaining of lower abdominal pain vs no complaints at all. Pt pulled moss out 2 times overnight. Decision was made to bladder scan and leave moss out. Patient continues to have gross hematuria with minimal urine output with minimal bladder retention seen on bedside u/s. Per Urology, decision was made to place bilateral stents today due to worsening KERRY (Cr. 5.1),  8/1/22: Pt seen and examined at bedside. Underwent, BL ureteral stent placement yesterday. Tolerated procedure well. Moss reinserted by Dr. Gr during procedure. Pt states that currently she is in no pain; however, attests to mild discomfort from chronic sacral wound. Pt continues to have gross hematuria. Will postpone thoracentesis as per IR to see if pleural effusions self resolve after stent placement. Will US Wednesday AM.   8/2: Pt seen and examined at bedside. Went for US of pleural effusions this AM, will f/u read. Per Pulm, will evaluate need for thoracentesis and if needed will go tmmrw or Wednesday. Moss draining grossly bloody urine. Pt states that she is in intense pain from her chronic sacral wound. Patient is confused and asking for  and family. Continue with cefazolin, will f/u with urology for length of treatment. Worsening Cr, Renal follow up. Palliative care Eval.    OVERNIGHT EVENTS: None     Home Medications:  amitriptyline 10 mg oral tablet: 3 tab(s) orally once a day (at bedtime) (01 Aug 2022 11:17)  aspirin 81 mg oral tablet: 1 tab(s) orally once a day (01 Aug 2022 11:17)  atorvastatin 40 mg oral tablet: 1 tab(s) orally once a day (01 Aug 2022 11:17)  buprenorphine 5 mcg/hr transdermal film, extended release: 1 patch transdermal once a week (01 Aug 2022 11:17)  ferrous sulfate 200 mg (65 mg elemental iron) oral tablet: 1 tab(s) orally once a day (01 Aug 2022 11:17)  HYDROmorphone 4 mg oral tablet: 1 tab(s) orally 3 times a day (01 Aug 2022 11:17)  Macrodantin 100 mg oral capsule: 1 cap(s) orally 4 times a day (01 Aug 2022 11:17)  Metoprolol Succinate ER 25 mg oral tablet, extended release: 1 tab(s) orally once a day (01 Aug 2022 11:17)  MiraLax oral powder for reconstitution: orally once a day, As Needed - for constipation (01 Aug 2022 11:17)  PreserVision AREDS oral capsule: 1 tab(s) orally 2 times a day (01 Aug 2022 11:17)  Reglan 5 mg oral tablet: orally 2 times a day (01 Aug 2022 11:17)  Senna 8.6 mg oral tablet: 2 tab(s) orally once a day (at bedtime) (01 Aug 2022 11:17)  sertraline 25 mg oral tablet: two tablets daily  (01 Aug 2022 11:17)  Vitamin D3 1250 mcg (50,000 intl units) oral capsule: 1 cap(s) orally once a week (01 Aug 2022 11:17)  Zofran 8 mg oral tablet: 1 tab(s) orally every 6 hours (01 Aug 2022 11:17)      MEDICATIONS  (STANDING):  amitriptyline 10 milliGRAM(s) Oral at bedtime  aspirin enteric coated 81 milliGRAM(s) Oral daily  atorvastatin 40 milliGRAM(s) Oral at bedtime  bisacodyl Suppository 10 milliGRAM(s) Rectal daily  buprenorphine Patch 5 MICROgram(s)/Hr 1 Patch Transdermal every 7 days  ceFAZolin   IVPB 1000 milliGRAM(s) IV Intermittent every 24 hours  dextrose 5%. 1000 milliLiter(s) (50 mL/Hr) IV Continuous <Continuous>  dextrose 50% Injectable 25 Gram(s) IV Push once  dextrose 50% Injectable 12.5 Gram(s) IV Push once  dextrose 50% Injectable 25 Gram(s) IV Push once  ferrous    sulfate 325 milliGRAM(s) Oral daily  glucagon  Injectable 1 milliGRAM(s) IntraMuscular once  heparin   Injectable 5000 Unit(s) SubCutaneous every 12 hours  HYDROmorphone   Tablet 4 milliGRAM(s) Oral three times a day  HYDROmorphone  Injectable 0.2 milliGRAM(s) IV Push once  insulin lispro (ADMELOG) corrective regimen sliding scale   SubCutaneous three times a day before meals  metoprolol succinate ER 25 milliGRAM(s) Oral daily  pantoprazole    Tablet 40 milliGRAM(s) Oral two times a day    MEDICATIONS  (PRN):  acetaminophen     Tablet .. 650 milliGRAM(s) Oral every 6 hours PRN Temp greater or equal to 38C (100.4F), Mild Pain (1 - 3)  dextrose Oral Gel 15 Gram(s) Oral once PRN Blood Glucose LESS THAN 70 milliGRAM(s)/deciliter  melatonin 3 milliGRAM(s) Oral at bedtime PRN Insomnia  metoclopramide 5 milliGRAM(s) Oral two times a day PRN Nausea/vomiting  ondansetron    Tablet 8 milliGRAM(s) Oral three times a day PRN Nausea and/or Vomiting  polyethylene glycol 3350 17 Gram(s) Oral daily PRN for constipation      Allergies    morphine (Other)    Intolerances        Social History:  Tobacco: Former smoker  EtOH: Denies   Recreational drug use: Denies  Lives with: Self, HHA and daughter work around the clock for additional assistance  Ambulates: Cane, walker  ADLs: Requires assistance with all ADLs (29 Jul 2022 23:08)      REVIEW OF SYSTEMS:  PAIN SCALE: [ x ] None  [  ] Other-  ROS Unable to obtain due to - [ X  ] Dementia  [  ] Lethargy [x  ] Drowsy [  ] Sedated [  ] non verbal  REST OF REVIEW Of SYSTEM - [ x ] Normal     Vital Signs Last 24 Hrs  T(C): 37 (02 Aug 2022 05:23), Max: 37 (02 Aug 2022 05:23)  T(F): 98.6 (02 Aug 2022 05:23), Max: 98.6 (02 Aug 2022 05:23)  HR: 96 (02 Aug 2022 07:38) (95 - 97)  BP: 114/55 (02 Aug 2022 07:38) (114/55 - 133/72)  BP(mean): --  RR: 17 (02 Aug 2022 07:38) (17 - 18)  SpO2: 92% (02 Aug 2022 07:38) (90% - 95%)    Parameters below as of 02 Aug 2022 07:38  Patient On (Oxygen Delivery Method): nasal cannula  O2 Flow (L/min): 4    Finger Stick        08-01 @ 07:01  -  08-02 @ 07:00  --------------------------------------------------------  IN: 300 mL / OUT: 400 mL / NET: -100 mL        PHYSICAL EXAM: Cachexia  GENERAL:  [X] NAD , [X] well appearing, [  ] Agitated, [  ] Drowsy,  [  ] Lethargy, [X] confused   HEAD:  [X] Normal, [  ] Other  EYES:  [X] EOMI, [X] PERRLA, [X] conjunctiva and sclera clear normal, [  ] Other,  [  ] Pallor,[  ] Discharge  ENMT:  [X] Normal, [X] Dry  mucous membranes, [X] dentition [  ] Good dentition, [ x ] No Thrush  NECK:  [X] Supple, [X] No JVD, [ x ] Normal thyroid, [  ] Lymphadenopathy [  ] Other  CHEST/LUNG:  [X] Clear to auscultation bilaterally, [X] Breath Sounds equal B/L / Decrease, [x  ] poor effort  [X] No rales, [X] No rhonchi  [X]  No wheezing,   HEART:  [X] Regular rate and rhythm, [  ] tachycardia, [  ] Bradycardia,  [  ] irregular  [X] No murmurs, No rubs, No gallops, [  ] PPM in place (Mfr:  )  ABDOMEN: [X] Reducible umbilical hernia [X] Moderately tense but no peritoneal signs [x ] Soft, [X] Nontender, [X] Nondistended, [ x ] No mass, [X] Bowel sounds present, [  ] obese  NERVOUS SYSTEM:  [X] Alert & Oriented X1 (person), [ x ] Nonfocal  [x  ] Confusion  [x  ] Encephalopathic [  ] Sedated [  ] Unable to assess, [ x ] Dementia [  ] Other-  EXTREMITIES: [X] 2+ Peripheral Pulses, No clubbing, No cyanosis, [X] right leg 3+ pitting edema from ankle to hip [  ] edema B/L lower EXT. [X] PVD stasis skin changes B/L Lower EXT with ecchymosis , [  ] wound  LYMPH: No lymphadenopathy noted  SKIN:  [  ] No rashes or lesions, [X] Pressure Ulcers (sacral), [ x ] ecchymosis- Large Ecchymosis Rt Knee & Thigh , [X] Skin Tears, [  ] Other    DIET: Diet, Pureed:   Consistent Carbohydrate Evening Snack  No Concentrated Potassium  Supplement Feeding Modality:  Oral  Nepro Cans or Servings Per Day:  1       Frequency:  Three Times a day (08-01-22 @ 14:59)      LABS:                        9.4    10.65 )-----------( 283      ( 02 Aug 2022 06:55 )             29.3     02 Aug 2022 06:55    138    |  101    |  101    ----------------------------<  166    4.5     |  23     |  5.20     Ca    8.6        02 Aug 2022 06:55  Phos  5.6       02 Aug 2022 06:55  Mg     2.9       02 Aug 2022 06:55    TPro  6.1    /  Alb  2.5    /  TBili  0.4    /  DBili  x      /  AST  20     /  ALT  <6     /  AlkPhos  186    02 Aug 2022 06:55      RADIOLOGY & ADDITIONAL TESTS: US performed, f/u read       HEALTH ISSUES - PROBLEM Dx:  KERRY (acute kidney injury)    Type 2 diabetes mellitus    GERD (gastroesophageal reflux disease)    CAD (coronary artery disease)    Need for prophylactic measure    Constipation    H/O vertebral fracture repair    Anemia    2019 novel coronavirus disease (COVID-19)    Bilateral pleural effusion    General weakness    Bilateral hydronephrosis    Urinary frequency    Esophageal dysmotility            Consultant(s) Notes Reviewed:  [ X ] YES     Care Discussed with [X] Consultants  [ X ] Patient  [ X ] Family [  ] HCP [  ]   [ x ] Social Service  [ x ] RN, [  ] Physical Therapy,[ x ] Palliative care team  DVT PPX: [  ] Lovenox, [ X ] S C Heparin, [  ] Coumadin, [  ] Xarelto, [  ] Eliquis, [  ] Pradaxa, [  ] IV Heparin drip, [  ] SCD [  ] Contraindication 2 to GI Bleed,[  ] Ambulation [  ] Contraindicated 2 to  bleed [  ] Contraindicated 2 to Brain Bleed  Advanced directive: [  ] None, [ X ] DNR/DNI

## 2022-08-02 NOTE — CONSULT NOTE ADULT - SUBJECTIVE AND OBJECTIVE BOX
HPI:  Additional history obtained from daughter (Mary) via telephone conversation.    89yo F with PMH DM, dyslipidemia, gastroparesis, CAD s/p 2 stents, hx of vertebral fx with repair presents to the ED with back and R leg pain, as well as recent decreased urinary frequency. Patient was previously at [facility] 7/5/22-7/15/22 for COVID+, cholelithiasis, KERRY, and UTI/hematuria. Patient lives at home by herself with home health aide and daughter who take turns caring for her. Daughter commented that patient has worsening weakness and PO intake, increased irritability, and recent difficulty with urination. Daughter states that during her previous admission, patient would report urinary frequency that occurred every 20-30 minutes; Nephrology had seen her previously and prescribed Lasix q48h. Patient says that sometimes "she forgets to drink", and daughter states that COVID infection has changed her sense of taste as well as overall appetite. Daughter also concerned about ecchymosis in the R hip and knee region which had occurred during the last hospital stay; however patient denies any recent fall or injury.     in ED:  Vital Signs: T 97.9F, HR 83, 121/63, RR 18, 98% on RA  Labs: Hgb 10.3, Na 133, BUN 88, Cr 3.90, Gluc 159, Alk Phos 227, Mg 3.2  COVID+  CXR: Large bilateral pleural effusions layering posteriorly. Cannot exclude   underlying infiltrate or atelectasis.  CT chest, abd/pelvis: Moderate B/L hydronephrosis, moderate B/L pleural effusions, mild ascites  RLE doppler: No evidence of RLE DVT  UA: Proteinuria, trace ketones, small leukocyte esterase, large blood, few bacteria  Given: NaCl bolus x1, PO Dilaudid, IV Dilaudid (29 Jul 2022 23:08)    PERTINENT PM/SXH:   H/O vertebral fracture repair    History of vertebral fracture    Dyslipidemia    DM type 2 with diabetic dyslipidemia    H/O gastroesophageal reflux (GERD)    Costochondritis    Coronary arteriosclerosis in native artery    Gastroparesis    Hematuria    Hydronephrosis    Esophageal dysmotility    Anemia of chronic disease      History of laminectomy    S/P hip hemiarthroplasty    S/P appendectomy      FAMILY HISTORY:  FHx: stroke (Mother)      ITEMS NOT CHECKED ARE NOT PRESENT    SOCIAL HISTORY:   Significant other/partner[ ]  Children[ ]  Spiritism/Spirituality:  Substance hx:  [ ]   Tobacco hx:  [ ]   Alcohol hx: [ ]   Home Opioid hx:  [ ] I-Stop Reference No:  Living Situation: [ ]Home  [ ]Long term care  [ ]Rehab [ ]Other    ADVANCE DIRECTIVES:    DNR  MOLST  [ ]  Living Will  [ ]   DECISION MAKER(s):  [ ] Health Care Proxy(s)  [ ] Surrogate(s)  [ ] Guardian           Name(s): Phone Number(s):    BASELINE (I)ADL(s) (prior to admission):  [place]: [ ]Total  [ ] Moderate [ ]Dependent    Allergies    morphine (Other)    Intolerances    MEDICATIONS  (STANDING):  amitriptyline 10 milliGRAM(s) Oral at bedtime  aspirin enteric coated 81 milliGRAM(s) Oral daily  atorvastatin 40 milliGRAM(s) Oral at bedtime  bisacodyl Suppository 10 milliGRAM(s) Rectal daily  buprenorphine Patch 5 MICROgram(s)/Hr 1 Patch Transdermal every 7 days  ceFAZolin   IVPB 1000 milliGRAM(s) IV Intermittent every 24 hours  dextrose 5%. 1000 milliLiter(s) (50 mL/Hr) IV Continuous <Continuous>  dextrose 50% Injectable 25 Gram(s) IV Push once  dextrose 50% Injectable 12.5 Gram(s) IV Push once  dextrose 50% Injectable 25 Gram(s) IV Push once  ferrous    sulfate 325 milliGRAM(s) Oral daily  glucagon  Injectable 1 milliGRAM(s) IntraMuscular once  heparin   Injectable 5000 Unit(s) SubCutaneous every 12 hours  HYDROmorphone   Tablet 4 milliGRAM(s) Oral three times a day  insulin lispro (ADMELOG) corrective regimen sliding scale   SubCutaneous three times a day before meals  lidocaine   4% Patch 1 Patch Transdermal daily  metoprolol succinate ER 25 milliGRAM(s) Oral daily  pantoprazole    Tablet 40 milliGRAM(s) Oral two times a day    MEDICATIONS  (PRN):  acetaminophen     Tablet .. 650 milliGRAM(s) Oral every 6 hours PRN Temp greater or equal to 38C (100.4F), Mild Pain (1 - 3)  dextrose Oral Gel 15 Gram(s) Oral once PRN Blood Glucose LESS THAN 70 milliGRAM(s)/deciliter  melatonin 3 milliGRAM(s) Oral at bedtime PRN Insomnia  metoclopramide 5 milliGRAM(s) Oral two times a day PRN Nausea/vomiting  ondansetron    Tablet 8 milliGRAM(s) Oral three times a day PRN Nausea and/or Vomiting  polyethylene glycol 3350 17 Gram(s) Oral daily PRN for constipation    PRESENT SYMPTOMS: [ ]Unable to obtain due to poor mentation   Source if other than patient:  [ ]Family   [ ]Team     Pain: [ ]yes [ ]no  QOL impact -   Location -                    Aggravating factors -  Quality -  Radiation -  Timing-  Severity (0-10 scale):  Minimal acceptable level (0-10 scale):     CPOT:    https://www.[facility].org/getattachment/[email or web address]/Critical-Care-Pain-Observation-Tool-(CPOT)      PAIN AD Score:     http://geriatrictoolkit.[email or web address]/painad.pdf (press ctrl +  left click to view)    Dyspnea:                           [ ]Mild [ ]Moderate [ ]Severe  Anxiety:                             [ ]Mild [ ]Moderate [ ]Severe  Fatigue:                             [ ]Mild [ ]Moderate [ ]Severe  Nausea:                             [ ]Mild [ ]Moderate [ ]Severe  Loss of appetite:              [ ]Mild [ ]Moderate [ ]Severe  Constipation:                    [ ]Mild [ ]Moderate [ ]Severe    Other Symptoms:  [ ]All other review of systems negative     Palliative Performance Status Version 2:         %    http://npcrc.org/files/news/palliative_performance_scale_ppsv2.pdf  PHYSICAL EXAM:  Vital Signs Last 24 Hrs  T(C): 37 (02 Aug 2022 13:48), Max: 37 (02 Aug 2022 05:23)  T(F): 98.6 (02 Aug 2022 13:48), Max: 98.6 (02 Aug 2022 05:23)  HR: 92 (02 Aug 2022 13:48) (92 - 97)  BP: 130/65 (02 Aug 2022 13:48) (114/55 - 133/72)  BP(mean): --  RR: 16 (02 Aug 2022 13:48) (16 - 18)  SpO2: 92% (02 Aug 2022 13:48) (90% - 95%)    Parameters below as of 02 Aug 2022 13:48  Patient On (Oxygen Delivery Method): nasal cannula     I&O's Summary    01 Aug 2022 07:01  -  02 Aug 2022 07:00  --------------------------------------------------------  IN: 300 mL / OUT: 400 mL / NET: -100 mL      GENERAL:  [ ]Alert  [ ]Oriented x   [ ]Lethargic  [ ]Cachexia  [ ]Unarousable  [ ]Verbal  [ ]Non-Verbal  Behavioral:   [ ] Anxiety  [ ] Delirium [ ] Agitation [ ] Other  HEENT:  [ ]Normal   [ ]Dry mouth   [ ]ET Tube/Trach  [ ]Oral lesions  PULMONARY:   [ ]Clear [ ]Tachypnea  [ ]Audible excessive secretions   [ ]Rhonchi        [ ]Right [ ]Left [ ]Bilateral  [ ]Crackles        [ ]Right [ ]Left [ ]Bilateral  [ ]Wheezing     [ ]Right [ ]Left [ ]Bilateral  [ ]Diminished breath sounds [ ]right [ ]left [ ]bilateral  CARDIOVASCULAR:    [ ]Regular [ ]Irregular [ ]Tachy  [ ][place] [ ]Murmur [ ]Other  GASTROINTESTINAL:  [ ]Soft  [ ]Distended   [ ]+BS  [ ]Non tender [ ]Tender  [ ]PEG [ ]OGT/ NGT  Last BM:   GENITOURINARY:  [ ]Normal [ ] Incontinent   [ ]Oliguria/Anuria   [ ]Bah  MUSCULOSKELETAL:   [ ]Normal   [ ]Weakness  [ ]Bed/Wheelchair bound [ ]Edema  NEUROLOGIC:   [ ]No focal deficits  [ ]Cognitive impairment  [ ]Dysphagia [ ]Dysarthria [ ]Paresis [ ]Other   SKIN:   [ ]Normal    [ ]Rash  [ ]Pressure ulcer(s)       Present on admission [ ]y [ ]n    CRITICAL CARE:  [ ] Shock Present  [ ]Septic [ ]Cardiogenic [ ]Neurologic [ ]Hypovolemic  [ ]  Vasopressors [ ]  Inotropes   [ ]Respiratory failure present [ ]Mechanical ventilation [ ]Non-invasive ventilatory support [ ]High flow    [ ]Acute  [ ]Chronic [ ]Hypoxic  [ ]Hypercarbic [ ]Other  [ ]Other organ failure     LABS:                        9.4    10.65 )-----------( 283      ( 02 Aug 2022 06:55 )             29.3   08-02    138  |  101  |  101<H>  ----------------------------<  166<H>  4.5   |  23  |  5.20<H>    Ca    8.6      02 Aug 2022 06:55  Phos  5.6     08-02  Mg     2.9     08-02    TPro  6.1  /  Alb  2.5<L>  /  TBili  0.4  /  DBili  x   /  AST  20  /  ALT  <6<L>  /  AlkPhos  186<H>  08-02  PT/INR - ( 02 Aug 2022 11:08 )   PT: 12.9 sec;   INR: 1.10 ratio         PTT - ( 02 Aug 2022 11:08 )  PTT:26.7 sec      RADIOLOGY & ADDITIONAL STUDIES:    PROTEIN CALORIE MALNUTRITION PRESENT: [ ]mild [ ]moderate [ ]severe [ ]underweight [ ]morbid obesity  https://www.andeal.org/vault/2440/web/files/ONC/Table_Clinical%20Characteristics%20to%20Document%20Malnutrition-White%20JV%20et%20al%202012.pdf    Height (cm): 162.6 (07-31-22 @ 18:27), 157.5 (07-05-22 @ 18:15), 157.5 (06-20-22 @ 13:56)  Weight (kg): 56.7 (07-31-22 @ 18:27), 52.6 (07-05-22 @ 18:15)  BMI (kg/m2): 21.4 (07-31-22 @ 18:27), 21.2 (07-05-22 @ 18:15)    [ ]PPSV2 < or = to 30% [ ]significant weight loss  [ ]poor nutritional intake  [ ]anasarca      [ ]Artificial Nutrition      REFERRALS:   [ ]Chaplaincy  [ ]Hospice  [ ]Child Life  [ ]Social Work  [ ]Case management [ ]Holistic Therapy     Goals of Care Document:  HPI:  Additional history obtained from daughter (Mary) via telephone conversation.    89yo F with PMH DM, dyslipidemia, gastroparesis, CAD s/p 2 stents, hx of vertebral fx with repair presents to the ED with back and R leg pain, as well as recent decreased urinary frequency. Patient was previously at [facility] 7/5/22-7/15/22 for COVID+, cholelithiasis, KERRY, and UTI/hematuria. Patient lives at home by herself with home health aide and daughter who take turns caring for her. Daughter commented that patient has worsening weakness and PO intake, increased irritability, and recent difficulty with urination. Daughter states that during her previous admission, patient would report urinary frequency that occurred every 20-30 minutes; Nephrology had seen her previously and prescribed Lasix q48h. Patient says that sometimes "she forgets to drink", and daughter states that COVID infection has changed her sense of taste as well as overall appetite. Daughter also concerned about ecchymosis in the R hip and knee region which had occurred during the last hospital stay; however patient denies any recent fall or injury.     in ED:  Vital Signs: T 97.9F, HR 83, 121/63, RR 18, 98% on RA  Labs: Hgb 10.3, Na 133, BUN 88, Cr 3.90, Gluc 159, Alk Phos 227, Mg 3.2  COVID+  CXR: Large bilateral pleural effusions layering posteriorly. Cannot exclude   underlying infiltrate or atelectasis.  CT chest, abd/pelvis: Moderate B/L hydronephrosis, moderate B/L pleural effusions, mild ascites  RLE doppler: No evidence of RLE DVT  UA: Proteinuria, trace ketones, small leukocyte esterase, large blood, few bacteria  Given: NaCl bolus x1, PO Dilaudid, IV Dilaudid (29 Jul 2022 23:08)    PERTINENT PM/SXH:   H/O vertebral fracture repair    History of vertebral fracture    Dyslipidemia    DM type 2 with diabetic dyslipidemia    H/O gastroesophageal reflux (GERD)    Costochondritis    Coronary arteriosclerosis in native artery    Gastroparesis    Hematuria    Hydronephrosis    Esophageal dysmotility    Anemia of chronic disease      History of laminectomy    S/P hip hemiarthroplasty    S/P appendectomy      FAMILY HISTORY:  FHx: stroke (Mother)      ITEMS NOT CHECKED ARE NOT PRESENT    SOCIAL HISTORY:   Significant other/partner[ ]  Children[ x]  Mormonism/Spirituality:  Substance hx:  [ ]   Tobacco hx:  [ ]   Alcohol hx: [ ]   (x) none  Home Opioid hx:  [ ] I-Stop Reference No:  Living Situation: [x ]Home  [ ]Long term care  [ ]Rehab [ ]Other    ADVANCE DIRECTIVES:    DNR  MOLST  [x ]  Living Will  [ ]   DECISION MAKER(s):  [ x] Health Care Proxy(s)  [ ] Surrogate(s)  [ ] Guardian           Name(s): Phone Number(s):    BASELINE (I)ADL(s) (prior to admission):  [place]: [ ]Total  [ ] Moderate [x ]Dependent    Allergies    morphine (Other)    Intolerances    MEDICATIONS  (STANDING):  amitriptyline 10 milliGRAM(s) Oral at bedtime  aspirin enteric coated 81 milliGRAM(s) Oral daily  atorvastatin 40 milliGRAM(s) Oral at bedtime  bisacodyl Suppository 10 milliGRAM(s) Rectal daily  buprenorphine Patch 5 MICROgram(s)/Hr 1 Patch Transdermal every 7 days  ceFAZolin   IVPB 1000 milliGRAM(s) IV Intermittent every 24 hours  dextrose 5%. 1000 milliLiter(s) (50 mL/Hr) IV Continuous <Continuous>  dextrose 50% Injectable 25 Gram(s) IV Push once  dextrose 50% Injectable 12.5 Gram(s) IV Push once  dextrose 50% Injectable 25 Gram(s) IV Push once  ferrous    sulfate 325 milliGRAM(s) Oral daily  glucagon  Injectable 1 milliGRAM(s) IntraMuscular once  heparin   Injectable 5000 Unit(s) SubCutaneous every 12 hours  HYDROmorphone   Tablet 4 milliGRAM(s) Oral three times a day  insulin lispro (ADMELOG) corrective regimen sliding scale   SubCutaneous three times a day before meals  lidocaine   4% Patch 1 Patch Transdermal daily  metoprolol succinate ER 25 milliGRAM(s) Oral daily  pantoprazole    Tablet 40 milliGRAM(s) Oral two times a day    MEDICATIONS  (PRN):  acetaminophen     Tablet .. 650 milliGRAM(s) Oral every 6 hours PRN Temp greater or equal to 38C (100.4F), Mild Pain (1 - 3)  dextrose Oral Gel 15 Gram(s) Oral once PRN Blood Glucose LESS THAN 70 milliGRAM(s)/deciliter  melatonin 3 milliGRAM(s) Oral at bedtime PRN Insomnia  metoclopramide 5 milliGRAM(s) Oral two times a day PRN Nausea/vomiting  ondansetron    Tablet 8 milliGRAM(s) Oral three times a day PRN Nausea and/or Vomiting  polyethylene glycol 3350 17 Gram(s) Oral daily PRN for constipation    PRESENT SYMPTOMS: [ ]Unable to obtain due to poor mentation   Source if other than patient:  [ ]Family   [ ]Team     Pain: [x ]yes [ ]no  QOL impact - yes  Location -   generalized abdomen                 Aggravating factors - movement  Quality - dull  Radiation - no  Timing- constant  Severity (0-10 scale): 7/10  Minimal acceptable level (0-10 scale): 3/10    CPOT:    https://www.[facility].org/getattachment/[email or web address]/Critical-Care-Pain-Observation-Tool-(CPOT)      PAIN AD Score:     http://geriatrictoolkit.[email or web address]/cog/painad.pdf (press ctrl +  left click to view)    Dyspnea:                           [ ]Mild [ ]Moderate [ ]Severe  Anxiety:                             [ ]Mild [x ]Moderate [ ]Severe  Fatigue:                             [ ]Mild [ x]Moderate [ ]Severe  Nausea:                             [ ]Mild [ ]Moderate [ x]Severe  Loss of appetite:              [ ]Mild [ ]Moderate [ x]Severe  Constipation:                    [ ]Mild [ ]Moderate [ ]Severe    Other Symptoms:  [ x]All other review of systems negative     Palliative Performance Status Version 2:         %    http://npcrc.org/files/news/palliative_performance_scale_ppsv2.pdf  PHYSICAL EXAM:  Vital Signs Last 24 Hrs  T(C): 37 (02 Aug 2022 13:48), Max: 37 (02 Aug 2022 05:23)  T(F): 98.6 (02 Aug 2022 13:48), Max: 98.6 (02 Aug 2022 05:23)  HR: 92 (02 Aug 2022 13:48) (92 - 97)  BP: 130/65 (02 Aug 2022 13:48) (114/55 - 133/72)  BP(mean): --  RR: 16 (02 Aug 2022 13:48) (16 - 18)  SpO2: 92% (02 Aug 2022 13:48) (90% - 95%)    Parameters below as of 02 Aug 2022 13:48  Patient On (Oxygen Delivery Method): nasal cannula     I&O's Summary    01 Aug 2022 07:01  -  02 Aug 2022 07:00  --------------------------------------------------------  IN: 300 mL / OUT: 400 mL / NET: -100 mL    GENERAL:  [X] NAD , [X] well appearing, [  ] Agitated, [  ] Drowsy,  [  ] Lethargy, [X] confused   HEAD:  [X] Normal, [  ] Other  EYES:  [X] EOMI, [X] PERRLA, [X] conjunctiva and sclera clear normal, [  ] Other,  [  ] Pallor,[  ] Discharge  ENMT:  [X] Normal, [X] Dry  mucous membranes, [X] dentition [  ] Good dentition, [ x ] No Thrush  NECK:  [X] Supple, [X] No JVD, [ x ] Normal thyroid, [  ] Lymphadenopathy [  ] Other  CHEST/LUNG:  [X] Clear to auscultation bilaterally, [X] Breath Sounds equal B/L / Decrease, [x  ] poor effort  [X] No rales, [X] No rhonchi  [X]  No wheezing,   HEART:  [X] Regular rate and rhythm, [  ] tachycardia, [  ] Bradycardia,  [  ] irregular  [X] No murmurs, No rubs, No gallops, [  ] PPM in place (Mfr:  )  ABDOMEN: [X] Reducible umbilical hernia [X] Moderately tense but no peritoneal signs [x ] Soft, [X] Nontender, [X] Nondistended, [ x ] No mass, [X] Bowel sounds present, [  ] obese  NERVOUS SYSTEM:  [X] Alert & Oriented X2 (person & place), [ x ] Nonfocal  [x  ] Confusion  [x  ] Encephalopathic [  ] Sedated [  ] Unable to assess, [ x ] Dementia [  ] Other-  EXTREMITIES: [X] 2+ Peripheral Pulses, No clubbing, No cyanosis, [X] right leg 3+ pitting edema from ankle to hip [  ] edema B/L lower EXT. [X] PVD stasis skin changes B/L Lower EXT with ecchymosis , [  ] wound  LYMPH: No lymphadenopathy noted  SKIN:  [  ] No rashes or lesions, [X] Pressure Ulcers (sacral), [ x ] ecchymosis- Large Ecchymosis Rt Knee & Thigh , [X] Skin Tears, [  ] Other    DIET: Diet, Pureed:   Consistent Carbohydrate Evening Snack  No Concentrated Potassium  Supplement Feeding Modality:  Oral  Nepro Cans or Servings Per Day:  1       Frequency:  Three Times a day (08-01-22 @ 14:59)      LABS:                        9.4    10.65 )-----------( 283      ( 02 Aug 2022 06:55 )             29.3   08-02    138  |  101  |  101<H>  ----------------------------<  166<H>  4.5   |  23  |  5.20<H>    Ca    8.6      02 Aug 2022 06:55  Phos  5.6     08-02  Mg     2.9     08-02    TPro  6.1  /  Alb  2.5<L>  /  TBili  0.4  /  DBili  x   /  AST  20  /  ALT  <6<L>  /  AlkPhos  186<H>  08-02  PT/INR - ( 02 Aug 2022 11:08 )   PT: 12.9 sec;   INR: 1.10 ratio         PTT - ( 02 Aug 2022 11:08 )  PTT:26.7 sec      RADIOLOGY & ADDITIONAL STUDIES: reviewed    PROTEIN CALORIE MALNUTRITION PRESENT: [ ]mild [ ]moderate [ ]severe [ ]underweight [ ]morbid obesity  https://www.andeal.org/vault/6730/web/files/ONC/Table_Clinical%20Characteristics%20to%20Document%20Malnutrition-White%20JV%20et%20al%202012.pdf    Height (cm): 162.6 (07-31-22 @ 18:27), 157.5 (07-05-22 @ 18:15), 157.5 (06-20-22 @ 13:56)  Weight (kg): 56.7 (07-31-22 @ 18:27), 52.6 (07-05-22 @ 18:15)  BMI (kg/m2): 21.4 (07-31-22 @ 18:27), 21.2 (07-05-22 @ 18:15)    [x ]PPSV2 < or = to 30% [ ]significant weight loss  [ ]poor nutritional intake  [ ]anasarca      [ ]Artificial Nutrition      REFERRALS:   [ ]Chaplaincy  [x ]Hospice  [ ]Child Life  [ ]Social Work  [ ]Case management [ ]Holistic Therapy     Goals of Care Document:

## 2022-08-02 NOTE — PROGRESS NOTE ADULT - SUBJECTIVE AND OBJECTIVE BOX
INTERVAL HPI/OVERNIGHT EVENTS: afeb    MEDICATIONS  (STANDING):  amitriptyline 10 milliGRAM(s) Oral at bedtime  aspirin enteric coated 81 milliGRAM(s) Oral daily  atorvastatin 40 milliGRAM(s) Oral at bedtime  bisacodyl Suppository 10 milliGRAM(s) Rectal daily  buprenorphine Patch 5 MICROgram(s)/Hr 1 Patch Transdermal every 7 days  ceFAZolin   IVPB 1000 milliGRAM(s) IV Intermittent every 24 hours  dextrose 5%. 1000 milliLiter(s) (50 mL/Hr) IV Continuous <Continuous>  dextrose 50% Injectable 25 Gram(s) IV Push once  dextrose 50% Injectable 12.5 Gram(s) IV Push once  dextrose 50% Injectable 25 Gram(s) IV Push once  ferrous    sulfate 325 milliGRAM(s) Oral daily  glucagon  Injectable 1 milliGRAM(s) IntraMuscular once  heparin   Injectable 5000 Unit(s) SubCutaneous every 12 hours  HYDROmorphone   Tablet 4 milliGRAM(s) Oral three times a day  insulin lispro (ADMELOG) corrective regimen sliding scale   SubCutaneous three times a day before meals  metoprolol succinate ER 25 milliGRAM(s) Oral daily  pantoprazole    Tablet 40 milliGRAM(s) Oral two times a day    MEDICATIONS  (PRN):  acetaminophen     Tablet .. 650 milliGRAM(s) Oral every 6 hours PRN Temp greater or equal to 38C (100.4F), Mild Pain (1 - 3)  dextrose Oral Gel 15 Gram(s) Oral once PRN Blood Glucose LESS THAN 70 milliGRAM(s)/deciliter  melatonin 3 milliGRAM(s) Oral at bedtime PRN Insomnia  metoclopramide 5 milliGRAM(s) Oral two times a day PRN Nausea/vomiting  ondansetron    Tablet 8 milliGRAM(s) Oral three times a day PRN Nausea and/or Vomiting  polyethylene glycol 3350 17 Gram(s) Oral daily PRN for constipation        Vital Signs Last 24 Hrs  T(C): 37 (02 Aug 2022 05:23), Max: 37 (02 Aug 2022 05:23)  T(F): 98.6 (02 Aug 2022 05:23), Max: 98.6 (02 Aug 2022 05:23)  HR: 96 (02 Aug 2022 07:38) (95 - 97)  BP: 114/55 (02 Aug 2022 07:38) (114/55 - 133/72)  BP(mean): --  RR: 17 (02 Aug 2022 07:38) (17 - 18)  SpO2: 92% (02 Aug 2022 07:38) (90% - 95%)    Parameters below as of 02 Aug 2022 07:38  Patient On (Oxygen Delivery Method): nasal cannula  O2 Flow (L/min): 4      PHYSICAL EXAM:    ABDOMEN: soft, NT  GENITALIA: light tea colored, improved    LABS:                        9.4    10.65 )-----------( 283      ( 02 Aug 2022 06:55 )             29.3     08-02    138  |  101  |  101<H>  ----------------------------<  166<H>  4.5   |  23  |  5.20<H>    Ca    8.6      02 Aug 2022 06:55  Phos  5.6     08-02  Mg     2.9     08-02    TPro  6.1  /  Alb  2.5<L>  /  TBili  0.4  /  DBili  x   /  AST  20  /  ALT  <6<L>  /  AlkPhos  186<H>  08-02            RADIOLOGY & ADDITIONAL TESTS:

## 2022-08-02 NOTE — PROGRESS NOTE ADULT - PROBLEM SELECTOR PLAN 1
Patient presents with decreased urinary frequency on admission, catheter present in ED. UA with trace ketones, small leukocyte esterase, large blood, and few bacteria. BUN 88, Cr 3.90  - CT abd/pelvis: Moderate B/L hydronephrosis, moderate B/L pleural effusions, mild ascites  - c/w Cefazolin, will reach out to urology to determine length of tx   - Avoid nephrotoxic meds  - Trend renal function and lytes, adjust as necessary  - Monitor I&Os  - Hold home Lasix due to KERRY, IV fluids  - Nephro consulted; recs appreciated   - Cr stable from yesterday (5.2) ; s/p BL ureteral stent placement yesterday 7/31; moss in place; gross hematuria   - Hyperkalemia resolved KERRY/CKD 4 -Oliguria- Moss cath -Poor urine out put   - CT abd/pelvis: Moderate B/L hydronephrosis, moderate B/L pleural effusions, mild ascites  - c/w Cefazolin, will reach out to urology to determine length of tx   - Avoid nephrotoxic meds  - Trend renal function and lytes, adjust as necessary  - Monitor I&Os  - Hold home Lasix due to KERRY, IV fluids  - Nephro D/W Dr Wu  -Explained the risks and benefits of dialysis. Risks include but are not limited to hypotension, cardiac dysfunction, weakness, fatigue, reduced quality of life, and death. Also explained the risk of dialysis access (catheter in this case) - risk life threatening infection and patient pulling out catheter when confused leading to life threatening bleeding. Patient is also malnourished with little muscle mass. Given advanced age as well, she is a poor candidate for dialysis. I would not recommend dialysis in this case as the long term benefits are less than the risks associated with dialysis.  - Cr stable from yesterday (5.2) ; s/p BL ureteral stent placement yesterday 7/31; moss in place; gross hematuria   - Hyperkalemia resolved after Lokelma

## 2022-08-02 NOTE — PROGRESS NOTE ADULT - PROBLEM SELECTOR PLAN 3
CT abd/pelvis showed moderate B/L pleural effusions on admission  - Pulm consulted Dr. orona, recs appreciated  - US performed this AM, f/u read   - Per pulm, will re-evaluate need for thoracentesis; if necessary pt likely to go tmrw or Weds AM  - Incentive spirometry

## 2022-08-03 ENCOUNTER — TRANSCRIPTION ENCOUNTER (OUTPATIENT)
Age: 87
End: 2022-08-03

## 2022-08-03 ENCOUNTER — RESULT REVIEW (OUTPATIENT)
Age: 87
End: 2022-08-03

## 2022-08-03 VITALS
DIASTOLIC BLOOD PRESSURE: 65 MMHG | SYSTOLIC BLOOD PRESSURE: 126 MMHG | OXYGEN SATURATION: 98 % | TEMPERATURE: 98 F | RESPIRATION RATE: 17 BRPM | HEART RATE: 97 BPM

## 2022-08-03 DIAGNOSIS — N13.30 UNSPECIFIED HYDRONEPHROSIS: ICD-10-CM

## 2022-08-03 PROCEDURE — 80048 BASIC METABOLIC PNL TOTAL CA: CPT

## 2022-08-03 PROCEDURE — 85025 COMPLETE CBC W/AUTO DIFF WBC: CPT

## 2022-08-03 PROCEDURE — 97162 PT EVAL MOD COMPLEX 30 MIN: CPT

## 2022-08-03 PROCEDURE — 85027 COMPLETE CBC AUTOMATED: CPT

## 2022-08-03 PROCEDURE — 73502 X-RAY EXAM HIP UNI 2-3 VIEWS: CPT

## 2022-08-03 PROCEDURE — 71045 X-RAY EXAM CHEST 1 VIEW: CPT | Mod: 26

## 2022-08-03 PROCEDURE — 81001 URINALYSIS AUTO W/SCOPE: CPT

## 2022-08-03 PROCEDURE — 71250 CT THORAX DX C-: CPT | Mod: MG

## 2022-08-03 PROCEDURE — 85730 THROMBOPLASTIN TIME PARTIAL: CPT

## 2022-08-03 PROCEDURE — 84100 ASSAY OF PHOSPHORUS: CPT

## 2022-08-03 PROCEDURE — 88108 CYTOPATH CONCENTRATE TECH: CPT | Mod: 26

## 2022-08-03 PROCEDURE — 36415 COLL VENOUS BLD VENIPUNCTURE: CPT

## 2022-08-03 PROCEDURE — U0003: CPT

## 2022-08-03 PROCEDURE — 32555 ASPIRATE PLEURA W/ IMAGING: CPT

## 2022-08-03 PROCEDURE — 83735 ASSAY OF MAGNESIUM: CPT

## 2022-08-03 PROCEDURE — 84540 ASSAY OF URINE/UREA-N: CPT

## 2022-08-03 PROCEDURE — 74176 CT ABD & PELVIS W/O CONTRAST: CPT | Mod: ME

## 2022-08-03 PROCEDURE — C9399: CPT

## 2022-08-03 PROCEDURE — C2617: CPT

## 2022-08-03 PROCEDURE — 88305 TISSUE EXAM BY PATHOLOGIST: CPT

## 2022-08-03 PROCEDURE — 73562 X-RAY EXAM OF KNEE 3: CPT

## 2022-08-03 PROCEDURE — 76000 FLUOROSCOPY <1 HR PHYS/QHP: CPT

## 2022-08-03 PROCEDURE — C1769: CPT

## 2022-08-03 PROCEDURE — 32555 ASPIRATE PLEURA W/ IMAGING: CPT | Mod: RT

## 2022-08-03 PROCEDURE — G1004: CPT

## 2022-08-03 PROCEDURE — 83615 LACTATE (LD) (LDH) ENZYME: CPT

## 2022-08-03 PROCEDURE — 88305 TISSUE EXAM BY PATHOLOGIST: CPT | Mod: 26

## 2022-08-03 PROCEDURE — 99285 EMERGENCY DEPT VISIT HI MDM: CPT | Mod: 25

## 2022-08-03 PROCEDURE — 97530 THERAPEUTIC ACTIVITIES: CPT

## 2022-08-03 PROCEDURE — C1729: CPT

## 2022-08-03 PROCEDURE — 76604 US EXAM CHEST: CPT

## 2022-08-03 PROCEDURE — 88108 CYTOPATH CONCENTRATE TECH: CPT

## 2022-08-03 PROCEDURE — 97110 THERAPEUTIC EXERCISES: CPT

## 2022-08-03 PROCEDURE — 80053 COMPREHEN METABOLIC PANEL: CPT

## 2022-08-03 PROCEDURE — 82962 GLUCOSE BLOOD TEST: CPT

## 2022-08-03 PROCEDURE — U0005: CPT

## 2022-08-03 PROCEDURE — 99233 SBSQ HOSP IP/OBS HIGH 50: CPT

## 2022-08-03 PROCEDURE — 71045 X-RAY EXAM CHEST 1 VIEW: CPT

## 2022-08-03 PROCEDURE — C1758: CPT

## 2022-08-03 PROCEDURE — 93005 ELECTROCARDIOGRAM TRACING: CPT

## 2022-08-03 PROCEDURE — 93971 EXTREMITY STUDY: CPT

## 2022-08-03 PROCEDURE — 85610 PROTHROMBIN TIME: CPT

## 2022-08-03 RX ORDER — ONDANSETRON 8 MG/1
1 TABLET, FILM COATED ORAL
Qty: 0 | Refills: 0 | DISCHARGE
Start: 2022-08-03

## 2022-08-03 RX ORDER — FERROUS SULFATE 325(65) MG
1 TABLET ORAL
Qty: 0 | Refills: 0 | DISCHARGE

## 2022-08-03 RX ORDER — SERTRALINE 25 MG/1
0 TABLET, FILM COATED ORAL
Qty: 0 | Refills: 0 | DISCHARGE

## 2022-08-03 RX ORDER — METOCLOPRAMIDE HCL 10 MG
1 TABLET ORAL
Qty: 0 | Refills: 0 | DISCHARGE
Start: 2022-08-03

## 2022-08-03 RX ORDER — POLYETHYLENE GLYCOL 3350 17 G/17G
0 POWDER, FOR SOLUTION ORAL
Qty: 0 | Refills: 0 | DISCHARGE

## 2022-08-03 RX ORDER — BUPRENORPHINE 10 UG/H
1 PATCH, EXTENDED RELEASE TRANSDERMAL
Qty: 0 | Refills: 0 | DISCHARGE

## 2022-08-03 RX ORDER — HYDROMORPHONE HYDROCHLORIDE 2 MG/ML
0.5 INJECTION INTRAMUSCULAR; INTRAVENOUS; SUBCUTANEOUS
Qty: 0 | Refills: 0 | DISCHARGE
Start: 2022-08-03

## 2022-08-03 RX ORDER — SENNA PLUS 8.6 MG/1
2 TABLET ORAL
Qty: 0 | Refills: 0 | DISCHARGE

## 2022-08-03 RX ORDER — NITROFURANTOIN MACROCRYSTAL 50 MG
1 CAPSULE ORAL
Qty: 0 | Refills: 0 | DISCHARGE

## 2022-08-03 RX ORDER — MULTIVIT-MIN/FERROUS GLUCONATE 9 MG/15 ML
1 LIQUID (ML) ORAL
Qty: 0 | Refills: 0 | DISCHARGE

## 2022-08-03 RX ORDER — METOCLOPRAMIDE HCL 10 MG
0 TABLET ORAL
Qty: 0 | Refills: 0 | DISCHARGE

## 2022-08-03 RX ORDER — HYDROMORPHONE HYDROCHLORIDE 2 MG/ML
0.25 INJECTION INTRAMUSCULAR; INTRAVENOUS; SUBCUTANEOUS
Qty: 0 | Refills: 0 | DISCHARGE
Start: 2022-08-03

## 2022-08-03 RX ORDER — ASPIRIN/CALCIUM CARB/MAGNESIUM 324 MG
1 TABLET ORAL
Qty: 0 | Refills: 0 | DISCHARGE

## 2022-08-03 RX ORDER — ONDANSETRON 8 MG/1
1 TABLET, FILM COATED ORAL
Qty: 0 | Refills: 0 | DISCHARGE

## 2022-08-03 RX ORDER — ATORVASTATIN CALCIUM 80 MG/1
1 TABLET, FILM COATED ORAL
Qty: 0 | Refills: 0 | DISCHARGE

## 2022-08-03 RX ORDER — CHOLECALCIFEROL (VITAMIN D3) 125 MCG
1 CAPSULE ORAL
Qty: 0 | Refills: 0 | DISCHARGE

## 2022-08-03 RX ORDER — AMITRIPTYLINE HCL 25 MG
3 TABLET ORAL
Qty: 0 | Refills: 0 | DISCHARGE

## 2022-08-03 RX ADMIN — HYDROMORPHONE HYDROCHLORIDE 0.5 MILLIGRAM(S): 2 INJECTION INTRAMUSCULAR; INTRAVENOUS; SUBCUTANEOUS at 01:18

## 2022-08-03 RX ADMIN — Medication 2.5 MILLIGRAM(S): at 06:58

## 2022-08-03 RX ADMIN — HYDROMORPHONE HYDROCHLORIDE 0.5 MILLIGRAM(S): 2 INJECTION INTRAMUSCULAR; INTRAVENOUS; SUBCUTANEOUS at 10:41

## 2022-08-03 RX ADMIN — HYDROMORPHONE HYDROCHLORIDE 0.5 MILLIGRAM(S): 2 INJECTION INTRAMUSCULAR; INTRAVENOUS; SUBCUTANEOUS at 14:23

## 2022-08-03 RX ADMIN — HYDROMORPHONE HYDROCHLORIDE 0.5 MILLIGRAM(S): 2 INJECTION INTRAMUSCULAR; INTRAVENOUS; SUBCUTANEOUS at 10:26

## 2022-08-03 RX ADMIN — HYDROMORPHONE HYDROCHLORIDE 0.5 MILLIGRAM(S): 2 INJECTION INTRAMUSCULAR; INTRAVENOUS; SUBCUTANEOUS at 06:59

## 2022-08-03 RX ADMIN — Medication 3 MILLIGRAM(S): at 01:18

## 2022-08-03 NOTE — PROGRESS NOTE ADULT - ASSESSMENT
KERRY on CKD 4  Hyperkalemia  Hematuria  N/V  COVID 19+  B/L Mod Hydronephrosis    -BLCr 1.8  -KERRY possibly from obstruction  -Urine lytes reviewed  -UA >50 RBCs  -Lokelma  -Low K diet  -Urology consulted; s/p stent placement  -Renal indices worsened where dialysis is indicated but patient is poor candidate given risks outweigh the benefits  -Palliative note reviewed; patient is now comfort care; pending inpatient hospice.       8/2/22: Had a lengthy discussion with the patient's Daughter at bedside. Discussed dialysis in great detail. Explained the risks and benefits of dialysis. Risks include but are not limited to hypotension, cardiac dysfunction, weakness, fatigue, reduced quality of life, and death. Also explained the risk of dialysis access (catheter in this case) - risk life threatening infection and patient pulling out catheter when confused leading to life threatening bleeding. Patient is also malnourished with little muscle mass. Given advanced age as well, she is a poor candidate for dialysis. I would not recommend dialysis in this case as the long term benefits are less than the risks associated with dialysis. The patients daughter was agreeing with this opinion but wants to talk to her mother some more today, though she is having bouts of confusion, before giving us that final decision.    Thank you

## 2022-08-03 NOTE — PROGRESS NOTE ADULT - PROBLEM SELECTOR PLAN 12
- Heparin subQ for DVT ppx  - last dose tonight; will then hold for thoracentesis
- Heparin subQ for DVT ppx
- Heparin subQ for DVT ppx, currently holding for Uro procedure  - SCDs
- Heparin subQ for DVT ppx  - last dose yd, holding for thoracentesis today
- Heparin subQ for DVT ppx, currently holding for Uro procedure

## 2022-08-03 NOTE — PROGRESS NOTE ADULT - REASON FOR ADMISSION
back and R leg pain, inability to urinate

## 2022-08-03 NOTE — PROGRESS NOTE ADULT - PROBLEM SELECTOR PLAN 11
- Continue home Protonix

## 2022-08-03 NOTE — PROGRESS NOTE ADULT - SUBJECTIVE AND OBJECTIVE BOX
Patient is a 89y old  Female who presents with a chief complaint of back and R leg pain, inability to urinate (03 Aug 2022 07:20)    HPI:  Additional history obtained from daughter (Mary) via telephone conversation.    89yo F with PMH DM, dyslipidemia, gastroparesis, CAD s/p 2 stents, hx of vertebral fx with repair presents to the ED with back and R leg pain, as well as recent decreased urinary frequency. Patient was previously at [facility] 7/5/22-7/15/22 for COVID+, cholelithiasis, KERRY, and UTI/hematuria. Patient lives at home by herself with home health aide and daughter who take turns caring for her. Daughter commented that patient has worsening weakness and PO intake, increased irritability, and recent difficulty with urination. Daughter states that during her previous admission, patient would report urinary frequency that occurred every 20-30 minutes; Nephrology had seen her previously and prescribed Lasix q48h. Patient says that sometimes "she forgets to drink", and daughter states that COVID infection has changed her sense of taste as well as overall appetite. Daughter also concerned about ecchymosis in the R hip and knee region which had occurred during the last hospital stay; however patient denies any recent fall or injury.     in ED:  Vital Signs: T 97.9F, HR 83, 121/63, RR 18, 98% on RA  Labs: Hgb 10.3, Na 133, BUN 88, Cr 3.90, Gluc 159, Alk Phos 227, Mg 3.2  COVID+  CXR: Large bilateral pleural effusions layering posteriorly. Cannot exclude   underlying infiltrate or atelectasis.  CT chest, abd/pelvis: Moderate B/L hydronephrosis, moderate B/L pleural effusions, mild ascites  RLE doppler: No evidence of RLE DVT  UA: Proteinuria, trace ketones, small leukocyte esterase, large blood, few bacteria  Given: NaCl bolus x1, PO Dilaudid, IV Dilaudid (29 Jul 2022 23:08)    INTERVAL HPI:  7/30/22: Pt seen and examined at bedside. Pt only complaint is pain from chronic sacral wound (being followed by nursing). Pt also has newly developed (from recent admission in July) right leg 3+ pitting edema from ankle to hip with bruising noted over right knee (DCT ruled out via u/s). Pt endorses decreased PO intake over last few months 2/2 not being hungry. Daughter at bedside to supplement history. Pt denies chest pain, SOB, abdominal pain, cough, light-headedness. Worsening KERRY (cr 4.2), gross hematuria noted in bedside commode (Pt was straight cath'd twice on last admission, black stool noted in bedside commode (patient takes iron supplement). No DVT, +bilateral pleural effusions, bilateral hydro, mild ascites noted CT scan  7/31: Pt seen and examined at bedside. Pt waxing and waning with her dementia confusion and orientation. Pt intermittently complaining of lower abdominal pain vs no complaints at all. Pt pulled moss out 2 times overnight. Decision was made to bladder scan and leave moss out. Patient continues to have gross hematuria with minimal urine output with minimal bladder retention seen on bedside u/s. Per Urology, decision was made to place bilateral stents today due to worsening KERRY (Cr. 5.1),  8/1/22: Pt seen and examined at bedside. Underwent, BL ureteral stent placement yesterday. Tolerated procedure well. Moss reinserted by Dr. Gr during procedure. Pt states that currently she is in no pain; however, attests to mild discomfort from chronic sacral wound. Pt continues to have gross hematuria. Will postpone thoracentesis as per IR to see if pleural effusions self resolve after stent placement. Will US Wednesday AM.   8/2: Pt seen and examined at bedside. Went for US of pleural effusions this AM, will f/u read. Per Pulm, will evaluate need for thoracentesis and if needed will go tmmrw or Wednesday. Moss draining grossly bloody urine. Pt states that she is in intense pain from her chronic sacral wound. Patient is confused and asking for  and family. Continue with cefazolin, will f/u with urology for length of treatment. Worsening Cr, Renal follow up. Palliative care Eval.  8/3: Pt seen and examined at bedside. US of lungs showed BL pleural effusions. Going for thoracentesis with IR today. Moss draining grossly bloody urine. Pt transitioned to comfort care yesterday. This AM pt is complaining of right shoulder pain but states that it is a chronic issue. Patient is confused. Continue with cefazolin, will f/u with urology for length of tx.       OVERNIGHT EVENTS: None     Home Medications:  amitriptyline 10 mg oral tablet: 3 tab(s) orally once a day (at bedtime) (01 Aug 2022 11:17)  aspirin 81 mg oral tablet: 1 tab(s) orally once a day (01 Aug 2022 11:17)  atorvastatin 40 mg oral tablet: 1 tab(s) orally once a day (01 Aug 2022 11:17)  buprenorphine 5 mcg/hr transdermal film, extended release: 1 patch transdermal once a week (01 Aug 2022 11:17)  ferrous sulfate 200 mg (65 mg elemental iron) oral tablet: 1 tab(s) orally once a day (01 Aug 2022 11:17)  HYDROmorphone 4 mg oral tablet: 1 tab(s) orally 3 times a day (01 Aug 2022 11:17)  Macrodantin 100 mg oral capsule: 1 cap(s) orally 4 times a day (01 Aug 2022 11:17)  Metoprolol Succinate ER 25 mg oral tablet, extended release: 1 tab(s) orally once a day (01 Aug 2022 11:17)  MiraLax oral powder for reconstitution: orally once a day, As Needed - for constipation (01 Aug 2022 11:17)  PreserVision AREDS oral capsule: 1 tab(s) orally 2 times a day (01 Aug 2022 11:17)  Reglan 5 mg oral tablet: orally 2 times a day (01 Aug 2022 11:17)  Senna 8.6 mg oral tablet: 2 tab(s) orally once a day (at bedtime) (01 Aug 2022 11:17)  sertraline 25 mg oral tablet: two tablets daily  (01 Aug 2022 11:17)  Vitamin D3 1250 mcg (50,000 intl units) oral capsule: 1 cap(s) orally once a week (01 Aug 2022 11:17)  Zofran 8 mg oral tablet: 1 tab(s) orally every 6 hours (01 Aug 2022 11:17)      MEDICATIONS  (STANDING):  bisacodyl Suppository 10 milliGRAM(s) Rectal daily  ceFAZolin   IVPB 1000 milliGRAM(s) IV Intermittent every 24 hours  HYDROmorphone  Injectable 0.5 milliGRAM(s) IV Push every 4 hours  lidocaine   4% Patch 1 Patch Transdermal daily  metoprolol tartrate Injectable 2.5 milliGRAM(s) IV Push every 8 hours    MEDICATIONS  (PRN):  acetaminophen     Tablet .. 650 milliGRAM(s) Oral every 6 hours PRN Temp greater or equal to 38C (100.4F), Mild Pain (1 - 3)  HYDROmorphone  Injectable 0.5 milliGRAM(s) IV Push every 1 hour PRN Severe Pain (7 - 10)  HYDROmorphone  Injectable 0.25 milliGRAM(s) IV Push every 1 hour PRN Moderate Pain (4 - 6)  HYDROmorphone  Injectable 0.25 milliGRAM(s) IV Push every 1 hour PRN shortness of breath  melatonin 3 milliGRAM(s) Oral at bedtime PRN Insomnia  metoclopramide 5 milliGRAM(s) Oral every 6 hours PRN nausea  ondansetron    Tablet 4 milliGRAM(s) Oral every 6 hours PRN Nausea      Allergies    morphine (Other)    Intolerances        Social History:  Tobacco: Former smoker  EtOH: Denies   Recreational drug use: Denies  Lives with: Self, HHA and daughter work around the clock for additional assistance  Ambulates: Cane, walker  ADLs: Requires assistance with all ADLs (29 Jul 2022 23:08)      REVIEW OF SYSTEMS:  PAIN SCALE: [ x ] None  [  ] Other-  ROS Unable to obtain due to - [ X ] Dementia  [  ] Lethargy [  ] Drowsy [  ] Sedated [  ] non verbal  REST OF REVIEW Of SYSTEM - [ x ] Normal     Vital Signs Last 24 Hrs  T(C): 36.8 (03 Aug 2022 04:55), Max: 37 (02 Aug 2022 13:48)  T(F): 98.2 (03 Aug 2022 04:55), Max: 98.6 (02 Aug 2022 13:48)  HR: 90 (03 Aug 2022 04:55) (90 - 101)  BP: 116/70 (03 Aug 2022 04:55) (109/64 - 130/65)  BP(mean): --  RR: 18 (03 Aug 2022 04:55) (16 - 18)  SpO2: 90% (03 Aug 2022 04:55) (90% - 95%)    Parameters below as of 03 Aug 2022 04:55  Patient On (Oxygen Delivery Method): nasal cannula  O2 Flow (L/min): 4    Finger Stick        08-02 @ 07:01  -  08-03 @ 07:00  --------------------------------------------------------  IN: 410 mL / OUT: 200 mL / NET: 210 mL        PHYSICAL EXAM: Cachexia  GENERAL:  [X] NAD , [X] well appearing, [  ] Agitated, [  ] Drowsy,  [  ] Lethargy, [X] confused   HEAD:  [X] Normal, [  ] Other  EYES:  [X] EOMI, [X] PERRLA, [X] conjunctiva and sclera clear normal, [  ] Other,  [  ] Pallor,[  ] Discharge  ENMT:  [X] Normal, [X] Dry  mucous membranes, [X] dentition [  ] Good dentition, [ x ] No Thrush  NECK:  [X] Supple, [X] No JVD, [ x ] Normal thyroid, [  ] Lymphadenopathy [  ] Other  CHEST/LUNG:  [X] Clear to auscultation bilaterally, [X] Breath Sounds equal B/L / Decrease, [x  ] poor effort  [X] No rales, [X] No rhonchi  [X]  No wheezing,   HEART:  [X] Regular rate and rhythm, [  ] tachycardia, [  ] Bradycardia,  [  ] irregular  [X] No murmurs, No rubs, No gallops, [  ] PPM in place (Mfr:  )  ABDOMEN: [X] Reducible umbilical hernia [X] Moderately tense but no peritoneal signs [x ] Soft, [X] Nontender, [X] Nondistended, [ x ] No mass, [X] Bowel sounds present, [  ] obese  NERVOUS SYSTEM:  [X] Alert & Oriented X1 (person), [ x ] Nonfocal  [x  ] Confusion  [x  ] Encephalopathic [  ] Sedated [  ] Unable to assess, [ x ] Dementia [  ] Other-  EXTREMITIES: [X] 2+ Peripheral Pulses, No clubbing, No cyanosis, [X] right leg 3+ pitting edema from ankle to hip [  ] edema B/L lower EXT. [X] PVD stasis skin changes B/L Lower EXT with ecchymosis , [  ] wound  LYMPH: No lymphadenopathy noted  SKIN:  [  ] No rashes or lesions, [X] Pressure Ulcers (sacral), [ x ] ecchymosis- Large Ecchymosis Rt Knee & Thigh , [X] Skin Tears, [  ] Other    DIET: Diet, Pureed:   Consistent Carbohydrate Evening Snack  No Concentrated Potassium  Supplement Feeding Modality:  Oral  Nepro Cans or Servings Per Day:  1       Frequency:  Three Times a day (08-01-22 @ 14:59)      LABS:      Ca    8.6        02 Aug 2022 06:55      PT/INR - ( 02 Aug 2022 11:08 )   PT: 12.9 sec;   INR: 1.10 ratio         PTT - ( 02 Aug 2022 11:08 )  PTT:26.7 sec                         Anemia Panel:      Thyroid Panel:                RADIOLOGY & ADDITIONAL TESTS: None       HEALTH ISSUES - PROBLEM Dx:  KERRY (acute kidney injury)    Need for prophylactic measure    H/O vertebral fracture repair    2019 novel coronavirus disease (COVID-19)    General weakness    Bilateral hydronephrosis    Urinary frequency    Esophageal dysmotility    Anemia    Bilateral pleural effusion    Type 2 diabetes mellitus    CAD (coronary artery disease)    Constipation    GERD (gastroesophageal reflux disease)            Consultant(s) Notes Reviewed:  [ X ] YES     Care Discussed with [X] Consultants  [X  ] Patient  [X  ] Family [  ] HCP [  ]   [  ] Social Service  [  ] RN, [  ] Physical Therapy,[  ] Palliative care team  DVT PPX: [  ] Lovenox, [  ] S C Heparin, [  ] Coumadin, [  ] Xarelto, [  ] Eliquis, [  ] Pradaxa, [  ] IV Heparin drip, [  ] SCD [  ] Contraindication 2 to GI Bleed,[  ] Ambulation [  ] Contraindicated 2 to  bleed [  ] Contraindicated 2 to Brain Bleed  Advanced directive: [  ] None, [ X ] DNR/DNI

## 2022-08-03 NOTE — PROGRESS NOTE ADULT - SUBJECTIVE AND OBJECTIVE BOX
Date/Time Patient Seen:  		  Referring MD:   Data Reviewed	       Patient is a 89y old  Female who presents with a chief complaint of back and R leg pain, inability to urinate (02 Aug 2022 14:23)      Subjective/HPI     PAST MEDICAL & SURGICAL HISTORY:  H/O vertebral fracture repair    History of vertebral fracture    Dyslipidemia    DM type 2 with diabetic dyslipidemia    H/O gastroesophageal reflux (GERD)    Costochondritis    Coronary arteriosclerosis in native artery  s/p 2 stents. last placed 2 years ago    Gastroparesis    Hematuria    Hydronephrosis    Esophageal dysmotility    Anemia of chronic disease    History of laminectomy    S/P hip hemiarthroplasty    S/P appendectomy          Medication list         MEDICATIONS  (STANDING):  bisacodyl Suppository 10 milliGRAM(s) Rectal daily  ceFAZolin   IVPB 1000 milliGRAM(s) IV Intermittent every 24 hours  HYDROmorphone  Injectable 0.5 milliGRAM(s) IV Push every 4 hours  lidocaine   4% Patch 1 Patch Transdermal daily  metoprolol tartrate Injectable 2.5 milliGRAM(s) IV Push every 8 hours    MEDICATIONS  (PRN):  acetaminophen     Tablet .. 650 milliGRAM(s) Oral every 6 hours PRN Temp greater or equal to 38C (100.4F), Mild Pain (1 - 3)  HYDROmorphone  Injectable 0.5 milliGRAM(s) IV Push every 1 hour PRN Severe Pain (7 - 10)  HYDROmorphone  Injectable 0.25 milliGRAM(s) IV Push every 1 hour PRN Moderate Pain (4 - 6)  HYDROmorphone  Injectable 0.25 milliGRAM(s) IV Push every 1 hour PRN shortness of breath  melatonin 3 milliGRAM(s) Oral at bedtime PRN Insomnia  metoclopramide 5 milliGRAM(s) Oral every 6 hours PRN nausea  ondansetron    Tablet 4 milliGRAM(s) Oral every 6 hours PRN Nausea         Vitals log        ICU Vital Signs Last 24 Hrs  T(C): 36.8 (03 Aug 2022 04:55), Max: 37 (02 Aug 2022 05:23)  T(F): 98.2 (03 Aug 2022 04:55), Max: 98.6 (02 Aug 2022 05:23)  HR: 90 (03 Aug 2022 04:55) (90 - 101)  BP: 116/70 (03 Aug 2022 04:55) (109/64 - 130/65)  BP(mean): --  ABP: --  ABP(mean): --  RR: 18 (03 Aug 2022 04:55) (16 - 18)  SpO2: 90% (03 Aug 2022 04:55) (90% - 95%)    O2 Parameters below as of 03 Aug 2022 04:55  Patient On (Oxygen Delivery Method): nasal cannula  O2 Flow (L/min): 4               Input and Output:  I&O's Detail    01 Aug 2022 07:01  -  02 Aug 2022 07:00  --------------------------------------------------------  IN:    IV PiggyBack: 50 mL    Oral Fluid: 250 mL  Total IN: 300 mL    OUT:    Indwelling Catheter - Urethral (mL): 400 mL  Total OUT: 400 mL    Total NET: -100 mL      02 Aug 2022 07:01  -  03 Aug 2022 05:14  --------------------------------------------------------  IN:    IV PiggyBack: 50 mL    Oral Fluid: 360 mL  Total IN: 410 mL    OUT:    Indwelling Catheter - Urethral (mL): 200 mL  Total OUT: 200 mL    Total NET: 210 mL          Lab Data                        9.4    10.65 )-----------( 283      ( 02 Aug 2022 06:55 )             29.3     08-02    138  |  101  |  101<H>  ----------------------------<  166<H>  4.5   |  23  |  5.20<H>    Ca    8.6      02 Aug 2022 06:55  Phos  5.6     08-02  Mg     2.9     08-02    TPro  6.1  /  Alb  2.5<L>  /  TBili  0.4  /  DBili  x   /  AST  20  /  ALT  <6<L>  /  AlkPhos  186<H>  08-02            Review of Systems	      Objective     Physical Examination    heart s1s2  lung dc BS  abd soft      Pertinent Lab findings & Imaging      Olvin:  NO   Adequate UO     I&O's Detail    01 Aug 2022 07:01  -  02 Aug 2022 07:00  --------------------------------------------------------  IN:    IV PiggyBack: 50 mL    Oral Fluid: 250 mL  Total IN: 300 mL    OUT:    Indwelling Catheter - Urethral (mL): 400 mL  Total OUT: 400 mL    Total NET: -100 mL      02 Aug 2022 07:01  -  03 Aug 2022 05:14  --------------------------------------------------------  IN:    IV PiggyBack: 50 mL    Oral Fluid: 360 mL  Total IN: 410 mL    OUT:    Indwelling Catheter - Urethral (mL): 200 mL  Total OUT: 200 mL    Total NET: 210 mL               Discussed with:     Cultures:	        Radiology

## 2022-08-03 NOTE — PROGRESS NOTE ADULT - ASSESSMENT
89yo F with PMH DM, dyslipidemia, gastroparesis, CAD s/p 2 stents, hx of vertebral fx with repair presents to the ED with back and R leg pain, as well as recent decreased urinary frequency. Patient was previously at [facility] 7/5/22-7/15/22 for COVID+, cholelithiasis, KERRY, and UTI/hematuria.    remains covid pos  hydro  effusions  atelectasis  cad  VCF  OP  OA  frail  weak  elderly  DM  HLD    pt is on CMO  ? pleurocentesis - may be a palliative measure - alleviate dyspnea and o2 demand  US chest shows bilateral eff - R > L    ct chest reviewed  effusions - bilaterally  I devonte if able to cooperate  I and O  cvs rx regimen and BP control  monitor for prolonged covid sx  assist with needs  GOC discussion  DM care

## 2022-08-03 NOTE — PROGRESS NOTE ADULT - ASSESSMENT
89 yo F with PMH DM, dyslipidemia, gastroparesis, CAD s/p 2 stents, hx of vertebral fx with repair presents to the ED with back and R leg pain, as well as recent decreased urinary frequency, admitted for KERRY and dehydration.    Nutritional Assessment:  · Nutritional Assessment	This patient has been assessed with a concern for Malnutrition and has been determined to have a diagnosis/diagnoses of Severe protein-calorie malnutrition.    This patient is being managed with:   Diet Pureed-  Consistent Carbohydrate {Evening Snack}  No Concentrated Potassium  Supplement Feeding Modality:  Oral  Nepro Cans or Servings Per Day:  1       Frequency:  Three Times a day  Entered: Aug  1 2022  2:59PM      #KERRY on CKD now ESRD with anuria   - CT abd/pelvis: Moderate B/L hydronephrosis, moderate B/L pleural effusions, mild ascites  - c/w Cefazolin, will reach out to urology to determine length of tx   - Cr stable from yesterday (5.2) ; s/p BL ureteral stent placement yesterday 7/31; moss in place; gross hematuria   - Hyperkalemia resolved after [facility]  - nephrology recs noted; not offering HD given that harm outweighs benefit   - comfort care  - d/c to inpatient hospice    #Bilateral pleural effusion.   - CT abd/pelvis showed moderate B/L pleural effusions   - pulm recs noted    #Pain/SOB  - d/c buprenorphine patch  - dilaudid 0.5 mg IVP q4h RTC and prn    #GERD  #Nausea 2/2 uremia  - continue PPI  - ok with reglan prn     #ACP/GOC  - PPS 20%  - prognosis: hours-days  - comfort care. D/c to inpatient hospice.     Appreciate consult. Discussed with the primary team.    Carlos Bee MD, [facility]     89 yo F with PMH DM, dyslipidemia, gastroparesis, CAD s/p 2 stents, hx of vertebral fx with repair presents to the ED with back and R leg pain, as well as recent decreased urinary frequency, admitted for KERRY and dehydration.    Nutritional Assessment:  · Nutritional Assessment	This patient has been assessed with a concern for Malnutrition and has been determined to have a diagnosis/diagnoses of Severe protein-calorie malnutrition.    This patient is being managed with:   Diet Pureed-  Consistent Carbohydrate {Evening Snack}  No Concentrated Potassium  Supplement Feeding Modality:  Oral  Nepro Cans or Servings Per Day:  1       Frequency:  Three Times a day  Entered: Aug  1 2022  2:59PM      #KERRY on CKD now ESRD with anuria   - CT abd/pelvis: Moderate B/L hydronephrosis, moderate B/L pleural effusions, mild ascites  - c/w Cefazolin, will reach out to urology to determine length of tx   - Cr stable from yesterday (5.2) ; s/p BL ureteral stent placement yesterday 7/31; moss in place; gross hematuria   - Hyperkalemia resolved after [facility]  - nephrology recs noted; not offering HD given that harm outweighs benefit   - comfort care  - d/c to inpatient hospice    #Bilateral pleural effusion.   - CT abd/pelvis showed moderate B/L pleural effusions   - pulm recs noted    #Pain/SOB  - d/c'd buprenorphine patch  - dilaudid 0.5 mg IVP q4h RTC and prn    #GERD  #Nausea 2/2 uremia  - continue PPI  - ok with reglan prn     #ACP/GOC  - PPS 20%  - prognosis: hours-days  - comfort care. D/c to inpatient hospice.     Appreciate consult. Discussed with the primary team.    Carlos Bee MD, [facility]

## 2022-08-03 NOTE — PROGRESS NOTE ADULT - SUBJECTIVE AND OBJECTIVE BOX
Patient is a 89y old  Female who presents with a chief complaint of back and R leg pain, inability to urinate (30 Jul 2022 08:35)       HPI:  Additional history obtained from daughter (Mary) via telephone conversation.    89yo F with PMH DM, dyslipidemia, gastroparesis, CAD s/p 2 stents, hx of vertebral fx with repair presents to the ED with back and R leg pain, as well as recent decreased urinary frequency. Patient was previously at [facility] 7/5/22-7/15/22 for COVID+, cholelithiasis, KERRY, and UTI/hematuria. Patient lives at home by herself with home health aide and daughter who take turns caring for her. Daughter commented that patient has worsening weakness and PO intake, increased irritability, and recent difficulty with urination. Daughter states that during her previous admission, patient would report urinary frequency that occurred every 20-30 minutes; Nephrology had seen her previously and prescribed Lasix q48h. Patient says that sometimes "she forgets to drink", and daughter states that COVID infection has changed her sense of taste as well as overall appetite. Daughter also concerned about ecchymosis in the R hip and knee region which had occurred during the last hospital stay; however patient denies any recent fall or injury.   Patient with decreased PO intake and N/V.  Has had trouble urinating and is having hematuria.  No dysgeusia or asterixis present. Known to use from prior.    No acute events noted  Confused    PAST MEDICAL & SURGICAL HISTORY:  H/O vertebral fracture repair      History of vertebral fracture      Dyslipidemia      DM type 2 with diabetic dyslipidemia      H/O gastroesophageal reflux (GERD)      Costochondritis      Coronary arteriosclerosis in native artery  s/p 2 stents. last placed 2 years ago      Gastroparesis      History of laminectomy      S/P hip hemiarthroplasty      S/P appendectomy           FAMILY HISTORY:  FHx: stroke (Mother)    NC    Social History:Non smoker    MEDICATIONS  (STANDING):  amitriptyline 10 milliGRAM(s) Oral at bedtime  aspirin enteric coated 81 milliGRAM(s) Oral daily  atorvastatin 40 milliGRAM(s) Oral at bedtime  dextrose 5%. 1000 milliLiter(s) (50 mL/Hr) IV Continuous <Continuous>  dextrose 5%. 1000 milliLiter(s) (100 mL/Hr) IV Continuous <Continuous>  dextrose 50% Injectable 25 Gram(s) IV Push once  dextrose 50% Injectable 12.5 Gram(s) IV Push once  dextrose 50% Injectable 25 Gram(s) IV Push once  ergocalciferol 69262 Unit(s) Oral <User Schedule>  ferrous    sulfate 325 milliGRAM(s) Oral daily  glucagon  Injectable 1 milliGRAM(s) IntraMuscular once  heparin   Injectable 5000 Unit(s) SubCutaneous every 8 hours  HYDROmorphone   Tablet 4 milliGRAM(s) Oral three times a day  insulin lispro (ADMELOG) corrective regimen sliding scale   SubCutaneous three times a day before meals  insulin lispro (ADMELOG) corrective regimen sliding scale   SubCutaneous at bedtime  metoprolol succinate ER 25 milliGRAM(s) Oral daily  pantoprazole    Tablet 40 milliGRAM(s) Oral two times a day  senna 2 Tablet(s) Oral at bedtime  sertraline 25 milliGRAM(s) Oral two times a day  sodium chloride 0.9%. 1000 milliLiter(s) (70 mL/Hr) IV Continuous <Continuous>  sodium zirconium cyclosilicate 10 Gram(s) Oral two times a day    MEDICATIONS  (PRN):  dextrose Oral Gel 15 Gram(s) Oral once PRN Blood Glucose LESS THAN 70 milliGRAM(s)/deciliter  melatonin 3 milliGRAM(s) Oral at bedtime PRN Insomnia  metoclopramide 5 milliGRAM(s) Oral two times a day PRN Nausea/vomiting  ondansetron    Tablet 8 milliGRAM(s) Oral three times a day PRN Nausea and/or Vomiting  polyethylene glycol 3350 17 Gram(s) Oral daily PRN for constipation   Meds reviewed    Allergies    morphine (Other)    Intolerances         REVIEW OF SYSTEMS:    Limited due to mental status      Vital Signs Last 24 Hrs  T(C): 36.8 (03 Aug 2022 04:55), Max: 37 (02 Aug 2022 13:48)  T(F): 98.2 (03 Aug 2022 04:55), Max: 98.6 (02 Aug 2022 13:48)  HR: 90 (03 Aug 2022 04:55) (90 - 101)  BP: 116/70 (03 Aug 2022 04:55) (109/64 - 130/65)  BP(mean): --  RR: 18 (03 Aug 2022 04:55) (16 - 18)  SpO2: 90% (03 Aug 2022 04:55) (90% - 95%)    Parameters below as of 03 Aug 2022 04:55  Patient On (Oxygen Delivery Method): nasal cannula  O2 Flow (L/min): 4          PHYSICAL EXAM:    GENERAL: NAD, frail appearing  HEAD:  Atraumatic, Normocephalic  EYES: Conjunctiva and sclera clear  ENMT: No Drainage from nares, No drainage from ears  NECK: Supple, neck  veins full  NERVOUS SYSTEM:  Awake, confused  CHEST/LUNG: reduced breath sounds  HEART: Regular rate and rhythm; No murmurs, rubs, or gallops  ABDOMEN: Soft, Nontender, mildly distended; Bowel sounds present  EXTREMITIES:  No Edema  SKIN: No rashes No obvious ecchymosis  +moss      LABS:                                            9.4    10.65 )-----------( 283      ( 02 Aug 2022 06:55 )             29.3     08-02    138  |  101  |  101<H>  ----------------------------<  166<H>  4.5   |  23  |  5.20<H>    Ca    8.6      02 Aug 2022 06:55  Phos  5.6     08-02  Mg     2.9     08-02    TPro  6.1  /  Alb  2.5<L>  /  TBili  0.4  /  DBili  x   /  AST  20  /  ALT  <6<L>  /  AlkPhos  186<H>  08-02

## 2022-08-03 NOTE — PROGRESS NOTE ADULT - PROVIDER SPECIALTY LIST ADULT
Pulmonology
Internal Medicine
Nephrology
Urology
Nephrology
Pulmonology
Pulmonology
Urology
Internal Medicine
Palliative Care
Pulmonology
Urology
Urology
Hospitalist
Internal Medicine
Internal Medicine

## 2022-08-03 NOTE — PROGRESS NOTE ADULT - PROBLEM SELECTOR PLAN 1
KERRY/CKD 4 -Oliguria- Moss cath -Poor urine out put   - CT abd/pelvis: Moderate B/L hydronephrosis, moderate B/L pleural effusions, mild ascites  - c/w Cefazolin, will reach out to urology to determine length of tx   - Avoid nephrotoxic meds  - Trend renal function and lytes, adjust as necessary  - Monitor I&Os  - Hold home Lasix due to KERRY, IV fluids  - Nephro D/W Dr Wu  -Explained the risks and benefits of dialysis. Risks include but are not limited to hypotension, cardiac dysfunction, weakness, fatigue, reduced quality of life, and death. Also explained the risk of dialysis access (catheter in this case) - risk life threatening infection and patient pulling out catheter when confused leading to life threatening bleeding. Patient is also malnourished with little muscle mass. Given advanced age as well, she is a poor candidate for dialysis. I would not recommend dialysis in this case as the long term benefits are less than the risks associated with dialysis.  - s/p BL ureteral stent placement 7/31; moss in place; gross hematuria   - Hyperkalemia resolved after Lokelma

## 2022-08-03 NOTE — PROGRESS NOTE ADULT - PROBLEM SELECTOR PLAN 8
Patient presented with back and R leg pain, daughter also commented on more noticeable generalized weakness  - PT consult for weakness, deconditioning  - Palliative consult for GOC discussion; pt is now comfort care   - SW consult for dispo, d/c recs  - PT recommends home with home PT

## 2022-08-03 NOTE — PROGRESS NOTE ADULT - SUBJECTIVE AND OBJECTIVE BOX
SUBJECTIVE AND OBJECTIVE:  INTERVAL HPI/OVERNIGHT EVENTS:    DNR on chart:   Allergies    morphine (Other)    Intolerances    MEDICATIONS  (STANDING):  bisacodyl Suppository 10 milliGRAM(s) Rectal daily  ceFAZolin   IVPB 1000 milliGRAM(s) IV Intermittent every 24 hours  HYDROmorphone  Injectable 0.5 milliGRAM(s) IV Push every 4 hours  lidocaine   4% Patch 1 Patch Transdermal daily  metoprolol tartrate Injectable 2.5 milliGRAM(s) IV Push every 8 hours    MEDICATIONS  (PRN):  acetaminophen     Tablet .. 650 milliGRAM(s) Oral every 6 hours PRN Temp greater or equal to 38C (100.4F), Mild Pain (1 - 3)  HYDROmorphone  Injectable 0.5 milliGRAM(s) IV Push every 1 hour PRN Severe Pain (7 - 10)  HYDROmorphone  Injectable 0.25 milliGRAM(s) IV Push every 1 hour PRN Moderate Pain (4 - 6)  HYDROmorphone  Injectable 0.25 milliGRAM(s) IV Push every 1 hour PRN shortness of breath  melatonin 3 milliGRAM(s) Oral at bedtime PRN Insomnia  metoclopramide 5 milliGRAM(s) Oral every 6 hours PRN nausea  ondansetron    Tablet 4 milliGRAM(s) Oral every 6 hours PRN Nausea      ITEMS UNCHECKED ARE NOT PRESENT    PRESENT SYMPTOMS: [ ]Unable to obtain due to poor mentation   Source if other than patient:  [ ]Family   [ ]Team     Pain:  [ ]yes [ ]no  QOL impact -   Location -                    Aggravating factors -  Quality -  Radiation -  Timing-  Severity (0-10 scale):  Minimal acceptable level (0-10 scale):     Dyspnea:                           [ ]Mild [ ]Moderate [ ]Severe  Anxiety:                             [ ]Mild [ ]Moderate [ ]Severe  Fatigue:                             [ ]Mild [ ]Moderate [ ]Severe  Nausea:                             [ ]Mild [ ]Moderate [ ]Severe  Loss of appetite:              [ ]Mild [ ]Moderate [ ]Severe  Constipation:                    [ ]Mild [ ]Moderate [ ]Severe    CPOT:    https://www.[facility].org/getattachment/[email or web address]/Critical-Care-Pain-Observation-Tool-(CPOT)    PAIN AD Score:	  http://geriatrictoolkit.[facility]/cog/painad.pdf (Ctrl + left click to view)    Other Symptoms:  [ ]All other review of systems negative     Palliative Performance Status Version 2:         %      http://npcrc.org/files/news/palliative_performance_scale_ppsv2.pdf  PHYSICAL EXAM:  Vital Signs Last 24 Hrs  T(C): 36.8 (03 Aug 2022 12:16), Max: 37 (02 Aug 2022 13:48)  T(F): 98.3 (03 Aug 2022 12:16), Max: 98.6 (02 Aug 2022 13:48)  HR: 103 (03 Aug 2022 12:16) (90 - 103)  BP: 113/63 (03 Aug 2022 12:16) (109/64 - 130/65)  BP(mean): 75 (03 Aug 2022 12:16) (75 - 75)  RR: 15 (03 Aug 2022 12:16) (15 - 18)  SpO2: 92% (03 Aug 2022 12:16) (90% - 95%)    Parameters below as of 03 Aug 2022 12:16  Patient On (Oxygen Delivery Method): nasal cannula  O2 Flow (L/min): 4   I&O's Summary    02 Aug 2022 07:01  -  03 Aug 2022 07:00  --------------------------------------------------------  IN: 410 mL / OUT: 200 mL / NET: 210 mL       GENERAL:  [ ]Alert  [ ]Oriented x   [ ]Lethargic  [ ]Cachexia  [ ]Unarousable  [ ]Verbal  [ ]Non-Verbal  Behavioral:   [ ]Anxiety  [ ]Delirium [ ]Agitation [ ]Other  HEENT:  [ ]Normal   [ ]Dry mouth   [ ]ET Tube/Trach  [ ]Oral lesions  PULMONARY:   [ ]Clear [ ]Tachypnea  [ ]Audible excessive secretions   [ ]Rhonchi        [ ]Right [ ]Left [ ]Bilateral  [ ]Crackles        [ ]Right [ ]Left [ ]Bilateral  [ ]Wheezing     [ ]Right [ ]Left [ ]Bilateral  [ ]Diminished BS [ ] Right [ ]Left [ ]Bilateral  CARDIOVASCULAR:    [ ]Regular [ ]Irregular [ ]Tachy  [ ]Kayden [ ]Murmur [ ]Other  GASTROINTESTINAL:  [ ]Soft  [ ]Distended   [ ]+BS  [ ]Non tender [ ]Tender  [ ]PEG [ ]OGT/ NGT   Last BM:    GENITOURINARY:  [ ]Normal [ ]Incontinent   [ ]Oliguria/Anuria   [ ]Bah  MUSCULOSKELETAL:   [ ]Normal   [ ]Weakness  [ ]Bed/Wheelchair bound [ ]Edema  NEUROLOGIC:   [ ]No focal deficits  [ ] Cognitive impairment  [ ] Dysphagia [ ]Dysarthria [ ] Paresis [ ]Other   SKIN:   [ ]Normal  [ ]Rash   [ ]Pressure ulcer(s) [ ]y [ ]n present on admission    CRITICAL CARE:  [ ]Shock Present  [ ]Septic [ ]Cardiogenic [ ]Neurologic [ ]Hypovolemic  [ ]Vasopressors [ ]Inotropes  [ ]Respiratory failure present [ ]Mechanical Ventilation [ ]Non-invasive ventilatory support [ ]High-Flow   [ ]Acute  [ ]Chronic [ ]Hypoxic  [ ]Hypercarbic [ ]Other  [ ]Other organ failure     LABS:                        9.4    10.65 )-----------( 283      ( 02 Aug 2022 06:55 )             29.3   08-02    138  |  101  |  101<H>  ----------------------------<  166<H>  4.5   |  23  |  5.20<H>    Ca    8.6      02 Aug 2022 06:55  Phos  5.6     08-02  Mg     2.9     08-02    TPro  6.1  /  Alb  2.5<L>  /  TBili  0.4  /  DBili  x   /  AST  20  /  ALT  <6<L>  /  AlkPhos  186<H>  08-02  PT/INR - ( 02 Aug 2022 11:08 )   PT: 12.9 sec;   INR: 1.10 ratio         PTT - ( 02 Aug 2022 11:08 )  PTT:26.7 sec      RADIOLOGY & ADDITIONAL STUDIES: reviewed    Protein Calorie Malnutrition Present: [ ]mild [ ]moderate [ ]severe [ ]underweight [ ]morbid obesity  https://www.andeal.org/vault/3990/web/files/ONC/Table_Clinical%20Characteristics%20to%20Document%20Malnutrition-White%20JV%20et%20al%202012.pdf    Height (cm): 162.6 (07-31-22 @ 18:27), 157.5 (07-05-22 @ 18:15), 157.5 (06-20-22 @ 13:56)  Weight (kg): 56.7 (07-31-22 @ 18:27), 52.6 (07-05-22 @ 18:15)  BMI (kg/m2): 21.4 (07-31-22 @ 18:27), 21.2 (07-05-22 @ 18:15)    [ ]PPSV2 < or = 30%  [ ]significant weight loss [ ]poor nutritional intake [ ]anasarca    [ ]Artificial Nutrition    REFERRALS:   [ ]Chaplaincy  [ ]Hospice  [ ]Child Life  [ ]Social Work  [ ]Case management [ ]Holistic Therapy     Goals of Care Document:     SUBJECTIVE AND OBJECTIVE/INTERVAL HPI/OVERNIGHT EVENTS:  - no acute events overnight    DNR on chart:   Allergies    morphine (Other)    Intolerances    MEDICATIONS  (STANDING):  bisacodyl Suppository 10 milliGRAM(s) Rectal daily  ceFAZolin   IVPB 1000 milliGRAM(s) IV Intermittent every 24 hours  HYDROmorphone  Injectable 0.5 milliGRAM(s) IV Push every 4 hours  lidocaine   4% Patch 1 Patch Transdermal daily  metoprolol tartrate Injectable 2.5 milliGRAM(s) IV Push every 8 hours    MEDICATIONS  (PRN):  acetaminophen     Tablet .. 650 milliGRAM(s) Oral every 6 hours PRN Temp greater or equal to 38C (100.4F), Mild Pain (1 - 3)  HYDROmorphone  Injectable 0.5 milliGRAM(s) IV Push every 1 hour PRN Severe Pain (7 - 10)  HYDROmorphone  Injectable 0.25 milliGRAM(s) IV Push every 1 hour PRN Moderate Pain (4 - 6)  HYDROmorphone  Injectable 0.25 milliGRAM(s) IV Push every 1 hour PRN shortness of breath  melatonin 3 milliGRAM(s) Oral at bedtime PRN Insomnia  metoclopramide 5 milliGRAM(s) Oral every 6 hours PRN nausea  ondansetron    Tablet 4 milliGRAM(s) Oral every 6 hours PRN Nausea      ITEMS UNCHECKED ARE NOT PRESENT    PRESENT SYMPTOMS: [ x]Unable to obtain due to poor mentation   Source if other than patient:  [ ]Family   [ ]Team     Pain:  [ ]yes [ ]no  QOL impact -   Location -                    Aggravating factors -  Quality -  Radiation -  Timing-  Severity (0-10 scale):  Minimal acceptable level (0-10 scale):     Dyspnea:                           [ ]Mild [ ]Moderate [ ]Severe  Anxiety:                             [ ]Mild [ ]Moderate [ ]Severe  Fatigue:                             [ ]Mild [ ]Moderate [ ]Severe  Nausea:                             [ ]Mild [ ]Moderate [ ]Severe  Loss of appetite:              [ ]Mild [ ]Moderate [ ]Severe  Constipation:                    [ ]Mild [ ]Moderate [ ]Severe    CPOT:    https://www.[facility].org/getattachment/[email or web address]/Critical-Care-Pain-Observation-Tool-(CPOT)    PAIN AD Score:	  http://geriatrictoolkit.[facility]/cog/painad.pdf (Ctrl + left click to view)    Other Symptoms:  [ ]All other review of systems negative     Palliative Performance Status Version 2:         %      http://npcrc.org/files/news/palliative_performance_scale_ppsv2.pdf  PHYSICAL EXAM:  Vital Signs Last 24 Hrs  T(C): 36.8 (03 Aug 2022 12:16), Max: 37 (02 Aug 2022 13:48)  T(F): 98.3 (03 Aug 2022 12:16), Max: 98.6 (02 Aug 2022 13:48)  HR: 103 (03 Aug 2022 12:16) (90 - 103)  BP: 113/63 (03 Aug 2022 12:16) (109/64 - 130/65)  BP(mean): 75 (03 Aug 2022 12:16) (75 - 75)  RR: 15 (03 Aug 2022 12:16) (15 - 18)  SpO2: 92% (03 Aug 2022 12:16) (90% - 95%)    Parameters below as of 03 Aug 2022 12:16  Patient On (Oxygen Delivery Method): nasal cannula  O2 Flow (L/min): 4   I&O's Summary    02 Aug 2022 07:01  -  03 Aug 2022 07:00  --------------------------------------------------------  IN: 410 mL / OUT: 200 mL / NET: 210 mL    GENERAL:  [X] NAD , [X] well appearing, [  ] Agitated, [  ] Drowsy,  [  ] Lethargy, [X] confused   HEAD:  [X] Normal, [  ] Other  EYES:  [X] EOMI, [X] PERRLA, [X] conjunctiva and sclera clear normal, [  ] Other,  [  ] Pallor,[  ] Discharge  ENMT:  [X] Normal, [X] Dry  mucous membranes, [X] dentition [  ] Good dentition, [ x ] No Thrush  NECK:  [X] Supple, [X] No JVD, [ x ] Normal thyroid, [  ] Lymphadenopathy [  ] Other  CHEST/LUNG:  [X] Clear to auscultation bilaterally, [X] Breath Sounds equal B/L / Decrease, [x  ] poor effort  [X] No rales, [X] No rhonchi  [X]  No wheezing,   HEART:  [X] Regular rate and rhythm, [  ] tachycardia, [  ] Bradycardia,  [  ] irregular  [X] No murmurs, No rubs, No gallops, [  ] PPM in place (Mfr:  )  ABDOMEN: [X] Reducible umbilical hernia [X] Moderately tense but no peritoneal signs [x ] Soft, [X] Nontender, [X] Nondistended, [ x ] No mass, [X] Bowel sounds present, [  ] obese  NERVOUS SYSTEM:  [X] Alert & Oriented X2 (person & place), [ x ] Nonfocal  [x  ] Confusion  [x  ] Encephalopathic [  ] Sedated [  ] Unable to assess, [ x ] Dementia [  ] Other-  EXTREMITIES: [X] 2+ Peripheral Pulses, No clubbing, No cyanosis, [X] right leg 3+ pitting edema from ankle to hip [  ] edema B/L lower EXT. [X] PVD stasis skin changes B/L Lower EXT with ecchymosis , [  ] wound  LYMPH: No lymphadenopathy noted  SKIN:  [  ] No rashes or lesions, [X] Pressure Ulcers (sacral), [ x ] ecchymosis- Large Ecchymosis Rt Knee & Thigh , [X] Skin Tears, [  ] Other    DIET: Diet, Pureed:   Consistent Carbohydrate Evening Snack  No Concentrated Potassium  Supplement Feeding Modality:  Oral  Nepro Cans or Servings Per Day:  1       Frequency:  Three Times a day (08-01-22 @ 14:59)   LABS:                        9.4    10.65 )-----------( 283      ( 02 Aug 2022 06:55 )             29.3   08-02    138  |  101  |  101<H>  ----------------------------<  166<H>  4.5   |  23  |  5.20<H>    Ca    8.6      02 Aug 2022 06:55  Phos  5.6     08-02  Mg     2.9     08-02    TPro  6.1  /  Alb  2.5<L>  /  TBili  0.4  /  DBili  x   /  AST  20  /  ALT  <6<L>  /  AlkPhos  186<H>  08-02  PT/INR - ( 02 Aug 2022 11:08 )   PT: 12.9 sec;   INR: 1.10 ratio         PTT - ( 02 Aug 2022 11:08 )  PTT:26.7 sec      RADIOLOGY & ADDITIONAL STUDIES: reviewed    Protein Calorie Malnutrition Present: [ ]mild [ ]moderate [ ]severe [ ]underweight [ ]morbid obesity  https://www.andeal.org/vault/2440/web/files/ONC/Table_Clinical%20Characteristics%20to%20Document%20Malnutrition-White%20JV%20et%20al%202012.pdf    Height (cm): 162.6 (07-31-22 @ 18:27), 157.5 (07-05-22 @ 18:15), 157.5 (06-20-22 @ 13:56)  Weight (kg): 56.7 (07-31-22 @ 18:27), 52.6 (07-05-22 @ 18:15)  BMI (kg/m2): 21.4 (07-31-22 @ 18:27), 21.2 (07-05-22 @ 18:15)    [ ]PPSV2 < or = 30%  [ ]significant weight loss [ ]poor nutritional intake [ ]anasarca    [ ]Artificial Nutrition    REFERRALS:   [ ]Chaplaincy  [ ]Hospice  [ ]Child Life  [ ]Social Work  [ ]Case management [ ]Holistic Therapy     Goals of Care Document:

## 2022-08-03 NOTE — PROGRESS NOTE ADULT - NUTRITIONAL ASSESSMENT
This patient has been assessed with a concern for Malnutrition and has been determined to have a diagnosis/diagnoses of Severe protein-calorie malnutrition.    This patient is being managed with:   Diet Pureed-  Consistent Carbohydrate {Evening Snack}  No Concentrated Potassium  Supplement Feeding Modality:  Oral  Nepro Cans or Servings Per Day:  1       Frequency:  Three Times a day  Entered: Aug  1 2022  2:59PM    
This patient has been assessed with a concern for Malnutrition and has been determined to have a diagnosis/diagnoses of Severe protein-calorie malnutrition.    This patient is being managed with:   Diet NPO-  Entered: Jul 31 2022  9:23AM    
This patient has been assessed with a concern for Malnutrition and has been determined to have a diagnosis/diagnoses of Severe protein-calorie malnutrition.    This patient is being managed with:   Diet Pureed-  Consistent Carbohydrate {Evening Snack}  Entered: Jul 31 2022  7:54PM    
This patient has been assessed with a concern for Malnutrition and has been determined to have a diagnosis/diagnoses of Severe protein-calorie malnutrition.    This patient is being managed with:   Diet Pureed-  Consistent Carbohydrate {Evening Snack}  No Concentrated Potassium  Supplement Feeding Modality:  Oral  Nepro Cans or Servings Per Day:  1       Frequency:  Three Times a day  Entered: Aug  1 2022  2:59PM

## 2022-08-03 NOTE — DISCHARGE NOTE NURSING/CASE MANAGEMENT/SOCIAL WORK - NSDCPEFALRISK_GEN_ALL_CORE
For information on Fall & Injury Prevention, visit: [email or web address] OR  [email or web address] OR  https://www.cdc.gov/steadi/patient.html

## 2022-08-03 NOTE — PROCEDURE NOTE - PROCEDURE FINDINGS AND DETAILS
1160cc of cloudy yellow pleuritic fluid removed from right under sterile conditions and ultrasound guidance.

## 2022-08-03 NOTE — PROGRESS NOTE ADULT - PROBLEM SELECTOR PROBLEM 1
KERRY (acute kidney injury)
KERRY (acute kidney injury)
Bilateral hydronephrosis
KERRY (acute kidney injury)

## 2022-08-03 NOTE — PROGRESS NOTE ADULT - PROBLEM SELECTOR PLAN 10
Patient reports constipation at baseline, takes Miralax and Senna  - Continue home bowel regimen

## 2022-08-03 NOTE — PROGRESS NOTE ADULT - PROBLEM SELECTOR PLAN 7
Hx of vertebral fracture with repair, patient presents with back and R leg pain. Takes PO dilaudid at home  - Buprenorphine Patch 5 mg removed as pt is now on comfort care   - receiving IV dilaudid   - Fall risk protocol

## 2022-08-03 NOTE — PROGRESS NOTE ADULT - PROBLEM SELECTOR PLAN 3
CT abd/pelvis showed moderate B/L pleural effusions on admission  - Pulm consulted Dr. orona, recs appreciated  - US performed, showed BL pleural effusions   - Pt will go for thoracentesis today with IR   - Incentive spirometry

## 2022-08-03 NOTE — PROGRESS NOTE ADULT - SUBJECTIVE AND OBJECTIVE BOX
INTERVAL HPI/OVERNIGHT EVENTS: Comfortable    MEDICATIONS  (STANDING):  bisacodyl Suppository 10 milliGRAM(s) Rectal daily  HYDROmorphone  Injectable 0.5 milliGRAM(s) IV Push every 4 hours  lidocaine   4% Patch 1 Patch Transdermal daily  metoprolol tartrate Injectable 2.5 milliGRAM(s) IV Push every 8 hours    MEDICATIONS  (PRN):  acetaminophen     Tablet .. 650 milliGRAM(s) Oral every 6 hours PRN Temp greater or equal to 38C (100.4F), Mild Pain (1 - 3)  HYDROmorphone  Injectable 0.5 milliGRAM(s) IV Push every 1 hour PRN Severe Pain (7 - 10)  HYDROmorphone  Injectable 0.25 milliGRAM(s) IV Push every 1 hour PRN Moderate Pain (4 - 6)  HYDROmorphone  Injectable 0.25 milliGRAM(s) IV Push every 1 hour PRN shortness of breath  melatonin 3 milliGRAM(s) Oral at bedtime PRN Insomnia  metoclopramide 5 milliGRAM(s) Oral every 6 hours PRN nausea  ondansetron    Tablet 4 milliGRAM(s) Oral every 6 hours PRN Nausea        Vital Signs Last 24 Hrs  T(C): 36.8 (03 Aug 2022 14:08), Max: 36.9 (02 Aug 2022 21:27)  T(F): 98.2 (03 Aug 2022 14:08), Max: 98.4 (02 Aug 2022 21:27)  HR: 97 (03 Aug 2022 14:08) (90 - 103)  BP: 126/65 (03 Aug 2022 14:08) (109/64 - 127/58)  BP(mean): 75 (03 Aug 2022 12:16) (75 - 75)  RR: 17 (03 Aug 2022 14:08) (15 - 18)  SpO2: 98% (03 Aug 2022 14:08) (90% - 98%)    Parameters below as of 03 Aug 2022 14:08  Patient On (Oxygen Delivery Method): nasal cannula        PHYSICAL EXAM:    ABDOMEN: Soft. No SP or CVA tenderness      LABS:                        9.4    10.65 )-----------( 283      ( 02 Aug 2022 06:55 )             29.3     08-02    138  |  101  |  101<H>  ----------------------------<  166<H>  4.5   |  23  |  5.20<H>    Ca    8.6      02 Aug 2022 06:55  Phos  5.6     08-02  Mg     2.9     08-02    TPro  6.1  /  Alb  2.5<L>  /  TBili  0.4  /  DBili  x   /  AST  20  /  ALT  <6<L>  /  AlkPhos  186<H>  08-02    PT/INR - ( 02 Aug 2022 11:08 )   PT: 12.9 sec;   INR: 1.10 ratio         PTT - ( 02 Aug 2022 11:08 )  PTT:26.7 sec        RADIOLOGY & ADDITIONAL TESTS:

## 2022-08-03 NOTE — PROGRESS NOTE ADULT - PROBLEM SELECTOR PLAN 2
Hgb 10.3 on admission. Baseline Hgb 10-11 from previous admissions, also confirmed by daughter.   - Hematuria mild   - Continue home ferrous sulfate  - Transfuse if Hgb <7 or clinically symptomatic

## 2022-09-15 NOTE — ED PROVIDER NOTE - PATIENT PORTAL LINK FT
You can access the FollowMyHealth Patient Portal offered by [facility] by registering at the following website: [email or web address]. By joining [employer]’s FollowMyHealth portal, you will also be able to view your health information using other applications (apps) compatible with our system. Cheiloplasty (Less Than 50%) Text: A decision was made to reconstruct the defect with a  cheiloplasty.  The defect was undermined extensively.  Additional orbicularis oris muscle was excised with a 15 blade scalpel.  The defect was converted into a full thickness wedge, of less than 50% of the vertical height of the lip, to facilite a better cosmetic result.  Small vessels were then tied off with 5-0 monocyrl. The orbicularis oris, superficial fascia, adipose and dermis were then reapproximated.  After the deeper layers were approximated the epidermis was reapproximated with particular care given to realign the vermilion border.

## 2022-12-06 NOTE — H&P ADULT - PROBLEM SELECTOR PLAN 12
No As per daughter Mary patient sundowns while in hospital  - no known hx of dementia as per daughter  - daughter states she does not want haldol or zyprexa for patient and that patient does well with Xanax instead

## 2023-01-20 NOTE — PROGRESS NOTE ADULT - PROBLEM SELECTOR PROBLEM 4
CAD (coronary artery disease) Staged Advancement Flap Text: The defect edges were debeveled with a #15 scalpel blade.  Given the location of the defect, shape of the defect and the proximity to free margins a staged advancement flap was deemed most appropriate.  Using a sterile surgical marker, an appropriate advancement flap was drawn incorporating the defect and placing the expected incisions within the relaxed skin tension lines where possible. The area thus outlined was incised deep to adipose tissue with a #15 scalpel blade.  The skin margins were undermined to an appropriate distance in all directions utilizing iris scissors.

## 2023-01-26 NOTE — H&P ADULT - NEUROLOGICAL
Fax received from [employer] for CGM order. Forms completed. Awaiting provider signature.    details… normal/cranial nerves II-XII intact/sensation intact/cranial nerves intact

## 2023-03-18 NOTE — ED PROVIDER NOTE - PSH
History of laminectomy    S/P appendectomy    S/P hip hemiarthroplasty
If you are a smoker, it is important for your health to stop smoking. Please be aware that second hand smoke is also harmful.

## 2023-04-14 NOTE — DISCHARGE NOTE PROVIDER - NS AS DC PROVIDER CONTACT Y/N MULTI
[address]  ED  Emergency Department Encounter  Emergency Medicine Resident     Pt Gail Snyder  MRN: [id number]  [place] 1974  Date of evaluation: 23  PCP:  No primary care provider on file. Note Started: 2:55 AM EDT      CHIEF COMPLAINT       Chief Complaint   Patient presents with    Suicidal    Alcohol Intoxication       HISTORY OF PRESENT ILLNESS  (Location/Symptom, Timing/Onset, Context/Setting, Quality, Duration, Modifying Factors, Severity.)      Gee Ku is a 50 y.o. female with past medical history of lupus, hyperlipidemia, hypertension who presents with complaints of suicidal ideation. Patient reports that she has been feeling depressed since the death of her daughter last November. Patient does admit to using alcohol tonight as well as smoking crack and marijuana. Patient states that she has been depressed since loss of her daughter and wants to commit suicide. She states she feels suicidal however does not currently have a plan. She denies attempt at self-harm tonight. No prior history of suicide attempts. Patient does state that she supposed be on antidepressant medications however has been off these for quite some time since her last admission to a psychiatric facility. Patient is willing to undergo psychiatric hospitalization and treatment. No medical complaints. PAST MEDICAL / SURGICAL / SOCIAL / FAMILY HISTORY      has a past medical history of Fibromyalgia, Headache, Hyperlipidemia, Hypertension, Hypothyroidism, Lupus ([address] and Thyroid disease. has a past surgical history that includes  section; Foot surgery; and Hysterectomy.       Social History     Socioeconomic History    Marital status: Single     Spouse name: Not on file    Number of children: Not on file    Years of education: Not on file    Highest education level: Not on file   Occupational History    Not on file   Tobacco Use    Smoking status: Every Day [address] ED  Emergency Department  Emergency Medicine Sign-out     Care of Tommy Ward was assumed from Dr. Manjula Quinn and is being seen for Suicidal and Alcohol Intoxication  . The patient's initial evaluation and plan have been discussed with the prior attending who initially evaluated the patient. EMERGENCY DEPARTMENT COURSE / MEDICAL DECISION MAKING:       MEDICATIONS GIVEN:  Orders Placed This Encounter   Medications    potassium bicarb-citric acid (EFFER-K) effervescent tablet 40 mEq    ketorolac (TORADOL) injection 30 mg       LABS / RADIOLOGY:     Labs Reviewed   ETHANOL - Abnormal; Notable for the following components:       Result Value    Ethanol 35 (*)     Ethanol percent 0.035 (*)     All other components within normal limits   CBC WITH AUTO DIFFERENTIAL - Abnormal; Notable for the following components:    Lymphocytes 44 (*)     All other components within normal limits   COMPREHENSIVE METABOLIC PANEL - Abnormal; Notable for the following components:    Potassium 3.4 (*)     Total Bilirubin 0.2 (*)     All other components within normal limits   URINE DRUG SCREEN - Abnormal; Notable for the following components:    Cocaine Metabolite, Urine POSITIVE (*)     Cannabinoid Scrn, Ur POSITIVE (*)     All other components within normal limits   HCG, SERUM, QUALITATIVE   IMMATURE PLATELET FRACTION       No results found. RECENT VITALS:     Temp: 97.5 °F (36.4 °C),  Heart Rate: 70, Resp: 16, BP: 103/67, SpO2: 100 %    This patient is a 50 y.o. Female with suicidal ideation without plan. Patient agreeable to going to the [facility] inpatient. This is a third patient signed out. See initial note for full details. Plan at this point is the patient to go to inpatient [facility] if not able to go to the center, would be appropriate for discharge with close follow-up and social work evaluation.     ED Course as of 04/14/23 0756   Fri Apr 14, 2023   3874 Pt is medically cleared for [facility] ED  Emergency Department  Emergency Medicine Sign-out     Care of Corinne Laud was assumed from Dr. Gary Joshi and is being seen for Suicidal and Alcohol Intoxication  . The patient's initial evaluation and plan have been discussed with the prior attending who initially evaluated the patient. EMERGENCY DEPARTMENT COURSE / MEDICAL DECISION MAKING:       MEDICATIONS GIVEN:  Orders Placed This Encounter   Medications    potassium bicarb-citric acid (EFFER-K) effervescent tablet 40 mEq    ketorolac (TORADOL) injection 30 mg       LABS / RADIOLOGY:     Labs Reviewed   ETHANOL - Abnormal; Notable for the following components:       Result Value    Ethanol 35 (*)     Ethanol percent 0.035 (*)     All other components within normal limits   CBC WITH AUTO DIFFERENTIAL - Abnormal; Notable for the following components:    Lymphocytes 44 (*)     All other components within normal limits   COMPREHENSIVE METABOLIC PANEL - Abnormal; Notable for the following components:    Potassium 3.4 (*)     Total Bilirubin 0.2 (*)     All other components within normal limits   URINE DRUG SCREEN - Abnormal; Notable for the following components:    Cocaine Metabolite, Urine POSITIVE (*)     Cannabinoid Scrn, Ur POSITIVE (*)     All other components within normal limits   HCG, SERUM, QUALITATIVE   IMMATURE PLATELET FRACTION       No results found. RECENT VITALS:     Temp: 97.5 °F (36.4 °C),  Heart Rate: 70, Resp: 16, BP: 103/67, SpO2: 100 %    This patient is a 50 y.o. Female with suicidal ideation. Depressed since the death of her daughter last November. ETOH use last night/crack use/marijuana. Does not have a plan. Social work evaluate. Does not meet inpatient criteria to [facility]. Social work seeing if [facility].      ED Course as of 04/14/23 0749   Fri Apr 14, 2023   4178 Pt is medically cleared for discharge to psychiatric facility.  [QC]   0438 Potassium(!): 3.4  Replaced with EfferK [QC]   [address] ED  Emergency Department  Faculty Sign-Out Addendum     Care of Shelley Russell was assumed from previous attending and is being seen for Suicidal and Alcohol Intoxication  . The patient's initial evaluation and plan have been discussed with the prior provider who initially evaluated the patient. Handoff taken on the following patient from prior Attending Physician:    Jessica Lambert    I was available and discussed any additional care issues that arose and coordinated the management plans with the resident(s) caring for the patient during my duty period. Any areas of disagreement with residents documentation of care or procedures are noted on the chart. I was personally present for the key portions of any/all procedures during my duty period. I have documented in the chart those procedures where I was not present during the key portions. EMERGENCY DEPARTMENT COURSE / MEDICAL DECISION MAKING:       MEDICATIONS GIVEN:  Orders Placed This Encounter   Medications    potassium bicarb-citric acid (EFFER-K) effervescent tablet 40 mEq    ketorolac (TORADOL) injection 30 mg       LABS / RADIOLOGY:     Labs Reviewed   ETHANOL - Abnormal; Notable for the following components:       Result Value    Ethanol 35 (*)     Ethanol percent 0.035 (*)     All other components within normal limits   CBC WITH AUTO DIFFERENTIAL - Abnormal; Notable for the following components:    Lymphocytes 44 (*)     All other components within normal limits   COMPREHENSIVE METABOLIC PANEL - Abnormal; Notable for the following components:    Potassium 3.4 (*)     Total Bilirubin 0.2 (*)     All other components within normal limits   URINE DRUG SCREEN - Abnormal; Notable for the following components:    Cocaine Metabolite, Urine POSITIVE (*)     Cannabinoid Scrn, Ur POSITIVE (*)     All other components within normal limits   HCG, SERUM, QUALITATIVE   IMMATURE PLATELET FRACTION       No results found.     RECENT time  Patient alcohol is 35. She is medically cleared at this time for psychiatric evaluation. Awaiting labs for atypical        Problems Addressed:  Suicidal ideation: acute illness or injury    Amount and/or Complexity of Data Reviewed  Labs: ordered.          Critical Care: None     Umer Monique MD  Attending Emergency Physician        Umer Monique MD  04/14/23 6478 Yes

## 2023-07-18 NOTE — PATIENT PROFILE ADULT - BRAND OF COVID-19 VACCINATION
SUBJECTIVE:        Patient ID: Mark Bowles is a 68 y.o. female who presents for   Chief Complaint   Patient presents with    Diabetes     FBS; 80    Health Maintenance     DM foot - Friday 7/21 - Dr Julisa KEITH eye - September      Diabetes Mellitus: Doing well. No concerns. She is compliant with her medications. Denies any chest pain, shortness of breath, fevers or chills  Hemoglobin A1C (%)   Date Value   07/18/2023 6.5 (H)     Hypertension BP is controlled. Is compliant with medications    Hyperlipidemia:No weakness or myalgias. No chest pain or dyspnea. Osteoporosis: Is on Prolia    Urinary retension: Patient had stents placed. On knutson catheter. Follows with Urology, Dr. Helga Nava    Chronic colostomy bag, managed by Dr. Jenni Mckeon.      Past Medical History:   Diagnosis Date    Altered bowel elimination due to intestinal ostomy ([address])     Arthritis     osteoporsis    Cancer ([address])     colon and uterine    Cerebral artery occlusion with cerebral infarction [facility])     uses walker    Closed fracture of radius 12/27/2016    Elevated lipids 01/15/2014    Headache     History of anal cancer 02/20/2017    Dr. Jenni Mckeon    Hyperlipidemia     Hypertension     Hypertension     Major depressive disorder, recurrent, mild 04/18/2023    Obesity     Osteoporosis     Poor historian     Proteinuria 06/25/2014    Type II or unspecified type diabetes mellitus without mention of complication, not stated as uncontrolled     Vaginal cancer ([address]) 05/22/2017    Vitamin D deficiency 11/06/2014       Patient Active Problem List   Diagnosis    Hypertension    Elevated lipids    Proteinuria    Vitamin D deficiency    Closed fracture of radius    History of anal cancer    Colostomy present ([address])    Vaginal cancer ([address])    Diabetes mellitus type 2 in obese ([address])    Stuttering    Age-related osteoporosis with current pathological fracture with routine healing    Herpes zoster vaccination declined    Chronic renal disease, stage III ([address]) Pfizer dose 1, 2, and 3

## 2023-09-14 NOTE — PATIENT PROFILE ADULT - HOW PATIENT ADDRESSED, PROFILE
Refill Request     CONFIRM preferrred pharmacy with the patient. If Mail Order Rx - Pend for 90 day refill. Last Seen: Last Seen Department: 2/22/2023  Last Seen by PCP: 2/22/2023    Last Written: 12/26/2018    If no future appointment scheduled, route STAFF MESSAGE with patient name to the [facility] for scheduling. Next Appointment:   Future Appointments   Date Time Provider [address] [place] [address]   2/26/2024  9:00 AM Saba Salinas MD [address]       Message sent to  to schedule appt with patient?   N/A      Requested Prescriptions     Pending Prescriptions Disp Refills    famciclovir (FAMVIR) 500 MG tablet 20 tablet 0     Sig: Take 2 tablets twice a day per fever blister outbreak Merissa
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
## 2023-10-13 NOTE — PHYSICAL THERAPY INITIAL EVALUATION ADULT - LIVES WITH, PROFILE
RIGHT MIDDLE FINGER TRIGGER FINGER RELEASE (R) Operative Note     Date: 10/13/2023  OR Location: [facility] OR    Name: Mariajose Narvaez, : 1958, Age: 65 y.o., MRN: [id number], Sex: female        Preoperative diagnosis: Right long finger trigger finger    Postoperative diagnosis: Right long finger trigger finger    Procedure planned: Right long finger trigger finger release    Procedure performed: Right long finger trigger finger release    Surgeon: Anurag Martinez D.O.    Asst.: ADELINE Moise  The physician assistant was present to the entire case. Given the nature of the disease process and the procedure to be performed a skilled surgical assistant was necessary during the case. The assistant was necessary in order to hold retractors and directly assist in the operation. A certified scrub tech was at the back table managing instruments and supplies for the surgical case.    Anesthesia type: Local field block performed with lidocaine with epinephrine solution buffered with bicarbonate monitored by the anesthesia team    Estimated blood loss: Less than 10 mL    Drains: None    Tourniquet: None    Specimens: None    Implants: None    Indications for surgery: The patient presented with painful triggering of the right long finger. The patient had failure of nonoperative treatment strategies. After full discussion regarding risks benefits and alternatives the patient elected to forego any additional nonoperative management in favor of right long finger trigger finger release.    Complications: None noted at time of surgery    Description of procedure: The patient was taken to the operative suite and placed in the supine position on the operating table.  A timeout was performed and the right long finger was confirmed to be the operative site.  The patient was carefully positioned on the table in such a fashion as to pad all bony prominences and peripheral nerves.  The patient was administered appropriate preoperative IV  antibiotics.  The patient was then prepped and draped in the normal sterile fashion.  An oblique incision was then marked out directly overlying the A1 pulley to the right long finger.  The 15 blade was used to incise skin.  The tenotomy scissors were used to gently dissect down through the subcutaneous plane taking care to protect the neurovascular bundles both radially and ulnarly.  The proximal and distal boundaries of the A1 pulley were dissected out and directly visualized.  The tenotomy scissors were then used to release the A1 pulley within its midline.  The hypertrophic palmar aponeurotic pulley was also released within its midline under direct visualization. Ragnell retractors were then used to independently traction FDS and FDP in order to perform traction tenolysis.  The patient was then asked to perform digital range of motion.  The patient was noted to have full range of motion with nice smooth gliding and no persistent triggering.  Copious irrigation was then performed.  Interrupted nylon stitches were used to close the skin.  A bulky soft dressing was placed.  The patient was then allowed to head to recovery in stable condition.  Overall the patient tolerated the procedure well.    Disposition: To PACU in stable condition              Anurag Martinez  Phone Number: [phone]       children

## 2023-10-20 NOTE — PROGRESS NOTE ADULT - PROBLEM SELECTOR PLAN 11
Performing Laboratory: -[id number] Expected Date Of Service: 10/20/2023 Billing Type: Third-Party Bill hx of spinal stenosis, OA, and neuropathy  -Restarted home PO Dilaudid 4mg @ 9am, 4pm, 10pm  - Lido patched Lower Back b/l for  Back pain Bill For Surgical Tray: no Melchor Matta(Attending) not on insulin at home  - low dose ISS  - hypoglycemia protocol

## 2023-11-14 NOTE — ED ADULT NURSE NOTE - CAS EDN DISCHARGE INTERVENTIONS
MD at bedside to confirm pt is not suicidal at this time.       Georgina Hemphill RN  11/14/23 5318 IV discontinued, cath removed intact

## 2023-12-31 PROBLEM — Z91.89: Status: ACTIVE | Noted: 2017-02-14

## 2024-01-01 NOTE — PROGRESS NOTE ADULT - SUBJECTIVE AND OBJECTIVE BOX
3.71 Patient is a 89y old  Female who presents with a chief complaint of gallstones, covid, fidelina (10 Jul 2022 06:42)    HPI:  89y F pmhx CAD x 2 stents (), T2DM, HTN, HLD, GERD, mild AS, chronic venous insufficiency, chronic neuropathy, macular degeneration, chronic constipation, depression, OA, and recent admission to [facility] - 3/2 for gallstone pancreatitis with non-operative treatment presented to the ED with constant nausea x 4 days associated with decreased PO intake, generalized weakness, and fall today x 1 due to LE weakness. Patient denies fever, chills, cp, dizziness, sob, abd pain, vomiting, diarrhea. Patient was seen here in ED  for epigastric pain (since resolved) and discharged with outpatient follow up with Dr. Terrell. Patient has seen Dr. Terrell twice since then and epigastric pain resolved s/p PO zofran and carafate with planned esophagram. Daughter states epigastric pain resolved, but patient has had constant nausea x 4 days that has not improved despite increasing PO zofran from 4mg to 8mg q6h. Patient was unable to obtain appointment with Dr. Terrell today and after falling at home due to leg weakness, daughter brought her to the ED.     ED Course  Vitals: 99.2F, 85bpm, 143/66, 95% RA  Labs: Hgb 10.2, Hct 32.5, Na 132, K 5.5, BUN 30, Cr 2, GFR 23, lipase wnl  EKG:    CT Chest and abd/pel: Moderate-sized bilateral pleural effusion with partial bilateral lower lobe atelectasis. Cholelithiasis with distended gallbladder and right upper quadrant inflammatory changes, findings suspicious for acute cholecystitis.    RUQ US: Cholelithiasis with nonspecific pericholecystic fluid. Negative sonographic Scott sign. Dilated common bowel duct. Recommend correlating with LFTs. Cannot exclude distal choledocholithiasis.    HEAD CT: Mild volume loss, microvascular disease, no acute hemorrhage or midline shift.    Patient Received: Zosyn x1, NS 500cc x1, Lasix 40mg IV x1, IV dilaudid .5mg x1           (2022 23:51)    INTERVAL HPI:  - pt seen and examined at bedside. Complaining of increased frequency of urination and suprapubic pain s/p 40mg IV lasix x1 in the ED. Otherwise, notes worsening of chronic back pain and general feeling of malaise. No sob, chest pain, nausea. Fidelina, Anemia.HIDA scan today.  - pt seen and examined at bedside. Feeling confused. A&Ox1 (person). Has a hx of hospital delirium and has been able to be reoriented when her daughter is present. Notes pain over b/l LE but denies sob, chest pain, n/v/d. Waiting for gastric emptying study per GI.  - pt seen and examined at bedside. Feeling confused again this AM possibly chronic hospital AM delirium. A&Ox1 (person). Notes constipation and decrease in b/l LE edema but has mild pain. Gastric emptying study cancelled due to pt inability to tolerate being off pain medication. Denies SOB, chest pain, n/v/d. Received dose 1/3 of remdesivir for COVID-19.   : Patient seen and examined at bedside. Patient states she did not sleep much overnight, but otherwise feeling okay. Denies any CP, SOB, abd pain, N/V/D. Patient still c/o mild leg pain. Cr stable. Loose BM Low stable H/H   7/10: Patient seen and examined at bedside. Complaining of increased frequency of urination that kept her up all night. States she needs her Dilaudid for back pain and b/l leg pain. Otherwise feeling alright Denies any CP, SOB, abd pain, N/V/D. Cr. stable.        OVERNIGHT EVENTS: Pt complaining of increased frequency of urination with hematuria. UA significant for large blood and many bacteria. Hgb down to 8.6 from 9.8. No signs of acute bleeding.     Home Medications:  amitriptyline 10 mg oral tablet: 3 tab(s) orally once a day (at bedtime) (2022 23:44)  aspirin 81 mg oral tablet: 1 tab(s) orally once a day (2022 23:44)  atorvastatin 40 mg oral tablet: 1 tab(s) orally once a day (2022 23:44)  Dilaudid 4 mg oral tablet: 1 tab(s) orally 3 times (2022 23:44)  ferrous sulfate 324 mg (65 mg elemental iron) oral delayed release tablet: 1 tab(s) orally once a day (2022 23:44)  ferrous sulfate 325 mg (65 mg elemental iron) oral tablet: 1 tablet oral daily (2022 23:44)  Metoprolol Succinate ER 25 mg oral tablet, extended release: 1 tab(s) orally once a day (2022 23:44)  MiraLax oral powder for reconstitution: ng/kg orally (2022 23:44)  PreserVision AREDS oral capsule: 1 tab(s) orally 2 times a day (:44)  Senna 8.6 mg oral tablet: 2 tab(s) orally once a day (at bedtime) (:44)  Toprol-XL 25 mg oral tablet, extended release: 1 tab(s) orally once a day (:44)  Vitamin D3 1250 mcg (50,000 intl units) oral capsule: 1 cap(s) orally once a week (:44)  Zofran 4 mg oral tablet: 1 tab(s) orally every 6 hours (2022 23:44)      MEDICATIONS  (STANDING):  amitriptyline 30 milliGRAM(s) Oral at bedtime  aspirin  chewable 81 milliGRAM(s) Oral daily  atorvastatin 40 milliGRAM(s) Oral at bedtime  dextrose 5%. 1000 milliLiter(s) (100 mL/Hr) IV Continuous <Continuous>  dextrose 5%. 1000 milliLiter(s) (50 mL/Hr) IV Continuous <Continuous>  dextrose 50% Injectable 25 Gram(s) IV Push once  dextrose 50% Injectable 12.5 Gram(s) IV Push once  dextrose 50% Injectable 25 Gram(s) IV Push once  ferrous    sulfate 325 milliGRAM(s) Oral daily  glucagon  Injectable 1 milliGRAM(s) IntraMuscular once  heparin   Injectable 5000 Unit(s) SubCutaneous every 8 hours  HYDROmorphone   Tablet 4 milliGRAM(s) Oral three times a day  insulin lispro (ADMELOG) corrective regimen sliding scale   SubCutaneous three times a day before meals  insulin lispro (ADMELOG) corrective regimen sliding scale   SubCutaneous at bedtime  metoclopramide Injectable 5 milliGRAM(s) IV Push every 6 hours  metoprolol succinate ER 25 milliGRAM(s) Oral daily  pantoprazole    Tablet 40 milliGRAM(s) Oral before breakfast  polyethylene glycol 3350 17 Gram(s) Oral daily  senna 2 Tablet(s) Oral at bedtime  sucralfate suspension 1 Gram(s) Oral two times a day    MEDICATIONS  (PRN):  acetaminophen     Tablet .. 650 milliGRAM(s) Oral every 6 hours PRN Temp greater or equal to 38C (100.4F), Mild Pain (1 - 3)  aluminum hydroxide/magnesium hydroxide/simethicone Suspension 30 milliLiter(s) Oral every 4 hours PRN Dyspepsia  dextrose Oral Gel 15 Gram(s) Oral once PRN Blood Glucose LESS THAN 70 milliGRAM(s)/deciliter  melatonin 3 milliGRAM(s) Oral at bedtime PRN Insomnia  ondansetron Injectable 4 milliGRAM(s) IV Push every 8 hours PRN Nausea and/or Vomiting      Allergies    morphine (Other)    Intolerances        Social History:  Former cigarette smoker, smoked <1/2 ppd for less than 15 years, quit more than 50 years ago   Denies ETOH use   Denies drug use   Ambulates with a walker. Lives at home with daughter. ADLs with assistance. (2022 23:51)      REVIEW OF SYSTEMS:  CONSTITUTIONAL: No fever, No chills, No fatigue, No myalgia, No Body ache, No Weakness  EYES: No eye pain,  No visual disturbances, No discharge, NO Redness  ENMT:  No ear pain, No nose bleed, No vertigo; No sinus pain, NO throat pain, No Congestion  NECK: No pain, No stiffness  RESPIRATORY: No cough, NO wheezing, No  hemoptysis, NO  shortness of breath  CARDIOVASCULAR: No chest pain, palpitations  GASTROINTESTINAL: +suprapubic pain No abdominal pain, NO epigastric pain. No nausea, No vomiting; No diarrhea, No constipation. [  ] BM  GENITOURINARY: No dysuria, + frequency, + urgency, + hematuria, NO incontinence  NEUROLOGICAL: No headaches, No dizziness, No numbness, No tingling, No tremors, No weakness  EXT: No Swelling, No Pain, +edema b/l LE  SKIN:  [  ] No itching, burning, rashes, or lesions   MUSCULOSKELETAL: No joint pain ,No Jt swelling; No muscle pain, No back pain, No extremity pain  PSYCHIATRIC: No depression,  No anxiety,  No mood swings ,No difficulty sleeping at night  PAIN SCALE: [  ] None  [  ] Other-  ROS Unable to obtain due to - [  ] Dementia  [  ] Lethargy [  ] Drowsy [  ] Sedated [  ] non verbal  REST OF REVIEW Of SYSTEM - [  ] Normal     Vital Signs Last 24 Hrs  T(C): 37.2 (10 Jul 2022 05:34), Max: 37.4 (2022 21:00)  T(F): 99 (10 Jul 2022 05:34), Max: 99.4 (2022 21:00)  HR: 95 (10 Jul 2022 05:34) (87 - 95)  BP: 123/73 (10 Jul 2022 05:34) (121/73 - 149/80)  BP(mean): --  RR: 17 (10 Jul 2022 05:34) (17 - 17)  SpO2: 74% (10 Jul 2022 05:34) (74% - 94%)    Parameters below as of 10 Jul 2022 05:34  Patient On (Oxygen Delivery Method): room air      Finger Stick          PHYSICAL EXAM:  GENERAL:  [x  ] NAD , [  ] well appearing, [ x ] Agitated, [  ] Drowsy,  [  ] Lethargy, [  ] confused   HEAD:  [  x] Normal, [  ] Other  EYES:  [ x ] EOMI, [ x ] PERRLA, [  x] conjunctiva and sclera clear normal, [  ] Other,  [  ] Pallor,[  ] Discharge  ENMT:  [x  ] Normal, [  ] Moist mucous membranes, [  ] Good dentition, [ x ] No Thrush  NECK:  [ x ] Supple, [  ] No JVD, [  ] Normal thyroid, [  ] Lymphadenopathy [  ] Other  CHEST/LUNG:  [x  ] Clear to auscultation bilaterally, [ x ] Breath Sounds equal B/L  [  ] poor effort  [ x ] No rales, [ x ] No rhonchi  [ x ]  No wheezing,   HEART:  [x  ] Regular rate and rhythm, [  ] tachycardia, [  ] Bradycardia,  [  ] irregular  [ x ] No murmurs, No rubs, No gallops, [  ] PPM in place (Mfr:  )  ABDOMEN:  [ x ] Soft, [ x ] Nontender, [  x] Nondistended, [  ] No mass, [ x ] Bowel sounds present, [  ] obese  NERVOUS SYSTEM:  [ x ] Alert & Oriented X3, [x  ] Nonfocal  [  ] Confusion  [  ] Encephalopathic [  ] Sedated [  ] Unable to assess, [  ] Dementia [  ] Other-  EXTREMITIES: [ x ] 2+ Peripheral Pulses, No clubbing, No cyanosis,  [x  ] 2+ edema B/L lower EXT. [  ] PVD stasis skin changes B/L Lower EXT, [  ] wound  LYMPH: No lymphadenopathy noted  SKIN:  [ x ] No rashes or lesions, [  ] Pressure Ulcers, [  ] ecchymosis, [  ] Skin Tears, [  ] Other    DIET:     LABS:                        8.6    5.55  )-----------( 199      ( 10 Jul 2022 07:44 )             26.3     10 Jul 2022 07:44    x      |  x      |  34     ----------------------------<  141    x       |  24     |  1.80     Ca    8.3        10 Jul 2022 07:44        Urinalysis Basic - ( 2022 22:30 )    Color: Yellow / Appearance: Slightly Turbid / S.005 / pH: x  Gluc: x / Ketone: Trace  / Bili: Negative / Urobili: Negative   Blood: x / Protein: 25 mg/dL / Nitrite: Negative   Leuk Esterase: Trace / RBC: 3-5 /HPF / WBC 3-5   Sq Epi: x / Non Sq Epi: Negative / Bacteria: Many        Culture Results:   No growth to date. ( @ 21:45)  Culture Results:   No growth to date. ( @ 21:40)                  Culture - Blood (collected 2022 21:45)  Source: .Blood Blood-Peripheral  Preliminary Report (2022 01:02):    No growth to date.    Culture - Blood (collected 2022 21:40)  Source: .Blood Blood-Peripheral  Preliminary Report (2022 01:02):    No growth to date.         Anemia Panel:  Vitamin B12, Serum: 342 pg/mL (22 @ 07:14)  Folate, Serum: 11.8 ng/mL (22 @ 07:14)  Iron Total, Serum: 19 ug/dL (22 @ 07:14)  Iron - Total Binding Capacity.: 242 ug/dL (22 @ 07:14)  Ferritin, Serum: 67 ng/mL (22 @ 07:14)      Thyroid Panel:        Lipase, Serum: 78 U/L (22 @ 19:05)          RADIOLOGY & ADDITIONAL TESTS:      HEALTH ISSUES - PROBLEM Dx:  Cholelithiasis    FIDELINA (acute kidney injury)    Pleural effusion    2019 novel coronavirus disease (COVID-19)    Anemia    Fall    CAD (coronary artery disease)    HTN (hypertension)    Type 2 diabetes mellitus    Chronic back pain    Anxiety and depression        DVT prophylaxis            Consultant(s) Notes Reviewed:  [ x ] YES     Care Discussed with [X] Consultants  [x  ] Patient  [ x ] Family [  ] HCP [  ]   [  ] Social Service  [  ] RN, [  ] Physical Therapy,[  ] Palliative care team  DVT PPX: [  ] Lovenox, [ x ] S C Heparin, [  ] Coumadin, [  ] Xarelto, [  ] Eliquis, [  ] Pradaxa, [  ] IV Heparin drip, [  ] SCD [  ] Contraindication 2 to GI Bleed,[  ] Ambulation [  ] Contraindicated 2 to  bleed [  ] Contraindicated 2 to Brain Bleed  Advanced directive: [x  ] None, [  ] DNR/DNI Patient is a 89y old  Female who presents with a chief complaint of gallstones, covid, fidelina (10 Jul 2022 06:42)    HPI:  89y F pmhx CAD x 2 stents (), T2DM, HTN, HLD, GERD, mild AS, chronic venous insufficiency, chronic neuropathy, macular degeneration, chronic constipation, depression, OA, and recent admission to [facility] - 3/2 for gallstone pancreatitis with non-operative treatment presented to the ED with constant nausea x 4 days associated with decreased PO intake, generalized weakness, and fall today x 1 due to LE weakness. Patient denies fever, chills, cp, dizziness, sob, abd pain, vomiting, diarrhea. Patient was seen here in ED  for epigastric pain (since resolved) and discharged with outpatient follow up with Dr. Terrell. Patient has seen Dr. Terrell twice since then and epigastric pain resolved s/p PO zofran and carafate with planned esophagram. Daughter states epigastric pain resolved, but patient has had constant nausea x 4 days that has not improved despite increasing PO zofran from 4mg to 8mg q6h. Patient was unable to obtain appointment with Dr. Terrell today and after falling at home due to leg weakness, daughter brought her to the ED.     ED Course  Vitals: 99.2F, 85bpm, 143/66, 95% RA  Labs: Hgb 10.2, Hct 32.5, Na 132, K 5.5, BUN 30, Cr 2, GFR 23, lipase wnl  EKG:    CT Chest and abd/pel: Moderate-sized bilateral pleural effusion with partial bilateral lower lobe atelectasis. Cholelithiasis with distended gallbladder and right upper quadrant inflammatory changes, findings suspicious for acute cholecystitis.    RUQ US: Cholelithiasis with nonspecific pericholecystic fluid. Negative sonographic Scott sign. Dilated common bowel duct. Recommend correlating with LFTs. Cannot exclude distal choledocholithiasis.    HEAD CT: Mild volume loss, microvascular disease, no acute hemorrhage or midline shift.    Patient Received: Zosyn x1, NS 500cc x1, Lasix 40mg IV x1, IV dilaudid .5mg x1           (2022 23:51)    INTERVAL HPI:  - pt seen and examined at bedside. Complaining of increased frequency of urination and suprapubic pain s/p 40mg IV lasix x1 in the ED. Otherwise, notes worsening of chronic back pain and general feeling of malaise. No sob, chest pain, nausea. Fidelina, Anemia.HIDA scan today.  - pt seen and examined at bedside. Feeling confused. A&Ox1 (person). Has a hx of hospital delirium and has been able to be reoriented when her daughter is present. Notes pain over b/l LE but denies sob, chest pain, n/v/d. Waiting for gastric emptying study per GI.  - pt seen and examined at bedside. Feeling confused again this AM possibly chronic hospital AM delirium. A&Ox1 (person). Notes constipation and decrease in b/l LE edema but has mild pain. Gastric emptying study cancelled due to pt inability to tolerate being off pain medication. Denies SOB, chest pain, n/v/d. Received dose 1/3 of remdesivir for COVID-19.   : Patient seen and examined at bedside. Patient states she did not sleep much overnight, but otherwise feeling okay. Denies any CP, SOB, abd pain, N/V/D. Patient still c/o mild leg pain. Cr stable. Loose BM Low stable H/H   7/10: Patient seen and examined at bedside. Complaining of increased frequency of urination that kept her up all night. States she needs her Dilaudid for back pain and b/l leg pain. Otherwise feeling alright Denies any CP, SOB, abd pain, N/V/D. Cr. stable.        OVERNIGHT EVENTS: Pt complaining of increased frequency of urination with hematuria. UA significant for large blood and many bacteria. Hgb down to 8.6 from 9.8. No signs of acute bleeding.     Home Medications:  amitriptyline 10 mg oral tablet: 3 tab(s) orally once a day (at bedtime) (2022 23:44)  aspirin 81 mg oral tablet: 1 tab(s) orally once a day (2022 23:44)  atorvastatin 40 mg oral tablet: 1 tab(s) orally once a day (2022 23:44)  Dilaudid 4 mg oral tablet: 1 tab(s) orally 3 times (2022 23:44)  ferrous sulfate 324 mg (65 mg elemental iron) oral delayed release tablet: 1 tab(s) orally once a day (2022 23:44)  ferrous sulfate 325 mg (65 mg elemental iron) oral tablet: 1 tablet oral daily (2022 23:44)  Metoprolol Succinate ER 25 mg oral tablet, extended release: 1 tab(s) orally once a day (2022 23:44)  MiraLax oral powder for reconstitution: ng/kg orally (2022 23:44)  PreserVision AREDS oral capsule: 1 tab(s) orally 2 times a day (:44)  Senna 8.6 mg oral tablet: 2 tab(s) orally once a day (at bedtime) (:44)  Toprol-XL 25 mg oral tablet, extended release: 1 tab(s) orally once a day (:44)  Vitamin D3 1250 mcg (50,000 intl units) oral capsule: 1 cap(s) orally once a week (:44)  Zofran 4 mg oral tablet: 1 tab(s) orally every 6 hours (2022 23:44)      MEDICATIONS  (STANDING):  amitriptyline 30 milliGRAM(s) Oral at bedtime  aspirin  chewable 81 milliGRAM(s) Oral daily  atorvastatin 40 milliGRAM(s) Oral at bedtime  dextrose 5%. 1000 milliLiter(s) (100 mL/Hr) IV Continuous <Continuous>  dextrose 5%. 1000 milliLiter(s) (50 mL/Hr) IV Continuous <Continuous>  dextrose 50% Injectable 25 Gram(s) IV Push once  dextrose 50% Injectable 12.5 Gram(s) IV Push once  dextrose 50% Injectable 25 Gram(s) IV Push once  ferrous    sulfate 325 milliGRAM(s) Oral daily  glucagon  Injectable 1 milliGRAM(s) IntraMuscular once  heparin   Injectable 5000 Unit(s) SubCutaneous every 8 hours  HYDROmorphone   Tablet 4 milliGRAM(s) Oral three times a day  insulin lispro (ADMELOG) corrective regimen sliding scale   SubCutaneous three times a day before meals  insulin lispro (ADMELOG) corrective regimen sliding scale   SubCutaneous at bedtime  metoclopramide Injectable 5 milliGRAM(s) IV Push every 6 hours  metoprolol succinate ER 25 milliGRAM(s) Oral daily  pantoprazole    Tablet 40 milliGRAM(s) Oral before breakfast  polyethylene glycol 3350 17 Gram(s) Oral daily  senna 2 Tablet(s) Oral at bedtime  sucralfate suspension 1 Gram(s) Oral two times a day    MEDICATIONS  (PRN):  acetaminophen     Tablet .. 650 milliGRAM(s) Oral every 6 hours PRN Temp greater or equal to 38C (100.4F), Mild Pain (1 - 3)  aluminum hydroxide/magnesium hydroxide/simethicone Suspension 30 milliLiter(s) Oral every 4 hours PRN Dyspepsia  dextrose Oral Gel 15 Gram(s) Oral once PRN Blood Glucose LESS THAN 70 milliGRAM(s)/deciliter  melatonin 3 milliGRAM(s) Oral at bedtime PRN Insomnia  ondansetron Injectable 4 milliGRAM(s) IV Push every 8 hours PRN Nausea and/or Vomiting      Allergies    morphine (Other)    Intolerances        Social History:  Former cigarette smoker, smoked <1/2 ppd for less than 15 years, quit more than 50 years ago   Denies ETOH use   Denies drug use   Ambulates with a walker. Lives at home with daughter. ADLs with assistance. (2022 23:51)      REVIEW OF SYSTEMS:  CONSTITUTIONAL: No fever, No chills, No fatigue, No myalgia, No Body ache, No Weakness  EYES: No eye pain,  No visual disturbances, No discharge, NO Redness  ENMT:  No ear pain, No nose bleed, No vertigo; No sinus pain, NO throat pain, No Congestion  NECK: No pain, No stiffness  RESPIRATORY: No cough, NO wheezing, No  hemoptysis, NO  shortness of breath  CARDIOVASCULAR: No chest pain, palpitations  GASTROINTESTINAL: +suprapubic pain No abdominal pain, NO epigastric pain. No nausea, No vomiting; No diarrhea, No constipation. [  ] BM  GENITOURINARY: No dysuria, + frequency, + urgency, + hematuria, NO incontinence  NEUROLOGICAL: No headaches, No dizziness, No numbness, No tingling, No tremors, No weakness  EXT: No Swelling, No Pain, +edema b/l LE  SKIN:  [ x ] No itching, burning, rashes, or lesions   MUSCULOSKELETAL: No joint pain ,No Jt swelling; No muscle pain, No back pain, No extremity pain  PSYCHIATRIC: No depression,  No anxiety,  No mood swings ,No difficulty sleeping at night  PAIN SCALE: [  ] None  [ x ] Other- dilaudid 4mg 9am, 4pm, 10pm  ROS Unable to obtain due to - [  ] Dementia  [  ] Lethargy [  ] Drowsy [  ] Sedated [  ] non verbal  REST OF REVIEW Of SYSTEM - [  ] Normal     Vital Signs Last 24 Hrs  T(C): 37.2 (10 Jul 2022 05:34), Max: 37.4 (2022 21:00)  T(F): 99 (10 Jul 2022 05:34), Max: 99.4 (2022 21:00)  HR: 95 (10 Jul 2022 05:34) (87 - 95)  BP: 123/73 (10 Jul 2022 05:34) (121/73 - 149/80)  BP(mean): --  RR: 17 (10 Jul 2022 05:34) (17 - 17)  SpO2: 74% (10 Jul 2022 05:34) (74% - 94%)    Parameters below as of 10 Jul 2022 05:34  Patient On (Oxygen Delivery Method): room air      Finger Stick          PHYSICAL EXAM:  GENERAL:  [x  ] NAD , [  ] well appearing, [ x ] Agitated, [  ] Drowsy,  [  ] Lethargy, [  ] confused   HEAD:  [  x] Normal, [  ] Other  EYES:  [ x ] EOMI, [ x ] PERRLA, [  x] conjunctiva and sclera clear normal, [  ] Other,  [  ] Pallor,[  ] Discharge  ENMT:  [x  ] Normal, [  ] Moist mucous membranes, [  ] Good dentition, [ x ] No Thrush  NECK:  [ x ] Supple, [  ] No JVD, [  ] Normal thyroid, [  ] Lymphadenopathy [  ] Other  CHEST/LUNG:  [x  ] Clear to auscultation bilaterally, [ x ] Breath Sounds equal B/L  [  ] poor effort  [ x ] No rales, [ x ] No rhonchi  [ x ]  No wheezing,   HEART:  [x  ] Regular rate and rhythm, [  ] tachycardia, [  ] Bradycardia,  [  ] irregular  [ x ] No murmurs, No rubs, No gallops, [  ] PPM in place (Mfr:  )  ABDOMEN:  [ x ] Soft, [ x ] Nontender, [  x] Nondistended, [  ] No mass, [ x ] Bowel sounds present, [  ] obese  NERVOUS SYSTEM:  [ x ] Alert & Oriented X3, [x  ] Nonfocal  [  ] Confusion  [  ] Encephalopathic [  ] Sedated [  ] Unable to assess, [  ] Dementia [  ] Other-  EXTREMITIES: [ x ] 2+ Peripheral Pulses, No clubbing, No cyanosis,  [x  ] 2+ edema B/L lower EXT. [  ] PVD stasis skin changes B/L Lower EXT, [  ] wound  LYMPH: No lymphadenopathy noted  SKIN:  [ x ] No rashes or lesions, [  ] Pressure Ulcers, [  ] ecchymosis, [  ] Skin Tears, [  ] Other    DIET:     LABS:                        8.6    5.55  )-----------( 199      ( 10 Jul 2022 07:44 )             26.3     10 Jul 2022 07:44    x      |  x      |  34     ----------------------------<  141    x       |  24     |  1.80     Ca    8.3        10 Jul 2022 07:44        Urinalysis Basic - ( 2022 22:30 )    Color: Yellow / Appearance: Slightly Turbid / S.005 / pH: x  Gluc: x / Ketone: Trace  / Bili: Negative / Urobili: Negative   Blood: x / Protein: 25 mg/dL / Nitrite: Negative   Leuk Esterase: Trace / RBC: 3-5 /HPF / WBC 3-5   Sq Epi: x / Non Sq Epi: Negative / Bacteria: Many        Culture Results:   No growth to date. ( @ 21:45)  Culture Results:   No growth to date. ( @ 21:40)                  Culture - Blood (collected 2022 21:45)  Source: .Blood Blood-Peripheral  Preliminary Report (2022 01:02):    No growth to date.    Culture - Blood (collected 2022 21:40)  Source: .Blood Blood-Peripheral  Preliminary Report (2022 01:02):    No growth to date.         Anemia Panel:  Vitamin B12, Serum: 342 pg/mL (22 @ 07:14)  Folate, Serum: 11.8 ng/mL (22 @ 07:14)  Iron Total, Serum: 19 ug/dL (22 @ 07:14)  Iron - Total Binding Capacity.: 242 ug/dL (22 @ 07:14)  Ferritin, Serum: 67 ng/mL (22 @ 07:14)      Thyroid Panel:        Lipase, Serum: 78 U/L (22 @ 19:05)          RADIOLOGY & ADDITIONAL TESTS:      HEALTH ISSUES - PROBLEM Dx:  Cholelithiasis    FIDELINA (acute kidney injury)    Pleural effusion    2019 novel coronavirus disease (COVID-19)    Anemia    Fall    CAD (coronary artery disease)    HTN (hypertension)    Type 2 diabetes mellitus    Chronic back pain    Anxiety and depression    owning    DVT prophylaxis            Consultant(s) Notes Reviewed:  [ x ] YES     Care Discussed with [X] Consultants  [x  ] Patient  [ x ] Family [  ] HCP [  ]   [  ] Social Service  [  ] RN, [  ] Physical Therapy,[  ] Palliative care team  DVT PPX: [  ] Lovenox, [ x ] S C Heparin, [  ] Coumadin, [  ] Xarelto, [  ] Eliquis, [  ] Pradaxa, [  ] IV Heparin drip, [  ] SCD [  ] Contraindication 2 to GI Bleed,[  ] Ambulation [  ] Contraindicated 2 to  bleed [  ] Contraindicated 2 to Brain Bleed  Advanced directive: [x  ] None, [  ] DNR/DNI Patient is a 89y old  Female who presents with a chief complaint of gallstones, covid, fidelina (10 Jul 2022 06:42)    HPI:  89y F pmhx CAD x 2 stents (), T2DM, HTN, HLD, GERD, mild AS, chronic venous insufficiency, chronic neuropathy, macular degeneration, chronic constipation, depression, OA, and recent admission to [facility] - 3/2 for gallstone pancreatitis with non-operative treatment presented to the ED with constant nausea x 4 days associated with decreased PO intake, generalized weakness, and fall today x 1 due to LE weakness. Patient denies fever, chills, cp, dizziness, sob, abd pain, vomiting, diarrhea. Patient was seen here in ED  for epigastric pain (since resolved) and discharged with outpatient follow up with Dr. Terrell. Patient has seen Dr. Terrell twice since then and epigastric pain resolved s/p PO zofran and carafate with planned esophagram. Daughter states epigastric pain resolved, but patient has had constant nausea x 4 days that has not improved despite increasing PO zofran from 4mg to 8mg q6h. Patient was unable to obtain appointment with Dr. Terrell today and after falling at home due to leg weakness, daughter brought her to the ED.     ED Course  Vitals: 99.2F, 85bpm, 143/66, 95% RA  Labs: Hgb 10.2, Hct 32.5, Na 132, K 5.5, BUN 30, Cr 2, GFR 23, lipase wnl  EKG:    CT Chest and abd/pel: Moderate-sized bilateral pleural effusion with partial bilateral lower lobe atelectasis. Cholelithiasis with distended gallbladder and right upper quadrant inflammatory changes, findings suspicious for acute cholecystitis.    RUQ US: Cholelithiasis with nonspecific pericholecystic fluid. Negative sonographic Scott sign. Dilated common bowel duct. Recommend correlating with LFTs. Cannot exclude distal choledocholithiasis.    HEAD CT: Mild volume loss, microvascular disease, no acute hemorrhage or midline shift.    Patient Received: Zosyn x1, NS 500cc x1, Lasix 40mg IV x1, IV dilaudid .5mg x1     (2022 23:51)    INTERVAL HPI:  - pt seen and examined at bedside. Complaining of increased frequency of urination and suprapubic pain s/p 40mg IV lasix x1 in the ED. Otherwise, notes worsening of chronic back pain and general feeling of malaise. No sob, chest pain, nausea. Fidelina, Anemia.HIDA scan today.  - pt seen and examined at bedside. Feeling confused. A&Ox1 (person). Has a hx of hospital delirium and has been able to be reoriented when her daughter is present. Notes pain over b/l LE but denies sob, chest pain, n/v/d. Waiting for gastric emptying study per GI.  - pt seen and examined at bedside. Feeling confused again this AM possibly chronic hospital AM delirium. A&Ox1 (person). Notes constipation and decrease in b/l LE edema but has mild pain. Gastric emptying study cancelled due to pt inability to tolerate being off pain medication. Denies SOB, chest pain, n/v/d. Received dose 1/3 of remdesivir for COVID-19.   : Patient seen and examined at bedside. Patient states she did not sleep much overnight, but otherwise feeling okay. Denies any CP, SOB, abd pain, N/V/D. Patient still c/o mild leg pain. Cr stable. Loose BM Low stable H/H   7/10: Patient seen and examined at bedside. Complaining of increased frequency of urination that kept her up all night. States she needs her Dilaudid for back pain and b/l leg pain. Otherwise feeling alright Denies any CP, SOB, abd pain, N/V/D. Cr. stable.  Low stable H/H       OVERNIGHT EVENTS: Pt complaining of increased frequency of urination with hematuria. UA significant for large blood and many bacteria. Hgb down to 8.6 from 9.8. No signs of acute bleeding.     Home Medications:  amitriptyline 10 mg oral tablet: 3 tab(s) orally once a day (at bedtime) (2022 23:44)  aspirin 81 mg oral tablet: 1 tab(s) orally once a day (2022 23:44)  atorvastatin 40 mg oral tablet: 1 tab(s) orally once a day (2022 23:44)  Dilaudid 4 mg oral tablet: 1 tab(s) orally 3 times (2022 23:44)  ferrous sulfate 324 mg (65 mg elemental iron) oral delayed release tablet: 1 tab(s) orally once a day (2022 23:44)  ferrous sulfate 325 mg (65 mg elemental iron) oral tablet: 1 tablet oral daily (:44)  Metoprolol Succinate ER 25 mg oral tablet, extended release: 1 tab(s) orally once a day (2022 23:44)  MiraLax oral powder for reconstitution: ng/kg orally (2022 23:44)  PreserVision AREDS oral capsule: 1 tab(s) orally 2 times a day (:44)  Senna 8.6 mg oral tablet: 2 tab(s) orally once a day (at bedtime) (:44)  Toprol-XL 25 mg oral tablet, extended release: 1 tab(s) orally once a day (2022 23:44)  Vitamin D3 1250 mcg (50,000 intl units) oral capsule: 1 cap(s) orally once a week (:44)  Zofran 4 mg oral tablet: 1 tab(s) orally every 6 hours (:44)      MEDICATIONS  (STANDING):  amitriptyline 30 milliGRAM(s) Oral at bedtime  aspirin  chewable 81 milliGRAM(s) Oral daily  atorvastatin 40 milliGRAM(s) Oral at bedtime  dextrose 5%. 1000 milliLiter(s) (100 mL/Hr) IV Continuous <Continuous>  dextrose 5%. 1000 milliLiter(s) (50 mL/Hr) IV Continuous <Continuous>  dextrose 50% Injectable 25 Gram(s) IV Push once  dextrose 50% Injectable 12.5 Gram(s) IV Push once  dextrose 50% Injectable 25 Gram(s) IV Push once  ferrous    sulfate 325 milliGRAM(s) Oral daily  glucagon  Injectable 1 milliGRAM(s) IntraMuscular once  heparin   Injectable 5000 Unit(s) SubCutaneous every 8 hours  HYDROmorphone   Tablet 4 milliGRAM(s) Oral three times a day  insulin lispro (ADMELOG) corrective regimen sliding scale   SubCutaneous three times a day before meals  insulin lispro (ADMELOG) corrective regimen sliding scale   SubCutaneous at bedtime  metoclopramide Injectable 5 milliGRAM(s) IV Push every 6 hours  metoprolol succinate ER 25 milliGRAM(s) Oral daily  pantoprazole    Tablet 40 milliGRAM(s) Oral before breakfast  polyethylene glycol 3350 17 Gram(s) Oral daily  senna 2 Tablet(s) Oral at bedtime  sucralfate suspension 1 Gram(s) Oral two times a day    MEDICATIONS  (PRN):  acetaminophen     Tablet .. 650 milliGRAM(s) Oral every 6 hours PRN Temp greater or equal to 38C (100.4F), Mild Pain (1 - 3)  aluminum hydroxide/magnesium hydroxide/simethicone Suspension 30 milliLiter(s) Oral every 4 hours PRN Dyspepsia  dextrose Oral Gel 15 Gram(s) Oral once PRN Blood Glucose LESS THAN 70 milliGRAM(s)/deciliter  melatonin 3 milliGRAM(s) Oral at bedtime PRN Insomnia  ondansetron Injectable 4 milliGRAM(s) IV Push every 8 hours PRN Nausea and/or Vomiting      Allergies    morphine (Other)    Intolerances        Social History:  Former cigarette smoker, smoked <1/2 ppd for less than 15 years, quit more than 50 years ago   Denies ETOH use   Denies drug use   Ambulates with a walker. Lives at home with daughter. ADLs with assistance. (2022 23:51)      REVIEW OF SYSTEMS:  CONSTITUTIONAL: No fever, No chills, No fatigue, No myalgia, No Body ache, No Weakness  EYES: No eye pain,  No visual disturbances, No discharge, NO Redness  ENMT:  No ear pain, No nose bleed, No vertigo; No sinus pain, NO throat pain, No Congestion  NECK: No pain, No stiffness  RESPIRATORY: No cough, NO wheezing, No  hemoptysis, NO  shortness of breath  CARDIOVASCULAR: No chest pain, palpitations  GASTROINTESTINAL: +suprapubic pain No abdominal pain, NO epigastric pain. No nausea, No vomiting; No diarrhea, No constipation. [  ] BM  GENITOURINARY: No dysuria, + frequency, + urgency, + hematuria, NO incontinence  NEUROLOGICAL: No headaches, No dizziness, No numbness, No tingling, No tremors, No weakness  EXT: No Swelling, No Pain, +edema b/l LE  SKIN:  [ x ] No itching, burning, rashes, or lesions   MUSCULOSKELETAL: No joint pain ,No Jt swelling; No muscle pain, No back pain, No extremity pain  PSYCHIATRIC: No depression,  No anxiety,  No mood swings ,No difficulty sleeping at night  PAIN SCALE: [  ] None  [ x ] Other- dilaudid 4mg 9am, 4pm, 10pm  ROS Unable to obtain due to - [  ] Dementia  [  ] Lethargy [  ] Drowsy [  ] Sedated [  ] non verbal  REST OF REVIEW Of SYSTEM - [  ] Normal     Vital Signs Last 24 Hrs  T(C): 37.2 (10 Jul 2022 05:34), Max: 37.4 (2022 21:00)  T(F): 99 (10 Jul 2022 05:34), Max: 99.4 (2022 21:00)  HR: 95 (10 Jul 2022 05:34) (87 - 95)  BP: 123/73 (10 Jul 2022 05:34) (121/73 - 149/80)  BP(mean): --  RR: 17 (10 Jul 2022 05:34) (17 - 17)  SpO2: 74% (10 Jul 2022 05:34) (74% - 94%)    Parameters below as of 10 Jul 2022 05:34  Patient On (Oxygen Delivery Method): room air      Finger Stick    PHYSICAL EXAM:  GENERAL:  [x  ] NAD , [x  ] well appearing, [ x ] Agitated, [  ] Drowsy,  [  ] Lethargy, [  ] confused   HEAD:  [  x] Normal, [  ] Other  EYES:  [ x ] EOMI, [ x ] PERRLA, [  x] conjunctiva and sclera clear normal, [  ] Other,  [  ] Pallor,[  ] Discharge  ENMT:  [x  ] Normal, [ x ] Moist mucous membranes, [x] Good dentition, [ x ] No Thrush  NECK:  [ x ] Supple, [ x ] No JVD, [ x ] Normal thyroid, [  ] Lymphadenopathy [  ] Other  CHEST/LUNG:  [x  ] Clear to auscultation bilaterally, [ x ] Breath Sounds equal B/L  [  ] poor effort  [ x ] No rales, [ x ] No rhonchi  [ x ]  No wheezing,   HEART:  [x  ] Regular rate and rhythm, [  ] tachycardia, [  ] Bradycardia,  [  ] irregular  [ x ] No murmurs, No rubs, No gallops, [  ] PPM in place (Mfr:  )  ABDOMEN:  [ x ] Soft, [ x ] Nontender, [  x] Nondistended, [  ] No mass, [ x ] Bowel sounds present, [  ] obese  NERVOUS SYSTEM:  [ x ] Alert & Oriented X3, [x  ] Nonfocal  [  ] Confusion  [  ] Encephalopathic [  ] Sedated [  ] Unable to assess, [  ] Dementia [  ] Other-  EXTREMITIES: [ x ] 2+ Peripheral Pulses, No clubbing, No cyanosis,  [x  ] 2+ edema B/L lower EXT. [  ] PVD stasis skin changes B/L Lower EXT, [  ] wound  LYMPH: No lymphadenopathy noted  SKIN:  [ x ] No rashes or lesions, [  ] Pressure Ulcers, [ x ] ecchymosis, [  ] Skin Tears, [  ] Other    DIET: Soft bite Size    LABS:                        8.6    5.55  )-----------( 199      ( 10 Jul 2022 07:44 )             26.3     10 Jul 2022 07:44    x      |  x      |  34     ----------------------------<  141    x       |  24     |  1.80     Ca    8.3        10 Jul 2022 07:44        Urinalysis Basic - ( 2022 22:30 )    Color: Yellow / Appearance: Slightly Turbid / S.005 / pH: x  Gluc: x / Ketone: Trace  / Bili: Negative / Urobili: Negative   Blood: x / Protein: 25 mg/dL / Nitrite: Negative   Leuk Esterase: Trace / RBC: 3-5 /HPF / WBC 3-5   Sq Epi: x / Non Sq Epi: Negative / Bacteria: Many        Culture Results:   No growth to date. ( @ 21:45)  Culture Results:   No growth to date. ( @ 21:40)      Culture - Blood (collected 2022 21:45)  Source: .Blood Blood-Peripheral  Preliminary Report (2022 01:02):    No growth to date.    Culture - Blood (collected 2022 21:40)  Source: .Blood Blood-Peripheral  Preliminary Report (2022 01:02):    No growth to date.         Anemia Panel:  Vitamin B12, Serum: 342 pg/mL (22 @ 07:14)  Folate, Serum: 11.8 ng/mL (22 @ 07:14)  Iron Total, Serum: 19 ug/dL (22 @ 07:14)  Iron - Total Binding Capacity.: 242 ug/dL (22 @ 07:14)  Ferritin, Serum: 67 ng/mL (22 @ 07:14)      Thyroid Panel:        Lipase, Serum: 78 U/L (22 @ 19:05)    RADIOLOGY & ADDITIONAL TESTS: none      HEALTH ISSUES - PROBLEM Dx:  Cholelithiasis    FIDELINA (acute kidney injury)    Pleural effusion    2019 novel coronavirus disease (COVID-19)    Anemia    Fall    CAD (coronary artery disease)    HTN (hypertension)    Type 2 diabetes mellitus    Chronic back pain    Anxiety and depression        DVT prophylaxis      Consultant(s) Notes Reviewed:  [ x ] YES     Care Discussed with [X] Consultants  [x  ] Patient  [ x ] Family- dtr  [  ] HCP [  ]   [  ] Social Service  [x  ] RN, [  ] Physical Therapy,[  ] Palliative care team  DVT PPX: [  ] Lovenox, [ x ] S C Heparin, [  ] Coumadin, [  ] Xarelto, [  ] Eliquis, [  ] Pradaxa, [  ] IV Heparin drip, [  ] SCD [  ] Contraindication 2 to GI Bleed,[  ] Ambulation [  ] Contraindicated 2 to  bleed [  ] Contraindicated 2 to Brain Bleed  Advanced directive: [  ] None, [ x ] DNR/DNI

## 2024-01-31 NOTE — PROGRESS NOTE ADULT - PROBLEM SELECTOR PROBLEM 4
Daughter stopped by yesterday 01/30 and had a discussion with Dr Rosario, pt scheduled to see Dr Rosario in a month.    2019 novel coronavirus disease (COVID-19)

## 2024-05-10 NOTE — ED PROVIDER NOTE - TOBACCO USE
Discharge Diagnosis  Endometrial thickening on ultrasound    Issues Requiring Follow-Up  Pathlogy    Test Results Pending At Discharge  Pending Labs       Order Current Status    Surgical Pathology Exam Collected (05/10/24 0822)            Hospital Course   Surgery w/normal postop course    Pertinent Physical Exam At Time of Discharge  Physical Examsee op note    Home Medications     Medication List      START taking these medications     acetaminophen 500 mg tablet; Commonly known as: Tylenol; Take 2 tablets   (1,000 mg) by mouth every 6 hours if needed for mild pain (1 - 3).   ibuprofen 600 mg tablet; Take 1 tablet (600 mg) by mouth every 8 hours   if needed for moderate pain (4 - 6) for up to 5 days.     CONTINUE taking these medications     clobetasol 0.05 % ointment; Commonly known as: Temovate; Apply 1   fingertip amount to the affected area once nightly for 4 weeks, then every   other night for 4 weeks, then twice weekly for 4 weeks   levothyroxine 88 mcg tablet; Commonly known as: Synthroid, Levoxyl; Take   1 tablet (88 mcg) by mouth once daily in the morning. Take before meals.   miSOPROStoL 200 mcg tablet; Commonly known as: Cytotec; Insert 1 tablet   (200 mcg) into the vagina 1 time for 1 dose. Place 1 tablet high into   vagina 24 hours before procedure       Outpatient Follow-Up  Future Appointments   Date Time Provider Department Center   5/22/2024  1:00 PM JUAN [facility]   5/28/2024 10:45 AM MD Zack Leyva   3/5/2025 11:30 AM Cata Reyes MD [id number] [facility]       Ivy Chandra MD  
Never smoker

## 2024-07-08 NOTE — ED PROVIDER NOTE - ENMT, MLM
Airway patent, Nasal mucosa clear. Mouth with normal mucosa. Throat has no vesicles, no oropharyngeal exudates and uvula is midline.
67

## 2024-09-20 NOTE — ED PROVIDER NOTE - TIMING
Pt and family verbalize good understanding of discharge teach and follow up with MD.   VSS,Surgical incision CDI. D/C criteria met. Pt verbalizes readiness to go home.   Pt home with son     [unfilled] 9/20/2024 2:06 AM     constant

## 2025-02-25 NOTE — PHYSICAL THERAPY INITIAL EVALUATION ADULT - IMPAIRMENTS FOUND, PT EVAL
aerobic capacity/endurance/gait, locomotion, and balance/gross motor/muscle strength/poor safety awareness Attending Attestation (For Attendings USE Only)...

## 2025-03-10 NOTE — ED PROVIDER NOTE - IV ALTEPLASE DOOR HIDDEN
Medication:    Disp Refills Start End    lisdexamfetamine (VYVANSE) 50 MG capsule 30 capsule 0 2/5/2025 --    Sig - Route: Take 1 capsule by mouth every morning. Must last 30 days - Oral    Sent to pharmacy as: Lisdexamfetamine Dimesylate 50 MG Oral Capsule (VYVANSE)    Class: Eprescribe      Last office visit date: 1/28/25, return in 2 months  Appointment: 3/27/25    Medication Refill Protocol Failed.      Controlled Substances Refill Protocol - 12 Month Protocol - ALWAYS forward to ordering provider [id number]/[id number]/2025 09:54 AM   Protocol Details FORWARD TO PROVIDER - Refill requests for these medications must ALWAYS be forwarded to the ordering provider, even if all criteria are met    PDMP has been reviewed in last 24 hours          Last refill: 2/6/25 per PDMP  Is the patient due for refill of this medication(s): Yes  PDMP review: Criteria met. Forwarded to covering Physician/SHARLENE for signature.     UDS: 11/11/24       show

## 2025-03-25 NOTE — ED PROVIDER NOTE - CONSTITUTIONAL, MLM
2021 normal... Well appearing, well nourished, awake, alert, oriented to person, place, time/situation

## 2025-05-06 NOTE — ED ADULT NURSE NOTE - CAS ELECT INFOMATION PROVIDED
Case Management Discharge Planning Note    Patient name Nancy Jones  Location [facility] /E2 -* MRN [id number]  : 1958 Date 2025       Current Admission Date: 2025  Current Admission Diagnosis:Incisional hernia, without obstruction or gangrene  Patient Active Problem List    Diagnosis Date Noted Date Diagnosed    Pharyngitis due to Streptococcus species 2025     Rib contusion, right, subsequent encounter 2025     Financial difficulties 2024     Shingles 2024     Diabetes due to undrl condition w h diabetic neuro comp ([facility]) 2024     Folliculitis 2024     Complex tear of lateral meniscus of left knee as current injury, initial encounter 2024     Opioid dependence, uncomplicated ([facility]) 2024     Obesity, morbid ([facility]) 2024     Acute lateral meniscus tear of left knee 2024     Acute pain of left knee 2024     Left facial numbness 2024     Other chest pain 2024     Ventral hernia without obstruction or gangrene 2024     Atherosclerosis of native arteries of extremities with rest pain, bilateral legs ([facility]) 2024     Acute on chronic diastolic (congestive) heart failure ([facility]) 2024     Incisional hernia, without obstruction or gangrene 2023     Stage 3 chronic kidney disease, unspecified whether stage 3a or 3b CKD ([facility]) 2023     Dysuria 2023     Omental infarction ([facility]) 2023     Hypokalemia 2023     Night sweats 2023     S/P small bowel resection 2023     Anxiety 2023     Acute postoperative pain 2023     Colitis 2023     Chronic migraine w/o aura w/o status migrainosus, not intractable 2023     IIH (idiopathic intracranial hypertension) 2023     Hematochezia 2023     Left carotid stenosis 2023     Colonic ischemia ([facility]) 2023     Paresthesia 2023     Injury of left facial nerve 2023     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     Hepatic steatosis 12/19/2022     Asymptomatic stenosis of left carotid artery 10/05/2022     Stenosis of left carotid artery 09/06/2022     Carotid artery stenosis 09/02/2022     Appendix disease 04/29/2022     Urinary retention 03/17/2022     Peripheral vascular disease (HCC) 03/17/2022     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     COPD (chronic obstructive pulmonary disease) (HCC) 08/19/2021     Unspecified diastolic (congestive) heart failure (HCC) 03/12/2021     Hypertensive heart disease with congestive heart failure (HCC) 12/21/2020     CAMILLE (obstructive sleep apnea)      Lumbar radiculopathy 10/01/2020     Wheezing 09/21/2020     A-fib (HCC) 03/19/2020     Angio-edema 01/22/2020     Gastroesophageal reflux disease without esophagitis 08/12/2019     Allergic reaction 12/03/2018     AMD (age-related macular degeneration), bilateral 11/16/2018     Angina pectoris ([facility]) 11/15/2017     Migraines 10/14/2016     Essential hypertension 08/13/2015       LOS (days): 1  Geometric Mean LOS (GMLOS) (days): 2  Days to GMLOS:0.9     OBJECTIVE:  Risk of Unplanned Readmission Score: 25.03         Current admission status: Inpatient   Preferred Pharmacy:   [facility] [id number] [place], [place] - [address] [place] [address]  Phone: [phone] Fax: [phone]    [facility] [id number] [place], [place] - [address]  [address]  [place] [address]  Phone: [phone] Fax: [phone]    Primary Care Provider: Barber Ayoub DO    Primary Insurance: [facility] REP  Secondary Insurance:     DISCHARGE DETAILS:                                Requested Home Health Care         Is the patient interested in HHC at discharge?: Yes  Home Health Discipline requested:: Occupational Therapy, Nursing, Physical Therapy  Home Health Agency Name:: [facility]  Home Health Follow-Up Provider:: PCP  Home Health Services  Needed:: Evaluate Functional Status and Safety, Strengthening/Theraputic Exercises to Improve Function, Wound/Ostomy Care  Homebound Criteria Met:: Requires the Assistance of Another Person for Safe Ambulation or to Leave the Home  Supporting Clincal Findings:: Fatigues Easliy in Short Distances, Limited Endurance                                                                                        DC instructions

## (undated) DEVICE — VENODYNE/SCD SLEEVE CALF MEDIUM

## (undated) DEVICE — PLV-SCD MACHINE: Type: DURABLE MEDICAL EQUIPMENT

## (undated) DEVICE — GOWN LG

## (undated) DEVICE — SOL INJ NS 0.9% 1000ML

## (undated) DEVICE — PACK CYSTO

## (undated) DEVICE — GLV 8 PROTEXIS (WHITE)

## (undated) DEVICE — DRAPE DRAINAGE BAG URO CATCH II

## (undated) DEVICE — FOLEY CATH 2-WAY 22FR 30CC SILICONE

## (undated) DEVICE — WARMING BLANKET UPPER ADULT

## (undated) DEVICE — DRAPE C ARM UNIVERSAL

## (undated) DEVICE — DRAINAGE BAG URINARY 4L

## (undated) DEVICE — SYR LUER LOK 30CC

## (undated) DEVICE — VENODYNE/SCD SLEEVE CALF LARGE

## (undated) DEVICE — UROVAC

## (undated) DEVICE — SOL IRR BAG H2O 3000ML

## (undated) DEVICE — SYR LUER LOK 10CC